# Patient Record
Sex: MALE | Race: BLACK OR AFRICAN AMERICAN | NOT HISPANIC OR LATINO | URBAN - METROPOLITAN AREA
[De-identification: names, ages, dates, MRNs, and addresses within clinical notes are randomized per-mention and may not be internally consistent; named-entity substitution may affect disease eponyms.]

---

## 2023-04-23 ENCOUNTER — INPATIENT (INPATIENT)
Facility: HOSPITAL | Age: 73
LOS: 3 days | Discharge: HOME HEALTH | DRG: 546 | End: 2023-04-27
Attending: INTERNAL MEDICINE | Admitting: INTERNAL MEDICINE
Payer: MEDICARE

## 2023-04-23 ENCOUNTER — EMERGENCY (EMERGENCY)
Facility: HOSPITAL | Age: 73
LOS: 0 days | Discharge: ANOTHER HOSPITAL | End: 2023-04-23
Attending: EMERGENCY MEDICINE
Payer: MEDICARE

## 2023-04-23 VITALS
RESPIRATION RATE: 17 BRPM | SYSTOLIC BLOOD PRESSURE: 133 MMHG | OXYGEN SATURATION: 98 % | HEIGHT: 64 IN | WEIGHT: 74.74 LBS | TEMPERATURE: 98 F | DIASTOLIC BLOOD PRESSURE: 78 MMHG | HEART RATE: 77 BPM

## 2023-04-23 VITALS
OXYGEN SATURATION: 94 % | TEMPERATURE: 98 F | DIASTOLIC BLOOD PRESSURE: 82 MMHG | RESPIRATION RATE: 16 BRPM | HEART RATE: 102 BPM | HEIGHT: 64 IN | WEIGHT: 164.91 LBS | SYSTOLIC BLOOD PRESSURE: 126 MMHG

## 2023-04-23 VITALS
SYSTOLIC BLOOD PRESSURE: 120 MMHG | HEART RATE: 78 BPM | DIASTOLIC BLOOD PRESSURE: 73 MMHG | RESPIRATION RATE: 16 BRPM | TEMPERATURE: 98 F | OXYGEN SATURATION: 100 %

## 2023-04-23 DIAGNOSIS — I21.4 NON-ST ELEVATION (NSTEMI) MYOCARDIAL INFARCTION: ICD-10-CM

## 2023-04-23 DIAGNOSIS — R06.02 SHORTNESS OF BREATH: ICD-10-CM

## 2023-04-23 DIAGNOSIS — E11.9 TYPE 2 DIABETES MELLITUS WITHOUT COMPLICATIONS: ICD-10-CM

## 2023-04-23 DIAGNOSIS — M10.9 GOUT, UNSPECIFIED: ICD-10-CM

## 2023-04-23 DIAGNOSIS — I10 ESSENTIAL (PRIMARY) HYPERTENSION: ICD-10-CM

## 2023-04-23 DIAGNOSIS — Z87.438 PERSONAL HISTORY OF OTHER DISEASES OF MALE GENITAL ORGANS: ICD-10-CM

## 2023-04-23 DIAGNOSIS — Z88.8 ALLERGY STATUS TO OTHER DRUGS, MEDICAMENTS AND BIOLOGICAL SUBSTANCES: ICD-10-CM

## 2023-04-23 DIAGNOSIS — R09.81 NASAL CONGESTION: ICD-10-CM

## 2023-04-23 DIAGNOSIS — Z79.4 LONG TERM (CURRENT) USE OF INSULIN: ICD-10-CM

## 2023-04-23 DIAGNOSIS — I45.10 UNSPECIFIED RIGHT BUNDLE-BRANCH BLOCK: ICD-10-CM

## 2023-04-23 DIAGNOSIS — M54.50 LOW BACK PAIN, UNSPECIFIED: ICD-10-CM

## 2023-04-23 LAB
ALBUMIN SERPL ELPH-MCNC: 3.2 G/DL — LOW (ref 3.3–5)
ALP SERPL-CCNC: 68 U/L — SIGNIFICANT CHANGE UP (ref 40–120)
ALT FLD-CCNC: 19 U/L — SIGNIFICANT CHANGE UP (ref 12–78)
ANION GAP SERPL CALC-SCNC: 14 MMOL/L — SIGNIFICANT CHANGE UP (ref 5–17)
ANION GAP SERPL CALC-SCNC: 5 MMOL/L — SIGNIFICANT CHANGE UP (ref 5–17)
APTT BLD: 35.7 SEC — HIGH (ref 27.5–35.5)
APTT BLD: 58.1 SEC — HIGH (ref 27.5–35.5)
AST SERPL-CCNC: 30 U/L — SIGNIFICANT CHANGE UP (ref 15–37)
BASOPHILS # BLD AUTO: 0.04 K/UL — SIGNIFICANT CHANGE UP (ref 0–0.2)
BASOPHILS NFR BLD AUTO: 0.8 % — SIGNIFICANT CHANGE UP (ref 0–2)
BILIRUB SERPL-MCNC: 0.5 MG/DL — SIGNIFICANT CHANGE UP (ref 0.2–1.2)
BUN SERPL-MCNC: 14 MG/DL — SIGNIFICANT CHANGE UP (ref 7–23)
BUN SERPL-MCNC: 15 MG/DL — SIGNIFICANT CHANGE UP (ref 7–23)
CALCIUM SERPL-MCNC: 9 MG/DL — SIGNIFICANT CHANGE UP (ref 8.4–10.5)
CALCIUM SERPL-MCNC: 9.2 MG/DL — SIGNIFICANT CHANGE UP (ref 8.5–10.1)
CHLORIDE SERPL-SCNC: 104 MMOL/L — SIGNIFICANT CHANGE UP (ref 96–108)
CHLORIDE SERPL-SCNC: 99 MMOL/L — SIGNIFICANT CHANGE UP (ref 96–108)
CO2 SERPL-SCNC: 23 MMOL/L — SIGNIFICANT CHANGE UP (ref 22–31)
CO2 SERPL-SCNC: 26 MMOL/L — SIGNIFICANT CHANGE UP (ref 22–31)
CREAT SERPL-MCNC: 1.96 MG/DL — HIGH (ref 0.5–1.3)
CREAT SERPL-MCNC: 1.97 MG/DL — HIGH (ref 0.5–1.3)
D DIMER BLD IA.RAPID-MCNC: 571 NG/ML DDU — HIGH
EGFR: 35 ML/MIN/1.73M2 — LOW
EGFR: 35 ML/MIN/1.73M2 — LOW
EOSINOPHIL # BLD AUTO: 0.26 K/UL — SIGNIFICANT CHANGE UP (ref 0–0.5)
EOSINOPHIL NFR BLD AUTO: 5.2 % — SIGNIFICANT CHANGE UP (ref 0–6)
GLUCOSE BLDC GLUCOMTR-MCNC: 83 MG/DL — SIGNIFICANT CHANGE UP (ref 70–99)
GLUCOSE SERPL-MCNC: 70 MG/DL — SIGNIFICANT CHANGE UP (ref 70–99)
GLUCOSE SERPL-MCNC: 80 MG/DL — SIGNIFICANT CHANGE UP (ref 70–99)
HCT VFR BLD CALC: 29.5 % — LOW (ref 39–50)
HGB BLD-MCNC: 9.5 G/DL — LOW (ref 13–17)
IMM GRANULOCYTES NFR BLD AUTO: 0.2 % — SIGNIFICANT CHANGE UP (ref 0–0.9)
INR BLD: 1.22 RATIO — HIGH (ref 0.88–1.16)
INR BLD: 1.26 RATIO — HIGH (ref 0.88–1.16)
LYMPHOCYTES # BLD AUTO: 2.27 K/UL — SIGNIFICANT CHANGE UP (ref 1–3.3)
LYMPHOCYTES # BLD AUTO: 45.1 % — HIGH (ref 13–44)
MCHC RBC-ENTMCNC: 27 PG — SIGNIFICANT CHANGE UP (ref 27–34)
MCHC RBC-ENTMCNC: 32.2 G/DL — SIGNIFICANT CHANGE UP (ref 32–36)
MCV RBC AUTO: 83.8 FL — SIGNIFICANT CHANGE UP (ref 80–100)
MONOCYTES # BLD AUTO: 0.6 K/UL — SIGNIFICANT CHANGE UP (ref 0–0.9)
MONOCYTES NFR BLD AUTO: 11.9 % — SIGNIFICANT CHANGE UP (ref 2–14)
NEUTROPHILS # BLD AUTO: 1.85 K/UL — SIGNIFICANT CHANGE UP (ref 1.8–7.4)
NEUTROPHILS NFR BLD AUTO: 36.8 % — LOW (ref 43–77)
NRBC # BLD: 0 /100 WBCS — SIGNIFICANT CHANGE UP (ref 0–0)
NT-PROBNP SERPL-SCNC: 751 PG/ML — HIGH (ref 0–300)
NT-PROBNP SERPL-SCNC: 785 PG/ML — HIGH (ref 0–125)
PLATELET # BLD AUTO: 232 K/UL — SIGNIFICANT CHANGE UP (ref 150–400)
POTASSIUM SERPL-MCNC: 4 MMOL/L — SIGNIFICANT CHANGE UP (ref 3.5–5.3)
POTASSIUM SERPL-MCNC: 4.3 MMOL/L — SIGNIFICANT CHANGE UP (ref 3.5–5.3)
POTASSIUM SERPL-SCNC: 4 MMOL/L — SIGNIFICANT CHANGE UP (ref 3.5–5.3)
POTASSIUM SERPL-SCNC: 4.3 MMOL/L — SIGNIFICANT CHANGE UP (ref 3.5–5.3)
PROT SERPL-MCNC: 7.3 GM/DL — SIGNIFICANT CHANGE UP (ref 6–8.3)
PROTHROM AB SERPL-ACNC: 14.7 SEC — HIGH (ref 10.5–13.4)
PROTHROM AB SERPL-ACNC: 14.7 SEC — HIGH (ref 10.5–13.4)
RAPID RVP RESULT: SIGNIFICANT CHANGE UP
RBC # BLD: 3.52 M/UL — LOW (ref 4.2–5.8)
RBC # FLD: 15.4 % — HIGH (ref 10.3–14.5)
SARS-COV-2 RNA SPEC QL NAA+PROBE: SIGNIFICANT CHANGE UP
SODIUM SERPL-SCNC: 135 MMOL/L — SIGNIFICANT CHANGE UP (ref 135–145)
SODIUM SERPL-SCNC: 136 MMOL/L — SIGNIFICANT CHANGE UP (ref 135–145)
TROPONIN I, HIGH SENSITIVITY RESULT: 150.7 NG/L — HIGH
TROPONIN T, HIGH SENSITIVITY RESULT: 149 NG/L — HIGH (ref 0–51)
WBC # BLD: 5.03 K/UL — SIGNIFICANT CHANGE UP (ref 3.8–10.5)
WBC # FLD AUTO: 5.03 K/UL — SIGNIFICANT CHANGE UP (ref 3.8–10.5)

## 2023-04-23 PROCEDURE — 72131 CT LUMBAR SPINE W/O DYE: CPT | Mod: 26,MA

## 2023-04-23 PROCEDURE — 71045 X-RAY EXAM CHEST 1 VIEW: CPT | Mod: 26

## 2023-04-23 PROCEDURE — 71275 CT ANGIOGRAPHY CHEST: CPT | Mod: 26,MA

## 2023-04-23 PROCEDURE — 70450 CT HEAD/BRAIN W/O DYE: CPT | Mod: 26,MA

## 2023-04-23 PROCEDURE — 99285 EMERGENCY DEPT VISIT HI MDM: CPT

## 2023-04-23 PROCEDURE — 93010 ELECTROCARDIOGRAM REPORT: CPT

## 2023-04-23 RX ORDER — KETOROLAC TROMETHAMINE 30 MG/ML
15 SYRINGE (ML) INJECTION ONCE
Refills: 0 | Status: DISCONTINUED | OUTPATIENT
Start: 2023-04-23 | End: 2023-04-23

## 2023-04-23 RX ORDER — GLUCAGON INJECTION, SOLUTION 0.5 MG/.1ML
1 INJECTION, SOLUTION SUBCUTANEOUS ONCE
Refills: 0 | Status: DISCONTINUED | OUTPATIENT
Start: 2023-04-23 | End: 2023-04-27

## 2023-04-23 RX ORDER — AMPICILLIN SODIUM AND SULBACTAM SODIUM 250; 125 MG/ML; MG/ML
INJECTION, POWDER, FOR SUSPENSION INTRAMUSCULAR; INTRAVENOUS
Refills: 0 | Status: DISCONTINUED | OUTPATIENT
Start: 2023-04-23 | End: 2023-04-27

## 2023-04-23 RX ORDER — PANTOPRAZOLE SODIUM 20 MG/1
40 TABLET, DELAYED RELEASE ORAL ONCE
Refills: 0 | Status: COMPLETED | OUTPATIENT
Start: 2023-04-23 | End: 2023-04-23

## 2023-04-23 RX ORDER — ASPIRIN/CALCIUM CARB/MAGNESIUM 324 MG
324 TABLET ORAL ONCE
Refills: 0 | Status: COMPLETED | OUTPATIENT
Start: 2023-04-23 | End: 2023-04-23

## 2023-04-23 RX ORDER — INSULIN LISPRO 100/ML
VIAL (ML) SUBCUTANEOUS
Refills: 0 | Status: DISCONTINUED | OUTPATIENT
Start: 2023-04-23 | End: 2023-04-27

## 2023-04-23 RX ORDER — AMPICILLIN SODIUM AND SULBACTAM SODIUM 250; 125 MG/ML; MG/ML
3 INJECTION, POWDER, FOR SUSPENSION INTRAMUSCULAR; INTRAVENOUS EVERY 6 HOURS
Refills: 0 | Status: DISCONTINUED | OUTPATIENT
Start: 2023-04-24 | End: 2023-04-27

## 2023-04-23 RX ORDER — AMPICILLIN SODIUM AND SULBACTAM SODIUM 250; 125 MG/ML; MG/ML
3 INJECTION, POWDER, FOR SUSPENSION INTRAMUSCULAR; INTRAVENOUS ONCE
Refills: 0 | Status: COMPLETED | OUTPATIENT
Start: 2023-04-23 | End: 2023-04-23

## 2023-04-23 RX ORDER — DEXTROSE 50 % IN WATER 50 %
12.5 SYRINGE (ML) INTRAVENOUS ONCE
Refills: 0 | Status: DISCONTINUED | OUTPATIENT
Start: 2023-04-23 | End: 2023-04-27

## 2023-04-23 RX ORDER — FLUTICASONE PROPIONATE 50 MCG
1 SPRAY, SUSPENSION NASAL
Refills: 0 | Status: DISCONTINUED | OUTPATIENT
Start: 2023-04-23 | End: 2023-04-27

## 2023-04-23 RX ORDER — HEPARIN SODIUM 5000 [USP'U]/ML
4400 INJECTION INTRAVENOUS; SUBCUTANEOUS EVERY 6 HOURS
Refills: 0 | Status: DISCONTINUED | OUTPATIENT
Start: 2023-04-23 | End: 2023-04-24

## 2023-04-23 RX ORDER — HEPARIN SODIUM 5000 [USP'U]/ML
INJECTION INTRAVENOUS; SUBCUTANEOUS
Qty: 25000 | Refills: 0 | Status: DISCONTINUED | OUTPATIENT
Start: 2023-04-23 | End: 2023-04-24

## 2023-04-23 RX ORDER — HEPARIN SODIUM 5000 [USP'U]/ML
900 INJECTION INTRAVENOUS; SUBCUTANEOUS
Qty: 25000 | Refills: 0 | Status: DISCONTINUED | OUTPATIENT
Start: 2023-04-23 | End: 2023-04-23

## 2023-04-23 RX ORDER — CLOPIDOGREL BISULFATE 75 MG/1
600 TABLET, FILM COATED ORAL ONCE
Refills: 0 | Status: COMPLETED | OUTPATIENT
Start: 2023-04-23 | End: 2023-04-23

## 2023-04-23 RX ORDER — DEXTROSE 50 % IN WATER 50 %
15 SYRINGE (ML) INTRAVENOUS ONCE
Refills: 0 | Status: DISCONTINUED | OUTPATIENT
Start: 2023-04-23 | End: 2023-04-27

## 2023-04-23 RX ORDER — SODIUM CHLORIDE 9 MG/ML
1000 INJECTION, SOLUTION INTRAVENOUS
Refills: 0 | Status: DISCONTINUED | OUTPATIENT
Start: 2023-04-23 | End: 2023-04-27

## 2023-04-23 RX ORDER — DEXTROSE 50 % IN WATER 50 %
25 SYRINGE (ML) INTRAVENOUS ONCE
Refills: 0 | Status: DISCONTINUED | OUTPATIENT
Start: 2023-04-23 | End: 2023-04-27

## 2023-04-23 RX ORDER — SODIUM CHLORIDE 9 MG/ML
1000 INJECTION INTRAMUSCULAR; INTRAVENOUS; SUBCUTANEOUS ONCE
Refills: 0 | Status: COMPLETED | OUTPATIENT
Start: 2023-04-23 | End: 2023-04-23

## 2023-04-23 RX ORDER — INSULIN LISPRO 100/ML
VIAL (ML) SUBCUTANEOUS AT BEDTIME
Refills: 0 | Status: DISCONTINUED | OUTPATIENT
Start: 2023-04-23 | End: 2023-04-27

## 2023-04-23 RX ADMIN — AMPICILLIN SODIUM AND SULBACTAM SODIUM 200 GRAM(S): 250; 125 INJECTION, POWDER, FOR SUSPENSION INTRAMUSCULAR; INTRAVENOUS at 15:51

## 2023-04-23 RX ADMIN — SODIUM CHLORIDE 1000 MILLILITER(S): 9 INJECTION INTRAMUSCULAR; INTRAVENOUS; SUBCUTANEOUS at 13:03

## 2023-04-23 RX ADMIN — Medication 324 MILLIGRAM(S): at 13:03

## 2023-04-23 RX ADMIN — PANTOPRAZOLE SODIUM 40 MILLIGRAM(S): 20 TABLET, DELAYED RELEASE ORAL at 10:29

## 2023-04-23 RX ADMIN — CLOPIDOGREL BISULFATE 600 MILLIGRAM(S): 75 TABLET, FILM COATED ORAL at 13:03

## 2023-04-23 RX ADMIN — Medication 15 MILLIGRAM(S): at 10:29

## 2023-04-23 RX ADMIN — HEPARIN SODIUM 900 UNIT(S)/HR: 5000 INJECTION INTRAVENOUS; SUBCUTANEOUS at 13:03

## 2023-04-23 RX ADMIN — HEPARIN SODIUM 900 UNIT(S)/HR: 5000 INJECTION INTRAVENOUS; SUBCUTANEOUS at 20:26

## 2023-04-23 NOTE — ED ADULT NURSE NOTE - OBJECTIVE STATEMENT
72y m pt arrived via ems; emt reports tx from [facility] vs; pt dx with nstemi; had gone to hospital regarding chronic sinus condition; nstemi found on ekg; elevated trop and bnp; pt given 324mg aspirin, 600mg plavix, toradol 15mg, protonix 40mg and is on heparin drip 900units/hr 9ml/hr started at 1300; no bolus given secondary to hematuria a few years ago; pt has 20g in L ac; pt aox3; no resp distress; no sob; no diff breath; no chest pain; pt c/o chronic back pain; no abd pain; no n/v/d; denies fever/chills; + cough/congestion; second iv placed-18g r ac; ekg done; pt placed on cardiac monitor in nsr; heparin infusing at 9ml/hr; repeat ptt due 1900; pt weighed; pt swabbed; labs drawn/sent

## 2023-04-23 NOTE — ED PROVIDER NOTE - PHYSICAL EXAMINATION
Physical Exam:  General: NAD, Conversive  Eyes: EOMI, Conjunctiva and sclera clear  Nose: + pus from R. nare  Neck: No JVD  Lungs: Clear to auscultation bilaterally, no wheeze, no rhonchi  Heart: Normal S1, S2, no murmurs  Abdomen: Soft, nontender, nondistended, no CVA tenderness  Extremities: 2+ peripheral pulses, no edema  Psych: AAO X3  Neurologic: Non-focal

## 2023-04-23 NOTE — ED PROVIDER NOTE - ATTENDING CONTRIBUTION TO CARE
Attending MD Gutiérrez:  I personally have seen and examined this patient. I have performed a substantive portion of the visit including all aspects of the medical decision making.  Resident note reviewed and agree on plan of care and except where noted.        72-year-old gentleman presenting as a transfer from outside hospital for possible NSTEMI.  The patient initially presented to outside emergency department for acute on chronic nasal congestion.  He stated that he had difficulty breathing out of his nose because of congestion.  This has been going on for years he states.  He denies any headaches facial pain or fevers.  Reportedly has a history of nasal polyp.  At outside hospital patient underwent ECG that showed ST elevations in V1 and V2.  The patient denies ever having any chest pain.  He again states that his shortness of breath is because of nasal congestion.  Patient did have troponin I of 150 which was elevated and patient was started on dual antiplatelet therapy and started on heparin infusion.    Patient on exam in no apparent distress sitting in the stretcher.  No increased work of breathing.  There is visible purulence in the anterior portion of the right nares.  There is no facial tenderness.  Extraocular movements intact bilaterally.  Regular heart sounds clear lungs anteriorly.  Trace edema bilateral ankles.  Patient moves all extremities spontaneously.    ECG at 1417 independently interpreted by me and shows normal sinus rhythm left axis deviation Q waves in lead V1 and V2, 1-1/2 mm of ST elevation in lead V2, 0.5 mm of ST elevation in V1, T wave inversion in lead aVL    Patient presenting for evaluation initially of nasal congestion but found to have abnormal cardiogram with elevated troponin at outside hospital.  Patient at this time with no active chest pain no shortness of breath.  Repeat ECG does show Q waves in V1 and V2 with maximal elevation of 1.5 mm in V2 isolated about 0.5 mm elevation in V1.  Highly doubt type I MI in this patient however we will continue medical therapies for now, cardiology fellow is aware and will assess patient.  Disposition and treatment plan will be in accordance with cardiology recommendations.  Patient certainly does have pretty severe sinus disease in the setting of known nasal polyps.      *The above represents an initial assessment/impression. Please refer to progress notes for potential changes in patient clinical course*

## 2023-04-23 NOTE — ED PROVIDER NOTE - MUSCULOSKELETAL NECK EXAM
+ old surgical scar in the mid posterior neck/no deformity, pain or tenderness. no restriction of movement/supple/trachea midline

## 2023-04-23 NOTE — H&P ADULT - NEGATIVE ENMT SYMPTOMS
no hearing difficulty/no ear pain/no tinnitus/no vertigo/no nasal discharge/no post-nasal discharge/no nose bleeds/no recurrent cold sores/no abnormal taste sensation/no gum bleeding/no throat pain

## 2023-04-23 NOTE — ED PROVIDER NOTE - PROGRESS NOTE DETAILS
Prabhu Bolton MD:  Cardiology fellow consulted, awaiting cardiology recommendations Prabhu Bolton MD:  Discussed with cardiology, low concern NSTEMI, Trop maintained elevation to ~150, will admit on heparin, discussed with unattached hospitalist non-emergent ENT consultation

## 2023-04-23 NOTE — ED PROVIDER NOTE - OBJECTIVE STATEMENT
72 Y M h/O chronic sinusitis, last evaluation by cardiologist in 2015, presenting due to incidental finding of NSTEMI with trop I of 151, started on DAPT, heparin infusion without bolus, patient denies any chest pain, difficulty breathing (from mouth, chronic difficulty breathing through nose), upon arrival patient remains asymptomtic with repeat ECG demonstrating 1.5 mm elevation v2,

## 2023-04-23 NOTE — ED ADULT NURSE NOTE - NSIMPLEMENTINTERV_GEN_ALL_ED
Implemented All Fall Risk Interventions:  [place] to call system. Call bell, personal items and telephone within reach. Instruct patient to call for assistance. Room bathroom lighting operational. Non-slip footwear when patient is off stretcher. Physically safe environment: no spills, clutter or unnecessary equipment. Stretcher in lowest position, wheels locked, appropriate side rails in place. Provide visual cue, wrist band, yellow gown, etc. Monitor gait and stability. Monitor for mental status changes and reorient to person, place, and time. Review medications for side effects contributing to fall risk. Reinforce activity limits and safety measures with patient and family.

## 2023-04-23 NOTE — ED PROVIDER NOTE - OBJECTIVE STATEMENT
72 years old male with wife c/o nasal congestions sob unable to breath for more than one year does not varies with lying down or sitting up. Pt also c/o lower back pain for a while. Pt denies recent hx of long traveling, recent hx of trauma, headache, dizziness, blurred visions, light sensitivities, focal/distal weakness or numbness, neck/hips/calfs pain, cough, chest pain, nausea, vomiting, fever, chills, abd pain, dysuria or irregular bowel movements. Pt sts he has hx of BPH, gout, diabetes, nasal polyp, hypertension, and cervical spines sx. pt's pox is 99 % to 100% in room air, rr 11 to 14 bp 114/56 at bed side.

## 2023-04-23 NOTE — ED PROVIDER NOTE - CLINICAL SUMMARY MEDICAL DECISION MAKING FREE TEXT BOX
72 Y M p/w chronic difficulty breathing through nares, incidental NSTEMI with no cardiology evaluation since 2015, ECG demonstrates marked 1-1.5 mm elevation in V2, V3, with AVL inversion, will discuss with cardiology, patient remains asymptomatic, on DAPT and heparin drip, awaiting final disposition for catheterization based on cardiology recommendations.

## 2023-04-23 NOTE — ED PROVIDER NOTE - CONSTITUTIONAL, MLM
normal... Well appearing, awake, alert, oriented to person, place, time/situation and in no apparent distress. Speaking in clear full sentences no nasal flaring no shoulders retractions no diaphoresis appears very comfortable lying in the stretcher in a bright light room

## 2023-04-23 NOTE — ED ADULT NURSE NOTE - OBJECTIVE STATEMENT
patient alert and oriented x4, came in for shortness of breath. pt states for the past few years he's been having shortness of breath especially at night, and can't take it anymore since it causes difficulty breathing. pt has history of nasal polyps and had surgery "many" years ago, but pt states frequent nose bleeds. pt also has been having lower back pain for quite some time, denies taking any medication for relief. pt denies any other pmh. pt placed on cardiac monitor and pulse oximetry, oxygen saturation 99% on room air, pt in no acute distress at this time.

## 2023-04-23 NOTE — ED ADULT TRIAGE NOTE - NS ED NURSE AMBULANCES
[facility] [Alert] : alert [No Acute Distress] : no acute distress [PERRL] : PERRL [Normocephalic] : normocephalic [Conjunctivae with no discharge] : conjunctivae with no discharge [Clear Tympanic membranes with present light reflex and bony landmarks] : clear tympanic membranes with present light reflex and bony landmarks [Auricles Well Formed] : auricles well formed [EOMI Bilateral] : EOMI bilateral [No Discharge] : no discharge [Nares Patent] : nares patent [Pink Nasal Mucosa] : pink nasal mucosa [Palate Intact] : palate intact [Nonerythematous Oropharynx] : nonerythematous oropharynx [Supple, full passive range of motion] : supple, full passive range of motion [Symmetric Chest Rise] : symmetric chest rise [No Palpable Masses] : no palpable masses [Normal S1, S2 present] : normal S1, S2 present [Regular Rate and Rhythm] : regular rate and rhythm [Clear to Auscultation Bilaterally] : clear to auscultation bilaterally [No Murmurs] : no murmurs [+2 Femoral Pulses] : +2 femoral pulses [Soft] : soft [NonTender] : non tender [Non Distended] : non distended [No Hepatomegaly] : no hepatomegaly [Normoactive Bowel Sounds] : normoactive bowel sounds [Testicles Descended Bilaterally] : testicles descended bilaterally [No Splenomegaly] : no splenomegaly [No fissures] : no fissures [Patent] : patent [No Abnormal Lymph Nodes Palpated] : no abnormal lymph nodes palpated [No Gait Asymmetry] : no gait asymmetry [Normal Muscle Tone] : normal muscle tone [No pain or deformities with palpation of bone, muscles, joints] : no pain or deformities with palpation of bone, muscles, joints [+2 Patella DTR] : +2 patella DTR [Straight] : straight [Cranial Nerves Grossly Intact] : cranial nerves grossly intact [No Rash or Lesions] : no rash or lesions

## 2023-04-23 NOTE — CONSULT NOTE ADULT - ASSESSMENT
72 y.o M with hx of HTN, CKD, DM, nasal polyps, sinusitis who presented to [facility] with difficulty breathing through his nose and was found to have elevated troponin and transferred to [facility] for further management.     #Elevated troponin  Patient without chest pain or angina equivalent eg SOB.   ECG: Sinus rhythm with LVH and repolarization abnormalties  Troponin trend: 149    Recommendations  - Troponin elevation does not appear to be related acute coronary syndrome and is in the presence of underlying CKD  - S/p ASA, Plavix load at OSH  - Continue aspirin 81mg daily  - TTE  - Check lipid panel, TSH  - Telemetry monitoring    Recommendations are preliminary until attending attestation.    Alvarez Perdomo MD  Cardiology Fellow- PGY 4 72 y.o M with hx of HTN, CKD, DM, nasal polyps, sinusitis who presented to [facility] with difficulty breathing through his nose and was found to have elevated troponin and transferred to [facility] for further management.     #Elevated troponin  Patient without chest pain or angina equivalent eg SOB.   ECG: Sinus rhythm with LVH and repolarization abnormalties  Troponin trend: 149    Recommendations  - Troponin elevation does not appear to be related acute coronary syndrome and is in the presence of underlying CKD  - S/p ASA, Plavix load at OSH  - Continue aspirin 81mg daily  - Trend troponin with CK, CKMB to peak  - TTE  - Check lipid panel, TSH  - Telemetry monitoring    Recommendations are preliminary until attending attestation.    Alvarez Perdomo MD  Cardiology Fellow- PGY 4 72 y.o M with hx of HTN, CKD, DM, nasal polyps, sinusitis who presented to [facility] with difficulty breathing through his nose and was found to have elevated troponin and transferred to [facility] for further management.     #Elevated troponin  Patient without chest pain or angina equivalent eg SOB.   ECG: Sinus rhythm with LVH and repolarization abnormalties  Troponin trend: 149    Recommendations  - Troponin elevation does not appear to be related acute coronary syndrome and is in the presence of underlying CKD  - S/p ASA, Plavix load at OSH  - Continue aspirin 81mg daily  - Trend troponin with CK, CKMB to peak  - TTE  - Check lipid panel, TSH  - Telemetry monitoring    Recommendations are preliminary until attending attestation.    Alvarez Perdomo MD  Cardiology Fellow- PGY 4    This patient was seen and examined personally by me and the plan was discussed with the fellow and/or resident above. Amendments were made as necessary to the above. Agree with the excellent note and plan above. 72M  w HTN, DM, CKD, nasal polyps here w SOB and nstemi. ECG w LVH. Trop flat with Cr. 1.97. TTE pending. If trop flat and TTE wnl can likely pursue non-invasive ischemic workup.    Joshua Allen MD, MPhil, [facility]  Cardiologist, [facility]  ; [facility] [employer] [facility]  Email: [email or web address] on-service contact/call information, go to [email or web address] and use [email or web address]" to login.  Outpatient Cardiology appointments, call [phone] to arrange with a colleague; I do not have outpatient Cardiology clinic.

## 2023-04-23 NOTE — ED PROVIDER NOTE - PROGRESS NOTE DETAILS
Dr. Valenzuela cardiologist is paged thru his service and team text. Transfer center is called. Transfer center is called. Dr. Perdomo cardiologist is notified sts he will call back after review the faxed ekg with Dr. Kaminski. Dr. Perdomo cardiologist called back sts pt will be transferred to [facility] ED. and [facility] start heparin ivpb and plavix 600 mg orally. according to [facility] medical record pt was allergic to heparin Pt however sts he was placed on an unknown blood thinner which caused hematuria no swelling no sob no rash after ortho sx in 2015. Transfer center and Dr. Perdomo cardiologist is called back sts just do it without heparin bolus continue drip and plavix.

## 2023-04-23 NOTE — H&P ADULT - NSHPLABSRESULTS_GEN_ALL_CORE
LABS:                        9.5    5.03  )-----------( 232      ( 23 Apr 2023 10:30 )             29.5     04-23    136  |  99  |  14  ----------------------------<  70  4.0   |  23  |  1.97<H>    Ca    9.0      23 Apr 2023 15:02    TPro  7.3  /  Alb  3.2<L>  /  TBili  0.5  /  DBili  x   /  AST  30  /  ALT  19  /  AlkPhos  68  04-23    PT/INR - ( 23 Apr 2023 19:37 )   PT: 14.7 sec;   INR: 1.26 ratio         PTT - ( 23 Apr 2023 19:37 )  PTT:58.1 sec          RADIOLOGY & ADDITIONAL TESTS:

## 2023-04-23 NOTE — ED PROVIDER NOTE - CLINICAL SUMMARY MEDICAL DECISION MAKING FREE TEXT BOX
hx, exam labs, ct head. cta chest no pulmonary embolism, consulted with cardiologist Dr. Valenzuela and transfer center Dr. Perdomo who reviewed ekg and labs and accept pt to [facility] for nonurgent cardiocath. Pt is very comfortable stable for transferring.

## 2023-04-23 NOTE — ED PROVIDER NOTE - NS ED ROS FT
GENERAL: No fever or chills  EYES: No change in vision  HEENT: No trouble swallowing or speaking, + difficulty breathing through nose  CARDIAC: No chest pain  PULMONARY: No cough or SOB  GI: No abdominal pain, no nausea or no vomiting, no diarrhea or constipation  NEURO: No new numbness  MSK: No joint pain  Otherwise as HPI or negative.

## 2023-04-23 NOTE — CONSULT NOTE ADULT - SUBJECTIVE AND OBJECTIVE BOX
CARDIOLOGY FELLOW CONSULT NOTE    HPI:  72 y.o M with hx of HTN, DM, nasal polyps, sinusitis who presented to [facility] with difficulty breathing and was found to have elevated troponin and transferred to [facility] for NSTEMI management. Patient reports progressive difficulty breathing through his nose, he has no difficulty or SOB when breathing through his mouth. He denies any chest pain, dyspnea on exertion, orthopnea or PND. At OSH was found to have troponin of elevation and transferred to [facility] for further management.     PMHx:   HTN (hypertension)  DM (diabetes mellitus)  Nasal polyp    PSHx:   S/P cataract extraction  S/P nasal polypectomy    Allergies:  No Known Allergies      Home Meds:    Current Medications:   ampicillin/sulbactam  IVPB      heparin   Injectable 4400 Unit(s) IV Push every 6 hours PRN  heparin  Infusion.  Unit(s)/Hr IV Continuous <Continuous>    FAMILY HISTORY:  No pertinent family history    ROS:  CV: chest pain (-), palpitation (-), orthopnea (-), PND (-), edema (-)  PULM: SOB (-), cough (-), wheezing (-), hemoptysis (-).   CONST: fever (-), chills (-) or fatigue (-)  GI: abdominal distension (-), abdominal pain (-) , nausea/vomiting (-), hematemesis, (-), melena (-), hematochezia (-)  : dysuria (-), frequency (-), hematuria (-).   NEURO: numbness (-), weakness (-), dizziness (-)  SKIN: itching (-), rash (-)  HEENT:  visual changes (-); vertigo or throat pain (-);  neck stiffness (-)     Physical Exam:  T(F): 98.7 (04-23), Max: 98.7 (04-23)  HR: 70 (04-23) (70 - 102)  BP: 98/59 (04-23) (98/59 - 133/78)  RR: 16 (04-23)  SpO2: 95% (04-23)    GENERAL: NAD  HEAD:  Atraumatic, Normocephalic.  EYES: EOMI, PERRLA, conjunctiva and sclera clear.  NECK: Supple, No JVD.  CHEST/LUNG: Clear to auscultation bilaterally; No rales, rhonchi, wheezing, or rubs. Speaking in full sentences  HEART: Regular rate and rhythm; No murmurs, rubs, or gallops.  ABDOMEN: Bowel sounds present; Soft, Nontender, Nondistended.   EXTREMITIES:  2+ Peripheral Pulses, brisk capillary refill. No clubbing, cyanosis, or edema.  PSYCH: Normal affect.  SKIN: No rashes or lesions.    ECG: Personally reviewed  Sinus rhythm with LVH and repolarization abnormalities     Echo: Personally reviewed    2015  Conclusions:  Technically difficult study with suboptimal image quality.  1. Mild mitral annular calcification. Minimal mitral  regurgitation.  2. Normal left ventricular internal dimensions.  Mildly  thickened basal anteroseptum.  3. Endocardium not well visualized in all myocardial  segments.  Normal overall left ventricular systolic  function with an estimated ejection fraction of 60%.  4. Normal right ventricular size and function.    CXR: Personally reviewed    Labs: Personally reviewed                        9.5    5.03  )-----------( 232      ( 23 Apr 2023 10:30 )             29.5     04-23    136  |  99  |  14  ----------------------------<  70  4.0   |  23  |  1.97<H>    Ca    9.0      23 Apr 2023 15:02    TPro  7.3  /  Alb  3.2<L>  /  TBili  0.5  /  DBili  x   /  AST  30  /  ALT  19  /  AlkPhos  68  04-23    PT/INR - ( 23 Apr 2023 19:37 )   PT: 14.7 sec;   INR: 1.26 ratio      PTT - ( 23 Apr 2023 19:37 )  PTT:58.1 sec    CARDIAC MARKERS ( 23 Apr 2023 15:02 )  149 ng/L / x     / x     / x     / x     / x

## 2023-04-23 NOTE — ED ADULT NURSE NOTE - NSIMPLEMENTINTERV_GEN_ALL_ED
Implemented All Fall with Harm Risk Interventions:  [place] to call system. Call bell, personal items and telephone within reach. Instruct patient to call for assistance. Room bathroom lighting operational. Non-slip footwear when patient is off stretcher. Physically safe environment: no spills, clutter or unnecessary equipment. Stretcher in lowest position, wheels locked, appropriate side rails in place. Provide visual cue, wrist band, yellow gown, etc. Monitor gait and stability. Monitor for mental status changes and reorient to person, place, and time. Review medications for side effects contributing to fall risk. Reinforce activity limits and safety measures with patient and family. Provide visual clues: red socks.

## 2023-04-24 DIAGNOSIS — J33.9 NASAL POLYP, UNSPECIFIED: ICD-10-CM

## 2023-04-24 DIAGNOSIS — R06.02 SHORTNESS OF BREATH: ICD-10-CM

## 2023-04-24 DIAGNOSIS — I21.4 NON-ST ELEVATION (NSTEMI) MYOCARDIAL INFARCTION: ICD-10-CM

## 2023-04-24 LAB
A1C WITH ESTIMATED AVERAGE GLUCOSE RESULT: 5.9 % — HIGH (ref 4–5.6)
ANION GAP SERPL CALC-SCNC: 16 MMOL/L — SIGNIFICANT CHANGE UP (ref 5–17)
APTT BLD: 112.9 SEC — HIGH (ref 27.5–35.5)
APTT BLD: 70.4 SEC — HIGH (ref 27.5–35.5)
BLD GP AB SCN SERPL QL: NEGATIVE — SIGNIFICANT CHANGE UP
BUN SERPL-MCNC: 16 MG/DL — SIGNIFICANT CHANGE UP (ref 7–23)
CALCIUM SERPL-MCNC: 8.8 MG/DL — SIGNIFICANT CHANGE UP (ref 8.4–10.5)
CHLORIDE SERPL-SCNC: 101 MMOL/L — SIGNIFICANT CHANGE UP (ref 96–108)
CK MB BLD-MCNC: 1.6 % — SIGNIFICANT CHANGE UP (ref 0–3.5)
CK MB CFR SERPL CALC: 3.1 NG/ML — SIGNIFICANT CHANGE UP (ref 0–6.7)
CK SERPL-CCNC: 189 U/L — SIGNIFICANT CHANGE UP (ref 30–200)
CO2 SERPL-SCNC: 20 MMOL/L — LOW (ref 22–31)
CREAT SERPL-MCNC: 2.11 MG/DL — HIGH (ref 0.5–1.3)
EGFR: 32 ML/MIN/1.73M2 — LOW
ESTIMATED AVERAGE GLUCOSE: 123 MG/DL — HIGH (ref 68–114)
FERRITIN SERPL-MCNC: 178 NG/ML — SIGNIFICANT CHANGE UP (ref 30–400)
FOLATE SERPL-MCNC: 15.6 NG/ML — SIGNIFICANT CHANGE UP
GLUCOSE BLDC GLUCOMTR-MCNC: 104 MG/DL — HIGH (ref 70–99)
GLUCOSE BLDC GLUCOMTR-MCNC: 108 MG/DL — HIGH (ref 70–99)
GLUCOSE BLDC GLUCOMTR-MCNC: 108 MG/DL — HIGH (ref 70–99)
GLUCOSE BLDC GLUCOMTR-MCNC: 55 MG/DL — LOW (ref 70–99)
GLUCOSE BLDC GLUCOMTR-MCNC: 61 MG/DL — LOW (ref 70–99)
GLUCOSE BLDC GLUCOMTR-MCNC: 71 MG/DL — SIGNIFICANT CHANGE UP (ref 70–99)
GLUCOSE BLDC GLUCOMTR-MCNC: 79 MG/DL — SIGNIFICANT CHANGE UP (ref 70–99)
GLUCOSE BLDC GLUCOMTR-MCNC: 86 MG/DL — SIGNIFICANT CHANGE UP (ref 70–99)
GLUCOSE BLDC GLUCOMTR-MCNC: 95 MG/DL — SIGNIFICANT CHANGE UP (ref 70–99)
GLUCOSE BLDC GLUCOMTR-MCNC: 96 MG/DL — SIGNIFICANT CHANGE UP (ref 70–99)
GLUCOSE BLDC GLUCOMTR-MCNC: 99 MG/DL — SIGNIFICANT CHANGE UP (ref 70–99)
GLUCOSE SERPL-MCNC: 56 MG/DL — LOW (ref 70–99)
HCT VFR BLD CALC: 27.7 % — LOW (ref 39–50)
HCT VFR BLD CALC: 28.5 % — LOW (ref 39–50)
HCT VFR BLD CALC: 28.7 % — LOW (ref 39–50)
HCT VFR BLD CALC: 29.4 % — LOW (ref 39–50)
HCV AB S/CO SERPL IA: 0.09 S/CO — SIGNIFICANT CHANGE UP (ref 0–0.99)
HCV AB SERPL-IMP: SIGNIFICANT CHANGE UP
HGB BLD-MCNC: 9.1 G/DL — LOW (ref 13–17)
HGB BLD-MCNC: 9.3 G/DL — LOW (ref 13–17)
HGB BLD-MCNC: 9.5 G/DL — LOW (ref 13–17)
HGB BLD-MCNC: 9.7 G/DL — LOW (ref 13–17)
INR BLD: 1.25 RATIO — HIGH (ref 0.88–1.16)
IRON SATN MFR SERPL: 18 % — SIGNIFICANT CHANGE UP (ref 16–55)
IRON SATN MFR SERPL: 36 UG/DL — LOW (ref 45–165)
MAGNESIUM SERPL-MCNC: 2 MG/DL — SIGNIFICANT CHANGE UP (ref 1.6–2.6)
MCHC RBC-ENTMCNC: 27.4 PG — SIGNIFICANT CHANGE UP (ref 27–34)
MCHC RBC-ENTMCNC: 27.5 PG — SIGNIFICANT CHANGE UP (ref 27–34)
MCHC RBC-ENTMCNC: 27.6 PG — SIGNIFICANT CHANGE UP (ref 27–34)
MCHC RBC-ENTMCNC: 28.1 PG — SIGNIFICANT CHANGE UP (ref 27–34)
MCHC RBC-ENTMCNC: 32.4 GM/DL — SIGNIFICANT CHANGE UP (ref 32–36)
MCHC RBC-ENTMCNC: 32.9 GM/DL — SIGNIFICANT CHANGE UP (ref 32–36)
MCHC RBC-ENTMCNC: 33 GM/DL — SIGNIFICANT CHANGE UP (ref 32–36)
MCHC RBC-ENTMCNC: 33.3 GM/DL — SIGNIFICANT CHANGE UP (ref 32–36)
MCV RBC AUTO: 82.8 FL — SIGNIFICANT CHANGE UP (ref 80–100)
MCV RBC AUTO: 83.7 FL — SIGNIFICANT CHANGE UP (ref 80–100)
MCV RBC AUTO: 84.7 FL — SIGNIFICANT CHANGE UP (ref 80–100)
MCV RBC AUTO: 85.2 FL — SIGNIFICANT CHANGE UP (ref 80–100)
NRBC # BLD: 0 /100 WBCS — SIGNIFICANT CHANGE UP (ref 0–0)
PHOSPHATE SERPL-MCNC: 3.7 MG/DL — SIGNIFICANT CHANGE UP (ref 2.5–4.5)
PLATELET # BLD AUTO: 212 K/UL — SIGNIFICANT CHANGE UP (ref 150–400)
PLATELET # BLD AUTO: 215 K/UL — SIGNIFICANT CHANGE UP (ref 150–400)
PLATELET # BLD AUTO: 224 K/UL — SIGNIFICANT CHANGE UP (ref 150–400)
PLATELET # BLD AUTO: 226 K/UL — SIGNIFICANT CHANGE UP (ref 150–400)
POTASSIUM SERPL-MCNC: 4.2 MMOL/L — SIGNIFICANT CHANGE UP (ref 3.5–5.3)
POTASSIUM SERPL-SCNC: 4.2 MMOL/L — SIGNIFICANT CHANGE UP (ref 3.5–5.3)
PROTHROM AB SERPL-ACNC: 14.5 SEC — HIGH (ref 10.5–13.4)
PSA FLD-MCNC: 16 NG/ML — HIGH (ref 0–4)
RBC # BLD: 3.31 M/UL — LOW (ref 4.2–5.8)
RBC # BLD: 3.39 M/UL — LOW (ref 4.2–5.8)
RBC # BLD: 3.44 M/UL — LOW (ref 4.2–5.8)
RBC # BLD: 3.45 M/UL — LOW (ref 4.2–5.8)
RBC # FLD: 15 % — HIGH (ref 10.3–14.5)
RBC # FLD: 15 % — HIGH (ref 10.3–14.5)
RBC # FLD: 15.1 % — HIGH (ref 10.3–14.5)
RBC # FLD: 15.1 % — HIGH (ref 10.3–14.5)
RH IG SCN BLD-IMP: POSITIVE — SIGNIFICANT CHANGE UP
SODIUM SERPL-SCNC: 137 MMOL/L — SIGNIFICANT CHANGE UP (ref 135–145)
TIBC SERPL-MCNC: 203 UG/DL — LOW (ref 220–430)
TROPONIN T, HIGH SENSITIVITY RESULT: 148 NG/L — HIGH (ref 0–51)
TSH SERPL-MCNC: 2.38 UIU/ML — SIGNIFICANT CHANGE UP (ref 0.27–4.2)
UIBC SERPL-MCNC: 167 UG/DL — SIGNIFICANT CHANGE UP (ref 110–370)
VIT B12 SERPL-MCNC: 677 PG/ML — SIGNIFICANT CHANGE UP (ref 232–1245)
WBC # BLD: 4.77 K/UL — SIGNIFICANT CHANGE UP (ref 3.8–10.5)
WBC # BLD: 5.15 K/UL — SIGNIFICANT CHANGE UP (ref 3.8–10.5)
WBC # BLD: 5.47 K/UL — SIGNIFICANT CHANGE UP (ref 3.8–10.5)
WBC # BLD: 6.71 K/UL — SIGNIFICANT CHANGE UP (ref 3.8–10.5)
WBC # FLD AUTO: 4.77 K/UL — SIGNIFICANT CHANGE UP (ref 3.8–10.5)
WBC # FLD AUTO: 5.15 K/UL — SIGNIFICANT CHANGE UP (ref 3.8–10.5)
WBC # FLD AUTO: 5.47 K/UL — SIGNIFICANT CHANGE UP (ref 3.8–10.5)
WBC # FLD AUTO: 6.71 K/UL — SIGNIFICANT CHANGE UP (ref 3.8–10.5)

## 2023-04-24 PROCEDURE — 99222 1ST HOSP IP/OBS MODERATE 55: CPT | Mod: FS,25

## 2023-04-24 PROCEDURE — 51703 INSERT BLADDER CATH COMPLEX: CPT

## 2023-04-24 PROCEDURE — 99221 1ST HOSP IP/OBS SF/LOW 40: CPT

## 2023-04-24 PROCEDURE — 31231 NASAL ENDOSCOPY DX: CPT

## 2023-04-24 PROCEDURE — 76770 US EXAM ABDO BACK WALL COMP: CPT | Mod: 26

## 2023-04-24 RX ORDER — SODIUM CHLORIDE 9 MG/ML
1000 INJECTION, SOLUTION INTRAVENOUS
Refills: 0 | Status: DISCONTINUED | OUTPATIENT
Start: 2023-04-24 | End: 2023-04-26

## 2023-04-24 RX ORDER — SODIUM CHLORIDE 0.65 %
2 AEROSOL, SPRAY (ML) NASAL THREE TIMES A DAY
Refills: 0 | Status: DISCONTINUED | OUTPATIENT
Start: 2023-04-24 | End: 2023-04-27

## 2023-04-24 RX ORDER — SODIUM CHLORIDE 9 MG/ML
1000 INJECTION INTRAMUSCULAR; INTRAVENOUS; SUBCUTANEOUS
Refills: 0 | Status: DISCONTINUED | OUTPATIENT
Start: 2023-04-24 | End: 2023-04-26

## 2023-04-24 RX ORDER — DEXTROSE 50 % IN WATER 50 %
12.5 SYRINGE (ML) INTRAVENOUS ONCE
Refills: 0 | Status: COMPLETED | OUTPATIENT
Start: 2023-04-24 | End: 2023-04-24

## 2023-04-24 RX ADMIN — AMPICILLIN SODIUM AND SULBACTAM SODIUM 200 GRAM(S): 250; 125 INJECTION, POWDER, FOR SUSPENSION INTRAMUSCULAR; INTRAVENOUS at 00:51

## 2023-04-24 RX ADMIN — HEPARIN SODIUM 900 UNIT(S)/HR: 5000 INJECTION INTRAVENOUS; SUBCUTANEOUS at 00:50

## 2023-04-24 RX ADMIN — Medication 1 SPRAY(S): at 06:04

## 2023-04-24 RX ADMIN — HEPARIN SODIUM 0 UNIT(S)/HR: 5000 INJECTION INTRAVENOUS; SUBCUTANEOUS at 03:03

## 2023-04-24 RX ADMIN — Medication 2 SPRAY(S): at 21:35

## 2023-04-24 RX ADMIN — Medication 12.5 GRAM(S): at 08:27

## 2023-04-24 RX ADMIN — AMPICILLIN SODIUM AND SULBACTAM SODIUM 200 GRAM(S): 250; 125 INJECTION, POWDER, FOR SUSPENSION INTRAMUSCULAR; INTRAVENOUS at 06:04

## 2023-04-24 RX ADMIN — Medication 1 SPRAY(S): at 17:43

## 2023-04-24 RX ADMIN — AMPICILLIN SODIUM AND SULBACTAM SODIUM 200 GRAM(S): 250; 125 INJECTION, POWDER, FOR SUSPENSION INTRAMUSCULAR; INTRAVENOUS at 13:39

## 2023-04-24 RX ADMIN — SODIUM CHLORIDE 50 MILLILITER(S): 9 INJECTION, SOLUTION INTRAVENOUS at 18:01

## 2023-04-24 RX ADMIN — HEPARIN SODIUM 700 UNIT(S)/HR: 5000 INJECTION INTRAVENOUS; SUBCUTANEOUS at 12:21

## 2023-04-24 RX ADMIN — HEPARIN SODIUM 700 UNIT(S)/HR: 5000 INJECTION INTRAVENOUS; SUBCUTANEOUS at 04:10

## 2023-04-24 RX ADMIN — AMPICILLIN SODIUM AND SULBACTAM SODIUM 200 GRAM(S): 250; 125 INJECTION, POWDER, FOR SUSPENSION INTRAMUSCULAR; INTRAVENOUS at 19:30

## 2023-04-24 NOTE — PROVIDER CONTACT NOTE (CRITICAL VALUE NOTIFICATION) - ASSESSMENT
alert and orineted x4; denies any chest pain, SOB or HA. patient was on heparin drip earlier but started developeing hematuria and clots; stopped Heparin drip see provider contact note.

## 2023-04-24 NOTE — CONSULT NOTE ADULT - SUBJECTIVE AND OBJECTIVE BOX
HPI:  72 Y M h/O chronic sinusitis, last evaluation by cardiologist in 2015, presenting due to incidental finding of NSTEMI with trop I of 151, started on DAPT, heparin infusion without bolus, patient denies any chest pain, difficulty breathing (from mouth, chronic difficulty breathing through nose), upon arrival patient remains asymptomtic with repeat ECG demonstrating 1.5 mm elevation v2.  consulted for gross hematuria s/p morales placement i/s/o heparin gtt and elevated PSA to 16.. Pt seen and examined. Reports he has been told of elevated PSA in the past and told he is has an enlarged prostate. no urologist. has not seen an PCP in few years. Had hematuria once in the past during a procedure, believes it was related to blood thinning medication. No family or personal history of  malignancy. No difficulty voiding at home however endorses nocturia (5-6x per night). ?had stones but unsure if they were renal. denies f/c/n/v/abd/flank pain or other acute complaints.    PAST MEDICAL & SURGICAL HISTORY:  HTN (hypertension)      DM (diabetes mellitus)      Nasal polyp      S/P cataract extraction  B/L      S/P nasal polypectomy          MEDICATIONS  (STANDING):  ampicillin/sulbactam  IVPB      ampicillin/sulbactam  IVPB 3 Gram(s) IV Intermittent every 6 hours  dextrose 5%. 1000 milliLiter(s) (50 mL/Hr) IV Continuous <Continuous>  dextrose 5%. 1000 milliLiter(s) (100 mL/Hr) IV Continuous <Continuous>  dextrose 50% Injectable 25 Gram(s) IV Push once  dextrose 50% Injectable 12.5 Gram(s) IV Push once  dextrose 50% Injectable 25 Gram(s) IV Push once  fluticasone propionate 50 MICROgram(s)/spray Nasal Spray 1 Spray(s) Both Nostrils two times a day  glucagon  Injectable 1 milliGRAM(s) IntraMuscular once  insulin lispro (ADMELOG) corrective regimen sliding scale   SubCutaneous at bedtime  insulin lispro (ADMELOG) corrective regimen sliding scale   SubCutaneous three times a day before meals    MEDICATIONS  (PRN):  dextrose Oral Gel 15 Gram(s) Oral once PRN Blood Glucose LESS THAN 70 milliGRAM(s)/deciliter      FAMILY HISTORY:  No pertinent family history        Allergies    No Known Allergies    Intolerances        SOCIAL HISTORY:    REVIEW OF SYSTEMS: Otherwise negative as stated in HPI    Physical Exam  Vital signs  T(C): 36.7 (04-24-23 @ 13:34), Max: 37.1 (04-23-23 @ 22:08)  HR: 81 (04-24-23 @ 13:34)  BP: 104/65 (04-24-23 @ 13:34)  SpO2: 97% (04-24-23 @ 13:34)  Wt(kg): --    Output      Gen: NAD  Pulm: No respiratory distress  Abd: S/ND/NT  Back: no CVAT BL  : nonpalp bladder. circ penis. small amount of blood with little clot at meatus. morales in place, light red urine draining. catheter hubbed. testes descended bl and nontender. no scrotal skin changes.  JAZMIN: + BPH, questionable firm nodularity on right posterior aspect of prostate. good sphincter tone\    exam chaperoned by SHAD Mi and SHAD Amaya.          LABS:                        9.1    5.15  )-----------( 224      ( 24 Apr 2023 11:17 )             27.7       04-24    137  |  101  |  16  ----------------------------<  56<L>  4.2   |  20<L>  |  2.11<H>    Ca    8.8      24 Apr 2023 07:02  Phos  3.7     04-24  Mg     2.0     04-24    TPro  7.3  /  Alb  3.2<L>  /  TBili  0.5  /  DBili  x   /  AST  30  /  ALT  19  /  AlkPhos  68  04-23    PT/INR - ( 24 Apr 2023 02:03 )   PT: 14.5 sec;   INR: 1.25 ratio         PTT - ( 24 Apr 2023 11:17 )  PTT:70.4 sec         HPI:  72 Y M h/O chronic sinusitis, last evaluation by cardiologist in 2015, presenting due to incidental finding of NSTEMI with trop I of 151, started on DAPT, heparin infusion without bolus, patient denies any chest pain, difficulty breathing (from mouth, chronic difficulty breathing through nose), upon arrival patient remains asymptomtic with repeat ECG demonstrating 1.5 mm elevation v2.  consulted for gross hematuria s/p morales placement i/s/o heparin gtt and elevated PSA to 16.. Pt seen and examined. Reports he has been told of elevated PSA in the past and told he is has an enlarged prostate. no urologist. has not seen an PCP in few years. Had hematuria once in the past during a procedure, believes it was related to blood thinning medication. No family or personal history of  malignancy. No difficulty voiding at home however endorses nocturia (5-6x per night). ?had stones but unsure if they were renal. denies f/c/n/v/abd/flank pain or other acute complaints.    PAST MEDICAL & SURGICAL HISTORY:  HTN (hypertension)      DM (diabetes mellitus)      Nasal polyp      S/P cataract extraction  B/L      S/P nasal polypectomy          MEDICATIONS  (STANDING):  ampicillin/sulbactam  IVPB      ampicillin/sulbactam  IVPB 3 Gram(s) IV Intermittent every 6 hours  dextrose 5%. 1000 milliLiter(s) (50 mL/Hr) IV Continuous <Continuous>  dextrose 5%. 1000 milliLiter(s) (100 mL/Hr) IV Continuous <Continuous>  dextrose 50% Injectable 25 Gram(s) IV Push once  dextrose 50% Injectable 12.5 Gram(s) IV Push once  dextrose 50% Injectable 25 Gram(s) IV Push once  fluticasone propionate 50 MICROgram(s)/spray Nasal Spray 1 Spray(s) Both Nostrils two times a day  glucagon  Injectable 1 milliGRAM(s) IntraMuscular once  insulin lispro (ADMELOG) corrective regimen sliding scale   SubCutaneous at bedtime  insulin lispro (ADMELOG) corrective regimen sliding scale   SubCutaneous three times a day before meals    MEDICATIONS  (PRN):  dextrose Oral Gel 15 Gram(s) Oral once PRN Blood Glucose LESS THAN 70 milliGRAM(s)/deciliter      FAMILY HISTORY:  No pertinent family history        Allergies    No Known Allergies    Intolerances        SOCIAL HISTORY:    REVIEW OF SYSTEMS: Otherwise negative as stated in HPI    Physical Exam  Vital signs  T(C): 36.7 (04-24-23 @ 13:34), Max: 37.1 (04-23-23 @ 22:08)  HR: 81 (04-24-23 @ 13:34)  BP: 104/65 (04-24-23 @ 13:34)  SpO2: 97% (04-24-23 @ 13:34)  Wt(kg): --    Output      Gen: NAD  Pulm: No respiratory distress  Abd: S/ND/NT  Back: no CVAT BL  : nonpalp bladder. circ penis. small amount of blood with little clot at meatus. morales in place, light red urine draining. catheter hubbed. testes descended bl and nontender. no scrotal skin changes.  JAZMIN: + BPH, questionable firm nodularity on right posterior aspect of prostate. good sphincter tone.    using septic technique, catheter irrigated with NS with immediate transition of urine from light red to pink. no clots. pt with some spasms during irrigation.     exam chaperoned by SHAD Mi and PA anil Amaya.          LABS:                        9.1    5.15  )-----------( 224      ( 24 Apr 2023 11:17 )             27.7       04-24    137  |  101  |  16  ----------------------------<  56<L>  4.2   |  20<L>  |  2.11<H>    Ca    8.8      24 Apr 2023 07:02  Phos  3.7     04-24  Mg     2.0     04-24    TPro  7.3  /  Alb  3.2<L>  /  TBili  0.5  /  DBili  x   /  AST  30  /  ALT  19  /  AlkPhos  68  04-23    PT/INR - ( 24 Apr 2023 02:03 )   PT: 14.5 sec;   INR: 1.25 ratio         PTT - ( 24 Apr 2023 11:17 )  PTT:70.4 sec         HPI:  72 Y M h/O chronic sinusitis, last evaluation by cardiologist in 2015, presenting due to incidental finding of NSTEMI with trop I of 151, started on DAPT, heparin infusion without bolus, patient denies any chest pain, difficulty breathing (from mouth, chronic difficulty breathing through nose), upon arrival patient remains asymptomatic with repeat ECG demonstrating 1.5 mm elevation v2.  consulted for gross hematuria s/p morales placement i/s/o heparin gtt and elevated PSA to 16.. Pt seen and examined. Reports he has been told of elevated PSA in the past and told he is has an enlarged prostate. no urologist. has not seen an PCP in few years. Had hematuria once in the past during a procedure, believes it was related to blood thinning medication. No family or personal history of  malignancy. No difficulty voiding at home however endorses nocturia (5-6x per night). ?had stones but unsure if they were renal. denies f/c/n/v/abd/flank pain or other acute complaints.    PAST MEDICAL & SURGICAL HISTORY:  HTN (hypertension)      DM (diabetes mellitus)      Nasal polyp      S/P cataract extraction  B/L      S/P nasal polypectomy          MEDICATIONS  (STANDING):  ampicillin/sulbactam  IVPB      ampicillin/sulbactam  IVPB 3 Gram(s) IV Intermittent every 6 hours  dextrose 5%. 1000 milliLiter(s) (50 mL/Hr) IV Continuous <Continuous>  dextrose 5%. 1000 milliLiter(s) (100 mL/Hr) IV Continuous <Continuous>  dextrose 50% Injectable 25 Gram(s) IV Push once  dextrose 50% Injectable 12.5 Gram(s) IV Push once  dextrose 50% Injectable 25 Gram(s) IV Push once  fluticasone propionate 50 MICROgram(s)/spray Nasal Spray 1 Spray(s) Both Nostrils two times a day  glucagon  Injectable 1 milliGRAM(s) IntraMuscular once  insulin lispro (ADMELOG) corrective regimen sliding scale   SubCutaneous at bedtime  insulin lispro (ADMELOG) corrective regimen sliding scale   SubCutaneous three times a day before meals    MEDICATIONS  (PRN):  dextrose Oral Gel 15 Gram(s) Oral once PRN Blood Glucose LESS THAN 70 milliGRAM(s)/deciliter      FAMILY HISTORY:  No pertinent family history        Allergies    No Known Allergies    Intolerances        SOCIAL HISTORY:    REVIEW OF SYSTEMS: Otherwise negative as stated in HPI    Physical Exam  Vital signs  T(C): 36.7 (04-24-23 @ 13:34), Max: 37.1 (04-23-23 @ 22:08)  HR: 81 (04-24-23 @ 13:34)  BP: 104/65 (04-24-23 @ 13:34)  SpO2: 97% (04-24-23 @ 13:34)  Wt(kg): --    Output      Gen: NAD  Pulm: No respiratory distress  Abd: S/ND/NT  Back: no CVAT BL  : nonpalp bladder. circ penis. small amount of blood with little clot at meatus. morales in place, light red urine draining. catheter hubbed. testes descended bl and nontender. no scrotal skin changes.  JAZMIN: + BPH, questionable firm nodularity on right posterior aspect of prostate. good sphincter tone.    using septic technique, catheter irrigated with NS with immediate transition of urine from light red to pink. no clots. pt with some spasms during irrigation.     exam chaperoned by SHAD Mi and PA anil Amaya.          LABS:                        9.1    5.15  )-----------( 224      ( 24 Apr 2023 11:17 )             27.7       04-24    137  |  101  |  16  ----------------------------<  56<L>  4.2   |  20<L>  |  2.11<H>    Ca    8.8      24 Apr 2023 07:02  Phos  3.7     04-24  Mg     2.0     04-24    TPro  7.3  /  Alb  3.2<L>  /  TBili  0.5  /  DBili  x   /  AST  30  /  ALT  19  /  AlkPhos  68  04-23    PT/INR - ( 24 Apr 2023 02:03 )   PT: 14.5 sec;   INR: 1.25 ratio         PTT - ( 24 Apr 2023 11:17 )  PTT:70.4 sec

## 2023-04-24 NOTE — PROCEDURE NOTE - ADDITIONAL PROCEDURE DETAILS
Called by ED for difficult morales in the setting of retention; ~480cc on bladder scan. Multiple failed attempts prior to my arrival.    Using aseptic technique, area prepped in traditional sterile fashion, lidocaine urojet instilled into urethra, and 16F coude catheter placed without resistance. Yellow urine drained with 1 clot, likely from previous traumatic insertions. 10cc sterile water placed into balloon and morales catheter secured with stat lock. pt tolerated procedure well. plan for morales per primary team. please page with any acute  concerns or questions.  p: [phone]

## 2023-04-24 NOTE — CONSULT NOTE ADULT - ASSESSMENT
73 yo M PMHx of chronic sinusitis, last evaluation by cardiologist in 2015, admitted for NSTEMI on heparin gtt.  consulted for gross hematuria s/p morales placement i/s/o heparin gtt and elevated PSA to 16.    #gross hematuria, likely in the setting of traumatic morales insertion and anticoagulation  - maintain morales  - consider flomax and finasteride  - follow up urine color  - flush morales prn  - no indication for cbi at this time  - CTAP (would prefer CT Urogram however in the setting of elevated Cr, may need non con)  - send urine culture and urine cytology  - needs outpatient cystoscopy  - anticoagulation per primary team    #elevated PSA (16.00) c/f prostate CA vs BPH vs recent catheterization  - needs outpatient workup  - MR Prostate 73 yo M PMHx of chronic sinusitis, last evaluation by cardiologist in 2015, admitted for NSTEMI on heparin gtt.  consulted for gross hematuria s/p morales placement i/s/o heparin gtt and elevated PSA to 16.    #gross hematuria, likely in the setting of traumatic morales insertion and anticoagulation  - maintain morales  - follow up urine color  - flush morales prn  - no indication for cbi at this time  - CTAP (would prefer CT Urogram however in the setting of elevated Cr, may need non con)  - send urine culture and urine cytology  - needs outpatient cystoscopy  - anticoagulation per primary team    #elevated PSA (16.00) c/f prostate CA vs BPH vs recent catheterization  - needs outpatient workup  - MR Prostate    #Retention  - morales for now  - consider flomax and finasteride  - bowel regimen  - hydration 73 yo M PMHx of chronic sinusitis, last evaluation by cardiologist in 2015, admitted for NSTEMI on heparin gtt.  consulted for gross hematuria s/p morales placement i/s/o heparin gtt and elevated PSA to 16.    #gross hematuria, likely in the setting of traumatic morales insertion and anticoagulation  - maintain morales  - follow up urine color  - flush morales prn  - no indication for cbi at this time  - CT AP (would prefer CT Urogram however in the setting of elevated Cr, needs optimization)  - send urine culture and urine cytology  - needs outpatient cystoscopy  - anticoagulation per primary team    #elevated PSA (16.00) c/f prostate CA vs BPH vs recent catheterization  - needs outpatient workup  - MR Prostate    #Retention  - morales for now  - consider flomax and finasteride  - bowel regimen  - hydration 71 yo M PMHx of chronic sinusitis, last evaluation by cardiologist in 2015, admitted for NSTEMI on heparin gtt.  consulted for gross hematuria s/p morales placement i/s/o heparin gtt and elevated PSA to 16.    #gross hematuria, likely in the setting of traumatic morales insertion and anticoagulation  - maintain morales  - follow up urine color  - flush morales prn  - no indication for cbi at this time  - CT AP (would prefer CT Urogram however in the setting of elevated Cr, needs optimization)  - send urine culture and urine cytology  - needs outpatient cystoscopy  - anticoagulation per primary team    #elevated PSA (16.00) c/f prostate CA vs BPH vs recent catheterization  - needs outpatient workup  - MR Prostate should be done outpatient    #Retention  - morales for now  - consider flomax and finasteride  - bowel regimen  - hydration 73 yo M PMHx of chronic sinusitis, last evaluation by cardiologist in 2015, admitted for NSTEMI on heparin gtt.  consulted for gross hematuria s/p morales placement i/s/o heparin gtt and elevated PSA to 16.    #gross hematuria, likely in the setting of traumatic morales insertion and anticoagulation  - maintain morales  - follow up urine color  - flush morales prn  - no indication for cbi at this time  - CT AP (would prefer CT Urogram however in the setting of elevated Cr, needs optimization)  - send urine culture  - needs outpatient cystoscopy  - anticoagulation per primary team    #elevated PSA (16.00) c/f prostate CA vs BPH vs recent catheterization  - needs outpatient workup  - MR Prostate should be done outpatient    #Retention  - morales for now  - consider flomax and finasteride  - bowel regimen  - hydration

## 2023-04-24 NOTE — CONSULT NOTE ADULT - ASSESSMENT
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 Pt had surgery in the past for the same problem. Bedside nasal endoscopy revealed polyps in the nasal cavity, exam was limited due to obstruction of the right nasal cavity. Recommend a medrol dose pack and flonase.

## 2023-04-24 NOTE — CONSULT NOTE ADULT - SUBJECTIVE AND OBJECTIVE BOX
CC: Nasal and sinus polyposis    HPI: 72 Y M h/O chronic sinusitis, last evaluation by cardiologist in 2015, presenting due to incidental finding of NSTEMI with trop I of 151, started on DAPT, heparin infusion without bolus, patient denies any chest pain, difficulty breathing (from mouth, chronic difficulty breathing through nose), upon arrival patient remains asymptomtic with repeat ECG demonstrating 1.5 mm elevation v2. ENT was consulted for Nasal and sinus polyposis found on head CT. Pt with H/O nasal polyps S/P resection many years ago, pt unsure of exact date. Pt reports nasal discharge and congestion mostly on the right side. Pt denies fevers, cough, facial pain or pressure.          PAST MEDICAL & SURGICAL HISTORY:  HTN (hypertension)      DM (diabetes mellitus)      Nasal polyp      S/P cataract extraction  B/L      S/P nasal polypectomy        Allergies    No Known Allergies    Intolerances      MEDICATIONS  (STANDING):  ampicillin/sulbactam  IVPB 3 Gram(s) IV Intermittent every 6 hours  ampicillin/sulbactam  IVPB      dextrose 5%. 1000 milliLiter(s) (100 mL/Hr) IV Continuous <Continuous>  dextrose 5%. 1000 milliLiter(s) (50 mL/Hr) IV Continuous <Continuous>  dextrose 50% Injectable 25 Gram(s) IV Push once  dextrose 50% Injectable 25 Gram(s) IV Push once  dextrose 50% Injectable 12.5 Gram(s) IV Push once  fluticasone propionate 50 MICROgram(s)/spray Nasal Spray 1 Spray(s) Both Nostrils two times a day  glucagon  Injectable 1 milliGRAM(s) IntraMuscular once  insulin lispro (ADMELOG) corrective regimen sliding scale   SubCutaneous three times a day before meals  insulin lispro (ADMELOG) corrective regimen sliding scale   SubCutaneous at bedtime    MEDICATIONS  (PRN):  dextrose Oral Gel 15 Gram(s) Oral once PRN Blood Glucose LESS THAN 70 milliGRAM(s)/deciliter      Social History: no tobacco use    Family history: no pertinent Fhx    ROS:   ENT: all negative except as noted in HPI   CV: denies palpitations  Pulm: denies SOB, cough, hemoptysis  GI: denies change in apetite, indigestion, n/v  : denies pertinent urinary symptoms, urgency  Neuro: denies numbness/tingling, loss of sensation  Psych: denies anxiety  MS: denies muscle weakness, instability  Heme: denies easy bruising or bleeding  Endo: denies heat/cold intolerance, excessive sweating  Vascular: denies LE edema    Vital Signs Last 24 Hrs  T(C): 36.7 (24 Apr 2023 13:34), Max: 37.1 (23 Apr 2023 22:08)  T(F): 98 (24 Apr 2023 13:34), Max: 98.7 (23 Apr 2023 22:08)  HR: 81 (24 Apr 2023 13:34) (70 - 81)  BP: 104/65 (24 Apr 2023 13:34) (98/59 - 119/77)  BP(mean): 72 (23 Apr 2023 22:08) (72 - 72)  RR: 18 (24 Apr 2023 13:34) (15 - 18)  SpO2: 97% (24 Apr 2023 13:34) (95% - 100%)    Parameters below as of 24 Apr 2023 13:34  Patient On (Oxygen Delivery Method): room air                              9.1    5.15  )-----------( 224      ( 24 Apr 2023 11:17 )             27.7    04-24    137  |  101  |  16  ----------------------------<  56<L>  4.2   |  20<L>  |  2.11<H>    Ca    8.8      24 Apr 2023 07:02  Phos  3.7     04-24  Mg     2.0     04-24    TPro  7.3  /  Alb  3.2<L>  /  TBili  0.5  /  DBili  x   /  AST  30  /  ALT  19  /  AlkPhos  68  04-23   PT/INR - ( 24 Apr 2023 02:03 )   PT: 14.5 sec;   INR: 1.25 ratio         PTT - ( 24 Apr 2023 11:17 )  PTT:70.4 sec    PHYSICAL EXAM:  Gen: NAD  Skin: No rashes, bruises, or lesions  Head: Normocephalic, Atraumatic  Face: no edema, erythema, or fluctuance. Parotid glands soft without mass  Eyes: no scleral injection  Nose: Polyps and clear secretions noted in right nare, no pus or active bleeding, left nare patent patent, no discharge  Mouth: No Stridor / Drooling / Trismus.  Mucosa moist, tongue/uvula midline, oropharynx clear  Neck: Flat, supple, no lymphadenopathy, trachea midline, no masses  Lymphatic: No lymphadenopathy  Resp: breathing easily, no stridor  CV: no peripheral edema/cyanosis  GI: nondistended   Peripheral vascular: no JVD or edema  Neuro: facial nerve intact, no facial droop        Diagnostic Nasal Endoscopy: (Scope #2 used)    Diagnostic Nasal Endoscopy  Indication for procedure: nasal congestion    "Anterior rhinoscopy insufficient to account for symptoms"    Verbal and/or written consent obtained from patient    Scope #3: flexible fiber optic telescope used with surgilube     Polyps and clear secretions noted in the nasal cavity, no purulence, unable to visualize middle meatus, maxillary ostia clear, sphenoethmoidal recess.  No sigmoidal septal deviation noted. Mucosa moist with scant mucus.      IMAGING/ADDITIONAL STUDIES: IMPRESSION:  No acute intracranial hemorrhage or mass effect. Chronic small vessel   ischemic changes.    Complete opacification of right sided paranasal sinuses and sinus change   pathways including suggestion of sinonasal polyposis with opacification   extending into the nasal cavity and nasopharynx. Recommend ENT referral.    --- End of Report ---     CC: Nasal and sinus polyposis    HPI: 72 Y M h/O chronic sinusitis, last evaluation by cardiologist in 2015, presenting due to incidental finding of NSTEMI with trop I of 151, started on DAPT, heparin infusion without bolus, patient denies any chest pain, difficulty breathing (from mouth, chronic difficulty breathing through nose), upon arrival patient remains asymptomtic with repeat ECG demonstrating 1.5 mm elevation v2. ENT was consulted for Nasal and sinus polyposis found on head CT. Pt with H/O nasal polyps S/P resection many years ago, pt unsure of exact date. Pt reports nasal discharge and congestion mostly on the right side. Pt denies fevers, cough, facial pain or pressure.          PAST MEDICAL & SURGICAL HISTORY:  HTN (hypertension)      DM (diabetes mellitus)      Nasal polyp      S/P cataract extraction  B/L      S/P nasal polypectomy        Allergies    No Known Allergies    Intolerances      MEDICATIONS  (STANDING):  ampicillin/sulbactam  IVPB 3 Gram(s) IV Intermittent every 6 hours  ampicillin/sulbactam  IVPB      dextrose 5%. 1000 milliLiter(s) (100 mL/Hr) IV Continuous <Continuous>  dextrose 5%. 1000 milliLiter(s) (50 mL/Hr) IV Continuous <Continuous>  dextrose 50% Injectable 25 Gram(s) IV Push once  dextrose 50% Injectable 25 Gram(s) IV Push once  dextrose 50% Injectable 12.5 Gram(s) IV Push once  fluticasone propionate 50 MICROgram(s)/spray Nasal Spray 1 Spray(s) Both Nostrils two times a day  glucagon  Injectable 1 milliGRAM(s) IntraMuscular once  insulin lispro (ADMELOG) corrective regimen sliding scale   SubCutaneous three times a day before meals  insulin lispro (ADMELOG) corrective regimen sliding scale   SubCutaneous at bedtime    MEDICATIONS  (PRN):  dextrose Oral Gel 15 Gram(s) Oral once PRN Blood Glucose LESS THAN 70 milliGRAM(s)/deciliter      Social History: no tobacco use    Family history: no pertinent Fhx    ROS:   ENT: all negative except as noted in HPI   CV: denies palpitations  Pulm: denies SOB, cough, hemoptysis  GI: denies change in apetite, indigestion, n/v  : denies pertinent urinary symptoms, urgency  Neuro: denies numbness/tingling, loss of sensation  Psych: denies anxiety  MS: denies muscle weakness, instability  Heme: denies easy bruising or bleeding  Endo: denies heat/cold intolerance, excessive sweating  Vascular: denies LE edema    Vital Signs Last 24 Hrs  T(C): 36.7 (24 Apr 2023 13:34), Max: 37.1 (23 Apr 2023 22:08)  T(F): 98 (24 Apr 2023 13:34), Max: 98.7 (23 Apr 2023 22:08)  HR: 81 (24 Apr 2023 13:34) (70 - 81)  BP: 104/65 (24 Apr 2023 13:34) (98/59 - 119/77)  BP(mean): 72 (23 Apr 2023 22:08) (72 - 72)  RR: 18 (24 Apr 2023 13:34) (15 - 18)  SpO2: 97% (24 Apr 2023 13:34) (95% - 100%)    Parameters below as of 24 Apr 2023 13:34  Patient On (Oxygen Delivery Method): room air                              9.1    5.15  )-----------( 224      ( 24 Apr 2023 11:17 )             27.7    04-24    137  |  101  |  16  ----------------------------<  56<L>  4.2   |  20<L>  |  2.11<H>    Ca    8.8      24 Apr 2023 07:02  Phos  3.7     04-24  Mg     2.0     04-24    TPro  7.3  /  Alb  3.2<L>  /  TBili  0.5  /  DBili  x   /  AST  30  /  ALT  19  /  AlkPhos  68  04-23   PT/INR - ( 24 Apr 2023 02:03 )   PT: 14.5 sec;   INR: 1.25 ratio         PTT - ( 24 Apr 2023 11:17 )  PTT:70.4 sec    PHYSICAL EXAM:  Gen: NAD  Skin: No rashes, bruises, or lesions  Head: Normocephalic, Atraumatic  Face: no edema, erythema, or fluctuance. Parotid glands soft without mass  Eyes: no scleral injection  Nose: Polyps and clear secretions noted in right nare, no pus or active bleeding, left nare patent patent, no discharge  Mouth: No Stridor / Drooling / Trismus.  Mucosa moist, tongue/uvula midline, oropharynx clear  Neck: Flat, supple, no lymphadenopathy, trachea midline, no masses  Lymphatic: No lymphadenopathy  Resp: breathing easily, no stridor  CV: no peripheral edema/cyanosis  GI: nondistended   Peripheral vascular: no JVD or edema  Neuro: facial nerve intact, no facial droop        Diagnostic Nasal Endoscopy: (Scope #2 used)    Diagnostic Nasal Endoscopy  Indication for procedure: nasal congestion    "Anterior rhinoscopy insufficient to account for symptoms"    Verbal and/or written consent obtained from patient    Scope #3: flexible fiber optic telescope used with surgilube      no purulence, Large polypoid translucent mass in the right nasal cavity obstructing view of the right nasal cavity landmark.  Continues on into the nasopharynx and is visible from the left side via the nasopharynx, Left side: maxillary ostia clear, sphenoethmoidal recess.  No sigmoidal septal deviation noted. Mucosa moist with scant mucus.      IMAGING/ADDITIONAL STUDIES: IMPRESSION:  No acute intracranial hemorrhage or mass effect. Chronic small vessel   ischemic changes.    Complete opacification of right sided paranasal sinuses and sinus change   pathways including suggestion of sinonasal polyposis with opacification   extending into the nasal cavity and nasopharynx. Recommend ENT referral.    --- End of Report ---

## 2023-04-24 NOTE — PROGRESS NOTE ADULT - SUBJECTIVE AND OBJECTIVE BOX
DATE OF SERVICE: 04-24-23 @ 19:40    Patient is a 72y old  Male who presents with a chief complaint of     SUBJECTIVE / OVERNIGHT EVENTS:  morales placed for urinary retention    MEDICATIONS  (STANDING):  ampicillin/sulbactam  IVPB 3 Gram(s) IV Intermittent every 6 hours  ampicillin/sulbactam  IVPB      dextrose 5%. 1000 milliLiter(s) (50 mL/Hr) IV Continuous <Continuous>  dextrose 5%. 1000 milliLiter(s) (50 mL/Hr) IV Continuous <Continuous>  dextrose 5%. 1000 milliLiter(s) (100 mL/Hr) IV Continuous <Continuous>  dextrose 50% Injectable 25 Gram(s) IV Push once  dextrose 50% Injectable 25 Gram(s) IV Push once  dextrose 50% Injectable 12.5 Gram(s) IV Push once  fluticasone propionate 50 MICROgram(s)/spray Nasal Spray 1 Spray(s) Both Nostrils two times a day  glucagon  Injectable 1 milliGRAM(s) IntraMuscular once  insulin lispro (ADMELOG) corrective regimen sliding scale   SubCutaneous three times a day before meals  insulin lispro (ADMELOG) corrective regimen sliding scale   SubCutaneous at bedtime  methylPREDNISolone   Oral   methylPREDNISolone 24 milliGRAM(s) Oral once  sodium chloride 0.65% Nasal 2 Spray(s) Both Nostrils three times a day    MEDICATIONS  (PRN):  dextrose Oral Gel 15 Gram(s) Oral once PRN Blood Glucose LESS THAN 70 milliGRAM(s)/deciliter      Vital Signs Last 24 Hrs  T(C): 36.8 (24 Apr 2023 18:38), Max: 37.1 (23 Apr 2023 22:08)  T(F): 98.2 (24 Apr 2023 18:38), Max: 98.7 (23 Apr 2023 22:08)  HR: 88 (24 Apr 2023 18:38) (70 - 88)  BP: 112/63 (24 Apr 2023 18:38) (98/59 - 112/63)  BP(mean): 72 (23 Apr 2023 22:08) (72 - 72)  RR: 17 (24 Apr 2023 18:38) (16 - 18)  SpO2: 98% (24 Apr 2023 18:38) (95% - 98%)    Parameters below as of 24 Apr 2023 18:38  Patient On (Oxygen Delivery Method): room air      CAPILLARY BLOOD GLUCOSE      POCT Blood Glucose.: 108 mg/dL (24 Apr 2023 18:22)  POCT Blood Glucose.: 86 mg/dL (24 Apr 2023 17:49)  POCT Blood Glucose.: 79 mg/dL (24 Apr 2023 17:05)  POCT Blood Glucose.: 99 mg/dL (24 Apr 2023 14:10)  POCT Blood Glucose.: 96 mg/dL (24 Apr 2023 13:13)  POCT Blood Glucose.: 95 mg/dL (24 Apr 2023 12:50)  POCT Blood Glucose.: 71 mg/dL (24 Apr 2023 11:31)  POCT Blood Glucose.: 108 mg/dL (24 Apr 2023 08:53)  POCT Blood Glucose.: 55 mg/dL (24 Apr 2023 07:59)  POCT Blood Glucose.: 61 mg/dL (24 Apr 2023 07:57)  POCT Blood Glucose.: 83 mg/dL (23 Apr 2023 23:56)    I&O's Summary      PHYSICAL EXAM:  GENERAL: NAD, well-developed  HEAD:  Atraumatic, Normocephalic  EYES: EOMI, PERRLA, conjunctiva and sclera clear  NECK: Supple, No JVD  CHEST/LUNG: Clear to auscultation bilaterally; No wheeze  HEART: Regular rate and rhythm; No murmurs, rubs, or gallops  ABDOMEN: Soft, Nontender, Nondistended; Bowel sounds present  EXTREMITIES:  2+ Peripheral Pulses, No clubbing, cyanosis, or edema  PSYCH: AAOx3  NEUROLOGY: non-focal  SKIN: No rashes or lesions    LABS:                        9.7    6.71  )-----------( 226      ( 24 Apr 2023 17:27 )             29.4     04-24    137  |  101  |  16  ----------------------------<  56<L>  4.2   |  20<L>  |  2.11<H>    Ca    8.8      24 Apr 2023 07:02  Phos  3.7     04-24  Mg     2.0     04-24    TPro  7.3  /  Alb  3.2<L>  /  TBili  0.5  /  DBili  x   /  AST  30  /  ALT  19  /  AlkPhos  68  04-23    PT/INR - ( 24 Apr 2023 02:03 )   PT: 14.5 sec;   INR: 1.25 ratio         PTT - ( 24 Apr 2023 11:17 )  PTT:70.4 sec  CARDIAC MARKERS ( 24 Apr 2023 17:27 )  x     / x     / 189 U/L / x     / 3.1 ng/mL          RADIOLOGY & ADDITIONAL TESTS:    Imaging Personally Reviewed:    Consultant(s) Notes Reviewed:      Care Discussed with Consultants/Other Providers:

## 2023-04-24 NOTE — CONSULT NOTE ADULT - PROBLEM SELECTOR RECOMMENDATION 9
Spontaneous, unlabored and symmetrical Flonase 1 spray to B/L nares BID  Medrol dose pack  Nasal saline 2 sprays to B/L nares TID  F/U with ENT as outpt Rec Sinus CT with contrast  Flonase 1 spray to B/L nares BID  Medrol dose pack  Nasal saline 2 sprays to B/L nares TID  F/U with ENT as outpt DR Dinh or Dr Turner [phone] Flonase 1 spray to B/L nares BID  Medrol dose pack  Nasal saline 2 sprays to B/L nares TID  F/U with ENT as outpt DR Dinh or Dr Turner [phone]

## 2023-04-24 NOTE — PROGRESS NOTE ADULT - ASSESSMENT
72 Y M h/O chronic sinusitis, last evaluation by cardiologist in 2015, presenting due to incidental finding of NSTEMI with trop I of 151, started on DAPT, heparin infusion without bolus, patient denies any chest pain, difficulty breathing (from mouth, chronic difficulty breathing through nose), upon arrival patient remains asymptomtic with repeat ECG demonstrating 1.5 mm elevation v2,    abnormal EKG  - follow up cardiology  - d/c  iv heparin  - start b blocker  - echo  - cardiology consult      SARAH  - unknown baseline  - renal US  - avoid nephrotoxins  - nephrology consult    diabetes  - fs qid  - hgb a1c 5.9  - insulin ss    sinusitis with nasal obstruction and nasal polyps  - Nasal polyps.   - Flonase 1 spray to B/L nares BID  Medrol dose pack  Nasal saline 2 sprays to B/L nares TID  F/U with ENT as outpt DR Dinh or Dr Turner [phone].-   ENT consult appreciated    anemia  - iron studies    urinary retention  - cont flomax  - cont morales     hematuria  - trauma 2/2 morales  - monitor H&H    elevated PSA  - follow up with urology    back pain  - likely DJD  - analgesics prn  - PT eval    anemia  - iron studies    dvt px  - on iv heparin

## 2023-04-24 NOTE — CONSULT NOTE ADULT - NS ATTEND AMEND GEN_ALL_CORE FT
He was seen and examined in ER. Urine color in [facility]arkins bag was light red, he was just irrigated. Will monitor to see if he needs CBI. Will evaluate elevated PSA as outpatient.
Patient with history of nasal polyps and large polypoid mass seen on the right side on both nasal endoscopy as well as CT scan.  He is status post previous surgery.  Although nasal mass looks very polypoid on physical exam given its unilateral nature would recommend CT sinus with contrast to rule out inverted papilloma.  However he is currently with acute kidney injury and is unable to get scan with contrast.  CT head images personally reviewed, large polypoid mass of right maxillary sinus.  Will treat acute kidney injury and obtain CT sinus with contrast as outpatient once stable.  We will have him follow-up with Abdiel Turner or Fabrizio for further management.  In the meanwhile can have Medrol Dosepak for symptomatic relief, should start nasal sinus rinses as outpatient as well.

## 2023-04-25 DIAGNOSIS — N17.9 ACUTE KIDNEY FAILURE, UNSPECIFIED: ICD-10-CM

## 2023-04-25 LAB
GLUCOSE BLDC GLUCOMTR-MCNC: 107 MG/DL — HIGH (ref 70–99)
GLUCOSE BLDC GLUCOMTR-MCNC: 124 MG/DL — HIGH (ref 70–99)
GLUCOSE BLDC GLUCOMTR-MCNC: 181 MG/DL — HIGH (ref 70–99)
GLUCOSE BLDC GLUCOMTR-MCNC: 89 MG/DL — SIGNIFICANT CHANGE UP (ref 70–99)
GLUCOSE BLDC GLUCOMTR-MCNC: 90 MG/DL — SIGNIFICANT CHANGE UP (ref 70–99)
GLUCOSE BLDC GLUCOMTR-MCNC: 92 MG/DL — SIGNIFICANT CHANGE UP (ref 70–99)
HCT VFR BLD CALC: 28.8 % — LOW (ref 39–50)
HCT VFR BLD CALC: 29.3 % — LOW (ref 39–50)
HCT VFR BLD CALC: 31.7 % — LOW (ref 39–50)
HGB BLD-MCNC: 10.5 G/DL — LOW (ref 13–17)
HGB BLD-MCNC: 9.5 G/DL — LOW (ref 13–17)
HGB BLD-MCNC: 9.7 G/DL — LOW (ref 13–17)
MCHC RBC-ENTMCNC: 27.2 PG — SIGNIFICANT CHANGE UP (ref 27–34)
MCHC RBC-ENTMCNC: 27.4 PG — SIGNIFICANT CHANGE UP (ref 27–34)
MCHC RBC-ENTMCNC: 27.7 PG — SIGNIFICANT CHANGE UP (ref 27–34)
MCHC RBC-ENTMCNC: 33 GM/DL — SIGNIFICANT CHANGE UP (ref 32–36)
MCHC RBC-ENTMCNC: 33.1 GM/DL — SIGNIFICANT CHANGE UP (ref 32–36)
MCHC RBC-ENTMCNC: 33.1 GM/DL — SIGNIFICANT CHANGE UP (ref 32–36)
MCV RBC AUTO: 82.5 FL — SIGNIFICANT CHANGE UP (ref 80–100)
MCV RBC AUTO: 82.8 FL — SIGNIFICANT CHANGE UP (ref 80–100)
MCV RBC AUTO: 83.7 FL — SIGNIFICANT CHANGE UP (ref 80–100)
NRBC # BLD: 0 /100 WBCS — SIGNIFICANT CHANGE UP (ref 0–0)
PLATELET # BLD AUTO: 229 K/UL — SIGNIFICANT CHANGE UP (ref 150–400)
PLATELET # BLD AUTO: 236 K/UL — SIGNIFICANT CHANGE UP (ref 150–400)
PLATELET # BLD AUTO: 241 K/UL — SIGNIFICANT CHANGE UP (ref 150–400)
RBC # BLD: 3.49 M/UL — LOW (ref 4.2–5.8)
RBC # BLD: 3.5 M/UL — LOW (ref 4.2–5.8)
RBC # BLD: 3.83 M/UL — LOW (ref 4.2–5.8)
RBC # FLD: 14.9 % — HIGH (ref 10.3–14.5)
RBC # FLD: 15 % — HIGH (ref 10.3–14.5)
RBC # FLD: 15.2 % — HIGH (ref 10.3–14.5)
WBC # BLD: 5.15 K/UL — SIGNIFICANT CHANGE UP (ref 3.8–10.5)
WBC # BLD: 5.4 K/UL — SIGNIFICANT CHANGE UP (ref 3.8–10.5)
WBC # BLD: 5.59 K/UL — SIGNIFICANT CHANGE UP (ref 3.8–10.5)
WBC # FLD AUTO: 5.15 K/UL — SIGNIFICANT CHANGE UP (ref 3.8–10.5)
WBC # FLD AUTO: 5.4 K/UL — SIGNIFICANT CHANGE UP (ref 3.8–10.5)
WBC # FLD AUTO: 5.59 K/UL — SIGNIFICANT CHANGE UP (ref 3.8–10.5)

## 2023-04-25 PROCEDURE — 93306 TTE W/DOPPLER COMPLETE: CPT | Mod: 26

## 2023-04-25 PROCEDURE — 78830 RP LOCLZJ TUM SPECT W/CT 1: CPT | Mod: 26

## 2023-04-25 PROCEDURE — 99231 SBSQ HOSP IP/OBS SF/LOW 25: CPT

## 2023-04-25 PROCEDURE — 99222 1ST HOSP IP/OBS MODERATE 55: CPT | Mod: GC

## 2023-04-25 PROCEDURE — 99233 SBSQ HOSP IP/OBS HIGH 50: CPT

## 2023-04-25 RX ORDER — TAMSULOSIN HYDROCHLORIDE 0.4 MG/1
0.4 CAPSULE ORAL AT BEDTIME
Refills: 0 | Status: DISCONTINUED | OUTPATIENT
Start: 2023-04-25 | End: 2023-04-27

## 2023-04-25 RX ORDER — ACETAMINOPHEN 500 MG
650 TABLET ORAL ONCE
Refills: 0 | Status: COMPLETED | OUTPATIENT
Start: 2023-04-25 | End: 2023-04-25

## 2023-04-25 RX ORDER — CHLORHEXIDINE GLUCONATE 213 G/1000ML
1 SOLUTION TOPICAL
Refills: 0 | Status: DISCONTINUED | OUTPATIENT
Start: 2023-04-25 | End: 2023-04-27

## 2023-04-25 RX ORDER — FINASTERIDE 5 MG/1
5 TABLET, FILM COATED ORAL DAILY
Refills: 0 | Status: DISCONTINUED | OUTPATIENT
Start: 2023-04-25 | End: 2023-04-27

## 2023-04-25 RX ADMIN — AMPICILLIN SODIUM AND SULBACTAM SODIUM 200 GRAM(S): 250; 125 INJECTION, POWDER, FOR SUSPENSION INTRAMUSCULAR; INTRAVENOUS at 12:38

## 2023-04-25 RX ADMIN — Medication 1 SPRAY(S): at 18:08

## 2023-04-25 RX ADMIN — Medication 650 MILLIGRAM(S): at 11:41

## 2023-04-25 RX ADMIN — Medication 2 SPRAY(S): at 05:37

## 2023-04-25 RX ADMIN — TAMSULOSIN HYDROCHLORIDE 0.4 MILLIGRAM(S): 0.4 CAPSULE ORAL at 22:09

## 2023-04-25 RX ADMIN — Medication 4 MILLIGRAM(S): at 08:32

## 2023-04-25 RX ADMIN — Medication 8 MILLIGRAM(S): at 22:09

## 2023-04-25 RX ADMIN — SODIUM CHLORIDE 50 MILLILITER(S): 9 INJECTION, SOLUTION INTRAVENOUS at 01:03

## 2023-04-25 RX ADMIN — Medication 4 MILLIGRAM(S): at 18:08

## 2023-04-25 RX ADMIN — CHLORHEXIDINE GLUCONATE 1 APPLICATION(S): 213 SOLUTION TOPICAL at 12:43

## 2023-04-25 RX ADMIN — FINASTERIDE 5 MILLIGRAM(S): 5 TABLET, FILM COATED ORAL at 12:42

## 2023-04-25 RX ADMIN — Medication 4 MILLIGRAM(S): at 12:43

## 2023-04-25 RX ADMIN — Medication 2 SPRAY(S): at 22:09

## 2023-04-25 RX ADMIN — AMPICILLIN SODIUM AND SULBACTAM SODIUM 200 GRAM(S): 250; 125 INJECTION, POWDER, FOR SUSPENSION INTRAMUSCULAR; INTRAVENOUS at 18:08

## 2023-04-25 RX ADMIN — AMPICILLIN SODIUM AND SULBACTAM SODIUM 200 GRAM(S): 250; 125 INJECTION, POWDER, FOR SUSPENSION INTRAMUSCULAR; INTRAVENOUS at 05:37

## 2023-04-25 RX ADMIN — Medication 1 SPRAY(S): at 05:38

## 2023-04-25 RX ADMIN — Medication 650 MILLIGRAM(S): at 10:41

## 2023-04-25 RX ADMIN — Medication 2 SPRAY(S): at 13:00

## 2023-04-25 RX ADMIN — AMPICILLIN SODIUM AND SULBACTAM SODIUM 200 GRAM(S): 250; 125 INJECTION, POWDER, FOR SUSPENSION INTRAMUSCULAR; INTRAVENOUS at 01:03

## 2023-04-25 NOTE — CONSULT NOTE ADULT - PROBLEM SELECTOR RECOMMENDATION 9
Pt with SARAH of unclear etiology- likely in the setting of IV contrast. Duration of SARAH however unknown for now. No prior labs available to review. Scr was elevated at 1.96 on admission on 4/23. Scr subsequently is elevated at 2.11 on 4/24. US Kidney showed B/L echogenicity suggestive of chronic medical disease. Pt also received IV contrast on 4/23.  Send UA, urine electrolytes. Agree with IVF (NS) for now. Monitor labs and urine output. Avoid any potential nephrotoxins. Dose medications as per eGFR.     If you have any questions, please feel free to contact me  Danny Crow  Nephrology Fellow  [phone]/ [employer] Teams(Preferred)  (After 5pm or on weekends please page the on-call fellow).

## 2023-04-25 NOTE — PROGRESS NOTE ADULT - ASSESSMENT
72M    HTN  CKD  DM  Nasal polyps    Admitted with difficulty breathing associated with sinusitis/nasal polyps  Found to have elevated troponin  CAC noted on CT PA    TTE raises suspicion for cardiac amyloidosis 2/2 increased wall thickness and abnormal GLS pattern  Cardiac amyloidosis could also account for elevated troponin and septal Q waves    Recommend  Serum free light chains  [id number] for TTR CA  ASA held 2/2 hematuria - consider restarting ASA if hematuria resolves  Repeat BMP  Check lipids with next blood draw and start statin if no contraindication  Consider stress MPI versus LHC    A total of 50 minutes was spent on this patient encounter. 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 Dago Mireles ([phone])  Ischemic evaluation as outpatient given absence of attributable symptoms and more likely alternative explanation for elevated troponin  Decreased LVSF also likely associated with TTR CA  Avoid negative inotropes  Check lipids with next blood draw and start statin if no contraindication  Association between nasal polyps, sinus disease, and aspirin-exacerbated respiratory disease - risk of aspirin may outweigh benefit    A total of 50 minutes was spent on this patient encounter.

## 2023-04-25 NOTE — PROGRESS NOTE ADULT - SUBJECTIVE AND OBJECTIVE BOX
DAILY PROGRESS NOTE:         24 hr events:  naeon  urine color clearing in tubing     Objective:    Vital Signs Last 24 Hrs  T(C): 36.9 (25 Apr 2023 04:30), Max: 36.9 (25 Apr 2023 04:30)  T(F): 98.4 (25 Apr 2023 04:30), Max: 98.4 (25 Apr 2023 04:30)  HR: 86 (25 Apr 2023 04:30) (81 - 88)  BP: 120/75 (25 Apr 2023 04:30) (104/65 - 120/75)  BP(mean): --  RR: 17 (25 Apr 2023 04:30) (17 - 18)  SpO2: 96% (25 Apr 2023 04:30) (96% - 98%)    Parameters below as of 25 Apr 2023 04:30  Patient On (Oxygen Delivery Method): room air        I&O's Detail    24 Apr 2023 07:01  -  25 Apr 2023 07:00  --------------------------------------------------------  IN:  Total IN: 0 mL    OUT:    Indwelling Catheter - Urethral (mL): 1500 mL  Total OUT: 1500 mL    Total NET: -1500 mL          Physical Exam:    General: NAD, well-nourished  Resp: Breathing comfortably on RA  CV: regular rate   : morales w/ clear yellow urine in morales tubing.       Laboratory Results:                          9.5    5.59  )-----------( 236      ( 25 Apr 2023 06:28 )             28.8     04-24    137  |  101  |  16  ----------------------------<  56<L>  4.2   |  20<L>  |  2.11<H>    Ca    8.8      24 Apr 2023 07:02  Phos  3.7     04-24  Mg     2.0     04-24    TPro  7.3  /  Alb  3.2<L>  /  TBili  0.5  /  DBili  x   /  AST  30  /  ALT  19  /  AlkPhos  68  04-23    PT/INR - ( 24 Apr 2023 02:03 )   PT: 14.5 sec;   INR: 1.25 ratio         PTT - ( 24 Apr 2023 11:17 )  PTT:70.4 sec

## 2023-04-25 NOTE — PROGRESS NOTE ADULT - SUBJECTIVE AND OBJECTIVE BOX
No acute overnight events    TTE from 4/24 showed LV EF 41%, increased LV wall thickness, and abnormal GLS    Mr. Sanchez reports difficulty breathing secondary to nasal polyps, chronic lower back pain, and pain secondary to catheter. He denies chest pain.    Physical Exam:  Vital Signs Last 24 Hrs  T(C): 36.7 (25 Apr 2023 11:05), Max: 36.9 (25 Apr 2023 04:30)  T(F): 98.1 (25 Apr 2023 11:05), Max: 98.4 (25 Apr 2023 04:30)  HR: 87 (25 Apr 2023 11:23) (81 - 88)  BP: 109/73 (25 Apr 2023 11:23) (104/65 - 120/75)  RR: 18 (25 Apr 2023 11:23) (17 - 18)  SpO2: 96% (25 Apr 2023 11:23) (95% - 98%)    GENERAL: NAD  HEAD:  Atraumatic, Normocephalic.  EYES: EOMI, PERRLA, conjunctiva and sclera clear.  NECK: Supple, No JVD.  CHEST/LUNG: Clear to auscultation bilaterally; No rales, rhonchi, wheezing, or rubs. Speaking in full sentences  HEART: Regular rate and rhythm; No murmurs, rubs, or gallops.  ABDOMEN: Bowel sounds present; Soft, Nontender, Nondistended.   EXTREMITIES:  2+ Peripheral Pulses, brisk capillary refill. No clubbing, cyanosis, or edema.  PSYCH: Normal affect.  SKIN: No rashes or lesions.    ECG:   Sinus rhythm   Septal Q waves    Echo: Personally reviewed    Labs:                        9.7    5.15  )-----------( 229      ( 25 Apr 2023 12:41 )             29.3     04-24    137  |  101  |  16  ----------------------------<  56<L>  4.2   |  20<L>  |  2.11<H>    Ca    8.8      24 Apr 2023 07:02  Phos  3.7     04-24  Mg     2.0     04-24      MEDICATIONS  (STANDING):  ampicillin/sulbactam  IVPB 3 Gram(s) IV Intermittent every 6 hours  ampicillin/sulbactam  IVPB      chlorhexidine 2% Cloths 1 Application(s) Topical <User Schedule>  dextrose 5%. 1000 milliLiter(s) (100 mL/Hr) IV Continuous <Continuous>  dextrose 5%. 1000 milliLiter(s) (50 mL/Hr) IV Continuous <Continuous>  dextrose 5%. 1000 milliLiter(s) (50 mL/Hr) IV Continuous <Continuous>  dextrose 50% Injectable 25 Gram(s) IV Push once  dextrose 50% Injectable 25 Gram(s) IV Push once  dextrose 50% Injectable 12.5 Gram(s) IV Push once  finasteride 5 milliGRAM(s) Oral daily  fluticasone propionate 50 MICROgram(s)/spray Nasal Spray 1 Spray(s) Both Nostrils two times a day  glucagon  Injectable 1 milliGRAM(s) IntraMuscular once  insulin lispro (ADMELOG) corrective regimen sliding scale   SubCutaneous at bedtime  insulin lispro (ADMELOG) corrective regimen sliding scale   SubCutaneous three times a day before meals  methylPREDNISolone 4 milliGRAM(s) Oral before breakfast  methylPREDNISolone 4 milliGRAM(s) Oral after lunch  methylPREDNISolone 8 milliGRAM(s) Oral at bedtime  methylPREDNISolone   Oral   methylPREDNISolone 24 milliGRAM(s) Oral once  methylPREDNISolone 4 milliGRAM(s) Oral after dinner  sodium chloride 0.65% Nasal 2 Spray(s) Both Nostrils three times a day  sodium chloride 0.9%. 1000 milliLiter(s) (75 mL/Hr) IV Continuous <Continuous>  tamsulosin 0.4 milliGRAM(s) Oral at bedtime                                  Events:  Tc-PYP scan compatible with TTR CA    Mr. Sanchez denies chest pain.    Vital Signs Last 24 Hrs  T(C): 36.5 (26 Apr 2023 11:00), Max: 36.8 (25 Apr 2023 20:15)  T(F): 97.7 (26 Apr 2023 11:00), Max: 98.2 (25 Apr 2023 20:15)  HR: 94 (26 Apr 2023 11:00) (75 - 94)  BP: 104/68 (26 Apr 2023 11:00) (104/68 - 121/74)  RR: 18 (26 Apr 2023 11:00) (18 - 18)  SpO2: 100% (26 Apr 2023 11:00) (96% - 100%)    GENERAL: NAD  LUNGS: mild expiratory wheezes  HEART: Regular rate and rhythm; No murmurs, rubs, or gallops.  ABDOMEN: Bowel sounds present; Soft, Nontender, Nondistended.   EXTREMITIES:  2+ Peripheral Pulses, brisk capillary refill. No clubbing, cyanosis, or edema.  PSYCH: Normal affect.  SKIN: No rashes or lesions.    ECG:   Sinus rhythm   Septal Q waves    Echo: Personally reviewed    Labs:             04-26    136  |  104  |  15  ----------------------------<  141<H>  4.4   |  21<L>  |  2.10<H>    Ca    8.4      26 Apr 2023 06:31                          9.3    5.23  )-----------( 234      ( 26 Apr 2023 06:32 )             28.2     MEDICATIONS  (STANDING):  ampicillin/sulbactam  IVPB      ampicillin/sulbactam  IVPB 3 Gram(s) IV Intermittent every 6 hours  chlorhexidine 2% Cloths 1 Application(s) Topical <User Schedule>  dextrose 5%. 1000 milliLiter(s) (100 mL/Hr) IV Continuous <Continuous>  dextrose 5%. 1000 milliLiter(s) (50 mL/Hr) IV Continuous <Continuous>  dextrose 50% Injectable 25 Gram(s) IV Push once  dextrose 50% Injectable 25 Gram(s) IV Push once  dextrose 50% Injectable 12.5 Gram(s) IV Push once  finasteride 5 milliGRAM(s) Oral daily  fluticasone propionate 50 MICROgram(s)/spray Nasal Spray 1 Spray(s) Both Nostrils two times a day  glucagon  Injectable 1 milliGRAM(s) IntraMuscular once  insulin lispro (ADMELOG) corrective regimen sliding scale   SubCutaneous three times a day before meals  insulin lispro (ADMELOG) corrective regimen sliding scale   SubCutaneous at bedtime  iron sucrose IVPB 200 milliGRAM(s) IV Intermittent every 24 hours  methylPREDNISolone   Oral   methylPREDNISolone 24 milliGRAM(s) Oral once  methylPREDNISolone 4 milliGRAM(s) Oral after dinner  methylPREDNISolone 4 milliGRAM(s) Oral before breakfast  methylPREDNISolone 4 milliGRAM(s) Oral after lunch  methylPREDNISolone 4 milliGRAM(s) Oral at bedtime  sodium chloride 0.65% Nasal 2 Spray(s) Both Nostrils three times a day  tamsulosin 0.4 milliGRAM(s) Oral at bedtime

## 2023-04-25 NOTE — CONSULT NOTE ADULT - ATTENDING COMMENTS
Pt is a 72-year-old Male with PMHx of chronic sinusitis, last evaluation by cardiologist in 2015, admitted at [facility] on 4/23 for NSTEMI, currently on heparin gtt. Over the course, labs concerning for rising Scr. Nephrology team was consulted today for SARAH.  Alert, conversant, wife in attendance.  Denies FH of renal disease DM  1.  Renal failure--needs full w/u including P/Cr urine and serologies.  Sono c/w CKD.  Echo raises prospect of systemic amyloid.  Radiocontrast may be contributing.  Volume optimization.  NON oliguric, no HD required.  Emphasized need for assiduous outpatient followup after current admission    discussed with med team  Juan Ortiz MD  contact me on TEAMS

## 2023-04-25 NOTE — PROGRESS NOTE ADULT - SUBJECTIVE AND OBJECTIVE BOX
DATE OF SERVICE: 04-25-23 @ 12:38    Patient is a 72y old  Male who presents with a chief complaint of     SUBJECTIVE / OVERNIGHT EVENTS:  No chest pain. No shortness of breath. No complaints. No events overnight.     MEDICATIONS  (STANDING):  ampicillin/sulbactam  IVPB 3 Gram(s) IV Intermittent every 6 hours  ampicillin/sulbactam  IVPB      chlorhexidine 2% Cloths 1 Application(s) Topical <User Schedule>  dextrose 5%. 1000 milliLiter(s) (100 mL/Hr) IV Continuous <Continuous>  dextrose 5%. 1000 milliLiter(s) (50 mL/Hr) IV Continuous <Continuous>  dextrose 5%. 1000 milliLiter(s) (50 mL/Hr) IV Continuous <Continuous>  dextrose 50% Injectable 25 Gram(s) IV Push once  dextrose 50% Injectable 12.5 Gram(s) IV Push once  dextrose 50% Injectable 25 Gram(s) IV Push once  finasteride 5 milliGRAM(s) Oral daily  fluticasone propionate 50 MICROgram(s)/spray Nasal Spray 1 Spray(s) Both Nostrils two times a day  glucagon  Injectable 1 milliGRAM(s) IntraMuscular once  insulin lispro (ADMELOG) corrective regimen sliding scale   SubCutaneous three times a day before meals  insulin lispro (ADMELOG) corrective regimen sliding scale   SubCutaneous at bedtime  methylPREDNISolone   Oral   methylPREDNISolone 24 milliGRAM(s) Oral once  methylPREDNISolone 4 milliGRAM(s) Oral after dinner  methylPREDNISolone 4 milliGRAM(s) Oral before breakfast  methylPREDNISolone 4 milliGRAM(s) Oral after lunch  methylPREDNISolone 8 milliGRAM(s) Oral at bedtime  sodium chloride 0.65% Nasal 2 Spray(s) Both Nostrils three times a day  sodium chloride 0.9%. 1000 milliLiter(s) (75 mL/Hr) IV Continuous <Continuous>  tamsulosin 0.4 milliGRAM(s) Oral at bedtime    MEDICATIONS  (PRN):  dextrose Oral Gel 15 Gram(s) Oral once PRN Blood Glucose LESS THAN 70 milliGRAM(s)/deciliter      Vital Signs Last 24 Hrs  T(C): 36.7 (25 Apr 2023 11:05), Max: 36.9 (25 Apr 2023 04:30)  T(F): 98.1 (25 Apr 2023 11:05), Max: 98.4 (25 Apr 2023 04:30)  HR: 87 (25 Apr 2023 11:23) (81 - 88)  BP: 109/73 (25 Apr 2023 11:23) (104/65 - 120/75)  BP(mean): --  RR: 18 (25 Apr 2023 11:23) (17 - 18)  SpO2: 96% (25 Apr 2023 11:23) (95% - 98%)    Parameters below as of 25 Apr 2023 11:23  Patient On (Oxygen Delivery Method): room air      CAPILLARY BLOOD GLUCOSE      POCT Blood Glucose.: 92 mg/dL (25 Apr 2023 11:31)  POCT Blood Glucose.: 90 mg/dL (25 Apr 2023 07:36)  POCT Blood Glucose.: 107 mg/dL (25 Apr 2023 05:39)  POCT Blood Glucose.: 89 mg/dL (25 Apr 2023 00:56)  POCT Blood Glucose.: 104 mg/dL (24 Apr 2023 21:25)  POCT Blood Glucose.: 108 mg/dL (24 Apr 2023 18:22)  POCT Blood Glucose.: 86 mg/dL (24 Apr 2023 17:49)  POCT Blood Glucose.: 79 mg/dL (24 Apr 2023 17:05)  POCT Blood Glucose.: 99 mg/dL (24 Apr 2023 14:10)  POCT Blood Glucose.: 96 mg/dL (24 Apr 2023 13:13)  POCT Blood Glucose.: 95 mg/dL (24 Apr 2023 12:50)    I&O's Summary    24 Apr 2023 07:01  -  25 Apr 2023 07:00  --------------------------------------------------------  IN: 0 mL / OUT: 1500 mL / NET: -1500 mL    25 Apr 2023 07:01  -  25 Apr 2023 12:38  --------------------------------------------------------  IN: 480 mL / OUT: 0 mL / NET: 480 mL        PHYSICAL EXAM:  GENERAL: NAD, well-developed  HEAD:  Atraumatic, Normocephalic  EYES: EOMI, PERRLA, conjunctiva and sclera clear  NECK: Supple, No JVD  CHEST/LUNG: Clear to auscultation bilaterally; No wheeze  HEART: Regular rate and rhythm; No murmurs, rubs, or gallops  ABDOMEN: Soft, Nontender, Nondistended; Bowel sounds present  EXTREMITIES:  2+ Peripheral Pulses, No clubbing, cyanosis, or edema  PSYCH: AAOx3  NEUROLOGY: non-focal  SKIN: No rashes or lesions    LABS:                        9.5    5.59  )-----------( 236      ( 25 Apr 2023 06:28 )             28.8     04-24    137  |  101  |  16  ----------------------------<  56<L>  4.2   |  20<L>  |  2.11<H>    Ca    8.8      24 Apr 2023 07:02  Phos  3.7     04-24  Mg     2.0     04-24      PT/INR - ( 24 Apr 2023 02:03 )   PT: 14.5 sec;   INR: 1.25 ratio         PTT - ( 24 Apr 2023 11:17 )  PTT:70.4 sec  CARDIAC MARKERS ( 24 Apr 2023 17:27 )  x     / x     / 189 U/L / x     / 3.1 ng/mL      < from: Transthoracic Echocardiogram (07.01.15 @ 08:02) >  ------------------------------------------------------------------------  Dimensions:    Normal Values:  LA:     3.0    2.0 - 4.0 cm  Ao:     3.1    2.0 - 3.8 cm  SEPTUM: 1.4    0.6 - 1.2 cm  PWT:    0.9    0.6 -1.1 cm  LVIDd:  3.6    3.0 - 5.6 cm  LVIDs:         1.8 - 4.0 cm  EF (Visual Estimate): 60 %  ------------------------------------------------------------------------  Observations:  Mitral Valve: Mild mitral annular calcification. Minimal  mitral regurgitation.  Aortic Valve/Aorta: Tricuspid aortic valve; cusp excursion  appears normal. No aortic valve regurgitation seen.  Normal aortic root dimension.  Left Atrium: Normal left atrium.  Left Ventricle: Endocardium not well visualized in all  myocardial segments.  Normal overall left ventricular  systolic function with an estimated ejection fraction of  60%. Normal left ventricular internal dimensions.  Mildly  thickened basal anteroseptum.  Right Heart: Normal right ventricular size and function.  Minimal tricuspid regurgitation.  Pericardium/Pleura: No pericardial effusion seen.  ------------------------------------------------------------------------  Conclusions:  Technically difficult study with suboptimal image quality.  1. Mild mitral annular calcification. Minimal mitral  regurgitation.  2. Normal left ventricular internal dimensions.  Mildly  thickened basal anteroseptum.  3. Endocardium not well visualized in all myocardial  segments.  Normal overall left ventricular systolic  function with an estimated ejection fraction of 60%.  4. Normal right ventricular size and function.  ------------------------------------------------------------------------    < end of copied text >      RADIOLOGY & ADDITIONAL TESTS:    Imaging Personally Reviewed:    Consultant(s) Notes Reviewed:      Care Discussed with Consultants/Other Providers:

## 2023-04-25 NOTE — PROGRESS NOTE ADULT - ASSESSMENT
72 Y M h/O chronic sinusitis, last evaluation by cardiologist in 2015, presenting due to incidental finding of NSTEMI with trop I of 151, started on DAPT, heparin infusion without bolus, patient denies any chest pain, difficulty breathing (from mouth, chronic difficulty breathing through nose), upon arrival patient remains asymptomtic with repeat ECG demonstrating 1.5 mm elevation v2,    abnormal EKG  - follow up cardiology  - d/c  iv heparin  - echo  - cardiology consult      SARAH  - unknown baseline  - renal US  - avoid nephrotoxins  - nephrology consult    diabetes  - fs qid  - hgb a1c 5.9  - insulin ss    sinusitis with nasal obstruction and nasal polyps  - Nasal polyps.   - Flonase 1 spray to B/L nares BID  Medrol dose pack  Nasal saline 2 sprays to B/L nares TID  F/U with ENT as outpt DR Dinh or Dr Turner [phone].-   ENT consult appreciated    anemia  - iron studies    urinary retention  - cont flomax  - cont morales     hematuria  - trauma 2/2 morales  - monitor H&H    elevated PSA  - follow up with urology    back pain  - likely DJD  - analgesics prn  - PT eval    anemia  - iron studies    dvt px  - on iv heparin

## 2023-04-25 NOTE — PHYSICAL THERAPY INITIAL EVALUATION ADULT - PLANNED THERAPY INTERVENTIONS, PT EVAL
balance training/bed mobility training/gait training/strengthening/transfer training GOAL: Pt will negotiate 5 steps  up and down using HR support, independent  within 2-3 weeks./balance training/bed mobility training/gait training/strengthening/transfer training

## 2023-04-25 NOTE — PROGRESS NOTE ADULT - ASSESSMENT
73 yo M PMHx of chronic sinusitis, last evaluation by cardiologist in 2015, admitted for NSTEMI on heparin gtt.  consulted for gross hematuria s/p morales placement i/s/o heparin gtt and elevated PSA to 16.    #gross hematuria, likely in the setting of traumatic morales insertion and anticoagulation  - maintain morales  - follow up urine color --> currently clear yellow.   - please start Flomax 0.4mg qhs and finasteride 5mg daily.   - send urine culture  - needs outpatient cystoscopy  - anticoagulation per primary team. If AC is pursued by primary team, would recommend continuing with morales at this time. Given his recent episode of retention would not remove morales at this time.     #elevated PSA (16.00) c/f prostate CA vs BPH vs recent catheterization  - needs outpatient workup  - MR Prostate should be done outpatient

## 2023-04-25 NOTE — PHYSICAL THERAPY INITIAL EVALUATION ADULT - PERTINENT HX OF CURRENT PROBLEM, REHAB EVAL
72 Y M h/O chronic sinusitis, last evaluation by cardiologist in 2015, presenting due to incidental finding of NSTEMI with trop I of 151, started on DAPT, heparin infusion without bolus, patient denies any chest pain, difficulty breathing (from mouth, chronic difficulty breathing through nose), upon arrival patient remains asymptomtic with repeat ECG demonstrating 1.5 mm elevation v2. Per cardiology note- Troponin elevation does not appear to be related acute coronary syndrome and is in the presence of underlying CKD. EKG- ECG: Sinus rhythm with LVH and repolarization abnormalties.

## 2023-04-25 NOTE — CONSULT NOTE ADULT - SUBJECTIVE AND OBJECTIVE BOX
[facility] DIVISION OF KIDNEY DISEASES AND HYPERTENSION -- [phone]  -- INITIAL CONSULT NOTE  --------------------------------------------------------------------------------  HPI: Pt is a 72-year-old Male with PMHx of chronic sinusitis, last evaluation by cardiologist in 2015, admitted at [facility] on 4/23 for NSTEMI, currently on heparin gtt. Over the course, labs concerning for rising Scr. Nephrology team was consulted today for SARAH.      On review of [facility], it was noted that Scr was elevated at 1.96 on admission on 4/23. Scr subsequently is elevated at 2.11 on 4/24. US Kidney showed B/L echogenicity suggestive of chronic medical disease. Pt also received IV contrast on 4/23. Pt denies history of kidney disease or kidney stones in the past . Also denies recent intake of NSAIDs/ motrin or OTCs meds.    Pt seen and examined at bedside. Pt reports of discomfort at the moraels catheter insertion site. Denies SOB, CP, HA, N/V, abdominal pain, or dizziness.      PAST HISTORY  --------------------------------------------------------------------------------  PAST MEDICAL & SURGICAL HISTORY:  HTN (hypertension)  DM (diabetes mellitus)  Nasal polyp  S/P cataract extraction  B/L  S/P nasal polypectomy    FAMILY HISTORY:  No pertinent family history    PAST SOCIAL HISTORY:    ALLERGIES & MEDICATIONS  --------------------------------------------------------------------------------  Allergies    No Known Allergies    Intolerances    Standing Inpatient Medications  acetaminophen     Tablet .. 650 milliGRAM(s) Oral once  ampicillin/sulbactam  IVPB      ampicillin/sulbactam  IVPB 3 Gram(s) IV Intermittent every 6 hours  dextrose 5%. 1000 milliLiter(s) IV Continuous <Continuous>  dextrose 5%. 1000 milliLiter(s) IV Continuous <Continuous>  dextrose 5%. 1000 milliLiter(s) IV Continuous <Continuous>  dextrose 50% Injectable 25 Gram(s) IV Push once  dextrose 50% Injectable 25 Gram(s) IV Push once  dextrose 50% Injectable 12.5 Gram(s) IV Push once  finasteride 5 milliGRAM(s) Oral daily  fluticasone propionate 50 MICROgram(s)/spray Nasal Spray 1 Spray(s) Both Nostrils two times a day  glucagon  Injectable 1 milliGRAM(s) IntraMuscular once  insulin lispro (ADMELOG) corrective regimen sliding scale   SubCutaneous three times a day before meals  insulin lispro (ADMELOG) corrective regimen sliding scale   SubCutaneous at bedtime  methylPREDNISolone   Oral   methylPREDNISolone 24 milliGRAM(s) Oral once  methylPREDNISolone 4 milliGRAM(s) Oral after dinner  methylPREDNISolone 4 milliGRAM(s) Oral before breakfast  methylPREDNISolone 4 milliGRAM(s) Oral after lunch  methylPREDNISolone 8 milliGRAM(s) Oral at bedtime  sodium chloride 0.65% Nasal 2 Spray(s) Both Nostrils three times a day  sodium chloride 0.9%. 1000 milliLiter(s) IV Continuous <Continuous>  tamsulosin 0.4 milliGRAM(s) Oral at bedtime    PRN Inpatient Medications  dextrose Oral Gel 15 Gram(s) Oral once PRN    REVIEW OF SYSTEMS  --------------------------------------------------------------------------------  Gen: No fevers/chills  Skin: No rashes  Head/Eyes/Ears: Normal hearing,   Respiratory: No dyspnea, cough  CV: No chest pain  GI: No abdominal pain, diarrhea  : No dysuria, hematuria  MSK: No  edema  Heme: No easy bruising or bleeding  Psych: No significant depression  All other systems were reviewed and are negative, except as noted.    VITALS/PHYSICAL EXAM  --------------------------------------------------------------------------------  T(C): 36.9 (04-25-23 @ 04:30), Max: 36.9 (04-25-23 @ 04:30)  HR: 86 (04-25-23 @ 04:30) (81 - 88)  BP: 120/75 (04-25-23 @ 04:30) (104/65 - 120/75)  RR: 17 (04-25-23 @ 04:30) (17 - 18)  SpO2: 96% (04-25-23 @ 04:30) (96% - 98%)  Wt(kg): --  Height (cm): 162.6 (04-23-23 @ 13:48)  Weight (kg): 73.6 (04-23-23 @ 14:45)  BMI (kg/m2): 27.8 (04-23-23 @ 14:45)  BSA (m2): 1.79 (04-23-23 @ 14:45)    04-24-23 @ 07:01  -  04-25-23 @ 07:00  --------------------------------------------------------  IN: 0 mL / OUT: 1500 mL / NET: -1500 mL    Physical Exam:  	Gen: NAD  	HEENT: MMM  	Pulm: CTA B/L  	CV: S1S2  	Abd: Soft, +BS               : morales cath+, pinkish urine evident  	Ext: No LE edema B/L  	Neuro: Awake  	Skin: Warm and dry  	Vascular access: IV peripheral canula    LABS/STUDIES  --------------------------------------------------------------------------------              9.5    5.59  >-----------<  236      [04-25-23 @ 06:28]              28.8     137  |  101  |  16  ----------------------------<  56      [04-24-23 @ 07:02]  4.2   |  20  |  2.11        Ca     8.8     [04-24-23 @ 07:02]      Mg     2.0     [04-24-23 @ 07:02]      Phos  3.7     [04-24-23 @ 07:02]    TPro  7.3  /  Alb  3.2  /  TBili  0.5  /  DBili  x   /  AST  30  /  ALT  19  /  AlkPhos  68  [04-23-23 @ 10:30]    PT/INR: PT 14.5 , INR 1.25       [04-24-23 @ 02:03]  PTT: 70.4       [04-24-23 @ 11:17]          [04-24-23 @ 17:27]    Creatinine Trend:  SCr 2.11 [04-24 @ 07:02]  SCr 1.97 [04-23 @ 15:02]  SCr 1.96 [04-23 @ 10:30]    Iron 36, TIBC 203, %sat 18      [04-24-23 @ 07:02]  Ferritin 178      [04-24-23 @ 07:02]  TSH 2.38      [04-24-23 @ 07:02]    HCV 0.09, Nonreact      [04-24-23 @ 07:02]

## 2023-04-25 NOTE — PHYSICAL THERAPY INITIAL EVALUATION ADULT - ADDITIONAL COMMENTS
Patient lives in pvt house with spouse, 3   steps to enter.  no steps inside.  Patient ambulated with quad cane independent. pt owns no other dmeS.

## 2023-04-26 LAB
ANION GAP SERPL CALC-SCNC: 11 MMOL/L — SIGNIFICANT CHANGE UP (ref 5–17)
BUN SERPL-MCNC: 15 MG/DL — SIGNIFICANT CHANGE UP (ref 7–23)
CALCIUM SERPL-MCNC: 8.4 MG/DL — SIGNIFICANT CHANGE UP (ref 8.4–10.5)
CHLORIDE SERPL-SCNC: 104 MMOL/L — SIGNIFICANT CHANGE UP (ref 96–108)
CHOLEST SERPL-MCNC: 137 MG/DL — SIGNIFICANT CHANGE UP
CO2 SERPL-SCNC: 21 MMOL/L — LOW (ref 22–31)
CREAT SERPL-MCNC: 2.1 MG/DL — HIGH (ref 0.5–1.3)
EGFR: 32 ML/MIN/1.73M2 — LOW
GLUCOSE BLDC GLUCOMTR-MCNC: 109 MG/DL — HIGH (ref 70–99)
GLUCOSE BLDC GLUCOMTR-MCNC: 143 MG/DL — HIGH (ref 70–99)
GLUCOSE BLDC GLUCOMTR-MCNC: 161 MG/DL — HIGH (ref 70–99)
GLUCOSE BLDC GLUCOMTR-MCNC: 202 MG/DL — HIGH (ref 70–99)
GLUCOSE SERPL-MCNC: 141 MG/DL — HIGH (ref 70–99)
HCT VFR BLD CALC: 28.2 % — LOW (ref 39–50)
HCT VFR BLD CALC: 28.4 % — LOW (ref 39–50)
HDLC SERPL-MCNC: 35 MG/DL — LOW
HGB BLD-MCNC: 9.3 G/DL — LOW (ref 13–17)
HGB BLD-MCNC: 9.6 G/DL — LOW (ref 13–17)
KAPPA LC SER QL IFE: 5.07 MG/DL — HIGH (ref 0.33–1.94)
KAPPA/LAMBDA FREE LIGHT CHAIN RATIO, SERUM: 1.9 RATIO — HIGH (ref 0.26–1.65)
LAMBDA LC SER QL IFE: 2.67 MG/DL — HIGH (ref 0.57–2.63)
LIPID PNL WITH DIRECT LDL SERPL: 89 MG/DL — SIGNIFICANT CHANGE UP
MCHC RBC-ENTMCNC: 27.7 PG — SIGNIFICANT CHANGE UP (ref 27–34)
MCHC RBC-ENTMCNC: 27.9 PG — SIGNIFICANT CHANGE UP (ref 27–34)
MCHC RBC-ENTMCNC: 33 GM/DL — SIGNIFICANT CHANGE UP (ref 32–36)
MCHC RBC-ENTMCNC: 33.8 GM/DL — SIGNIFICANT CHANGE UP (ref 32–36)
MCV RBC AUTO: 82.6 FL — SIGNIFICANT CHANGE UP (ref 80–100)
MCV RBC AUTO: 83.9 FL — SIGNIFICANT CHANGE UP (ref 80–100)
NON HDL CHOLESTEROL: 103 MG/DL — SIGNIFICANT CHANGE UP
NRBC # BLD: 0 /100 WBCS — SIGNIFICANT CHANGE UP (ref 0–0)
NRBC # BLD: 0 /100 WBCS — SIGNIFICANT CHANGE UP (ref 0–0)
NT-PROBNP SERPL-SCNC: 1888 PG/ML — HIGH (ref 0–300)
PLATELET # BLD AUTO: 234 K/UL — SIGNIFICANT CHANGE UP (ref 150–400)
PLATELET # BLD AUTO: 246 K/UL — SIGNIFICANT CHANGE UP (ref 150–400)
POTASSIUM SERPL-MCNC: 4.4 MMOL/L — SIGNIFICANT CHANGE UP (ref 3.5–5.3)
POTASSIUM SERPL-SCNC: 4.4 MMOL/L — SIGNIFICANT CHANGE UP (ref 3.5–5.3)
RBC # BLD: 3.36 M/UL — LOW (ref 4.2–5.8)
RBC # BLD: 3.44 M/UL — LOW (ref 4.2–5.8)
RBC # FLD: 14.9 % — HIGH (ref 10.3–14.5)
RBC # FLD: 14.9 % — HIGH (ref 10.3–14.5)
SODIUM SERPL-SCNC: 136 MMOL/L — SIGNIFICANT CHANGE UP (ref 135–145)
TRIGL SERPL-MCNC: 67 MG/DL — SIGNIFICANT CHANGE UP
WBC # BLD: 4.32 K/UL — SIGNIFICANT CHANGE UP (ref 3.8–10.5)
WBC # BLD: 5.23 K/UL — SIGNIFICANT CHANGE UP (ref 3.8–10.5)
WBC # FLD AUTO: 4.32 K/UL — SIGNIFICANT CHANGE UP (ref 3.8–10.5)
WBC # FLD AUTO: 5.23 K/UL — SIGNIFICANT CHANGE UP (ref 3.8–10.5)

## 2023-04-26 PROCEDURE — 99233 SBSQ HOSP IP/OBS HIGH 50: CPT

## 2023-04-26 RX ORDER — IRON SUCROSE 20 MG/ML
200 INJECTION, SOLUTION INTRAVENOUS EVERY 24 HOURS
Refills: 0 | Status: DISCONTINUED | OUTPATIENT
Start: 2023-04-26 | End: 2023-04-27

## 2023-04-26 RX ADMIN — Medication 4 MILLIGRAM(S): at 21:21

## 2023-04-26 RX ADMIN — AMPICILLIN SODIUM AND SULBACTAM SODIUM 200 GRAM(S): 250; 125 INJECTION, POWDER, FOR SUSPENSION INTRAMUSCULAR; INTRAVENOUS at 17:08

## 2023-04-26 RX ADMIN — Medication 1 SPRAY(S): at 05:02

## 2023-04-26 RX ADMIN — Medication 4 MILLIGRAM(S): at 17:08

## 2023-04-26 RX ADMIN — Medication 2 SPRAY(S): at 05:03

## 2023-04-26 RX ADMIN — Medication 2 SPRAY(S): at 21:21

## 2023-04-26 RX ADMIN — IRON SUCROSE 110 MILLIGRAM(S): 20 INJECTION, SOLUTION INTRAVENOUS at 17:31

## 2023-04-26 RX ADMIN — AMPICILLIN SODIUM AND SULBACTAM SODIUM 200 GRAM(S): 250; 125 INJECTION, POWDER, FOR SUSPENSION INTRAMUSCULAR; INTRAVENOUS at 00:19

## 2023-04-26 RX ADMIN — FINASTERIDE 5 MILLIGRAM(S): 5 TABLET, FILM COATED ORAL at 11:39

## 2023-04-26 RX ADMIN — Medication 2: at 12:15

## 2023-04-26 RX ADMIN — Medication 1 SPRAY(S): at 17:09

## 2023-04-26 RX ADMIN — AMPICILLIN SODIUM AND SULBACTAM SODIUM 200 GRAM(S): 250; 125 INJECTION, POWDER, FOR SUSPENSION INTRAMUSCULAR; INTRAVENOUS at 05:03

## 2023-04-26 RX ADMIN — Medication 1: at 08:41

## 2023-04-26 RX ADMIN — Medication 4 MILLIGRAM(S): at 06:58

## 2023-04-26 RX ADMIN — AMPICILLIN SODIUM AND SULBACTAM SODIUM 200 GRAM(S): 250; 125 INJECTION, POWDER, FOR SUSPENSION INTRAMUSCULAR; INTRAVENOUS at 11:39

## 2023-04-26 RX ADMIN — AMPICILLIN SODIUM AND SULBACTAM SODIUM 200 GRAM(S): 250; 125 INJECTION, POWDER, FOR SUSPENSION INTRAMUSCULAR; INTRAVENOUS at 23:53

## 2023-04-26 RX ADMIN — TAMSULOSIN HYDROCHLORIDE 0.4 MILLIGRAM(S): 0.4 CAPSULE ORAL at 21:21

## 2023-04-26 RX ADMIN — CHLORHEXIDINE GLUCONATE 1 APPLICATION(S): 213 SOLUTION TOPICAL at 05:02

## 2023-04-26 RX ADMIN — Medication 4 MILLIGRAM(S): at 11:39

## 2023-04-26 RX ADMIN — Medication 2 SPRAY(S): at 11:40

## 2023-04-26 NOTE — PROGRESS NOTE ADULT - ASSESSMENT
73 yo M PMHx of chronic sinusitis, last evaluation by cardiologist in 2015, admitted for NSTEMI on heparin gtt.  consulted for gross hematuria s/p morales placement i/s/o heparin gtt and elevated PSA to 16.    #gross hematuria, likely in the setting of traumatic morales insertion and anticoagulation  - maintain morales  - follow up urine color --> currently clear yellow.   - please start Flomax 0.4mg qhs and finasteride 5mg daily.   - can attempt TOV today as urine is clear.

## 2023-04-26 NOTE — PROGRESS NOTE ADULT - ASSESSMENT
72 Y M h/O chronic sinusitis, last evaluation by cardiologist in 2015, presenting due to incidental finding of NSTEMI with trop I of 151, started on DAPT, heparin infusion without bolus, patient denies any chest pain, difficulty breathing (from mouth, chronic difficulty breathing through nose), upon arrival patient remains asymptomtic with repeat ECG demonstrating 1.5 mm elevation v2,    abnormal EKG  - follow up cardiology  - echo done  - amyloid study positive  - plan as per  cardiology consult      SARAH  - unknown baseline  - renal US - no obst  - avoid nephrotoxins  - nephrology consult following    diabetes  - fs qid  - hgb a1c 5.9  - insulin ss    sinusitis with nasal obstruction and nasal polyps  - Nasal polyps.   - Flonase 1 spray to B/L nares BID  Medrol dose pack  Nasal saline 2 sprays to B/L nares TID  F/U with ENT as outpt DR Dinh or Dr Turner [phone].-   ENT consult appreciated    anemia  - iron studies c/w iron def  - out pt gi workup  - iron supplements    urinary retention  - cont flomax  - cont morales     hematuria  - trauma 2/2 morales  - monitor H&H    elevated PSA  - follow up with urology as out pt    back pain  - likely DJD  - analgesics prn  - PT eval    anemia  - iron studies    dvt px  - on iv heparin

## 2023-04-26 NOTE — PROGRESS NOTE ADULT - SUBJECTIVE AND OBJECTIVE BOX
DATE OF SERVICE: 04-26-23 @ 12:31    Patient is a 72y old  Male who presents with a chief complaint of Elevated troponin (25 Apr 2023 12:33)      SUBJECTIVE / OVERNIGHT EVENTS:  No chest pain. No shortness of breath. No complaints. No events overnight.     MEDICATIONS  (STANDING):  ampicillin/sulbactam  IVPB      ampicillin/sulbactam  IVPB 3 Gram(s) IV Intermittent every 6 hours  chlorhexidine 2% Cloths 1 Application(s) Topical <User Schedule>  dextrose 5%. 1000 milliLiter(s) (100 mL/Hr) IV Continuous <Continuous>  dextrose 5%. 1000 milliLiter(s) (50 mL/Hr) IV Continuous <Continuous>  dextrose 50% Injectable 25 Gram(s) IV Push once  dextrose 50% Injectable 25 Gram(s) IV Push once  dextrose 50% Injectable 12.5 Gram(s) IV Push once  finasteride 5 milliGRAM(s) Oral daily  fluticasone propionate 50 MICROgram(s)/spray Nasal Spray 1 Spray(s) Both Nostrils two times a day  glucagon  Injectable 1 milliGRAM(s) IntraMuscular once  insulin lispro (ADMELOG) corrective regimen sliding scale   SubCutaneous three times a day before meals  insulin lispro (ADMELOG) corrective regimen sliding scale   SubCutaneous at bedtime  methylPREDNISolone   Oral   methylPREDNISolone 24 milliGRAM(s) Oral once  methylPREDNISolone 4 milliGRAM(s) Oral after dinner  methylPREDNISolone 4 milliGRAM(s) Oral before breakfast  methylPREDNISolone 4 milliGRAM(s) Oral after lunch  methylPREDNISolone 4 milliGRAM(s) Oral at bedtime  sodium chloride 0.65% Nasal 2 Spray(s) Both Nostrils three times a day  tamsulosin 0.4 milliGRAM(s) Oral at bedtime    MEDICATIONS  (PRN):  dextrose Oral Gel 15 Gram(s) Oral once PRN Blood Glucose LESS THAN 70 milliGRAM(s)/deciliter      Vital Signs Last 24 Hrs  T(C): 36.5 (26 Apr 2023 11:00), Max: 36.8 (25 Apr 2023 20:15)  T(F): 97.7 (26 Apr 2023 11:00), Max: 98.2 (25 Apr 2023 20:15)  HR: 94 (26 Apr 2023 11:00) (75 - 94)  BP: 104/68 (26 Apr 2023 11:00) (104/68 - 121/74)  BP(mean): --  RR: 18 (26 Apr 2023 11:00) (18 - 18)  SpO2: 100% (26 Apr 2023 11:00) (96% - 100%)    Parameters below as of 26 Apr 2023 11:00  Patient On (Oxygen Delivery Method): room air      CAPILLARY BLOOD GLUCOSE      POCT Blood Glucose.: 202 mg/dL (26 Apr 2023 11:47)  POCT Blood Glucose.: 161 mg/dL (26 Apr 2023 07:42)  POCT Blood Glucose.: 181 mg/dL (25 Apr 2023 21:22)  POCT Blood Glucose.: 124 mg/dL (25 Apr 2023 17:23)    I&O's Summary    25 Apr 2023 07:01  -  26 Apr 2023 07:00  --------------------------------------------------------  IN: 480 mL / OUT: 1975 mL / NET: -1495 mL    26 Apr 2023 07:01  -  26 Apr 2023 12:31  --------------------------------------------------------  IN: 240 mL / OUT: 0 mL / NET: 240 mL        PHYSICAL EXAM:  GENERAL: NAD, well-developed  HEAD:  Atraumatic, Normocephalic  EYES: EOMI, PERRLA, conjunctiva and sclera clear  NECK: Supple, No JVD  CHEST/LUNG: Clear to auscultation bilaterally; No wheeze  HEART: Regular rate and rhythm; No murmurs, rubs, or gallops  ABDOMEN: Soft, Nontender, Nondistended; Bowel sounds present  EXTREMITIES:  2+ Peripheral Pulses, No clubbing, cyanosis, or edema  PSYCH: AAOx3  NEUROLOGY: non-focal  SKIN: No rashes or lesions    LABS:                        9.3    5.23  )-----------( 234      ( 26 Apr 2023 06:32 )             28.2     04-26    136  |  104  |  15  ----------------------------<  141<H>  4.4   |  21<L>  |  2.10<H>    Ca    8.4      26 Apr 2023 06:31        CARDIAC MARKERS ( 24 Apr 2023 17:27 )  x     / x     / 189 U/L / x     / 3.1 ng/mL    < from: NM Amyloidosis SPECT/CT, Single Area Single Day (04.25.23 @ 14:40) >  INTERPRETATION:  RADIOPHARMACEUTICAL: 14.5 mCi Tc-99m-pyrophosphate; i.v.    CLINICAL INFORMATION: 72 year old gentleman withnon-STEMI and increased   LV thickness; referred to evaluate for cardiac transthyretin amyloidosis.    TECHNIQUE: Approximately 3 hours after radiopharmaceutical administration   SPECT/CT of the chest was obtained. A computer workstation was used to   obtain composite images for anatomic correlation by precisely overlying   the SPECT and CT images to generate a fused SPECT/CT image. The CT   protocol was optimized for SPECT attenuation correction and to provide   anatomic detail for localization of SPECT abnormalities. The CT protocol   was not designed to produce and cannot replace state-of- the-art   diagnostic CT images with specific imaging protocols for different body   parts and indications.    COMPARISON: None    FINDINGS:    The technical quality of the images was adequate.    There is trace pleural effusions bilaterally. Degenerative changes noted   along the thoracolumbar spine.    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    < end of copied text >    < from: US Kidney and Bladder (04.24.23 @ 16:50) >  FINDINGS:  Right kidney: 9.6 cm. Upper pole cyst measuring 0.8 x 0.9 x 0.8 cm and an   additional upper pole cyst measuring 1.2 x 1.4 x 1.6 cm. Lower pole cyst   measuring 2.0 x 1.7 x 1.7 cm. No hydronephrosis or calculi. Mildly   increased echogenicity, indicating renal parenchymal disease.    Left kidney: 10.8 cm. Mid renal cyst measuring 2.3 x 2.7 x 2.6 cm. Upper   pole cyst measuring 0.8 x 0.7 x 0.6 cm. No hydronephrosis or calculi.   Mildly increased echogenicity, indicating renal parenchymal disease.    Urinary bladder: Not distended, Harkins with balloon intact.    Prostate: Enlarged with a volume of 160 cc.    IMPRESSION:  Bilateral renal cysts. No hydronephrosis or renal calculi. Mildly   increased echogenicity bilaterally, indicating renal parenchymal disease.  Prostatomegaly.      < end of copied text >      RADIOLOGY & ADDITIONAL TESTS:    Imaging Personally Reviewed:    Consultant(s) Notes Reviewed:      Care Discussed with Consultants/Other Providers:

## 2023-04-26 NOTE — PROGRESS NOTE ADULT - SUBJECTIVE AND OBJECTIVE BOX
DAILY PROGRESS NOTE:         24 hr events:  urine in morales clear     Objective:    Vital Signs Last 24 Hrs  T(C): 36.5 (26 Apr 2023 11:00), Max: 36.8 (25 Apr 2023 20:15)  T(F): 97.7 (26 Apr 2023 11:00), Max: 98.2 (25 Apr 2023 20:15)  HR: 94 (26 Apr 2023 11:00) (75 - 94)  BP: 104/68 (26 Apr 2023 11:00) (104/68 - 121/74)  BP(mean): --  RR: 18 (26 Apr 2023 11:00) (18 - 18)  SpO2: 100% (26 Apr 2023 11:00) (96% - 100%)    Parameters below as of 26 Apr 2023 11:00  Patient On (Oxygen Delivery Method): room air        I&O's Detail    25 Apr 2023 07:01  -  26 Apr 2023 07:00  --------------------------------------------------------  IN:    Oral Fluid: 480 mL  Total IN: 480 mL    OUT:    Indwelling Catheter - Urethral (mL): 1975 mL  Total OUT: 1975 mL    Total NET: -1495 mL      26 Apr 2023 07:01  -  26 Apr 2023 13:16  --------------------------------------------------------  IN:    Oral Fluid: 240 mL  Total IN: 240 mL    OUT:  Total OUT: 0 mL    Total NET: 240 mL          Physical Exam:    General: NAD, well-nourished  Resp: Breathing comfortably on RA  CV: regular rate   : clear yellow urine   Laboratory Results:                          9.3    5.23  )-----------( 234      ( 26 Apr 2023 06:32 )             28.2     04-26    136  |  104  |  15  ----------------------------<  141<H>  4.4   |  21<L>  |  2.10<H>    Ca    8.4      26 Apr 2023 06:31

## 2023-04-26 NOTE — PATIENT PROFILE ADULT - VISION (WITH CORRECTIVE LENSES IF THE PATIENT USUALLY WEARS THEM):
Addended by: Radha Bolden on: 1/26/2023 11:48 AM     Modules accepted: Orders
Normal vision: sees adequately in most situations; can see medication labels, newsprint

## 2023-04-26 NOTE — PATIENT PROFILE ADULT - FALL HARM RISK - RISK INTERVENTIONS

## 2023-04-27 ENCOUNTER — TRANSCRIPTION ENCOUNTER (OUTPATIENT)
Age: 73
End: 2023-04-27

## 2023-04-27 VITALS
TEMPERATURE: 98 F | HEART RATE: 75 BPM | SYSTOLIC BLOOD PRESSURE: 117 MMHG | DIASTOLIC BLOOD PRESSURE: 75 MMHG | OXYGEN SATURATION: 97 % | RESPIRATION RATE: 18 BRPM

## 2023-04-27 LAB
ALBUMIN SERPL ELPH-MCNC: 3.5 G/DL — SIGNIFICANT CHANGE UP (ref 3.3–5)
ALP SERPL-CCNC: 62 U/L — SIGNIFICANT CHANGE UP (ref 40–120)
ALT FLD-CCNC: 11 U/L — SIGNIFICANT CHANGE UP (ref 10–45)
ANION GAP SERPL CALC-SCNC: 12 MMOL/L — SIGNIFICANT CHANGE UP (ref 5–17)
APPEARANCE UR: CLEAR — SIGNIFICANT CHANGE UP
AST SERPL-CCNC: 25 U/L — SIGNIFICANT CHANGE UP (ref 10–40)
BACTERIA # UR AUTO: NEGATIVE — SIGNIFICANT CHANGE UP
BILIRUB SERPL-MCNC: 0.2 MG/DL — SIGNIFICANT CHANGE UP (ref 0.2–1.2)
BILIRUB UR-MCNC: NEGATIVE — SIGNIFICANT CHANGE UP
BUN SERPL-MCNC: 18 MG/DL — SIGNIFICANT CHANGE UP (ref 7–23)
CALCIUM SERPL-MCNC: 8.6 MG/DL — SIGNIFICANT CHANGE UP (ref 8.4–10.5)
CHLORIDE SERPL-SCNC: 106 MMOL/L — SIGNIFICANT CHANGE UP (ref 96–108)
CO2 SERPL-SCNC: 21 MMOL/L — LOW (ref 22–31)
COLOR SPEC: SIGNIFICANT CHANGE UP
CREAT ?TM UR-MCNC: 54 MG/DL — SIGNIFICANT CHANGE UP
CREAT SERPL-MCNC: 2.23 MG/DL — HIGH (ref 0.5–1.3)
CULTURE RESULTS: NO GROWTH — SIGNIFICANT CHANGE UP
DIFF PNL FLD: ABNORMAL
EGFR: 30 ML/MIN/1.73M2 — LOW
EPI CELLS # UR: 2 /HPF — SIGNIFICANT CHANGE UP
GLUCOSE BLDC GLUCOMTR-MCNC: 120 MG/DL — HIGH (ref 70–99)
GLUCOSE BLDC GLUCOMTR-MCNC: 144 MG/DL — HIGH (ref 70–99)
GLUCOSE SERPL-MCNC: 108 MG/DL — HIGH (ref 70–99)
GLUCOSE UR QL: NEGATIVE — SIGNIFICANT CHANGE UP
HCT VFR BLD CALC: 26.2 % — LOW (ref 39–50)
HGB BLD-MCNC: 8.9 G/DL — LOW (ref 13–17)
HYALINE CASTS # UR AUTO: 0 /LPF — SIGNIFICANT CHANGE UP (ref 0–2)
KAPPA LC SER QL IFE: 3.99 MG/DL — HIGH (ref 0.33–1.94)
KAPPA/LAMBDA FREE LIGHT CHAIN RATIO, SERUM: 1.67 RATIO — HIGH (ref 0.26–1.65)
KETONES UR-MCNC: NEGATIVE — SIGNIFICANT CHANGE UP
LAMBDA LC SER QL IFE: 2.39 MG/DL — SIGNIFICANT CHANGE UP (ref 0.57–2.63)
LEUKOCYTE ESTERASE UR-ACNC: NEGATIVE — SIGNIFICANT CHANGE UP
MCHC RBC-ENTMCNC: 27.9 PG — SIGNIFICANT CHANGE UP (ref 27–34)
MCHC RBC-ENTMCNC: 34 GM/DL — SIGNIFICANT CHANGE UP (ref 32–36)
MCV RBC AUTO: 82.1 FL — SIGNIFICANT CHANGE UP (ref 80–100)
MRSA PCR RESULT.: SIGNIFICANT CHANGE UP
NITRITE UR-MCNC: NEGATIVE — SIGNIFICANT CHANGE UP
NRBC # BLD: 0 /100 WBCS — SIGNIFICANT CHANGE UP (ref 0–0)
PH UR: 6.5 — SIGNIFICANT CHANGE UP (ref 5–8)
PLATELET # BLD AUTO: 236 K/UL — SIGNIFICANT CHANGE UP (ref 150–400)
POTASSIUM SERPL-MCNC: 3.9 MMOL/L — SIGNIFICANT CHANGE UP (ref 3.5–5.3)
POTASSIUM SERPL-SCNC: 3.9 MMOL/L — SIGNIFICANT CHANGE UP (ref 3.5–5.3)
POTASSIUM UR-SCNC: 15 MMOL/L — SIGNIFICANT CHANGE UP
PROT ?TM UR-MCNC: 14 MG/DL — HIGH (ref 0–12)
PROT SERPL-MCNC: 6.5 G/DL — SIGNIFICANT CHANGE UP (ref 6–8.3)
PROT UR-MCNC: ABNORMAL
PROT/CREAT UR-RTO: 0.3 RATIO — HIGH (ref 0–0.2)
RBC # BLD: 3.19 M/UL — LOW (ref 4.2–5.8)
RBC # FLD: 15.2 % — HIGH (ref 10.3–14.5)
RBC CASTS # UR COMP ASSIST: 102 /HPF — HIGH (ref 0–4)
S AUREUS DNA NOSE QL NAA+PROBE: DETECTED
SODIUM SERPL-SCNC: 139 MMOL/L — SIGNIFICANT CHANGE UP (ref 135–145)
SODIUM UR-SCNC: 40 MMOL/L — SIGNIFICANT CHANGE UP
SP GR SPEC: 1.01 — SIGNIFICANT CHANGE UP (ref 1.01–1.02)
SPECIMEN SOURCE: SIGNIFICANT CHANGE UP
URATE UR-MCNC: 18.4 MG/DL — SIGNIFICANT CHANGE UP
UROBILINOGEN FLD QL: NEGATIVE — SIGNIFICANT CHANGE UP
WBC # BLD: 10.15 K/UL — SIGNIFICANT CHANGE UP (ref 3.8–10.5)
WBC # FLD AUTO: 10.15 K/UL — SIGNIFICANT CHANGE UP (ref 3.8–10.5)
WBC UR QL: 7 /HPF — HIGH (ref 0–5)

## 2023-04-27 PROCEDURE — 99285 EMERGENCY DEPT VISIT HI MDM: CPT

## 2023-04-27 PROCEDURE — 84560 ASSAY OF URINE/URIC ACID: CPT

## 2023-04-27 PROCEDURE — 83521 IG LIGHT CHAINS FREE EACH: CPT

## 2023-04-27 PROCEDURE — 80053 COMPREHEN METABOLIC PANEL: CPT

## 2023-04-27 PROCEDURE — 82550 ASSAY OF CK (CPK): CPT

## 2023-04-27 PROCEDURE — 84443 ASSAY THYROID STIM HORMONE: CPT

## 2023-04-27 PROCEDURE — 94640 AIRWAY INHALATION TREATMENT: CPT

## 2023-04-27 PROCEDURE — 84300 ASSAY OF URINE SODIUM: CPT

## 2023-04-27 PROCEDURE — 82962 GLUCOSE BLOOD TEST: CPT

## 2023-04-27 PROCEDURE — 87086 URINE CULTURE/COLONY COUNT: CPT

## 2023-04-27 PROCEDURE — 99231 SBSQ HOSP IP/OBS SF/LOW 25: CPT

## 2023-04-27 PROCEDURE — 83550 IRON BINDING TEST: CPT

## 2023-04-27 PROCEDURE — 82607 VITAMIN B-12: CPT

## 2023-04-27 PROCEDURE — 83735 ASSAY OF MAGNESIUM: CPT

## 2023-04-27 PROCEDURE — 80061 LIPID PANEL: CPT

## 2023-04-27 PROCEDURE — 86850 RBC ANTIBODY SCREEN: CPT

## 2023-04-27 PROCEDURE — 85610 PROTHROMBIN TIME: CPT

## 2023-04-27 PROCEDURE — 86803 HEPATITIS C AB TEST: CPT

## 2023-04-27 PROCEDURE — 0225U NFCT DS DNA&RNA 21 SARSCOV2: CPT

## 2023-04-27 PROCEDURE — 80048 BASIC METABOLIC PNL TOTAL CA: CPT

## 2023-04-27 PROCEDURE — 84484 ASSAY OF TROPONIN QUANT: CPT

## 2023-04-27 PROCEDURE — 87641 MR-STAPH DNA AMP PROBE: CPT

## 2023-04-27 PROCEDURE — 84156 ASSAY OF PROTEIN URINE: CPT

## 2023-04-27 PROCEDURE — 82728 ASSAY OF FERRITIN: CPT

## 2023-04-27 PROCEDURE — 97161 PT EVAL LOW COMPLEX 20 MIN: CPT

## 2023-04-27 PROCEDURE — 82570 ASSAY OF URINE CREATININE: CPT

## 2023-04-27 PROCEDURE — A9538: CPT

## 2023-04-27 PROCEDURE — 85027 COMPLETE CBC AUTOMATED: CPT

## 2023-04-27 PROCEDURE — 83880 ASSAY OF NATRIURETIC PEPTIDE: CPT

## 2023-04-27 PROCEDURE — 87640 STAPH A DNA AMP PROBE: CPT

## 2023-04-27 PROCEDURE — 85730 THROMBOPLASTIN TIME PARTIAL: CPT

## 2023-04-27 PROCEDURE — G0103: CPT

## 2023-04-27 PROCEDURE — 83036 HEMOGLOBIN GLYCOSYLATED A1C: CPT

## 2023-04-27 PROCEDURE — 82553 CREATINE MB FRACTION: CPT

## 2023-04-27 PROCEDURE — 83540 ASSAY OF IRON: CPT

## 2023-04-27 PROCEDURE — 86901 BLOOD TYPING SEROLOGIC RH(D): CPT

## 2023-04-27 PROCEDURE — 36415 COLL VENOUS BLD VENIPUNCTURE: CPT

## 2023-04-27 PROCEDURE — 84133 ASSAY OF URINE POTASSIUM: CPT

## 2023-04-27 PROCEDURE — 99232 SBSQ HOSP IP/OBS MODERATE 35: CPT

## 2023-04-27 PROCEDURE — 84100 ASSAY OF PHOSPHORUS: CPT

## 2023-04-27 PROCEDURE — 93306 TTE W/DOPPLER COMPLETE: CPT

## 2023-04-27 PROCEDURE — 82746 ASSAY OF FOLIC ACID SERUM: CPT

## 2023-04-27 PROCEDURE — 81001 URINALYSIS AUTO W/SCOPE: CPT

## 2023-04-27 PROCEDURE — 76770 US EXAM ABDO BACK WALL COMP: CPT

## 2023-04-27 PROCEDURE — 86900 BLOOD TYPING SEROLOGIC ABO: CPT

## 2023-04-27 PROCEDURE — 78830 RP LOCLZJ TUM SPECT W/CT 1: CPT

## 2023-04-27 PROCEDURE — 96374 THER/PROPH/DIAG INJ IV PUSH: CPT

## 2023-04-27 RX ORDER — FINASTERIDE 5 MG/1
1 TABLET, FILM COATED ORAL
Qty: 30 | Refills: 0
Start: 2023-04-27 | End: 2023-05-26

## 2023-04-27 RX ORDER — FLUTICASONE PROPIONATE 50 MCG
1 SPRAY, SUSPENSION NASAL
Qty: 1 | Refills: 0
Start: 2023-04-27 | End: 2023-05-26

## 2023-04-27 RX ORDER — TAMSULOSIN HYDROCHLORIDE 0.4 MG/1
1 CAPSULE ORAL
Qty: 30 | Refills: 0
Start: 2023-04-27 | End: 2023-05-26

## 2023-04-27 RX ORDER — SODIUM CHLORIDE 0.65 %
2 AEROSOL, SPRAY (ML) NASAL
Qty: 0 | Refills: 0 | DISCHARGE
Start: 2023-04-27

## 2023-04-27 RX ADMIN — AMPICILLIN SODIUM AND SULBACTAM SODIUM 200 GRAM(S): 250; 125 INJECTION, POWDER, FOR SUSPENSION INTRAMUSCULAR; INTRAVENOUS at 05:20

## 2023-04-27 RX ADMIN — Medication 2 SPRAY(S): at 05:20

## 2023-04-27 RX ADMIN — Medication 4 MILLIGRAM(S): at 11:26

## 2023-04-27 RX ADMIN — Medication 4 MILLIGRAM(S): at 06:52

## 2023-04-27 RX ADMIN — CHLORHEXIDINE GLUCONATE 1 APPLICATION(S): 213 SOLUTION TOPICAL at 05:19

## 2023-04-27 RX ADMIN — Medication 1 SPRAY(S): at 05:20

## 2023-04-27 RX ADMIN — FINASTERIDE 5 MILLIGRAM(S): 5 TABLET, FILM COATED ORAL at 11:26

## 2023-04-27 RX ADMIN — Medication 2 SPRAY(S): at 11:27

## 2023-04-27 NOTE — DISCHARGE NOTE PROVIDER - CARE PROVIDERS DIRECT ADDRESSES
[email or web address]

## 2023-04-27 NOTE — DISCHARGE NOTE PROVIDER - CARE PROVIDER_API CALL
Dago Mireles)  Cardiology; Internal Medicine  [address], [address]  [place] [address]  Phone: [phone]  Fax: [phone]  Follow Up Time: 2 weeks    Cleveland Dinh)  Otolaryngology  [address], [address]  [place] [address]  Phone: [phone]  Fax: [phone]  Follow Up Time: 2 weeks    Reji Brenner)  Urology  [address], [address]  [place] [address]  Phone: [phone]  Fax: [phone]  Follow Up Time: 2 weeks

## 2023-04-27 NOTE — DISCHARGE NOTE NURSING/CASE MANAGEMENT/SOCIAL WORK - PATIENT PORTAL LINK FT
You can access the FollowMyHealth Patient Portal offered by [facility] by registering at the following website: [email or web address]. By joining [facility]’s FollowMyHealth portal, you will also be able to view your health information using other applications (apps) compatible with our system. Alert-The patient is alert, awake and responds to voice. The patient is oriented to time, place, and person. The triage nurse is able to obtain subjective information.

## 2023-04-27 NOTE — PROGRESS NOTE ADULT - SUBJECTIVE AND OBJECTIVE BOX
Subjective  feeling well; has had small voids. Does not feel sensation of retention currently   Objective    Vital signs  T(F): , Max: 98.5 (04-26-23 @ 20:14)  HR: 77 (04-27-23 @ 04:49)  BP: 100/63 (04-27-23 @ 04:49)  SpO2: 97% (04-27-23 @ 04:49)  Wt(kg): --    Output     04-26 @ 07:01  -  04-27 @ 07:00  --------------------------------------------------------  IN: 480 mL / OUT: 350 mL / NET: 130 mL        Gen awake alert nad axox3  Abd soft ntnd   Back no cvat bl    nonpalp bladder no suprapubic tenderness     Labs  Complete Blood Count in AM (04.27.23 @ 06:45)    Nucleated RBC: 0 /100 WBCs   WBC Count: 10.15 K/uL   RBC Count: 3.19 M/uL   Hemoglobin: 8.9 g/dL   Hematocrit: 26.2 %   Mean Cell Volume: 82.1 fl   Mean Cell Hemoglobin: 27.9 pg   Mean Cell Hemoglobin Conc: 34.0 gm/dL   Red Cell Distrib Width: 15.2 %   Platelet Count - Automated: 236 K/uL    Comprehensive Metabolic Panel in AM (04.27.23 @ 06:44)    Sodium, Serum: 139 mmol/L   Potassium, Serum: 3.9 mmol/L   Chloride, Serum: 106 mmol/L   Carbon Dioxide, Serum: 21 mmol/L   Anion Gap, Serum: 12 mmol/L   Blood Urea Nitrogen, Serum: 18 mg/dL   Creatinine, Serum: 2.23 mg/dL   Glucose, Serum: 108 mg/dL   Calcium, Total Serum: 8.6 mg/dL   Protein Total, Serum: 6.5 g/dL   Albumin, Serum: 3.5 g/dL   Bilirubin Total, Serum: 0.2 mg/dL   Alkaline Phosphatase, Serum: 62 U/L   Aspartate Aminotransferase (AST/SGOT): 25 U/L   Alanine Aminotransferase (ALT/SGPT): 11 U/L   eGFR: 30: The estimated glomerular filtration rate (eGFR) is calculated using the  2021 CKD-EPI creatinine equation, which does not have a coefficient for  race and is validated in individuals 18 years of age and older (N Engl J  Med 2021; 385:9054-7713). Creatinine-based eGFR may be inaccurate in  various situations including but not limited to extremes of muscle mass,  altered dietary protein intake, or medications that affect renal tubular  creatinine secretion. mL/min/1.73m2        Urine Cx: Culture - Urine (04.26.23 @ 00:40)    Specimen Source: Catheterized Catheterized   Culture Results:   No growth    Imaging  < from: US Kidney and Bladder (04.24.23 @ 16:50) >  FINDINGS:  Right kidney: 9.6 cm. Upper pole cyst measuring 0.8 x 0.9 x 0.8 cm and an   additional upper pole cyst measuring 1.2 x 1.4 x 1.6 cm. Lower pole cyst   measuring 2.0 x 1.7 x 1.7 cm. No hydronephrosis or calculi. Mildly   increased echogenicity, indicating renal parenchymal disease.    Left kidney: 10.8 cm. Mid renal cyst measuring 2.3 x 2.7 x 2.6 cm. Upper   pole cyst measuring 0.8 x 0.7 x 0.6 cm. No hydronephrosis or calculi.   Mildly increased echogenicity, indicating renal parenchymal disease.    Urinary bladder: Not distended, Harkins with balloon intact.    Prostate: Enlarged with a volume of 160 cc.    IMPRESSION:  Bilateral renal cysts. No hydronephrosis or renal calculi. Mildly   increased echogenicity bilaterally, indicating renal parenchymal disease.  Prostatomegaly.    < end of copied text >

## 2023-04-27 NOTE — PROGRESS NOTE ADULT - SUBJECTIVE AND OBJECTIVE BOX
Mr. Sanchez denies chest pain and dyspnea    Vital Signs Last 24 Hrs  T(C): 36.9 (27 Apr 2023 04:49), Max: 36.9 (26 Apr 2023 20:14)  T(F): 98.4 (27 Apr 2023 04:49), Max: 98.5 (26 Apr 2023 20:14)  HR: 77 (27 Apr 2023 04:49) (77 - 92)  BP: 100/63 (27 Apr 2023 04:49) (100/63 - 106/68)  RR: 18 (27 Apr 2023 04:49) (18 - 18)  SpO2: 97% (27 Apr 2023 04:49) (97% - 98%)    GENERAL: NAD  LUNGS: mild expiratory wheezes  HEART: Regular rate and rhythm; No murmurs, rubs, or gallops.  ABDOMEN: Bowel sounds present; Soft, Nontender, Nondistended.   EXTREMITIES:  2+ Peripheral Pulses, brisk capillary refill. No clubbing, cyanosis, or edema.  PSYCH: Normal affect.  SKIN: No rashes or lesions.    ECG:   Sinus rhythm   Septal Q waves    Echo: Personally reviewed    Labs:                                 8.9    10.15 )-----------( 236      ( 27 Apr 2023 06:45 )             26.2     04-27    139  |  106  |  18  ----------------------------<  108<H>  3.9   |  21<L>  |  2.23<H>    LDL 89    MEDICATIONS  (STANDING):  chlorhexidine 2% Cloths 1 Application(s) Topical <User Schedule>  dextrose 5%. 1000 milliLiter(s) (100 mL/Hr) IV Continuous <Continuous>  dextrose 5%. 1000 milliLiter(s) (50 mL/Hr) IV Continuous <Continuous>  dextrose 50% Injectable 25 Gram(s) IV Push once  dextrose 50% Injectable 25 Gram(s) IV Push once  dextrose 50% Injectable 12.5 Gram(s) IV Push once  finasteride 5 milliGRAM(s) Oral daily  fluticasone propionate 50 MICROgram(s)/spray Nasal Spray 1 Spray(s) Both Nostrils two times a day  glucagon  Injectable 1 milliGRAM(s) IntraMuscular once  insulin lispro (ADMELOG) corrective regimen sliding scale   SubCutaneous three times a day before meals  insulin lispro (ADMELOG) corrective regimen sliding scale   SubCutaneous at bedtime  iron sucrose IVPB 200 milliGRAM(s) IV Intermittent every 24 hours  methylPREDNISolone 4 milliGRAM(s) Oral before breakfast  methylPREDNISolone 4 milliGRAM(s) Oral at bedtime  methylPREDNISolone   Oral   methylPREDNISolone 24 milliGRAM(s) Oral once  sodium chloride 0.65% Nasal 2 Spray(s) Both Nostrils three times a day  tamsulosin 0.4 milliGRAM(s) Oral at bedtime    Tele:   SR 70-80

## 2023-04-27 NOTE — PROGRESS NOTE ADULT - SUBJECTIVE AND OBJECTIVE BOX
DATE OF SERVICE: 23 @ 12:52    Patient is a 72y old  Male who presents with a chief complaint of Elevated troponin (2023 11:49)      SUBJECTIVE / OVERNIGHT EVENTS:  No chest pain. No shortness of breath. No complaints. No events overnight.     MEDICATIONS  (STANDING):  chlorhexidine 2% Cloths 1 Application(s) Topical <User Schedule>  dextrose 5%. 1000 milliLiter(s) (50 mL/Hr) IV Continuous <Continuous>  dextrose 5%. 1000 milliLiter(s) (100 mL/Hr) IV Continuous <Continuous>  dextrose 50% Injectable 25 Gram(s) IV Push once  dextrose 50% Injectable 25 Gram(s) IV Push once  dextrose 50% Injectable 12.5 Gram(s) IV Push once  finasteride 5 milliGRAM(s) Oral daily  fluticasone propionate 50 MICROgram(s)/spray Nasal Spray 1 Spray(s) Both Nostrils two times a day  glucagon  Injectable 1 milliGRAM(s) IntraMuscular once  insulin lispro (ADMELOG) corrective regimen sliding scale   SubCutaneous at bedtime  insulin lispro (ADMELOG) corrective regimen sliding scale   SubCutaneous three times a day before meals  iron sucrose IVPB 200 milliGRAM(s) IV Intermittent every 24 hours  methylPREDNISolone   Oral   methylPREDNISolone 24 milliGRAM(s) Oral once  methylPREDNISolone 4 milliGRAM(s) Oral before breakfast  methylPREDNISolone 4 milliGRAM(s) Oral at bedtime  sodium chloride 0.65% Nasal 2 Spray(s) Both Nostrils three times a day  tamsulosin 0.4 milliGRAM(s) Oral at bedtime    MEDICATIONS  (PRN):  dextrose Oral Gel 15 Gram(s) Oral once PRN Blood Glucose LESS THAN 70 milliGRAM(s)/deciliter      Vital Signs Last 24 Hrs  T(C): 36.9 (2023 04:49), Max: 36.9 (2023 20:14)  T(F): 98.4 (2023 04:49), Max: 98.5 (2023 20:14)  HR: 77 (2023 04:49) (77 - 92)  BP: 100/63 (2023 04:49) (100/63 - 106/68)  BP(mean): --  RR: 18 (2023 04:49) (18 - 18)  SpO2: 97% (2023 04:49) (97% - 98%)    Parameters below as of 2023 04:49  Patient On (Oxygen Delivery Method): room air      CAPILLARY BLOOD GLUCOSE      POCT Blood Glucose.: 144 mg/dL (2023 11:59)  POCT Blood Glucose.: 120 mg/dL (2023 08:05)  POCT Blood Glucose.: 143 mg/dL (2023 21:09)  POCT Blood Glucose.: 109 mg/dL (2023 17:09)    I&O's Summary    2023 07:01  -  2023 07:00  --------------------------------------------------------  IN: 480 mL / OUT: 350 mL / NET: 130 mL        PHYSICAL EXAM:  GENERAL: NAD, well-developed  HEAD:  Atraumatic, Normocephalic  EYES: EOMI, PERRLA, conjunctiva and sclera clear  NECK: Supple, No JVD  CHEST/LUNG: Clear to auscultation bilaterally; No wheeze  HEART: Regular rate and rhythm; No murmurs, rubs, or gallops  ABDOMEN: Soft, Nontender, Nondistended; Bowel sounds present  EXTREMITIES:  2+ Peripheral Pulses, No clubbing, cyanosis, or edema  PSYCH: AAOx3  NEUROLOGY: non-focal  SKIN: No rashes or lesions    LABS:                        8.9    10.15 )-----------( 236      ( 2023 06:45 )             26.2         139  |  106  |  18  ----------------------------<  108<H>  3.9   |  21<L>  |  2.23<H>    Ca    8.6      2023 06:44    TPro  6.5  /  Alb  3.5  /  TBili  0.2  /  DBili  x   /  AST  25  /  ALT  11  /  AlkPhos  62            Urinalysis Basic - ( 2023 07:12 )    Color: Light Yellow / Appearance: Clear / S.011 / pH: x  Gluc: x / Ketone: Negative  / Bili: Negative / Urobili: Negative   Blood: x / Protein: Trace / Nitrite: Negative   Leuk Esterase: Negative / RBC: 102 /hpf / WBC 7 /HPF   Sq Epi: x / Non Sq Epi: x / Bacteria: Negative        RADIOLOGY & ADDITIONAL TESTS:    Imaging Personally Reviewed:    Consultant(s) Notes Reviewed:      Care Discussed with Consultants/Other Providers:

## 2023-04-27 NOTE — PROVIDER CONTACT NOTE (OTHER) - BACKGROUND
see patient chart
Pt admitted for NSTEMI.
see patient chart
see patient chart
Pt admitted for NSTEMI
see patient chart

## 2023-04-27 NOTE — DISCHARGE NOTE PROVIDER - NSFOLLOWUPCLINICS_GEN_ALL_ED_FT
[facility] - Primary Care  Primary Care  [address] [place] [address]  Phone: [phone]  Fax:

## 2023-04-27 NOTE — PROGRESS NOTE ADULT - ATTENDING COMMENTS
- patient is still refusing morales catheter  - double the dose of Flomax  - elevated PSA work up as outside bases
Agree with plan as indicated above for work up of hematuria and also elevated PSA level.

## 2023-04-27 NOTE — PROVIDER CONTACT NOTE (OTHER) - NAME OF MD/NP/PA/DO NOTIFIED:
Karen KULKARNI
Ionie Chambers
Kelsey Vargas
Kelsey Vargas RN
Karen KULKARNI
Kelsey Miramontes NP ACP
Kelsey Sotomayor

## 2023-04-27 NOTE — PROVIDER CONTACT NOTE (OTHER) - ASSESSMENT
alert and oriented x4; denies any chest pain, sob or ha. tender to touch around penis and scrotum area during hygiene clean. hematuria with clots noted in morales bad. clot noted on head of penis. 7ml/hr on heparin drip as ordered
Pt TOV done at 22:00 with bladder scan of 204 ml
alert and oriented x4; denies any chest pain, sob, ha, lightheadededness, numbness, tingling. NSR on tele moitor. ate 50% of his lunch. apple juice at bedside. patient states he eats and drinks slowly.
c/o discomfort and inability to void; 480 mL retaining
patient is alert and oriented x4; denies any chest pain, sob , ha, lightheadedness, numbness or tingling.
Pt had PAT for 1 sec HR up to 170 on tele
patient is alert and oriented x4; heparin drip at 7mlhr. denies any chest pain, sob or HA.

## 2023-04-27 NOTE — PROVIDER CONTACT NOTE (OTHER) - DATE AND TIME:
24-Apr-2023 04:35
24-Apr-2023 13:26
24-Apr-2023 08:08
27-Apr-2023 22:15
24-Apr-2023 10:13
24-Apr-2023 14:13
27-Apr-2023 06:58

## 2023-04-27 NOTE — DISCHARGE NOTE PROVIDER - DID THE PATIENT PRESENT WITH OR WAS TREATED FOR MALNUTRITION DURING THIS ADMISSION
Patient was seen in the ED on 8/29/21 for complaints of chest pain.  CT of the chest was negative for PE and showed just trace pericardial effusion.  Troponin level negative x1.  She had resumed her NSAID and colchicine. She was treated for presumed pericarditis. She took ibuprofen for a few days with resolution of the symptom.      This has not recurred.       No

## 2023-04-27 NOTE — DISCHARGE NOTE NURSING/CASE MANAGEMENT/SOCIAL WORK - NSDCPEFALRISK_GEN_ALL_CORE
For information on Fall & Injury Prevention, visit: [email or web address] OR  [email or web address] OR  https://www.cdc.gov/steadi/patient.html

## 2023-04-27 NOTE — PROVIDER CONTACT NOTE (OTHER) - ACTION/TREATMENT ORDERED:
ACTIVATE HYPOGLYCEMIC PROTOCOL; administer dextrose 50% 12.5grams
draw CBC, aptt. continue heparin drip until noted otherwise.
provider entering orders
As per Karen KULKARNI, will continue to monitor
stop heparin drip. will order urology consult
As per Karen KULKARNI, repeat bladder scan and continue to monitor
recheck blood glucose after completion of apple juice

## 2023-04-27 NOTE — DISCHARGE NOTE PROVIDER - NSDCFUSCHEDAPPT_GEN_ALL_CORE_FT
[facility]  INTMED  [facility]   Scheduled Appointment: 05/03/2023    Cleveland Dinh  [facility]  OTOLARYNG [address]  Scheduled Appointment: 06/05/2023    Reji Brenner  [facility]  UROLOGY [address]  Scheduled Appointment: 06/12/2023    Dago Mireles  [facility]  CARDIOLOGY [address] [facility]   Scheduled Appointment: 07/03/2023

## 2023-04-27 NOTE — PROGRESS NOTE ADULT - ASSESSMENT
73 yo M PMHx of chronic sinusitis, last evaluation by cardiologist in 2015, admitted for NSTEMI on heparin gtt.  consulted for gross hematuria s/p morales placement i/s/o heparin gtt and elevated PSA to 16.    gross hematuria from traumatic morales   currently clear yellow.     Pts bladder scan was 600 and 590 this am, Recommend morales catheter to pt who is refusing.   Pt educated on the risks of retention: worsening kidney function, upper tract obstruction, infections     Cont Proscar but can increase Flomax to 0.8mg qhs as pt retaining urine and refusing morales.  Ambulate as able, bowel regimen    elevated PSA - outpt work up  Follow  up as outpt with [facility] Dr. Brenner

## 2023-04-27 NOTE — PROGRESS NOTE ADULT - PROVIDER SPECIALTY LIST ADULT
Cardiology
Internal Medicine
Internal Medicine
Urology
Cardiology
Internal Medicine
Internal Medicine
Urology
Urology

## 2023-04-27 NOTE — PROVIDER CONTACT NOTE (OTHER) - SITUATION
blood glucose after half cup apple juice: 94,95,99
pt on heparin drip; hematuria in morales bag and pt bleeding from penis (bed sheet 5% soaked with blood)
blood clots noted around head of penis and in morales bag
patient is hypoglycemic at 61 repeated at 55.
pt unable to urinate
Pt TOV done at 22:00 with bladder scan of 204 ml
Pt had PAT for 1 sec HR up to 170 on tele

## 2023-04-27 NOTE — DISCHARGE NOTE PROVIDER - NSDCCPCAREPLAN_GEN_ALL_CORE_FT
PRINCIPAL DISCHARGE DIAGNOSIS  Diagnosis: Cardiac amyloidosis  Assessment and Plan of Treatment: Follow up with Dr Dago Mireles.  Call to make an appointment.      SECONDARY DISCHARGE DIAGNOSES  Diagnosis: SARAH (acute kidney injury)  Assessment and Plan of Treatment: Avoid taking (NSAIDs) - (ex: Ibuprofen, Advil, Celebrex, Naprosyn)  Avoid taking any nephrotoxic agents (can harm kidneys) - Intravenous contrast for diagnostic testing, combination cold medications.  Have all medications adjusted for your renal function by your Health Care Provider.  Blood pressure control is important.  Take all medication as prescribed.      Diagnosis: DM2 (diabetes mellitus, type 2)  Assessment and Plan of Treatment: HgA1C this admission was   Make sure you get your HgA1c checked every three months.  If you take oral diabetes medications, check your blood glucose two times a day.  If you take insulin, check your blood glucose before meals and at bedtime.  It's important not to skip any meals.  Keep a log of your blood glucose results and always take it with you to your doctor appointments.  Keep a list of your current medications including injectables and over the counter medications and bring this medication list with you to all your doctor appointments.  If you have not seen your ophthalmologist this year call for appointment.  Check your feet daily for redness, sores, or openings. Do not self treat. If no improvement in two days call your primary care physician for an appointment.  Low blood sugar (hypoglycemia) is a blood sugar below 70mg/dl. Check your blood sugar if you feel signs/symptoms of hypoglycemia. If your blood sugar is below 70 take 15 grams of carbohydrates (ex 4 oz of apple juice, 3-4 glucose tablets, or 4-6 oz of regular soda) wait 15 minutes and repeat blood sugar to make sure it comes up above 70.  If your blood sugar is above 70 and you are due for a meal, have a meal.  If you are not due for a meal have a snack.  This snack helps keeps your blood sugar at a safe range.      Diagnosis: Nasal polyps  Assessment and Plan of Treatment:     Diagnosis: Anemia  Assessment and Plan of Treatment:     Diagnosis: Hypertension  Assessment and Plan of Treatment: Low salt diet  Activity as tolerated.  Take all medication as prescribed.  Follow up with your medical doctor for routine blood pressure monitoring at your next visit.  Notify your doctor if you have any of the following symptoms:   Dizziness, Lightheadedness, Blurry vision, Headache, Chest pain, Shortness of breath      Diagnosis: Urinary retention  Assessment and Plan of Treatment: You also have an elevated PSA level.  Follow up outpatient with urology.     PRINCIPAL DISCHARGE DIAGNOSIS  Diagnosis: Cardiac amyloidosis  Assessment and Plan of Treatment: Follow up with Dr Dago Mireles.  Call to make an appointment.  If you develop chest pain or shortness of breaht, please go to your nearest emergency room and contact your phyisician.      SECONDARY DISCHARGE DIAGNOSES  Diagnosis: SARAH (acute kidney injury)  Assessment and Plan of Treatment: Avoid taking (NSAIDs) - (ex: Ibuprofen, Advil, Celebrex, Naprosyn)  Avoid taking any nephrotoxic agents (can harm kidneys) - Intravenous contrast for diagnostic testing, combination cold medications.  Have all medications adjusted for your renal function by your Health Care Provider.  Blood pressure control is important.  Take all medication as prescribed.      Diagnosis: DM2 (diabetes mellitus, type 2)  Assessment and Plan of Treatment: HgA1C this admission was 5.9  Make sure you get your HgA1c checked every three months.  If you take oral diabetes medications, check your blood glucose two times a day.  If you take insulin, check your blood glucose before meals and at bedtime.  It's important not to skip any meals.  Keep a log of your blood glucose results and always take it with you to your doctor appointments.  Keep a list of your current medications including injectables and over the counter medications and bring this medication list with you to all your doctor appointments.  If you have not seen your ophthalmologist this year call for appointment.  Check your feet daily for redness, sores, or openings. Do not self treat. If no improvement in two days call your primary care physician for an appointment.  Low blood sugar (hypoglycemia) is a blood sugar below 70mg/dl. Check your blood sugar if you feel signs/symptoms of hypoglycemia. If your blood sugar is below 70 take 15 grams of carbohydrates (ex 4 oz of apple juice, 3-4 glucose tablets, or 4-6 oz of regular soda) wait 15 minutes and repeat blood sugar to make sure it comes up above 70.  If your blood sugar is above 70 and you are due for a meal, have a meal.  If you are not due for a meal have a snack.  This snack helps keeps your blood sugar at a safe range.      Diagnosis: Nasal polyps  Assessment and Plan of Treatment: Follow up with Dr Cleveland Dinh, call to make an appointment.  Ensure that you complete the medrol dose javier and use the nasal sprays as prescribed.    Diagnosis: Anemia  Assessment and Plan of Treatment: Eat iron and protein rich foods including: meat, dark leafy green vegetables, and beans.  Limit caffeine.  Notify your doctor if you experience heartburn, constipation, diarrhea, nausea/vomiting, dizziness or are feeling extremely tired.  Seek immediate care or call 911 if you experience:  shortness of breath even at rest, you have blood in your bowel movement or vomit, if you are too dizzy to stand up      Diagnosis: Hypertension  Assessment and Plan of Treatment: Low salt diet  Activity as tolerated.  Take all medication as prescribed.  Follow up with your medical doctor for routine blood pressure monitoring at your next visit.  Notify your doctor if you have any of the following symptoms:   Dizziness, Lightheadedness, Blurry vision, Headache, Chest pain, Shortness of breath      Diagnosis: Urinary retention  Assessment and Plan of Treatment: You also have an elevated PSA level.  Follow up outpatient with urology.

## 2023-04-27 NOTE — DISCHARGE NOTE PROVIDER - NSDCMRMEDTOKEN_GEN_ALL_CORE_FT
sodium chloride 0.65% nasal spray: 2 spray(s) nasal 3 times a day   finasteride 5 mg oral tablet: 1 tab(s) orally once a day  fluticasone 50 mcg/inh nasal spray: 1 spray(s) nasal 2 times a day  Medrol Dosepak 4 mg oral tablet: 1 tablet orally once a day take as directed, start with Day 4 of the pack and only complete Days 4-6.  sodium chloride 0.65% nasal spray: 2 spray(s) nasal 3 times a day  tamsulosin 0.4 mg oral capsule: 1 cap(s) orally once a day (at bedtime)

## 2023-04-27 NOTE — DISCHARGE NOTE PROVIDER - PROVIDER TOKENS
PROVIDER:[TOKEN:[[id number]],FOLLOWUP:[2 weeks]],PROVIDER:[TOKEN:[[id number]],FOLLOWUP:[2 weeks]],PROVIDER:[TOKEN:[[id number]],FOLLOWUP:[2 weeks]]

## 2023-04-27 NOTE — DISCHARGE NOTE PROVIDER - HOSPITAL COURSE
72 Y M h/O chronic sinusitis, last evaluation by cardiologist in 2015, presenting due to incidental finding of NSTEMI with trop I of 151, started on DAPT, heparin infusion without bolus, patient denies any chest pain, difficulty breathing (from mouth, chronic difficulty breathing through nose), upon arrival patient remains asymptomatic with repeat ECG demonstrating 1.5 mm elevation v2,    abnormal EKG  - follow up cardiology  - echo done  - amyloid study positive  - follow up with Dr Mireles outpatient      SARAH  - unknown baseline  - renal US - no obstruction   - avoid nephrotoxins  - nephrology consult following    diabetes  - fs qid  - hgb a1c 5.9  - insulin ss    sinusitis with nasal obstruction and nasal polyps  - Nasal polyps.   - Flonase 1 spray to B/L nares BID  Medrol dose pack  Nasal saline 2 sprays to B/L nares TID  F/U with ENT as outpt DR Dinh or Dr Turner [phone].-   ENT consult appreciated    anemia  - iron studies c/w iron def  - out pt gi workup  - iron supplements    urinary retention  - cont flomax  - cont morales     hematuria  - trauma 2/2 morales  - monitor H&H    elevated PSA  - follow up with urology as out pt    back pain  - likely DJD  - analgesics prn  - PT eval    Patient stable for discharge home. 72 Y M h/O chronic sinusitis, last evaluation by cardiologist in 2015, presenting due to incidental finding of NSTEMI with trop I of 151, started on DAPT, heparin infusion without bolus, patient denies any chest pain, difficulty breathing (from mouth, chronic difficulty breathing through nose), upon arrival patient remains asymptomatic with repeat ECG demonstrating 1.5 mm elevation v2,    abnormal EKG  - follow up cardiology  - echo done  - amyloid study positive  - follow up with Dr Mireles outpatient      SARAH  - unknown baseline  - renal US - no obstruction   - avoid nephrotoxins  - nephrology consult following    diabetes  - fs qid  - hgb a1c 5.9  - insulin ss    sinusitis with nasal obstruction and nasal polyps  - Nasal polyps.   - Flonase 1 spray to B/L nares BID  Medrol dose pack  Nasal saline 2 sprays to B/L nares TID  F/U with ENT as outpt DR Dinh or Dr Turner [phone].-   ENT consult appreciated    anemia  - iron studies c/w iron def  - out pt gi workup  - iron supplements    urinary retention  - cont flomax  - follow up with urology outpatient    hematuria  - trauma 2/2 morales  - monitor H&H    elevated PSA  - follow up with urology as out pt    back pain  - likely DJD  - analgesics prn  - PT eval    Patient stable for discharge home.

## 2023-04-27 NOTE — PROGRESS NOTE ADULT - ASSESSMENT
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 Dago Mireles ([phone])  Ischemic evaluation as outpatient given absence of attributable symptoms and more likely alternative explanation for elevated troponin  Start atorvastatin 20  Decreased LVSF also likely associated with TTR CA  Avoid negative inotropes  Association between nasal polyps, sinus disease, and aspirin-exacerbated respiratory disease - risk of aspirin may outweigh benefit    A total of 35 minutes was spent on this patient encounter.

## 2023-04-27 NOTE — PROGRESS NOTE ADULT - ASSESSMENT
72 Y M h/O chronic sinusitis, last evaluation by cardiologist in 2015, presenting due to incidental finding of NSTEMI with trop I of 151, started on DAPT, heparin infusion without bolus, patient denies any chest pain, difficulty breathing (from mouth, chronic difficulty breathing through nose), upon arrival patient remains asymptomtic with repeat ECG demonstrating 1.5 mm elevation v2,    abnormal EKG  - follow up cardiology  - echo done  - amyloid study positive  - follow up with Niels Mireles as out pt    SARAH  - unknown baseline  - renal US - no obst  - avoid nephrotoxins  - nephrology consult following    diabetes  - fs qid  - hgb a1c 5.9  - insulin ss    sinusitis with nasal obstruction and nasal polyps  - Nasal polyps.   - Flonase 1 spray to B/L nares BID  Medrol dose pack  Nasal saline 2 sprays to B/L nares TID  F/U with ENT as outpt DR Dinh or Dr Turner [phone].-   ENT consult appreciated    anemia  - iron studies c/w iron def  - out pt gi workup  - iron supplements    urinary retention  - morales removed  - pt refusing replacement  - cont flomax  - c/w proscar      hematuria  - trauma 2/2 morales  - monitor H&H    elevated PSA  - follow up with urology as out pt    back pain  - likely DJD  - analgesics prn  - PT eval    d/c home  - follow up with ent, urology and cardio

## 2023-04-27 NOTE — PROVIDER CONTACT NOTE (OTHER) - REASON
Pt TOV done at 22:00 with bladder scan of 204 ml
Pt had PAT for 1 sec HR up to 170 on tele
480 mL retaining
blood clots noted around head of penis and in morales bag
bloo glucose after half cup apple juice: 94,95,99
pt on heparin drip; hematuria in morales bag and pt bleeding from penis
hypoglycemia; 61,55

## 2023-04-27 NOTE — DISCHARGE NOTE PROVIDER - NSDCFUADDAPPT_GEN_ALL_CORE_FT
APPTS ARE READY TO BE MADE: [x] YES    Best Family or Patient Contact (if needed):    Additional Information about above appointments (if needed):    1:   2:   3:     Other comments or requests:    APPTS ARE READY TO BE MADE: [x] YES    Best Family or Patient Contact (if needed):    Additional Information about above appointments (if needed):    1:   2:   3:     Other comments or requests:     Patient is scheduled to see Dr. Marin at 10:30AM on 5/3/23 at [address] [place] [address]    Patient is scheduled to see Dr. Dinh at 9:30AM on 6/5/23 at [address] [place] [address]    Patient is scheduled to see Dr. Contreras at 10:20AM on 6/12/23 at [address] [place] [id number]    Patient is scheduled to see Dr. Mireles at 12:00pm on 7/3/23 at [address] [place] [address]

## 2023-04-28 NOTE — CHART NOTE - NSCHARTNOTEFT_GEN_A_CORE
Alerted by primary team that pt failed TOV.   Catheter removed yesterday evening. Pt voiding small quantities but PVR showed 615cc.  Unable to be straight cath on floor when attempted by RN secondary to resistance.  Went to bedside to assist in catheter placement, however pt refusing additional attempts at this time. Feels comfortable and would like more time. States he is able to void at his baseline.   discussed risks of retention with pt. Verbalized understanding, however at this time wishes to wait 1-2 more hours to see if able to void more.   advised pt to call RN for assistance going to restroom when urge to void rather than lying supine in bed during next attempt.  Should repeat bladder scan following void.   Will endorse pt to day team to follow up void and assist with catheter placement if necessary.     pager: [phone]
Called by the nurse to report that patient is hypoglycemic and hematuria. Patient seen at bedside, states he does not want an appetite.  Vital Signs Last 24 Hrs  T(C): 36.7 (24 Apr 2023 13:34), Max: 37.1 (23 Apr 2023 22:08)  T(F): 98 (24 Apr 2023 13:34), Max: 98.7 (23 Apr 2023 22:08)  HR: 81 (24 Apr 2023 13:34) (70 - 81)  BP: 104/65 (24 Apr 2023 13:34) (98/59 - 110/67)  BP(mean): 72 (23 Apr 2023 22:08) (72 - 72)  RR: 18 (24 Apr 2023 13:34) (16 - 18)  SpO2: 97% (24 Apr 2023 13:34) (95% - 97%)    Parameters below as of 24 Apr 2023 13:34  Patient On (Oxygen Delivery Method): room air          Labs:                          9.7    6.71  )-----------( 226      ( 24 Apr 2023 17:27 )             29.4     04-24    137  |  101  |  16  ----------------------------<  56<L>  4.2   |  20<L>  |  2.11<H>    Ca    8.8      24 Apr 2023 07:02  Phos  3.7     04-24  Mg     2.0     04-24    TPro  7.3  /  Alb  3.2<L>  /  TBili  0.5  /  DBili  x   /  AST  30  /  ALT  19  /  AlkPhos  68  04-23      CARDIAC MARKERS ( 24 Apr 2023 17:27 )  x     / x     / 189 U/L / x     / 3.1 ng/mL        Radiology:    MEDICATIONS  (STANDING):  ampicillin/sulbactam  IVPB 3 Gram(s) IV Intermittent every 6 hours  ampicillin/sulbactam  IVPB      dextrose 5%. 1000 milliLiter(s) (50 mL/Hr) IV Continuous <Continuous>  dextrose 5%. 1000 milliLiter(s) (100 mL/Hr) IV Continuous <Continuous>  dextrose 5%. 1000 milliLiter(s) (50 mL/Hr) IV Continuous <Continuous>  dextrose 50% Injectable 25 Gram(s) IV Push once  dextrose 50% Injectable 12.5 Gram(s) IV Push once  dextrose 50% Injectable 25 Gram(s) IV Push once  fluticasone propionate 50 MICROgram(s)/spray Nasal Spray 1 Spray(s) Both Nostrils two times a day  glucagon  Injectable 1 milliGRAM(s) IntraMuscular once  insulin lispro (ADMELOG) corrective regimen sliding scale   SubCutaneous three times a day before meals  insulin lispro (ADMELOG) corrective regimen sliding scale   SubCutaneous at bedtime  methylPREDNISolone   Oral   methylPREDNISolone 24 milliGRAM(s) Oral once  sodium chloride 0.65% Nasal 2 Spray(s) Both Nostrils three times a day    MEDICATIONS  (PRN):  dextrose Oral Gel 15 Gram(s) Oral once PRN Blood Glucose LESS THAN 70 milliGRAM(s)/deciliter      Physical Exam:  General: WN/WD NAD  Neurology: A&Ox3, nonfocal, GARCIA x 4  Head:  Normocephalic, atraumatic  Respiratory: CTA B/L  CV: RRR, S1S2, no murmur  Abdominal: Soft, NT, ND no palpable mass   - morales bag noted with punch color urine with small clots  MSK: No edema, + peripheral pulses, FROM all 4 extremity    Assessment & Plan:  HPI:  72 Y M h/O chronic sinusitis, last evaluation by cardiologist in 2015, presenting due to incidental finding of NSTEMI with trop I of 151, started on DAPT, heparin infusion without bolus, patient denies any chest pain, difficulty breathing (from mouth, chronic difficulty breathing through nose), upon arrival patient remains asymptomtic with repeat ECG demonstrating 1.5 mm elevation v2, (23 Apr 2023 22:34)  Hematuria  > Morales flushed  > Urology consulted , may need CBI  >  stat cbc done HG 9.7  > Trend cbc q6hrs  > Troponin 148( 149)  > Heparin stopped per Dr Allen    Hypoglycemia  > Refusing to eat, Encourage increase po intake   > Follow glycemic protocol  > Dr Kiko ibanez
OK for trial of void today if urine clear.  Dw Dr Daniels.
Left 1 message for patient in regards to follow up care with callback information.

## 2023-04-29 ENCOUNTER — FORM ENCOUNTER (OUTPATIENT)
Age: 73
End: 2023-04-29

## 2023-04-29 ENCOUNTER — NON-APPOINTMENT (OUTPATIENT)
Age: 73
End: 2023-04-29

## 2023-05-03 ENCOUNTER — OUTPATIENT (OUTPATIENT)
Dept: OUTPATIENT SERVICES | Facility: HOSPITAL | Age: 73
LOS: 1 days | End: 2023-05-03
Payer: MEDICARE

## 2023-05-03 ENCOUNTER — APPOINTMENT (OUTPATIENT)
Dept: INTERNAL MEDICINE | Facility: CLINIC | Age: 73
End: 2023-05-03
Payer: MEDICARE

## 2023-05-03 VITALS
BODY MASS INDEX: 27.31 KG/M2 | WEIGHT: 160 LBS | OXYGEN SATURATION: 98 % | DIASTOLIC BLOOD PRESSURE: 70 MMHG | HEIGHT: 64 IN | HEART RATE: 90 BPM | SYSTOLIC BLOOD PRESSURE: 122 MMHG

## 2023-05-03 DIAGNOSIS — R93.1 ABNORMAL FINDINGS ON DIAGNOSTIC IMAGING OF HEART AND CORONARY CIRCULATION: ICD-10-CM

## 2023-05-03 DIAGNOSIS — I10 ESSENTIAL (PRIMARY) HYPERTENSION: ICD-10-CM

## 2023-05-03 DIAGNOSIS — Z00.00 ENCOUNTER FOR GENERAL ADULT MEDICAL EXAMINATION W/OUT ABNORMAL FINDINGS: ICD-10-CM

## 2023-05-03 PROCEDURE — G0439: CPT | Mod: GC

## 2023-05-03 PROCEDURE — G0439: CPT

## 2023-05-03 PROCEDURE — 84443 ASSAY THYROID STIM HORMONE: CPT

## 2023-05-03 PROCEDURE — 36415 COLL VENOUS BLD VENIPUNCTURE: CPT

## 2023-05-03 PROCEDURE — 80061 LIPID PANEL: CPT

## 2023-05-03 PROCEDURE — 85027 COMPLETE CBC AUTOMATED: CPT

## 2023-05-03 PROCEDURE — 80053 COMPREHEN METABOLIC PANEL: CPT

## 2023-05-04 PROBLEM — R93.1 ECHOCARDIOGRAM ABNORMAL: Status: ACTIVE | Noted: 2023-05-04

## 2023-05-04 NOTE — REVIEW OF SYSTEMS
[Fever] : no fever [Chills] : no chills [Night Sweats] : no night sweats [Discharge] : no discharge [Pain] : no pain [Vision Problems] : no vision problems [Earache] : no earache [Hearing Loss] : no hearing loss [Nasal Discharge] : no nasal discharge [Chest Pain] : no chest pain [Palpitations] : no palpitations [Orthopena] : no orthopnea [Shortness Of Breath] : no shortness of breath [Wheezing] : no wheezing [Cough] : no cough [Abdominal Pain] : no abdominal pain [Nausea] : no nausea [Vomiting] : no vomiting [Dysuria] : no dysuria [Incontinence] : no incontinence [Hematuria] : no hematuria [Joint Pain] : no joint pain [Joint Stiffness] : no joint stiffness [Back Pain] : no back pain [Itching] : no itching [Mole Changes] : no mole changes [Skin Rash] : no skin rash [Headache] : no headache [Dizziness] : no dizziness [Memory Loss] : no memory loss [Easy Bleeding] : no easy bleeding

## 2023-05-04 NOTE — END OF VISIT
[] : Resident [Time Spent: ___ minutes] : I have spent [unfilled] minutes of time on the encounter. [FreeTextEntry3] : echo with pattern suggestive of amyloid Workup in progress, with cardio, Renal referral for CKD noted.\par ENT for nasla polyps

## 2023-05-04 NOTE — ASSESSMENT
[FreeTextEntry1] : #HCM\par #Post-Hosp\par -Dx of TTR cardiac amyloid w/ noted SARAH during hospitalization\par -D/c on 6 day medrol (4mg), flonase, finasteride, & tamsulosin\par > ordered: cbc, cmp, lipid, TSH, uric acid\par    > should uric acid be elevated + pt c/b w/ complaints on joint pain, start allopurinol\par > f/u w/ cards for cardiac amyloid\par > f/u w/ ENT for b/l polyp\par > ordered f/u w/ renal for CKD\par > rtc as needed or in 6 months

## 2023-05-04 NOTE — HISTORY OF PRESENT ILLNESS
[Other: _____] : [unfilled] [FreeTextEntry1] : CPE [de-identified] : 72M w/ PMH of chronic sinusitis (b/l polyps), chronic back pain, BPH, & pre-DM (5.9%) presenting for hospital f/u & CPE. Pt was admitted week prior w/ noted NSTEMI & SARAH. During hospitalization, noted TTR amyloid on echo. Patient d/c w/ instruction to f/u w/ PCP, cards, renal, uro, & ENT. Patient d/c on 6 day medrol (4mg), flonase, finasteride, & tamsulosin. Since d/c, pt denies fatigue, CP, SOB, abdominal pain. Endorses (+) LE cold extremities w/o associated pain on palpation or w/ exertion. \par \par Lives w/ daughter. Obtained covid vaccine; no influenza vaccine.No history of abuse of alcohol, tobacco, or drug use. Patient utilizes cane to walk. Pt is not up to date on colonoscopy. States last one was many years prior in which a polyp was removed; however pt not interested in f/u colonoscopy.

## 2023-05-04 NOTE — PHYSICAL EXAM
[No Acute Distress] : no acute distress [Well Nourished] : well nourished [Normal Sclera/Conjunctiva] : normal sclera/conjunctiva [No JVD] : no jugular venous distention [No Lymphadenopathy] : no lymphadenopathy [No Respiratory Distress] : no respiratory distress  [Normal Rate] : normal rate  [Regular Rhythm] : with a regular rhythm [No Carotid Bruits] : no carotid bruits [No Edema] : there was no peripheral edema [Soft] : abdomen soft [No HSM] : no HSM [Normal] : no CVA or spinal tenderness [No Joint Swelling] : no joint swelling [No Rash] : no rash [Coordination Grossly Intact] : coordination grossly intact [No Focal Deficits] : no focal deficits [Speech Grossly Normal] : speech grossly normal [de-identified] : Noted b/l polyps

## 2023-05-05 ENCOUNTER — NON-APPOINTMENT (OUTPATIENT)
Age: 73
End: 2023-05-05

## 2023-05-05 DIAGNOSIS — R93.1 ABNORMAL FINDINGS ON DIAGNOSTIC IMAGING OF HEART AND CORONARY CIRCULATION: ICD-10-CM

## 2023-05-05 DIAGNOSIS — N18.32 CHRONIC KIDNEY DISEASE, STAGE 3B: ICD-10-CM

## 2023-05-05 DIAGNOSIS — Z00.00 ENCOUNTER FOR GENERAL ADULT MEDICAL EXAMINATION WITHOUT ABNORMAL FINDINGS: ICD-10-CM

## 2023-05-15 DIAGNOSIS — M1A.9XX0 CHRONIC GOUT, UNSPECIFIED, W/OUT TOPHUS (TOPHI): ICD-10-CM

## 2023-05-17 ENCOUNTER — NON-APPOINTMENT (OUTPATIENT)
Age: 73
End: 2023-05-17

## 2023-05-24 ENCOUNTER — NON-APPOINTMENT (OUTPATIENT)
Age: 73
End: 2023-05-24

## 2023-06-05 ENCOUNTER — NON-APPOINTMENT (OUTPATIENT)
Age: 73
End: 2023-06-05

## 2023-06-05 ENCOUNTER — APPOINTMENT (OUTPATIENT)
Dept: OTOLARYNGOLOGY | Facility: CLINIC | Age: 73
End: 2023-06-05
Payer: MEDICARE

## 2023-06-05 VITALS
BODY MASS INDEX: 25.95 KG/M2 | SYSTOLIC BLOOD PRESSURE: 112 MMHG | WEIGHT: 152 LBS | HEIGHT: 64 IN | DIASTOLIC BLOOD PRESSURE: 72 MMHG | TEMPERATURE: 98.2 F | HEART RATE: 87 BPM

## 2023-06-05 PROCEDURE — 31231 NASAL ENDOSCOPY DX: CPT

## 2023-06-05 PROCEDURE — 99214 OFFICE O/P EST MOD 30 MIN: CPT | Mod: 25

## 2023-06-05 NOTE — CONSULT LETTER
[Please see my note below.] : Please see my note below. [FreeTextEntry1] : Dear Dr. MELANIE GARCIA \par I had the pleasure of evaluating your patient DEEPTHI GÓMEZ, thank you for allowing us to participate in their care. please see full note detailing our visit below.\par If you have any questions, please do not hesitate to call me and I would be happy to discuss further. \par \par Cleveland Dinh M.D.\par Attending Physician,  \par Department of Otolaryngology - Head and Neck Surgery\par [facility] \par Office: [phone]\par Fax: [phone]\par

## 2023-06-05 NOTE — PROCEDURE
[FreeTextEntry6] : Procedure performed: Nasal Endoscopy- Diagnostic\par Pre-op indication(s): nasal congestion\par Post-op indication(s): nasal congestion\par Verbal and/or written consent obtained from patient\par Anterior rhinoscopy insufficient to account for symptoms\par Scope #: 3, flexible fiber optic telescope\par The scope was introduced in the nasal passage between the middle and inferior turbinates to exam the inferior portion of the middle meatus and the fontanelle, as well as the maxillary ostia. Next, the scope was passed medically and posteriorly to the middle turbinates to examine the sphenoethmoid recess and the superior turbinate region.\par \par Upon visualization the finders are as follows:\par Nasal Septum: sigmoidal septal deviation\par Bilateral - Mucosa: boggy turbinates, Mucous: scant, Polyp: +seen R>L, right completely obstructed by polyps, Inferior Turbinate: boggy, Middle Turbinate: normal, Superior Turbinate: normal, Inferior Meatus: narrow, Middle Meatus: narrow, Super Meatus: normal, Sphenoethmoidal Recess: clear\par

## 2023-06-05 NOTE — END OF VISIT
[FreeTextEntry3] : I personally saw and examined the patient in detail. I spoke to SHAD Thornton regarding the assessment and plan of care.  I preformed the procedures and I reviewed the above assessment and plan of care, and agree. I have made changes in changes in the body of the note where appropriate.

## 2023-06-05 NOTE — ASSESSMENT
[FreeTextEntry1] : 72 year old male presents with hx of nasal polyps and nasal obstruction. On exam, polyps seen R>L, right completely obstructed by polyps, purulence draining as well. \par - patient already had antibiotics and steroids while at hospital recently. \par - will get CT scan for further evaluation of nasal polyps and to help plan appropriate intervention. Will decide if procedure should be in office or hospital based on CT scan. \par - Risks benefits and alternatives of endoscopic sinus surgery with possible image guidance possible septoplasty bilateral inferior turbinate reduction discussed with patient at length. Risks discussed include but were not limited to bleeding, infection, persistent symptoms, scarring, injury to the skull base and brain and CSF leak, injury to orbit, crusting, septal hematoma, septal perforation results in whistling, crusting and bleeding as well as continued nasal obstruction etc. were discussed. also discussed option of office balloon - similar risk profile, will not address septum and turbs and is less invasive with quicker recover however also higher possibility for need for future intervention. \par - start patient on steroids and antbiotics 5 days before procedure\par - will refer to Dr Staci Mota for further evaluation of polyps\par - follow up after CT results - pt would like to try office first if possible, will see evaluate based on CT \par

## 2023-06-05 NOTE — HISTORY OF PRESENT ILLNESS
[de-identified] : 72 year old male presents with complaint of nasal polyps for many years. States he had them removed many years ago, but blockage on right>left has come back. States cannot even breath from right side of nose. Has runny nose all the time. Denies sinus pressure. Uses nasal sprays but unsure which ones he uses, uses twice a day. Was on antibiotics and prednisone at [facility].

## 2023-06-10 ENCOUNTER — EMERGENCY (EMERGENCY)
Facility: HOSPITAL | Age: 73
LOS: 1 days | Discharge: HOME | End: 2023-06-10
Payer: MEDICARE

## 2023-06-10 VITALS
DIASTOLIC BLOOD PRESSURE: 69 MMHG | HEIGHT: 64 IN | SYSTOLIC BLOOD PRESSURE: 104 MMHG | RESPIRATION RATE: 17 BRPM | WEIGHT: 154.98 LBS | OXYGEN SATURATION: 97 % | HEART RATE: 91 BPM | TEMPERATURE: 99 F

## 2023-06-10 VITALS
HEART RATE: 80 BPM | SYSTOLIC BLOOD PRESSURE: 105 MMHG | DIASTOLIC BLOOD PRESSURE: 65 MMHG | TEMPERATURE: 98 F | RESPIRATION RATE: 18 BRPM | OXYGEN SATURATION: 97 %

## 2023-06-10 LAB
ALBUMIN SERPL ELPH-MCNC: 3.3 G/DL — SIGNIFICANT CHANGE UP (ref 3.3–5)
ALP SERPL-CCNC: 93 U/L — SIGNIFICANT CHANGE UP (ref 40–120)
ALT FLD-CCNC: 6 U/L — LOW (ref 10–45)
ANION GAP SERPL CALC-SCNC: 15 MMOL/L — SIGNIFICANT CHANGE UP (ref 5–17)
APTT BLD: 34.3 SEC — SIGNIFICANT CHANGE UP (ref 27.5–35.5)
AST SERPL-CCNC: 15 U/L — SIGNIFICANT CHANGE UP (ref 10–40)
BASOPHILS # BLD AUTO: 0.01 K/UL — SIGNIFICANT CHANGE UP (ref 0–0.2)
BASOPHILS NFR BLD AUTO: 0.2 % — SIGNIFICANT CHANGE UP (ref 0–2)
BILIRUB SERPL-MCNC: 0.6 MG/DL — SIGNIFICANT CHANGE UP (ref 0.2–1.2)
BUN SERPL-MCNC: 15 MG/DL — SIGNIFICANT CHANGE UP (ref 7–23)
CALCIUM SERPL-MCNC: 9.4 MG/DL — SIGNIFICANT CHANGE UP (ref 8.4–10.5)
CHLORIDE SERPL-SCNC: 95 MMOL/L — LOW (ref 96–108)
CO2 SERPL-SCNC: 20 MMOL/L — LOW (ref 22–31)
CREAT SERPL-MCNC: 1.66 MG/DL — HIGH (ref 0.5–1.3)
EGFR: 43 ML/MIN/1.73M2 — LOW
EOSINOPHIL # BLD AUTO: 0.15 K/UL — SIGNIFICANT CHANGE UP (ref 0–0.5)
EOSINOPHIL NFR BLD AUTO: 3.6 % — SIGNIFICANT CHANGE UP (ref 0–6)
GLUCOSE SERPL-MCNC: 80 MG/DL — SIGNIFICANT CHANGE UP (ref 70–99)
HCT VFR BLD CALC: 31.6 % — LOW (ref 39–50)
HGB BLD-MCNC: 10.6 G/DL — LOW (ref 13–17)
IMM GRANULOCYTES NFR BLD AUTO: 0.5 % — SIGNIFICANT CHANGE UP (ref 0–0.9)
INR BLD: 1.27 RATIO — HIGH (ref 0.88–1.16)
LYMPHOCYTES # BLD AUTO: 2.09 K/UL — SIGNIFICANT CHANGE UP (ref 1–3.3)
LYMPHOCYTES # BLD AUTO: 50 % — HIGH (ref 13–44)
MCHC RBC-ENTMCNC: 28.2 PG — SIGNIFICANT CHANGE UP (ref 27–34)
MCHC RBC-ENTMCNC: 33.5 GM/DL — SIGNIFICANT CHANGE UP (ref 32–36)
MCV RBC AUTO: 84 FL — SIGNIFICANT CHANGE UP (ref 80–100)
MONOCYTES # BLD AUTO: 0.57 K/UL — SIGNIFICANT CHANGE UP (ref 0–0.9)
MONOCYTES NFR BLD AUTO: 13.6 % — SIGNIFICANT CHANGE UP (ref 2–14)
NEUTROPHILS # BLD AUTO: 1.34 K/UL — LOW (ref 1.8–7.4)
NEUTROPHILS NFR BLD AUTO: 32.1 % — LOW (ref 43–77)
NRBC # BLD: 0 /100 WBCS — SIGNIFICANT CHANGE UP (ref 0–0)
PLATELET # BLD AUTO: 220 K/UL — SIGNIFICANT CHANGE UP (ref 150–400)
POTASSIUM SERPL-MCNC: 4.6 MMOL/L — SIGNIFICANT CHANGE UP (ref 3.5–5.3)
POTASSIUM SERPL-SCNC: 4.6 MMOL/L — SIGNIFICANT CHANGE UP (ref 3.5–5.3)
PROT SERPL-MCNC: 6.8 G/DL — SIGNIFICANT CHANGE UP (ref 6–8.3)
PROTHROM AB SERPL-ACNC: 14.6 SEC — HIGH (ref 10.5–13.4)
RBC # BLD: 3.76 M/UL — LOW (ref 4.2–5.8)
RBC # FLD: 13.9 % — SIGNIFICANT CHANGE UP (ref 10.3–14.5)
SODIUM SERPL-SCNC: 130 MMOL/L — LOW (ref 135–145)
URATE SERPL-MCNC: 8.6 MG/DL — SIGNIFICANT CHANGE UP (ref 3.4–8.8)
WBC # BLD: 4.18 K/UL — SIGNIFICANT CHANGE UP (ref 3.8–10.5)
WBC # FLD AUTO: 4.18 K/UL — SIGNIFICANT CHANGE UP (ref 3.8–10.5)

## 2023-06-10 PROCEDURE — 85730 THROMBOPLASTIN TIME PARTIAL: CPT

## 2023-06-10 PROCEDURE — 83036 HEMOGLOBIN GLYCOSYLATED A1C: CPT

## 2023-06-10 PROCEDURE — 73610 X-RAY EXAM OF ANKLE: CPT

## 2023-06-10 PROCEDURE — 85025 COMPLETE CBC W/AUTO DIFF WBC: CPT

## 2023-06-10 PROCEDURE — 85652 RBC SED RATE AUTOMATED: CPT

## 2023-06-10 PROCEDURE — 73630 X-RAY EXAM OF FOOT: CPT

## 2023-06-10 PROCEDURE — 86140 C-REACTIVE PROTEIN: CPT

## 2023-06-10 PROCEDURE — 73630 X-RAY EXAM OF FOOT: CPT | Mod: 26,LT

## 2023-06-10 PROCEDURE — 73610 X-RAY EXAM OF ANKLE: CPT | Mod: 26,LT

## 2023-06-10 PROCEDURE — 99284 EMERGENCY DEPT VISIT MOD MDM: CPT | Mod: 25

## 2023-06-10 PROCEDURE — 85610 PROTHROMBIN TIME: CPT

## 2023-06-10 PROCEDURE — 80053 COMPREHEN METABOLIC PANEL: CPT

## 2023-06-10 PROCEDURE — 84550 ASSAY OF BLOOD/URIC ACID: CPT

## 2023-06-10 PROCEDURE — 99285 EMERGENCY DEPT VISIT HI MDM: CPT

## 2023-06-10 RX ORDER — COLCHICINE 0.6 MG
1 TABLET ORAL
Qty: 2 | Refills: 0
Start: 2023-06-10 | End: 2023-06-12

## 2023-06-10 RX ORDER — COLCHICINE 0.6 MG
1 TABLET ORAL
Qty: 2 | Refills: 0
Start: 2023-06-10 | End: 2023-06-11

## 2023-06-10 RX ORDER — DEXAMETHASONE 0.5 MG/5ML
4 ELIXIR ORAL ONCE
Refills: 0 | Status: COMPLETED | OUTPATIENT
Start: 2023-06-10 | End: 2023-06-10

## 2023-06-10 RX ORDER — ACETAMINOPHEN 500 MG
975 TABLET ORAL ONCE
Refills: 0 | Status: COMPLETED | OUTPATIENT
Start: 2023-06-10 | End: 2023-06-10

## 2023-06-10 RX ORDER — COLCHICINE 0.6 MG
0.6 TABLET ORAL ONCE
Refills: 0 | Status: COMPLETED | OUTPATIENT
Start: 2023-06-10 | End: 2023-06-10

## 2023-06-10 RX ADMIN — Medication 4 MILLIGRAM(S): at 16:59

## 2023-06-10 RX ADMIN — Medication 0.6 MILLIGRAM(S): at 20:47

## 2023-06-10 RX ADMIN — Medication 975 MILLIGRAM(S): at 16:59

## 2023-06-10 NOTE — ED PROVIDER NOTE - NSFOLLOWUPINSTRUCTIONS_ED_ALL_ED_FT
GOUT    WHAT YOU NEED TO KNOW:  What is gout? Gout is a form of arthritis that causes severe joint pain and stiffness. Acute gout pain starts suddenly, gets worse quickly, and stops on its own. Acute gout can become chronic and cause permanent damage to your joints.    What causes gout? Gout develops when uric acid builds up in your joints. Uric acid is made when your body breaks down purines. Purines are found in some medicines and foods. Your body gets rid of most uric acid through your urine. When your body cannot get rid of enough uric acid, it can build up and form crystals in your joints. The crystals cause your joints to become swollen and painful. This is called a gout attack.    What increases my risk for gout? You may have been born with a decreased ability to break down and get rid of purines. Your body's ability to break down purines may be very slow. Gout is more common in men than in women. Any of the following can also increase your risk:  •A family history of gout  •Kidney disease or problems with how your kidneys work  •Eating foods that are high in purines, such as red meat  •Alcohol or tobacco use  •Diuretic medicine (water pills), or aspirin  •A medical condition such as diabetes, high blood pressure, or high cholesterol  •A condition such as an irregular heartbeat or a blood clot in your lungs    What are the stages of gout?   •Hyperuricemia is a high level of uric acid. Hyperuricemia is not gout, but it increases your risk for gout. You may have no symptoms at this stage, and it usually does not need treatment.  •Acute gouty arthritis starts with a sudden attack of pain and swelling, usually in 1 joint. This may be in your big toe. The attack may last from a few days to 2 weeks.      How is gout diagnosed? Your healthcare provider will ask about your medicines, health problems, and allergies. Tell him or her when your joint pain and swelling started. He or she will need to know if the swelling and pain were worst within 1 day or if got worse over time. He or she will check the skin over your joints for redness. You may also need any of the following:  •Blood tests are used to check the level of uric acid. You may need to have blood tested more than 1 time.  •A synovial fluid test is used to collect a sample of fluid from around your painful joint. The fluid is sent to a lab to check for uric acid crystals. Synovial fluid surrounds and protects your joints.  •An x-ray, ultrasound, CT, or MRI may show the gout or damage to bones caused by gout. You may be given contrast liquid to help your joints show up better in the pictures. Tell the healthcare provider if you have ever had an allergic reaction to contrast liquid. Do not enter the MRI room with anything metal. Metal can cause serious injury. Tell the healthcare provider if you have any metal in or on your body.      How is gout treated? The following can make your symptoms stop sooner, prevent attacks, and decrease your risk for joint damage:  •Medicines: ?NSAIDs, such as ibuprofen, help decrease swelling, pain, and fever. This medicine is available with or without a doctor's order. NSAIDs can cause stomach bleeding or kidney problems in certain people. If you take blood thinner medicine, always ask your healthcare provider if NSAIDs are safe for you. Always read the medicine label and follow directions.      Gout medicine decreases joint pain and swelling. It may also be given to prevent new gout attacks.      =======================================    TAKE COLCHICINE 0.6MG, ONCE/DAY FOR THE NEXT 2 DAYS.    =======================================    Advance activity as tolerated.  Continue all previously prescribed medications as directed unless otherwise instructed.  Follow up with your primary care physician in 48-72 hours- bring copies of your results.  Return to the ER for worsening or persistent symptoms, and/or ANY NEW OR CONCERNING SYMPTOMS.     If you have issues obtaining follow up, please call: [phone] to obtain a doctor or specialist who takes your insurance in your area.  You may call [phone] to make an appointment with the internal medicine clinic.

## 2023-06-10 NOTE — ED PROVIDER NOTE - CLINICAL SUMMARY MEDICAL DECISION MAKING FREE TEXT BOX
Impression:  DDx includes gout, cellulitis, >>> septic joint.    Patient is afebrile with normal vital signs.  No leukocytosis.  Although CRP is elevated his physical exam is more consistent with gout at this time.  Will evaluate with labs, xray, pain meds, reassess.  LIkely podiatry consult.

## 2023-06-10 NOTE — ED PROVIDER NOTE - PROGRESS NOTE DETAILS
MD Pickard: patient seen and cleared by podiatry, high suspicion for gout at this time.  Has been on colchicine in the past baseline creatinine ~2; 1.66 today.  Patient had previously been on a Medrol Dosepak in the days prior to coming to the ED but he did not tolerate it well.  While it is not typical practice to prescribe colchicine to patients with mild renal insufficiency, given his degree of pain and its efficacy it is reasonable to give the patient a single dose of 0.6 mg today in the ED and 2 to 3 days as an outpatient 0.6 mg daily.  Strict return precautions

## 2023-06-10 NOTE — CONSULT NOTE ADULT - SUBJECTIVE AND OBJECTIVE BOX
Patient is a 72y old  Male who presents with a chief complaint of left ankle pain     HPI: Patient is a 72-year-old male who presents emergency department complaining of foot pain.  Patient states that left lower extremity started hurting 2 weeks ago) and progressively got worse.  Patient states that he is unable to ambulate due to pain.  Patient denies any fevers, chills, shortness of breath, chest pain, alcohol use, loss of sensation.      PAST MEDICAL & SURGICAL HISTORY:  HTN (hypertension)      DM (diabetes mellitus)      Nasal polyp      S/P cataract extraction  B/L      S/P nasal polypectomy          MEDICATIONS  (STANDING):    MEDICATIONS  (PRN):      Allergies    No Known Allergies    Intolerances        VITALS:    Vital Signs Last 24 Hrs  T(C): 36.7 (10 Elijah 2023 21:30), Max: 37 (10 Elijah 2023 14:47)  T(F): 98 (10 Elijah 2023 21:30), Max: 98.6 (10 Elijah 2023 14:47)  HR: 80 (10 Elijah 2023 21:30) (73 - 91)  BP: 105/65 (10 Elijah 2023 21:30) (98/63 - 107/74)  BP(mean): --  RR: 18 (10 Elijah 2023 21:30) (16 - 18)  SpO2: 97% (10 Elijah 2023 21:30) (97% - 98%)    Parameters below as of 10 Elijah 2023 21:30  Patient On (Oxygen Delivery Method): room air        LABS:                          10.6   4.18  )-----------( 220      ( 10 Elijah 2023 16:29 )             31.6       06-10    130<L>  |  95<L>  |  15  ----------------------------<  80  4.6   |  20<L>  |  1.66<H>    Ca    9.4      10 Elijah 2023 16:29    TPro  6.8  /  Alb  3.3  /  TBili  0.6  /  DBili  x   /  AST  15  /  ALT  6<L>  /  AlkPhos  93  06-10      CAPILLARY BLOOD GLUCOSE          PT/INR - ( 10 Elijah 2023 16:29 )   PT: 14.6 sec;   INR: 1.27 ratio         PTT - ( 10 Elijah 2023 16:29 )  PTT:34.3 sec    LOWER EXTREMITY PHYSICAL EXAM:    Vascular: DP/PT 2/4, B/L, CFT <3 seconds B/L, Temperature gradient warm to cool, B/L.   Neuro: Epicritic sensation intact to the level of digits, B/L.  Musculoskeletal/Ortho: unremarkable   Skin: Left ankle global edema, tender to touch near lateral ankle, guarded range of motion, no open wounds. Right foot with no open wounds or signs of infection       RADIOLOGY & ADDITIONAL STUDIES:    < from: Xray Foot AP + Lateral + Oblique, Left (06.10.23 @ 16:59) >    ******PRELIMINARY REPORT******      ******PRELIMINARY REPORT******         ACC: [id number] EXAM:  XR FOOT COMP MIN 3 VIEWS LT   ORDERED BY: MARILIN CURTIS     ACC: [id number] EXAM:  XR ANKLE COMP MIN 3 VIEWS LT   ORDERED BY: MARILIN CURTIS     PROCEDURE DATE:  06/10/2023    ******PRELIMINARY REPORT******      ******PRELIMINARY REPORT******           INTERPRETATION:  CLINICAL INDICATION: Atraumatic left lateral malleolus   pain    TECHNIQUE: 3 views of left ankle and 3 views of the left foot    COMPARISON: No priors available.    FINDINGS:  No acute fracture or dislocation.    Joint spaces are maintained. No joint effusion.    Mild osteoarthritic degenerative changes. Calcaneal spurs.    Vascular calcifications.    IMPRESSION:  No acute fracture or dislocation.        ******PRELIMINARY REPORT******      ******PRELIMINARY REPORT******        PRERNA KAT DO; Resident Radiologist  This document is a PRELIMINARY interpretation and is pending final   attending approval. Elijah 10 2023  5:40PM    < end of copied text >

## 2023-06-10 NOTE — ED PROVIDER NOTE - OBJECTIVE STATEMENT
Patient is a 72-year-old male who presents emergency department complaining of foot pain.  Patient states that left lower extremity started hurting 2 weeks ago) and progressively got worse.  Patient states that he is unable to ambulate due to pain.  Patient denies any fevers, chills, shortness of breath, chest pain, alcohol use, loss of sensation..

## 2023-06-10 NOTE — ED ADULT NURSE NOTE - CAS DISCH BELONGINGS RETURNED
Not applicable Consent 3/Introductory Paragraph: I gave the patient a chance to ask questions they had about the procedure.  Following this I explained the Mohs procedure and consent was obtained. The risks, benefits and alternatives to therapy were discussed in detail. Specifically, the risks of infection, scarring, bleeding, prolonged wound healing, incomplete removal, allergy to anesthesia, nerve injury and recurrence were addressed. Prior to the procedure, the treatment site was clearly identified and confirmed by the patient. All components of Universal Protocol/PAUSE Rule completed.

## 2023-06-10 NOTE — ED PROVIDER NOTE - PATIENT PORTAL LINK FT
You can access the FollowMyHealth Patient Portal offered by [facility] by registering at the following website: [email or web address]. By joining [facility]’s FollowMyHealth portal, you will also be able to view your health information using other applications (apps) compatible with our system.

## 2023-06-10 NOTE — ED ADULT NURSE REASSESSMENT NOTE - NS ED NURSE REASSESS COMMENT FT1
report received from RN KELSEY, pt resting comfortably and no acute distress noted, safety and comfort measures maintained. pending podiatry consult

## 2023-06-10 NOTE — ED ADULT NURSE NOTE - OBJECTIVE STATEMENT
71 yo M pt PMHx of HTN, DM p/w 2 weeks of Left sided pedal edema worsened today with pain. no signs of trauma/infection. PMS intact. pt using osman to assist ambulating d/t pain.  pt is A&Ox4, MAEW, Skin warm dry intact/normal for race, no rash, LLE foot minimally edematous tender to touch, PMS intact.  Pt denies headache, dizziness, chest pain, palpitations, cough, SOB, abdominal pain, n/v/d, urinary symptoms, fevers, chills, weakness at this time.

## 2023-06-10 NOTE — ED PROVIDER NOTE - ATTENDING CONTRIBUTION TO CARE
MD Pickard:  patient seen and evaluated with the resident.  I was present for key portions of the History & Physical, and I agree with the Impression & Plan.    Patient is a 72-year-old male complaining of 2 weeks atraumatic left lateral malleolar pain.  Quality is a stabbing pain worse with any movement or palpation; quality similar to prior gout flares    Medical history of gout.    Context: Patient admitted to [facility] in late April for incidental NSTEMI (did not complain of chest pain, shortness of breath)    Associated symptoms: No fevers, no chills, no history of trauma.      VS: wnl.  Physical Exam: adult M, NAD, NCAT, PERRL, EOMI, neck supple, RRR, CTA B, Abd: s/nd/nt,   Ext: grossly swollen L ankle; +lateral maleollar TTP, overlying skin is warm, but not hot or red.  Neuro: AAOx3, ambulates w/o diff, strength 5/5 & symmetric throughout.    Impression:  DDx includes gout, cellulitis, >>> septic joint.     Will evaluate with labs, xray, pain meds, reassess. MD Pickard:  patient seen and evaluated with the resident.  I was present for key portions of the History & Physical, and I agree with the Impression & Plan.    Patient is a 72-year-old male complaining of 2 weeks atraumatic left lateral malleolar pain.  Quality is a stabbing pain worse with any movement or palpation; quality similar to prior gout flares    Medical history of gout.    Context: Patient admitted to [facility] in late April for incidental NSTEMI (did not complain of chest pain, shortness of breath)    Associated symptoms: No fevers, no chills, no history of trauma.      VS: wnl.  Physical Exam: adult M, NAD, NCAT, PERRL, EOMI, neck supple, RRR, CTA B, Abd: s/nd/nt,   Ext: grossly swollen L ankle; +lateral maleollar TTP, overlying skin is warm, but not hot or red.  Neuro: AAOx3, ambulates w/o diff, strength 5/5 & symmetric throughout.    Impression:  DDx includes gout, cellulitis, >>> septic joint.     Will evaluate with labs, xray, pain meds, reassess.  LIkely podiatry consult.

## 2023-06-10 NOTE — CONSULT NOTE ADULT - ASSESSMENT
72 y.o M with L ankle pain  - Pt seen and evaluated   - NVS intact  - Afebrile, no leukocytosis   - Left foot xray: no gas, no OM  - Left ankle global edema, tender to touch near lateral ankle, guarded range of motion, no open wounds. Right foot with no open wounds or signs of infection   - Recommend IV dose of prednisone and d/c on Colchiceine - low concern for septic ankle. Patient likely having a recurrent gout attack given his strong history of previous gout episodes x5 years.   - Pt to follow-up with PCP or rheumatologist for appropriate preventive management for his chronic gout   - Patient is podiatry stable for d/c - please followup with Dr. Ward w/in 1 week so discharge. Please call [phone] to make any appointment   - Discussed with attending

## 2023-06-10 NOTE — ED ADULT NURSE NOTE - PRIMARY CARE PROVIDER
Risk Factors for Stroke/Prescribed Medications/Need for Followup After Discharge/Stroke Education Booklet/Stroke Warning Signs and Symptoms/Call 911 for Stroke unk

## 2023-06-10 NOTE — ED PROVIDER NOTE - PHYSICAL EXAMINATION
Physical Exam:  Gen: NAD, AOx3, non-toxic appearing, able to ambulate without assistance  Head: NCAT  HEENT: EOMI, PEERLA, normal conjunctiva, tongue midline, oral mucosa moist  Lung: CTAB, no respiratory distress, no wheezes/rhonchi/rales B/L, speaking in full sentences  CV: RRR, no murmurs, rubs or gallops  Abd: soft, NT, ND, no guarding, no rigidity, no rebound tenderness, no CVA tenderness   MSK: no visible deformities, ROM normal in UE/LE, no back pain  Neuro: No focal sensory or motor deficits  Skin: Warm, well perfused, LLE swelling/ warmth @ the foot/ankle.   Psych: normal affect, calm

## 2023-06-11 LAB
A1C WITH ESTIMATED AVERAGE GLUCOSE RESULT: 5.5 % — SIGNIFICANT CHANGE UP (ref 4–5.6)
ESTIMATED AVERAGE GLUCOSE: 111 MG/DL — SIGNIFICANT CHANGE UP (ref 68–114)

## 2023-06-12 ENCOUNTER — APPOINTMENT (OUTPATIENT)
Dept: UROLOGY | Facility: CLINIC | Age: 73
End: 2023-06-12
Payer: MEDICARE

## 2023-06-12 DIAGNOSIS — Z87.09 PERSONAL HISTORY OF OTHER DISEASES OF THE RESPIRATORY SYSTEM: ICD-10-CM

## 2023-06-12 PROCEDURE — 99214 OFFICE O/P EST MOD 30 MIN: CPT

## 2023-06-12 NOTE — REVIEW OF SYSTEMS
[Feeling Tired] : feeling tired [Recent Weight Gain (___ Lbs)] : recent [unfilled] ~Ulb weight gain [Recent Weight Loss (___ Lbs)] : recent [unfilled] ~Ulb weight loss [Nasal Discharge] : nasal discharge [Urine retention] : urine retention [Strain or push to urinate] : strain or push to urinate [Slow urine stream] : slow urine stream [Limb Weakness] : limb weakness [Difficulty Walking] : difficulty walking [Muscle Weakness] : muscle weakness [Negative] : Heme/Lymph [FreeTextEntry2] : frequent urination , blood in urine when given heparin

## 2023-06-12 NOTE — PHYSICAL EXAM
[General Appearance - Well Developed] : well developed [General Appearance - Well Nourished] : well nourished [Normal Appearance] : normal appearance [Well Groomed] : well groomed [General Appearance - In No Acute Distress] : no acute distress [Edema] : no peripheral edema [Respiration, Rhythm And Depth] : normal respiratory rhythm and effort [Exaggerated Use Of Accessory Muscles For Inspiration] : no accessory muscle use [Abdomen Soft] : soft [Abdomen Tenderness] : non-tender [Costovertebral Angle Tenderness] : no ~M costovertebral angle tenderness [Urethral Meatus] : meatus normal [Urinary Bladder Findings] : the bladder was normal on palpation [Testes Tenderness] : no tenderness of the testes [Scrotum] : the scrotum was normal [Testes Mass (___cm)] : there were no testicular masses [] : no rash [FreeTextEntry1] : Using a cane to walk [No Focal Deficits] : no focal deficits [Oriented To Time, Place, And Person] : oriented to person, place, and time [Affect] : the affect was normal [Mood] : the mood was normal [Not Anxious] : not anxious [Inguinal Lymph Nodes Enlarged Bilaterally] : inguinal

## 2023-06-12 NOTE — ASSESSMENT
[FreeTextEntry1] : We reviewed his hospitalization records on the computer screen.  We had a long discussion regarding patient's urinary symptoms. Initial workup with cystoscopy, determination of urinary flow rate, post void residual volume and urodynamic study was discussed. We discussed conservative management without using medications such as controlling constipation, limiting fluids before bed-time, and limiting irritating substances such as coffee, tea, alcohol, spicy foods, etc. We also discussed medication therapy for treatment of urinary symptoms with alpha-blocker therapy (such as Flomax, Rapaflo), 5 alpha reductase inhibitors (such as Proscar and Avodart), Cialis 5 mg daily especially if ED is present, anticholinergic medications (such as VESIcare, Toviaz), Myrbetriq or a combination of the above medications. Treatment of overactive bladder symptoms with Botox intravesical injections was reviewed. Also surgical treatment options such as minimally invasive treatments (UroLift, Rezum, etc), photo-vaporization of the prostate (Greenlight laser), TURP or suprapubic prostatectomy based on the size of the prostate, were discussed and side effects reviewed. Questions were answered.  For now he will continue with finasteride and tamsulosin.  Since he recently had a Harkins catheter, we will hold off on repeating PSA level.  PSA level can be repeated at the next visit and then we will discuss the next step.\par \par Reji Brenner MD, FACS\par [facility]\par  of Urology\par \par [address], [address]\[place] [address]\par \par [address] [facility] [address]\[place] [address]\par \par Tel: [phone]\par Fax: [phone]

## 2023-06-15 ENCOUNTER — OUTPATIENT (OUTPATIENT)
Dept: OUTPATIENT SERVICES | Facility: HOSPITAL | Age: 73
LOS: 1 days | End: 2023-06-15
Payer: MEDICARE

## 2023-06-15 ENCOUNTER — APPOINTMENT (OUTPATIENT)
Dept: CT IMAGING | Facility: IMAGING CENTER | Age: 73
End: 2023-06-15
Payer: MEDICARE

## 2023-06-15 DIAGNOSIS — J33.9 NASAL POLYP, UNSPECIFIED: ICD-10-CM

## 2023-06-15 PROCEDURE — 70486 CT MAXILLOFACIAL W/O DYE: CPT | Mod: 26

## 2023-06-15 PROCEDURE — 70486 CT MAXILLOFACIAL W/O DYE: CPT

## 2023-06-19 ENCOUNTER — NON-APPOINTMENT (OUTPATIENT)
Age: 73
End: 2023-06-19

## 2023-06-26 ENCOUNTER — APPOINTMENT (OUTPATIENT)
Dept: NEPHROLOGY | Facility: CLINIC | Age: 73
End: 2023-06-26
Payer: MEDICARE

## 2023-06-26 VITALS
WEIGHT: 156 LBS | BODY MASS INDEX: 26.63 KG/M2 | TEMPERATURE: 97.3 F | HEART RATE: 85 BPM | OXYGEN SATURATION: 100 % | SYSTOLIC BLOOD PRESSURE: 111 MMHG | HEIGHT: 64 IN | DIASTOLIC BLOOD PRESSURE: 74 MMHG

## 2023-06-26 DIAGNOSIS — N25.0 RENAL OSTEODYSTROPHY: ICD-10-CM

## 2023-06-26 LAB
ALBUMIN SERPL ELPH-MCNC: 4 G/DL
ANION GAP SERPL CALC-SCNC: 16 MMOL/L
APPEARANCE: CLEAR
BACTERIA: NEGATIVE /HPF
BILIRUBIN URINE: NEGATIVE
BLOOD URINE: NEGATIVE
BUN SERPL-MCNC: 16 MG/DL
CALCIUM SERPL-MCNC: 9.6 MG/DL
CAST: 1 /LPF
CHLORIDE SERPL-SCNC: 103 MMOL/L
CO2 SERPL-SCNC: 21 MMOL/L
COLOR: YELLOW
CREAT SERPL-MCNC: 1.88 MG/DL
CREAT SPEC-SCNC: 132 MG/DL
CREAT SPEC-SCNC: 132 MG/DL
CREAT/PROT UR: 0.1 RATIO
EGFR: 37 ML/MIN/1.73M2
EPITHELIAL CELLS: 0 /HPF
GLUCOSE QUALITATIVE U: NEGATIVE MG/DL
GLUCOSE SERPL-MCNC: 79 MG/DL
KETONES URINE: NEGATIVE MG/DL
LEUKOCYTE ESTERASE URINE: NEGATIVE
MICROALBUMIN 24H UR DL<=1MG/L-MCNC: 2.5 MG/DL
MICROALBUMIN/CREAT 24H UR-RTO: 19 MG/G
MICROSCOPIC-UA: NORMAL
NITRITE URINE: NEGATIVE
PH URINE: 5.5
PHOSPHATE SERPL-MCNC: 4.5 MG/DL
POTASSIUM SERPL-SCNC: 4.4 MMOL/L
PROT UR-MCNC: 12 MG/DL
PROTEIN URINE: NEGATIVE MG/DL
RED BLOOD CELLS URINE: 1 /HPF
SODIUM SERPL-SCNC: 140 MMOL/L
SPECIFIC GRAVITY URINE: 1.01
UROBILINOGEN URINE: 0.2 MG/DL
WHITE BLOOD CELLS URINE: 0 /HPF

## 2023-06-26 PROCEDURE — 99215 OFFICE O/P EST HI 40 MIN: CPT

## 2023-06-26 NOTE — ASSESSMENT
[FreeTextEntry1] : \par 1. Chronic kidney disease stage 3b . \par Patient's baseline serum creatinine is ~2.0mg/dL, with a corresponding eGFR of around 32-37 ml/min/1.73m2.\par The etiology of CKD is unclear. The history of DM is also unclear. SPEP negative, serological workup negative.\par US kidney showed medical renal disease with no hydronephrosis. He has several small simple renal cysts. \par In order to slow progression, patient is advised have good blood pressure to avoid NSAIDs. \par >50% of the encounter was spent discussing about kidney function, stages of CKD, and steps to slow progression of kidney disease. \par \par \par 2. Metabolic acidosis - secondary to CKD. \par Current bicarb level is 21 - no indication to start bicarb therapy for now.  \par \par 3. Renal bone disease - Since patient has CKD, we will screen for secondary hyperparathyroidism and hyperphosphatemia. The latest phos level is 4.5, phos binders are not indicated. \par \par 4. Anemia of CKD - goal Hb is 10-11.5. his current Hb is 9.9. I will check iron studies during the next blood work. \par \par Patient is recommended to follow up in 3-4 months. \par \par

## 2023-06-26 NOTE — PHYSICAL EXAM
[General Appearance - Alert] : alert [General Appearance - In No Acute Distress] : in no acute distress [General Appearance - Well Nourished] : well nourished [Sclera] : the sclera and conjunctiva were normal [Outer Ear] : the ears and nose were normal in appearance [Hearing Threshold Finger Rub Not [place]] : hearing was normal [Nasal Cavity] : the nasal mucosa and septum were normal [Oropharynx] : the oropharynx was normal [Neck Appearance] : the appearance of the neck was normal [Neck Cervical Mass (___cm)] : no neck mass was observed [] : no respiratory distress [Respiration, Rhythm And Depth] : normal respiratory rhythm and effort [Exaggerated Use Of Accessory Muscles For Inspiration] : no accessory muscle use [Auscultation Breath Sounds / Voice Sounds] : lungs were clear to auscultation bilaterally [Heart Rate And Rhythm] : heart rate was normal and rhythm regular [Heart Sounds] : normal S1 and S2 [Heart Sounds Gallop] : no gallops [Murmurs] : no murmurs [Edema] : there was no peripheral edema [Veins - Varicosity Changes] : there were no varicosital changes [Bowel Sounds] : normal bowel sounds [No CVA Tenderness] : no ~M costovertebral angle tenderness [No Spinal Tenderness] : no spinal tenderness [Involuntary Movements] : no involuntary movements were seen [Musculoskeletal - Swelling] : no joint swelling seen [Impaired Insight] : insight and judgment were intact [Affect] : the affect was normal [Mood] : the mood was normal [FreeTextEntry1] : Walks using a cane.

## 2023-06-26 NOTE — HISTORY OF PRESENT ILLNESS
[FreeTextEntry1] : Mr. DEEPTHI GÓMEZ is a 72 year-old man with a PMH of BPH, DM who [facility] for the management of CKD. \par \par \par Kidney history:\par Mr. GÓMEZ has a baseline serum creatinine of 2.0 mg/dL, with a corresponding eGFR of 33 ml/min/1.73m2. He had a serum creatinine of 1.0 in 2015, but we do not have record in between. \par Urinalyses showed no rbc/wbc; and UPCR showed 0.1 g/g.\par No prior kidney imaging done. \par Serological workup was negative, paraproteinemia workup was negative. \par \par He denies having nausea/vomiting/diarrhea. He has a good appetite. He denies hematuria, frothy urine or dysuria. He denies shortness of breath, chest pain, headache, edema. No hand tremors. He  denies skin rash, joint pains or hair loss. He denies NSAID use or herbal supplements.\par No family history of kidney disease.\par \par DM:\par Diagnosed in 2010 \par He said that he changed his diet. Not on any medication. Not consistent with outpatient followup. \par \par Currently, he is getting workup for cardiac amyloid. \par \par

## 2023-07-12 ENCOUNTER — APPOINTMENT (OUTPATIENT)
Dept: CARDIOLOGY | Facility: CLINIC | Age: 73
End: 2023-07-12
Payer: MEDICARE

## 2023-07-12 ENCOUNTER — NON-APPOINTMENT (OUTPATIENT)
Age: 73
End: 2023-07-12

## 2023-07-12 VITALS
DIASTOLIC BLOOD PRESSURE: 78 MMHG | SYSTOLIC BLOOD PRESSURE: 104 MMHG | WEIGHT: 150 LBS | HEART RATE: 78 BPM | OXYGEN SATURATION: 100 % | BODY MASS INDEX: 25.75 KG/M2

## 2023-07-12 PROCEDURE — 99215 OFFICE O/P EST HI 40 MIN: CPT

## 2023-07-12 PROCEDURE — 36415 COLL VENOUS BLD VENIPUNCTURE: CPT

## 2023-07-12 PROCEDURE — 93000 ELECTROCARDIOGRAM COMPLETE: CPT

## 2023-07-13 ENCOUNTER — APPOINTMENT (OUTPATIENT)
Dept: OTOLARYNGOLOGY | Facility: CLINIC | Age: 73
End: 2023-07-13
Payer: MEDICARE

## 2023-07-13 VITALS
DIASTOLIC BLOOD PRESSURE: 72 MMHG | HEART RATE: 77 BPM | SYSTOLIC BLOOD PRESSURE: 115 MMHG | HEIGHT: 64 IN | WEIGHT: 150 LBS | OXYGEN SATURATION: 98 % | BODY MASS INDEX: 25.61 KG/M2

## 2023-07-13 LAB
ALBUMIN SERPL ELPH-MCNC: 4.1 G/DL
ALP BLD-CCNC: 87 U/L
ALT SERPL-CCNC: 9 U/L
ANION GAP SERPL CALC-SCNC: 17 MMOL/L
AST SERPL-CCNC: 19 U/L
BILIRUB SERPL-MCNC: 0.4 MG/DL
BUN SERPL-MCNC: 15 MG/DL
CALCIUM SERPL-MCNC: 10 MG/DL
CHLORIDE SERPL-SCNC: 103 MMOL/L
CHOLEST SERPL-MCNC: 161 MG/DL
CO2 SERPL-SCNC: 20 MMOL/L
CREAT SERPL-MCNC: 1.87 MG/DL
EGFR: 37 ML/MIN/1.73M2
ESTIMATED AVERAGE GLUCOSE: 108 MG/DL
GLUCOSE SERPL-MCNC: 78 MG/DL
HBA1C MFR BLD HPLC: 5.4 %
HDLC SERPL-MCNC: 37 MG/DL
LDLC SERPL CALC-MCNC: 106 MG/DL
NONHDLC SERPL-MCNC: 124 MG/DL
NT-PROBNP SERPL-MCNC: 1332 PG/ML
POTASSIUM SERPL-SCNC: 5 MMOL/L
PREALB SERPL NEPH-MCNC: 5 MG/DL
PROT SERPL-MCNC: 6.9 G/DL
SODIUM SERPL-SCNC: 140 MMOL/L
TRIGL SERPL-MCNC: 94 MG/DL
TSH SERPL-ACNC: 2.52 UIU/ML

## 2023-07-13 PROCEDURE — 99214 OFFICE O/P EST MOD 30 MIN: CPT | Mod: 25

## 2023-07-13 PROCEDURE — 31231 NASAL ENDOSCOPY DX: CPT

## 2023-07-13 NOTE — ASSESSMENT
[FreeTextEntry1] : 72 year old male presents with hx of nasal polyps and nasal obstruction. On exam, polyps seen R>L, right completely obstructed by polyps, purulence draining as well. \par - patient already had antibiotics and steroids while at hospital recently. \par - CT scan showed diffuse pansinus opacification and polyps throughout. \par - Risks benefits and alternatives of endoscopic sinus surgery with possible image guidance possible septoplasty bilateral inferior turbinate reduction discussed with patient at length. Risks discussed include but were not limited to bleeding, infection, persistent symptoms, scarring, injury to the skull base and brain and CSF leak, injury to orbit, crusting, septal hematoma, septal perforation results in whistling, crusting and bleeding as well as continued nasal obstruction etc. were discussed. also discussed option of office balloon - similar risk profile, will not address septum and turbs and is less invasive with quicker recover however also higher possibility for need for future intervention. \par - start patient on steroids and antbiotics 5 days before procedure\par - patient has cardiac disease and advised he will need clearance before procedure. pansinus, septum, turbs and polyps \par - will send medrol dose pack to help relieve breathing prior to surgery. \par - will refer to Dr Staci Mota for further evaluation of polyps\par \par

## 2023-07-13 NOTE — HISTORY OF PRESENT ILLNESS
[de-identified] : 72 year old male presents with complaint of nasal polyps for many years. States he had them removed many years ago, but blockage on right>left has come back. States cannot even breath from right side of nose. Has runny nose all the time. Denies sinus pressure. Uses nasal sprays but unsure which ones he uses, uses twice a day. Was on antibiotics and prednisone at [facility].  [FreeTextEntry1] : Patient following up to review CT scan and go over surgical plan for sinuses. States breathing is obstructed and nose is always runny. never allergy tested. using nasal sprays \par States has constant mucus in throat and frequent throat clearing for many years. Denies hx of acid reflux.

## 2023-07-13 NOTE — CONSULT LETTER
[Please see my note below.] : Please see my note below. [FreeTextEntry1] : Dear Dr. MELANIE GARCIA \par I had the pleasure of evaluating your patient DEEPTHI GÓMEZ, thank you for allowing us to participate in their care. please see full note detailing our visit below.\par If you have any questions, please do not hesitate to call me and I would be happy to discuss further. \par \par Cleveland Dinh M.D.\par Attending Physician,  \par Department of Otolaryngology - Head and Neck Surgery\par [facility] \par Office: [phone]\par Fax: [phone]\par

## 2023-07-16 NOTE — DISCUSSION/SUMMARY
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 \par He is NYHA Class II. \par Amyloid is stage 2 based on pro-BNP < 3000 and GFR < 45.\par \par Genetic testing sent today.\par Will plan to start stabilizer therapy with Vyndamax. \par \par If patient is seen to have hereditary disease and develops symptoms of polyneuropathy, will consider addition of TTR silencer therapy. \par \par Patient has moderately reduced LVEF, but patients with amyloid cardiomyopathy often do not tolerate guideline directed medical therapy for HFrEF.

## 2023-07-16 NOTE — CARDIOLOGY SUMMARY
[de-identified] : 7/12/2023, sinus at 77 bpm, LAFB, early repolarization, normal voltage ECG [de-identified] : 4/24/2023\par 1. Normal left ventricular cavity size. The left ventricular wall thickness is moderately increased. The left\par ventricular systolic function is mildly decreased with an ejection fraction of 41 % by Freeman's method\par of disks. There is global left ventricular hypokinesis.\par 2. There is normal left ventricular diastolic function.\par 3. Global longitudinal strain is -8.0 % (abnormal > -16%). GLS was assessed on TomTec echo machine\par with a heart rate of 80 bpm and a blood pressure of 104/65 mmHg.\par 4. Normal right ventricular cavity size, normal wall thickness and normal systolic function. The tricuspid\par annular plane systolic excursion (TAPSE) is 2.4 cm (normal >=1.7 cm).\par 5. With increased left ventricular thickening, decreased left ventricular function and abnormal strain,\par would suggest further evaluation for infiltrative cardiomyopathy vs hypertensive heart disease with\par Cardiac MRI.\par 6. No significant valvular disease.\par 7. No pericardial effusion seen.\par 8. Compared to the transthoracic echocardiogram performed on 7/21/2015 There is a decrease in LVEF. [de-identified] : 4/25/2023, technetium pyrophosphate scan, grade 3

## 2023-07-16 NOTE — HISTORY OF PRESENT ILLNESS
[FreeTextEntry1] : Patient is an 72 year-old Black gentleman, born in [facility], [place] the [place] 36, with known past medical history of hypertension, noninsulin dependent type II diabetes\par Pain in the feet that he thought was gout. He presented to the ER with no cardiovascular complaints, but because of swelling in the feet, they checked troponin, found it mildly elevated, treated for ACS, got an Echo, and ultimately made the diagnosis of cardiac amyloidosis.\par \par On 4/27/2023, patient had kappa : lambda ratio of 1.67 (normal in the setting o f creatinine of 2 mg/dL); this makes AL-CA unlikely. \par Technetium pyrophosphate scan positive.

## 2023-07-16 NOTE — PHYSICAL EXAM
[Well Developed] : well developed [Well Nourished] : well nourished [No Acute Distress] : no acute distress [Normal Conjunctiva] : normal conjunctiva [Normal Venous Pressure] : normal venous pressure [No Carotid Bruit] : no carotid bruit [Normal S1, S2] : normal S1, S2 [No Murmur] : no murmur [No Rub] : no rub [No Gallop] : no gallop [Clear Lung Fields] : clear lung fields [Good Air Entry] : good air entry [Soft] : abdomen soft [No Respiratory Distress] : no respiratory distress  [Non Tender] : non-tender [No Masses/organomegaly] : no masses/organomegaly [Normal Bowel Sounds] : normal bowel sounds [Normal Gait] : normal gait [No Edema] : no edema [No Clubbing] : no clubbing [No Cyanosis] : no cyanosis [No Varicosities] : no varicosities [No Rash] : no rash [No Skin Lesions] : no skin lesions [Moves all extremities] : moves all extremities [No Focal Deficits] : no focal deficits [Normal Speech] : normal speech [Alert and Oriented] : alert and oriented [Normal memory] : normal memory

## 2023-08-03 LAB
ALBUMIN SERPL ELPH-MCNC: 3.9 G/DL
ALP BLD-CCNC: 68 U/L
ALT SERPL-CCNC: 14 U/L
ANION GAP SERPL CALC-SCNC: 14 MMOL/L
AST SERPL-CCNC: 21 U/L
BILIRUB SERPL-MCNC: 0.4 MG/DL
BUN SERPL-MCNC: 11 MG/DL
CALCIUM SERPL-MCNC: 9.2 MG/DL
CHLORIDE SERPL-SCNC: 107 MMOL/L
CHOLEST SERPL-MCNC: 163 MG/DL
CO2 SERPL-SCNC: 23 MMOL/L
CREAT SERPL-MCNC: 2.05 MG/DL
EGFR: 33 ML/MIN/1.73M2
GLUCOSE SERPL-MCNC: 89 MG/DL
HCT VFR BLD CALC: 31 %
HDLC SERPL-MCNC: 43 MG/DL
HGB BLD-MCNC: 9.9 G/DL
LDLC SERPL CALC-MCNC: 95 MG/DL
MCHC RBC-ENTMCNC: 28.4 PG
MCHC RBC-ENTMCNC: 31.9 GM/DL
MCV RBC AUTO: 88.8 FL
NONHDLC SERPL-MCNC: 121 MG/DL
PLATELET # BLD AUTO: 278 K/UL
POTASSIUM SERPL-SCNC: 4.8 MMOL/L
PROT SERPL-MCNC: 6.9 G/DL
RBC # BLD: 3.49 M/UL
RBC # FLD: 16.4 %
SODIUM SERPL-SCNC: 144 MMOL/L
TRIGL SERPL-MCNC: 127 MG/DL
TSH SERPL-ACNC: 3.39 UIU/ML
WBC # FLD AUTO: 5.39 K/UL

## 2023-08-14 ENCOUNTER — NON-APPOINTMENT (OUTPATIENT)
Age: 73
End: 2023-08-14

## 2023-08-14 ENCOUNTER — APPOINTMENT (OUTPATIENT)
Dept: CARDIOLOGY | Facility: CLINIC | Age: 73
End: 2023-08-14
Payer: MEDICARE

## 2023-08-14 VITALS
DIASTOLIC BLOOD PRESSURE: 62 MMHG | WEIGHT: 150 LBS | OXYGEN SATURATION: 100 % | HEART RATE: 87 BPM | BODY MASS INDEX: 25.61 KG/M2 | SYSTOLIC BLOOD PRESSURE: 93 MMHG | HEIGHT: 64 IN

## 2023-08-14 PROCEDURE — 93000 ELECTROCARDIOGRAM COMPLETE: CPT

## 2023-08-14 PROCEDURE — 99215 OFFICE O/P EST HI 40 MIN: CPT | Mod: 25

## 2023-08-17 NOTE — CARDIOLOGY SUMMARY
[de-identified] : 7/12/2023, sinus at 77 bpm, LAFB, early repolarization, normal voltage ECG [de-identified] : 4/24/2023\par  1. Normal left ventricular cavity size. The left ventricular wall thickness is moderately increased. The left\par  ventricular systolic function is mildly decreased with an ejection fraction of 41 % by Freeman's method\par  of disks. There is global left ventricular hypokinesis.\par  2. There is normal left ventricular diastolic function.\par  3. Global longitudinal strain is -8.0 % (abnormal > -16%). GLS was assessed on TomTec echo machine\par  with a heart rate of 80 bpm and a blood pressure of 104/65 mmHg.\par  4. Normal right ventricular cavity size, normal wall thickness and normal systolic function. The tricuspid\par  annular plane systolic excursion (TAPSE) is 2.4 cm (normal >=1.7 cm).\par  5. With increased left ventricular thickening, decreased left ventricular function and abnormal strain,\par  would suggest further evaluation for infiltrative cardiomyopathy vs hypertensive heart disease with\par  Cardiac MRI.\par  6. No significant valvular disease.\par  7. No pericardial effusion seen.\par  8. Compared to the transthoracic echocardiogram performed on 7/21/2015 There is a decrease in LVEF. [de-identified] : 4/25/2023, technetium pyrophosphate scan, grade 3

## 2023-08-17 NOTE — HISTORY OF PRESENT ILLNESS
[FreeTextEntry1] : Patient is an 72 year-old Black gentleman, born in [facility], [place] 36, with known past medical history of hypertension, noninsulin dependent type II diabetes\par  Pain in the feet that he thought was gout. He presented to the ER with no cardiovascular complaints, but because of swelling in the feet, they checked troponin, found it mildly elevated, treated for ACS, got an Echo, and ultimately made the diagnosis of cardiac amyloidosis.\par  \par  On 4/27/2023, patient had kappa : lambda ratio of 1.67 (normal in the setting o f creatinine of 2 mg/dL); this makes AL-CA unlikely. \par  Technetium pyrophosphate scan positive.

## 2023-08-17 NOTE — DISCUSSION/SUMMARY
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  He is NYHA Class II.  Amyloid is stage 2 based on pro-BNP < 3000 and GFR < 45.  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 Will plan to start stabilizer therapy with Vyndamax.  Will plan to start silencer therapy with Amvuttra for patient with hereditary disease and symptoms of polyneuropathy.   Patient has moderately reduced LVEF, but patients with amyloid cardiomyopathy often do not tolerate guideline directed medical therapy for HFrEF.  Follow up in one month.

## 2023-08-17 NOTE — END OF VISIT
[Time Spent: ___ minutes] : I have spent [unfilled] minutes of time on the encounter. [FreeTextEntry3] : I saw the patient with Rose Mary Covarrubias NP and I agree with the findings and plan as above.

## 2023-08-17 NOTE — REASON FOR VISIT
[Other: ____] : [unfilled] [FreeTextEntry1] : 8/19/2023 Cy Sanchez returns today for routine scheduled follow up. He is accompanied by his daughter, Franci. Since last visit he has begun therapy with Vyndamax 61 mg daily. He has also been contacted by the representatives of [facility] although there are still ongoing insurance issues for approval. Today he is feeling well and has no specific complaints. He continues to report neuropathy which he feels mostly in his hands and occasionally in his feet.  In October he is planning for surgical intervention of his nasal polyps which have caused obstruction and he is hopeful that his breathing will become easier. He denies any overt exertional dyspnea, orthopnea or PND.   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 He has 3 sisters, 2 in [place] and 1 in [facility].  He has 2 sons and 1 daughter.

## 2023-08-28 ENCOUNTER — NON-APPOINTMENT (OUTPATIENT)
Age: 73
End: 2023-08-28

## 2023-08-29 ENCOUNTER — LABORATORY RESULT (OUTPATIENT)
Age: 73
End: 2023-08-29

## 2023-08-29 ENCOUNTER — APPOINTMENT (OUTPATIENT)
Dept: PEDIATRIC ALLERGY IMMUNOLOGY | Facility: CLINIC | Age: 73
End: 2023-08-29
Payer: MEDICARE

## 2023-08-29 ENCOUNTER — NON-APPOINTMENT (OUTPATIENT)
Age: 73
End: 2023-08-29

## 2023-08-29 VITALS
SYSTOLIC BLOOD PRESSURE: 109 MMHG | HEART RATE: 74 BPM | DIASTOLIC BLOOD PRESSURE: 70 MMHG | WEIGHT: 150 LBS | HEIGHT: 64 IN | BODY MASS INDEX: 25.61 KG/M2 | OXYGEN SATURATION: 97 %

## 2023-08-29 DIAGNOSIS — J31.0 CHRONIC RHINITIS: ICD-10-CM

## 2023-08-29 DIAGNOSIS — R74.8 ABNORMAL LEVELS OF OTHER SERUM ENZYMES: ICD-10-CM

## 2023-08-29 PROCEDURE — 95012 NITRIC OXIDE EXP GAS DETER: CPT

## 2023-08-29 PROCEDURE — 36415 COLL VENOUS BLD VENIPUNCTURE: CPT

## 2023-08-29 PROCEDURE — 99203 OFFICE O/P NEW LOW 30 MIN: CPT | Mod: 25

## 2023-08-29 PROCEDURE — 94060 EVALUATION OF WHEEZING: CPT

## 2023-08-29 PROCEDURE — 99204 OFFICE O/P NEW MOD 45 MIN: CPT | Mod: 25

## 2023-08-29 PROCEDURE — 95004 PERQ TESTS W/ALRGNC XTRCS: CPT

## 2023-08-29 RX ORDER — PREDNISONE 10 MG/1
10 TABLET ORAL
Qty: 1 | Refills: 0 | Status: COMPLETED | COMMUNITY
Start: 2023-05-15 | End: 2023-08-29

## 2023-08-29 RX ORDER — METHYLPREDNISOLONE 4 MG/1
4 TABLET ORAL
Qty: 1 | Refills: 0 | Status: COMPLETED | COMMUNITY
Start: 2023-04-27 | End: 2023-08-29

## 2023-08-29 NOTE — IMPRESSION
[Spirometry] : Spirometry [Mild] : (mild) [Allergy Testing Dust Mite] : dust mites [Allergy Testing Mixed Feathers] : feathers [Allergy Testing Cockroach] : cockroach [Allergy Testing Dog] : dog [Allergy Testing Cat] : cat [] : molds [Allergy Testing Trees] : trees [Allergy Testing Weeds] : weeds [Allergy Testing Grasses] : grasses

## 2023-08-30 LAB
C LUNATA IGE QN: <0.1 KUA/L
DEPRECATED A PULLULANS IGE RAST QL: 0
DEPRECATED C LUNATA IGE RAST QL: 0
DEPRECATED F MONILIFORME IGE RAST QL: 0
DEPRECATED M RACEMOSUS IGE RAST QL: 0
DEPRECATED R NIGRICANS IGE RAST QL: 0
F MONILIFORME IGE QN: <0.1 KUA/L
M RACEMOSUS IGE QN: <0.1 KUA/L
MOLD (AUREOBASIDIUM M12) CONC: <0.1 KUA/L
R NIGRICANS IGE QN: <0.1 KUA/L

## 2023-08-31 PROBLEM — R74.8 ELEVATED SERUM TRYPTASE: Status: ACTIVE | Noted: 2023-08-31

## 2023-08-31 NOTE — PHYSICAL EXAM
[Alert] : alert [Well Nourished] : well nourished [No Acute Distress] : no acute distress [Well Developed] : well developed [Sclera Not Icteric] : sclera not icteric [Normal TMs] : both tympanic membranes were normal [Normal Tonsils] : normal tonsils [Boggy Nasal Turbinates] : boggy and/or pale nasal turbinates [No Neck Mass] : no neck mass was observed [Normal Rate and Effort] : normal respiratory rhythm and effort [No Crackles] : no crackles [Bilateral Audible Breath Sounds] : bilateral audible breath sounds [Normal Rate] : heart rate was normal  [Normal S1, S2] : normal S1 and S2 [No murmur] : no murmur [Regular Rhythm] : with a regular rhythm [Soft] : abdomen soft [Normal Cervical Lymph Nodes] : cervical [Skin Intact] : skin intact  [No Rash] : no rash [No Edema] : no edema [No Cyanosis] : no cyanosis [Normal Mood] : mood was normal [Normal Affect] : affect was normal [Judgment and Insight Age Appropriate] : judgement and insight is age appropriate [Alert, Awake, Oriented as Age-Appropriate] : alert, awake, oriented as age appropriate [Conjunctival Erythema] : no conjunctival erythema [Pharyngeal erythema] : no pharyngeal erythema [Posterior Pharyngeal Cobblestoning] : no posterior pharyngeal cobblestoning [Clear Rhinorrhea] : no clear rhinorrhea was seen [Exudate] : no exudate [Wheezing] : no wheezing was heard [Patches] : no patches [Urticaria] : no urticaria

## 2023-08-31 NOTE — END OF VISIT
[FreeTextEntry3] : I, Dr. Staci Mota, personally performed the evaluation and management (E/M) services for this new patient.  That E/M includes conducting the initial examination, assessing all conditions, and establishing the plan of care.  Today, my JANICE, [employer], was here to observe my evaluation and management services for this patient to be followed going forward.

## 2023-08-31 NOTE — CONSULT LETTER
[Dear  ___] : Dear  [unfilled], [Consult Letter:] : I had the pleasure of evaluating your patient, [unfilled]. [Please see my note below.] : Please see my note below. [Consult Closing:] : Thank you very much for allowing me to participate in the care of this patient.  If you have any questions, please do not hesitate to contact me. [Sincerely,] : Sincerely, [FreeTextEntry3] : MD FRANCESCO Witt, LELIA Adult [facility] Pediatric Allergy, Asthma and Clinical Immunology Associate Professor of Medicine and Pediatrics at   [facility], Adult Allergy and Immunology   [facility]   Division of Allergy, Asthma and Immunology   [address] [place] [address], [place] [address]   Phone [phone]  Fax [phone]

## 2023-08-31 NOTE — REVIEW OF SYSTEMS
[Joint Pains] : arthralgias [Nl] : no history of recurrent infections of the sinuses,ear, throat, lungs (pneumonia/ bronchitis) or skin [Fatigue] : no fatigue [Fever] : no fever [Eye Redness] : no redness [Puffy Eyelids] : no puffy ~T eyelids [Edema] : no edema [Chest Pain] : no chest pain or discomfort [Palpitations] : no palpitations [Nocturnal Awakening] : no nocturnal awakening with shortness of breath [Cough] : no cough [Wheezing Worsens With Exercise] : wheezing does not worsen with exercise [Wheezing] : no wheezing [Vomiting] : no vomiting [Diarrhea] : no diarrhea [Seizure] : no seizures [Headache] : no headache [Urticaria] : no urticaria [Atopic Dermatitis] : no atopic dermatitis [Swelling] : no swelling [Easy Bleeding] : no ~M tendency for easy bleeding [Depression] : no depression [Anxiety] : no anxiety [Short Stature] : short stature was not noted [Jitteriness] : no jitteriness [FreeTextEntry4] : see HPI

## 2023-08-31 NOTE — HISTORY OF PRESENT ILLNESS
[Asthma] : asthma [Eczematous rashes] : eczematous rashes [Venom Reactions] : venom reactions [Food Allergies] : food allergies [Drug Allergies] : drug allergies [de-identified] : 72M  with  chronic sinusitis with nasal polyps, chronic back pain and TTR amyloid cardiomyopathy currently presenting for an initial evaluation. Referred by Dr. Dinh.   CHRONIC RHINOSINUSITIS Approximately 20 years ago, developed nasal congestion. Subsequent, ENT evaluation revealed nasal polyps, which led to first polypectomy. Nasal symptoms, initially improved post sinus surgery. Two years, later polyp re grew. Nasal symptoms significantly, worsened last year. Currently, admits to decreased sense of smell and taste, nasal congestion. He is scheduled for sinus surgery with polypectomy in October.  -July 2023: Rhinoscopy:  Mucosa: boggy turbinates, Mucous: scant, Polyp: +seen R>L, right completely obstructed by polyps, Inferior Turbinate: boggy, Middle Turbinate: normal, Superior Turbinate: normal, Inferior Meatus: narrow, Middle Meatus: narrow, Super Meatus: normal, Sphenoethmoidal Recess: clear . Nasal steroid: on  Flonase.     Patient had had ONE sinus surgeries. Last surgery was 20 years ago.  Over the last year he received one course of antibiotics and steroids, with improvement of nasal congestion.    No history of ASTHMA.   No NSAIDs allergy. Took Advil two months ago without any issues.    History of respiratory symptoms after alcohol ingestion: does not drink.  History of hives: no History of GI bleeding or ulcers:no

## 2023-09-01 LAB
A ALTERNATA IGE QN: <0.1 KUA/L
A FUMIGATUS IGE QN: <0.1 KUA/L
ALBUMIN SERPL ELPH-MCNC: 3.9 G/DL
ALP BLD-CCNC: 93 U/L
ALT SERPL-CCNC: 11 U/L
ANION GAP SERPL CALC-SCNC: 15 MMOL/L
AST SERPL-CCNC: 22 U/L
BILIRUB SERPL-MCNC: 0.4 MG/DL
BUN SERPL-MCNC: 16 MG/DL
C HERBARUM IGE QN: <0.1 KUA/L
CALCIUM SERPL-MCNC: 9.5 MG/DL
CH50 SERPL-MCNC: 57 U/ML
CHLORIDE SERPL-SCNC: 103 MMOL/L
CO2 SERPL-SCNC: 20 MMOL/L
CREAT SERPL-MCNC: 1.83 MG/DL
DEPRECATED A ALTERNATA IGE RAST QL: 0
DEPRECATED A FUMIGATUS IGE RAST QL: 0
DEPRECATED C HERBARUM IGE RAST QL: 0
DEPRECATED KAPPA LC FREE/LAMBDA SER: 1.7 RATIO
DEPRECATED P NOTATUM IGE RAST QL: 0
DEPRECATED S ROSTRATA IGE RAST QL: 0
EGFR: 38 ML/MIN/1.73M2
GLUCOSE SERPL-MCNC: 77 MG/DL
IGA SER QL IEP: 209 MG/DL
IGG SER QL IEP: 1253 MG/DL
IGM SER QL IEP: 110 MG/DL
KAPPA LC CSF-MCNC: 2.7 MG/DL
KAPPA LC SERPL-MCNC: 4.6 MG/DL
P NOTATUM IGE QN: <0.1 KUA/L
POTASSIUM SERPL-SCNC: 4.8 MMOL/L
PROT SERPL-MCNC: 6.8 G/DL
S ROSTRATA IGE QN: <0.1 KUA/L
SODIUM SERPL-SCNC: 138 MMOL/L
TOTAL IGE SMQN RAST: 20 KU/L
TRYPTASE: 14.4 UG/L

## 2023-09-08 LAB
2,3-DINOR-11B-PROSTAGLANDIN F2A, RANDOM URINE: NORMAL
COMPLEMENT, ALTERNATE PATHWAY (AH50): 67
CREATININE RANDOM URINE: 136 MG/DL
CREATININE RANDOM URINE: NORMAL
LEUKOTRIENE E4, URINE: 162 PG/MG CR
MANNAN BINDING LECTIN (MBL): 2118 NG/ML

## 2023-09-19 ENCOUNTER — NON-APPOINTMENT (OUTPATIENT)
Age: 73
End: 2023-09-19

## 2023-09-19 ENCOUNTER — APPOINTMENT (OUTPATIENT)
Dept: CARDIOLOGY | Facility: CLINIC | Age: 73
End: 2023-09-19
Payer: MEDICARE

## 2023-09-19 VITALS
OXYGEN SATURATION: 100 % | SYSTOLIC BLOOD PRESSURE: 103 MMHG | HEART RATE: 84 BPM | BODY MASS INDEX: 24.37 KG/M2 | DIASTOLIC BLOOD PRESSURE: 70 MMHG | WEIGHT: 142 LBS

## 2023-09-19 DIAGNOSIS — G89.29 DORSALGIA, UNSPECIFIED: ICD-10-CM

## 2023-09-19 DIAGNOSIS — M54.9 DORSALGIA, UNSPECIFIED: ICD-10-CM

## 2023-09-19 DIAGNOSIS — R60.0 LOCALIZED EDEMA: ICD-10-CM

## 2023-09-19 PROCEDURE — 93000 ELECTROCARDIOGRAM COMPLETE: CPT

## 2023-09-19 PROCEDURE — 93970 EXTREMITY STUDY: CPT

## 2023-09-19 PROCEDURE — 99215 OFFICE O/P EST HI 40 MIN: CPT | Mod: 25

## 2023-09-20 ENCOUNTER — OUTPATIENT (OUTPATIENT)
Dept: OUTPATIENT SERVICES | Facility: HOSPITAL | Age: 73
LOS: 1 days | End: 2023-09-20
Payer: MEDICARE

## 2023-09-20 VITALS
DIASTOLIC BLOOD PRESSURE: 52 MMHG | WEIGHT: 145.95 LBS | OXYGEN SATURATION: 100 % | TEMPERATURE: 98 F | HEIGHT: 64 IN | RESPIRATION RATE: 16 BRPM | HEART RATE: 82 BPM | SYSTOLIC BLOOD PRESSURE: 90 MMHG

## 2023-09-20 DIAGNOSIS — J33.9 NASAL POLYP, UNSPECIFIED: ICD-10-CM

## 2023-09-20 DIAGNOSIS — Z98.890 OTHER SPECIFIED POSTPROCEDURAL STATES: Chronic | ICD-10-CM

## 2023-09-20 DIAGNOSIS — Z01.818 ENCOUNTER FOR OTHER PREPROCEDURAL EXAMINATION: ICD-10-CM

## 2023-09-20 DIAGNOSIS — J34.89 OTHER SPECIFIED DISORDERS OF NOSE AND NASAL SINUSES: ICD-10-CM

## 2023-09-20 DIAGNOSIS — Z98.1 ARTHRODESIS STATUS: Chronic | ICD-10-CM

## 2023-09-20 DIAGNOSIS — E85.4 ORGAN-LIMITED AMYLOIDOSIS: ICD-10-CM

## 2023-09-20 PROCEDURE — G0463: CPT

## 2023-09-20 NOTE — H&P PST ADULT - NSICDXPASTMEDICALHX_GEN_ALL_CORE_FT
PAST MEDICAL HISTORY:  BPH (benign prostatic hyperplasia)     Cardiac amyloidosis     DM (diabetes mellitus)     HTN (hypertension)     Nasal polyp     NSTEMI (non-ST elevation myocardial infarction)     Stenosis, cervical spine

## 2023-09-20 NOTE — H&P PST ADULT - PROBLEM SELECTOR PLAN 1
scheduled functional endoscopic sinus surgery with medfusion, septoplasty, bilateral inferior turbinoplasty, polypectomy on 10/11/2023  -preop instructions given  -Labs: obtain from [employer] dated 9/19/2023  -obtain M/E from Dr. Mireles appt 9/19

## 2023-09-20 NOTE — H&P PST ADULT - ASSESSMENT
DASI score: 3.89  DASI activity: Pt able to perform ADL's, has EASTON on exertion  Loose teeth or denture: denies

## 2023-09-20 NOTE — H&P PST ADULT - NSANTHOSAYNRD_GEN_A_CORE
No. JEFE screening performed.  STOP BANG Legend: 0-2 = LOW Risk; 3-4 = INTERMEDIATE Risk; 5-8 = HIGH Risk

## 2023-09-20 NOTE — H&P PST ADULT - NSICDXPASTSURGICALHX_GEN_ALL_CORE_FT
PAST SURGICAL HISTORY:  History of fusion of cervical spine     S/P cataract extraction B/L    S/P nasal polypectomy

## 2023-09-20 NOTE — H&P PST ADULT - HISTORY OF PRESENT ILLNESS
72 yr old male with PMH of BPH, NSTEMI (4/2023), cardiac amyloidosis, DM (A1c ; 5.5 on 7/13/2023), SARAH (Creat: 1.79), nasal polyps. Pt reports hx of nasal obstruction, nasal polyps, and sinus symptoms, worsening as of late. Pt evaluated by Dr. Dinh for a scheduled functional endoscopic sinus surgery with medfusion, septoplasty, bilateral inferior turbinoplasty, polypectomy on 10/11/2023. Pt denies fever, chills, rhinorrhea, h/a, c/p or SOB.

## 2023-09-21 LAB
ALBUMIN SERPL ELPH-MCNC: 4 G/DL
ALP BLD-CCNC: 103 U/L
ALT SERPL-CCNC: 9 U/L
ANION GAP SERPL CALC-SCNC: 14 MMOL/L
AST SERPL-CCNC: 18 U/L
BILIRUB SERPL-MCNC: 0.5 MG/DL
BUN SERPL-MCNC: 17 MG/DL
CALCIUM SERPL-MCNC: 9.7 MG/DL
CHLORIDE SERPL-SCNC: 102 MMOL/L
CO2 SERPL-SCNC: 22 MMOL/L
CREAT SERPL-MCNC: 1.79 MG/DL
DEPRECATED D DIMER PPP IA-ACNC: 396 NG/ML DDU
EGFR: 39 ML/MIN/1.73M2
GLUCOSE SERPL-MCNC: 80 MG/DL
HCT VFR BLD CALC: 33.1 %
HGB BLD-MCNC: 10.6 G/DL
MCHC RBC-ENTMCNC: 26.1 PG
MCHC RBC-ENTMCNC: 32 GM/DL
MCV RBC AUTO: 81.5 FL
NT-PROBNP SERPL-MCNC: 1840 PG/ML
PLATELET # BLD AUTO: 232 K/UL
POTASSIUM SERPL-SCNC: 4.8 MMOL/L
PREALB SERPL NEPH-MCNC: 10 MG/DL
PROT SERPL-MCNC: 6.6 G/DL
RBC # BLD: 4.06 M/UL
RBC # FLD: 16.6 %
SODIUM SERPL-SCNC: 138 MMOL/L
WBC # FLD AUTO: 5.06 K/UL

## 2023-10-04 ENCOUNTER — NON-APPOINTMENT (OUTPATIENT)
Age: 73
End: 2023-10-04

## 2023-10-10 ENCOUNTER — TRANSCRIPTION ENCOUNTER (OUTPATIENT)
Age: 73
End: 2023-10-10

## 2023-10-11 ENCOUNTER — RESULT REVIEW (OUTPATIENT)
Age: 73
End: 2023-10-11

## 2023-10-11 ENCOUNTER — APPOINTMENT (OUTPATIENT)
Dept: OTOLARYNGOLOGY | Facility: HOSPITAL | Age: 73
End: 2023-10-11

## 2023-10-11 ENCOUNTER — OUTPATIENT (OUTPATIENT)
Dept: INPATIENT UNIT | Facility: HOSPITAL | Age: 73
LOS: 1 days | End: 2023-10-11
Payer: MEDICARE

## 2023-10-11 ENCOUNTER — TRANSCRIPTION ENCOUNTER (OUTPATIENT)
Age: 73
End: 2023-10-11

## 2023-10-11 VITALS
DIASTOLIC BLOOD PRESSURE: 69 MMHG | WEIGHT: 145.95 LBS | RESPIRATION RATE: 18 BRPM | TEMPERATURE: 98 F | HEART RATE: 75 BPM | SYSTOLIC BLOOD PRESSURE: 114 MMHG | OXYGEN SATURATION: 100 % | HEIGHT: 64 IN

## 2023-10-11 VITALS
RESPIRATION RATE: 16 BRPM | DIASTOLIC BLOOD PRESSURE: 59 MMHG | OXYGEN SATURATION: 100 % | HEART RATE: 74 BPM | SYSTOLIC BLOOD PRESSURE: 111 MMHG

## 2023-10-11 DIAGNOSIS — Z98.1 ARTHRODESIS STATUS: Chronic | ICD-10-CM

## 2023-10-11 DIAGNOSIS — J34.89 OTHER SPECIFIED DISORDERS OF NOSE AND NASAL SINUSES: ICD-10-CM

## 2023-10-11 DIAGNOSIS — J33.9 NASAL POLYP, UNSPECIFIED: ICD-10-CM

## 2023-10-11 LAB
GAS PNL BLDA: SIGNIFICANT CHANGE UP
GAS PNL BLDA: SIGNIFICANT CHANGE UP
GLUCOSE BLDC GLUCOMTR-MCNC: 104 MG/DL — HIGH (ref 70–99)
GLUCOSE BLDC GLUCOMTR-MCNC: 121 MG/DL — HIGH (ref 70–99)
GLUCOSE BLDC GLUCOMTR-MCNC: 51 MG/DL — CRITICAL LOW (ref 70–99)
GLUCOSE BLDC GLUCOMTR-MCNC: 53 MG/DL — CRITICAL LOW (ref 70–99)
GLUCOSE BLDC GLUCOMTR-MCNC: 59 MG/DL — LOW (ref 70–99)
GLUCOSE BLDC GLUCOMTR-MCNC: 66 MG/DL — LOW (ref 70–99)
GRAM STN FLD: SIGNIFICANT CHANGE UP
SPECIMEN SOURCE: SIGNIFICANT CHANGE UP

## 2023-10-11 PROCEDURE — 82565 ASSAY OF CREATININE: CPT

## 2023-10-11 PROCEDURE — 88305 TISSUE EXAM BY PATHOLOGIST: CPT | Mod: 26

## 2023-10-11 PROCEDURE — 31225 REMOVAL OF UPPER JAW: CPT | Mod: RT

## 2023-10-11 PROCEDURE — 31259 NSL/SINS NDSC SPHN TISS RMVL: CPT | Mod: 50

## 2023-10-11 PROCEDURE — 61782 SCAN PROC CRANIAL EXTRA: CPT

## 2023-10-11 PROCEDURE — 82803 BLOOD GASES ANY COMBINATION: CPT

## 2023-10-11 PROCEDURE — 87077 CULTURE AEROBIC IDENTIFY: CPT

## 2023-10-11 PROCEDURE — 30520 REPAIR OF NASAL SEPTUM: CPT

## 2023-10-11 PROCEDURE — 84295 ASSAY OF SERUM SODIUM: CPT

## 2023-10-11 PROCEDURE — 82962 GLUCOSE BLOOD TEST: CPT

## 2023-10-11 PROCEDURE — 87075 CULTR BACTERIA EXCEPT BLOOD: CPT

## 2023-10-11 PROCEDURE — 31276 NSL/SINS NDSC FRNT TISS RMVL: CPT | Mod: 50

## 2023-10-11 PROCEDURE — 87186 SC STD MICRODIL/AGAR DIL: CPT

## 2023-10-11 PROCEDURE — 88331 PATH CONSLTJ SURG 1 BLK 1SPC: CPT

## 2023-10-11 PROCEDURE — 31267 ENDOSCOPY MAXILLARY SINUS: CPT | Mod: LT

## 2023-10-11 PROCEDURE — 88300 SURGICAL PATH GROSS: CPT | Mod: 26,59

## 2023-10-11 PROCEDURE — 82330 ASSAY OF CALCIUM: CPT

## 2023-10-11 PROCEDURE — 82947 ASSAY GLUCOSE BLOOD QUANT: CPT

## 2023-10-11 PROCEDURE — 30140 RESECT INFERIOR TURBINATE: CPT | Mod: LT

## 2023-10-11 PROCEDURE — 31288 NASAL/SINUS ENDOSCOPY SURG: CPT | Mod: 50

## 2023-10-11 PROCEDURE — 31255 NSL/SINS NDSC W/TOT ETHMDCT: CPT | Mod: 50

## 2023-10-11 PROCEDURE — 88331 PATH CONSLTJ SURG 1 BLK 1SPC: CPT | Mod: 26

## 2023-10-11 PROCEDURE — 85014 HEMATOCRIT: CPT

## 2023-10-11 PROCEDURE — 84132 ASSAY OF SERUM POTASSIUM: CPT

## 2023-10-11 PROCEDURE — 30140 RESECT INFERIOR TURBINATE: CPT | Mod: 50

## 2023-10-11 PROCEDURE — C1889: CPT

## 2023-10-11 PROCEDURE — 31253 NSL/SINS NDSC TOTAL: CPT | Mod: 50

## 2023-10-11 PROCEDURE — C9399: CPT

## 2023-10-11 PROCEDURE — 31267 ENDOSCOPY MAXILLARY SINUS: CPT | Mod: 50

## 2023-10-11 PROCEDURE — 87070 CULTURE OTHR SPECIMN AEROBIC: CPT

## 2023-10-11 PROCEDURE — 88311 DECALCIFY TISSUE: CPT

## 2023-10-11 PROCEDURE — 88305 TISSUE EXAM BY PATHOLOGIST: CPT

## 2023-10-11 PROCEDURE — 88311 DECALCIFY TISSUE: CPT | Mod: 26

## 2023-10-11 PROCEDURE — 82435 ASSAY OF BLOOD CHLORIDE: CPT

## 2023-10-11 PROCEDURE — 87205 SMEAR GRAM STAIN: CPT

## 2023-10-11 PROCEDURE — 85018 HEMOGLOBIN: CPT

## 2023-10-11 PROCEDURE — 83605 ASSAY OF LACTIC ACID: CPT

## 2023-10-11 PROCEDURE — 88300 SURGICAL PATH GROSS: CPT

## 2023-10-11 DEVICE — SURGIFLO MATRIX WITH THROMBIN KIT: Type: IMPLANTABLE DEVICE | Site: BILATERAL | Status: FUNCTIONAL

## 2023-10-11 RX ORDER — HYDROMORPHONE HYDROCHLORIDE 2 MG/ML
0.2 INJECTION INTRAMUSCULAR; INTRAVENOUS; SUBCUTANEOUS
Refills: 0 | Status: DISCONTINUED | OUTPATIENT
Start: 2023-10-11 | End: 2023-10-11

## 2023-10-11 RX ORDER — LIDOCAINE HCL 20 MG/ML
0.2 VIAL (ML) INJECTION ONCE
Refills: 0 | Status: DISCONTINUED | OUTPATIENT
Start: 2023-10-11 | End: 2023-10-11

## 2023-10-11 RX ORDER — CEFAZOLIN SODIUM 1 G
2000 VIAL (EA) INJECTION ONCE
Refills: 0 | Status: COMPLETED | OUTPATIENT
Start: 2023-10-11 | End: 2023-10-11

## 2023-10-11 RX ORDER — ONDANSETRON 8 MG/1
4 TABLET, FILM COATED ORAL ONCE
Refills: 0 | Status: DISCONTINUED | OUTPATIENT
Start: 2023-10-11 | End: 2023-10-11

## 2023-10-11 RX ORDER — SODIUM CHLORIDE 9 MG/ML
1000 INJECTION, SOLUTION INTRAVENOUS
Refills: 0 | Status: DISCONTINUED | OUTPATIENT
Start: 2023-10-11 | End: 2023-10-11

## 2023-10-11 RX ORDER — SODIUM CHLORIDE 9 MG/ML
3 INJECTION INTRAMUSCULAR; INTRAVENOUS; SUBCUTANEOUS EVERY 8 HOURS
Refills: 0 | Status: DISCONTINUED | OUTPATIENT
Start: 2023-10-11 | End: 2023-10-11

## 2023-10-11 RX ORDER — SODIUM CHLORIDE 9 MG/ML
1000 INJECTION INTRAMUSCULAR; INTRAVENOUS; SUBCUTANEOUS
Refills: 0 | Status: DISCONTINUED | OUTPATIENT
Start: 2023-10-11 | End: 2023-10-25

## 2023-10-11 RX ADMIN — HYDROMORPHONE HYDROCHLORIDE 0.2 MILLIGRAM(S): 2 INJECTION INTRAMUSCULAR; INTRAVENOUS; SUBCUTANEOUS at 15:30

## 2023-10-11 RX ADMIN — HYDROMORPHONE HYDROCHLORIDE 0.2 MILLIGRAM(S): 2 INJECTION INTRAMUSCULAR; INTRAVENOUS; SUBCUTANEOUS at 15:06

## 2023-10-11 RX ADMIN — SODIUM CHLORIDE 100 MILLILITER(S): 9 INJECTION, SOLUTION INTRAVENOUS at 09:49

## 2023-10-11 NOTE — PRE-OP CHECKLIST - WEIGHT IN LBS
When You Have a Sore Throat    A sore throat can be painful. There are many reasons why you may have a sore throat. Your healthcare provider will work with you to find the cause of your sore throat. He or she will also find the best treatment for you.  What causes a sore throat?  Sore throats can be caused or worsened by:  · Cold or flu viruses  · Bacteria  · Irritants such as tobacco smoke or air pollution  · Acid reflux  A healthy throat  The tonsils are on the sides of the throat near the base of the tongue. They collect viruses and bacteria and help fight infection. The throat (pharynx) is the passage for air. Mucus from the nasal cavity also moves down the passage.  An inflamed throat  The tonsils and pharynx can become inflamed due to a cold or flu virus. Postnasal drip (excess mucus draining from the nasal cavity) can irritate the throat. It can also make the throat or tonsils more likely to be infected by bacteria. Severe, untreated tonsillitis in children or adults can cause a pocket of pus (abscess) to form near the tonsil.  Your evaluation  A medical evaluation can help find the cause of your sore throat. It can also help your healthcare provider choose the best treatment for you. The evaluation may include a health history, physical exam, and diagnostic tests.  Health history  Your healthcare provider may ask you the following:  · How long has the sore throat lasted and how have you been treating it?  · Do you have any other symptoms, such as body aches, fever, or cough?  · Does your sore throat recur? If so, how often? How many days of school or work have you missed because of a sore throat?  · Do you have trouble eating or swallowing?  · Have you been told that you snore or have other sleep problems?  · Do you have bad breath?  · Do you cough up bad-tasting mucus?  Physical exam  During the exam, your healthcare provider checks your ears, nose, and throat for problems. He or she also checks for  "swelling in the neck, and may listen to your chest.  Possible tests  Other tests your healthcare provider may perform include:  · A throat swab to check for bacteria such as streptococcus (the bacteria that causes strep throat)  · A blood test to check for mononucleosis (a viral infection)  · A chest X-ray to rule out pneumonia, especially if you have a cough  Treating a sore throat  Treatment depends on many factors. What is the likely cause? Is the problem recent? Does it keep coming back? In many cases, the best thing to do is to treat the symptoms, rest, and let the problem heal itself. Antibiotics may help clear up some bacterial infections. For cases of severe or recurring tonsillitis, the tonsils may need to be removed.  Relieving your symptoms  · Dont smoke, and avoid secondhand smoke.  · For children, try throat sprays or Popsicles. Adults and older children may try lozenges.  · Drink warm liquids to soothe the throat and help thin mucus. Avoid alcohol, spicy foods, and acidic drinks such as orange juice. These can irritate the throat.  · Gargle with warm saltwater (1 teaspoon of salt to 8 ounces of warm water).  · Use a humidifier to keep air moist and relieve throat dryness.  · Try over-the-counter pain relievers such as acetaminophen or ibuprofen. Use as directed, and dont exceed the recommended dose. Dont give aspirin to children.   Are antibiotics needed?  If your sore throat is due to a bacterial infection, antibiotics may speed healing and prevent complications. Although group A streptococcus ("strep throat" or GAS) is the major treatable infection for a sore throat, GAS causes only 5% to 15% of sore throats in adults who seek medical care. Most sore throats are caused by cold or flu viruses. And antibiotics dont treat viral illness. In fact, using antibiotics when theyre not needed may produce bacteria that are harder to kill. Your healthcare provider will prescribe antibiotics only if he or " she thinks they are likely to help.  If antibiotics are prescribed  Take the medicine exactly as directed. Be sure to finish your prescription even if youre feeling better. And be sure to ask your healthcare provider or pharmacist what side effects are common and what to do about them.  Is surgery needed?  In some cases, tonsils need to be removed. This is often done as outpatient (same-day) surgery. Your healthcare provider may advise removing the tonsils in cases of:  · Several severe bouts of tonsillitis in a year. Severe episodes include those that lead to missed days of school or work, or that need to be treated with antibiotics.  · Tonsillitis that causes breathing problems during sleep  · Tonsillitis caused by food particles collecting in pouches in the tonsils (cryptic tonsillitis)  Call your healthcare provider if any of the following occur:  · Symptoms worsen, or new symptoms develop.  · Swollen tonsils make breathing difficult.  · The pain is severe enough to keep you from drinking liquids.  · A skin rash, hives, or wheezing develops. Any of these could signal an allergic reaction to antibiotics.  · Symptoms dont improve within a week.  · Symptoms dont improve within 2 to 3 days of starting antibiotics.   Date Last Reviewed: 2016  © 0307-5697 [employer]. [address] [place] [address], [place] [address]. All rights reserved. This information is not intended as a substitute for professional medical care. Always follow your healthcare professional's instructions.         145.9

## 2023-10-11 NOTE — ASU DISCHARGE PLAN (ADULT/PEDIATRIC) - ASU DC SPECIAL INSTRUCTIONSFT
- No nose blowing for 2 weeks, cough/sneeze through an open mouth   - No straining for two weeks  - Complete antibiotics as prescribed  - Pain medication as needed - do not drive, make decisions or operate machinery on pain meds. if constipates take stool softener, do not strain.   - start using nasal wash tomorrow, 2-3 times a day if possible  - follow up with Dr. Dinh in 1-2 weeks  - expect some bloody oozing from the nose for the first week, put a gauze under to catch it. May use afrin if needed

## 2023-10-11 NOTE — ASU DISCHARGE PLAN (ADULT/PEDIATRIC) - CARE PROVIDER_API CALL
Cleveland Dinh.  Otolaryngology  [address] [place] [address], [address]  [place] [address]  Phone: [phone]  Fax: [phone]  Follow Up Time:

## 2023-10-11 NOTE — ASU PATIENT PROFILE, ADULT - FALL HARM RISK - HARM RISK INTERVENTIONS

## 2023-10-11 NOTE — ASU DISCHARGE PLAN (ADULT/PEDIATRIC) - NS MD DC FALL RISK RISK
For information on Fall & Injury Prevention, visit: [email or web address] OR  [email or web address] OR  https://www.cdc.gov/steadi/patient.html

## 2023-10-14 LAB
-  AMPICILLIN/SULBACTAM: SIGNIFICANT CHANGE UP
-  CEFAZOLIN: SIGNIFICANT CHANGE UP
-  CLINDAMYCIN: SIGNIFICANT CHANGE UP
-  ERYTHROMYCIN: SIGNIFICANT CHANGE UP
-  GENTAMICIN: SIGNIFICANT CHANGE UP
-  OXACILLIN: SIGNIFICANT CHANGE UP
-  PENICILLIN: SIGNIFICANT CHANGE UP
-  RIFAMPIN: SIGNIFICANT CHANGE UP
-  TETRACYCLINE: SIGNIFICANT CHANGE UP
-  TRIMETHOPRIM/SULFAMETHOXAZOLE: SIGNIFICANT CHANGE UP
-  VANCOMYCIN: SIGNIFICANT CHANGE UP
METHOD TYPE: SIGNIFICANT CHANGE UP

## 2023-10-15 ENCOUNTER — EMERGENCY (EMERGENCY)
Facility: HOSPITAL | Age: 73
LOS: 1 days | Discharge: HOME | End: 2023-10-15
Attending: STUDENT IN AN ORGANIZED HEALTH CARE EDUCATION/TRAINING PROGRAM
Payer: MEDICARE

## 2023-10-15 VITALS
RESPIRATION RATE: 18 BRPM | HEIGHT: 64 IN | HEART RATE: 99 BPM | DIASTOLIC BLOOD PRESSURE: 72 MMHG | OXYGEN SATURATION: 100 % | SYSTOLIC BLOOD PRESSURE: 115 MMHG | TEMPERATURE: 98 F | WEIGHT: 149.91 LBS

## 2023-10-15 DIAGNOSIS — Z98.1 ARTHRODESIS STATUS: Chronic | ICD-10-CM

## 2023-10-15 PROBLEM — E85.4 ORGAN-LIMITED AMYLOIDOSIS: Chronic | Status: ACTIVE | Noted: 2023-09-20

## 2023-10-15 PROBLEM — N40.0 BENIGN PROSTATIC HYPERPLASIA WITHOUT LOWER URINARY TRACT SYMPTOMS: Chronic | Status: ACTIVE | Noted: 2023-09-20

## 2023-10-15 PROBLEM — M48.02 SPINAL STENOSIS, CERVICAL REGION: Chronic | Status: ACTIVE | Noted: 2023-09-20

## 2023-10-15 LAB
-  AMIKACIN: SIGNIFICANT CHANGE UP
-  AMOXICILLIN/CLAVULANIC ACID: SIGNIFICANT CHANGE UP
-  AMPICILLIN/SULBACTAM: SIGNIFICANT CHANGE UP
-  AMPICILLIN: SIGNIFICANT CHANGE UP
-  AZTREONAM: SIGNIFICANT CHANGE UP
-  CEFAZOLIN: SIGNIFICANT CHANGE UP
-  CEFEPIME: SIGNIFICANT CHANGE UP
-  CEFOXITIN: SIGNIFICANT CHANGE UP
-  CEFTRIAXONE: SIGNIFICANT CHANGE UP
-  CIPROFLOXACIN: SIGNIFICANT CHANGE UP
-  ERTAPENEM: SIGNIFICANT CHANGE UP
-  GENTAMICIN: SIGNIFICANT CHANGE UP
-  IMIPENEM: SIGNIFICANT CHANGE UP
-  LEVOFLOXACIN: SIGNIFICANT CHANGE UP
-  MEROPENEM: SIGNIFICANT CHANGE UP
-  PIPERACILLIN/TAZOBACTAM: SIGNIFICANT CHANGE UP
-  TOBRAMYCIN: SIGNIFICANT CHANGE UP
-  TRIMETHOPRIM/SULFAMETHOXAZOLE: SIGNIFICANT CHANGE UP
APPEARANCE UR: CLEAR — SIGNIFICANT CHANGE UP
BACTERIA # UR AUTO: NEGATIVE — SIGNIFICANT CHANGE UP
BILIRUB UR-MCNC: NEGATIVE — SIGNIFICANT CHANGE UP
COLOR SPEC: YELLOW — SIGNIFICANT CHANGE UP
DIFF PNL FLD: NEGATIVE — SIGNIFICANT CHANGE UP
EPI CELLS # UR: 0 /HPF — SIGNIFICANT CHANGE UP
GLUCOSE UR QL: NEGATIVE — SIGNIFICANT CHANGE UP
HYALINE CASTS # UR AUTO: 1 /LPF — SIGNIFICANT CHANGE UP (ref 0–2)
KETONES UR-MCNC: NEGATIVE — SIGNIFICANT CHANGE UP
LEUKOCYTE ESTERASE UR-ACNC: NEGATIVE — SIGNIFICANT CHANGE UP
METHOD TYPE: SIGNIFICANT CHANGE UP
NITRITE UR-MCNC: NEGATIVE — SIGNIFICANT CHANGE UP
PH UR: 5.5 — SIGNIFICANT CHANGE UP (ref 5–8)
PROT UR-MCNC: NEGATIVE — SIGNIFICANT CHANGE UP
RBC CASTS # UR COMP ASSIST: 1 /HPF — SIGNIFICANT CHANGE UP (ref 0–4)
SP GR SPEC: 1.01 — SIGNIFICANT CHANGE UP (ref 1.01–1.02)
UROBILINOGEN FLD QL: NEGATIVE — SIGNIFICANT CHANGE UP
WBC UR QL: 1 /HPF — SIGNIFICANT CHANGE UP (ref 0–5)

## 2023-10-15 PROCEDURE — 99283 EMERGENCY DEPT VISIT LOW MDM: CPT

## 2023-10-15 PROCEDURE — 81001 URINALYSIS AUTO W/SCOPE: CPT

## 2023-10-15 PROCEDURE — 87086 URINE CULTURE/COLONY COUNT: CPT

## 2023-10-15 NOTE — ED PROVIDER NOTE - PHYSICAL EXAMINATION
Physical Exam:  Gen: NAD, AOx3, non-toxic appearing, able to ambulate without assistance  Head: NCAT  HEENT: EOMI, PEERLA, normal conjunctiva, tongue midline, oral mucosa moist  Lung: CTAB, no respiratory distress, no wheezes/rhonchi/rales B/L, speaking in full sentences  CV: RRR, no murmurs, rubs or gallops  Abd: lower abd tenderness/ suprapubic tenderness -   MSK: no visible deformities, ROM normal in UE/LE, no back pain  Neuro: No focal sensory or motor deficits  Skin: Warm, well perfused, no rash, no leg swelling  Psych: normal affect, calm

## 2023-10-15 NOTE — ED PROVIDER NOTE - CLINICAL SUMMARY MEDICAL DECISION MAKING FREE TEXT BOX
Patient presents to the ED  complaining of urinary retention.  Patient is hemodynamically stable afebrile on presentation.  Patient physical exam significant lower abdominal swelling and tenderness on exam.  Patient's bladder scan shows over 1 L of urine.  Given patient's presentation and symptoms we will place Harkins catheter and patient.  Urinalysis and urine culture will be obtained.  Pending labs for disposition. Patient presents to the ED  complaining of urinary retention.  Patient is hemodynamically stable afebrile on presentation.  Patient physical exam significant lower abdominal swelling and tenderness on exam.  Patient's bladder scan shows over 1 L of urine.  Given patient's presentation and symptoms we will place Harkins catheter and patient.  Urinalysis and urine culture will be obtained.  Pending labs for disposition.    pettet attending- see attending attestation for my mdm

## 2023-10-15 NOTE — ED ADULT NURSE REASSESSMENT NOTE - NS ED NURSE REASSESS COMMENT FT1
16French indwelling urinary catheter placed under sterile technique, 2 RNs at bedside, draining to gravity, pt output 1300mLs clear, yellow urine

## 2023-10-15 NOTE — ED ADULT NURSE NOTE - OBJECTIVE STATEMENT
pt is a 73yo male presenting to the ED complaining of urinary retention. pt states he began experiencing some difficulty with urination on Wednesday, reports putting out very small volumes of urine, pt reports worsening urinary retention since last night, has not been able to urinate since last night 10/14/23. pt bladder scanned, found to have >1200mL urine retained. pt A&Ox3 gross neuro intact, no difficulty speaking in complete sentences, s1s2 heart sounds heard, pulses x 4, kelly x4, abdomen soft nontender nondistended, skin intact. pt denies chest pain, sob, ha, n/v/d, abdominal pain, f/c, hematuria

## 2023-10-15 NOTE — ED PROVIDER NOTE - PATIENT PORTAL LINK FT
You can access the FollowMyHealth Patient Portal offered by [facility] by registering at the following website: [email or web address]. By joining [facility]’s FollowMyHealth portal, you will also be able to view your health information using other applications (apps) compatible with our system.

## 2023-10-15 NOTE — ED PROVIDER NOTE - ATTENDING CONTRIBUTION TO CARE
I, Silvio Reed, performed a history and physical exam of the patient and discussed their management with the resident and/or advanced care provider. I reviewed the resident and/or advanced care provider's note and agree with the documented findings and plan of care. I was present and available for all procedures.      Well appearing and in NAD, head normal appearing atraumatic, trachea midline, no respiratory distress, lungs cta bilaterally, rrr no murmurs, soft NT sp distention. abdomen, no visible extremity deformities, Alert and oriented, non focal neuro exam, skin warm and dry, normal affect and mood, no leg swelling, calf ttp or jvd. : circumcised, no lesions.  No discharge or blood at urethral meatus.  Testes descended bilaterally.  Testes and epididymis nontender bilaterally.  Cremasteric reflex present bilaterally.     Acute urinary retention otherwise well-appearing bladder scan over 1200 mL in bladder Harkins placed 16 [place] without resistance no hematuria draining spontaneously well-appearing patient feeling relief will drain maximally monitor send urine discharge urology follow-up discussed patient agreeable with plan

## 2023-10-15 NOTE — ED ADULT NURSE NOTE - NSFALLUNIVINTERV_ED_ALL_ED
Bed/Stretcher in lowest position, wheels locked, appropriate side rails in place/Call bell, personal items and telephone in reach/Instruct patient to call for assistance before getting out of bed/chair/stretcher/Non-slip footwear applied when patient is off stretcher/[place] to call system/Physically safe environment - no spills, clutter or unnecessary equipment/Purposeful proactive rounding/Room/bathroom lighting operational, light cord in reach

## 2023-10-15 NOTE — ED PROVIDER NOTE - OBJECTIVE STATEMENT
Patient is a 72-year-old male with a past medical history of BPH, cardiac amyloidosis, diabetes urinary retention who presents emergency department complaining of urinary retention.  Patient states last time he urinated was yesterday afternoon.  Patient states that he is in significant pain due to inability to urinate.  Patient denies any fevers, chills, chest pain, shortness of breath, headache, syncope.

## 2023-10-15 NOTE — ED PROVIDER NOTE - NSFOLLOWUPINSTRUCTIONS_ED_ALL_ED_FT
1. TAKE ALL MEDICATIONS AS DIRECTED.    2. FOR PAIN OR FEVER YOU CAN TAKE IBUPROFEN (MOTRIN, ADVIL) OR ACETAMINOPHEN (TYLENOL) AS NEEDED, AS DIRECTED ON PACKAGING.  3. FOLLOW UP WITH A UROLOGY DOCTOR WITHIN 5 DAYS AS DIRECTED FOR INMAN CATHETER ASSESSMENT  4. IF YOU HAD LABS OR IMAGING DONE, YOU WERE GIVEN COPIES OF ALL LABS AND/OR IMAGING RESULTS FROM YOUR ER VISIT--PLEASE TAKE THEM WITH YOU TO YOUR FOLLOW UP APPOINTMENTS.  5. RETURN TO THE ER FOR ANY WORSENING SYMPTOMS OR CONCERNS.    How to take care of your Inman catheter  To take care of your Inman catheter, you will need to:    Clean your catheter every day.  Change your drainage bags. You will change your drainage bag 2 times a day:  In the morning, change the night bag to the leg bag.  At night before you go to bed, change the leg bag to the night bag.  Replace your drainage bags with new bags once a week. You should also change your drainage bag if it gets clogged or blocked.  Wash your drainage bags every day.  Drink 1 to 2 glasses of liquids every 2 hours while you’re awake to keep you hydrated.  You may see some blood or urine around where the catheter enters your body. This may happen when you’re walking or having a bowel movement (pooping). This is normal if there’s urine draining into the drainage bag. If you do not have urine draining into the drainage bag, call your healthcare provider.    Back to top  How to comfortably wear your Inman catheter and leg bag  The tubing from your leg bag should fit down to your calf with your leg slightly bent. If you have extra tubing, you may need to cut it. Your healthcare provider will show you how to do this.  Always wear the leg bag below your knee. This will help it drain.  Place the leg bag on your calf using the Velcro® straps your healthcare provider gave you. Use a leg strap to secure the tubing to your thigh.  If the straps leave a yamil on your leg, they are too tight. Loosen them. Leaving the straps too tight can lower your blood flow and cause blood clots.  Use a water-based lubricant (such as Astroglide® or K-Y®) to keep your penis or vagina opening from getting sore.  Keep your penis or vagina opening clean by taking a shower every day. This will help prevent infections when your Inman catheter is in place.  Back to top  How to shower with your Inman catheter  You can shower while you have your catheter in place.  Do not take a bath until your catheter is removed. Taking a bath while you have your catheter in place puts you at risk for infections.  Make sure you always shower with your night bag. Your night bag is waterproof. Do not shower with your leg bag. Your leg bag has cloth on the side and will not dry as fast.  You may find it easier to shower in the morning before you change your night bag to your leg bag.  Back to top  How to clean your Inman catheter  You can clean your catheter while you’re in the shower. Follow these instructions.    Gather your supplies. You will need:  Mild soap, such as Dove®.  1 Cath-Secure®.  Clean your hands with soap and water or an alcohol-based hand .  If you’re washing your hands with soap and water, wet your hands and apply soap. Rub your hands together well for at least 20 seconds, then rinse. Dry your hands with a paper towel. Use that same towel to turn off the faucet.  If you’re using an alcohol-based hand , cover your hands with it. Rub them together until they’re dry.  Using mild soap and water, clean your penis or vagina.  If you have a penis, pull back your foreskin (the skin around the tip of your penis), if needed. Clean the area, including your penis.  If you have a vagina, separate your labia (the smaller folds of skin around your vaginal opening). Clean the area from front to back.  Clean the area where the catheter enters your body. This is called your urethra.  Clean the catheter from where it enters your body and then down, away from your body. Hold the catheter at the point it enters your body so that you do not put tension on it.  Rinse the area well and dry it gently.  If you removed your old Cath-Secure, attach the catheter to your leg with a new Cath-Secure. This will keep the catheter from moving.  Back to top  When to change your drainage bags  You will change your drainage bag 2 times a day. Change it:    In the morning after you shower, change the night bag to the leg bag.  At night before you go to bed, change the leg bag to the night bag.  Replace your drainage bags with new bags once a week. You should also change your drainage bag if it gets clogged or blocked.    Back to top  How to change your drainage bag    This video demonstrates how to change your urinary (Inman) catheter drainage bag.  Video Details  Gather your supplies. You will need:  A clean cloth (not one you’re using for bathing) or a 4 x 4 piece of gauze.  Your night or leg bag (whichever one you are switching to).  2 alcohol pads.  Clean your hands with soap and water or an alcohol-based hand .  If you’re washing your hands with soap and water, wet your hands and apply soap. Rub your hands together well for at least 20 seconds, then rinse. Dry your hands with a paper towel. Use that same towel to turn off the faucet.  If you’re using an alcohol-based hand , cover your hands with it. Rub them together until they’re dry.  Empty the urine from the drainage bag into the toilet. Make sure the spout of the drainage bag never touches the side of the toilet or any emptying container. If it does, wipe it with an alcohol pad for 15 seconds.  Place the clean cloth or gauze under the connector to catch any leakage.  Pinch the catheter with your fingers and disconnect the used bag.  Wipe the end of the catheter with an alcohol pad.  Wipe the connector on the new bag with the second alcohol pad.  Connect the clean bag to the catheter and release your finger pinch. Make sure the catheter is tightly connected to the bag to keep it from opening or leaking.  Check all connections. Straighten any kinks or twists in the tubing.  Back to top  How to take care of your drainage bags  Caring for your leg drainage bag  Empty the leg bag into the toilet every 2 to 4 hours, as needed. You can do this through the spout at the bottom of the bag. Do not let the bag get completely full.  Do not lie down for longer than 2 hours while you’re wearing the leg bag. This can keep your urine from draining the way it should.  Caring for your night drainage bag  Always keep the night bag below the level of your bladder.  When you go to sleep, hang your night bag off the bed. You can do this by using a small trash can. Place a clean plastic bag inside the trash can. Hang your night bag inside of the trash can.  Cleaning your drainage bags  Clean your leg bag and night bag every day. Follow these instructions.    Gather your supplies. You will need:  White vinegar.  Cool water.  Clean your hands with soap and water or an alcohol-based hand .  If you’re washing your hands with soap and water, wet your hands and apply soap. Rub your hands together well for at least 20 seconds, then rinse. Dry your hands with a paper towel. Use that same towel to turn off the faucet.  If you’re using an alcohol-based hand , cover your hands with it. Rub them together until they’re dry.  Rinse the bag with cool water. Do not use hot water because it can damage the plastic.  To help get rid of the smell, fill the bag jail with a mixture of 1part white vinegar and 3 parts water. Shake the bag and let it sit for 15 minutes.  If you cannot get the mixture into the bag, try putting the vinegar and water into a measuring cup with a pour spout. Then use the spout to help pour the mixture into the bag.  Rinse the bag with cool water. Hang it up to dry.  Back to top  How to prevent Inman catheter infections  Follow these guidelines to prevent getting infections while you have your catheter in place:    Keep the drainage bag below the level of your bladder.  Always keep your drainage bag off the floor.  Keep the catheter secured to your thigh to keep it from moving.  Do not lie on your catheter or block the flow of urine in the tubing.  Take a shower every day to keep the catheter clean.  Wash your hands before and after touching the catheter or bag.  Back to top  Common questions about caring for your Inman catheter  Can I place a Inman catheter myself?  No. Your catheter is placed by your healthcare provider.    How long can I wear my Inman catheter before it needs to be changed?  Your catheter should be replaced about once a month, if it stops working, or if you have an infection.    Can I poop when I have a Inman catheter?  Yes. Your catheter will not affect your pooping.    Can I get an erection with a Inman catheter?  Yes. You can get an erection with a catheter in place.    Can I drive with a Inman catheter?  You can drive with a catheter unless your healthcare provider gives you other instructions.    Can I exercise with a Inman catheter?  Ask your healthcare provider if you can exercise while you have a Inman catheter in place.    Can I swim with a Inman catheter?  No. You cannot swim with a catheter in place.    Can I fly on an airplane with a Inman catheter?  Yes. You can fly on an airplane with a catheter.    Back to top  When to call your healthcare provider  Call your healthcare provider right away if:    Your catheter comes out. Do not try to put it back in yourself.  You have a fever of 101°F (38.3 °C) or higher.  You’re making less urine than usual.  You do not have urine draining into your drainage bag.  Your urine smells bad.  You have bright red blood or large blood clots in your urine.  You have abdominal (belly) pain and no urine in your catheter bag.

## 2023-10-15 NOTE — ED PROVIDER NOTE - CARE PROVIDER_API CALL
Reji Brenner  Urology  [address] [place] [address], [address]  [place] [address]  Phone: [phone]  Fax: [phone]  Follow Up Time: Urgent

## 2023-10-16 ENCOUNTER — NON-APPOINTMENT (OUTPATIENT)
Age: 73
End: 2023-10-16

## 2023-10-16 LAB
CULTURE RESULTS: NO GROWTH — SIGNIFICANT CHANGE UP
CULTURE RESULTS: SIGNIFICANT CHANGE UP
ORGANISM # SPEC MICROSCOPIC CNT: SIGNIFICANT CHANGE UP
SPECIMEN SOURCE: SIGNIFICANT CHANGE UP
SPECIMEN SOURCE: SIGNIFICANT CHANGE UP

## 2023-10-18 ENCOUNTER — APPOINTMENT (OUTPATIENT)
Dept: UROLOGY | Facility: CLINIC | Age: 73
End: 2023-10-18

## 2023-10-18 ENCOUNTER — APPOINTMENT (OUTPATIENT)
Dept: UROLOGY | Facility: CLINIC | Age: 73
End: 2023-10-18
Payer: MEDICARE

## 2023-10-18 LAB
SURGICAL PATHOLOGY STUDY: SIGNIFICANT CHANGE UP
SURGICAL PATHOLOGY STUDY: SIGNIFICANT CHANGE UP

## 2023-10-18 PROCEDURE — 99214 OFFICE O/P EST MOD 30 MIN: CPT

## 2023-10-19 ENCOUNTER — APPOINTMENT (OUTPATIENT)
Dept: OTOLARYNGOLOGY | Facility: CLINIC | Age: 73
End: 2023-10-19
Payer: MEDICARE

## 2023-10-19 ENCOUNTER — APPOINTMENT (OUTPATIENT)
Dept: UROLOGY | Facility: CLINIC | Age: 73
End: 2023-10-19
Payer: MEDICARE

## 2023-10-19 ENCOUNTER — NON-APPOINTMENT (OUTPATIENT)
Age: 73
End: 2023-10-19

## 2023-10-19 ENCOUNTER — EMERGENCY (EMERGENCY)
Facility: HOSPITAL | Age: 73
LOS: 1 days | Discharge: HOME | End: 2023-10-19
Attending: STUDENT IN AN ORGANIZED HEALTH CARE EDUCATION/TRAINING PROGRAM
Payer: MEDICARE

## 2023-10-19 VITALS
SYSTOLIC BLOOD PRESSURE: 101 MMHG | DIASTOLIC BLOOD PRESSURE: 64 MMHG | RESPIRATION RATE: 18 BRPM | HEART RATE: 72 BPM | TEMPERATURE: 98 F | OXYGEN SATURATION: 100 %

## 2023-10-19 VITALS
SYSTOLIC BLOOD PRESSURE: 103 MMHG | DIASTOLIC BLOOD PRESSURE: 63 MMHG | HEIGHT: 64 IN | HEART RATE: 96 BPM | TEMPERATURE: 98 F

## 2023-10-19 VITALS
TEMPERATURE: 98 F | DIASTOLIC BLOOD PRESSURE: 65 MMHG | WEIGHT: 149.91 LBS | HEART RATE: 86 BPM | SYSTOLIC BLOOD PRESSURE: 98 MMHG | OXYGEN SATURATION: 100 % | HEIGHT: 64 IN | RESPIRATION RATE: 16 BRPM

## 2023-10-19 VITALS
SYSTOLIC BLOOD PRESSURE: 101 MMHG | DIASTOLIC BLOOD PRESSURE: 67 MMHG | RESPIRATION RATE: 17 BRPM | HEART RATE: 91 BPM | OXYGEN SATURATION: 100 % | TEMPERATURE: 98.5 F

## 2023-10-19 DIAGNOSIS — R35.0 FREQUENCY OF MICTURITION: ICD-10-CM

## 2023-10-19 DIAGNOSIS — R97.20 ELEVATED PROSTATE, SPECIFIC ANTIGEN [PSA]: ICD-10-CM

## 2023-10-19 DIAGNOSIS — Z98.1 ARTHRODESIS STATUS: Chronic | ICD-10-CM

## 2023-10-19 LAB
ALBUMIN SERPL ELPH-MCNC: 3.4 G/DL — SIGNIFICANT CHANGE UP (ref 3.3–5)
ALBUMIN SERPL ELPH-MCNC: 3.4 G/DL — SIGNIFICANT CHANGE UP (ref 3.3–5)
ALP SERPL-CCNC: 128 U/L — HIGH (ref 40–120)
ALP SERPL-CCNC: 128 U/L — HIGH (ref 40–120)
ALT FLD-CCNC: 35 U/L — SIGNIFICANT CHANGE UP (ref 10–45)
ALT FLD-CCNC: 35 U/L — SIGNIFICANT CHANGE UP (ref 10–45)
ANION GAP SERPL CALC-SCNC: 13 MMOL/L — SIGNIFICANT CHANGE UP (ref 5–17)
ANION GAP SERPL CALC-SCNC: 13 MMOL/L — SIGNIFICANT CHANGE UP (ref 5–17)
APPEARANCE UR: ABNORMAL
APPEARANCE UR: ABNORMAL
AST SERPL-CCNC: 52 U/L — HIGH (ref 10–40)
AST SERPL-CCNC: 52 U/L — HIGH (ref 10–40)
BACTERIA # UR AUTO: ABNORMAL
BACTERIA # UR AUTO: ABNORMAL
BASOPHILS # BLD AUTO: 0.03 K/UL — SIGNIFICANT CHANGE UP (ref 0–0.2)
BASOPHILS # BLD AUTO: 0.03 K/UL — SIGNIFICANT CHANGE UP (ref 0–0.2)
BASOPHILS NFR BLD AUTO: 0.5 % — SIGNIFICANT CHANGE UP (ref 0–2)
BASOPHILS NFR BLD AUTO: 0.5 % — SIGNIFICANT CHANGE UP (ref 0–2)
BILIRUB SERPL-MCNC: 0.3 MG/DL — SIGNIFICANT CHANGE UP (ref 0.2–1.2)
BILIRUB SERPL-MCNC: 0.3 MG/DL — SIGNIFICANT CHANGE UP (ref 0.2–1.2)
BILIRUB UR-MCNC: NEGATIVE — SIGNIFICANT CHANGE UP
BILIRUB UR-MCNC: NEGATIVE — SIGNIFICANT CHANGE UP
BUN SERPL-MCNC: 18 MG/DL — SIGNIFICANT CHANGE UP (ref 7–23)
BUN SERPL-MCNC: 18 MG/DL — SIGNIFICANT CHANGE UP (ref 7–23)
CALCIUM SERPL-MCNC: 8.9 MG/DL — SIGNIFICANT CHANGE UP (ref 8.4–10.5)
CALCIUM SERPL-MCNC: 8.9 MG/DL — SIGNIFICANT CHANGE UP (ref 8.4–10.5)
CHLORIDE SERPL-SCNC: 105 MMOL/L — SIGNIFICANT CHANGE UP (ref 96–108)
CHLORIDE SERPL-SCNC: 105 MMOL/L — SIGNIFICANT CHANGE UP (ref 96–108)
CO2 SERPL-SCNC: 20 MMOL/L — LOW (ref 22–31)
CO2 SERPL-SCNC: 20 MMOL/L — LOW (ref 22–31)
COLOR SPEC: SIGNIFICANT CHANGE UP
COLOR SPEC: SIGNIFICANT CHANGE UP
CREAT SERPL-MCNC: 1.89 MG/DL — HIGH (ref 0.5–1.3)
CREAT SERPL-MCNC: 1.89 MG/DL — HIGH (ref 0.5–1.3)
DIFF PNL FLD: ABNORMAL
DIFF PNL FLD: ABNORMAL
EGFR: 37 ML/MIN/1.73M2 — LOW
EGFR: 37 ML/MIN/1.73M2 — LOW
EOSINOPHIL # BLD AUTO: 0.25 K/UL — SIGNIFICANT CHANGE UP (ref 0–0.5)
EOSINOPHIL # BLD AUTO: 0.25 K/UL — SIGNIFICANT CHANGE UP (ref 0–0.5)
EOSINOPHIL NFR BLD AUTO: 4.6 % — SIGNIFICANT CHANGE UP (ref 0–6)
EOSINOPHIL NFR BLD AUTO: 4.6 % — SIGNIFICANT CHANGE UP (ref 0–6)
EPI CELLS # UR: 0 /HPF — SIGNIFICANT CHANGE UP
EPI CELLS # UR: 0 /HPF — SIGNIFICANT CHANGE UP
GLUCOSE SERPL-MCNC: 92 MG/DL — SIGNIFICANT CHANGE UP (ref 70–99)
GLUCOSE SERPL-MCNC: 92 MG/DL — SIGNIFICANT CHANGE UP (ref 70–99)
GLUCOSE UR QL: NEGATIVE — SIGNIFICANT CHANGE UP
GLUCOSE UR QL: NEGATIVE — SIGNIFICANT CHANGE UP
HCT VFR BLD CALC: 27.1 % — LOW (ref 39–50)
HCT VFR BLD CALC: 27.1 % — LOW (ref 39–50)
HGB BLD-MCNC: 8.7 G/DL — LOW (ref 13–17)
HGB BLD-MCNC: 8.7 G/DL — LOW (ref 13–17)
HYALINE CASTS # UR AUTO: 1 /LPF — SIGNIFICANT CHANGE UP (ref 0–2)
HYALINE CASTS # UR AUTO: 1 /LPF — SIGNIFICANT CHANGE UP (ref 0–2)
IMM GRANULOCYTES NFR BLD AUTO: 0.2 % — SIGNIFICANT CHANGE UP (ref 0–0.9)
IMM GRANULOCYTES NFR BLD AUTO: 0.2 % — SIGNIFICANT CHANGE UP (ref 0–0.9)
KETONES UR-MCNC: NEGATIVE — SIGNIFICANT CHANGE UP
KETONES UR-MCNC: NEGATIVE — SIGNIFICANT CHANGE UP
LEUKOCYTE ESTERASE UR-ACNC: ABNORMAL
LEUKOCYTE ESTERASE UR-ACNC: ABNORMAL
LYMPHOCYTES # BLD AUTO: 2.85 K/UL — SIGNIFICANT CHANGE UP (ref 1–3.3)
LYMPHOCYTES # BLD AUTO: 2.85 K/UL — SIGNIFICANT CHANGE UP (ref 1–3.3)
LYMPHOCYTES # BLD AUTO: 52 % — HIGH (ref 13–44)
LYMPHOCYTES # BLD AUTO: 52 % — HIGH (ref 13–44)
MCHC RBC-ENTMCNC: 26.3 PG — LOW (ref 27–34)
MCHC RBC-ENTMCNC: 26.3 PG — LOW (ref 27–34)
MCHC RBC-ENTMCNC: 32.1 GM/DL — SIGNIFICANT CHANGE UP (ref 32–36)
MCHC RBC-ENTMCNC: 32.1 GM/DL — SIGNIFICANT CHANGE UP (ref 32–36)
MCV RBC AUTO: 81.9 FL — SIGNIFICANT CHANGE UP (ref 80–100)
MCV RBC AUTO: 81.9 FL — SIGNIFICANT CHANGE UP (ref 80–100)
MONOCYTES # BLD AUTO: 0.63 K/UL — SIGNIFICANT CHANGE UP (ref 0–0.9)
MONOCYTES # BLD AUTO: 0.63 K/UL — SIGNIFICANT CHANGE UP (ref 0–0.9)
MONOCYTES NFR BLD AUTO: 11.5 % — SIGNIFICANT CHANGE UP (ref 2–14)
MONOCYTES NFR BLD AUTO: 11.5 % — SIGNIFICANT CHANGE UP (ref 2–14)
NEUTROPHILS # BLD AUTO: 1.71 K/UL — LOW (ref 1.8–7.4)
NEUTROPHILS # BLD AUTO: 1.71 K/UL — LOW (ref 1.8–7.4)
NEUTROPHILS NFR BLD AUTO: 31.2 % — LOW (ref 43–77)
NEUTROPHILS NFR BLD AUTO: 31.2 % — LOW (ref 43–77)
NITRITE UR-MCNC: NEGATIVE — SIGNIFICANT CHANGE UP
NITRITE UR-MCNC: NEGATIVE — SIGNIFICANT CHANGE UP
NRBC # BLD: 0 /100 WBCS — SIGNIFICANT CHANGE UP (ref 0–0)
NRBC # BLD: 0 /100 WBCS — SIGNIFICANT CHANGE UP (ref 0–0)
PH UR: 6.5 — SIGNIFICANT CHANGE UP (ref 5–8)
PH UR: 6.5 — SIGNIFICANT CHANGE UP (ref 5–8)
PLATELET # BLD AUTO: 277 K/UL — SIGNIFICANT CHANGE UP (ref 150–400)
PLATELET # BLD AUTO: 277 K/UL — SIGNIFICANT CHANGE UP (ref 150–400)
POTASSIUM SERPL-MCNC: 4.7 MMOL/L — SIGNIFICANT CHANGE UP (ref 3.5–5.3)
POTASSIUM SERPL-MCNC: 4.7 MMOL/L — SIGNIFICANT CHANGE UP (ref 3.5–5.3)
POTASSIUM SERPL-SCNC: 4.7 MMOL/L — SIGNIFICANT CHANGE UP (ref 3.5–5.3)
POTASSIUM SERPL-SCNC: 4.7 MMOL/L — SIGNIFICANT CHANGE UP (ref 3.5–5.3)
PROT SERPL-MCNC: 6.7 G/DL — SIGNIFICANT CHANGE UP (ref 6–8.3)
PROT SERPL-MCNC: 6.7 G/DL — SIGNIFICANT CHANGE UP (ref 6–8.3)
PROT UR-MCNC: SIGNIFICANT CHANGE UP
PROT UR-MCNC: SIGNIFICANT CHANGE UP
RBC # BLD: 3.31 M/UL — LOW (ref 4.2–5.8)
RBC # BLD: 3.31 M/UL — LOW (ref 4.2–5.8)
RBC # FLD: 16.2 % — HIGH (ref 10.3–14.5)
RBC # FLD: 16.2 % — HIGH (ref 10.3–14.5)
RBC CASTS # UR COMP ASSIST: 16 /HPF — HIGH (ref 0–4)
RBC CASTS # UR COMP ASSIST: 16 /HPF — HIGH (ref 0–4)
SODIUM SERPL-SCNC: 138 MMOL/L — SIGNIFICANT CHANGE UP (ref 135–145)
SODIUM SERPL-SCNC: 138 MMOL/L — SIGNIFICANT CHANGE UP (ref 135–145)
SP GR SPEC: 1.01 — LOW (ref 1.01–1.02)
SP GR SPEC: 1.01 — LOW (ref 1.01–1.02)
UROBILINOGEN FLD QL: NEGATIVE — SIGNIFICANT CHANGE UP
UROBILINOGEN FLD QL: NEGATIVE — SIGNIFICANT CHANGE UP
WBC # BLD: 5.48 K/UL — SIGNIFICANT CHANGE UP (ref 3.8–10.5)
WBC # BLD: 5.48 K/UL — SIGNIFICANT CHANGE UP (ref 3.8–10.5)
WBC # FLD AUTO: 5.48 K/UL — SIGNIFICANT CHANGE UP (ref 3.8–10.5)
WBC # FLD AUTO: 5.48 K/UL — SIGNIFICANT CHANGE UP (ref 3.8–10.5)
WBC UR QL: 52 /HPF — HIGH (ref 0–5)
WBC UR QL: 52 /HPF — HIGH (ref 0–5)

## 2023-10-19 PROCEDURE — 87086 URINE CULTURE/COLONY COUNT: CPT

## 2023-10-19 PROCEDURE — 87186 SC STD MICRODIL/AGAR DIL: CPT

## 2023-10-19 PROCEDURE — 96374 THER/PROPH/DIAG INJ IV PUSH: CPT | Mod: XU

## 2023-10-19 PROCEDURE — 80053 COMPREHEN METABOLIC PANEL: CPT

## 2023-10-19 PROCEDURE — 51702 INSERT TEMP BLADDER CATH: CPT

## 2023-10-19 PROCEDURE — 52000 CYSTOURETHROSCOPY: CPT

## 2023-10-19 PROCEDURE — 99284 EMERGENCY DEPT VISIT MOD MDM: CPT | Mod: 25

## 2023-10-19 PROCEDURE — 31237 NSL/SINS NDSC SURG BX POLYPC: CPT | Mod: 50,58

## 2023-10-19 PROCEDURE — 85025 COMPLETE CBC W/AUTO DIFF WBC: CPT

## 2023-10-19 PROCEDURE — 99284 EMERGENCY DEPT VISIT MOD MDM: CPT

## 2023-10-19 PROCEDURE — 99024 POSTOP FOLLOW-UP VISIT: CPT

## 2023-10-19 PROCEDURE — 81001 URINALYSIS AUTO W/SCOPE: CPT

## 2023-10-19 PROCEDURE — 87077 CULTURE AEROBIC IDENTIFY: CPT

## 2023-10-19 RX ORDER — CEFTRIAXONE 500 MG/1
1000 INJECTION, POWDER, FOR SOLUTION INTRAMUSCULAR; INTRAVENOUS ONCE
Refills: 0 | Status: COMPLETED | OUTPATIENT
Start: 2023-10-19 | End: 2023-10-19

## 2023-10-19 RX ORDER — CEFDINIR 250 MG/5ML
1 POWDER, FOR SUSPENSION ORAL
Qty: 14 | Refills: 0
Start: 2023-10-19 | End: 2023-10-25

## 2023-10-19 RX ADMIN — CEFTRIAXONE 100 MILLIGRAM(S): 500 INJECTION, POWDER, FOR SOLUTION INTRAMUSCULAR; INTRAVENOUS at 05:01

## 2023-10-19 NOTE — ED PROVIDER NOTE - NSFOLLOWUPINSTRUCTIONS_ED_ALL_ED_FT
You were seen in the emergency department for inability to urinate    1) Follow-up with your primary care provider in 1-5 days. Follow up with a urologist within 1 week.    2) Continue to take all medications as prescribed. We sent an antibiotic to your pharmacy. Please take as directed.    3) Rest and stay hydrated. Pain can be managed with Acetaminophen (aka Tylenol) over the counter as directed.    4) Return to the ER for any new or worsening symptoms. Signs to look out for include fever, chills, vomiting, severe pain, pain that travels from from groin to back, or for any other worsening or concerning symptoms      Please read all attached patient information.

## 2023-10-19 NOTE — ED PROVIDER NOTE - OBJECTIVE STATEMENT
72y M with hx BPH and gout presenting with urinary retention for 1d.   pt had surgery 1w ago and had difficulty voiding afterward and was dc'ed on morales. Pt went to urologist yesterday and had morales removed and was unable to urinate since then. Pt denies  any abd pain, fever, chills, flank pain, cp, sob, swelling of legs.

## 2023-10-19 NOTE — ED ADULT NURSE NOTE - NSFALLUNIVINTERV_ED_ALL_ED
Bed/Stretcher in lowest position, wheels locked, appropriate side rails in place/Call bell, personal items and telephone in reach/Instruct patient to call for assistance before getting out of bed/chair/stretcher/Non-slip footwear applied when patient is off stretcher/[place] to call system/Physically safe environment - no spills, clutter or unnecessary equipment/Purposeful proactive rounding/Room/bathroom lighting operational, light cord in reach

## 2023-10-19 NOTE — ED PROVIDER NOTE - ATTENDING CONTRIBUTION TO CARE
I have personally performed a face to face medical and diagnostic evaluation of the patient. I have discussed with and reviewed the Resident's note and agree with the History, ROS, Physical Exam and MDM unless otherwise indicated. A brief summary of my personal evaluation and impression can be found below.    72y M with hx BPH and gout presenting with urinary retention for 1d. pt had surgery 1w ago and had difficulty voiding afterward and was dc'ed on morales. Pt went to urologist yesterday and had morales removed and was unable to urinate since then. On exam, vital signs stable, retaining urine on bladder scan.  Morales placed without any difficulty, patient endorses much relief.  On lab sent off, positive for UTI.  Plan for treatment with antibiotics.  Labs within normal limits.  Will give return precautions and follow-up instructions with teach back.  Will DC patient with Morales. Brad Mo, ED Attending

## 2023-10-19 NOTE — ED ADULT NURSE REASSESSMENT NOTE - NS ED NURSE REASSESS COMMENT FT1
Harkins catheter inserted using sterile technique. Second RN present to confirm sterility. Bedside drainage to gravity. 900 CC clear yellow urine drained. UA and UC sent.

## 2023-10-19 NOTE — ED PROVIDER NOTE - PROGRESS NOTE DETAILS
Heike Glover, PGY-2: RN did bladder scan which shower retention 746cc in bladder.   Harkins placed, which drained approx 900cc urine UA shows WBC, leukocyte esterase and blood. CTX ordered for UTI. No previous cultures available

## 2023-10-19 NOTE — ED PROVIDER NOTE - PATIENT PORTAL LINK FT
You can access the FollowMyHealth Patient Portal offered by [facility] by registering at the following website: [email or web address]. By joining [employer]’s FollowMyHealth portal, you will also be able to view your health information using other applications (apps) compatible with our system.

## 2023-10-19 NOTE — ED ADULT NURSE NOTE - OBJECTIVE STATEMENT
72 year old male, A&Ox4, PMH of PMH of BPH, stenosis, cardiac amyloidosis, NSTEMI, DM, cervical spine fusion, presenting to ED c/o urinary retention. Patient recently here at [facility] 2 days ago and sent home with a morales catheter. Catheter was removed at urology follow up on 10/18 around noon, patient states he has been unable to urinate since. Denies pain but states he has the urge to urinate. 746 CC of urine noted on bladder scan. Denies CP, SOB, HA, n/v/d, abdominal pain, fever and chills. Abdomen soft, nondistended and nontender. Peripheral pulses strong and equal bilaterally. Safety and comfort measures maintained.

## 2023-10-19 NOTE — ED PROVIDER NOTE - CLINICAL SUMMARY MEDICAL DECISION MAKING FREE TEXT BOX
c/f urinary retention 2/2 BPH vs bladder spasm vs UTI. Consider SARAH 2/2 urinary retention Plan: cbc, cmp, bladder scan, ua/uc

## 2023-10-19 NOTE — ED PROVIDER NOTE - PHYSICAL EXAMINATION
GENERAL: NAD, lying in bed comfortably  HEAD:  Atraumatic, Normocephalic  EYES: EOMI, conjunctiva and sclera clear  ENT: Moist mucous membranes  NECK: Supple  CHEST/LUNG: Unlabored respirations. Clear to auscultation bilaterally. No rales, rhonchi, wheezing, or rubs  HEART: Regular rate and rhythm. No murmurs, rubs, or gallops  ABDOMEN: Soft, nondistended, nontender. palpable bladder  EXTREMITIES:  No cyanosis or edema. Brisk capillary refill  NERVOUS SYSTEM:  No focal deficits. A&Ox3  SKIN: No rashes or lesions

## 2023-10-21 NOTE — ED POST DISCHARGE NOTE - DETAILS
final sens 3rd gen cephalosporins sensitive - Bon Mckenzie PA-C 10/24/23: Additional culture results show pseudomonas sensitive to FQ, prior organism enterobacter also sensitive to FQ. I called and spoke to pt who reports persistent pain around the catheter site, instructed pt to DC cefdinir and start cipro, Rx sent to pt preferred pharmacy, CrCl 33 ([facility]) as per UTD 500mg BID is in acceptable dose range given this CrCl. pt instructed to f/u with urology. -Cholo Ruth PA-C

## 2023-10-21 NOTE — ED POST DISCHARGE NOTE - RESULT SUMMARY
Prelim ucx >100K GNR. On cefdinir. Will await final sensitivities to determine need for contact vs appropriate care given. - Aleks Baker PA-C

## 2023-10-22 LAB
-  AMIKACIN: SIGNIFICANT CHANGE UP
-  AMIKACIN: SIGNIFICANT CHANGE UP
-  AMOXICILLIN/CLAVULANIC ACID: SIGNIFICANT CHANGE UP
-  AMOXICILLIN/CLAVULANIC ACID: SIGNIFICANT CHANGE UP
-  AMPICILLIN/SULBACTAM: SIGNIFICANT CHANGE UP
-  AMPICILLIN/SULBACTAM: SIGNIFICANT CHANGE UP
-  AMPICILLIN: SIGNIFICANT CHANGE UP
-  AMPICILLIN: SIGNIFICANT CHANGE UP
-  AZTREONAM: SIGNIFICANT CHANGE UP
-  AZTREONAM: SIGNIFICANT CHANGE UP
-  CEFAZOLIN: SIGNIFICANT CHANGE UP
-  CEFAZOLIN: SIGNIFICANT CHANGE UP
-  CEFEPIME: SIGNIFICANT CHANGE UP
-  CEFEPIME: SIGNIFICANT CHANGE UP
-  CEFOTAXIME: SIGNIFICANT CHANGE UP
-  CEFOTAXIME: SIGNIFICANT CHANGE UP
-  CEFOXITIN: SIGNIFICANT CHANGE UP
-  CEFOXITIN: SIGNIFICANT CHANGE UP
-  CEFTAZIDIME: SIGNIFICANT CHANGE UP
-  CEFTAZIDIME: SIGNIFICANT CHANGE UP
-  CEFTRIAXONE: SIGNIFICANT CHANGE UP
-  CEFTRIAXONE: SIGNIFICANT CHANGE UP
-  CIPROFLOXACIN: SIGNIFICANT CHANGE UP
-  CIPROFLOXACIN: SIGNIFICANT CHANGE UP
-  ERTAPENEM: SIGNIFICANT CHANGE UP
-  ERTAPENEM: SIGNIFICANT CHANGE UP
-  GENTAMICIN: SIGNIFICANT CHANGE UP
-  GENTAMICIN: SIGNIFICANT CHANGE UP
-  IMIPENEM: SIGNIFICANT CHANGE UP
-  IMIPENEM: SIGNIFICANT CHANGE UP
-  LEVOFLOXACIN: SIGNIFICANT CHANGE UP
-  LEVOFLOXACIN: SIGNIFICANT CHANGE UP
-  MEROPENEM: SIGNIFICANT CHANGE UP
-  MEROPENEM: SIGNIFICANT CHANGE UP
-  NITROFURANTOIN: SIGNIFICANT CHANGE UP
-  NITROFURANTOIN: SIGNIFICANT CHANGE UP
-  PIPERACILLIN/TAZOBACTAM: SIGNIFICANT CHANGE UP
-  PIPERACILLIN/TAZOBACTAM: SIGNIFICANT CHANGE UP
-  TOBRAMYCIN: SIGNIFICANT CHANGE UP
-  TOBRAMYCIN: SIGNIFICANT CHANGE UP
-  TRIMETHOPRIM/SULFAMETHOXAZOLE: SIGNIFICANT CHANGE UP
-  TRIMETHOPRIM/SULFAMETHOXAZOLE: SIGNIFICANT CHANGE UP
METHOD TYPE: SIGNIFICANT CHANGE UP
METHOD TYPE: SIGNIFICANT CHANGE UP

## 2023-10-24 LAB
-  AMIKACIN: SIGNIFICANT CHANGE UP
-  AMIKACIN: SIGNIFICANT CHANGE UP
-  AZTREONAM: SIGNIFICANT CHANGE UP
-  AZTREONAM: SIGNIFICANT CHANGE UP
-  CEFEPIME: SIGNIFICANT CHANGE UP
-  CEFEPIME: SIGNIFICANT CHANGE UP
-  CEFTAZIDIME: SIGNIFICANT CHANGE UP
-  CEFTAZIDIME: SIGNIFICANT CHANGE UP
-  CIPROFLOXACIN: SIGNIFICANT CHANGE UP
-  CIPROFLOXACIN: SIGNIFICANT CHANGE UP
-  GENTAMICIN: SIGNIFICANT CHANGE UP
-  GENTAMICIN: SIGNIFICANT CHANGE UP
-  IMIPENEM: SIGNIFICANT CHANGE UP
-  IMIPENEM: SIGNIFICANT CHANGE UP
-  LEVOFLOXACIN: SIGNIFICANT CHANGE UP
-  LEVOFLOXACIN: SIGNIFICANT CHANGE UP
-  MEROPENEM: SIGNIFICANT CHANGE UP
-  MEROPENEM: SIGNIFICANT CHANGE UP
-  PIPERACILLIN/TAZOBACTAM: SIGNIFICANT CHANGE UP
-  PIPERACILLIN/TAZOBACTAM: SIGNIFICANT CHANGE UP
-  TOBRAMYCIN: SIGNIFICANT CHANGE UP
-  TOBRAMYCIN: SIGNIFICANT CHANGE UP
CULTURE RESULTS: SIGNIFICANT CHANGE UP
CULTURE RESULTS: SIGNIFICANT CHANGE UP
METHOD TYPE: SIGNIFICANT CHANGE UP
METHOD TYPE: SIGNIFICANT CHANGE UP
ORGANISM # SPEC MICROSCOPIC CNT: SIGNIFICANT CHANGE UP
SPECIMEN SOURCE: SIGNIFICANT CHANGE UP
SPECIMEN SOURCE: SIGNIFICANT CHANGE UP

## 2023-10-24 RX ORDER — CIPROFLOXACIN LACTATE 400MG/40ML
1 VIAL (ML) INTRAVENOUS
Qty: 10 | Refills: 0
Start: 2023-10-24 | End: 2023-10-28

## 2023-10-25 ENCOUNTER — APPOINTMENT (OUTPATIENT)
Dept: UROLOGY | Facility: CLINIC | Age: 73
End: 2023-10-25
Payer: MEDICARE

## 2023-10-25 DIAGNOSIS — R21 RASH AND OTHER NONSPECIFIC SKIN ERUPTION: ICD-10-CM

## 2023-10-25 PROCEDURE — 99214 OFFICE O/P EST MOD 30 MIN: CPT

## 2023-10-26 ENCOUNTER — APPOINTMENT (OUTPATIENT)
Dept: UROLOGY | Facility: CLINIC | Age: 73
End: 2023-10-26
Payer: MEDICARE

## 2023-10-26 PROCEDURE — 51703 INSERT BLADDER CATH COMPLEX: CPT

## 2023-10-30 ENCOUNTER — APPOINTMENT (OUTPATIENT)
Dept: NEPHROLOGY | Facility: CLINIC | Age: 73
End: 2023-10-30
Payer: MEDICARE

## 2023-10-30 VITALS
DIASTOLIC BLOOD PRESSURE: 53 MMHG | HEIGHT: 64 IN | OXYGEN SATURATION: 97 % | WEIGHT: 148.81 LBS | BODY MASS INDEX: 25.41 KG/M2 | HEART RATE: 80 BPM | SYSTOLIC BLOOD PRESSURE: 84 MMHG | TEMPERATURE: 98 F

## 2023-10-30 PROCEDURE — 99214 OFFICE O/P EST MOD 30 MIN: CPT

## 2023-11-03 ENCOUNTER — INPATIENT (INPATIENT)
Facility: HOSPITAL | Age: 73
LOS: 3 days | Discharge: HOME | End: 2023-11-07
Attending: INTERNAL MEDICINE | Admitting: INTERNAL MEDICINE
Payer: MEDICARE

## 2023-11-03 VITALS
SYSTOLIC BLOOD PRESSURE: 117 MMHG | HEIGHT: 64 IN | RESPIRATION RATE: 18 BRPM | TEMPERATURE: 98 F | DIASTOLIC BLOOD PRESSURE: 79 MMHG | HEART RATE: 76 BPM

## 2023-11-03 DIAGNOSIS — Z98.1 ARTHRODESIS STATUS: Chronic | ICD-10-CM

## 2023-11-03 DIAGNOSIS — R07.9 CHEST PAIN, UNSPECIFIED: ICD-10-CM

## 2023-11-03 LAB
ALBUMIN SERPL ELPH-MCNC: 3.5 G/DL — SIGNIFICANT CHANGE UP (ref 3.3–5)
ALBUMIN SERPL ELPH-MCNC: 3.5 G/DL — SIGNIFICANT CHANGE UP (ref 3.3–5)
ALP SERPL-CCNC: 159 U/L — HIGH (ref 40–120)
ALP SERPL-CCNC: 159 U/L — HIGH (ref 40–120)
ALT FLD-CCNC: 18 U/L — SIGNIFICANT CHANGE UP (ref 4–41)
ALT FLD-CCNC: 18 U/L — SIGNIFICANT CHANGE UP (ref 4–41)
ANION GAP SERPL CALC-SCNC: 13 MMOL/L — SIGNIFICANT CHANGE UP (ref 7–14)
ANION GAP SERPL CALC-SCNC: 13 MMOL/L — SIGNIFICANT CHANGE UP (ref 7–14)
AST SERPL-CCNC: 36 U/L — SIGNIFICANT CHANGE UP (ref 4–40)
AST SERPL-CCNC: 36 U/L — SIGNIFICANT CHANGE UP (ref 4–40)
BASOPHILS # BLD AUTO: 0.04 K/UL — SIGNIFICANT CHANGE UP (ref 0–0.2)
BASOPHILS # BLD AUTO: 0.04 K/UL — SIGNIFICANT CHANGE UP (ref 0–0.2)
BASOPHILS NFR BLD AUTO: 0.8 % — SIGNIFICANT CHANGE UP (ref 0–2)
BASOPHILS NFR BLD AUTO: 0.8 % — SIGNIFICANT CHANGE UP (ref 0–2)
BILIRUB SERPL-MCNC: 0.8 MG/DL — SIGNIFICANT CHANGE UP (ref 0.2–1.2)
BILIRUB SERPL-MCNC: 0.8 MG/DL — SIGNIFICANT CHANGE UP (ref 0.2–1.2)
BUN SERPL-MCNC: 19 MG/DL — SIGNIFICANT CHANGE UP (ref 7–23)
BUN SERPL-MCNC: 19 MG/DL — SIGNIFICANT CHANGE UP (ref 7–23)
CALCIUM SERPL-MCNC: 8.8 MG/DL — SIGNIFICANT CHANGE UP (ref 8.4–10.5)
CALCIUM SERPL-MCNC: 8.8 MG/DL — SIGNIFICANT CHANGE UP (ref 8.4–10.5)
CHLORIDE SERPL-SCNC: 105 MMOL/L — SIGNIFICANT CHANGE UP (ref 98–107)
CHLORIDE SERPL-SCNC: 105 MMOL/L — SIGNIFICANT CHANGE UP (ref 98–107)
CO2 SERPL-SCNC: 21 MMOL/L — LOW (ref 22–31)
CO2 SERPL-SCNC: 21 MMOL/L — LOW (ref 22–31)
CREAT SERPL-MCNC: 1.81 MG/DL — HIGH (ref 0.5–1.3)
CREAT SERPL-MCNC: 1.81 MG/DL — HIGH (ref 0.5–1.3)
EGFR: 39 ML/MIN/1.73M2 — LOW
EGFR: 39 ML/MIN/1.73M2 — LOW
EOSINOPHIL # BLD AUTO: 0.18 K/UL — SIGNIFICANT CHANGE UP (ref 0–0.5)
EOSINOPHIL # BLD AUTO: 0.18 K/UL — SIGNIFICANT CHANGE UP (ref 0–0.5)
EOSINOPHIL NFR BLD AUTO: 3.7 % — SIGNIFICANT CHANGE UP (ref 0–6)
EOSINOPHIL NFR BLD AUTO: 3.7 % — SIGNIFICANT CHANGE UP (ref 0–6)
GLUCOSE BLDC GLUCOMTR-MCNC: 103 MG/DL — HIGH (ref 70–99)
GLUCOSE BLDC GLUCOMTR-MCNC: 103 MG/DL — HIGH (ref 70–99)
GLUCOSE SERPL-MCNC: 48 MG/DL — CRITICAL LOW (ref 70–99)
GLUCOSE SERPL-MCNC: 48 MG/DL — CRITICAL LOW (ref 70–99)
HCT VFR BLD CALC: 25.7 % — LOW (ref 39–50)
HCT VFR BLD CALC: 25.7 % — LOW (ref 39–50)
HGB BLD-MCNC: 8.6 G/DL — LOW (ref 13–17)
HGB BLD-MCNC: 8.6 G/DL — LOW (ref 13–17)
IANC: 1.84 K/UL — SIGNIFICANT CHANGE UP (ref 1.8–7.4)
IANC: 1.84 K/UL — SIGNIFICANT CHANGE UP (ref 1.8–7.4)
IMM GRANULOCYTES NFR BLD AUTO: 0.4 % — SIGNIFICANT CHANGE UP (ref 0–0.9)
IMM GRANULOCYTES NFR BLD AUTO: 0.4 % — SIGNIFICANT CHANGE UP (ref 0–0.9)
LIDOCAIN IGE QN: 38 U/L — SIGNIFICANT CHANGE UP (ref 7–60)
LIDOCAIN IGE QN: 38 U/L — SIGNIFICANT CHANGE UP (ref 7–60)
LYMPHOCYTES # BLD AUTO: 2.16 K/UL — SIGNIFICANT CHANGE UP (ref 1–3.3)
LYMPHOCYTES # BLD AUTO: 2.16 K/UL — SIGNIFICANT CHANGE UP (ref 1–3.3)
LYMPHOCYTES # BLD AUTO: 44.7 % — HIGH (ref 13–44)
LYMPHOCYTES # BLD AUTO: 44.7 % — HIGH (ref 13–44)
MAGNESIUM SERPL-MCNC: 1.9 MG/DL — SIGNIFICANT CHANGE UP (ref 1.6–2.6)
MAGNESIUM SERPL-MCNC: 1.9 MG/DL — SIGNIFICANT CHANGE UP (ref 1.6–2.6)
MCHC RBC-ENTMCNC: 26.5 PG — LOW (ref 27–34)
MCHC RBC-ENTMCNC: 26.5 PG — LOW (ref 27–34)
MCHC RBC-ENTMCNC: 33.5 GM/DL — SIGNIFICANT CHANGE UP (ref 32–36)
MCHC RBC-ENTMCNC: 33.5 GM/DL — SIGNIFICANT CHANGE UP (ref 32–36)
MCV RBC AUTO: 79.3 FL — LOW (ref 80–100)
MCV RBC AUTO: 79.3 FL — LOW (ref 80–100)
MONOCYTES # BLD AUTO: 0.59 K/UL — SIGNIFICANT CHANGE UP (ref 0–0.9)
MONOCYTES # BLD AUTO: 0.59 K/UL — SIGNIFICANT CHANGE UP (ref 0–0.9)
MONOCYTES NFR BLD AUTO: 12.2 % — SIGNIFICANT CHANGE UP (ref 2–14)
MONOCYTES NFR BLD AUTO: 12.2 % — SIGNIFICANT CHANGE UP (ref 2–14)
NEUTROPHILS # BLD AUTO: 1.84 K/UL — SIGNIFICANT CHANGE UP (ref 1.8–7.4)
NEUTROPHILS # BLD AUTO: 1.84 K/UL — SIGNIFICANT CHANGE UP (ref 1.8–7.4)
NEUTROPHILS NFR BLD AUTO: 38.2 % — LOW (ref 43–77)
NEUTROPHILS NFR BLD AUTO: 38.2 % — LOW (ref 43–77)
NRBC # BLD: 0 /100 WBCS — SIGNIFICANT CHANGE UP (ref 0–0)
NRBC # BLD: 0 /100 WBCS — SIGNIFICANT CHANGE UP (ref 0–0)
NRBC # FLD: 0 K/UL — SIGNIFICANT CHANGE UP (ref 0–0)
NRBC # FLD: 0 K/UL — SIGNIFICANT CHANGE UP (ref 0–0)
NT-PROBNP SERPL-SCNC: 3316 PG/ML — HIGH
NT-PROBNP SERPL-SCNC: 3316 PG/ML — HIGH
PLATELET # BLD AUTO: 253 K/UL — SIGNIFICANT CHANGE UP (ref 150–400)
PLATELET # BLD AUTO: 253 K/UL — SIGNIFICANT CHANGE UP (ref 150–400)
POTASSIUM SERPL-MCNC: 4 MMOL/L — SIGNIFICANT CHANGE UP (ref 3.5–5.3)
POTASSIUM SERPL-MCNC: 4 MMOL/L — SIGNIFICANT CHANGE UP (ref 3.5–5.3)
POTASSIUM SERPL-SCNC: 4 MMOL/L — SIGNIFICANT CHANGE UP (ref 3.5–5.3)
POTASSIUM SERPL-SCNC: 4 MMOL/L — SIGNIFICANT CHANGE UP (ref 3.5–5.3)
PROT SERPL-MCNC: 6.7 G/DL — SIGNIFICANT CHANGE UP (ref 6–8.3)
PROT SERPL-MCNC: 6.7 G/DL — SIGNIFICANT CHANGE UP (ref 6–8.3)
RBC # BLD: 3.24 M/UL — LOW (ref 4.2–5.8)
RBC # BLD: 3.24 M/UL — LOW (ref 4.2–5.8)
RBC # FLD: 16.1 % — HIGH (ref 10.3–14.5)
RBC # FLD: 16.1 % — HIGH (ref 10.3–14.5)
SODIUM SERPL-SCNC: 139 MMOL/L — SIGNIFICANT CHANGE UP (ref 135–145)
SODIUM SERPL-SCNC: 139 MMOL/L — SIGNIFICANT CHANGE UP (ref 135–145)
TROPONIN T, HIGH SENSITIVITY RESULT: 148 NG/L — CRITICAL HIGH
TROPONIN T, HIGH SENSITIVITY RESULT: 148 NG/L — CRITICAL HIGH
TROPONIN T, HIGH SENSITIVITY RESULT: 157 NG/L — CRITICAL HIGH
TROPONIN T, HIGH SENSITIVITY RESULT: 157 NG/L — CRITICAL HIGH
WBC # BLD: 4.83 K/UL — SIGNIFICANT CHANGE UP (ref 3.8–10.5)
WBC # BLD: 4.83 K/UL — SIGNIFICANT CHANGE UP (ref 3.8–10.5)
WBC # FLD AUTO: 4.83 K/UL — SIGNIFICANT CHANGE UP (ref 3.8–10.5)
WBC # FLD AUTO: 4.83 K/UL — SIGNIFICANT CHANGE UP (ref 3.8–10.5)

## 2023-11-03 PROCEDURE — 71045 X-RAY EXAM CHEST 1 VIEW: CPT | Mod: 26

## 2023-11-03 PROCEDURE — 99285 EMERGENCY DEPT VISIT HI MDM: CPT

## 2023-11-03 RX ORDER — SODIUM CHLORIDE 9 MG/ML
1000 INJECTION, SOLUTION INTRAVENOUS
Refills: 0 | Status: DISCONTINUED | OUTPATIENT
Start: 2023-11-03 | End: 2023-11-06

## 2023-11-03 RX ORDER — ASPIRIN/CALCIUM CARB/MAGNESIUM 324 MG
162 TABLET ORAL ONCE
Refills: 0 | Status: COMPLETED | OUTPATIENT
Start: 2023-11-03 | End: 2023-11-03

## 2023-11-03 RX ORDER — FAMOTIDINE 10 MG/ML
20 INJECTION INTRAVENOUS ONCE
Refills: 0 | Status: COMPLETED | OUTPATIENT
Start: 2023-11-03 | End: 2023-11-04

## 2023-11-03 RX ORDER — DEXTROSE 50 % IN WATER 50 %
50 SYRINGE (ML) INTRAVENOUS ONCE
Refills: 0 | Status: COMPLETED | OUTPATIENT
Start: 2023-11-03 | End: 2023-11-03

## 2023-11-03 RX ADMIN — Medication 162 MILLIGRAM(S): at 19:45

## 2023-11-03 RX ADMIN — Medication 50 MILLILITER(S): at 21:45

## 2023-11-03 NOTE — ED PROVIDER NOTE - CLINICAL SUMMARY MEDICAL DECISION MAKING FREE TEXT BOX
73 y/o M with PMHx amyloidosis, BPH presents to the ED for chest pain that lasted a few seconds. Lower left sided sharp in nature. Pt in NAD at this time. Pt resting comfortably. Differentials include but not limited to ACS, muscle spasm. Low concern for PE as pt only had pain for a few seconds without SOB, tachycardia. Plan for labs, EKG, CXR. Dispo pending labs and imaging but likely home.

## 2023-11-03 NOTE — ED ADULT NURSE NOTE - OBJECTIVE STATEMENT
pt presents to ED c/o CP today lasting x2secs. A/Ox4, ambulatory, denies other symptoms, does not appear in distress

## 2023-11-03 NOTE — ED ADULT TRIAGE NOTE - SOURCE OF INFORMATION
Left pt a vmm informing him that he will be receiving a new CCM for the program.  He can expect that this person will outreach to him in the near future. Time Spent This Encounter Total: 2 min medical record review, telephone,  communication.        Mon
Patient

## 2023-11-03 NOTE — ED PROVIDER NOTE - ATTENDING CONTRIBUTION TO CARE
73 yo M hx cardiac amyloidosis, BPH, on tamsulosin and finasteride, walks with rolleraid at baseline, sent in for evaluation s/p episode of chest pain today while awaiting for MRI of prostate. Pt states he did not eat since last night. He was able to walk from the car to the waiting room with no chest pain. He was sitting for a "while" when pain started, described as lasting seconds, sharp/shock like pain. No associated n/v or diaphoresis. No fevers/chills. No sob, monteiro/orthopnea. On exam, well appearing, NAD, heart rrr, lungs ctab, abd soft nt/nd, mild b/l LE edema to ankles. Plan for cardiac monitor, labs, ekg, cxr. troponin elevated, tba

## 2023-11-03 NOTE — ED ADULT TRIAGE NOTE - CHIEF COMPLAINT QUOTE
As per EMT. "Pt arrives from MD office  awaiting MRI for enlarged prostate. while there pt c/o chest pain lasted few seconds and is gone. EKG done is abnormal pt has recent history of cardiac work up with elevated troponin" asa 325 given. Pt st" Pain is gone now."

## 2023-11-03 NOTE — ED PROVIDER NOTE - OBJECTIVE STATEMENT
71 y/o M with PMHx amyloidosis, BPH presents to the ED for chest pain. Pt states he was supposed to get MRI to evaluate prostate today but had episode of sharp left sided chest pain and was sent to ED for evaluation and MRI was not completed. Pt states pain only lasted a few seconds and he has no other complaints at this time. Pt states he has had similar episodes in past but never thought anything of it. Denies fevers, chills, SOB, palpitations, abd pain, n/v/d/c, dysuria, hematuria.

## 2023-11-03 NOTE — ED PROVIDER NOTE - PHYSICAL EXAMINATION
Constitutional: VS reviewed. Alert and orientedx3, well appearing, no apparent distress  HEENT: Atraumatic, EOMI, PERRL  CV: RRR  Lungs: Clear and equal bilaterally, no wheezes, rales or crackles  Abdomen: Soft, nondistended, nontender  MSK: No deformities  Skin: Warm and dry. As visualized no rashes, lesions, bruising or erythema  Neuro: Strength and sensation intact.   Lymph: No pitting edema in extremities.

## 2023-11-03 NOTE — ED ADULT TRIAGE NOTE - BP NONINVASIVE DIASTOLIC (MM HG)
Pt stated that he will be out of medication prior to the weekend, he was given on 2/6/18 #20. NORCO.      Medication(s) Requested: NORCO  Last office visit: 2/6/18  Last refill: 2/6/18 #20      Please advise.    79

## 2023-11-03 NOTE — ED ADULT NURSE NOTE - NSFALLUNIVINTERV_ED_ALL_ED
Bed/Stretcher in lowest position, wheels locked, appropriate side rails in place/Call bell, personal items and telephone in reach/Instruct patient to call for assistance before getting out of bed/chair/stretcher/Non-slip footwear applied when patient is off stretcher/[place] to call system/Physically safe environment - no spills, clutter or unnecessary equipment/Purposeful proactive rounding/Room/bathroom lighting operational, light cord in reach/Unable to comprehend

## 2023-11-03 NOTE — ED PROVIDER NOTE - PROGRESS NOTE DETAILS
Alicia Lowe PGY2: Pt found to be hypoglycemia in 50s. Pt states he did not eat all day due to the MRI. Pt given multiple juices with minimal improvement. Pt given 1 amp of D50 with increase into 110s. Pt also found to have elevated troponin. Pt still denying CP at this time. TBA to tele with rpt trop.

## 2023-11-04 DIAGNOSIS — D64.9 ANEMIA, UNSPECIFIED: ICD-10-CM

## 2023-11-04 DIAGNOSIS — N18.30 CHRONIC KIDNEY DISEASE, STAGE 3 UNSPECIFIED: ICD-10-CM

## 2023-11-04 LAB
A1C WITH ESTIMATED AVERAGE GLUCOSE RESULT: 5.3 % — SIGNIFICANT CHANGE UP (ref 4–5.6)
A1C WITH ESTIMATED AVERAGE GLUCOSE RESULT: 5.3 % — SIGNIFICANT CHANGE UP (ref 4–5.6)
ALBUMIN SERPL ELPH-MCNC: 3.1 G/DL — LOW (ref 3.3–5)
ALBUMIN SERPL ELPH-MCNC: 3.1 G/DL — LOW (ref 3.3–5)
ALP SERPL-CCNC: 156 U/L — HIGH (ref 40–120)
ALP SERPL-CCNC: 156 U/L — HIGH (ref 40–120)
ALT FLD-CCNC: 22 U/L — SIGNIFICANT CHANGE UP (ref 4–41)
ALT FLD-CCNC: 22 U/L — SIGNIFICANT CHANGE UP (ref 4–41)
ANION GAP SERPL CALC-SCNC: 10 MMOL/L — SIGNIFICANT CHANGE UP (ref 7–14)
ANION GAP SERPL CALC-SCNC: 10 MMOL/L — SIGNIFICANT CHANGE UP (ref 7–14)
APPEARANCE UR: ABNORMAL
APPEARANCE UR: ABNORMAL
APTT BLD: 35.2 SEC — SIGNIFICANT CHANGE UP (ref 24.5–35.6)
APTT BLD: 35.2 SEC — SIGNIFICANT CHANGE UP (ref 24.5–35.6)
APTT BLD: 38.6 SEC — HIGH (ref 24.5–35.6)
APTT BLD: 38.6 SEC — HIGH (ref 24.5–35.6)
AST SERPL-CCNC: 46 U/L — HIGH (ref 4–40)
AST SERPL-CCNC: 46 U/L — HIGH (ref 4–40)
B PERT DNA SPEC QL NAA+PROBE: SIGNIFICANT CHANGE UP
B PERT DNA SPEC QL NAA+PROBE: SIGNIFICANT CHANGE UP
B PERT+PARAPERT DNA PNL SPEC NAA+PROBE: SIGNIFICANT CHANGE UP
B PERT+PARAPERT DNA PNL SPEC NAA+PROBE: SIGNIFICANT CHANGE UP
BACTERIA # UR AUTO: ABNORMAL /HPF
BACTERIA # UR AUTO: ABNORMAL /HPF
BASOPHILS # BLD AUTO: 0.03 K/UL — SIGNIFICANT CHANGE UP (ref 0–0.2)
BASOPHILS # BLD AUTO: 0.03 K/UL — SIGNIFICANT CHANGE UP (ref 0–0.2)
BASOPHILS # BLD AUTO: 0.04 K/UL — SIGNIFICANT CHANGE UP (ref 0–0.2)
BASOPHILS # BLD AUTO: 0.04 K/UL — SIGNIFICANT CHANGE UP (ref 0–0.2)
BASOPHILS NFR BLD AUTO: 0.7 % — SIGNIFICANT CHANGE UP (ref 0–2)
BASOPHILS NFR BLD AUTO: 0.7 % — SIGNIFICANT CHANGE UP (ref 0–2)
BASOPHILS NFR BLD AUTO: 0.9 % — SIGNIFICANT CHANGE UP (ref 0–2)
BASOPHILS NFR BLD AUTO: 0.9 % — SIGNIFICANT CHANGE UP (ref 0–2)
BILIRUB SERPL-MCNC: 0.6 MG/DL — SIGNIFICANT CHANGE UP (ref 0.2–1.2)
BILIRUB SERPL-MCNC: 0.6 MG/DL — SIGNIFICANT CHANGE UP (ref 0.2–1.2)
BILIRUB UR-MCNC: NEGATIVE — SIGNIFICANT CHANGE UP
BILIRUB UR-MCNC: NEGATIVE — SIGNIFICANT CHANGE UP
BORDETELLA PARAPERTUSSIS (RAPRVP): SIGNIFICANT CHANGE UP
BORDETELLA PARAPERTUSSIS (RAPRVP): SIGNIFICANT CHANGE UP
BUN SERPL-MCNC: 20 MG/DL — SIGNIFICANT CHANGE UP (ref 7–23)
BUN SERPL-MCNC: 20 MG/DL — SIGNIFICANT CHANGE UP (ref 7–23)
C PNEUM DNA SPEC QL NAA+PROBE: SIGNIFICANT CHANGE UP
C PNEUM DNA SPEC QL NAA+PROBE: SIGNIFICANT CHANGE UP
CALCIUM SERPL-MCNC: 8.4 MG/DL — SIGNIFICANT CHANGE UP (ref 8.4–10.5)
CALCIUM SERPL-MCNC: 8.4 MG/DL — SIGNIFICANT CHANGE UP (ref 8.4–10.5)
CAST: 5 /LPF — HIGH (ref 0–4)
CAST: 5 /LPF — HIGH (ref 0–4)
CHLORIDE SERPL-SCNC: 105 MMOL/L — SIGNIFICANT CHANGE UP (ref 98–107)
CHLORIDE SERPL-SCNC: 105 MMOL/L — SIGNIFICANT CHANGE UP (ref 98–107)
CHOLEST SERPL-MCNC: 86 MG/DL — SIGNIFICANT CHANGE UP
CHOLEST SERPL-MCNC: 86 MG/DL — SIGNIFICANT CHANGE UP
CK MB BLD-MCNC: 2.2 % — SIGNIFICANT CHANGE UP (ref 0–2.5)
CK MB BLD-MCNC: 2.2 % — SIGNIFICANT CHANGE UP (ref 0–2.5)
CK MB CFR SERPL CALC: 5.1 NG/ML — SIGNIFICANT CHANGE UP
CK MB CFR SERPL CALC: 5.1 NG/ML — SIGNIFICANT CHANGE UP
CK SERPL-CCNC: 227 U/L — HIGH (ref 30–200)
CK SERPL-CCNC: 227 U/L — HIGH (ref 30–200)
CO2 SERPL-SCNC: 20 MMOL/L — LOW (ref 22–31)
CO2 SERPL-SCNC: 20 MMOL/L — LOW (ref 22–31)
COLOR SPEC: YELLOW — SIGNIFICANT CHANGE UP
COLOR SPEC: YELLOW — SIGNIFICANT CHANGE UP
CREAT SERPL-MCNC: 1.81 MG/DL — HIGH (ref 0.5–1.3)
CREAT SERPL-MCNC: 1.81 MG/DL — HIGH (ref 0.5–1.3)
DIFF PNL FLD: ABNORMAL
DIFF PNL FLD: ABNORMAL
EGFR: 39 ML/MIN/1.73M2 — LOW
EGFR: 39 ML/MIN/1.73M2 — LOW
EOSINOPHIL # BLD AUTO: 0.21 K/UL — SIGNIFICANT CHANGE UP (ref 0–0.5)
EOSINOPHIL # BLD AUTO: 0.21 K/UL — SIGNIFICANT CHANGE UP (ref 0–0.5)
EOSINOPHIL # BLD AUTO: 0.24 K/UL — SIGNIFICANT CHANGE UP (ref 0–0.5)
EOSINOPHIL # BLD AUTO: 0.24 K/UL — SIGNIFICANT CHANGE UP (ref 0–0.5)
EOSINOPHIL NFR BLD AUTO: 4.6 % — SIGNIFICANT CHANGE UP (ref 0–6)
EOSINOPHIL NFR BLD AUTO: 4.6 % — SIGNIFICANT CHANGE UP (ref 0–6)
EOSINOPHIL NFR BLD AUTO: 5.2 % — SIGNIFICANT CHANGE UP (ref 0–6)
EOSINOPHIL NFR BLD AUTO: 5.2 % — SIGNIFICANT CHANGE UP (ref 0–6)
ESTIMATED AVERAGE GLUCOSE: 105 — SIGNIFICANT CHANGE UP
ESTIMATED AVERAGE GLUCOSE: 105 — SIGNIFICANT CHANGE UP
FLUAV SUBTYP SPEC NAA+PROBE: SIGNIFICANT CHANGE UP
FLUAV SUBTYP SPEC NAA+PROBE: SIGNIFICANT CHANGE UP
FLUBV RNA SPEC QL NAA+PROBE: SIGNIFICANT CHANGE UP
FLUBV RNA SPEC QL NAA+PROBE: SIGNIFICANT CHANGE UP
GLUCOSE BLDC GLUCOMTR-MCNC: 102 MG/DL — HIGH (ref 70–99)
GLUCOSE BLDC GLUCOMTR-MCNC: 102 MG/DL — HIGH (ref 70–99)
GLUCOSE BLDC GLUCOMTR-MCNC: 124 MG/DL — HIGH (ref 70–99)
GLUCOSE BLDC GLUCOMTR-MCNC: 124 MG/DL — HIGH (ref 70–99)
GLUCOSE BLDC GLUCOMTR-MCNC: 127 MG/DL — HIGH (ref 70–99)
GLUCOSE BLDC GLUCOMTR-MCNC: 127 MG/DL — HIGH (ref 70–99)
GLUCOSE BLDC GLUCOMTR-MCNC: 128 MG/DL — HIGH (ref 70–99)
GLUCOSE BLDC GLUCOMTR-MCNC: 128 MG/DL — HIGH (ref 70–99)
GLUCOSE BLDC GLUCOMTR-MCNC: 76 MG/DL — SIGNIFICANT CHANGE UP (ref 70–99)
GLUCOSE BLDC GLUCOMTR-MCNC: 76 MG/DL — SIGNIFICANT CHANGE UP (ref 70–99)
GLUCOSE BLDC GLUCOMTR-MCNC: 80 MG/DL — SIGNIFICANT CHANGE UP (ref 70–99)
GLUCOSE BLDC GLUCOMTR-MCNC: 80 MG/DL — SIGNIFICANT CHANGE UP (ref 70–99)
GLUCOSE BLDC GLUCOMTR-MCNC: 86 MG/DL — SIGNIFICANT CHANGE UP (ref 70–99)
GLUCOSE BLDC GLUCOMTR-MCNC: 86 MG/DL — SIGNIFICANT CHANGE UP (ref 70–99)
GLUCOSE BLDC GLUCOMTR-MCNC: 89 MG/DL — SIGNIFICANT CHANGE UP (ref 70–99)
GLUCOSE BLDC GLUCOMTR-MCNC: 89 MG/DL — SIGNIFICANT CHANGE UP (ref 70–99)
GLUCOSE BLDC GLUCOMTR-MCNC: 96 MG/DL — SIGNIFICANT CHANGE UP (ref 70–99)
GLUCOSE BLDC GLUCOMTR-MCNC: 96 MG/DL — SIGNIFICANT CHANGE UP (ref 70–99)
GLUCOSE BLDC GLUCOMTR-MCNC: 97 MG/DL — SIGNIFICANT CHANGE UP (ref 70–99)
GLUCOSE BLDC GLUCOMTR-MCNC: 97 MG/DL — SIGNIFICANT CHANGE UP (ref 70–99)
GLUCOSE BLDC GLUCOMTR-MCNC: 99 MG/DL — SIGNIFICANT CHANGE UP (ref 70–99)
GLUCOSE SERPL-MCNC: 116 MG/DL — HIGH (ref 70–99)
GLUCOSE SERPL-MCNC: 116 MG/DL — HIGH (ref 70–99)
GLUCOSE UR QL: NEGATIVE MG/DL — SIGNIFICANT CHANGE UP
GLUCOSE UR QL: NEGATIVE MG/DL — SIGNIFICANT CHANGE UP
HADV DNA SPEC QL NAA+PROBE: SIGNIFICANT CHANGE UP
HADV DNA SPEC QL NAA+PROBE: SIGNIFICANT CHANGE UP
HCOV 229E RNA SPEC QL NAA+PROBE: SIGNIFICANT CHANGE UP
HCOV 229E RNA SPEC QL NAA+PROBE: SIGNIFICANT CHANGE UP
HCOV HKU1 RNA SPEC QL NAA+PROBE: SIGNIFICANT CHANGE UP
HCOV HKU1 RNA SPEC QL NAA+PROBE: SIGNIFICANT CHANGE UP
HCOV NL63 RNA SPEC QL NAA+PROBE: SIGNIFICANT CHANGE UP
HCOV NL63 RNA SPEC QL NAA+PROBE: SIGNIFICANT CHANGE UP
HCOV OC43 RNA SPEC QL NAA+PROBE: SIGNIFICANT CHANGE UP
HCOV OC43 RNA SPEC QL NAA+PROBE: SIGNIFICANT CHANGE UP
HCT VFR BLD CALC: 23.6 % — LOW (ref 39–50)
HCT VFR BLD CALC: 23.6 % — LOW (ref 39–50)
HCT VFR BLD CALC: 24.3 % — LOW (ref 39–50)
HCT VFR BLD CALC: 24.3 % — LOW (ref 39–50)
HDLC SERPL-MCNC: 34 MG/DL — LOW
HDLC SERPL-MCNC: 34 MG/DL — LOW
HGB BLD-MCNC: 8 G/DL — LOW (ref 13–17)
HGB BLD-MCNC: 8 G/DL — LOW (ref 13–17)
HGB BLD-MCNC: 8.1 G/DL — LOW (ref 13–17)
HGB BLD-MCNC: 8.1 G/DL — LOW (ref 13–17)
HMPV RNA SPEC QL NAA+PROBE: SIGNIFICANT CHANGE UP
HMPV RNA SPEC QL NAA+PROBE: SIGNIFICANT CHANGE UP
HPIV1 RNA SPEC QL NAA+PROBE: SIGNIFICANT CHANGE UP
HPIV1 RNA SPEC QL NAA+PROBE: SIGNIFICANT CHANGE UP
HPIV2 RNA SPEC QL NAA+PROBE: SIGNIFICANT CHANGE UP
HPIV2 RNA SPEC QL NAA+PROBE: SIGNIFICANT CHANGE UP
HPIV3 RNA SPEC QL NAA+PROBE: SIGNIFICANT CHANGE UP
HPIV3 RNA SPEC QL NAA+PROBE: SIGNIFICANT CHANGE UP
HPIV4 RNA SPEC QL NAA+PROBE: SIGNIFICANT CHANGE UP
HPIV4 RNA SPEC QL NAA+PROBE: SIGNIFICANT CHANGE UP
IANC: 1.69 K/UL — LOW (ref 1.8–7.4)
IANC: 1.69 K/UL — LOW (ref 1.8–7.4)
IANC: 1.77 K/UL — LOW (ref 1.8–7.4)
IANC: 1.77 K/UL — LOW (ref 1.8–7.4)
IMM GRANULOCYTES NFR BLD AUTO: 0.4 % — SIGNIFICANT CHANGE UP (ref 0–0.9)
INR BLD: 1.46 RATIO — HIGH (ref 0.85–1.18)
INR BLD: 1.46 RATIO — HIGH (ref 0.85–1.18)
KETONES UR-MCNC: NEGATIVE MG/DL — SIGNIFICANT CHANGE UP
KETONES UR-MCNC: NEGATIVE MG/DL — SIGNIFICANT CHANGE UP
LACTATE SERPL-SCNC: 1 MMOL/L — SIGNIFICANT CHANGE UP (ref 0.5–2)
LACTATE SERPL-SCNC: 1 MMOL/L — SIGNIFICANT CHANGE UP (ref 0.5–2)
LEUKOCYTE ESTERASE UR-ACNC: ABNORMAL
LEUKOCYTE ESTERASE UR-ACNC: ABNORMAL
LIPID PNL WITH DIRECT LDL SERPL: 45 MG/DL — SIGNIFICANT CHANGE UP
LIPID PNL WITH DIRECT LDL SERPL: 45 MG/DL — SIGNIFICANT CHANGE UP
LYMPHOCYTES # BLD AUTO: 1.82 K/UL — SIGNIFICANT CHANGE UP (ref 1–3.3)
LYMPHOCYTES # BLD AUTO: 1.82 K/UL — SIGNIFICANT CHANGE UP (ref 1–3.3)
LYMPHOCYTES # BLD AUTO: 1.9 K/UL — SIGNIFICANT CHANGE UP (ref 1–3.3)
LYMPHOCYTES # BLD AUTO: 1.9 K/UL — SIGNIFICANT CHANGE UP (ref 1–3.3)
LYMPHOCYTES # BLD AUTO: 39.7 % — SIGNIFICANT CHANGE UP (ref 13–44)
LYMPHOCYTES # BLD AUTO: 39.7 % — SIGNIFICANT CHANGE UP (ref 13–44)
LYMPHOCYTES # BLD AUTO: 42 % — SIGNIFICANT CHANGE UP (ref 13–44)
LYMPHOCYTES # BLD AUTO: 42 % — SIGNIFICANT CHANGE UP (ref 13–44)
M PNEUMO DNA SPEC QL NAA+PROBE: SIGNIFICANT CHANGE UP
M PNEUMO DNA SPEC QL NAA+PROBE: SIGNIFICANT CHANGE UP
MAGNESIUM SERPL-MCNC: 1.9 MG/DL — SIGNIFICANT CHANGE UP (ref 1.6–2.6)
MAGNESIUM SERPL-MCNC: 1.9 MG/DL — SIGNIFICANT CHANGE UP (ref 1.6–2.6)
MCHC RBC-ENTMCNC: 26.8 PG — LOW (ref 27–34)
MCHC RBC-ENTMCNC: 26.8 PG — LOW (ref 27–34)
MCHC RBC-ENTMCNC: 26.9 PG — LOW (ref 27–34)
MCHC RBC-ENTMCNC: 26.9 PG — LOW (ref 27–34)
MCHC RBC-ENTMCNC: 32.9 GM/DL — SIGNIFICANT CHANGE UP (ref 32–36)
MCHC RBC-ENTMCNC: 32.9 GM/DL — SIGNIFICANT CHANGE UP (ref 32–36)
MCHC RBC-ENTMCNC: 34.3 GM/DL — SIGNIFICANT CHANGE UP (ref 32–36)
MCHC RBC-ENTMCNC: 34.3 GM/DL — SIGNIFICANT CHANGE UP (ref 32–36)
MCV RBC AUTO: 78.4 FL — LOW (ref 80–100)
MCV RBC AUTO: 78.4 FL — LOW (ref 80–100)
MCV RBC AUTO: 81.3 FL — SIGNIFICANT CHANGE UP (ref 80–100)
MCV RBC AUTO: 81.3 FL — SIGNIFICANT CHANGE UP (ref 80–100)
MONOCYTES # BLD AUTO: 0.66 K/UL — SIGNIFICANT CHANGE UP (ref 0–0.9)
MONOCYTES # BLD AUTO: 0.66 K/UL — SIGNIFICANT CHANGE UP (ref 0–0.9)
MONOCYTES # BLD AUTO: 0.71 K/UL — SIGNIFICANT CHANGE UP (ref 0–0.9)
MONOCYTES # BLD AUTO: 0.71 K/UL — SIGNIFICANT CHANGE UP (ref 0–0.9)
MONOCYTES NFR BLD AUTO: 14.6 % — HIGH (ref 2–14)
MONOCYTES NFR BLD AUTO: 14.6 % — HIGH (ref 2–14)
MONOCYTES NFR BLD AUTO: 15.5 % — HIGH (ref 2–14)
MONOCYTES NFR BLD AUTO: 15.5 % — HIGH (ref 2–14)
NEUTROPHILS # BLD AUTO: 1.69 K/UL — LOW (ref 1.8–7.4)
NEUTROPHILS # BLD AUTO: 1.69 K/UL — LOW (ref 1.8–7.4)
NEUTROPHILS # BLD AUTO: 1.77 K/UL — LOW (ref 1.8–7.4)
NEUTROPHILS # BLD AUTO: 1.77 K/UL — LOW (ref 1.8–7.4)
NEUTROPHILS NFR BLD AUTO: 37.5 % — LOW (ref 43–77)
NEUTROPHILS NFR BLD AUTO: 37.5 % — LOW (ref 43–77)
NEUTROPHILS NFR BLD AUTO: 38.5 % — LOW (ref 43–77)
NEUTROPHILS NFR BLD AUTO: 38.5 % — LOW (ref 43–77)
NITRITE UR-MCNC: NEGATIVE — SIGNIFICANT CHANGE UP
NITRITE UR-MCNC: NEGATIVE — SIGNIFICANT CHANGE UP
NON HDL CHOLESTEROL: 52 MG/DL — SIGNIFICANT CHANGE UP
NON HDL CHOLESTEROL: 52 MG/DL — SIGNIFICANT CHANGE UP
NRBC # BLD: 0 /100 WBCS — SIGNIFICANT CHANGE UP (ref 0–0)
NRBC # FLD: 0 K/UL — SIGNIFICANT CHANGE UP (ref 0–0)
PH UR: 6 — SIGNIFICANT CHANGE UP (ref 5–8)
PH UR: 6 — SIGNIFICANT CHANGE UP (ref 5–8)
PHOSPHATE SERPL-MCNC: 2.9 MG/DL — SIGNIFICANT CHANGE UP (ref 2.5–4.5)
PHOSPHATE SERPL-MCNC: 2.9 MG/DL — SIGNIFICANT CHANGE UP (ref 2.5–4.5)
PLATELET # BLD AUTO: 219 K/UL — SIGNIFICANT CHANGE UP (ref 150–400)
PLATELET # BLD AUTO: 219 K/UL — SIGNIFICANT CHANGE UP (ref 150–400)
PLATELET # BLD AUTO: 256 K/UL — SIGNIFICANT CHANGE UP (ref 150–400)
PLATELET # BLD AUTO: 256 K/UL — SIGNIFICANT CHANGE UP (ref 150–400)
POTASSIUM SERPL-MCNC: 4.1 MMOL/L — SIGNIFICANT CHANGE UP (ref 3.5–5.3)
POTASSIUM SERPL-MCNC: 4.1 MMOL/L — SIGNIFICANT CHANGE UP (ref 3.5–5.3)
POTASSIUM SERPL-SCNC: 4.1 MMOL/L — SIGNIFICANT CHANGE UP (ref 3.5–5.3)
POTASSIUM SERPL-SCNC: 4.1 MMOL/L — SIGNIFICANT CHANGE UP (ref 3.5–5.3)
PROT SERPL-MCNC: 6 G/DL — SIGNIFICANT CHANGE UP (ref 6–8.3)
PROT SERPL-MCNC: 6 G/DL — SIGNIFICANT CHANGE UP (ref 6–8.3)
PROT UR-MCNC: 100 MG/DL
PROT UR-MCNC: 100 MG/DL
PROTHROM AB SERPL-ACNC: 16.3 SEC — HIGH (ref 9.5–13)
PROTHROM AB SERPL-ACNC: 16.3 SEC — HIGH (ref 9.5–13)
RAPID RVP RESULT: SIGNIFICANT CHANGE UP
RAPID RVP RESULT: SIGNIFICANT CHANGE UP
RBC # BLD: 2.99 M/UL — LOW (ref 4.2–5.8)
RBC # BLD: 2.99 M/UL — LOW (ref 4.2–5.8)
RBC # BLD: 3.01 M/UL — LOW (ref 4.2–5.8)
RBC # BLD: 3.01 M/UL — LOW (ref 4.2–5.8)
RBC # FLD: 15.9 % — HIGH (ref 10.3–14.5)
RBC # FLD: 15.9 % — HIGH (ref 10.3–14.5)
RBC # FLD: 16.2 % — HIGH (ref 10.3–14.5)
RBC # FLD: 16.2 % — HIGH (ref 10.3–14.5)
RBC CASTS # UR COMP ASSIST: 28 /HPF — HIGH (ref 0–4)
RBC CASTS # UR COMP ASSIST: 28 /HPF — HIGH (ref 0–4)
RSV RNA SPEC QL NAA+PROBE: SIGNIFICANT CHANGE UP
RSV RNA SPEC QL NAA+PROBE: SIGNIFICANT CHANGE UP
RV+EV RNA SPEC QL NAA+PROBE: SIGNIFICANT CHANGE UP
RV+EV RNA SPEC QL NAA+PROBE: SIGNIFICANT CHANGE UP
SARS-COV-2 RNA SPEC QL NAA+PROBE: SIGNIFICANT CHANGE UP
SARS-COV-2 RNA SPEC QL NAA+PROBE: SIGNIFICANT CHANGE UP
SODIUM SERPL-SCNC: 135 MMOL/L — SIGNIFICANT CHANGE UP (ref 135–145)
SODIUM SERPL-SCNC: 135 MMOL/L — SIGNIFICANT CHANGE UP (ref 135–145)
SP GR SPEC: 1.01 — SIGNIFICANT CHANGE UP (ref 1–1.03)
SP GR SPEC: 1.01 — SIGNIFICANT CHANGE UP (ref 1–1.03)
SQUAMOUS # UR AUTO: 1 /HPF — SIGNIFICANT CHANGE UP (ref 0–5)
SQUAMOUS # UR AUTO: 1 /HPF — SIGNIFICANT CHANGE UP (ref 0–5)
TRIGL SERPL-MCNC: 34 MG/DL — SIGNIFICANT CHANGE UP
TRIGL SERPL-MCNC: 34 MG/DL — SIGNIFICANT CHANGE UP
TROPONIN T, HIGH SENSITIVITY RESULT: 188 NG/L — CRITICAL HIGH
TROPONIN T, HIGH SENSITIVITY RESULT: 188 NG/L — CRITICAL HIGH
TSH SERPL-MCNC: 2.98 UIU/ML — SIGNIFICANT CHANGE UP (ref 0.27–4.2)
TSH SERPL-MCNC: 2.98 UIU/ML — SIGNIFICANT CHANGE UP (ref 0.27–4.2)
UROBILINOGEN FLD QL: 0.2 MG/DL — SIGNIFICANT CHANGE UP (ref 0.2–1)
UROBILINOGEN FLD QL: 0.2 MG/DL — SIGNIFICANT CHANGE UP (ref 0.2–1)
WBC # BLD: 4.52 K/UL — SIGNIFICANT CHANGE UP (ref 3.8–10.5)
WBC # BLD: 4.52 K/UL — SIGNIFICANT CHANGE UP (ref 3.8–10.5)
WBC # BLD: 4.59 K/UL — SIGNIFICANT CHANGE UP (ref 3.8–10.5)
WBC # BLD: 4.59 K/UL — SIGNIFICANT CHANGE UP (ref 3.8–10.5)
WBC # FLD AUTO: 4.52 K/UL — SIGNIFICANT CHANGE UP (ref 3.8–10.5)
WBC # FLD AUTO: 4.52 K/UL — SIGNIFICANT CHANGE UP (ref 3.8–10.5)
WBC # FLD AUTO: 4.59 K/UL — SIGNIFICANT CHANGE UP (ref 3.8–10.5)
WBC # FLD AUTO: 4.59 K/UL — SIGNIFICANT CHANGE UP (ref 3.8–10.5)
WBC UR QL: 298 /HPF — HIGH (ref 0–5)
WBC UR QL: 298 /HPF — HIGH (ref 0–5)

## 2023-11-04 RX ORDER — FINASTERIDE 5 MG/1
5 TABLET, FILM COATED ORAL DAILY
Refills: 0 | Status: DISCONTINUED | OUTPATIENT
Start: 2023-11-04 | End: 2023-11-07

## 2023-11-04 RX ORDER — DEXTROSE 50 % IN WATER 50 %
12.5 SYRINGE (ML) INTRAVENOUS ONCE
Refills: 0 | Status: DISCONTINUED | OUTPATIENT
Start: 2023-11-04 | End: 2023-11-07

## 2023-11-04 RX ORDER — LIDOCAINE 4 G/100G
1 CREAM TOPICAL ONCE
Refills: 0 | Status: COMPLETED | OUTPATIENT
Start: 2023-11-04 | End: 2023-11-04

## 2023-11-04 RX ORDER — LIDOCAINE HCL 20 MG/ML
20 VIAL (ML) INJECTION ONCE
Refills: 0 | Status: COMPLETED | OUTPATIENT
Start: 2023-11-04 | End: 2023-11-04

## 2023-11-04 RX ORDER — HEPARIN SODIUM 5000 [USP'U]/ML
5000 INJECTION INTRAVENOUS; SUBCUTANEOUS EVERY 12 HOURS
Refills: 0 | Status: DISCONTINUED | OUTPATIENT
Start: 2023-11-04 | End: 2023-11-07

## 2023-11-04 RX ORDER — OXYCODONE HYDROCHLORIDE 5 MG/1
5 TABLET ORAL ONCE
Refills: 0 | Status: DISCONTINUED | OUTPATIENT
Start: 2023-11-04 | End: 2023-11-04

## 2023-11-04 RX ORDER — TAMSULOSIN HYDROCHLORIDE 0.4 MG/1
0.4 CAPSULE ORAL AT BEDTIME
Refills: 0 | Status: DISCONTINUED | OUTPATIENT
Start: 2023-11-04 | End: 2023-11-07

## 2023-11-04 RX ORDER — COLCHICINE 0.6 MG
0.6 TABLET ORAL DAILY
Refills: 0 | Status: DISCONTINUED | OUTPATIENT
Start: 2023-11-04 | End: 2023-11-07

## 2023-11-04 RX ORDER — GLUCAGON INJECTION, SOLUTION 0.5 MG/.1ML
1 INJECTION, SOLUTION SUBCUTANEOUS ONCE
Refills: 0 | Status: DISCONTINUED | OUTPATIENT
Start: 2023-11-04 | End: 2023-11-07

## 2023-11-04 RX ORDER — SODIUM CHLORIDE 0.65 %
1 AEROSOL, SPRAY (ML) NASAL
Refills: 0 | Status: DISCONTINUED | OUTPATIENT
Start: 2023-11-04 | End: 2023-11-07

## 2023-11-04 RX ORDER — DEXTROSE 50 % IN WATER 50 %
15 SYRINGE (ML) INTRAVENOUS ONCE
Refills: 0 | Status: DISCONTINUED | OUTPATIENT
Start: 2023-11-04 | End: 2023-11-07

## 2023-11-04 RX ORDER — ACETAMINOPHEN 500 MG
650 TABLET ORAL EVERY 6 HOURS
Refills: 0 | Status: DISCONTINUED | OUTPATIENT
Start: 2023-11-04 | End: 2023-11-07

## 2023-11-04 RX ORDER — DEXTROSE 50 % IN WATER 50 %
25 SYRINGE (ML) INTRAVENOUS ONCE
Refills: 0 | Status: DISCONTINUED | OUTPATIENT
Start: 2023-11-04 | End: 2023-11-07

## 2023-11-04 RX ORDER — CEFTRIAXONE 500 MG/1
1000 INJECTION, POWDER, FOR SOLUTION INTRAMUSCULAR; INTRAVENOUS EVERY 24 HOURS
Refills: 0 | Status: COMPLETED | OUTPATIENT
Start: 2023-11-04 | End: 2023-11-06

## 2023-11-04 RX ORDER — OXYCODONE HYDROCHLORIDE 5 MG/1
2.5 TABLET ORAL EVERY 6 HOURS
Refills: 0 | Status: DISCONTINUED | OUTPATIENT
Start: 2023-11-04 | End: 2023-11-07

## 2023-11-04 RX ORDER — FLUTICASONE PROPIONATE 50 MCG
1 SPRAY, SUSPENSION NASAL
Refills: 0 | Status: DISCONTINUED | OUTPATIENT
Start: 2023-11-04 | End: 2023-11-07

## 2023-11-04 RX ADMIN — Medication 650 MILLIGRAM(S): at 06:30

## 2023-11-04 RX ADMIN — Medication 0.6 MILLIGRAM(S): at 13:32

## 2023-11-04 RX ADMIN — Medication 650 MILLIGRAM(S): at 03:35

## 2023-11-04 RX ADMIN — OXYCODONE HYDROCHLORIDE 5 MILLIGRAM(S): 5 TABLET ORAL at 10:45

## 2023-11-04 RX ADMIN — Medication 1 SPRAY(S): at 17:39

## 2023-11-04 RX ADMIN — FINASTERIDE 5 MILLIGRAM(S): 5 TABLET, FILM COATED ORAL at 13:31

## 2023-11-04 RX ADMIN — SODIUM CHLORIDE 75 MILLILITER(S): 9 INJECTION, SOLUTION INTRAVENOUS at 00:28

## 2023-11-04 RX ADMIN — CEFTRIAXONE 100 MILLIGRAM(S): 500 INJECTION, POWDER, FOR SOLUTION INTRAMUSCULAR; INTRAVENOUS at 10:45

## 2023-11-04 RX ADMIN — OXYCODONE HYDROCHLORIDE 2.5 MILLIGRAM(S): 5 TABLET ORAL at 19:40

## 2023-11-04 RX ADMIN — FAMOTIDINE 20 MILLIGRAM(S): 10 INJECTION INTRAVENOUS at 00:28

## 2023-11-04 RX ADMIN — OXYCODONE HYDROCHLORIDE 2.5 MILLIGRAM(S): 5 TABLET ORAL at 19:15

## 2023-11-04 RX ADMIN — HEPARIN SODIUM 5000 UNIT(S): 5000 INJECTION INTRAVENOUS; SUBCUTANEOUS at 17:39

## 2023-11-04 RX ADMIN — OXYCODONE HYDROCHLORIDE 5 MILLIGRAM(S): 5 TABLET ORAL at 11:38

## 2023-11-04 RX ADMIN — TAMSULOSIN HYDROCHLORIDE 0.4 MILLIGRAM(S): 0.4 CAPSULE ORAL at 23:09

## 2023-11-04 NOTE — H&P ADULT - ASSESSMENT
73 yo M hx cardiac amyloidosis, BPH, on tamsulosin and finasteride, walks with rolleraid at baseline, sent in for evaluation s/p episode of chest pain today while awaiting for MRI of prostate.   Pt states he did not eat since last night. He was able to walk from the car to the waiting room with no chest pain. He was sitting for a "while" when pain started, described as lasting seconds, sharp/shock like pain. No associated n/v or diaphoresis. No fevers/chills. No sob, monteiro/orthopnea. On exam, well appearing, NAD, heart rrr, lungs ctab, abd soft nt/nd, mild b/l LE edema to ankles.   Ptn has urinary retention, morales placed, c/o having urethral pain while morales is inserted  states baseline SBP at home is   Cardiology called. may need ischemic work up though CP seems atypical  CKD 4, renal called  Urinary retention,  called, keep morales in place, analgesics  cont oputptn meds  DVT ppx w sc Lovenox

## 2023-11-04 NOTE — ED ADULT NURSE REASSESSMENT NOTE - NS ED NURSE REASSESS COMMENT FT1
Pt laying in bed, NAD, NSR on the monitor, respirations even and unlabored. Labs drawn per MD orders. VS in flow sheet. Pt denies chest pain, shortness of breath, N/V/D, headache, dizziness, weakness, fever, chills,  symptoms.

## 2023-11-04 NOTE — CONSULT NOTE ADULT - ASSESSMENT
A/P    73 yo M hx cardiac amyloidosis, BPH, on tamsulosin and finasteride, walks with rolleraid at baseline, sent in for evaluation s/p episode of chest pain today while awaiting for MRI of prostate.     #chest pain  -atypical chest pain  -resolved sx  -no acute ecg abnl   -trop elevated in setting of ckd, likely demand ischemia   -ckmb negative  -can check echo to eval lv fxn  -no plans for ischemic eval unless sig lv dysfunction given atypical nature of sx    #urinary retention  -s/p morales  -med f/u  -?urology eval     #cardiac amyloid   -on vyndamax as outpt  -baseline sbp 100 as outpt    #ckd/adryan  -renal eval called    dvt ppx      76 minutes spent on total encounter; more than 50% of the visit was spent counseling and/or coordinating care by the attending physician.  
"71 y/o M with PMHx amyloidosis, BPH presents to the ED for chest pain. Pt states he was supposed to get MRI to evaluate prostate today but had episode of sharp left sided chest pain and was sent to ED for evaluation and MRI was not completed. Pt states pain only lasted a few seconds and he has no other complaints at this time. Pt states he has had similar episodes in past but never thought anything of it. Denies fevers, chills, SOB, palpitations, abd pain, n/v/d/c, dysuria, hematuria."    Urology consulted for urinary retention. Patient currently seeing Dr. Brenner as outpatient for urinary retention work up. Has had a morales catheter in place for past few weeks. States no issues with morales catheter and that it has been draining. Endorses some pain at the tip of the penis from the catheter.     Plan:   - No urological intervention indicated   - Continue morales catheter  - Patient to continue following up as outpatient for work up with Dr. Brenner.     Case discussed with Dr. Brenner. Recommendations communicated directly to primary team.     [facility]  [address] [place] [id number]  [phone]  
Pt with CKD.

## 2023-11-04 NOTE — H&P ADULT - NSHPPHYSICALEXAM_GEN_ALL_CORE
T(C): 36.6 (11-04-23 @ 09:44), Max: 38.2 (11-04-23 @ 03:31)  HR: 84 (11-04-23 @ 09:44) (76 - 100)  BP: 98/60 (11-04-23 @ 09:44) (98/60 - 117/79)  RR: 16 (11-04-23 @ 09:44) (16 - 22)  SpO2: 100% (11-04-23 @ 09:44) (100% - 100%)    PHYSICAL EXAM:  GENERAL: NAD, well-developed  HEAD:  Atraumatic, Normocephalic  EYES: EOMI, PERRLA, conjunctiva and sclera clear  NECK: Supple, No JVD  CHEST/LUNG: Clear to auscultation bilaterally; No wheeze  HEART: Regular rate and rhythm; No murmurs, rubs, or gallops  ABDOMEN: Soft, Nontender, Nondistended; Bowel sounds present  EXTREMITIES:  2+ Peripheral Pulses, No clubbing, cyanosis, or edema  PSYCH: AAOx3  NEUROLOGY: non-focal  SKIN: No rashes or lesions

## 2023-11-04 NOTE — CONSULT NOTE ADULT - PROBLEM SELECTOR RECOMMENDATION 2
Pt with anemia in the setting of CKD. Hgb low at 8. Recommend checking iron studies and ferritin levels. Tranfusion as per primary team. Monitor Hgb levels.     Discussed with attending on call.   If you have any questions, please feel free to contact me.  Eladio Stuart MD  Nephrology Fellow  [id number] / [employer] Teams (Preferred)  (After 4pm or on weekends, please call the on-call Fellow)

## 2023-11-04 NOTE — PATIENT PROFILE ADULT - FALL HARM RISK - HARM RISK INTERVENTIONS
Assistance with ambulation/Assistance OOB with selected safe patient handling equipment/Communicate Risk of Fall with Harm to all staff/Discuss with provider need for PT consult/Monitor gait and stability/Reinforce activity limits and safety measures with patient and family/Tailored Fall Risk Interventions/Visual Cue: Yellow wristband and red socks/Bed in lowest position, wheels locked, appropriate side rails in place/Call bell, personal items and telephone in reach/Instruct patient to call for assistance before getting out of bed or chair/Non-slip footwear when patient is out of bed/[place] to call system/Physically safe environment - no spills, clutter or unnecessary equipment/Purposeful Proactive Rounding/Room/bathroom lighting operational, light cord in reach

## 2023-11-04 NOTE — CONSULT NOTE ADULT - ATTENDING COMMENTS
He was seen and examined, above was reviewed, Harkins catheter was re-adjusted and clear urine drained, agree with plan as above. Keep Harkins to drainage upon discharge. If possible, he should do prostate MRI here prior to discharge.
CKD cr ta baseline   anemia beckwith as outlined above

## 2023-11-04 NOTE — ED ADULT NURSE REASSESSMENT NOTE - NS ED NURSE REASSESS COMMENT FT1
Pt laying in bed, NAD, respirations even and unlabored. Pt c/o penile pain rated 5/10, Green ACP made aware, awaiting interventions. ACP made aware of BG and BP.

## 2023-11-04 NOTE — ED ADULT NURSE REASSESSMENT NOTE - NS ED NURSE REASSESS COMMENT FT1
Pt received from break RN. Pt laying in bed, NAD, respirations even and unlabored. VS in flow sheet, ACP made aware of HR and temperature, awaiting interventions.

## 2023-11-04 NOTE — CONSULT NOTE ADULT - PROBLEM SELECTOR RECOMMENDATION 9
Pt with known CKD Stage 3 and follows with Dr. Elmore in the outpatient setting. On review of labs on [place], last Scr on 10/19/23 was 1.89. Scr on admission was 1.81 on 11/3 and remains elevated/stable at 1.81 today. Recommend removal of morales catheter unless warranted due to urinary retention. UA with 100mg of protein. Last Kidney US on 4/24/23 showed B/L renal cysts, no evidence of hydronephrosis, and mildly increased echogenicity bilaterally, indicating renal parenchymal disease. Monitor labs and urine output. Avoid nephrotoxins. Dose medications as per eGFR.

## 2023-11-04 NOTE — ED ADULT NURSE REASSESSMENT NOTE - NS ED NURSE REASSESS COMMENT FT1
Break RN: Pt is A&Ox4, resting in stretcher with complaints of pain at the tip of penile area at this time. 16F Harkins in place with positive urine output. No swelling, redness or drainage noted from penis. ACP made aware. Respirations even and unlabored, chest rise equal b/l. Sinus rhythm noted on cardiac monitor. Pt denies chest pain, SOB, fever, cough, chills, abdominal pain, N/V/D, h/a, dizziness, numbness/tingling or any urinary symptoms at this time. Awaiting further orders from ACP. Safety maintained throughout. Will continue to monitor.

## 2023-11-04 NOTE — ED ADULT NURSE REASSESSMENT NOTE - NS ED NURSE REASSESS COMMENT FT1
ACP made aware of BG. Pt received with Harkins catheter from home,  Harkins catheter removed. 16 [place] catheter placed per MD orders, urine output yellow, cloudy. Urine collected per MD orders. Sterility maintained. Pt tolerated well. ACP made aware of BG.

## 2023-11-04 NOTE — ED ADULT NURSE REASSESSMENT NOTE - NS ED NURSE REASSESS COMMENT FT1
pt fs 80 given apple juice  pt c/o of penal pain by morales and rectal pain / pt cleaned and turned  iv in fusing as ordered/repeat fs in 15 min/

## 2023-11-04 NOTE — ED ADULT NURSE REASSESSMENT NOTE - PAIN RATING/NUMBER SCALE (0-10): ACTIVITY
Reports that the amytryptyline 25 mg is not helping his sleep.  Negative apnea test.  Will try some trazodone  
Started Thursday with stomach flu, diarrhea and puking.  Headache.  Unable to eat and drink.  Yesterday still has some stomach issues, sore throat started. Reports mild nasal congestion.    No fever today.  No blood in vomit or stool.    Has not taken any over the counter medication.  Sick contacts at work.    
9 (severe pain)

## 2023-11-04 NOTE — CHART NOTE - NSCHARTNOTEFT_GEN_A_CORE
Medicine PA Note    Pt's Outpatient Urologist:    Dr. Reji Brenner MD  [address] [place] [address]  [address]  [place] [address] [phone]      Charles Burden PA-C  x [phone] Medicine PA Note    Pt's Outpatient Urologist:    Dr. Reji Brenner MD  [address] [place] [address]  [address] [place] [address] [phone]    - morales catheter placed in the [facility] ED for urinary retention. [facility] c/s called.    Charles Burden PA-C  x [phone]

## 2023-11-04 NOTE — CONSULT NOTE ADULT - SUBJECTIVE AND OBJECTIVE BOX
CARDIOLOGY CONSULT NOTE - DR. BEST        Date of Service: 11-04-23 @ 12:17      HPI:  71 y/o M with PMHx amyloidosis, BPH presents to the ED for chest pain. Pt states he was supposed to get MRI to evaluate prostate today but had episode of sharp left sided chest pain and was sent to ED for evaluation and MRI was not completed. Pt states pain only lasted a few seconds and he has no other complaints at this time. Pt states he has had similar episodes in past but never thought anything of it. Denies fevers, chills, SOB, palpitations, abd pain, n/v/d/c, dysuria, hematuria. (04 Nov 2023 11:50)      no active chest pain, dyspnea, dizziness    PAST MEDICAL & SURGICAL HISTORY:  HTN (hypertension)      DM (diabetes mellitus)      Nasal polyp      NSTEMI (non-ST elevation myocardial infarction)      Cardiac amyloidosis      Stenosis, cervical spine      BPH (benign prostatic hyperplasia)      S/P cataract extraction  B/L      S/P nasal polypectomy      History of fusion of cervical spine            PREVIOUS DIAGNOSTIC TESTING:    [ ] Echocardiogram:  [ ]  Catheterization:  [ ] Stress Test:  	    MEDICATIONS:    Home Medications:  sodium chloride 0.65% nasal spray: 2 spray(s) nasal 3 times a day (04 Nov 2023 11:52)  Vyndamax 61 mg oral capsule: 1 cap(s) orally once a day (04 Nov 2023 11:52)      MEDICATIONS  (STANDING):  cefTRIAXone   IVPB 1000 milliGRAM(s) IV Intermittent every 24 hours  colchicine 0.6 milliGRAM(s) Oral daily  dextrose 5% + sodium chloride 0.9%. 1000 milliLiter(s) (75 mL/Hr) IV Continuous <Continuous>  dextrose 50% Injectable 25 Gram(s) IV Push once  dextrose 50% Injectable 12.5 Gram(s) IV Push once  dextrose 50% Injectable 25 Gram(s) IV Push once  dextrose Oral Gel 15 Gram(s) Oral once  finasteride 5 milliGRAM(s) Oral daily  fluticasone propionate 50 MICROgram(s)/spray Nasal Spray 1 Spray(s) Both Nostrils two times a day  glucagon  Injectable 1 milliGRAM(s) IntraMuscular once  heparin   Injectable 5000 Unit(s) SubCutaneous every 12 hours  sodium chloride 0.65% Nasal 1 Spray(s) Both Nostrils two times a day  tamsulosin 0.4 milliGRAM(s) Oral at bedtime      FAMILY HISTORY:    nc  SOCIAL HISTORY:    [x ] Non-smoker  [ ] Smoker  [ ] Alcohol    Allergies    No Known Allergies    Intolerances    	    REVIEW OF SYSTEMS:  CONSTITUTIONAL: No fever, weight loss, or fatigue  EYES: No eye pain, visual disturbances, or discharge  ENMT:  No difficulty hearing, tinnitus, vertigo; No sinus or throat pain  NECK: No pain or stiffness  RESPIRATORY: No cough, wheezing, chills or hemoptysis; No Shortness of Breath  CARDIOVASCULAR: as HPI  GASTROINTESTINAL: No abdominal or epigastric pain. No nausea, vomiting, or hematemesis; No diarrhea or constipation. No melena or hematochezia.  GENITOURINARY: No dysuria, frequency, hematuria, or incontinence  NEUROLOGICAL: No headaches, memory loss, loss of strength, numbness, or tremors  SKIN: No itching, burning, rashes, or lesions   	  [ ] All others negative	  [ ] Unable to obtain    PHYSICAL EXAM:    T(C): 36.8 (11-04-23 @ 12:05), Max: 38.2 (11-04-23 @ 03:31)  HR: 78 (11-04-23 @ 12:05) (76 - 100)  BP: 96/55 (11-04-23 @ 12:05) (96/55 - 117/79)  RR: 16 (11-04-23 @ 12:05) (16 - 22)  SpO2: 96% (11-04-23 @ 12:05) (96% - 100%)  Wt(kg): --  I&O's Summary    04 Nov 2023 08:01  -  04 Nov 2023 12:17  --------------------------------------------------------  IN: 0 mL / OUT: 400 mL / NET: -400 mL      Daily Height in cm: 162.56 (03 Nov 2023 17:03)    Daily     Appearance: Normal	  Psychiatry: A & O x 3, Mood & affect appropriate  HEENT:   Normal oral mucosa, PERRL, EOMI	  Lymphatic: No lymphadenopathy  Cardiovascular: Normal S1 S2,RRR, No JVD, No murmurs  Respiratory: Lungs clear to auscultation	  Gastrointestinal:  Soft, Non-tender, + BS	  Skin: No rashes, No ecchymoses, No cyanosis	  Neurologic: Non-focal  Extremities: Normal range of motion, No clubbing, cyanosis or edema  Vascular: Peripheral pulses palpable 2+ bilaterally    TELEMETRY: 	    ECG:  	sr no acute ischemic abnl   RADIOLOGY:  OTHER: 	  	  LABS:	 	    CARDIAC MARKERS:        proBNP:     Lipid Profile:   HgA1c:   TSH: Thyroid Stimulating Hormone, Serum: 2.98 uIU/mL (11-04-23 @ 10:45)                            8.0    4.52  )-----------( 219      ( 04 Nov 2023 10:45 )             24.3     11-04    135  |  105  |  20  ----------------------------<  116<H>  4.1   |  20<L>  |  1.81<H>    Ca    8.4      04 Nov 2023 10:45  Phos  2.9     11-04  Mg     1.90     11-04    TPro  6.0  /  Alb  3.1<L>  /  TBili  0.6  /  DBili  x   /  AST  46<H>  /  ALT  22  /  AlkPhos  156<H>  11-04    PT/INR - ( 04 Nov 2023 10:45 )   PT: 16.3 sec;   INR: 1.46 ratio         PTT - ( 04 Nov 2023 10:45 )  PTT:38.6 sec    Creatinine: 1.81 mg/dL (11-04-23 @ 10:45)  Creatinine: 1.81 mg/dL (11-03-23 @ 19:41)        ASSESSMENT/PLAN: 	              
HPI    "73 y/o M with PMHx amyloidosis, BPH presents to the ED for chest pain. Pt states he was supposed to get MRI to evaluate prostate today but had episode of sharp left sided chest pain and was sent to ED for evaluation and MRI was not completed. Pt states pain only lasted a few seconds and he has no other complaints at this time. Pt states he has had similar episodes in past but never thought anything of it. Denies fevers, chills, SOB, palpitations, abd pain, n/v/d/c, dysuria, hematuria."    Urology consulted for urinary retention. Patient currently seeing Dr. Brenner as outpatient for urinary retention work up. Has had a morales catheter in place for past few weeks. States no issues with morales catheter and that it has been draining. Endorses some pain at the tip of the penis from the catheter. States he had some abdominal pain previously at time of MRI but has since resolved.     PAST MEDICAL & SURGICAL HISTORY:  HTN (hypertension)      DM (diabetes mellitus)      Nasal polyp      NSTEMI (non-ST elevation myocardial infarction)      Cardiac amyloidosis      Stenosis, cervical spine      BPH (benign prostatic hyperplasia)      S/P cataract extraction  B/L      S/P nasal polypectomy      History of fusion of cervical spine          MEDICATIONS  (STANDING):  cefTRIAXone   IVPB 1000 milliGRAM(s) IV Intermittent every 24 hours  colchicine 0.6 milliGRAM(s) Oral daily  dextrose 5% + sodium chloride 0.9%. 1000 milliLiter(s) (75 mL/Hr) IV Continuous <Continuous>  dextrose 50% Injectable 25 Gram(s) IV Push once  dextrose 50% Injectable 12.5 Gram(s) IV Push once  dextrose 50% Injectable 25 Gram(s) IV Push once  dextrose Oral Gel 15 Gram(s) Oral once  finasteride 5 milliGRAM(s) Oral daily  fluticasone propionate 50 MICROgram(s)/spray Nasal Spray 1 Spray(s) Both Nostrils two times a day  glucagon  Injectable 1 milliGRAM(s) IntraMuscular once  heparin   Injectable 5000 Unit(s) SubCutaneous every 12 hours  sodium chloride 0.65% Nasal 1 Spray(s) Both Nostrils two times a day  tamsulosin 0.4 milliGRAM(s) Oral at bedtime    MEDICATIONS  (PRN):  acetaminophen     Tablet .. 650 milliGRAM(s) Oral every 6 hours PRN Temp greater or equal to 38C (100.4F), Mild Pain (1 - 3)  aluminum hydroxide/magnesium hydroxide/simethicone Suspension 30 milliLiter(s) Oral every 4 hours PRN Dyspepsia  oxyCODONE    IR 2.5 milliGRAM(s) Oral every 6 hours PRN Moderate Pain (4 - 6)      FAMILY HISTORY:      Allergies    No Known Allergies    Intolerances        SOCIAL HISTORY:    REVIEW OF SYSTEMS: Otherwise negative as stated in HPI    Physical Exam  Vital signs  T(C): 36.4 (11-04-23 @ 18:10), Max: 38.2 (11-04-23 @ 03:31)  HR: 86 (11-04-23 @ 18:10)  BP: 101/65 (11-04-23 @ 18:10)  SpO2: 98% (11-04-23 @ 18:10)  Wt(kg): --    Output    OUT:    Indwelling Catheter - Urethral (mL): 400 mL  Total OUT: 400 mL    Total NET: -400 mL          Gen: NAD  Pulm: No respiratory distress	  CV: RRR  GI: S/ND/NT  : Morales draining cyu.   MSK: moves all 4 limbs spontaneously    LABS:      11-04 @ 10:45    WBC 4.52  / Hct 24.3  / SCr 1.81     11-04 @ 04:55    WBC 4.59  / Hct 23.6  / SCr --       11-04    135  |  105  |  20  ----------------------------<  116<H>  4.1   |  20<L>  |  1.81<H>    Ca    8.4      04 Nov 2023 10:45  Phos  2.9     11-04  Mg     1.90     11-04    TPro  6.0  /  Alb  3.1<L>  /  TBili  0.6  /  DBili  x   /  AST  46<H>  /  ALT  22  /  AlkPhos  156<H>  11-04    PT/INR - ( 04 Nov 2023 10:45 )   PT: 16.3 sec;   INR: 1.46 ratio         PTT - ( 04 Nov 2023 10:45 )  PTT:38.6 sec  Urinalysis Basic - ( 04 Nov 2023 10:45 )    Color: x / Appearance: x / SG: x / pH: x  Gluc: 116 mg/dL / Ketone: x  / Bili: x / Urobili: x   Blood: x / Protein: x / Nitrite: x   Leuk Esterase: x / RBC: x / WBC x   Sq Epi: x / Non Sq Epi: x / Bacteria: x        
[facility] DIVISION OF KIDNEY DISEASES AND HYPERTENSION -- [phone]  -- INITIAL CONSULT NOTE  --------------------------------------------------------------------------------  HPI: 72M with PMH of HTN, DM2, CKD Stage 3 (baseline Scr ~1.7-2), NSTEMI, cardiac amyloidosis, and BPH who presents to the ED after experience sharp L sided chest pain that radiated down his L arm prior to MRI for prostate surgery. Nephrology consulted for CKD management.     On review of labs on [place], last Scr on 10/19/23 was 1.89. Scr on admission was 1.81 on 11/3 and remains elevated/stable at 1.81 today.     Pt seen and evaluated in the ED. Pt only complains of penile pain due to morales catheter and states that chest pain he experience earlier has resolved. Otherwise offers no additional complaints at this time.    PAST HISTORY  --------------------------------------------------------------------------------  PAST MEDICAL & SURGICAL HISTORY:  HTN (hypertension)  DM (diabetes mellitus)  Nasal polyp  NSTEMI (non-ST elevation myocardial infarction)  Cardiac amyloidosis  Stenosis, cervical spine  BPH (benign prostatic hyperplasia)  S/P cataract extraction  B/L  S/P nasal polypectomy  History of fusion of cervical spine    FAMILY HISTORY:  No family history of kidney disease    PAST SOCIAL HISTORY:  Former smoker    ALLERGIES & MEDICATIONS  --------------------------------------------------------------------------------  Allergies  No Known Allergies    Intolerances    Standing Inpatient MedicationscefTRIAXone   IVPB 1000 milliGRAM(s) IV Intermittent every 24 hours  colchicine 0.6 milliGRAM(s) Oral daily  dextrose 5% + sodium chloride 0.9%. 1000 milliLiter(s) IV Continuous <Continuous>  dextrose 50% Injectable 25 Gram(s) IV Push once  dextrose 50% Injectable 12.5 Gram(s) IV Push once  dextrose 50% Injectable 25 Gram(s) IV Push once  dextrose Oral Gel 15 Gram(s) Oral once  finasteride 5 milliGRAM(s) Oral daily  fluticasone propionate 50 MICROgram(s)/spray Nasal Spray 1 Spray(s) Both Nostrils two times a day  glucagon  Injectable 1 milliGRAM(s) IntraMuscular once  heparin   Injectable 5000 Unit(s) SubCutaneous every 12 hours  sodium chloride 0.65% Nasal 1 Spray(s) Both Nostrils two times a day  tamsulosin 0.4 milliGRAM(s) Oral at bedtime    PRN Inpatient Medicationsacetaminophen     Tablet .. 650 milliGRAM(s) Oral every 6 hours PRN  aluminum hydroxide/magnesium hydroxide/simethicone Suspension 30 milliLiter(s) Oral every 4 hours PRN  oxyCODONE    IR 2.5 milliGRAM(s) Oral every 6 hours PRN    REVIEW OF SYSTEMS  --------------------------------------------------------------------------------  Gen: No fevers/chills  Skin: No rashes  Head/Eyes/Ears: No HA  Respiratory: No dyspnea, cough  CV: +chest pain radiating down L arm now improved  GI: No abdominal pain, diarrhea  : No dysuria, hematuria, penile pain because of morales catheter  MSK: No edema  Heme: No easy bruising or bleeding  Psych: No significant depression    All other systems were reviewed and are negative, except as noted.    VITALS/PHYSICAL EXAM  --------------------------------------------------------------------------------  T(C): 36.8 (11-04-23 @ 12:05), Max: 38.2 (11-04-23 @ 03:31)  HR: 77 (11-04-23 @ 15:22) (76 - 100)  BP: 90/57 (11-04-23 @ 15:22) (90/57 - 117/79)  RR: 16 (11-04-23 @ 12:05) (16 - 22)  SpO2: 96% (11-04-23 @ 12:05) (96% - 100%)  Wt(kg): --  Height (cm): 162.6 (11-03-23 @ 17:03)    11-04-23 @ 08:01  -  11-04-23 @ 16:55  --------------------------------------------------------  IN: 0 mL / OUT: 400 mL / NET: -400 mL    Physical Exam:  Gen: NAD  HEENT: MMM  Pulm: Minimal rales B/L in bases  CV: S1S2  Abd: Soft, +BS, morales catheter   Ext: trace B/L LE edema  Neuro: Awake  Skin: Warm and dry    LABS/STUDIES  --------------------------------------------------------------------------------              8.0    4.52  >-----------<  219      [11-04-23 @ 10:45]              24.3     135  |  105  |  20  ----------------------------<  116      [11-04-23 @ 10:45]  4.1   |  20  |  1.81        Ca     8.4     [11-04-23 @ 10:45]      Mg     1.90     [11-04-23 @ 10:45]      Phos  2.9     [11-04-23 @ 10:45]    TPro  6.0  /  Alb  3.1  /  TBili  0.6  /  DBili  x   /  AST  46  /  ALT  22  /  AlkPhos  156  [11-04-23 @ 10:45]          [11-04-23 @ 10:45]    Creatinine Trend:  SCr 1.81 [11-04 @ 10:45]  SCr 1.81 [11-03 @ 19:41]  SCr 1.89 [10-19 @ 04:17]    Iron 36, TIBC 203, %sat 18      [04-24-23 @ 07:02]  Ferritin 178      [04-24-23 @ 07:02]  TSH 2.98      [11-04-23 @ 10:45]  Lipid: chol 86, TG 34, HDL 34, LDL --      [11-04-23 @ 10:45]

## 2023-11-05 LAB
ANION GAP SERPL CALC-SCNC: 12 MMOL/L — SIGNIFICANT CHANGE UP (ref 7–14)
ANION GAP SERPL CALC-SCNC: 12 MMOL/L — SIGNIFICANT CHANGE UP (ref 7–14)
BASOPHILS # BLD AUTO: 0.04 K/UL — SIGNIFICANT CHANGE UP (ref 0–0.2)
BASOPHILS # BLD AUTO: 0.04 K/UL — SIGNIFICANT CHANGE UP (ref 0–0.2)
BASOPHILS NFR BLD AUTO: 0.7 % — SIGNIFICANT CHANGE UP (ref 0–2)
BASOPHILS NFR BLD AUTO: 0.7 % — SIGNIFICANT CHANGE UP (ref 0–2)
BUN SERPL-MCNC: 16 MG/DL — SIGNIFICANT CHANGE UP (ref 7–23)
BUN SERPL-MCNC: 16 MG/DL — SIGNIFICANT CHANGE UP (ref 7–23)
CALCIUM SERPL-MCNC: 8.4 MG/DL — SIGNIFICANT CHANGE UP (ref 8.4–10.5)
CALCIUM SERPL-MCNC: 8.4 MG/DL — SIGNIFICANT CHANGE UP (ref 8.4–10.5)
CHLORIDE SERPL-SCNC: 105 MMOL/L — SIGNIFICANT CHANGE UP (ref 98–107)
CHLORIDE SERPL-SCNC: 105 MMOL/L — SIGNIFICANT CHANGE UP (ref 98–107)
CO2 SERPL-SCNC: 20 MMOL/L — LOW (ref 22–31)
CO2 SERPL-SCNC: 20 MMOL/L — LOW (ref 22–31)
CREAT SERPL-MCNC: 1.82 MG/DL — HIGH (ref 0.5–1.3)
CREAT SERPL-MCNC: 1.82 MG/DL — HIGH (ref 0.5–1.3)
CULTURE RESULTS: SIGNIFICANT CHANGE UP
CULTURE RESULTS: SIGNIFICANT CHANGE UP
EGFR: 38 ML/MIN/1.73M2 — LOW
EGFR: 38 ML/MIN/1.73M2 — LOW
EOSINOPHIL # BLD AUTO: 0.26 K/UL — SIGNIFICANT CHANGE UP (ref 0–0.5)
EOSINOPHIL # BLD AUTO: 0.26 K/UL — SIGNIFICANT CHANGE UP (ref 0–0.5)
EOSINOPHIL NFR BLD AUTO: 4.8 % — SIGNIFICANT CHANGE UP (ref 0–6)
EOSINOPHIL NFR BLD AUTO: 4.8 % — SIGNIFICANT CHANGE UP (ref 0–6)
GLUCOSE BLDC GLUCOMTR-MCNC: 116 MG/DL — HIGH (ref 70–99)
GLUCOSE BLDC GLUCOMTR-MCNC: 116 MG/DL — HIGH (ref 70–99)
GLUCOSE BLDC GLUCOMTR-MCNC: 141 MG/DL — HIGH (ref 70–99)
GLUCOSE BLDC GLUCOMTR-MCNC: 141 MG/DL — HIGH (ref 70–99)
GLUCOSE BLDC GLUCOMTR-MCNC: 89 MG/DL — SIGNIFICANT CHANGE UP (ref 70–99)
GLUCOSE BLDC GLUCOMTR-MCNC: 89 MG/DL — SIGNIFICANT CHANGE UP (ref 70–99)
GLUCOSE SERPL-MCNC: 88 MG/DL — SIGNIFICANT CHANGE UP (ref 70–99)
GLUCOSE SERPL-MCNC: 88 MG/DL — SIGNIFICANT CHANGE UP (ref 70–99)
HCT VFR BLD CALC: 23.6 % — LOW (ref 39–50)
HCT VFR BLD CALC: 23.6 % — LOW (ref 39–50)
HGB BLD-MCNC: 8 G/DL — LOW (ref 13–17)
HGB BLD-MCNC: 8 G/DL — LOW (ref 13–17)
IANC: 2.49 K/UL — SIGNIFICANT CHANGE UP (ref 1.8–7.4)
IANC: 2.49 K/UL — SIGNIFICANT CHANGE UP (ref 1.8–7.4)
IMM GRANULOCYTES NFR BLD AUTO: 0.6 % — SIGNIFICANT CHANGE UP (ref 0–0.9)
IMM GRANULOCYTES NFR BLD AUTO: 0.6 % — SIGNIFICANT CHANGE UP (ref 0–0.9)
LYMPHOCYTES # BLD AUTO: 1.91 K/UL — SIGNIFICANT CHANGE UP (ref 1–3.3)
LYMPHOCYTES # BLD AUTO: 1.91 K/UL — SIGNIFICANT CHANGE UP (ref 1–3.3)
LYMPHOCYTES # BLD AUTO: 35 % — SIGNIFICANT CHANGE UP (ref 13–44)
LYMPHOCYTES # BLD AUTO: 35 % — SIGNIFICANT CHANGE UP (ref 13–44)
MAGNESIUM SERPL-MCNC: 1.7 MG/DL — SIGNIFICANT CHANGE UP (ref 1.6–2.6)
MAGNESIUM SERPL-MCNC: 1.7 MG/DL — SIGNIFICANT CHANGE UP (ref 1.6–2.6)
MCHC RBC-ENTMCNC: 27 PG — SIGNIFICANT CHANGE UP (ref 27–34)
MCHC RBC-ENTMCNC: 27 PG — SIGNIFICANT CHANGE UP (ref 27–34)
MCHC RBC-ENTMCNC: 33.9 GM/DL — SIGNIFICANT CHANGE UP (ref 32–36)
MCHC RBC-ENTMCNC: 33.9 GM/DL — SIGNIFICANT CHANGE UP (ref 32–36)
MCV RBC AUTO: 79.7 FL — LOW (ref 80–100)
MCV RBC AUTO: 79.7 FL — LOW (ref 80–100)
MONOCYTES # BLD AUTO: 0.72 K/UL — SIGNIFICANT CHANGE UP (ref 0–0.9)
MONOCYTES # BLD AUTO: 0.72 K/UL — SIGNIFICANT CHANGE UP (ref 0–0.9)
MONOCYTES NFR BLD AUTO: 13.2 % — SIGNIFICANT CHANGE UP (ref 2–14)
MONOCYTES NFR BLD AUTO: 13.2 % — SIGNIFICANT CHANGE UP (ref 2–14)
NEUTROPHILS # BLD AUTO: 2.49 K/UL — SIGNIFICANT CHANGE UP (ref 1.8–7.4)
NEUTROPHILS # BLD AUTO: 2.49 K/UL — SIGNIFICANT CHANGE UP (ref 1.8–7.4)
NEUTROPHILS NFR BLD AUTO: 45.7 % — SIGNIFICANT CHANGE UP (ref 43–77)
NEUTROPHILS NFR BLD AUTO: 45.7 % — SIGNIFICANT CHANGE UP (ref 43–77)
NRBC # BLD: 0 /100 WBCS — SIGNIFICANT CHANGE UP (ref 0–0)
NRBC # BLD: 0 /100 WBCS — SIGNIFICANT CHANGE UP (ref 0–0)
NRBC # FLD: 0 K/UL — SIGNIFICANT CHANGE UP (ref 0–0)
NRBC # FLD: 0 K/UL — SIGNIFICANT CHANGE UP (ref 0–0)
PHOSPHATE SERPL-MCNC: 2.5 MG/DL — SIGNIFICANT CHANGE UP (ref 2.5–4.5)
PHOSPHATE SERPL-MCNC: 2.5 MG/DL — SIGNIFICANT CHANGE UP (ref 2.5–4.5)
PLATELET # BLD AUTO: 243 K/UL — SIGNIFICANT CHANGE UP (ref 150–400)
PLATELET # BLD AUTO: 243 K/UL — SIGNIFICANT CHANGE UP (ref 150–400)
POTASSIUM SERPL-MCNC: 4 MMOL/L — SIGNIFICANT CHANGE UP (ref 3.5–5.3)
POTASSIUM SERPL-MCNC: 4 MMOL/L — SIGNIFICANT CHANGE UP (ref 3.5–5.3)
POTASSIUM SERPL-SCNC: 4 MMOL/L — SIGNIFICANT CHANGE UP (ref 3.5–5.3)
POTASSIUM SERPL-SCNC: 4 MMOL/L — SIGNIFICANT CHANGE UP (ref 3.5–5.3)
RBC # BLD: 2.96 M/UL — LOW (ref 4.2–5.8)
RBC # BLD: 2.96 M/UL — LOW (ref 4.2–5.8)
RBC # FLD: 16.2 % — HIGH (ref 10.3–14.5)
RBC # FLD: 16.2 % — HIGH (ref 10.3–14.5)
SODIUM SERPL-SCNC: 137 MMOL/L — SIGNIFICANT CHANGE UP (ref 135–145)
SODIUM SERPL-SCNC: 137 MMOL/L — SIGNIFICANT CHANGE UP (ref 135–145)
SPECIMEN SOURCE: SIGNIFICANT CHANGE UP
SPECIMEN SOURCE: SIGNIFICANT CHANGE UP
WBC # BLD: 5.45 K/UL — SIGNIFICANT CHANGE UP (ref 3.8–10.5)
WBC # BLD: 5.45 K/UL — SIGNIFICANT CHANGE UP (ref 3.8–10.5)
WBC # FLD AUTO: 5.45 K/UL — SIGNIFICANT CHANGE UP (ref 3.8–10.5)
WBC # FLD AUTO: 5.45 K/UL — SIGNIFICANT CHANGE UP (ref 3.8–10.5)

## 2023-11-05 PROCEDURE — 99221 1ST HOSP IP/OBS SF/LOW 40: CPT

## 2023-11-05 PROCEDURE — 99222 1ST HOSP IP/OBS MODERATE 55: CPT

## 2023-11-05 PROCEDURE — 74176 CT ABD & PELVIS W/O CONTRAST: CPT | Mod: 26

## 2023-11-05 RX ORDER — ACETAMINOPHEN 500 MG
1000 TABLET ORAL ONCE
Refills: 0 | Status: COMPLETED | OUTPATIENT
Start: 2023-11-05 | End: 2023-11-05

## 2023-11-05 RX ORDER — INFLUENZA VIRUS VACCINE 15; 15; 15; 15 UG/.5ML; UG/.5ML; UG/.5ML; UG/.5ML
0.7 SUSPENSION INTRAMUSCULAR ONCE
Refills: 0 | Status: DISCONTINUED | OUTPATIENT
Start: 2023-11-05 | End: 2023-11-07

## 2023-11-05 RX ADMIN — Medication 1 SPRAY(S): at 05:57

## 2023-11-05 RX ADMIN — OXYCODONE HYDROCHLORIDE 2.5 MILLIGRAM(S): 5 TABLET ORAL at 17:56

## 2023-11-05 RX ADMIN — SODIUM CHLORIDE 75 MILLILITER(S): 9 INJECTION, SOLUTION INTRAVENOUS at 09:38

## 2023-11-05 RX ADMIN — Medication 1000 MILLIGRAM(S): at 05:27

## 2023-11-05 RX ADMIN — FINASTERIDE 5 MILLIGRAM(S): 5 TABLET, FILM COATED ORAL at 09:36

## 2023-11-05 RX ADMIN — CEFTRIAXONE 100 MILLIGRAM(S): 500 INJECTION, POWDER, FOR SOLUTION INTRAMUSCULAR; INTRAVENOUS at 10:39

## 2023-11-05 RX ADMIN — Medication 1 SPRAY(S): at 17:31

## 2023-11-05 RX ADMIN — Medication 0.6 MILLIGRAM(S): at 09:36

## 2023-11-05 RX ADMIN — SODIUM CHLORIDE 75 MILLILITER(S): 9 INJECTION, SOLUTION INTRAVENOUS at 23:00

## 2023-11-05 RX ADMIN — Medication 400 MILLIGRAM(S): at 10:06

## 2023-11-05 RX ADMIN — OXYCODONE HYDROCHLORIDE 2.5 MILLIGRAM(S): 5 TABLET ORAL at 01:55

## 2023-11-05 RX ADMIN — TAMSULOSIN HYDROCHLORIDE 0.4 MILLIGRAM(S): 0.4 CAPSULE ORAL at 21:46

## 2023-11-05 RX ADMIN — SODIUM CHLORIDE 75 MILLILITER(S): 9 INJECTION, SOLUTION INTRAVENOUS at 04:57

## 2023-11-05 RX ADMIN — OXYCODONE HYDROCHLORIDE 2.5 MILLIGRAM(S): 5 TABLET ORAL at 01:25

## 2023-11-05 RX ADMIN — Medication 400 MILLIGRAM(S): at 04:57

## 2023-11-05 NOTE — CHART NOTE - NSCHARTNOTEFT_GEN_A_CORE
Urology contacted due to CT showing catheter balloon within prostate with distended bladder. Catheter previously placed by Dr. Brenner with visualization of balloon in bladder on ultrasound.    Catheter balloon deflated and catheter advanced into bladder with output of urine. 20 mL sterile water placed into balloon.    - Continue Harkins catheter  - Recommend prostate MRI for prostate cancer if patient will be staying in hospital, as previously scheduled outpatient MRI was cancelled Urology contacted due to CT showing catheter balloon within prostate with distended bladder. Catheter previously placed by Dr. Brenner with visualization of balloon in bladder on ultrasound.    Catheter balloon deflated and catheter advanced into bladder with output of urine. 20 mL sterile water placed into balloon.    - Continue Harkins catheter  - Recommend prostate MRI for prostate cancer if patient will be staying in hospital, as previously scheduled outpatient MRI was cancelled.    Addendum: he was seen and examined, above discussed. Plan for prostate MRI as inpatient hopefully.

## 2023-11-05 NOTE — PROVIDER CONTACT NOTE (OTHER) - ASSESSMENT
a&o x4, no mental status changes noted, D5 NS 0.9% continuously running at 75ml/hr. safety maintained

## 2023-11-06 ENCOUNTER — APPOINTMENT (OUTPATIENT)
Dept: OTOLARYNGOLOGY | Facility: CLINIC | Age: 73
End: 2023-11-06

## 2023-11-06 ENCOUNTER — APPOINTMENT (OUTPATIENT)
Dept: GERIATRICS | Facility: CLINIC | Age: 73
End: 2023-11-06

## 2023-11-06 LAB
ANION GAP SERPL CALC-SCNC: 9 MMOL/L — SIGNIFICANT CHANGE UP (ref 7–14)
ANION GAP SERPL CALC-SCNC: 9 MMOL/L — SIGNIFICANT CHANGE UP (ref 7–14)
BASOPHILS # BLD AUTO: 0.03 K/UL — SIGNIFICANT CHANGE UP (ref 0–0.2)
BASOPHILS # BLD AUTO: 0.03 K/UL — SIGNIFICANT CHANGE UP (ref 0–0.2)
BASOPHILS NFR BLD AUTO: 0.8 % — SIGNIFICANT CHANGE UP (ref 0–2)
BASOPHILS NFR BLD AUTO: 0.8 % — SIGNIFICANT CHANGE UP (ref 0–2)
BUN SERPL-MCNC: 14 MG/DL — SIGNIFICANT CHANGE UP (ref 7–23)
BUN SERPL-MCNC: 14 MG/DL — SIGNIFICANT CHANGE UP (ref 7–23)
CALCIUM SERPL-MCNC: 8.1 MG/DL — LOW (ref 8.4–10.5)
CALCIUM SERPL-MCNC: 8.1 MG/DL — LOW (ref 8.4–10.5)
CHLORIDE SERPL-SCNC: 109 MMOL/L — HIGH (ref 98–107)
CHLORIDE SERPL-SCNC: 109 MMOL/L — HIGH (ref 98–107)
CO2 SERPL-SCNC: 21 MMOL/L — LOW (ref 22–31)
CO2 SERPL-SCNC: 21 MMOL/L — LOW (ref 22–31)
CREAT SERPL-MCNC: 1.65 MG/DL — HIGH (ref 0.5–1.3)
CREAT SERPL-MCNC: 1.65 MG/DL — HIGH (ref 0.5–1.3)
EGFR: 43 ML/MIN/1.73M2 — LOW
EGFR: 43 ML/MIN/1.73M2 — LOW
EOSINOPHIL # BLD AUTO: 0.47 K/UL — SIGNIFICANT CHANGE UP (ref 0–0.5)
EOSINOPHIL # BLD AUTO: 0.47 K/UL — SIGNIFICANT CHANGE UP (ref 0–0.5)
EOSINOPHIL NFR BLD AUTO: 11.8 % — HIGH (ref 0–6)
EOSINOPHIL NFR BLD AUTO: 11.8 % — HIGH (ref 0–6)
FERRITIN SERPL-MCNC: 211 NG/ML — SIGNIFICANT CHANGE UP (ref 30–400)
FERRITIN SERPL-MCNC: 211 NG/ML — SIGNIFICANT CHANGE UP (ref 30–400)
GLUCOSE BLDC GLUCOMTR-MCNC: 103 MG/DL — HIGH (ref 70–99)
GLUCOSE BLDC GLUCOMTR-MCNC: 103 MG/DL — HIGH (ref 70–99)
GLUCOSE BLDC GLUCOMTR-MCNC: 83 MG/DL — SIGNIFICANT CHANGE UP (ref 70–99)
GLUCOSE BLDC GLUCOMTR-MCNC: 83 MG/DL — SIGNIFICANT CHANGE UP (ref 70–99)
GLUCOSE BLDC GLUCOMTR-MCNC: 94 MG/DL — SIGNIFICANT CHANGE UP (ref 70–99)
GLUCOSE BLDC GLUCOMTR-MCNC: 94 MG/DL — SIGNIFICANT CHANGE UP (ref 70–99)
GLUCOSE BLDC GLUCOMTR-MCNC: 97 MG/DL — SIGNIFICANT CHANGE UP (ref 70–99)
GLUCOSE BLDC GLUCOMTR-MCNC: 97 MG/DL — SIGNIFICANT CHANGE UP (ref 70–99)
GLUCOSE SERPL-MCNC: 90 MG/DL — SIGNIFICANT CHANGE UP (ref 70–99)
GLUCOSE SERPL-MCNC: 90 MG/DL — SIGNIFICANT CHANGE UP (ref 70–99)
HCT VFR BLD CALC: 23.6 % — LOW (ref 39–50)
HCT VFR BLD CALC: 23.6 % — LOW (ref 39–50)
HGB BLD-MCNC: 8 G/DL — LOW (ref 13–17)
HGB BLD-MCNC: 8 G/DL — LOW (ref 13–17)
IANC: 1.42 K/UL — LOW (ref 1.8–7.4)
IANC: 1.42 K/UL — LOW (ref 1.8–7.4)
IMM GRANULOCYTES NFR BLD AUTO: 0.3 % — SIGNIFICANT CHANGE UP (ref 0–0.9)
IMM GRANULOCYTES NFR BLD AUTO: 0.3 % — SIGNIFICANT CHANGE UP (ref 0–0.9)
IRON SATN MFR SERPL: 12 % — LOW (ref 14–50)
IRON SATN MFR SERPL: 12 % — LOW (ref 14–50)
IRON SATN MFR SERPL: 18 UG/DL — LOW (ref 45–165)
IRON SATN MFR SERPL: 18 UG/DL — LOW (ref 45–165)
LYMPHOCYTES # BLD AUTO: 1.69 K/UL — SIGNIFICANT CHANGE UP (ref 1–3.3)
LYMPHOCYTES # BLD AUTO: 1.69 K/UL — SIGNIFICANT CHANGE UP (ref 1–3.3)
LYMPHOCYTES # BLD AUTO: 42.3 % — SIGNIFICANT CHANGE UP (ref 13–44)
LYMPHOCYTES # BLD AUTO: 42.3 % — SIGNIFICANT CHANGE UP (ref 13–44)
MAGNESIUM SERPL-MCNC: 1.8 MG/DL — SIGNIFICANT CHANGE UP (ref 1.6–2.6)
MAGNESIUM SERPL-MCNC: 1.8 MG/DL — SIGNIFICANT CHANGE UP (ref 1.6–2.6)
MCHC RBC-ENTMCNC: 26.8 PG — LOW (ref 27–34)
MCHC RBC-ENTMCNC: 26.8 PG — LOW (ref 27–34)
MCHC RBC-ENTMCNC: 33.9 GM/DL — SIGNIFICANT CHANGE UP (ref 32–36)
MCHC RBC-ENTMCNC: 33.9 GM/DL — SIGNIFICANT CHANGE UP (ref 32–36)
MCV RBC AUTO: 79.2 FL — LOW (ref 80–100)
MCV RBC AUTO: 79.2 FL — LOW (ref 80–100)
MONOCYTES # BLD AUTO: 0.38 K/UL — SIGNIFICANT CHANGE UP (ref 0–0.9)
MONOCYTES # BLD AUTO: 0.38 K/UL — SIGNIFICANT CHANGE UP (ref 0–0.9)
MONOCYTES NFR BLD AUTO: 9.5 % — SIGNIFICANT CHANGE UP (ref 2–14)
MONOCYTES NFR BLD AUTO: 9.5 % — SIGNIFICANT CHANGE UP (ref 2–14)
NEUTROPHILS # BLD AUTO: 1.42 K/UL — LOW (ref 1.8–7.4)
NEUTROPHILS # BLD AUTO: 1.42 K/UL — LOW (ref 1.8–7.4)
NEUTROPHILS NFR BLD AUTO: 35.3 % — LOW (ref 43–77)
NEUTROPHILS NFR BLD AUTO: 35.3 % — LOW (ref 43–77)
NRBC # BLD: 0 /100 WBCS — SIGNIFICANT CHANGE UP (ref 0–0)
NRBC # BLD: 0 /100 WBCS — SIGNIFICANT CHANGE UP (ref 0–0)
NRBC # FLD: 0 K/UL — SIGNIFICANT CHANGE UP (ref 0–0)
NRBC # FLD: 0 K/UL — SIGNIFICANT CHANGE UP (ref 0–0)
PHOSPHATE SERPL-MCNC: 3.3 MG/DL — SIGNIFICANT CHANGE UP (ref 2.5–4.5)
PHOSPHATE SERPL-MCNC: 3.3 MG/DL — SIGNIFICANT CHANGE UP (ref 2.5–4.5)
PLATELET # BLD AUTO: 243 K/UL — SIGNIFICANT CHANGE UP (ref 150–400)
PLATELET # BLD AUTO: 243 K/UL — SIGNIFICANT CHANGE UP (ref 150–400)
POTASSIUM SERPL-MCNC: 4.1 MMOL/L — SIGNIFICANT CHANGE UP (ref 3.5–5.3)
POTASSIUM SERPL-MCNC: 4.1 MMOL/L — SIGNIFICANT CHANGE UP (ref 3.5–5.3)
POTASSIUM SERPL-SCNC: 4.1 MMOL/L — SIGNIFICANT CHANGE UP (ref 3.5–5.3)
POTASSIUM SERPL-SCNC: 4.1 MMOL/L — SIGNIFICANT CHANGE UP (ref 3.5–5.3)
RBC # BLD: 2.98 M/UL — LOW (ref 4.2–5.8)
RBC # BLD: 2.98 M/UL — LOW (ref 4.2–5.8)
RBC # FLD: 16 % — HIGH (ref 10.3–14.5)
RBC # FLD: 16 % — HIGH (ref 10.3–14.5)
SODIUM SERPL-SCNC: 139 MMOL/L — SIGNIFICANT CHANGE UP (ref 135–145)
SODIUM SERPL-SCNC: 139 MMOL/L — SIGNIFICANT CHANGE UP (ref 135–145)
TIBC SERPL-MCNC: 146 UG/DL — LOW (ref 220–430)
TIBC SERPL-MCNC: 146 UG/DL — LOW (ref 220–430)
UIBC SERPL-MCNC: 128 UG/DL — SIGNIFICANT CHANGE UP (ref 110–370)
UIBC SERPL-MCNC: 128 UG/DL — SIGNIFICANT CHANGE UP (ref 110–370)
WBC # BLD: 4 K/UL — SIGNIFICANT CHANGE UP (ref 3.8–10.5)
WBC # BLD: 4 K/UL — SIGNIFICANT CHANGE UP (ref 3.8–10.5)
WBC # FLD AUTO: 4 K/UL — SIGNIFICANT CHANGE UP (ref 3.8–10.5)
WBC # FLD AUTO: 4 K/UL — SIGNIFICANT CHANGE UP (ref 3.8–10.5)

## 2023-11-06 PROCEDURE — 99232 SBSQ HOSP IP/OBS MODERATE 35: CPT | Mod: GC

## 2023-11-06 PROCEDURE — 93356 MYOCRD STRAIN IMG SPCKL TRCK: CPT

## 2023-11-06 PROCEDURE — 99231 SBSQ HOSP IP/OBS SF/LOW 25: CPT

## 2023-11-06 PROCEDURE — 76376 3D RENDER W/INTRP POSTPROCES: CPT | Mod: 26

## 2023-11-06 PROCEDURE — 93306 TTE W/DOPPLER COMPLETE: CPT | Mod: 26

## 2023-11-06 RX ADMIN — Medication 1 SPRAY(S): at 17:39

## 2023-11-06 RX ADMIN — Medication 0.6 MILLIGRAM(S): at 12:15

## 2023-11-06 RX ADMIN — Medication 1 SPRAY(S): at 06:34

## 2023-11-06 RX ADMIN — FINASTERIDE 5 MILLIGRAM(S): 5 TABLET, FILM COATED ORAL at 12:14

## 2023-11-06 RX ADMIN — TAMSULOSIN HYDROCHLORIDE 0.4 MILLIGRAM(S): 0.4 CAPSULE ORAL at 22:37

## 2023-11-06 RX ADMIN — CEFTRIAXONE 100 MILLIGRAM(S): 500 INJECTION, POWDER, FOR SOLUTION INTRAMUSCULAR; INTRAVENOUS at 09:54

## 2023-11-06 NOTE — PROGRESS NOTE ADULT - TIME BILLING
agree with above  cp reoslved  trop elevated in setting of ckd, likely demand ischemia   ckmb negative  CT abd and plevis with cardiomegaly /pulm edema-- consider adding lasix 20 mg Po every other day given creat/borderling low bp   check echo to eval lv fxn  no plans for ischemic eval unless sig lv dysfunction given atypical nature of sx

## 2023-11-06 NOTE — PROGRESS NOTE ADULT - ATTENDING COMMENTS
he was seen and examined and above plan was reviewed with him.
CKD improving cr trends   Renal service will sign off at this time. please re-consult if needed

## 2023-11-06 NOTE — PROGRESS NOTE ADULT - PROBLEM SELECTOR PLAN 1
Pt with known CKD Stage 3 and follows with Dr. Elmore in the outpatient setting. On review of labs on [place], last Scr on 10/19/23 was 1.89. Scr on admission was 1.81 on 11/3 and remains elevated/stable at 1.6 today. Consider removal of morales catheter unless warranted due to urinary retention. UA with 100mg of protein. Last Kidney US on 4/24/23 showed B/L renal cysts, no evidence of hydronephrosis, and mildly increased echogenicity bilaterally, indicating renal parenchymal disease. Monitor labs and urine output. Avoid nephrotoxins. Dose medications as per eGFR. Pt with known CKD Stage 3 and follows with Dr. Elmore in the outpatient setting. On review of labs on [place], last Scr on 10/19/23 was 1.89. Scr on admission was 1.81 on 11/3 and remains elevated/stable at 1.6 today. Harkins catheter in place. UA with 100mg of protein. Last Kidney US on 4/24/23 showed B/L renal cysts, no evidence of hydronephrosis, and mildly increased echogenicity bilaterally, indicating renal parenchymal disease. Monitor labs and urine output. Avoid nephrotoxins. Dose medications as per eGFR.

## 2023-11-06 NOTE — PROGRESS NOTE ADULT - PROBLEM SELECTOR PLAN 2
Pt with anemia in the setting of CKD. Hgb low at 8. 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 If no contraindication, can consider IV venofer. Tranfusion as per primary team. Monitor Hgb levels. Pt with anemia in the setting of CKD. Hgb low at 8. 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 If no contraindication, can consider IV venofer. Transfusion as per primary team. Monitor Hgb levels.

## 2023-11-07 ENCOUNTER — TRANSCRIPTION ENCOUNTER (OUTPATIENT)
Age: 73
End: 2023-11-07

## 2023-11-07 VITALS
TEMPERATURE: 98 F | HEART RATE: 79 BPM | SYSTOLIC BLOOD PRESSURE: 119 MMHG | OXYGEN SATURATION: 98 % | RESPIRATION RATE: 20 BRPM | DIASTOLIC BLOOD PRESSURE: 75 MMHG

## 2023-11-07 LAB
ANION GAP SERPL CALC-SCNC: 11 MMOL/L — SIGNIFICANT CHANGE UP (ref 7–14)
ANION GAP SERPL CALC-SCNC: 11 MMOL/L — SIGNIFICANT CHANGE UP (ref 7–14)
B PERT DNA SPEC QL NAA+PROBE: SIGNIFICANT CHANGE UP
B PERT DNA SPEC QL NAA+PROBE: SIGNIFICANT CHANGE UP
B PERT+PARAPERT DNA PNL SPEC NAA+PROBE: SIGNIFICANT CHANGE UP
B PERT+PARAPERT DNA PNL SPEC NAA+PROBE: SIGNIFICANT CHANGE UP
BASOPHILS # BLD AUTO: 0.03 K/UL — SIGNIFICANT CHANGE UP (ref 0–0.2)
BASOPHILS # BLD AUTO: 0.03 K/UL — SIGNIFICANT CHANGE UP (ref 0–0.2)
BASOPHILS NFR BLD AUTO: 0.7 % — SIGNIFICANT CHANGE UP (ref 0–2)
BASOPHILS NFR BLD AUTO: 0.7 % — SIGNIFICANT CHANGE UP (ref 0–2)
BORDETELLA PARAPERTUSSIS (RAPRVP): SIGNIFICANT CHANGE UP
BORDETELLA PARAPERTUSSIS (RAPRVP): SIGNIFICANT CHANGE UP
BUN SERPL-MCNC: 12 MG/DL — SIGNIFICANT CHANGE UP (ref 7–23)
BUN SERPL-MCNC: 12 MG/DL — SIGNIFICANT CHANGE UP (ref 7–23)
C PNEUM DNA SPEC QL NAA+PROBE: SIGNIFICANT CHANGE UP
C PNEUM DNA SPEC QL NAA+PROBE: SIGNIFICANT CHANGE UP
CALCIUM SERPL-MCNC: 8.2 MG/DL — LOW (ref 8.4–10.5)
CALCIUM SERPL-MCNC: 8.2 MG/DL — LOW (ref 8.4–10.5)
CHLORIDE SERPL-SCNC: 107 MMOL/L — SIGNIFICANT CHANGE UP (ref 98–107)
CHLORIDE SERPL-SCNC: 107 MMOL/L — SIGNIFICANT CHANGE UP (ref 98–107)
CO2 SERPL-SCNC: 20 MMOL/L — LOW (ref 22–31)
CO2 SERPL-SCNC: 20 MMOL/L — LOW (ref 22–31)
CREAT SERPL-MCNC: 1.63 MG/DL — HIGH (ref 0.5–1.3)
CREAT SERPL-MCNC: 1.63 MG/DL — HIGH (ref 0.5–1.3)
EGFR: 44 ML/MIN/1.73M2 — LOW
EGFR: 44 ML/MIN/1.73M2 — LOW
EOSINOPHIL # BLD AUTO: 0.46 K/UL — SIGNIFICANT CHANGE UP (ref 0–0.5)
EOSINOPHIL # BLD AUTO: 0.46 K/UL — SIGNIFICANT CHANGE UP (ref 0–0.5)
EOSINOPHIL NFR BLD AUTO: 10.2 % — HIGH (ref 0–6)
EOSINOPHIL NFR BLD AUTO: 10.2 % — HIGH (ref 0–6)
FLUAV SUBTYP SPEC NAA+PROBE: SIGNIFICANT CHANGE UP
FLUAV SUBTYP SPEC NAA+PROBE: SIGNIFICANT CHANGE UP
FLUBV RNA SPEC QL NAA+PROBE: SIGNIFICANT CHANGE UP
FLUBV RNA SPEC QL NAA+PROBE: SIGNIFICANT CHANGE UP
GLUCOSE BLDC GLUCOMTR-MCNC: 112 MG/DL — HIGH (ref 70–99)
GLUCOSE BLDC GLUCOMTR-MCNC: 112 MG/DL — HIGH (ref 70–99)
GLUCOSE BLDC GLUCOMTR-MCNC: 86 MG/DL — SIGNIFICANT CHANGE UP (ref 70–99)
GLUCOSE BLDC GLUCOMTR-MCNC: 86 MG/DL — SIGNIFICANT CHANGE UP (ref 70–99)
GLUCOSE SERPL-MCNC: 80 MG/DL — SIGNIFICANT CHANGE UP (ref 70–99)
GLUCOSE SERPL-MCNC: 80 MG/DL — SIGNIFICANT CHANGE UP (ref 70–99)
HADV DNA SPEC QL NAA+PROBE: SIGNIFICANT CHANGE UP
HADV DNA SPEC QL NAA+PROBE: SIGNIFICANT CHANGE UP
HCOV 229E RNA SPEC QL NAA+PROBE: SIGNIFICANT CHANGE UP
HCOV 229E RNA SPEC QL NAA+PROBE: SIGNIFICANT CHANGE UP
HCOV HKU1 RNA SPEC QL NAA+PROBE: SIGNIFICANT CHANGE UP
HCOV HKU1 RNA SPEC QL NAA+PROBE: SIGNIFICANT CHANGE UP
HCOV NL63 RNA SPEC QL NAA+PROBE: SIGNIFICANT CHANGE UP
HCOV NL63 RNA SPEC QL NAA+PROBE: SIGNIFICANT CHANGE UP
HCOV OC43 RNA SPEC QL NAA+PROBE: SIGNIFICANT CHANGE UP
HCOV OC43 RNA SPEC QL NAA+PROBE: SIGNIFICANT CHANGE UP
HCT VFR BLD CALC: 22.6 % — LOW (ref 39–50)
HCT VFR BLD CALC: 22.6 % — LOW (ref 39–50)
HGB BLD-MCNC: 7.5 G/DL — LOW (ref 13–17)
HGB BLD-MCNC: 7.5 G/DL — LOW (ref 13–17)
HMPV RNA SPEC QL NAA+PROBE: SIGNIFICANT CHANGE UP
HMPV RNA SPEC QL NAA+PROBE: SIGNIFICANT CHANGE UP
HPIV1 RNA SPEC QL NAA+PROBE: SIGNIFICANT CHANGE UP
HPIV1 RNA SPEC QL NAA+PROBE: SIGNIFICANT CHANGE UP
HPIV2 RNA SPEC QL NAA+PROBE: SIGNIFICANT CHANGE UP
HPIV2 RNA SPEC QL NAA+PROBE: SIGNIFICANT CHANGE UP
HPIV3 RNA SPEC QL NAA+PROBE: SIGNIFICANT CHANGE UP
HPIV3 RNA SPEC QL NAA+PROBE: SIGNIFICANT CHANGE UP
HPIV4 RNA SPEC QL NAA+PROBE: SIGNIFICANT CHANGE UP
HPIV4 RNA SPEC QL NAA+PROBE: SIGNIFICANT CHANGE UP
IANC: 1.4 K/UL — LOW (ref 1.8–7.4)
IANC: 1.4 K/UL — LOW (ref 1.8–7.4)
IMM GRANULOCYTES NFR BLD AUTO: 0.2 % — SIGNIFICANT CHANGE UP (ref 0–0.9)
IMM GRANULOCYTES NFR BLD AUTO: 0.2 % — SIGNIFICANT CHANGE UP (ref 0–0.9)
LYMPHOCYTES # BLD AUTO: 2.09 K/UL — SIGNIFICANT CHANGE UP (ref 1–3.3)
LYMPHOCYTES # BLD AUTO: 2.09 K/UL — SIGNIFICANT CHANGE UP (ref 1–3.3)
LYMPHOCYTES # BLD AUTO: 46.3 % — HIGH (ref 13–44)
LYMPHOCYTES # BLD AUTO: 46.3 % — HIGH (ref 13–44)
M PNEUMO DNA SPEC QL NAA+PROBE: SIGNIFICANT CHANGE UP
M PNEUMO DNA SPEC QL NAA+PROBE: SIGNIFICANT CHANGE UP
MAGNESIUM SERPL-MCNC: 1.9 MG/DL — SIGNIFICANT CHANGE UP (ref 1.6–2.6)
MAGNESIUM SERPL-MCNC: 1.9 MG/DL — SIGNIFICANT CHANGE UP (ref 1.6–2.6)
MCHC RBC-ENTMCNC: 26.5 PG — LOW (ref 27–34)
MCHC RBC-ENTMCNC: 26.5 PG — LOW (ref 27–34)
MCHC RBC-ENTMCNC: 33.2 GM/DL — SIGNIFICANT CHANGE UP (ref 32–36)
MCHC RBC-ENTMCNC: 33.2 GM/DL — SIGNIFICANT CHANGE UP (ref 32–36)
MCV RBC AUTO: 79.9 FL — LOW (ref 80–100)
MCV RBC AUTO: 79.9 FL — LOW (ref 80–100)
MONOCYTES # BLD AUTO: 0.52 K/UL — SIGNIFICANT CHANGE UP (ref 0–0.9)
MONOCYTES # BLD AUTO: 0.52 K/UL — SIGNIFICANT CHANGE UP (ref 0–0.9)
MONOCYTES NFR BLD AUTO: 11.5 % — SIGNIFICANT CHANGE UP (ref 2–14)
MONOCYTES NFR BLD AUTO: 11.5 % — SIGNIFICANT CHANGE UP (ref 2–14)
NEUTROPHILS # BLD AUTO: 1.4 K/UL — LOW (ref 1.8–7.4)
NEUTROPHILS # BLD AUTO: 1.4 K/UL — LOW (ref 1.8–7.4)
NEUTROPHILS NFR BLD AUTO: 31.1 % — LOW (ref 43–77)
NEUTROPHILS NFR BLD AUTO: 31.1 % — LOW (ref 43–77)
NRBC # BLD: 0 /100 WBCS — SIGNIFICANT CHANGE UP (ref 0–0)
NRBC # BLD: 0 /100 WBCS — SIGNIFICANT CHANGE UP (ref 0–0)
NRBC # FLD: 0 K/UL — SIGNIFICANT CHANGE UP (ref 0–0)
NRBC # FLD: 0 K/UL — SIGNIFICANT CHANGE UP (ref 0–0)
PHOSPHATE SERPL-MCNC: 3.4 MG/DL — SIGNIFICANT CHANGE UP (ref 2.5–4.5)
PHOSPHATE SERPL-MCNC: 3.4 MG/DL — SIGNIFICANT CHANGE UP (ref 2.5–4.5)
PLATELET # BLD AUTO: 235 K/UL — SIGNIFICANT CHANGE UP (ref 150–400)
PLATELET # BLD AUTO: 235 K/UL — SIGNIFICANT CHANGE UP (ref 150–400)
POTASSIUM SERPL-MCNC: 4.1 MMOL/L — SIGNIFICANT CHANGE UP (ref 3.5–5.3)
POTASSIUM SERPL-MCNC: 4.1 MMOL/L — SIGNIFICANT CHANGE UP (ref 3.5–5.3)
POTASSIUM SERPL-SCNC: 4.1 MMOL/L — SIGNIFICANT CHANGE UP (ref 3.5–5.3)
POTASSIUM SERPL-SCNC: 4.1 MMOL/L — SIGNIFICANT CHANGE UP (ref 3.5–5.3)
RAPID RVP RESULT: SIGNIFICANT CHANGE UP
RAPID RVP RESULT: SIGNIFICANT CHANGE UP
RBC # BLD: 2.83 M/UL — LOW (ref 4.2–5.8)
RBC # BLD: 2.83 M/UL — LOW (ref 4.2–5.8)
RBC # FLD: 16.2 % — HIGH (ref 10.3–14.5)
RBC # FLD: 16.2 % — HIGH (ref 10.3–14.5)
RSV RNA SPEC QL NAA+PROBE: SIGNIFICANT CHANGE UP
RSV RNA SPEC QL NAA+PROBE: SIGNIFICANT CHANGE UP
RV+EV RNA SPEC QL NAA+PROBE: SIGNIFICANT CHANGE UP
RV+EV RNA SPEC QL NAA+PROBE: SIGNIFICANT CHANGE UP
SARS-COV-2 RNA SPEC QL NAA+PROBE: SIGNIFICANT CHANGE UP
SARS-COV-2 RNA SPEC QL NAA+PROBE: SIGNIFICANT CHANGE UP
SODIUM SERPL-SCNC: 138 MMOL/L — SIGNIFICANT CHANGE UP (ref 135–145)
SODIUM SERPL-SCNC: 138 MMOL/L — SIGNIFICANT CHANGE UP (ref 135–145)
WBC # BLD: 4.51 K/UL — SIGNIFICANT CHANGE UP (ref 3.8–10.5)
WBC # BLD: 4.51 K/UL — SIGNIFICANT CHANGE UP (ref 3.8–10.5)
WBC # FLD AUTO: 4.51 K/UL — SIGNIFICANT CHANGE UP (ref 3.8–10.5)
WBC # FLD AUTO: 4.51 K/UL — SIGNIFICANT CHANGE UP (ref 3.8–10.5)

## 2023-11-07 RX ORDER — TAFAMIDIS 61 MG/1
1 CAPSULE, LIQUID FILLED ORAL
Refills: 0 | DISCHARGE

## 2023-11-07 RX ORDER — ACETAMINOPHEN 500 MG
2 TABLET ORAL
Qty: 0 | Refills: 0 | DISCHARGE
Start: 2023-11-07

## 2023-11-07 RX ORDER — TAFAMIDIS 61 MG/1
1 CAPSULE, LIQUID FILLED ORAL
Qty: 0 | Refills: 0 | DISCHARGE
Start: 2023-11-07

## 2023-11-07 RX ADMIN — HEPARIN SODIUM 5000 UNIT(S): 5000 INJECTION INTRAVENOUS; SUBCUTANEOUS at 05:21

## 2023-11-07 RX ADMIN — Medication 100 MILLIGRAM(S): at 08:00

## 2023-11-07 RX ADMIN — Medication 1 SPRAY(S): at 17:37

## 2023-11-07 RX ADMIN — Medication 0.6 MILLIGRAM(S): at 11:35

## 2023-11-07 RX ADMIN — FINASTERIDE 5 MILLIGRAM(S): 5 TABLET, FILM COATED ORAL at 11:34

## 2023-11-07 RX ADMIN — Medication 1 SPRAY(S): at 05:22

## 2023-11-07 NOTE — PROGRESS NOTE ADULT - PROVIDER SPECIALTY LIST ADULT
Cardiology
Internal Medicine
Internal Medicine
Urology
Cardiology
Cardiology
Internal Medicine
Nephrology

## 2023-11-07 NOTE — PHYSICAL THERAPY INITIAL EVALUATION ADULT - ADDITIONAL COMMENTS
Pt lives with his wife and daughter in a house with 3 stairs to enter; Pt resides on the first floor. prior to admission Pt reported requiring a little assistance with bed mobility/transfers and required supervision with ambulating household distances with a rollator. Pt denies any falls over the last 6 months.     Pt. left comfortable in bed with all tubes/lines intact, head of the bed elevated, +bed alarm, call bell in reach and in NAD.

## 2023-11-07 NOTE — DISCHARGE NOTE PROVIDER - NSDCCPCAREPLAN_GEN_ALL_CORE_FT
PRINCIPAL DISCHARGE DIAGNOSIS  Diagnosis: Chest pain  Assessment and Plan of Treatment: Resolved. Continue all medications as prescribed. Follow up with your cardiologist in 1-2 weeks for re-eval and further management. If you do not have a cardiologist you can follow up with Dr Saravia (contact info provided). Return to ED for persistent chest pain or shortness of breath.      SECONDARY DISCHARGE DIAGNOSES  Diagnosis: Urinary retention  Assessment and Plan of Treatment: Continue with Harkins catheter care. Follow up with your urologist Dr Brenner, as an outpatient for MRI prostate and further management     PRINCIPAL DISCHARGE DIAGNOSIS  Diagnosis: Chest pain  Assessment and Plan of Treatment: Resolved. Continue all medications as prescribed. Follow up with your cardiologist Dr Mireles in 1-2 weeks for re-eval and further management. Return to ED for persistent chest pain or shortness of breath.      SECONDARY DISCHARGE DIAGNOSES  Diagnosis: Urinary retention  Assessment and Plan of Treatment: Continue with Harkins catheter care. Follow up with your urologist Dr Brenner, as an outpatient for MRI prostate and further management

## 2023-11-07 NOTE — DISCHARGE NOTE PROVIDER - CARE PROVIDER_API CALL
Dago Saravia  Cardiovascular Disease  [address], [address]  [place] [address]  Phone: [phone]  Fax: [phone]  Follow Up Time:     Dr Larkin, PCP,   Phone: (   )    -  Fax: (   )    -  Follow Up Time:     Reji Brenner  Urology  [address] [place] [address], [address]  [place] [address]  Phone: [phone]  Fax: [phone]  Follow Up Time:    Reji Bernner  Urology  [address] [place] [address], [address]  [place] [address]  Phone: [phone]  Fax: [phone]  Follow Up Time:     Dr Larkin, PCP,   Phone: (   )    -  Fax: (   )    -  Follow Up Time:     Dago Mireles  Cardiology  [address]  [place] [address]  Phone: [phone]  Fax: [phone]  Follow Up Time:

## 2023-11-07 NOTE — DISCHARGE NOTE NURSING/CASE MANAGEMENT/SOCIAL WORK - NSDCPEFALRISK_GEN_ALL_CORE
For information on Fall & Injury Prevention, visit: [email or web address] OR  [email or web address] OR  https://www.cdc.gov/steadi/patient.html

## 2023-11-07 NOTE — DISCHARGE NOTE NURSING/CASE MANAGEMENT/SOCIAL WORK - PATIENT PORTAL LINK FT
You can access the FollowMyHealth Patient Portal offered by [facility] by registering at the following website: [email or web address]. By joining [facility]’s FollowMyHealth portal, you will also be able to view your health information using other applications (apps) compatible with our system.

## 2023-11-07 NOTE — PROGRESS NOTE ADULT - SUBJECTIVE AND OBJECTIVE BOX
Patient is a 72y old  Male who presents with a chief complaint of     SUBJECTIVE / OVERNIGHT EVENTS: awaiting MR prostate, TTE is stable.     MEDICATIONS  (STANDING):  colchicine 0.6 milliGRAM(s) Oral daily  dextrose 5% + sodium chloride 0.9%. 1000 milliLiter(s) (75 mL/Hr) IV Continuous <Continuous>  dextrose 50% Injectable 25 Gram(s) IV Push once  dextrose 50% Injectable 12.5 Gram(s) IV Push once  dextrose 50% Injectable 25 Gram(s) IV Push once  dextrose Oral Gel 15 Gram(s) Oral once  finasteride 5 milliGRAM(s) Oral daily  fluticasone propionate 50 MICROgram(s)/spray Nasal Spray 1 Spray(s) Both Nostrils two times a day  glucagon  Injectable 1 milliGRAM(s) IntraMuscular once  heparin   Injectable 5000 Unit(s) SubCutaneous every 12 hours  influenza  Vaccine (HIGH DOSE) 0.7 milliLiter(s) IntraMuscular once  sodium chloride 0.65% Nasal 1 Spray(s) Both Nostrils two times a day  tamsulosin 0.4 milliGRAM(s) Oral at bedtime    MEDICATIONS  (PRN):  acetaminophen     Tablet .. 650 milliGRAM(s) Oral every 6 hours PRN Temp greater or equal to 38C (100.4F), Mild Pain (1 - 3)  aluminum hydroxide/magnesium hydroxide/simethicone Suspension 30 milliLiter(s) Oral every 4 hours PRN Dyspepsia  oxyCODONE    IR 2.5 milliGRAM(s) Oral every 6 hours PRN Moderate Pain (4 - 6)      Vital Signs Last 24 Hrs  T(F): 98.6 (11-06-23 @ 14:25), Max: 98.6 (11-06-23 @ 14:25)  HR: 85 (11-06-23 @ 14:25) (75 - 85)  BP: 103/67 (11-06-23 @ 14:25) (100/66 - 103/67)  RR: 18 (11-06-23 @ 14:25) (18 - 18)  SpO2: 100% (11-06-23 @ 14:25) (98% - 100%)  Telemetry:   CAPILLARY BLOOD GLUCOSE      POCT Blood Glucose.: 94 mg/dL (06 Nov 2023 17:08)  POCT Blood Glucose.: 103 mg/dL (06 Nov 2023 12:01)  POCT Blood Glucose.: 97 mg/dL (06 Nov 2023 08:12)  POCT Blood Glucose.: 116 mg/dL (05 Nov 2023 22:35)    I&O's Summary    05 Nov 2023 07:01  -  06 Nov 2023 07:00  --------------------------------------------------------  IN: 1790 mL / OUT: 2000 mL / NET: -210 mL    06 Nov 2023 07:01  -  06 Nov 2023 19:57  --------------------------------------------------------  IN: 725 mL / OUT: 300 mL / NET: 425 mL        PHYSICAL EXAM:  GENERAL: NAD, well-developed  HEAD:  Atraumatic, Normocephalic  EYES: EOMI, PERRLA, conjunctiva and sclera clear  NECK: Supple, No JVD  CHEST/LUNG: Clear to auscultation bilaterally; No wheeze  HEART: Regular rate and rhythm; No murmurs, rubs, or gallops  ABDOMEN: Soft, Nontender, Nondistended; Bowel sounds present  EXTREMITIES:  2+ Peripheral Pulses, No clubbing, cyanosis, or edema  PSYCH: AAOx3  NEUROLOGY: non-focal  SKIN: No rashes or lesions    LABS:                        8.0    4.00  )-----------( 243      ( 06 Nov 2023 06:22 )             23.6     11-06    139  |  109<H>  |  14  ----------------------------<  90  4.1   |  21<L>  |  1.65<H>    Ca    8.1<L>      06 Nov 2023 06:22  Phos  3.3     11-06  Mg     1.80     11-06            Urinalysis Basic - ( 06 Nov 2023 06:22 )    Color: x / Appearance: x / SG: x / pH: x  Gluc: 90 mg/dL / Ketone: x  / Bili: x / Urobili: x   Blood: x / Protein: x / Nitrite: x   Leuk Esterase: x / RBC: x / WBC x   Sq Epi: x / Non Sq Epi: x / Bacteria: x        RADIOLOGY & ADDITIONAL TESTS:    Imaging Personally Reviewed:    Consultant(s) Notes Reviewed:      Care Discussed with Consultants/Other Providers:  
CARDIOLOGY FOLLOW UP - Dr. Saravia  DATE OF SERVICE: 11/6/23     CC no cp or sob       REVIEW OF SYSTEMS:  CONSTITUTIONAL: No fever, weight loss, or fatigue  RESPIRATORY: No cough, wheezing, chills or hemoptysis; No Shortness of Breath  CARDIOVASCULAR: No chest pain, palpitations, passing out, dizziness, or leg swelling  GASTROINTESTINAL: No abdominal or epigastric pain. No nausea, vomiting, or hematemesis; No diarrhea or constipation. No melena or hematochezia.  VASCULAR: No edema     PHYSICAL EXAM:  T(C): 36.8 (11-06-23 @ 06:34), Max: 36.8 (11-06-23 @ 06:34)  HR: 81 (11-06-23 @ 06:34) (73 - 93)  BP: 102/64 (11-06-23 @ 06:34) (100/63 - 102/64)  RR: 18 (11-06-23 @ 06:34) (18 - 18)  SpO2: 98% (11-06-23 @ 06:34) (97% - 100%)  Wt(kg): --  I&O's Summary    05 Nov 2023 07:01  -  06 Nov 2023 07:00  --------------------------------------------------------  IN: 1790 mL / OUT: 2000 mL / NET: -210 mL    06 Nov 2023 07:01  -  06 Nov 2023 10:23  --------------------------------------------------------  IN: 275 mL / OUT: 0 mL / NET: 275 mL        Appearance: Normal	  Cardiovascular: Normal S1 S2,RRR, No JVD, No murmurs  Respiratory:crackles at base   Gastrointestinal:  Soft, Non-tender, + BS	  Extremities: le edema +1bl       Home Medications:  sodium chloride 0.65% nasal spray: 2 spray(s) nasal 3 times a day (04 Nov 2023 11:52)  Vyndamax 61 mg oral capsule: 1 cap(s) orally once a day (04 Nov 2023 11:52)      MEDICATIONS  (STANDING):  colchicine 0.6 milliGRAM(s) Oral daily  dextrose 5% + sodium chloride 0.9%. 1000 milliLiter(s) (75 mL/Hr) IV Continuous <Continuous>  dextrose 50% Injectable 25 Gram(s) IV Push once  dextrose 50% Injectable 12.5 Gram(s) IV Push once  dextrose 50% Injectable 25 Gram(s) IV Push once  dextrose Oral Gel 15 Gram(s) Oral once  finasteride 5 milliGRAM(s) Oral daily  fluticasone propionate 50 MICROgram(s)/spray Nasal Spray 1 Spray(s) Both Nostrils two times a day  glucagon  Injectable 1 milliGRAM(s) IntraMuscular once  heparin   Injectable 5000 Unit(s) SubCutaneous every 12 hours  influenza  Vaccine (HIGH DOSE) 0.7 milliLiter(s) IntraMuscular once  sodium chloride 0.65% Nasal 1 Spray(s) Both Nostrils two times a day  tamsulosin 0.4 milliGRAM(s) Oral at bedtime      TELEMETRY: nsr 	    ECG:  	  RADIOLOGY:      < from: CT Abdomen and Pelvis No Cont (11.05.23 @ 09:04) >  IMPRESSION:  Harkins catheter resides within a massive prostate with resultant urinary   obstruction and mild bilateral hydronephrosis.    Cardiomegaly with pulmonary edema pattern at the lung bases.    Preliminary findings regarding malpositioned Harkins catheter were   discussed with BEV Crowder 11/5/2023 9:22 AM by Dr. Wang with read   back confirmation.    --- End of Report ---    < end of copied text >    DIAGNOSTIC TESTING:  [ ] Echocardiogram:  [ ]  Catheterization:  [ ] Stress Test:    OTHER: 	    LABS:	 	    Troponin T, High Sensitivity Result: 188 ng/L (11-04 @ 10:45)  Creatine Kinase, Serum: 227 U/L [30 - 200] (11-04 @ 10:45)  CKMB Units: 5.1 ng/mL (11-04 @ 10:45)  Troponin T, High Sensitivity Result: 157 ng/L (11-03 @ 22:05)  Troponin T, High Sensitivity Result: 148 ng/L (11-03 @ 19:41)                          8.0    4.00  )-----------( 243      ( 06 Nov 2023 06:22 )             23.6     11-06    139  |  109<H>  |  14  ----------------------------<  90  4.1   |  21<L>  |  1.65<H>    Ca    8.1<L>      06 Nov 2023 06:22  Phos  3.3     11-06  Mg     1.80     11-06    TPro  6.0  /  Alb  3.1<L>  /  TBili  0.6  /  DBili  x   /  AST  46<H>  /  ALT  22  /  AlkPhos  156<H>  11-04    PT/INR - ( 04 Nov 2023 10:45 )   PT: 16.3 sec;   INR: 1.46 ratio         PTT - ( 04 Nov 2023 10:45 )  PTT:38.6 sec        
CARDIOLOGY FOLLOW UP - Dr. Saravia  DATE OF SERVICE: 11/7/23     CC no cp or sob        REVIEW OF SYSTEMS:  CONSTITUTIONAL: No fever, weight loss, or fatigue  RESPIRATORY: No cough, wheezing, chills or hemoptysis; No Shortness of Breath  CARDIOVASCULAR: No chest pain, palpitations, passing out, dizziness, or leg swelling  GASTROINTESTINAL: No abdominal or epigastric pain. No nausea, vomiting, or hematemesis; No diarrhea or constipation. No melena or hematochezia.  VASCULAR: No edema     PHYSICAL EXAM:  T(C): 36.6 (11-07-23 @ 05:00), Max: 37 (11-06-23 @ 14:25)  HR: 82 (11-07-23 @ 05:00) (82 - 88)  BP: 102/72 (11-07-23 @ 05:00) (102/72 - 109/68)  RR: 18 (11-07-23 @ 05:00) (18 - 20)  SpO2: 97% (11-07-23 @ 05:00) (97% - 100%)  Wt(kg): --  I&O's Summary    06 Nov 2023 07:01  -  07 Nov 2023 07:00  --------------------------------------------------------  IN: 725 mL / OUT: 300 mL / NET: 425 mL        Appearance: Normal	  Cardiovascular: Normal S1 S2,RRR, No JVD, No murmurs  Respiratory: Lungs clear to auscultation	  Gastrointestinal:  Soft, Non-tender, + BS	  Extremities: Normal range of motion, No clubbing, cyanosis or edema      Home Medications:  sodium chloride 0.65% nasal spray: 2 spray(s) nasal 3 times a day (04 Nov 2023 11:52)  Vyndamax 61 mg oral capsule: 1 cap(s) orally once a day (04 Nov 2023 11:52)      MEDICATIONS  (STANDING):  colchicine 0.6 milliGRAM(s) Oral daily  dextrose 50% Injectable 25 Gram(s) IV Push once  dextrose 50% Injectable 12.5 Gram(s) IV Push once  dextrose 50% Injectable 25 Gram(s) IV Push once  dextrose Oral Gel 15 Gram(s) Oral once  finasteride 5 milliGRAM(s) Oral daily  fluticasone propionate 50 MICROgram(s)/spray Nasal Spray 1 Spray(s) Both Nostrils two times a day  glucagon  Injectable 1 milliGRAM(s) IntraMuscular once  heparin   Injectable 5000 Unit(s) SubCutaneous every 12 hours  influenza  Vaccine (HIGH DOSE) 0.7 milliLiter(s) IntraMuscular once  sodium chloride 0.65% Nasal 1 Spray(s) Both Nostrils two times a day  tamsulosin 0.4 milliGRAM(s) Oral at bedtime      TELEMETRY: nsr  	    ECG:  	  RADIOLOGY:   DIAGNOSTIC TESTING:  [ ] Echocardiogram:  < from: TTE W or WO Ultrasound Enhancing Agent (11.06.23 @ 10:22) >     CONCLUSIONS:      1. The left ventricular cavity is small. Left ventricular systolic function is normal with a calculated ejection fraction of 69 % by the Freeman's biplane method of disks. There is moderate (grade 2) left ventricular diastolic dysfunction. Severe left ventricular hypertrophy. Left ventricular global longitudinal strain is -10.7 % is abnormal (> -16%). Normal strain measurements noted at the left ventricular apex. Images were acquired on a [employer] ultrasound system and processed on the ultrasound machine with a heart rate of 82 bpm and a blood pressure of 102/64 mmHg. Findings suggestive of infiltrative cardiomyopathy.   2. Normal right ventricular cavity size, increasedwall thickness, and probably normal systolic function.   3. The left atrium is normal in size.   4. The inferior vena cava is normal in size measuring 1.61 cm in diameter, (normal <2.1cm) with normal inspiratory collapse (normal >50%) consistent with normal right atrial pressure (~3, range 0-5mmHg).   5. No pericardial effusion seen.      < end of copied text >    [ ]  Catheterization:  [ ] Stress Test:    OTHER: 	    LABS:	 	    Troponin T, High Sensitivity Result: 188 ng/L (11-04 @ 10:45)  Creatine Kinase, Serum: 227 U/L [30 - 200] (11-04 @ 10:45)  CKMB Units: 5.1 ng/mL (11-04 @ 10:45)  Troponin T, High Sensitivity Result: 157 ng/L (11-03 @ 22:05)  Troponin T, High Sensitivity Result: 148 ng/L (11-03 @ 19:41)                          7.5    4.51  )-----------( 235      ( 07 Nov 2023 06:46 )             22.6     11-07    138  |  107  |  12  ----------------------------<  80  4.1   |  20<L>  |  1.63<H>    Ca    8.2<L>      07 Nov 2023 06:46  Phos  3.4     11-07  Mg     1.90     11-07              
CARDIOLOGY FOLLOW UP NOTE - DR. BEST    Patient Name: DEEPTHI GÓMEZ    Date of Service: 11-05-23 @ 09:09    Patient seen and examined  no new complaints      Subjective:    cv: denies chest pain, dyspnea, palpitations, dizziness  pulmonary: denies cough  GI: denies abdominal pain, nausea, vomiting  vascular/legs: no edema   skin: no rash  ROS: otherwise negative   overnight events:      PHYSICAL EXAM:  T(C): 37 (11-05-23 @ 05:57), Max: 37.3 (11-04-23 @ 20:50)  HR: 95 (11-05-23 @ 05:57) (77 - 97)  BP: 116/68 (11-05-23 @ 05:57) (90/57 - 116/68)  RR: 18 (11-05-23 @ 05:57) (16 - 18)  SpO2: 98% (11-05-23 @ 05:57) (96% - 100%)  Wt(kg): --  I&O's Summary    04 Nov 2023 08:01  -  05 Nov 2023 07:00  --------------------------------------------------------  IN: 1140 mL / OUT: 800 mL / NET: 340 mL      Daily Height in cm: 162.56 (04 Nov 2023 20:50)    Daily     Appearance: Normal	  Cardiovascular: Normal S1 S2,RRR, No JVD, No murmurs  Respiratory: Lungs clear to auscultation	  Gastrointestinal:  Soft, Non-tender, + BS	  Extremities: Normal range of motion, No clubbing, cyanosis or edema      Home Medications:  sodium chloride 0.65% nasal spray: 2 spray(s) nasal 3 times a day (04 Nov 2023 11:52)  Vyndamax 61 mg oral capsule: 1 cap(s) orally once a day (04 Nov 2023 11:52)      MEDICATIONS  (STANDING):  cefTRIAXone   IVPB 1000 milliGRAM(s) IV Intermittent every 24 hours  colchicine 0.6 milliGRAM(s) Oral daily  dextrose 5% + sodium chloride 0.9%. 1000 milliLiter(s) (75 mL/Hr) IV Continuous <Continuous>  dextrose 50% Injectable 25 Gram(s) IV Push once  dextrose 50% Injectable 12.5 Gram(s) IV Push once  dextrose 50% Injectable 25 Gram(s) IV Push once  dextrose Oral Gel 15 Gram(s) Oral once  finasteride 5 milliGRAM(s) Oral daily  fluticasone propionate 50 MICROgram(s)/spray Nasal Spray 1 Spray(s) Both Nostrils two times a day  glucagon  Injectable 1 milliGRAM(s) IntraMuscular once  heparin   Injectable 5000 Unit(s) SubCutaneous every 12 hours  sodium chloride 0.65% Nasal 1 Spray(s) Both Nostrils two times a day  tamsulosin 0.4 milliGRAM(s) Oral at bedtime      TELEMETRY: 	    ECG:  	  RADIOLOGY:   DIAGNOSTIC TESTING:  [ ] Echocardiogram:  [ ] Catheterization:  [ ] Stress Test:    OTHER: 	    LABS:	 	    CARDIAC MARKERS:        Troponin T, High Sensitivity Result: 188 ng/L (11-04 @ 10:45)  Troponin T, High Sensitivity Result: 157 ng/L (11-03 @ 22:05)  Troponin T, High Sensitivity Result: 148 ng/L (11-03 @ 19:41)                                8.0    5.45  )-----------( 243      ( 05 Nov 2023 05:26 )             23.6     11-05    137  |  105  |  16  ----------------------------<  88  4.0   |  20<L>  |  1.82<H>    Ca    8.4      05 Nov 2023 05:26  Phos  2.5     11-05  Mg     1.70     11-05    TPro  6.0  /  Alb  3.1<L>  /  TBili  0.6  /  DBili  x   /  AST  46<H>  /  ALT  22  /  AlkPhos  156<H>  11-04    proBNP:   PT/INR - ( 04 Nov 2023 10:45 )   PT: 16.3 sec;   INR: 1.46 ratio         PTT - ( 04 Nov 2023 10:45 )  PTT:38.6 sec  Lipid Profile:   HgA1c:     Creatinine: 1.82 mg/dL (11-05-23 @ 05:26)  Creatinine: 1.81 mg/dL (11-04-23 @ 10:45)  Creatinine: 1.81 mg/dL (11-03-23 @ 19:41)            
Patient is a 72y old  Male who presents with a chief complaint of     SUBJECTIVE / OVERNIGHT EVENTS: morales in place, had a  F/U, urine Cx is neg, blood cx ntd, no contraindication to dc planning, will dc ABx. ptn has a massive prostate , will need outptn  F/U    MEDICATIONS  (STANDING):  cefTRIAXone   IVPB 1000 milliGRAM(s) IV Intermittent every 24 hours  colchicine 0.6 milliGRAM(s) Oral daily  dextrose 5% + sodium chloride 0.9%. 1000 milliLiter(s) (75 mL/Hr) IV Continuous <Continuous>  dextrose 50% Injectable 25 Gram(s) IV Push once  dextrose 50% Injectable 12.5 Gram(s) IV Push once  dextrose 50% Injectable 25 Gram(s) IV Push once  dextrose Oral Gel 15 Gram(s) Oral once  finasteride 5 milliGRAM(s) Oral daily  fluticasone propionate 50 MICROgram(s)/spray Nasal Spray 1 Spray(s) Both Nostrils two times a day  glucagon  Injectable 1 milliGRAM(s) IntraMuscular once  heparin   Injectable 5000 Unit(s) SubCutaneous every 12 hours  sodium chloride 0.65% Nasal 1 Spray(s) Both Nostrils two times a day  tamsulosin 0.4 milliGRAM(s) Oral at bedtime    MEDICATIONS  (PRN):  acetaminophen     Tablet .. 650 milliGRAM(s) Oral every 6 hours PRN Temp greater or equal to 38C (100.4F), Mild Pain (1 - 3)  aluminum hydroxide/magnesium hydroxide/simethicone Suspension 30 milliLiter(s) Oral every 4 hours PRN Dyspepsia  oxyCODONE    IR 2.5 milliGRAM(s) Oral every 6 hours PRN Moderate Pain (4 - 6)      Vital Signs Last 24 Hrs  T(F): 97.9 (11-05-23 @ 10:42), Max: 99.2 (11-04-23 @ 20:50)  HR: 93 (11-05-23 @ 10:42) (86 - 97)  BP: 100/63 (11-05-23 @ 10:42) (100/63 - 116/68)  RR: 18 (11-05-23 @ 10:42) (18 - 18)  SpO2: 99% (11-05-23 @ 10:42) (97% - 99%)  Telemetry:   CAPILLARY BLOOD GLUCOSE      POCT Blood Glucose.: 141 mg/dL (05 Nov 2023 12:35)  POCT Blood Glucose.: 89 mg/dL (05 Nov 2023 08:32)  POCT Blood Glucose.: 99 mg/dL (04 Nov 2023 23:06)  POCT Blood Glucose.: 89 mg/dL (04 Nov 2023 21:22)  POCT Blood Glucose.: 124 mg/dL (04 Nov 2023 20:07)  POCT Blood Glucose.: 76 mg/dL (04 Nov 2023 18:04)    I&O's Summary    04 Nov 2023 08:01  -  05 Nov 2023 07:00  --------------------------------------------------------  IN: 1140 mL / OUT: 800 mL / NET: 340 mL    05 Nov 2023 07:01  -  05 Nov 2023 16:38  --------------------------------------------------------  IN: 300 mL / OUT: 1000 mL / NET: -700 mL        PHYSICAL EXAM:  GENERAL: NAD, well-developed  HEAD:  Atraumatic, Normocephalic  EYES: EOMI, PERRLA, conjunctiva and sclera clear  NECK: Supple, No JVD  CHEST/LUNG: Clear to auscultation bilaterally; No wheeze  HEART: Regular rate and rhythm; No murmurs, rubs, or gallops  ABDOMEN: Soft, Nontender, Nondistended; Bowel sounds present  EXTREMITIES:  2+ Peripheral Pulses, No clubbing, cyanosis, or edema  PSYCH: AAOx3  NEUROLOGY: non-focal  SKIN: No rashes or lesions    LABS:                        8.0    5.45  )-----------( 243      ( 05 Nov 2023 05:26 )             23.6     11-05    137  |  105  |  16  ----------------------------<  88  4.0   |  20<L>  |  1.82<H>    Ca    8.4      05 Nov 2023 05:26  Phos  2.5     11-05  Mg     1.70     11-05    TPro  6.0  /  Alb  3.1<L>  /  TBili  0.6  /  DBili  x   /  AST  46<H>  /  ALT  22  /  AlkPhos  156<H>  11-04    PT/INR - ( 04 Nov 2023 10:45 )   PT: 16.3 sec;   INR: 1.46 ratio         PTT - ( 04 Nov 2023 10:45 )  PTT:38.6 sec  CARDIAC MARKERS ( 04 Nov 2023 10:45 )  x     / x     / 227 U/L / x     / 5.1 ng/mL      Urinalysis Basic - ( 05 Nov 2023 05:26 )    Color: x / Appearance: x / SG: x / pH: x  Gluc: 88 mg/dL / Ketone: x  / Bili: x / Urobili: x   Blood: x / Protein: x / Nitrite: x   Leuk Esterase: x / RBC: x / WBC x   Sq Epi: x / Non Sq Epi: x / Bacteria: x        RADIOLOGY & ADDITIONAL TESTS:    Imaging Personally Reviewed:    Consultant(s) Notes Reviewed:      Care Discussed with Consultants/Other Providers:  
Patient is a 72y old  Male who presents with a chief complaint of Chest pain (07 Nov 2023 15:11)      SUBJECTIVE / OVERNIGHT EVENTS: no new events. dc home today    MEDICATIONS  (STANDING):  colchicine 0.6 milliGRAM(s) Oral daily  dextrose 50% Injectable 25 Gram(s) IV Push once  dextrose 50% Injectable 12.5 Gram(s) IV Push once  dextrose 50% Injectable 25 Gram(s) IV Push once  dextrose Oral Gel 15 Gram(s) Oral once  finasteride 5 milliGRAM(s) Oral daily  fluticasone propionate 50 MICROgram(s)/spray Nasal Spray 1 Spray(s) Both Nostrils two times a day  glucagon  Injectable 1 milliGRAM(s) IntraMuscular once  heparin   Injectable 5000 Unit(s) SubCutaneous every 12 hours  influenza  Vaccine (HIGH DOSE) 0.7 milliLiter(s) IntraMuscular once  sodium chloride 0.65% Nasal 1 Spray(s) Both Nostrils two times a day  tamsulosin 0.4 milliGRAM(s) Oral at bedtime    MEDICATIONS  (PRN):  acetaminophen     Tablet .. 650 milliGRAM(s) Oral every 6 hours PRN Temp greater or equal to 38C (100.4F), Mild Pain (1 - 3)  aluminum hydroxide/magnesium hydroxide/simethicone Suspension 30 milliLiter(s) Oral every 4 hours PRN Dyspepsia  benzonatate 100 milliGRAM(s) Oral every 8 hours PRN Cough  oxyCODONE    IR 2.5 milliGRAM(s) Oral every 6 hours PRN Moderate Pain (4 - 6)      Vital Signs Last 24 Hrs  T(F): 98.5 (11-07-23 @ 15:05), Max: 98.5 (11-07-23 @ 15:05)  HR: 79 (11-07-23 @ 15:05) (79 - 88)  BP: 119/75 (11-07-23 @ 15:05) (100/61 - 119/75)  RR: 20 (11-07-23 @ 15:05) (18 - 20)  SpO2: 98% (11-07-23 @ 15:05) (97% - 98%)  Telemetry:   CAPILLARY BLOOD GLUCOSE      POCT Blood Glucose.: 112 mg/dL (07 Nov 2023 12:22)  POCT Blood Glucose.: 86 mg/dL (07 Nov 2023 08:27)  POCT Blood Glucose.: 83 mg/dL (06 Nov 2023 22:17)    I&O's Summary    06 Nov 2023 07:01  -  07 Nov 2023 07:00  --------------------------------------------------------  IN: 725 mL / OUT: 300 mL / NET: 425 mL    07 Nov 2023 07:01  -  07 Nov 2023 19:32  --------------------------------------------------------  IN: 0 mL / OUT: 500 mL / NET: -500 mL        PHYSICAL EXAM:  GENERAL: NAD, well-developed  HEAD:  Atraumatic, Normocephalic  EYES: EOMI, PERRLA, conjunctiva and sclera clear  NECK: Supple, No JVD  CHEST/LUNG: Clear to auscultation bilaterally; No wheeze  HEART: Regular rate and rhythm; No murmurs, rubs, or gallops  ABDOMEN: Soft, Nontender, Nondistended; Bowel sounds present  EXTREMITIES:  2+ Peripheral Pulses, No clubbing, cyanosis, or edema  PSYCH: AAOx3  NEUROLOGY: non-focal  SKIN: No rashes or lesions    LABS:                        7.5    4.51  )-----------( 235      ( 07 Nov 2023 06:46 )             22.6     11-07    138  |  107  |  12  ----------------------------<  80  4.1   |  20<L>  |  1.63<H>    Ca    8.2<L>      07 Nov 2023 06:46  Phos  3.4     11-07  Mg     1.90     11-07            Urinalysis Basic - ( 07 Nov 2023 06:46 )    Color: x / Appearance: x / SG: x / pH: x  Gluc: 80 mg/dL / Ketone: x  / Bili: x / Urobili: x   Blood: x / Protein: x / Nitrite: x   Leuk Esterase: x / RBC: x / WBC x   Sq Epi: x / Non Sq Epi: x / Bacteria: x        RADIOLOGY & ADDITIONAL TESTS:    Imaging Personally Reviewed:    Consultant(s) Notes Reviewed:      Care Discussed with Consultants/Other Providers:  
Subjective  Patient seen at bedside. Feeling well today.     Objective    Vital signs  T(F): , Max: 98.6 (11-06-23 @ 14:25)  HR: 85 (11-06-23 @ 14:25)  BP: 103/67 (11-06-23 @ 14:25)  SpO2: 100% (11-06-23 @ 14:25)  Wt(kg): --    Output     OUT:    Indwelling Catheter - Urethral (mL): 2000 mL  Total OUT: 2000 mL    Total NET: -2000 mL      OUT:    Indwelling Catheter - Urethral (mL): 300 mL  Total OUT: 300 mL    Total NET: -300 mL          Gen: NAD  Abd: soft, nontender, nondistended  : morales secured in place, draining CYU    Labs      11-06 @ 06:22    WBC 4.00  / Hct 23.6  / SCr 1.65     11-05 @ 05:26    WBC 5.45  / Hct 23.6  / SCr 1.82         Culture - Blood (collected 11-04-23 @ 04:55)  Source: .Blood Blood-Peripheral  Preliminary Report (11-06-23 @ 09:01):    No growth at 48 Hours    Culture - Blood (collected 11-04-23 @ 04:55)  Source: .Blood Blood-Peripheral  Preliminary Report (11-06-23 @ 09:01):    No growth at 48 Hours    Culture - Urine (collected 11-04-23 @ 00:48)  Source: Clean Catch Clean Catch (Midstream)  Final Report (11-05-23 @ 12:26):    <10,000 CFU/mL Normal Urogenital Janett        Urine Cx: ?  Blood Cx: ?    Imaging
[facility] DIVISION OF KIDNEY DISEASES AND HYPERTENSION   FOLLOW UP NOTE  --------------------------------------------------------------------------------  Chief Complaint: CKD management     24 hour events/subjective: Pt. was seen and examined today. Continues to have morales discomfort otherwise denied shortness of breath, nausea, vomiting, chest discomfort.     PAST HISTORY  --------------------------------------------------------------------------------  No significant changes to PMH, PSH, FHx, SHx, unless otherwise noted    ALLERGIES & MEDICATIONS  --------------------------------------------------------------------------------  Allergies    No Known Allergies    Intolerances    Standing Inpatient Medications  colchicine 0.6 milliGRAM(s) Oral daily  dextrose 5% + sodium chloride 0.9%. 1000 milliLiter(s) IV Continuous <Continuous>  dextrose 50% Injectable 25 Gram(s) IV Push once  dextrose 50% Injectable 12.5 Gram(s) IV Push once  dextrose 50% Injectable 25 Gram(s) IV Push once  dextrose Oral Gel 15 Gram(s) Oral once  finasteride 5 milliGRAM(s) Oral daily  fluticasone propionate 50 MICROgram(s)/spray Nasal Spray 1 Spray(s) Both Nostrils two times a day  glucagon  Injectable 1 milliGRAM(s) IntraMuscular once  heparin   Injectable 5000 Unit(s) SubCutaneous every 12 hours  influenza  Vaccine (HIGH DOSE) 0.7 milliLiter(s) IntraMuscular once  sodium chloride 0.65% Nasal 1 Spray(s) Both Nostrils two times a day  tamsulosin 0.4 milliGRAM(s) Oral at bedtime    PRN Inpatient Medications  acetaminophen     Tablet .. 650 milliGRAM(s) Oral every 6 hours PRN  aluminum hydroxide/magnesium hydroxide/simethicone Suspension 30 milliLiter(s) Oral every 4 hours PRN  oxyCODONE    IR 2.5 milliGRAM(s) Oral every 6 hours PRN    REVIEW OF SYSTEMS  --------------------------------------------------------------------------------  All other systems were reviewed and are negative, except as noted.    VITALS/PHYSICAL EXAM  --------------------------------------------------------------------------------  T(C): 36.8 (11-06-23 @ 06:34), Max: 36.8 (11-06-23 @ 06:34)  HR: 81 (11-06-23 @ 06:34) (73 - 93)  BP: 102/64 (11-06-23 @ 06:34) (100/63 - 102/64)  RR: 18 (11-06-23 @ 06:34) (18 - 18)  SpO2: 98% (11-06-23 @ 06:34) (97% - 100%)  Wt(kg): --  Height (cm): 162.6 (11-04-23 @ 20:50)  Weight (kg): 68.5 (11-04-23 @ 20:50)  BMI (kg/m2): 25.9 (11-04-23 @ 20:50)  BSA (m2): 1.74 (11-04-23 @ 20:50)    11-05-23 @ 07:01  -  11-06-23 @ 07:00  --------------------------------------------------------  IN: 1790 mL / OUT: 2000 mL / NET: -210 mL    11-06-23 @ 07:01  -  11-06-23 @ 09:58  --------------------------------------------------------  IN: 275 mL / OUT: 0 mL / NET: 275 mL    Physical Exam:  Gen: NAD  HEENT: MMM  Pulm: Diminished B/L in bases  CV: S1S2  Abd: Soft, +BS, morales catheter   Ext: trace B/L LE edema  Neuro: Awake  Skin: Warm and dry    LABS/STUDIES  --------------------------------------------------------------------------------              8.0    4.00  >-----------<  243      [11-06-23 @ 06:22]              23.6     139  |  109  |  14  ----------------------------<  90      [11-06-23 @ 06:22]  4.1   |  21  |  1.65        Ca     8.1     [11-06-23 @ 06:22]      Mg     1.80     [11-06-23 @ 06:22]      Phos  3.3     [11-06-23 @ 06:22]    TPro  6.0  /  Alb  3.1  /  TBili  0.6  /  DBili  x   /  AST  46  /  ALT  22  /  AlkPhos  156  [11-04-23 @ 10:45]    PT/INR: PT 16.3 , INR 1.46       [11-04-23 @ 10:45]  PTT: 38.6       [11-04-23 @ 10:45]          [11-04-23 @ 10:45]    Creatinine Trend:  SCr 1.65 [11-06 @ 06:22]  SCr 1.82 [11-05 @ 05:26]  SCr 1.81 [11-04 @ 10:45]  SCr 1.81 [11-03 @ 19:41]  SCr 1.89 [10-19 @ 04:17]

## 2023-11-07 NOTE — DISCHARGE NOTE PROVIDER - PROVIDER TOKENS
PROVIDER:[TOKEN:[[id number]]],FREE:[LAST:[Dr Larkin, PCP],PHONE:[(   )    -],FAX:[(   )    -]],PROVIDER:[TOKEN:[[id number]]] PROVIDER:[TOKEN:[[id number]]],FREE:[LAST:[Dr Larkin, PCP],PHONE:[(   )    -],FAX:[(   )    -]],PROVIDER:[TOKEN:[[id number]]]

## 2023-11-07 NOTE — DISCHARGE NOTE PROVIDER - CARE PROVIDERS DIRECT ADDRESSES
,DirectAddress_Unknown,DirectAddress_Unknown,[email or web address],DirectAddress_[email or web address]

## 2023-11-07 NOTE — DISCHARGE NOTE PROVIDER - HOSPITAL COURSE
Chest/Abd Pain  hsTrop: 148-->157               BNP: 3316  11/3 CXR - clear lungs    UTI  UA - large leuks, mod blood, many bacteria        RVP neg  - BCX/UCX negative  - on CTX iv  - has a morales catheter placed for BPH  - lidocaine jelly ordered for around catheter site    Hypoglycemia  - Glucose: 48  - was npo for an outpatient MRI of abd but never took the test  - monitor fs  - on D5W    CKD  Bun/Cr: 19/1.81  - monitor cr  - avoid nephrotoxic agents    Dispo- Chest/Abd Pain  hsTrop: 148-->157               BNP: 3316  11/3 CXR - clear lungs    UTI  UA - large leuks, mod blood, many bacteria        RVP neg  - BCX/UCX negative  - on CTX iv  - has a morales catheter placed for BPH  - lidocaine jelly ordered for around catheter site    Hypoglycemia  - Glucose: 48  - was npo for an outpatient MRI of abd but never took the test  - monitor fs  - on D5W    CKD  Bun/Cr: 19/1.81  - monitor cr  - avoid nephrotoxic agents    Dispo- home

## 2023-11-07 NOTE — PHYSICAL THERAPY INITIAL EVALUATION ADULT - MANUAL MUSCLE TESTING RESULTS, REHAB EVAL
bilateral upper extremities: at least 3/5, bilateral lower extremities hips: 2+/5, knees: 3-/5, ankles: at least 3/5/grossly assessed due to

## 2023-11-07 NOTE — DISCHARGE NOTE PROVIDER - NSDCFUSCHEDAPPT_GEN_ALL_CORE_FT
Staci Mota  [facility]  PEDALLERGY [address]  Scheduled Appointment: 11/08/2023    Dago Mireles  [facility]  CARDIOLOGY [address]   Scheduled Appointment: 11/16/2023    Reji Brenner  [facility]  UROLOGY [address]  Scheduled Appointment: 11/17/2023    [facility]  MRI  [address]  Scheduled Appointment: 11/19/2023    Reji Brenner  [facility]  UROLOGY [address]  Scheduled Appointment: 12/13/2023    Nery Elmore  [facility]  NEPHRO [address]  Scheduled Appointment: 01/29/2024

## 2023-11-08 ENCOUNTER — LABORATORY RESULT (OUTPATIENT)
Age: 73
End: 2023-11-08

## 2023-11-08 ENCOUNTER — APPOINTMENT (OUTPATIENT)
Dept: PEDIATRIC ALLERGY IMMUNOLOGY | Facility: CLINIC | Age: 73
End: 2023-11-08
Payer: MEDICARE

## 2023-11-08 VITALS
SYSTOLIC BLOOD PRESSURE: 115 MMHG | HEART RATE: 86 BPM | OXYGEN SATURATION: 95 % | DIASTOLIC BLOOD PRESSURE: 74 MMHG | WEIGHT: 148.81 LBS | BODY MASS INDEX: 25.41 KG/M2 | HEIGHT: 64 IN

## 2023-11-08 DIAGNOSIS — R09.82 POSTNASAL DRIP: ICD-10-CM

## 2023-11-08 PROCEDURE — 99214 OFFICE O/P EST MOD 30 MIN: CPT | Mod: 25

## 2023-11-08 PROCEDURE — 36415 COLL VENOUS BLD VENIPUNCTURE: CPT

## 2023-11-08 RX ORDER — NYSTATIN 100000 [USP'U]/G
100000 CREAM TOPICAL
Qty: 60 | Refills: 0 | Status: COMPLETED | COMMUNITY
Start: 2023-10-25 | End: 2023-11-08

## 2023-11-08 RX ORDER — AMOXICILLIN AND CLAVULANATE POTASSIUM 875; 125 MG/1; MG/1
875-125 TABLET, COATED ORAL
Qty: 20 | Refills: 0 | Status: COMPLETED | COMMUNITY
Start: 2023-10-04 | End: 2023-11-08

## 2023-11-08 RX ORDER — OXYCODONE AND ACETAMINOPHEN 5; 325 MG/1; MG/1
5-325 TABLET ORAL
Qty: 20 | Refills: 0 | Status: COMPLETED | COMMUNITY
Start: 2023-10-04 | End: 2023-11-08

## 2023-11-08 RX ORDER — PREDNISONE 10 MG/1
10 TABLET ORAL
Qty: 27 | Refills: 0 | Status: COMPLETED | COMMUNITY
Start: 2023-10-04 | End: 2023-11-08

## 2023-11-09 PROBLEM — R09.82 POSTNASAL DRIP: Status: ACTIVE | Noted: 2023-08-31

## 2023-11-09 LAB
CULTURE RESULTS: SIGNIFICANT CHANGE UP
SPECIMEN SOURCE: SIGNIFICANT CHANGE UP

## 2023-11-10 ENCOUNTER — APPOINTMENT (OUTPATIENT)
Dept: OTOLARYNGOLOGY | Facility: CLINIC | Age: 73
End: 2023-11-10
Payer: MEDICARE

## 2023-11-10 VITALS — HEIGHT: 64 IN | BODY MASS INDEX: 25.27 KG/M2 | WEIGHT: 148 LBS | TEMPERATURE: 97.6 F

## 2023-11-10 DIAGNOSIS — J32.9 CHRONIC SINUSITIS, UNSPECIFIED: ICD-10-CM

## 2023-11-10 DIAGNOSIS — J34.89 OTHER SPECIFIED DISORDERS OF NOSE AND NASAL SINUSES: ICD-10-CM

## 2023-11-10 DIAGNOSIS — D36.9 BENIGN NEOPLASM, UNSPECIFIED SITE: ICD-10-CM

## 2023-11-10 DIAGNOSIS — J31.0 CHRONIC RHINITIS: ICD-10-CM

## 2023-11-10 PROCEDURE — 31237 NSL/SINS NDSC SURG BX POLYPC: CPT | Mod: 50,58

## 2023-11-10 PROCEDURE — 99024 POSTOP FOLLOW-UP VISIT: CPT

## 2023-11-15 ENCOUNTER — APPOINTMENT (OUTPATIENT)
Dept: MRI IMAGING | Facility: CLINIC | Age: 73
End: 2023-11-15
Payer: MEDICARE

## 2023-11-15 ENCOUNTER — RESULT REVIEW (OUTPATIENT)
Age: 73
End: 2023-11-15

## 2023-11-15 PROCEDURE — 76498P: CUSTOM

## 2023-11-15 PROCEDURE — 72197 MRI PELVIS W/O & W/DYE: CPT

## 2023-11-15 PROCEDURE — A9585: CPT

## 2023-11-16 ENCOUNTER — APPOINTMENT (OUTPATIENT)
Dept: CARDIOLOGY | Facility: CLINIC | Age: 73
End: 2023-11-16
Payer: MEDICARE

## 2023-11-16 ENCOUNTER — NON-APPOINTMENT (OUTPATIENT)
Age: 73
End: 2023-11-16

## 2023-11-16 VITALS
BODY MASS INDEX: 25.4 KG/M2 | WEIGHT: 148 LBS | DIASTOLIC BLOOD PRESSURE: 66 MMHG | SYSTOLIC BLOOD PRESSURE: 84 MMHG | HEART RATE: 80 BPM | OXYGEN SATURATION: 100 %

## 2023-11-16 PROCEDURE — 93000 ELECTROCARDIOGRAM COMPLETE: CPT

## 2023-11-16 PROCEDURE — 99215 OFFICE O/P EST HI 40 MIN: CPT | Mod: 25

## 2023-11-17 ENCOUNTER — APPOINTMENT (OUTPATIENT)
Dept: UROLOGY | Facility: CLINIC | Age: 73
End: 2023-11-17
Payer: MEDICARE

## 2023-11-17 PROCEDURE — 51703 INSERT BLADDER CATH COMPLEX: CPT

## 2023-11-20 ENCOUNTER — LABORATORY RESULT (OUTPATIENT)
Age: 73
End: 2023-11-20

## 2023-11-20 ENCOUNTER — APPOINTMENT (OUTPATIENT)
Dept: GERIATRICS | Facility: CLINIC | Age: 73
End: 2023-11-20
Payer: MEDICARE

## 2023-11-20 VITALS — DIASTOLIC BLOOD PRESSURE: 65 MMHG | SYSTOLIC BLOOD PRESSURE: 87 MMHG

## 2023-11-20 VITALS — DIASTOLIC BLOOD PRESSURE: 66 MMHG | SYSTOLIC BLOOD PRESSURE: 92 MMHG

## 2023-11-20 DIAGNOSIS — N18.32 TYPE 2 DIABETES MELLITUS WITH DIABETIC CHRONIC KIDNEY DISEASE: ICD-10-CM

## 2023-11-20 DIAGNOSIS — E11.22 TYPE 2 DIABETES MELLITUS WITH DIABETIC CHRONIC KIDNEY DISEASE: ICD-10-CM

## 2023-11-20 DIAGNOSIS — N40.1 BENIGN PROSTATIC HYPERPLASIA WITH LOWER URINARY TRACT SYMPMS: ICD-10-CM

## 2023-11-20 DIAGNOSIS — F32.A DEPRESSION, UNSPECIFIED: ICD-10-CM

## 2023-11-20 DIAGNOSIS — I13.0 HYPERTENSIVE HEART AND CHRONIC KIDNEY DISEASE WITH HEART FAILURE AND STAGE 1 THROUGH STAGE 4 CHRONIC KIDNEY DISEASE, OR UNSPECIFIED CHRONIC KIDNEY DISEASE: ICD-10-CM

## 2023-11-20 DIAGNOSIS — Z71.89 OTHER SPECIFIED COUNSELING: ICD-10-CM

## 2023-11-20 DIAGNOSIS — D14.0 BENIGN NEOPLASM OF MIDDLE EAR, NASAL CAVITY AND ACCESSORY SINUSES: ICD-10-CM

## 2023-11-20 DIAGNOSIS — E11.59 TYPE 2 DIABETES MELLITUS WITH OTHER CIRCULATORY COMPLICATIONS: ICD-10-CM

## 2023-11-20 DIAGNOSIS — I15.2 TYPE 2 DIABETES MELLITUS WITH OTHER CIRCULATORY COMPLICATIONS: ICD-10-CM

## 2023-11-20 DIAGNOSIS — N13.8 BENIGN PROSTATIC HYPERPLASIA WITH LOWER URINARY TRACT SYMPMS: ICD-10-CM

## 2023-11-20 DIAGNOSIS — Z87.09 PERSONAL HISTORY OF OTHER DISEASES OF THE RESPIRATORY SYSTEM: ICD-10-CM

## 2023-11-20 LAB
ALBUMIN SERPL ELPH-MCNC: 3.4 G/DL
ALP BLD-CCNC: 171 U/L
ALT SERPL-CCNC: 23 U/L
ANION GAP SERPL CALC-SCNC: 11 MMOL/L
AST SERPL-CCNC: 27 U/L
BASOPHILS # BLD AUTO: 0.05 K/UL
BASOPHILS NFR BLD AUTO: 1 %
BILIRUB SERPL-MCNC: 0.5 MG/DL
BUN SERPL-MCNC: 12 MG/DL
CALCIUM SERPL-MCNC: 8.9 MG/DL
CHLORIDE SERPL-SCNC: 101 MMOL/L
CO2 SERPL-SCNC: 24 MMOL/L
CREAT SERPL-MCNC: 1.76 MG/DL
EGFR: 40 ML/MIN/1.73M2
EOSINOPHIL # BLD AUTO: 0.44 K/UL
EOSINOPHIL NFR BLD AUTO: 8.7 %
GLUCOSE SERPL-MCNC: 95 MG/DL
HCT VFR BLD CALC: 27 %
HGB BLD-MCNC: 8.6 G/DL
IMM GRANULOCYTES NFR BLD AUTO: 0.2 %
LYMPHOCYTES # BLD AUTO: 2.15 K/UL
LYMPHOCYTES NFR BLD AUTO: 42.3 %
MAN DIFF?: NORMAL
MCHC RBC-ENTMCNC: 26.7 PG
MCHC RBC-ENTMCNC: 31.9 GM/DL
MCV RBC AUTO: 83.9 FL
MONOCYTES # BLD AUTO: 0.57 K/UL
MONOCYTES NFR BLD AUTO: 11.2 %
NEUTROPHILS # BLD AUTO: 1.86 K/UL
NEUTROPHILS NFR BLD AUTO: 36.6 %
NT-PROBNP SERPL-MCNC: 3323 PG/ML
PLATELET # BLD AUTO: 243 K/UL
POTASSIUM SERPL-SCNC: 4.5 MMOL/L
PREALB SERPL NEPH-MCNC: 8 MG/DL
PROT SERPL-MCNC: 6.2 G/DL
RBC # BLD: 3.22 M/UL
RBC # FLD: 17.3 %
SODIUM SERPL-SCNC: 135 MMOL/L
WBC # FLD AUTO: 5.08 K/UL

## 2023-11-20 PROCEDURE — 99204 OFFICE O/P NEW MOD 45 MIN: CPT | Mod: 25

## 2023-11-20 PROCEDURE — 99497 ADVNCD CARE PLAN 30 MIN: CPT

## 2023-11-20 PROCEDURE — 99214 OFFICE O/P EST MOD 30 MIN: CPT | Mod: 25

## 2023-11-21 LAB
24R-OH-CALCIDIOL SERPL-MCNC: 19.3 PG/ML
CHOLEST SERPL-MCNC: 129 MG/DL
ESTIMATED AVERAGE GLUCOSE: 100 MG/DL
FOLATE SERPL-MCNC: 9.7 NG/ML
HBA1C MFR BLD HPLC: 5.1 %
HDLC SERPL-MCNC: 35 MG/DL
LDLC SERPL CALC-MCNC: 75 MG/DL
NONHDLC SERPL-MCNC: 94 MG/DL
TRIGL SERPL-MCNC: 98 MG/DL
TSH SERPL-ACNC: 4.53 UIU/ML
VIT B12 SERPL-MCNC: 668 PG/ML

## 2023-11-22 NOTE — ED PROVIDER NOTE - NSTIMEPROVIDERCAREINITIATE_GEN_ER
Caller: Jose Luis Ahuja    Relationship to patient: Self    Best call back number: [phone]     Chief complaint: DVT     Requested date: ASAP      Additional notes: PT IS INQUIRING IF HE NEEDS TO BE SEEN. PT STATES HIS LEG IS SWOLLEN, DUE TO A DVT. PT WENT TO THE ER THIS MORNING AND WAS ADVISED TO SPEAK WITH DR. ALVAREZ. PT STATES HE HAD 2 STENTS PUT IN LAST THURSDAY BY DR. ALVAREZ. HE IS ALSO BEEN PLACED ON ELIQUIS. PLEASE REACH OUT TO FURTHER ADVISE.       
PT TO FOLLOW UP WITH CURRENT CARDIOLOGIST AILYN PHOENIX  
19-Oct-2023 02:32

## 2023-11-23 PROBLEM — I13.0: Status: RESOLVED | Noted: 2023-11-23 | Resolved: 2023-11-23

## 2023-11-23 PROBLEM — D14.0 INVERTED PAPILLOMA OF NASAL CAVITY: Status: ACTIVE | Noted: 2023-11-10

## 2023-11-23 PROBLEM — Z71.89 ADVANCE CARE PLANNING: Status: ACTIVE | Noted: 2023-11-23

## 2023-11-23 PROBLEM — E11.22 TYPE 2 DIABETES MELLITUS WITH STAGE 3B CHRONIC KIDNEY DISEASE, WITHOUT LONG-TERM CURRENT USE OF INSULIN: Status: ACTIVE | Noted: 2023-11-23

## 2023-11-23 PROBLEM — E11.59 HYPERTENSION ASSOCIATED WITH DIABETES: Status: RESOLVED | Noted: 2023-11-23 | Resolved: 2023-11-23

## 2023-11-23 PROBLEM — N40.1 BENIGN PROSTATIC HYPERPLASIA WITH URINARY OBSTRUCTION: Status: RESOLVED | Noted: 2023-11-23 | Resolved: 2023-11-23

## 2023-11-23 PROBLEM — F32.A DEPRESSION, UNSPECIFIED DEPRESSION TYPE: Status: ACTIVE | Noted: 2023-11-20

## 2023-11-23 PROBLEM — Z87.09 HISTORY OF NASAL POLYP: Status: RESOLVED | Noted: 2023-06-05 | Resolved: 2023-11-23

## 2023-11-26 LAB
2,3-DINOR-11B-PROSTAGLANDIN F2A, RANDOM URINE: NORMAL
ALBUMIN SERPL ELPH-MCNC: 3.4 G/DL
ALP BLD-CCNC: 159 U/L
ALT SERPL-CCNC: 52 U/L
ANION GAP SERPL CALC-SCNC: 10 MMOL/L
AST SERPL-CCNC: 125 U/L
BASOPHILS # BLD AUTO: 0.06 K/UL
BASOPHILS NFR BLD AUTO: 1.5 %
BILIRUB SERPL-MCNC: 0.3 MG/DL
BUN SERPL-MCNC: 12 MG/DL
CALCIUM SERPL-MCNC: 8.3 MG/DL
CHLORIDE SERPL-SCNC: 104 MMOL/L
CO2 SERPL-SCNC: 22 MMOL/L
CREAT SERPL-MCNC: 1.74 MG/DL
CREATININE RANDOM URINE: 99 MG/DL
CREATININE RANDOM URINE: NORMAL
EGFR: 40 ML/MIN/1.73M2
EOSINOPHIL # BLD AUTO: 0.4 K/UL
EOSINOPHIL NFR BLD AUTO: 10.1 %
GLUCOSE SERPL-MCNC: 85 MG/DL
HCT VFR BLD CALC: 24.8 %
HGB BLD-MCNC: 8 G/DL
IMM GRANULOCYTES NFR BLD AUTO: 0.3 %
LEUKOTRIENE E4, URINE: 412 PG/MG CR
LYMPHOCYTES # BLD AUTO: 2.2 K/UL
LYMPHOCYTES NFR BLD AUTO: 55.4 %
MAN DIFF?: NORMAL
MCHC RBC-ENTMCNC: 26.3 PG
MCHC RBC-ENTMCNC: 32.3 GM/DL
MCV RBC AUTO: 81.6 FL
MONOCYTES # BLD AUTO: 0.43 K/UL
MONOCYTES NFR BLD AUTO: 10.8 %
NEUTROPHILS # BLD AUTO: 0.87 K/UL
NEUTROPHILS NFR BLD AUTO: 21.9 %
PLATELET # BLD AUTO: 276 K/UL
POTASSIUM SERPL-SCNC: 4.4 MMOL/L
PROT SERPL-MCNC: 6.2 G/DL
RBC # BLD: 3.04 M/UL
RBC # FLD: 16.3 %
SODIUM SERPL-SCNC: 137 MMOL/L
TOTAL IGE SMQN RAST: 41 KU/L
TRYPTASE: 18.3 UG/L
WBC # FLD AUTO: 3.97 K/UL

## 2023-11-28 ENCOUNTER — NON-APPOINTMENT (OUTPATIENT)
Age: 73
End: 2023-11-28

## 2023-12-04 ENCOUNTER — APPOINTMENT (OUTPATIENT)
Dept: UROLOGY | Facility: CLINIC | Age: 73
End: 2023-12-04

## 2023-12-07 ENCOUNTER — APPOINTMENT (OUTPATIENT)
Dept: OTOLARYNGOLOGY | Facility: CLINIC | Age: 73
End: 2023-12-07

## 2023-12-12 ENCOUNTER — APPOINTMENT (OUTPATIENT)
Dept: UROLOGY | Facility: CLINIC | Age: 73
End: 2023-12-12
Payer: MEDICARE

## 2023-12-12 DIAGNOSIS — R35.1 NOCTURIA: ICD-10-CM

## 2023-12-12 PROCEDURE — 99214 OFFICE O/P EST MOD 30 MIN: CPT

## 2023-12-16 ENCOUNTER — INPATIENT (INPATIENT)
Facility: HOSPITAL | Age: 73
LOS: 5 days | Discharge: HOME | End: 2023-12-22
Attending: INTERNAL MEDICINE | Admitting: INTERNAL MEDICINE
Payer: MEDICARE

## 2023-12-16 VITALS
OXYGEN SATURATION: 97 % | HEIGHT: 64 IN | DIASTOLIC BLOOD PRESSURE: 54 MMHG | TEMPERATURE: 103 F | RESPIRATION RATE: 18 BRPM | HEART RATE: 109 BPM | WEIGHT: 154.98 LBS | SYSTOLIC BLOOD PRESSURE: 112 MMHG

## 2023-12-16 DIAGNOSIS — Z98.1 ARTHRODESIS STATUS: Chronic | ICD-10-CM

## 2023-12-16 DIAGNOSIS — Z97.8 PRESENCE OF OTHER SPECIFIED DEVICES: ICD-10-CM

## 2023-12-16 DIAGNOSIS — N40.0 BENIGN PROSTATIC HYPERPLASIA WITHOUT LOWER URINARY TRACT SYMPTOMS: ICD-10-CM

## 2023-12-16 DIAGNOSIS — R10.11 RIGHT UPPER QUADRANT PAIN: ICD-10-CM

## 2023-12-16 DIAGNOSIS — N18.30 CHRONIC KIDNEY DISEASE, STAGE 3 UNSPECIFIED: ICD-10-CM

## 2023-12-16 DIAGNOSIS — E85.4 ORGAN-LIMITED AMYLOIDOSIS: ICD-10-CM

## 2023-12-16 DIAGNOSIS — A41.9 SEPSIS, UNSPECIFIED ORGANISM: ICD-10-CM

## 2023-12-16 LAB
ALBUMIN SERPL ELPH-MCNC: 3.1 G/DL — LOW (ref 3.3–5)
ALBUMIN SERPL ELPH-MCNC: 3.1 G/DL — LOW (ref 3.3–5)
ALP SERPL-CCNC: 127 U/L — HIGH (ref 40–120)
ALP SERPL-CCNC: 127 U/L — HIGH (ref 40–120)
ALT FLD-CCNC: 52 U/L — SIGNIFICANT CHANGE UP (ref 12–78)
ALT FLD-CCNC: 52 U/L — SIGNIFICANT CHANGE UP (ref 12–78)
ANION GAP SERPL CALC-SCNC: 5 MMOL/L — SIGNIFICANT CHANGE UP (ref 5–17)
ANION GAP SERPL CALC-SCNC: 5 MMOL/L — SIGNIFICANT CHANGE UP (ref 5–17)
APPEARANCE UR: ABNORMAL
APPEARANCE UR: ABNORMAL
APTT BLD: 38.1 SEC — HIGH (ref 24.5–35.6)
APTT BLD: 38.1 SEC — HIGH (ref 24.5–35.6)
AST SERPL-CCNC: 68 U/L — HIGH (ref 15–37)
AST SERPL-CCNC: 68 U/L — HIGH (ref 15–37)
BACTERIA # UR AUTO: ABNORMAL /HPF
BACTERIA # UR AUTO: ABNORMAL /HPF
BASE EXCESS BLDV CALC-SCNC: 2.1 MMOL/L — SIGNIFICANT CHANGE UP (ref -2–3)
BASE EXCESS BLDV CALC-SCNC: 2.1 MMOL/L — SIGNIFICANT CHANGE UP (ref -2–3)
BASOPHILS # BLD AUTO: 0.03 K/UL — SIGNIFICANT CHANGE UP (ref 0–0.2)
BASOPHILS # BLD AUTO: 0.03 K/UL — SIGNIFICANT CHANGE UP (ref 0–0.2)
BASOPHILS NFR BLD AUTO: 0.3 % — SIGNIFICANT CHANGE UP (ref 0–2)
BASOPHILS NFR BLD AUTO: 0.3 % — SIGNIFICANT CHANGE UP (ref 0–2)
BILIRUB SERPL-MCNC: 0.5 MG/DL — SIGNIFICANT CHANGE UP (ref 0.2–1.2)
BILIRUB SERPL-MCNC: 0.5 MG/DL — SIGNIFICANT CHANGE UP (ref 0.2–1.2)
BILIRUB UR-MCNC: NEGATIVE — SIGNIFICANT CHANGE UP
BILIRUB UR-MCNC: NEGATIVE — SIGNIFICANT CHANGE UP
BLOOD GAS COMMENTS, VENOUS: SIGNIFICANT CHANGE UP
BLOOD GAS COMMENTS, VENOUS: SIGNIFICANT CHANGE UP
BUN SERPL-MCNC: 22 MG/DL — SIGNIFICANT CHANGE UP (ref 7–23)
BUN SERPL-MCNC: 22 MG/DL — SIGNIFICANT CHANGE UP (ref 7–23)
CALCIUM SERPL-MCNC: 8.7 MG/DL — SIGNIFICANT CHANGE UP (ref 8.5–10.1)
CALCIUM SERPL-MCNC: 8.7 MG/DL — SIGNIFICANT CHANGE UP (ref 8.5–10.1)
CHLORIDE BLDV-SCNC: 104 MMOL/L — SIGNIFICANT CHANGE UP (ref 98–107)
CHLORIDE BLDV-SCNC: 104 MMOL/L — SIGNIFICANT CHANGE UP (ref 98–107)
CHLORIDE SERPL-SCNC: 106 MMOL/L — SIGNIFICANT CHANGE UP (ref 96–108)
CHLORIDE SERPL-SCNC: 106 MMOL/L — SIGNIFICANT CHANGE UP (ref 96–108)
CO2 BLDV-SCNC: 29 MMOL/L — HIGH (ref 22–26)
CO2 BLDV-SCNC: 29 MMOL/L — HIGH (ref 22–26)
CO2 SERPL-SCNC: 28 MMOL/L — SIGNIFICANT CHANGE UP (ref 22–31)
CO2 SERPL-SCNC: 28 MMOL/L — SIGNIFICANT CHANGE UP (ref 22–31)
COLOR SPEC: ABNORMAL
COLOR SPEC: ABNORMAL
CREAT SERPL-MCNC: 1.77 MG/DL — HIGH (ref 0.5–1.3)
CREAT SERPL-MCNC: 1.77 MG/DL — HIGH (ref 0.5–1.3)
DIFF PNL FLD: ABNORMAL
DIFF PNL FLD: ABNORMAL
EGFR: 40 ML/MIN/1.73M2 — LOW
EGFR: 40 ML/MIN/1.73M2 — LOW
EOSINOPHIL # BLD AUTO: 0.15 K/UL — SIGNIFICANT CHANGE UP (ref 0–0.5)
EOSINOPHIL # BLD AUTO: 0.15 K/UL — SIGNIFICANT CHANGE UP (ref 0–0.5)
EOSINOPHIL NFR BLD AUTO: 1.6 % — SIGNIFICANT CHANGE UP (ref 0–6)
EOSINOPHIL NFR BLD AUTO: 1.6 % — SIGNIFICANT CHANGE UP (ref 0–6)
EPI CELLS # UR: PRESENT
EPI CELLS # UR: PRESENT
FLUAV AG NPH QL: SIGNIFICANT CHANGE UP
FLUAV AG NPH QL: SIGNIFICANT CHANGE UP
FLUBV AG NPH QL: SIGNIFICANT CHANGE UP
FLUBV AG NPH QL: SIGNIFICANT CHANGE UP
GAS PNL BLDV: 136 MMOL/L — SIGNIFICANT CHANGE UP (ref 136–145)
GAS PNL BLDV: 136 MMOL/L — SIGNIFICANT CHANGE UP (ref 136–145)
GAS PNL BLDV: SIGNIFICANT CHANGE UP
GLUCOSE BLDV-MCNC: 77 MG/DL — SIGNIFICANT CHANGE UP (ref 65–95)
GLUCOSE BLDV-MCNC: 77 MG/DL — SIGNIFICANT CHANGE UP (ref 65–95)
GLUCOSE SERPL-MCNC: 80 MG/DL — SIGNIFICANT CHANGE UP (ref 70–99)
GLUCOSE SERPL-MCNC: 80 MG/DL — SIGNIFICANT CHANGE UP (ref 70–99)
GLUCOSE UR QL: NEGATIVE MG/DL — SIGNIFICANT CHANGE UP
GLUCOSE UR QL: NEGATIVE MG/DL — SIGNIFICANT CHANGE UP
HCO3 BLDV-SCNC: 27 MMOL/L — SIGNIFICANT CHANGE UP (ref 22–28)
HCO3 BLDV-SCNC: 27 MMOL/L — SIGNIFICANT CHANGE UP (ref 22–28)
HCT VFR BLD CALC: 30.9 % — LOW (ref 39–50)
HCT VFR BLD CALC: 30.9 % — LOW (ref 39–50)
HCT VFR BLDA CALC: 32 % — LOW (ref 37–47)
HCT VFR BLDA CALC: 32 % — LOW (ref 37–47)
HGB BLD CALC-MCNC: 10.6 G/DL — LOW (ref 12.6–17.4)
HGB BLD CALC-MCNC: 10.6 G/DL — LOW (ref 12.6–17.4)
HGB BLD-MCNC: 10 G/DL — LOW (ref 13–17)
HGB BLD-MCNC: 10 G/DL — LOW (ref 13–17)
HOROWITZ INDEX BLDV+IHG-RTO: 21 — SIGNIFICANT CHANGE UP
HOROWITZ INDEX BLDV+IHG-RTO: 21 — SIGNIFICANT CHANGE UP
IMM GRANULOCYTES NFR BLD AUTO: 0.4 % — SIGNIFICANT CHANGE UP (ref 0–0.9)
IMM GRANULOCYTES NFR BLD AUTO: 0.4 % — SIGNIFICANT CHANGE UP (ref 0–0.9)
INR BLD: 1.09 RATIO — SIGNIFICANT CHANGE UP (ref 0.85–1.18)
INR BLD: 1.09 RATIO — SIGNIFICANT CHANGE UP (ref 0.85–1.18)
KETONES UR-MCNC: NEGATIVE MG/DL — SIGNIFICANT CHANGE UP
KETONES UR-MCNC: NEGATIVE MG/DL — SIGNIFICANT CHANGE UP
LACTATE BLDV-MCNC: 1.1 MMOL/L — SIGNIFICANT CHANGE UP (ref 0.56–1.39)
LACTATE BLDV-MCNC: 1.1 MMOL/L — SIGNIFICANT CHANGE UP (ref 0.56–1.39)
LACTATE SERPL-SCNC: 1 MMOL/L — SIGNIFICANT CHANGE UP (ref 0.7–2)
LACTATE SERPL-SCNC: 1 MMOL/L — SIGNIFICANT CHANGE UP (ref 0.7–2)
LEUKOCYTE ESTERASE UR-ACNC: ABNORMAL
LEUKOCYTE ESTERASE UR-ACNC: ABNORMAL
LYMPHOCYTES # BLD AUTO: 1.69 K/UL — SIGNIFICANT CHANGE UP (ref 1–3.3)
LYMPHOCYTES # BLD AUTO: 1.69 K/UL — SIGNIFICANT CHANGE UP (ref 1–3.3)
LYMPHOCYTES # BLD AUTO: 18.2 % — SIGNIFICANT CHANGE UP (ref 13–44)
LYMPHOCYTES # BLD AUTO: 18.2 % — SIGNIFICANT CHANGE UP (ref 13–44)
MCHC RBC-ENTMCNC: 26.2 PG — LOW (ref 27–34)
MCHC RBC-ENTMCNC: 26.2 PG — LOW (ref 27–34)
MCHC RBC-ENTMCNC: 32.4 G/DL — SIGNIFICANT CHANGE UP (ref 32–36)
MCHC RBC-ENTMCNC: 32.4 G/DL — SIGNIFICANT CHANGE UP (ref 32–36)
MCV RBC AUTO: 81.1 FL — SIGNIFICANT CHANGE UP (ref 80–100)
MCV RBC AUTO: 81.1 FL — SIGNIFICANT CHANGE UP (ref 80–100)
MONOCYTES # BLD AUTO: 0.49 K/UL — SIGNIFICANT CHANGE UP (ref 0–0.9)
MONOCYTES # BLD AUTO: 0.49 K/UL — SIGNIFICANT CHANGE UP (ref 0–0.9)
MONOCYTES NFR BLD AUTO: 5.3 % — SIGNIFICANT CHANGE UP (ref 2–14)
MONOCYTES NFR BLD AUTO: 5.3 % — SIGNIFICANT CHANGE UP (ref 2–14)
NEUTROPHILS # BLD AUTO: 6.9 K/UL — SIGNIFICANT CHANGE UP (ref 1.8–7.4)
NEUTROPHILS # BLD AUTO: 6.9 K/UL — SIGNIFICANT CHANGE UP (ref 1.8–7.4)
NEUTROPHILS NFR BLD AUTO: 74.2 % — SIGNIFICANT CHANGE UP (ref 43–77)
NEUTROPHILS NFR BLD AUTO: 74.2 % — SIGNIFICANT CHANGE UP (ref 43–77)
NITRITE UR-MCNC: NEGATIVE — SIGNIFICANT CHANGE UP
NITRITE UR-MCNC: NEGATIVE — SIGNIFICANT CHANGE UP
NRBC # BLD: 0 /100 WBCS — SIGNIFICANT CHANGE UP (ref 0–0)
NRBC # BLD: 0 /100 WBCS — SIGNIFICANT CHANGE UP (ref 0–0)
PCO2 BLDV: 44 MMHG — SIGNIFICANT CHANGE UP (ref 42–55)
PCO2 BLDV: 44 MMHG — SIGNIFICANT CHANGE UP (ref 42–55)
PH BLDV: 7.4 — SIGNIFICANT CHANGE UP (ref 7.32–7.43)
PH BLDV: 7.4 — SIGNIFICANT CHANGE UP (ref 7.32–7.43)
PH UR: 7.5 — SIGNIFICANT CHANGE UP (ref 5–8)
PH UR: 7.5 — SIGNIFICANT CHANGE UP (ref 5–8)
PLATELET # BLD AUTO: 337 K/UL — SIGNIFICANT CHANGE UP (ref 150–400)
PLATELET # BLD AUTO: 337 K/UL — SIGNIFICANT CHANGE UP (ref 150–400)
PO2 BLDV: 25 MMHG — SIGNIFICANT CHANGE UP (ref 25–45)
PO2 BLDV: 25 MMHG — SIGNIFICANT CHANGE UP (ref 25–45)
POTASSIUM BLDV-SCNC: 4.3 MMOL/L — SIGNIFICANT CHANGE UP (ref 3.5–5.1)
POTASSIUM BLDV-SCNC: 4.3 MMOL/L — SIGNIFICANT CHANGE UP (ref 3.5–5.1)
POTASSIUM SERPL-MCNC: 4.2 MMOL/L — SIGNIFICANT CHANGE UP (ref 3.5–5.3)
POTASSIUM SERPL-MCNC: 4.2 MMOL/L — SIGNIFICANT CHANGE UP (ref 3.5–5.3)
POTASSIUM SERPL-SCNC: 4.2 MMOL/L — SIGNIFICANT CHANGE UP (ref 3.5–5.3)
POTASSIUM SERPL-SCNC: 4.2 MMOL/L — SIGNIFICANT CHANGE UP (ref 3.5–5.3)
PROT SERPL-MCNC: 7.8 GM/DL — SIGNIFICANT CHANGE UP (ref 6–8.3)
PROT SERPL-MCNC: 7.8 GM/DL — SIGNIFICANT CHANGE UP (ref 6–8.3)
PROT UR-MCNC: 30 MG/DL
PROT UR-MCNC: 30 MG/DL
PROTHROM AB SERPL-ACNC: 12.9 SEC — SIGNIFICANT CHANGE UP (ref 9.5–13)
PROTHROM AB SERPL-ACNC: 12.9 SEC — SIGNIFICANT CHANGE UP (ref 9.5–13)
RBC # BLD: 3.81 M/UL — LOW (ref 4.2–5.8)
RBC # BLD: 3.81 M/UL — LOW (ref 4.2–5.8)
RBC # FLD: 16.4 % — HIGH (ref 10.3–14.5)
RBC # FLD: 16.4 % — HIGH (ref 10.3–14.5)
RBC CASTS # UR COMP ASSIST: SIGNIFICANT CHANGE UP /HPF (ref 0–4)
RBC CASTS # UR COMP ASSIST: SIGNIFICANT CHANGE UP /HPF (ref 0–4)
SAO2 % BLDV: 30.4 % — LOW (ref 94–98)
SAO2 % BLDV: 30.4 % — LOW (ref 94–98)
SARS-COV-2 RNA SPEC QL NAA+PROBE: SIGNIFICANT CHANGE UP
SARS-COV-2 RNA SPEC QL NAA+PROBE: SIGNIFICANT CHANGE UP
SODIUM SERPL-SCNC: 139 MMOL/L — SIGNIFICANT CHANGE UP (ref 135–145)
SODIUM SERPL-SCNC: 139 MMOL/L — SIGNIFICANT CHANGE UP (ref 135–145)
SP GR SPEC: 1.01 — SIGNIFICANT CHANGE UP (ref 1–1.03)
SP GR SPEC: 1.01 — SIGNIFICANT CHANGE UP (ref 1–1.03)
UROBILINOGEN FLD QL: 1 MG/DL — SIGNIFICANT CHANGE UP (ref 0.2–1)
UROBILINOGEN FLD QL: 1 MG/DL — SIGNIFICANT CHANGE UP (ref 0.2–1)
WBC # BLD: 9.3 K/UL — SIGNIFICANT CHANGE UP (ref 3.8–10.5)
WBC # BLD: 9.3 K/UL — SIGNIFICANT CHANGE UP (ref 3.8–10.5)
WBC # FLD AUTO: 9.3 K/UL — SIGNIFICANT CHANGE UP (ref 3.8–10.5)
WBC # FLD AUTO: 9.3 K/UL — SIGNIFICANT CHANGE UP (ref 3.8–10.5)
WBC UR QL: SIGNIFICANT CHANGE UP /HPF (ref 0–5)
WBC UR QL: SIGNIFICANT CHANGE UP /HPF (ref 0–5)

## 2023-12-16 PROCEDURE — 99223 1ST HOSP IP/OBS HIGH 75: CPT

## 2023-12-16 PROCEDURE — 99285 EMERGENCY DEPT VISIT HI MDM: CPT

## 2023-12-16 PROCEDURE — 76700 US EXAM ABDOM COMPLETE: CPT | Mod: 26

## 2023-12-16 PROCEDURE — 93010 ELECTROCARDIOGRAM REPORT: CPT

## 2023-12-16 PROCEDURE — 71045 X-RAY EXAM CHEST 1 VIEW: CPT | Mod: 26

## 2023-12-16 RX ORDER — TRAMADOL HYDROCHLORIDE 50 MG/1
50 TABLET ORAL EVERY 6 HOURS
Refills: 0 | Status: DISCONTINUED | OUTPATIENT
Start: 2023-12-16 | End: 2023-12-22

## 2023-12-16 RX ORDER — ONDANSETRON 8 MG/1
4 TABLET, FILM COATED ORAL EVERY 8 HOURS
Refills: 0 | Status: DISCONTINUED | OUTPATIENT
Start: 2023-12-16 | End: 2023-12-22

## 2023-12-16 RX ORDER — CEFTRIAXONE 500 MG/1
1000 INJECTION, POWDER, FOR SOLUTION INTRAMUSCULAR; INTRAVENOUS ONCE
Refills: 0 | Status: COMPLETED | OUTPATIENT
Start: 2023-12-17 | End: 2023-12-17

## 2023-12-16 RX ORDER — SODIUM CHLORIDE 9 MG/ML
1000 INJECTION, SOLUTION INTRAVENOUS ONCE
Refills: 0 | Status: COMPLETED | OUTPATIENT
Start: 2023-12-16 | End: 2023-12-16

## 2023-12-16 RX ORDER — LIDOCAINE HCL 20 MG/ML
5 VIAL (ML) INJECTION ONCE
Refills: 0 | Status: COMPLETED | OUTPATIENT
Start: 2023-12-16 | End: 2023-12-16

## 2023-12-16 RX ORDER — COLCHICINE 0.6 MG
0.6 TABLET ORAL
Refills: 0 | Status: DISCONTINUED | OUTPATIENT
Start: 2023-12-16 | End: 2023-12-22

## 2023-12-16 RX ORDER — ONDANSETRON 8 MG/1
4 TABLET, FILM COATED ORAL ONCE
Refills: 0 | Status: COMPLETED | OUTPATIENT
Start: 2023-12-16 | End: 2023-12-16

## 2023-12-16 RX ORDER — ACETAMINOPHEN 500 MG
975 TABLET ORAL ONCE
Refills: 0 | Status: COMPLETED | OUTPATIENT
Start: 2023-12-16 | End: 2023-12-16

## 2023-12-16 RX ORDER — FLUTICASONE PROPIONATE 50 MCG
1 SPRAY, SUSPENSION NASAL
Refills: 0 | Status: DISCONTINUED | OUTPATIENT
Start: 2023-12-16 | End: 2023-12-22

## 2023-12-16 RX ORDER — SODIUM CHLORIDE 0.65 %
2 AEROSOL, SPRAY (ML) NASAL THREE TIMES A DAY
Refills: 0 | Status: DISCONTINUED | OUTPATIENT
Start: 2023-12-16 | End: 2023-12-22

## 2023-12-16 RX ORDER — LANOLIN ALCOHOL/MO/W.PET/CERES
3 CREAM (GRAM) TOPICAL AT BEDTIME
Refills: 0 | Status: DISCONTINUED | OUTPATIENT
Start: 2023-12-16 | End: 2023-12-22

## 2023-12-16 RX ORDER — ACETAMINOPHEN 500 MG
650 TABLET ORAL EVERY 6 HOURS
Refills: 0 | Status: DISCONTINUED | OUTPATIENT
Start: 2023-12-16 | End: 2023-12-22

## 2023-12-16 RX ORDER — LIDOCAINE 4 G/100G
1 CREAM TOPICAL ONCE
Refills: 0 | Status: COMPLETED | OUTPATIENT
Start: 2023-12-16 | End: 2023-12-16

## 2023-12-16 RX ORDER — TAMSULOSIN HYDROCHLORIDE 0.4 MG/1
0.4 CAPSULE ORAL AT BEDTIME
Refills: 0 | Status: DISCONTINUED | OUTPATIENT
Start: 2023-12-16 | End: 2023-12-22

## 2023-12-16 RX ORDER — CEFTRIAXONE 500 MG/1
1000 INJECTION, POWDER, FOR SOLUTION INTRAMUSCULAR; INTRAVENOUS ONCE
Refills: 0 | Status: COMPLETED | OUTPATIENT
Start: 2023-12-16 | End: 2023-12-16

## 2023-12-16 RX ORDER — SODIUM CHLORIDE 9 MG/ML
500 INJECTION, SOLUTION INTRAVENOUS ONCE
Refills: 0 | Status: COMPLETED | OUTPATIENT
Start: 2023-12-16 | End: 2023-12-16

## 2023-12-16 RX ORDER — COLCHICINE 0.6 MG
0.6 TABLET ORAL
Refills: 0 | Status: DISCONTINUED | OUTPATIENT
Start: 2023-12-16 | End: 2023-12-16

## 2023-12-16 RX ORDER — SODIUM CHLORIDE 9 MG/ML
1000 INJECTION, SOLUTION INTRAVENOUS ONCE
Refills: 0 | Status: DISCONTINUED | OUTPATIENT
Start: 2023-12-16 | End: 2023-12-18

## 2023-12-16 RX ORDER — FINASTERIDE 5 MG/1
5 TABLET, FILM COATED ORAL DAILY
Refills: 0 | Status: DISCONTINUED | OUTPATIENT
Start: 2023-12-16 | End: 2023-12-22

## 2023-12-16 RX ADMIN — CEFTRIAXONE 100 MILLIGRAM(S): 500 INJECTION, POWDER, FOR SOLUTION INTRAMUSCULAR; INTRAVENOUS at 15:59

## 2023-12-16 RX ADMIN — ONDANSETRON 4 MILLIGRAM(S): 8 TABLET, FILM COATED ORAL at 15:59

## 2023-12-16 RX ADMIN — FINASTERIDE 5 MILLIGRAM(S): 5 TABLET, FILM COATED ORAL at 22:08

## 2023-12-16 RX ADMIN — TAMSULOSIN HYDROCHLORIDE 0.4 MILLIGRAM(S): 0.4 CAPSULE ORAL at 22:08

## 2023-12-16 RX ADMIN — Medication 5 MILLILITER(S): at 15:59

## 2023-12-16 RX ADMIN — LIDOCAINE 1 PATCH: 4 CREAM TOPICAL at 15:59

## 2023-12-16 RX ADMIN — LIDOCAINE 1 PATCH: 4 CREAM TOPICAL at 22:00

## 2023-12-16 RX ADMIN — SODIUM CHLORIDE 500 MILLILITER(S): 9 INJECTION, SOLUTION INTRAVENOUS at 22:21

## 2023-12-16 RX ADMIN — Medication 0.6 MILLIGRAM(S): at 22:08

## 2023-12-16 RX ADMIN — Medication 975 MILLIGRAM(S): at 15:59

## 2023-12-16 RX ADMIN — Medication 1 SPRAY(S): at 22:08

## 2023-12-16 RX ADMIN — Medication 2 SPRAY(S): at 22:09

## 2023-12-16 RX ADMIN — SODIUM CHLORIDE 1000 MILLILITER(S): 9 INJECTION, SOLUTION INTRAVENOUS at 15:59

## 2023-12-16 NOTE — ED PROVIDER NOTE - PHYSICAL EXAMINATION
Gen: aox3, nad,  Head: NCAT  ENT: Airway patent, dry mucous membranes, nasal passageways clear   Cardiac: mild tachycardia without  murmurs   Respiratory: Lungs CTA B/L  Gastrointestinal: Abdomen soft, nontender, nondistended, no rebound, no guarding  MSK: No gross abnormalities, FROM of all four extremities, mild b/l nonpitting symmetrical edema, + low sacral pain, no midline c-t-l spine tenderness   HEME: Extremities warm and well perfused   Skin: No rashes, no lesions  Neuro: No gross neurologic deficits

## 2023-12-16 NOTE — ED PROVIDER NOTE - PROGRESS NOTE DETAILS
Sudheer DO: pt reassessed, clinically improved, however due to initial rigors, high fever, and morales with complicated UTI with possible early sepsis, will admit, d/w Dr Miller Willard DO: pt reassessed, clinically improved, however due to initial rigors, high fever, and morales with complicated UTI with possible early sepsis, will admit, d/w Dr Miller, pt agreeable to plan

## 2023-12-16 NOTE — ED ADULT TRIAGE NOTE - GLASGOW COMA SCALE: BEST MOTOR RESPONSE, MLM
Goal Outcome Evaluation:               Pt has been running fevers over night and this am. Motrin added this am to help with fevers. Hematology changed patient to eliquis and stopped heparin gtt. Patient was alert and oriented this am on 4L n/c.     Patient went to bathroom with oxygen on and when returned to bed O2 sats were 50%. Patient recovered to 90s with 4L n/c. Explained to patient she cannot take her oxygen off.     This afternoon patient become lethargic. She responded to voice but quickly back to sleep. Abgs done and Co2 was 82. Patient placed on avaps and abgs rechecked 2 hours later. Repeat co2 was 80 and patient would then only respond by touch/gentle shaking.     Called Naun in ICU and patient transferred to ICU. Notified daughter at bedside.       Problem: Fall Injury Risk  Goal: Absence of Fall and Fall-Related Injury  Outcome: Ongoing, Progressing  Intervention: Identify and Manage Contributors  Recent Flowsheet Documentation  Taken 11/16/2022 1200 by Robina Hopson RN  Medication Review/Management: medications reviewed  Taken 11/16/2022 0811 by Robina Hopson RN  Medication Review/Management: medications reviewed  Intervention: Promote Injury-Free Environment  Recent Flowsheet Documentation  Taken 11/16/2022 1200 by Robina Hopson RN  Safety Promotion/Fall Prevention:   activity supervised   clutter free environment maintained   nonskid shoes/slippers when out of bed   safety round/check completed  Taken 11/16/2022 0811 by Robina Hopson RN  Safety Promotion/Fall Prevention:   activity supervised   clutter free environment maintained   nonskid shoes/slippers when out of bed   safety round/check completed      (M6) obeys commands

## 2023-12-16 NOTE — ED ADULT NURSE NOTE - OBJECTIVE STATEMENT
Pt presents to the ED c/o lower back pain, fever, chills, weakness and hematuria. Hx BPH, CKD. Pt had indwelling morales cathter in place, was changed 1 week ago. Denies cp, sob, diff breathing

## 2023-12-16 NOTE — ED ADULT TRIAGE NOTE - CHIEF COMPLAINT QUOTE
brought by ems for back pain and bilateral leg pain . pt also vomited times two . h/o prostates problems with Harkins catheter in place draining pinkish urine .

## 2023-12-16 NOTE — H&P ADULT - ASSESSMENT
Cy Sanchez is a 73 year old male with PMHx of BPH (c/b urinary retention s/p Harkins catheter placement two months ago, exchanged monthly), CKD stage IIIb, and cardiac amyloidosis who presented to the ED on 12/16/23 for complaints of bilateral leg pain and admitted for sepsis secondary to CAUTI.    Sepsis secondary to CAUTI  In pt with indwelling Harkins catheter  Complaints of b/l leg pain that started yesterday and worsening of chronic lower back pain  Temp 102.9 and  on admission  Lactic acid WNL, COVID/influenza negative  U/A (sample obtained after Harkins exchange as per ER physician) with proteinuria, negative nitrites, large leuks, moderate blood, WBC 25-50, RBC 11-25, moderate bacteria, squamous epithelial cells  CXR with ?b/l lower infiltrates, clinical picture not consistent with PNA  S/p LR 1500 cc bolus, ceftriaxone 1 g IV, and acetaminophen 975 mg in the ED  Ceftriaxone 1 g IV continued  F/u blood cultures, urine culture, CT A/P to r/o other etiologies given acute on chronic lower back pain  Monitor fever curve and WBC trend  Telemetry    RUQ pain, unclear etiology  Complaints of RUQ pain that started while he was in ER, could not elaborate more on pain  F/u abdominal U/S      Chronic medical conditions:  BPH: PTA finasteride 5 mg, tamsulosin 0.4 mg  Cardiac amyloidosis: PTA colchicine 0.6 mg reordered as q48 given CrCl after discussion with pharmacy, PTA Vyndamax 61 mg held given not on formulary and unable to borrow from [place], discussed with pharmacist, please ask family to bring in own med in AM  CKD stage IIIb: Cr 1.77 on admission, appears to be around baseline, s/p LR 1500 cc bolus in the ED, avoid nephrotoxins, renally dose meds, encourage PO hydration, monitor renal function  Normocytic anemia, chronic: hgb 10.0 on admission, appears better than baseline, no signs of bleeding, monitor    Medication reconciliation completed using discharge med rec from [facility] on 11/7/23. Patient reports he has not had any medication changes since discharge.    Plan of care discussed with wife at bedside. Cy Sanchez is a 73 year old male with PMHx of BPH (c/b urinary retention s/p Harkins catheter placement two months ago, exchanged monthly), CKD stage IIIb, and cardiac amyloidosis who presented to the ED on 12/16/23 for complaints of bilateral leg pain and admitted for sepsis secondary to CAUTI.    Sepsis secondary to CAUTI  In pt with indwelling Harkins catheter  Complaints of b/l leg pain that started yesterday and worsening of chronic lower back pain  Temp 102.9 and  on admission  Lactic acid WNL, COVID/influenza negative  U/A (sample obtained after Harkins exchange as per ER physician) with proteinuria, negative nitrites, large leuks, moderate blood, WBC 25-50, RBC 11-25, moderate bacteria, squamous epithelial cells  CXR with ?b/l lower infiltrates, clinical picture not consistent with PNA  S/p LR 1500 cc bolus, ceftriaxone 1 g IV, and acetaminophen 975 mg in the ED  Ceftriaxone 1 g IV continued  F/u blood cultures, urine culture, CT A/P to r/o other etiologies given acute on chronic lower back pain  Monitor fever curve and WBC trend  Telemetry    RUQ pain, unclear etiology  Complaints of RUQ pain that started while he was in ER, could not elaborate more on pain  F/u abdominal U/S      Chronic medical conditions:  BPH: PTA finasteride 5 mg, tamsulosin 0.4 mg  Cardiac amyloidosis: PTA colchicine 0.6 mg reordered as q48 given CrCl after discussion with pharmacy, PTA Vyndamax 61 mg held given not on formulary and unable to borrow from [place], discussed with pharmacist, please ask family to bring in own med in AM  CKD stage IIIb: Cr 1.77 on admission, appears to be around baseline, s/p LR 1500 cc bolus in the ED, avoid nephrotoxins, renally dose meds, encourage PO hydration, monitor renal function  Normocytic anemia, chronic: hgb 10.0 on admission, appears better than baseline, no signs of bleeding, monitor    Medication reconciliation completed using discharge med rec from [facility] on 11/7/23. Patient reports he has not had any medication changes since discharge.    Plan of care discussed with wife at bedside.

## 2023-12-16 NOTE — ED ADULT NURSE NOTE - NSFALLUNIVINTERV_ED_ALL_ED
Bed/Stretcher in lowest position, wheels locked, appropriate side rails in place/Call bell, personal items and telephone in reach/Instruct patient to call for assistance before getting out of bed/chair/stretcher/Non-slip footwear applied when patient is off stretcher/[place] to call system/Physically safe environment - no spills, clutter or unnecessary equipment/Purposeful proactive rounding/Room/bathroom lighting operational, light cord in reach Bed/Stretcher in lowest position, wheels locked, appropriate side rails in place/Call bell, personal items and telephone in reach/Instruct patient to call for assistance before getting out of bed/chair/stretcher/Non-slip footwear applied when patient is off stretcher/[place] to call system/Physically safe environment - no spills, clutter or unnecessary equipment/Purposeful proactive rounding/Room/bathroom lighting operational, light cord in reach

## 2023-12-16 NOTE — H&P ADULT - NSHPPHYSICALEXAM_GEN_ALL_CORE
T(C): 37.1 (12-16-23 @ 19:51), Max: 39.4 (12-16-23 @ 14:20)  HR: 96 (12-16-23 @ 19:51) (96 - 109)  BP: 96/58 (12-16-23 @ 19:51) (96/58 - 112/54)  RR: 20 (12-16-23 @ 19:51) (18 - 20)  SpO2: 97% (12-16-23 @ 19:51) (97% - 98%)    CONSTITUTIONAL: Well groomed, in mild distress  EYES: PERRLA and symmetric, EOMI  ENMT: Oral mucosa with moist membranes  RESP: No respiratory distress, no use of accessory muscles, diminished breath sounds b/l  CV: tachycardic  GI: Soft, NT, ND  : Harkins catheter in place draining orange colored urine, no CVA tenderness appreciated

## 2023-12-16 NOTE — ED PROVIDER NOTE - CLINICAL SUMMARY MEDICAL DECISION MAKING FREE TEXT BOX
73-year-old male history of diabetes BPH CKD chronic indwelling Harkins, who presents for low back pain, chills, fatigue and generalized weakness, found to be febrile here, with reported new hematuria without clots in the Harkins.  Harkins was changed 1 week ago.  Patient also noted to have nausea and vomiting today and could not tolerate p.o.  Patient denies any falls.  No headache no chest pain no trouble breathing.  Family has noticed mild increase in lower extremity edema over the last several weeks.  Patient was seen in November for chest pain, diagnosed with cardiac amyloidosis  - patient with noted rigors, dehydrated appearing, due to cardiac amyloid and stage II diastolic dysfcn, will not bolus 30 cc/kg but start with 1L and assess response, suspect UTI rule out sepsis, empiric abx - last Ucx proteus sensitive to cephalosporins, cxr, re-eval

## 2023-12-16 NOTE — H&P ADULT - NSHPLABSRESULTS_GEN_ALL_CORE
10.0   9.30  )-----------( 337      ( 16 Dec 2023 15:30 )             30.9   12-    139  |  106  |  22  ----------------------------<  80  4.2   |  28  |  1.77<H>    Ca    8.7      16 Dec 2023 15:30    TPro  7.8  /  Alb  3.1<L>  /  TBili  0.5  /  DBili  x   /  AST  68<H>  /  ALT  52  /  AlkPhos  127<H>      Urinalysis Basic - ( 16 Dec 2023 15:30 )    Color: Orange / Appearance: Cloudy / S.012 / pH: x  Gluc: 80 mg/dL / Ketone: Negative mg/dL  / Bili: Negative / Urobili: 1.0 mg/dL   Blood: x / Protein: 30 mg/dL / Nitrite: Negative   Leuk Esterase: Large / RBC: 11-25 /HPF / WBC 25-50 /HPF   Sq Epi: x / Non Sq Epi: x / Bacteria: Moderate /HPF    Chest x-ray 23  IMPRESSION:  Persistent small bibasilar infiltrates.

## 2023-12-16 NOTE — ED ADULT NURSE NOTE - CCCP TRG CHIEF CMPLNT
Is an  Needed: no  If yes, Which Language:     Callers Name: Mallory Jay Phone Number: [phone]  Relationship to Patient: mom  Best time of day to call: any  Is it ok to leave a detailed voicemail on this number: yes  Reason for Call: mom states Dupixent was approved and they need to set up teaching nurse visits for it. Also needs refills on below medications   Medication Question(if no, do not complete additional questions):  Name of Medication: Benadryl and Mometasone CREAM   Name of Pharmacy(include location): [facility] [place]  Is this a Refill Request: yes        Gaby Keyes     vomiting , blood tinged urine/back pain general

## 2023-12-16 NOTE — PATIENT PROFILE ADULT - FALL HARM RISK - HARM RISK INTERVENTIONS
Assistance with ambulation/Assistance OOB with selected safe patient handling equipment/Communicate Risk of Fall with Harm to all staff/Discuss with provider need for PT consult/Monitor gait and stability/Provide patient with walking aids - walker, cane, crutches/Reinforce activity limits and safety measures with patient and family/Tailored Fall Risk Interventions/Visual Cue: Yellow wristband and red socks/Bed in lowest position, wheels locked, appropriate side rails in place/Call bell, personal items and telephone in reach/Instruct patient to call for assistance before getting out of bed or chair/Non-slip footwear when patient is out of bed/[place] to call system/Physically safe environment - no spills, clutter or unnecessary equipment/Purposeful Proactive Rounding/Room/bathroom lighting operational, light cord in reach Assistance with ambulation/Assistance OOB with selected safe patient handling equipment/Communicate Risk of Fall with Harm to all staff/Discuss with provider need for PT consult/Monitor gait and stability/Provide patient with walking aids - walker, cane, crutches/Reinforce activity limits and safety measures with patient and family/Tailored Fall Risk Interventions/Visual Cue: Yellow wristband and red socks/Bed in lowest position, wheels locked, appropriate side rails in place/Call bell, personal items and telephone in reach/Instruct patient to call for assistance before getting out of bed or chair/Non-slip footwear when patient is out of bed/[place] to call system/Physically safe environment - no spills, clutter or unnecessary equipment/Purposeful Proactive Rounding/Room/bathroom lighting operational, light cord in reach

## 2023-12-16 NOTE — ED PROVIDER NOTE - OBJECTIVE STATEMENT
73-year-old male history of diabetes BPH CKD chronic indwelling Harkins, who presents for low back pain, chills, fatigue and generalized weakness, found to be febrile here, with reported new hematuria without clots in the Harkins.  Harkins was changed 1 week ago.  Patient also noted to have nausea and vomiting today and could not tolerate p.o.  Patient denies any falls.  No headache no chest pain no trouble breathing.  Family has noticed mild increase in lower extremity edema over the last several weeks.  Patient was seen in November for chest pain, diagnosed with cardiac amyloidosis

## 2023-12-16 NOTE — ED ADULT TRIAGE NOTE - HISTORY OF COVID-19 VACCINATION
Vaccine status unknown I have personally performed a face to face diagnostic evaluation on this patient. I have reviewed the PA note and agree with the history, exam, and plan of care, except as noted.

## 2023-12-17 LAB
ANION GAP SERPL CALC-SCNC: 7 MMOL/L — SIGNIFICANT CHANGE UP (ref 5–17)
ANION GAP SERPL CALC-SCNC: 7 MMOL/L — SIGNIFICANT CHANGE UP (ref 5–17)
BUN SERPL-MCNC: 22 MG/DL — SIGNIFICANT CHANGE UP (ref 7–23)
BUN SERPL-MCNC: 22 MG/DL — SIGNIFICANT CHANGE UP (ref 7–23)
CALCIUM SERPL-MCNC: 8.6 MG/DL — SIGNIFICANT CHANGE UP (ref 8.5–10.1)
CALCIUM SERPL-MCNC: 8.6 MG/DL — SIGNIFICANT CHANGE UP (ref 8.5–10.1)
CHLORIDE SERPL-SCNC: 106 MMOL/L — SIGNIFICANT CHANGE UP (ref 96–108)
CHLORIDE SERPL-SCNC: 106 MMOL/L — SIGNIFICANT CHANGE UP (ref 96–108)
CO2 SERPL-SCNC: 24 MMOL/L — SIGNIFICANT CHANGE UP (ref 22–31)
CO2 SERPL-SCNC: 24 MMOL/L — SIGNIFICANT CHANGE UP (ref 22–31)
CREAT SERPL-MCNC: 1.7 MG/DL — HIGH (ref 0.5–1.3)
CREAT SERPL-MCNC: 1.7 MG/DL — HIGH (ref 0.5–1.3)
EGFR: 42 ML/MIN/1.73M2 — LOW
EGFR: 42 ML/MIN/1.73M2 — LOW
GLUCOSE BLDC GLUCOMTR-MCNC: 134 MG/DL — HIGH (ref 70–99)
GLUCOSE BLDC GLUCOMTR-MCNC: 134 MG/DL — HIGH (ref 70–99)
GLUCOSE BLDC GLUCOMTR-MCNC: 140 MG/DL — HIGH (ref 70–99)
GLUCOSE BLDC GLUCOMTR-MCNC: 140 MG/DL — HIGH (ref 70–99)
GLUCOSE BLDC GLUCOMTR-MCNC: 76 MG/DL — SIGNIFICANT CHANGE UP (ref 70–99)
GLUCOSE BLDC GLUCOMTR-MCNC: 76 MG/DL — SIGNIFICANT CHANGE UP (ref 70–99)
GLUCOSE BLDC GLUCOMTR-MCNC: 99 MG/DL — SIGNIFICANT CHANGE UP (ref 70–99)
GLUCOSE BLDC GLUCOMTR-MCNC: 99 MG/DL — SIGNIFICANT CHANGE UP (ref 70–99)
GLUCOSE SERPL-MCNC: 52 MG/DL — CRITICAL LOW (ref 70–99)
GLUCOSE SERPL-MCNC: 52 MG/DL — CRITICAL LOW (ref 70–99)
HCT VFR BLD CALC: 25.4 % — LOW (ref 39–50)
HCT VFR BLD CALC: 25.4 % — LOW (ref 39–50)
HGB BLD-MCNC: 8.2 G/DL — LOW (ref 13–17)
HGB BLD-MCNC: 8.2 G/DL — LOW (ref 13–17)
MCHC RBC-ENTMCNC: 26.2 PG — LOW (ref 27–34)
MCHC RBC-ENTMCNC: 26.2 PG — LOW (ref 27–34)
MCHC RBC-ENTMCNC: 32.3 G/DL — SIGNIFICANT CHANGE UP (ref 32–36)
MCHC RBC-ENTMCNC: 32.3 G/DL — SIGNIFICANT CHANGE UP (ref 32–36)
MCV RBC AUTO: 81.2 FL — SIGNIFICANT CHANGE UP (ref 80–100)
MCV RBC AUTO: 81.2 FL — SIGNIFICANT CHANGE UP (ref 80–100)
NRBC # BLD: 0 /100 WBCS — SIGNIFICANT CHANGE UP (ref 0–0)
NRBC # BLD: 0 /100 WBCS — SIGNIFICANT CHANGE UP (ref 0–0)
PLATELET # BLD AUTO: 258 K/UL — SIGNIFICANT CHANGE UP (ref 150–400)
PLATELET # BLD AUTO: 258 K/UL — SIGNIFICANT CHANGE UP (ref 150–400)
POTASSIUM SERPL-MCNC: 4 MMOL/L — SIGNIFICANT CHANGE UP (ref 3.5–5.3)
POTASSIUM SERPL-MCNC: 4 MMOL/L — SIGNIFICANT CHANGE UP (ref 3.5–5.3)
POTASSIUM SERPL-SCNC: 4 MMOL/L — SIGNIFICANT CHANGE UP (ref 3.5–5.3)
POTASSIUM SERPL-SCNC: 4 MMOL/L — SIGNIFICANT CHANGE UP (ref 3.5–5.3)
RBC # BLD: 3.13 M/UL — LOW (ref 4.2–5.8)
RBC # BLD: 3.13 M/UL — LOW (ref 4.2–5.8)
RBC # FLD: 16.2 % — HIGH (ref 10.3–14.5)
RBC # FLD: 16.2 % — HIGH (ref 10.3–14.5)
SODIUM SERPL-SCNC: 137 MMOL/L — SIGNIFICANT CHANGE UP (ref 135–145)
SODIUM SERPL-SCNC: 137 MMOL/L — SIGNIFICANT CHANGE UP (ref 135–145)
WBC # BLD: 13.94 K/UL — HIGH (ref 3.8–10.5)
WBC # BLD: 13.94 K/UL — HIGH (ref 3.8–10.5)
WBC # FLD AUTO: 13.94 K/UL — HIGH (ref 3.8–10.5)
WBC # FLD AUTO: 13.94 K/UL — HIGH (ref 3.8–10.5)

## 2023-12-17 PROCEDURE — 74176 CT ABD & PELVIS W/O CONTRAST: CPT | Mod: 26

## 2023-12-17 PROCEDURE — 99233 SBSQ HOSP IP/OBS HIGH 50: CPT

## 2023-12-17 RX ORDER — SODIUM CHLORIDE 9 MG/ML
1000 INJECTION INTRAMUSCULAR; INTRAVENOUS; SUBCUTANEOUS ONCE
Refills: 0 | Status: COMPLETED | OUTPATIENT
Start: 2023-12-17 | End: 2023-12-17

## 2023-12-17 RX ORDER — SODIUM CHLORIDE 9 MG/ML
1000 INJECTION INTRAMUSCULAR; INTRAVENOUS; SUBCUTANEOUS
Refills: 0 | Status: DISCONTINUED | OUTPATIENT
Start: 2023-12-17 | End: 2023-12-18

## 2023-12-17 RX ADMIN — FINASTERIDE 5 MILLIGRAM(S): 5 TABLET, FILM COATED ORAL at 13:11

## 2023-12-17 RX ADMIN — Medication 3 MILLIGRAM(S): at 00:18

## 2023-12-17 RX ADMIN — Medication 2 SPRAY(S): at 13:12

## 2023-12-17 RX ADMIN — LIDOCAINE 1 PATCH: 4 CREAM TOPICAL at 03:24

## 2023-12-17 RX ADMIN — CEFTRIAXONE 100 MILLIGRAM(S): 500 INJECTION, POWDER, FOR SOLUTION INTRAMUSCULAR; INTRAVENOUS at 22:59

## 2023-12-17 RX ADMIN — SODIUM CHLORIDE 1000 MILLILITER(S): 9 INJECTION INTRAMUSCULAR; INTRAVENOUS; SUBCUTANEOUS at 10:54

## 2023-12-17 RX ADMIN — Medication 2 SPRAY(S): at 22:11

## 2023-12-17 RX ADMIN — TRAMADOL HYDROCHLORIDE 50 MILLIGRAM(S): 50 TABLET ORAL at 08:22

## 2023-12-17 RX ADMIN — Medication 1 SPRAY(S): at 17:13

## 2023-12-17 RX ADMIN — Medication 2 SPRAY(S): at 05:20

## 2023-12-17 RX ADMIN — SODIUM CHLORIDE 75 MILLILITER(S): 9 INJECTION INTRAMUSCULAR; INTRAVENOUS; SUBCUTANEOUS at 13:17

## 2023-12-17 RX ADMIN — TRAMADOL HYDROCHLORIDE 50 MILLIGRAM(S): 50 TABLET ORAL at 09:00

## 2023-12-17 RX ADMIN — TAMSULOSIN HYDROCHLORIDE 0.4 MILLIGRAM(S): 0.4 CAPSULE ORAL at 22:11

## 2023-12-17 RX ADMIN — Medication 1 SPRAY(S): at 05:20

## 2023-12-17 NOTE — PROGRESS NOTE ADULT - SUBJECTIVE AND OBJECTIVE BOX
****************************************  Hayes Manley MD  Available on [employer] Teams  ****************************************  SUBJECTIVE  Patient seen and examined at bedside. Hypotensive and Hypoglycemic this AM  Denied HA, CP, SOB, n/v/d/c , fevers, chills    OBJECTIVE   Vital Signs Last 24 Hrs  T(C): 36.6 (17 Dec 2023 10:29), Max: 39.4 (16 Dec 2023 14:20)  T(F): 97.8 (17 Dec 2023 10:29), Max: 102.9 (16 Dec 2023 14:20)  HR: 84 (17 Dec 2023 10:29) (84 - 109)  BP: 83/48 (17 Dec 2023 10:29) (83/48 - 112/54)  BP(mean): --  RR: 18 (17 Dec 2023 10:29) (18 - 20)  SpO2: 96% (17 Dec 2023 10:29) (95% - 98%)    Parameters below as of 16 Dec 2023 19:51  Patient On (Oxygen Delivery Method): room air        12-16-23 @ 07:01  -  12-17-23 @ 07:00  --------------------------------------------------------  IN: 0 mL / OUT: 700 mL / NET: -700 mL    12-17-23 @ 07:01  -  12-17-23 @ 11:31  --------------------------------------------------------  IN: 360 mL / OUT: 0 mL / NET: 360 mL      PHYSICAL EXAM:  CONSTITUTIONAL: Well groomed, in mild distress  EYES: PERRLA and symmetric, EOMI  ENMT: Oral mucosa with moist membranes  RESP: No respiratory distress, no use of accessory muscles, diminished breath sounds b/l  CV: tachycardic  GI: Soft, NT, ND  : Harkins catheter in place draining orange colored urine, no CVA tenderness appreciated        HOSPITAL MEDICATIONS  cefTRIAXone   IVPB 1000 milliGRAM(s) IV Intermittent Once      colchicine 0.6 milliGRAM(s) Oral every 48 hours  finasteride 5 milliGRAM(s) Oral daily      acetaminophen     Tablet .. 650 milliGRAM(s) Oral every 6 hours PRN  melatonin 3 milliGRAM(s) Oral at bedtime PRN  ondansetron Injectable 4 milliGRAM(s) IV Push every 8 hours PRN  traMADol 50 milliGRAM(s) Oral every 6 hours PRN    aluminum hydroxide/magnesium hydroxide/simethicone Suspension 30 milliLiter(s) Oral every 4 hours PRN      tamsulosin 0.4 milliGRAM(s) Oral at bedtime    lactated ringers Bolus 1000 milliLiter(s) IV Bolus once  sodium chloride 0.9%. 1000 milliLiter(s) IV Continuous <Continuous>      fluticasone propionate 50 MICROgram(s)/spray Nasal Spray 1 Spray(s) Both Nostrils two times a day  sodium chloride 0.65% Nasal 2 Spray(s) Both Nostrils three times a day    Tafamadis (Vyndamax) 61 milliGRAM(s)   Oral daily      LABS                        8.2    13.94 )-----------( 258      ( 17 Dec 2023 06:05 )             25.4       12-17    137  |  106  |  22  ----------------------------<  52<LL>  4.0   |  24  |  1.70<H>    Ca    8.6      17 Dec 2023 06:05    TPro  7.8  /  Alb  3.1<L>  /  TBili  0.5  /  DBili  x   /  AST  68<H>  /  ALT  52  /  AlkPhos  127<H>  12-16      Urinalysis Basic - ( 17 Dec 2023 06:05 )    Color: x / Appearance: x / SG: x / pH: x  Gluc: 52 mg/dL / Ketone: x  / Bili: x / Urobili: x   Blood: x / Protein: x / Nitrite: x   Leuk Esterase: x / RBC: x / WBC x   Sq Epi: x / Non Sq Epi: x / Bacteria: x        Follow Up:      RADIOLOGY:

## 2023-12-17 NOTE — PROGRESS NOTE ADULT - ASSESSMENT
Cy Sanchez is a 73 year old male with PMHx of BPH (c/b urinary retention s/p Harkins catheter placement two months ago, exchanged monthly), CKD stage IIIb, and cardiac amyloidosis who presented to the ED on 12/16/23 for complaints of bilateral leg pain and admitted for sepsis secondary to CAUTI.    Sepsis secondary to CAUTI  In pt with indwelling Harkins catheter  Complaints of b/l leg pain that started yesterday and worsening of chronic lower back pain  Temp 102.9 and  on admission  Lactic acid WNL, COVID/influenza negative  U/A (sample obtained after Harkins exchange as per ER physician) with proteinuria, negative nitrites, large leuks, moderate blood, WBC 25-50, RBC 11-25, moderate bacteria, squamous epithelial cells  CXR with ?b/l lower infiltrates, clinical picture not consistent with PNA  S/p LR 1500 cc bolus, ceftriaxone 1 g IV, and acetaminophen 975 mg in the ED  Ceftriaxone 1 g IV continued  F/u blood cultures, urine culture, CT A/P to r/o other etiologies given acute on chronic lower back pain  Monitor fever curve and WBC trend  Telemetry  -S/p NS 1L Bolus for hypotn this AM. Start maint IVF x24 hrs    RUQ pain, unclear etiology  Complaints of RUQ pain that started while he was in ER, could not elaborate more on pain  F/u abdominal U/S      Chronic medical conditions:  BPH: PTA finasteride 5 mg, tamsulosin 0.4 mg  Cardiac amyloidosis: PTA colchicine 0.6 mg reordered as q48 given CrCl after discussion with pharmacy, PTA Vyndamax 61 mg held given not on formulary and unable to borrow from [place], discussed with pharmacist, please ask family to bring in own med in AM  CKD stage IIIb: Cr 1.77 on admission, appears to be around baseline, s/p LR 1500 cc bolus in the ED, avoid nephrotoxins, renally dose meds, encourage PO hydration, monitor renal function  Normocytic anemia, chronic: hgb 10.0 on admission, appears better than baseline, no signs of bleeding, monitor    Medication reconciliation completed using discharge med rec from [facility] on 11/7/23. Patient reports he has not had any medication changes since discharge.    Plan of care discussed with wife at bedside. Cy Sanchez is a 73 year old male with PMHx of BPH (c/b urinary retention s/p Harkins catheter placement two months ago, exchanged monthly), CKD stage IIIb, and cardiac amyloidosis who presented to the ED on 12/16/23 for complaints of bilateral leg pain and admitted for sepsis secondary to CAUTI.    Sepsis secondary to CAUTI  In pt with indwelling Harkins catheter  Complaints of b/l leg pain that started yesterday and worsening of chronic lower back pain  Temp 102.9 and  on admission  Lactic acid WNL, COVID/influenza negative  U/A (sample obtained after Harkins exchange as per ER physician) with proteinuria, negative nitrites, large leuks, moderate blood, WBC 25-50, RBC 11-25, moderate bacteria, squamous epithelial cells  CXR with ?b/l lower infiltrates, clinical picture not consistent with PNA  S/p LR 1500 cc bolus, ceftriaxone 1 g IV, and acetaminophen 975 mg in the ED  Ceftriaxone 1 g IV continued  F/u blood cultures, urine culture, CT A/P to r/o other etiologies given acute on chronic lower back pain  Monitor fever curve and WBC trend  Telemetry  -S/p NS 1L Bolus for hypotn this AM. Start maint IVF x24 hrs    RUQ pain, unclear etiology  Complaints of RUQ pain that started while he was in ER, could not elaborate more on pain  F/u abdominal U/S      Chronic medical conditions:  BPH: PTA finasteride 5 mg, tamsulosin 0.4 mg  Cardiac amyloidosis: PTA colchicine 0.6 mg reordered as q48 given CrCl after discussion with pharmacy, PTA Vyndamax 61 mg held given not on formulary and unable to borrow from [place], discussed with pharmacist, please ask family to bring in own med in AM  CKD stage IIIb: Cr 1.77 on admission, appears to be around baseline, s/p LR 1500 cc bolus in the ED, avoid nephrotoxins, renally dose meds, encourage PO hydration, monitor renal function  Normocytic anemia, chronic: hgb 10.0 on admission, appears better than baseline, no signs of bleeding, monitor    Medication reconciliation completed using discharge med rec from [facility] on 11/7/23. Patient reports he has not had any medication changes since discharge.    Plan of care discussed with wife at bedside.

## 2023-12-18 LAB
ALBUMIN SERPL ELPH-MCNC: 2.3 G/DL — LOW (ref 3.3–5)
ALBUMIN SERPL ELPH-MCNC: 2.3 G/DL — LOW (ref 3.3–5)
ALP SERPL-CCNC: 108 U/L — SIGNIFICANT CHANGE UP (ref 40–120)
ALP SERPL-CCNC: 108 U/L — SIGNIFICANT CHANGE UP (ref 40–120)
ALT FLD-CCNC: 28 U/L — SIGNIFICANT CHANGE UP (ref 12–78)
ALT FLD-CCNC: 28 U/L — SIGNIFICANT CHANGE UP (ref 12–78)
ANION GAP SERPL CALC-SCNC: 5 MMOL/L — SIGNIFICANT CHANGE UP (ref 5–17)
ANION GAP SERPL CALC-SCNC: 5 MMOL/L — SIGNIFICANT CHANGE UP (ref 5–17)
AST SERPL-CCNC: 36 U/L — SIGNIFICANT CHANGE UP (ref 15–37)
AST SERPL-CCNC: 36 U/L — SIGNIFICANT CHANGE UP (ref 15–37)
BASOPHILS # BLD AUTO: 0.03 K/UL — SIGNIFICANT CHANGE UP (ref 0–0.2)
BASOPHILS # BLD AUTO: 0.03 K/UL — SIGNIFICANT CHANGE UP (ref 0–0.2)
BASOPHILS NFR BLD AUTO: 0.5 % — SIGNIFICANT CHANGE UP (ref 0–2)
BASOPHILS NFR BLD AUTO: 0.5 % — SIGNIFICANT CHANGE UP (ref 0–2)
BILIRUB SERPL-MCNC: 0.2 MG/DL — SIGNIFICANT CHANGE UP (ref 0.2–1.2)
BILIRUB SERPL-MCNC: 0.2 MG/DL — SIGNIFICANT CHANGE UP (ref 0.2–1.2)
BUN SERPL-MCNC: 21 MG/DL — SIGNIFICANT CHANGE UP (ref 7–23)
BUN SERPL-MCNC: 21 MG/DL — SIGNIFICANT CHANGE UP (ref 7–23)
CALCIUM SERPL-MCNC: 8.1 MG/DL — LOW (ref 8.5–10.1)
CALCIUM SERPL-MCNC: 8.1 MG/DL — LOW (ref 8.5–10.1)
CHLORIDE SERPL-SCNC: 109 MMOL/L — HIGH (ref 96–108)
CHLORIDE SERPL-SCNC: 109 MMOL/L — HIGH (ref 96–108)
CO2 SERPL-SCNC: 25 MMOL/L — SIGNIFICANT CHANGE UP (ref 22–31)
CO2 SERPL-SCNC: 25 MMOL/L — SIGNIFICANT CHANGE UP (ref 22–31)
CREAT SERPL-MCNC: 1.67 MG/DL — HIGH (ref 0.5–1.3)
CREAT SERPL-MCNC: 1.67 MG/DL — HIGH (ref 0.5–1.3)
EGFR: 42 ML/MIN/1.73M2 — LOW
EGFR: 42 ML/MIN/1.73M2 — LOW
EOSINOPHIL # BLD AUTO: 0.25 K/UL — SIGNIFICANT CHANGE UP (ref 0–0.5)
EOSINOPHIL # BLD AUTO: 0.25 K/UL — SIGNIFICANT CHANGE UP (ref 0–0.5)
EOSINOPHIL NFR BLD AUTO: 4.5 % — SIGNIFICANT CHANGE UP (ref 0–6)
EOSINOPHIL NFR BLD AUTO: 4.5 % — SIGNIFICANT CHANGE UP (ref 0–6)
GLUCOSE BLDC GLUCOMTR-MCNC: 103 MG/DL — HIGH (ref 70–99)
GLUCOSE BLDC GLUCOMTR-MCNC: 103 MG/DL — HIGH (ref 70–99)
GLUCOSE BLDC GLUCOMTR-MCNC: 130 MG/DL — HIGH (ref 70–99)
GLUCOSE BLDC GLUCOMTR-MCNC: 130 MG/DL — HIGH (ref 70–99)
GLUCOSE SERPL-MCNC: 101 MG/DL — HIGH (ref 70–99)
GLUCOSE SERPL-MCNC: 101 MG/DL — HIGH (ref 70–99)
HCT VFR BLD CALC: 24.1 % — LOW (ref 39–50)
HCT VFR BLD CALC: 24.1 % — LOW (ref 39–50)
HGB BLD-MCNC: 7.9 G/DL — LOW (ref 13–17)
HGB BLD-MCNC: 7.9 G/DL — LOW (ref 13–17)
IMM GRANULOCYTES NFR BLD AUTO: 0.4 % — SIGNIFICANT CHANGE UP (ref 0–0.9)
IMM GRANULOCYTES NFR BLD AUTO: 0.4 % — SIGNIFICANT CHANGE UP (ref 0–0.9)
LYMPHOCYTES # BLD AUTO: 2.2 K/UL — SIGNIFICANT CHANGE UP (ref 1–3.3)
LYMPHOCYTES # BLD AUTO: 2.2 K/UL — SIGNIFICANT CHANGE UP (ref 1–3.3)
LYMPHOCYTES # BLD AUTO: 40 % — SIGNIFICANT CHANGE UP (ref 13–44)
LYMPHOCYTES # BLD AUTO: 40 % — SIGNIFICANT CHANGE UP (ref 13–44)
MAGNESIUM SERPL-MCNC: 2 MG/DL — SIGNIFICANT CHANGE UP (ref 1.6–2.6)
MAGNESIUM SERPL-MCNC: 2 MG/DL — SIGNIFICANT CHANGE UP (ref 1.6–2.6)
MCHC RBC-ENTMCNC: 26.4 PG — LOW (ref 27–34)
MCHC RBC-ENTMCNC: 26.4 PG — LOW (ref 27–34)
MCHC RBC-ENTMCNC: 32.8 G/DL — SIGNIFICANT CHANGE UP (ref 32–36)
MCHC RBC-ENTMCNC: 32.8 G/DL — SIGNIFICANT CHANGE UP (ref 32–36)
MCV RBC AUTO: 80.6 FL — SIGNIFICANT CHANGE UP (ref 80–100)
MCV RBC AUTO: 80.6 FL — SIGNIFICANT CHANGE UP (ref 80–100)
MONOCYTES # BLD AUTO: 0.6 K/UL — SIGNIFICANT CHANGE UP (ref 0–0.9)
MONOCYTES # BLD AUTO: 0.6 K/UL — SIGNIFICANT CHANGE UP (ref 0–0.9)
MONOCYTES NFR BLD AUTO: 10.9 % — SIGNIFICANT CHANGE UP (ref 2–14)
MONOCYTES NFR BLD AUTO: 10.9 % — SIGNIFICANT CHANGE UP (ref 2–14)
NEUTROPHILS # BLD AUTO: 2.4 K/UL — SIGNIFICANT CHANGE UP (ref 1.8–7.4)
NEUTROPHILS # BLD AUTO: 2.4 K/UL — SIGNIFICANT CHANGE UP (ref 1.8–7.4)
NEUTROPHILS NFR BLD AUTO: 43.7 % — SIGNIFICANT CHANGE UP (ref 43–77)
NEUTROPHILS NFR BLD AUTO: 43.7 % — SIGNIFICANT CHANGE UP (ref 43–77)
NRBC # BLD: 0 /100 WBCS — SIGNIFICANT CHANGE UP (ref 0–0)
NRBC # BLD: 0 /100 WBCS — SIGNIFICANT CHANGE UP (ref 0–0)
PHOSPHATE SERPL-MCNC: 3 MG/DL — SIGNIFICANT CHANGE UP (ref 2.5–4.5)
PHOSPHATE SERPL-MCNC: 3 MG/DL — SIGNIFICANT CHANGE UP (ref 2.5–4.5)
PLATELET # BLD AUTO: 231 K/UL — SIGNIFICANT CHANGE UP (ref 150–400)
PLATELET # BLD AUTO: 231 K/UL — SIGNIFICANT CHANGE UP (ref 150–400)
POTASSIUM SERPL-MCNC: 4.1 MMOL/L — SIGNIFICANT CHANGE UP (ref 3.5–5.3)
POTASSIUM SERPL-MCNC: 4.1 MMOL/L — SIGNIFICANT CHANGE UP (ref 3.5–5.3)
POTASSIUM SERPL-SCNC: 4.1 MMOL/L — SIGNIFICANT CHANGE UP (ref 3.5–5.3)
POTASSIUM SERPL-SCNC: 4.1 MMOL/L — SIGNIFICANT CHANGE UP (ref 3.5–5.3)
PROT SERPL-MCNC: 6.2 GM/DL — SIGNIFICANT CHANGE UP (ref 6–8.3)
PROT SERPL-MCNC: 6.2 GM/DL — SIGNIFICANT CHANGE UP (ref 6–8.3)
RBC # BLD: 2.99 M/UL — LOW (ref 4.2–5.8)
RBC # BLD: 2.99 M/UL — LOW (ref 4.2–5.8)
RBC # FLD: 16.3 % — HIGH (ref 10.3–14.5)
RBC # FLD: 16.3 % — HIGH (ref 10.3–14.5)
SODIUM SERPL-SCNC: 139 MMOL/L — SIGNIFICANT CHANGE UP (ref 135–145)
SODIUM SERPL-SCNC: 139 MMOL/L — SIGNIFICANT CHANGE UP (ref 135–145)
WBC # BLD: 5.5 K/UL — SIGNIFICANT CHANGE UP (ref 3.8–10.5)
WBC # BLD: 5.5 K/UL — SIGNIFICANT CHANGE UP (ref 3.8–10.5)
WBC # FLD AUTO: 5.5 K/UL — SIGNIFICANT CHANGE UP (ref 3.8–10.5)
WBC # FLD AUTO: 5.5 K/UL — SIGNIFICANT CHANGE UP (ref 3.8–10.5)

## 2023-12-18 PROCEDURE — 99232 SBSQ HOSP IP/OBS MODERATE 35: CPT

## 2023-12-18 RX ORDER — CEFTRIAXONE 500 MG/1
1000 INJECTION, POWDER, FOR SOLUTION INTRAMUSCULAR; INTRAVENOUS EVERY 24 HOURS
Refills: 0 | Status: DISCONTINUED | OUTPATIENT
Start: 2023-12-18 | End: 2023-12-20

## 2023-12-18 RX ADMIN — SODIUM CHLORIDE 75 MILLILITER(S): 9 INJECTION INTRAMUSCULAR; INTRAVENOUS; SUBCUTANEOUS at 05:44

## 2023-12-18 RX ADMIN — Medication 0.6 MILLIGRAM(S): at 21:23

## 2023-12-18 RX ADMIN — Medication 2 SPRAY(S): at 13:07

## 2023-12-18 RX ADMIN — FINASTERIDE 5 MILLIGRAM(S): 5 TABLET, FILM COATED ORAL at 11:34

## 2023-12-18 RX ADMIN — Medication 1 SPRAY(S): at 18:15

## 2023-12-18 RX ADMIN — CEFTRIAXONE 100 MILLIGRAM(S): 500 INJECTION, POWDER, FOR SOLUTION INTRAMUSCULAR; INTRAVENOUS at 21:22

## 2023-12-18 RX ADMIN — Medication 1 SPRAY(S): at 05:43

## 2023-12-18 RX ADMIN — Medication 2 SPRAY(S): at 05:43

## 2023-12-18 RX ADMIN — Medication 2 SPRAY(S): at 21:23

## 2023-12-18 RX ADMIN — TAMSULOSIN HYDROCHLORIDE 0.4 MILLIGRAM(S): 0.4 CAPSULE ORAL at 21:22

## 2023-12-18 NOTE — PROGRESS NOTE ADULT - ASSESSMENT
73 year old male with PMHx of BPH (c/b urinary retention s/p Harkins catheter placement two months ago, exchanged monthly), CKD stage IIIb, and cardiac amyloidosis who presented to the ED on 12/16/23 for complaints of bilateral leg pain and admitted for sepsis secondary to CAUTI.    Sepsis secondary to CAUTI  In pt with indwelling Harkins catheter on admission  Improving MSK and abdominal pain  Harkins exchanged in ED  Continue with Ceftriaxone  Follow up urine culture  Blood culture NGTD      RUQ pain, unclear etiology  Complaints of RUQ pain that started while he was in ER, could not elaborate more on pain  Abdominal US seems unremarkable  RESOLVED    BPH  Continue with Flomax and Finasteride  OP urology follow up    Cardiac Amyloidosis  Continue with Colchicine 0.6 q48 hours which is renally dosed  He brought in his home Vyndamax    CKD3  Monitor Cr  OP follow up

## 2023-12-18 NOTE — PROGRESS NOTE ADULT - SUBJECTIVE AND OBJECTIVE BOX
Patient is a 73y old  Male who presents with a chief complaint of Sepsis secondary to CAUTI (17 Dec 2023 11:31)      SUBJECTIVE / OVERNIGHT EVENTS: Patient seen and examined at bedside. No acute events overnight. Without complaints, feels well. NO abdominal pain. No leg pain.    MEDICATIONS  (STANDING):  cefTRIAXone   IVPB 1000 milliGRAM(s) IV Intermittent every 24 hours  colchicine 0.6 milliGRAM(s) Oral every 48 hours  finasteride 5 milliGRAM(s) Oral daily  fluticasone propionate 50 MICROgram(s)/spray Nasal Spray 1 Spray(s) Both Nostrils two times a day  sodium chloride 0.65% Nasal 2 Spray(s) Both Nostrils three times a day  Tafamadis (Vyndamax) 61 milliGRAM(s) 61 milliGRAM(s) Oral daily  tamsulosin 0.4 milliGRAM(s) Oral at bedtime    MEDICATIONS  (PRN):  acetaminophen     Tablet .. 650 milliGRAM(s) Oral every 6 hours PRN Temp greater or equal to 38C (100.4F), Mild Pain (1 - 3)  melatonin 3 milliGRAM(s) Oral at bedtime PRN Insomnia  ondansetron Injectable 4 milliGRAM(s) IV Push every 8 hours PRN Nausea and/or Vomiting  traMADol 50 milliGRAM(s) Oral every 6 hours PRN Moderate Pain (4 - 6)      CAPILLARY BLOOD GLUCOSE      POCT Blood Glucose.: 130 mg/dL (18 Dec 2023 11:11)  POCT Blood Glucose.: 103 mg/dL (18 Dec 2023 07:52)  POCT Blood Glucose.: 134 mg/dL (17 Dec 2023 21:25)  POCT Blood Glucose.: 99 mg/dL (17 Dec 2023 16:21)    I&O's Summary    17 Dec 2023 07:01  -  18 Dec 2023 07:00  --------------------------------------------------------  IN: 2650 mL / OUT: 1350 mL / NET: 1300 mL        PHYSICAL EXAM:  Vital Signs Last 24 Hrs  T(C): 37.6 (18 Dec 2023 10:50), Max: 37.6 (18 Dec 2023 10:50)  T(F): 99.7 (18 Dec 2023 10:50), Max: 99.7 (18 Dec 2023 10:50)  HR: 90 (18 Dec 2023 10:50) (70 - 90)  BP: 94/58 (18 Dec 2023 10:50) (83/53 - 102/67)  BP(mean): --  RR: 18 (18 Dec 2023 10:50) (16 - 18)  SpO2: 98% (18 Dec 2023 10:50) (94% - 98%)    Parameters below as of 18 Dec 2023 10:50  Patient On (Oxygen Delivery Method): room air        GEN: male in NAD, appears comfortable, no diaphoresis  EYES: No scleral injection, EOMI  ENTM: neck supple & symmetric without tracheal deviation, moist membranes, no gross hearing impairment, thyroid gland not enlarged  CV: +S1/S2, no m/r/g, no abdominal bruit, no LE edema  RESP: breathing comfortably, no respiratory accessory muscle use, CTAB, no w/r/r  GI: normoactive BS, soft, NTND, no rebounding/guarding, no palpable masses    LABS:                        7.9    5.50  )-----------( 231      ( 18 Dec 2023 06:57 )             24.1     12-18    139  |  109<H>  |  21  ----------------------------<  101<H>  4.1   |  25  |  1.67<H>    Ca    8.1<L>      18 Dec 2023 06:57  Phos  3.0     12-18  Mg     2.0     12-18    TPro  6.2  /  Alb  2.3<L>  /  TBili  0.2  /  DBili  x   /  AST  36  /  ALT  28  /  AlkPhos  108  12-18    PT/INR - ( 16 Dec 2023 15:30 )   PT: 12.9 sec;   INR: 1.09 ratio         PTT - ( 16 Dec 2023 15:30 )  PTT:38.1 sec      Urinalysis Basic - ( 18 Dec 2023 06:57 )    Color: x / Appearance: x / SG: x / pH: x  Gluc: 101 mg/dL / Ketone: x  / Bili: x / Urobili: x   Blood: x / Protein: x / Nitrite: x   Leuk Esterase: x / RBC: x / WBC x   Sq Epi: x / Non Sq Epi: x / Bacteria: x        Culture - Blood (collected 16 Dec 2023 15:40)  Source: .Blood Blood-Peripheral  Preliminary Report (17 Dec 2023 22:05):    No growth at 24 hours    Culture - Blood (collected 16 Dec 2023 15:30)  Source: .Blood Blood-Peripheral  Preliminary Report (17 Dec 2023 22:05):    No growth at 24 hours        RADIOLOGY & ADDITIONAL TESTS:  Results Reviewed:   Imaging Personally Reviewed:  Electrocardiogram Personally Reviewed:    COORDINATION OF CARE:  Care Discussed with Consultants/Other Providers [Y/N]:  Prior or Outpatient Records Reviewed [Y/N]:

## 2023-12-19 ENCOUNTER — TRANSCRIPTION ENCOUNTER (OUTPATIENT)
Age: 73
End: 2023-12-19

## 2023-12-19 LAB
ANION GAP SERPL CALC-SCNC: 5 MMOL/L — SIGNIFICANT CHANGE UP (ref 5–17)
ANION GAP SERPL CALC-SCNC: 5 MMOL/L — SIGNIFICANT CHANGE UP (ref 5–17)
BUN SERPL-MCNC: 20 MG/DL — SIGNIFICANT CHANGE UP (ref 7–23)
BUN SERPL-MCNC: 20 MG/DL — SIGNIFICANT CHANGE UP (ref 7–23)
CALCIUM SERPL-MCNC: 8.6 MG/DL — SIGNIFICANT CHANGE UP (ref 8.5–10.1)
CALCIUM SERPL-MCNC: 8.6 MG/DL — SIGNIFICANT CHANGE UP (ref 8.5–10.1)
CHLORIDE SERPL-SCNC: 109 MMOL/L — HIGH (ref 96–108)
CHLORIDE SERPL-SCNC: 109 MMOL/L — HIGH (ref 96–108)
CO2 SERPL-SCNC: 25 MMOL/L — SIGNIFICANT CHANGE UP (ref 22–31)
CO2 SERPL-SCNC: 25 MMOL/L — SIGNIFICANT CHANGE UP (ref 22–31)
CREAT SERPL-MCNC: 1.65 MG/DL — HIGH (ref 0.5–1.3)
CREAT SERPL-MCNC: 1.65 MG/DL — HIGH (ref 0.5–1.3)
EGFR: 43 ML/MIN/1.73M2 — LOW
EGFR: 43 ML/MIN/1.73M2 — LOW
GLUCOSE SERPL-MCNC: 92 MG/DL — SIGNIFICANT CHANGE UP (ref 70–99)
GLUCOSE SERPL-MCNC: 92 MG/DL — SIGNIFICANT CHANGE UP (ref 70–99)
HCT VFR BLD CALC: 26.9 % — LOW (ref 39–50)
HCT VFR BLD CALC: 26.9 % — LOW (ref 39–50)
HGB BLD-MCNC: 8.7 G/DL — LOW (ref 13–17)
HGB BLD-MCNC: 8.7 G/DL — LOW (ref 13–17)
MCHC RBC-ENTMCNC: 26.2 PG — LOW (ref 27–34)
MCHC RBC-ENTMCNC: 26.2 PG — LOW (ref 27–34)
MCHC RBC-ENTMCNC: 32.3 G/DL — SIGNIFICANT CHANGE UP (ref 32–36)
MCHC RBC-ENTMCNC: 32.3 G/DL — SIGNIFICANT CHANGE UP (ref 32–36)
MCV RBC AUTO: 81 FL — SIGNIFICANT CHANGE UP (ref 80–100)
MCV RBC AUTO: 81 FL — SIGNIFICANT CHANGE UP (ref 80–100)
NRBC # BLD: 0 /100 WBCS — SIGNIFICANT CHANGE UP (ref 0–0)
NRBC # BLD: 0 /100 WBCS — SIGNIFICANT CHANGE UP (ref 0–0)
PLATELET # BLD AUTO: 250 K/UL — SIGNIFICANT CHANGE UP (ref 150–400)
PLATELET # BLD AUTO: 250 K/UL — SIGNIFICANT CHANGE UP (ref 150–400)
POTASSIUM SERPL-MCNC: 4.2 MMOL/L — SIGNIFICANT CHANGE UP (ref 3.5–5.3)
POTASSIUM SERPL-MCNC: 4.2 MMOL/L — SIGNIFICANT CHANGE UP (ref 3.5–5.3)
POTASSIUM SERPL-SCNC: 4.2 MMOL/L — SIGNIFICANT CHANGE UP (ref 3.5–5.3)
POTASSIUM SERPL-SCNC: 4.2 MMOL/L — SIGNIFICANT CHANGE UP (ref 3.5–5.3)
RBC # BLD: 3.32 M/UL — LOW (ref 4.2–5.8)
RBC # BLD: 3.32 M/UL — LOW (ref 4.2–5.8)
RBC # FLD: 16.3 % — HIGH (ref 10.3–14.5)
RBC # FLD: 16.3 % — HIGH (ref 10.3–14.5)
SODIUM SERPL-SCNC: 139 MMOL/L — SIGNIFICANT CHANGE UP (ref 135–145)
SODIUM SERPL-SCNC: 139 MMOL/L — SIGNIFICANT CHANGE UP (ref 135–145)
WBC # BLD: 5.91 K/UL — SIGNIFICANT CHANGE UP (ref 3.8–10.5)
WBC # BLD: 5.91 K/UL — SIGNIFICANT CHANGE UP (ref 3.8–10.5)
WBC # FLD AUTO: 5.91 K/UL — SIGNIFICANT CHANGE UP (ref 3.8–10.5)
WBC # FLD AUTO: 5.91 K/UL — SIGNIFICANT CHANGE UP (ref 3.8–10.5)

## 2023-12-19 PROCEDURE — 99232 SBSQ HOSP IP/OBS MODERATE 35: CPT

## 2023-12-19 RX ADMIN — TAMSULOSIN HYDROCHLORIDE 0.4 MILLIGRAM(S): 0.4 CAPSULE ORAL at 22:11

## 2023-12-19 RX ADMIN — FINASTERIDE 5 MILLIGRAM(S): 5 TABLET, FILM COATED ORAL at 11:31

## 2023-12-19 RX ADMIN — CEFTRIAXONE 100 MILLIGRAM(S): 500 INJECTION, POWDER, FOR SOLUTION INTRAMUSCULAR; INTRAVENOUS at 22:11

## 2023-12-19 RX ADMIN — Medication 2 SPRAY(S): at 05:01

## 2023-12-19 RX ADMIN — Medication 1 SPRAY(S): at 17:11

## 2023-12-19 RX ADMIN — Medication 2 SPRAY(S): at 17:12

## 2023-12-19 RX ADMIN — Medication 1 SPRAY(S): at 05:01

## 2023-12-19 NOTE — DISCHARGE NOTE PROVIDER - HOSPITAL COURSE
73 year old male with PMHx of BPH (c/b urinary retention s/p Harkins catheter placement two months ago, exchanged monthly), CKD stage IIIb, and cardiac amyloidosis who presented to the ED on 12/16/23 for complaints of bilateral leg pain and admitted for sepsis secondary to CAUTI.    Sepsis secondary to CAUTI  In pt with indwelling Harkins catheter on admission  Improving MSK and abdominal pain  Harkins exchanged in ED  Continue with Ceftriaxone  Follow up urine culture  Blood culture NGTD      RUQ pain, unclear etiology  Complaints of RUQ pain that started while he was in ER, could not elaborate more on pain  Abdominal US seems unremarkable  RESOLVED    BPH  Continue with Flomax and Finasteride  OP urology follow up    Cardiac Amyloidosis  Continue with Colchicine 0.6 q48 hours which is renally dosed  He brought in his home Vyndamax     73 year old male with history of BPH (c/b urinary retention s/p Harkins catheter placement two months ago, exchanged monthly), CKD stage IIIb, and cardiac amyloidosis who presented to the ED on 12/16/23 for complaints of bilateral leg pain and admitted for sepsis POA secondary to CAUTI in pt with indwelling Harkins catheter on admission. Harkins was exchanged in ED. Pt was treated w/ Ceftriaxone - but urine culture grewing enterococcus faecalis & Stenotrophomonas maltophilia. Pt improved and, as per ID, there was no need for antibiotics at this time as patient clinically improved despite being on abx that is did not treat the urine culture. The patient refused PT eval.    Discharge time: 43 minutes      Vital Signs Last 24 Hrs  T(C): 36.3 (22 Dec 2023 10:38), Max: 37.2 (21 Dec 2023 23:16)  T(F): 97.4 (22 Dec 2023 10:38), Max: 99 (21 Dec 2023 23:16)  HR: 100 (22 Dec 2023 10:38) (85 - 100)  BP: 98/62 (22 Dec 2023 10:38) (96/61 - 105/72)  BP(mean): 74 (22 Dec 2023 10:38) (74 - 74)  RR: 17 (22 Dec 2023 10:38) (17 - 18)  SpO2: 99% (22 Dec 2023 10:38) (99% - 100%)    Parameters below as of 22 Dec 2023 04:22  Patient On (Oxygen Delivery Method): room air     PHYSICAL EXAM:  GENERAL: NAD, well-groomed, well-developed  HEAD:  Atraumatic, Normocephalic  EYES: EOMI, PERRLA   NECK: Supple   NERVOUS SYSTEM:  Alert & Oriented X3, Good concentration   CHEST/LUNG: Clear to auscultation bilaterally; No rales, rhonchi, wheezing, or rubs  HEART: Regular rate and rhythm; No murmurs, rubs, or gallops  ABDOMEN: Soft, Nontender, Nondistended; Bowel sounds present  EXTREMITIES:  No clubbing, cyanosis, or edema

## 2023-12-19 NOTE — DISCHARGE NOTE PROVIDER - NSDCCPCAREPLAN_GEN_ALL_CORE_FT
PRINCIPAL DISCHARGE DIAGNOSIS  Diagnosis: Fever  Assessment and Plan of Treatment: You were found to have a urinary tract infection      SECONDARY DISCHARGE DIAGNOSES  Diagnosis: Acute UTI  Assessment and Plan of Treatment:      PRINCIPAL DISCHARGE DIAGNOSIS  Diagnosis: Fever  Assessment and Plan of Treatment: You were found to have a urinary tract infection. You completed course of antibitiocs      SECONDARY DISCHARGE DIAGNOSES  Diagnosis: Acute UTI  Assessment and Plan of Treatment:

## 2023-12-19 NOTE — DISCHARGE NOTE PROVIDER - PROVIDER TOKENS
FREE:[LAST:[Your PCP],PHONE:[(   )    -],FAX:[(   )    -],FOLLOWUP:[1 week]],FREE:[LAST:[Your urologist],PHONE:[(   )    -],FAX:[(   )    -],FOLLOWUP:[1 week]]

## 2023-12-19 NOTE — DISCHARGE NOTE PROVIDER - CARE PROVIDER_API CALL
Your PCP,   Phone: (   )    -  Fax: (   )    -  Follow Up Time: 1 week    Your urologist,   Phone: (   )    -  Fax: (   )    -  Follow Up Time: 1 week

## 2023-12-19 NOTE — DISCHARGE NOTE PROVIDER - NSDCFUSCHEDAPPT_GEN_ALL_CORE_FT
[facility]  GERIATRICS [address]   Scheduled Appointment: 12/22/2023    Evonne Marion  [facility]  UROLOGY [address]  Scheduled Appointment: 01/04/2024    Cleveland Dinh  [facility]  OTOLARYNG [address] [facility]  Scheduled Appointment: 01/09/2024    [facility]  UROLOGY [address]  Scheduled Appointment: 01/16/2024    Vicente Milan  [facility]  UROLOGY [address]  Scheduled Appointment: 01/16/2024    Nery Elmore  [facility]  NEPHRO [address]  Scheduled Appointment: 01/29/2024    MACARIO CLINE  [facility]  CARDIOLOGY [address] [facility]   Scheduled Appointment: 02/14/2024     [facility]  GERIATRICS [address]   Scheduled Appointment: 12/22/2023    Evonne Marion  [facility]  UROLOGY [address]  Scheduled Appointment: 01/04/2024    Cleveland Dinh  [facility]  OTOLARYNG [address] [facility]  Scheduled Appointment: 01/09/2024    [facility]  UROLOGY [address]  Scheduled Appointment: 01/16/2024    Vicente Milan  [facility]  UROLOGY [address]  Scheduled Appointment: 01/16/2024    Nery Elmore  [facility]  NEPHRO [address]  Scheduled Appointment: 01/29/2024    MACARIO CLINE  [facility]  CARDIOLOGY [address] [facility]   Scheduled Appointment: 02/14/2024     [facility]  GERIATRICS [address]   Scheduled Appointment: 12/26/2023    Evonne Marion  [facility]  UROLOGY [address]  Scheduled Appointment: 01/04/2024    Cleveland Dinh  [facility]  OTOLARYNG [address] [facility]  Scheduled Appointment: 01/09/2024    [facility]  UROLOGY [address]  Scheduled Appointment: 01/16/2024    Vicente Milan  [facility]  UROLOGY [address]  Scheduled Appointment: 01/16/2024    MACARIO CLINE  [facility]  CARDIOLOGY [address] [facility]   Scheduled Appointment: 02/14/2024     [facility]  GERIATRICS [address]   Scheduled Appointment: 12/26/2023    Evonne Marion  [facility]  UROLOGY [address]  Scheduled Appointment: 01/04/2024    Cleveland Dinh  [facility]  OTOLARYNG [address] [facility]  Scheduled Appointment: 01/09/2024    [facility]  UROLOGY [address]  Scheduled Appointment: 01/16/2024    Vicente Milan  [facility]  UROLOGY [address]  Scheduled Appointment: 01/16/2024    MACARIO CLINE  [facility]  CARDIOLOGY [address] [facility]   Scheduled Appointment: 02/14/2024

## 2023-12-19 NOTE — DISCHARGE NOTE PROVIDER - NSDCMRMEDTOKEN_GEN_ALL_CORE_FT
acetaminophen 325 mg oral tablet: 2 tab(s) orally every 6 hours As needed Temp greater or equal to 38C (100.4F), Mild Pain (1 - 3)  colchicine 0.6 mg oral capsule: 1 cap(s) orally once a day  finasteride 5 mg oral tablet: 1 tab(s) orally once a day  fluticasone 50 mcg/inh nasal spray: 1 spray(s) nasal 2 times a day  sodium chloride 0.65% nasal spray: 2 spray(s) nasal 3 times a day  tamsulosin 0.4 mg oral capsule: 1 cap(s) orally once a day (at bedtime)  Vyndamax 61 mg oral capsule: 1 cap(s) orally once a day

## 2023-12-19 NOTE — PROGRESS NOTE ADULT - TIME BILLING
Review of laboratory data, radiology results, consultants' recommendations, documentation in [place], discussion with patient/house staff/ACP and interdisciplinary staff (such as , social workers, etc). Interventions were performed as documented above. Review of laboratory data, radiology results, consultants' recommendations, documentation in [place], discussion with patient/house staff/ACP and interdisciplinary staff (such as , social workers, etc). Interventions were performed as documented above.

## 2023-12-20 LAB
-  AMPICILLIN: SIGNIFICANT CHANGE UP
-  AMPICILLIN: SIGNIFICANT CHANGE UP
-  CIPROFLOXACIN: SIGNIFICANT CHANGE UP
-  CIPROFLOXACIN: SIGNIFICANT CHANGE UP
-  LEVOFLOXACIN: SIGNIFICANT CHANGE UP
-  MINOCYCLINE: SIGNIFICANT CHANGE UP
-  MINOCYCLINE: SIGNIFICANT CHANGE UP
-  NITROFURANTOIN: SIGNIFICANT CHANGE UP
-  NITROFURANTOIN: SIGNIFICANT CHANGE UP
-  TETRACYCLINE: SIGNIFICANT CHANGE UP
-  TETRACYCLINE: SIGNIFICANT CHANGE UP
-  TRIMETHOPRIM/SULFAMETHOXAZOLE: SIGNIFICANT CHANGE UP
-  TRIMETHOPRIM/SULFAMETHOXAZOLE: SIGNIFICANT CHANGE UP
-  VANCOMYCIN: SIGNIFICANT CHANGE UP
-  VANCOMYCIN: SIGNIFICANT CHANGE UP
CULTURE RESULTS: ABNORMAL
CULTURE RESULTS: ABNORMAL
METHOD TYPE: SIGNIFICANT CHANGE UP
ORGANISM # SPEC MICROSCOPIC CNT: ABNORMAL
ORGANISM # SPEC MICROSCOPIC CNT: SIGNIFICANT CHANGE UP
ORGANISM # SPEC MICROSCOPIC CNT: SIGNIFICANT CHANGE UP
SPECIMEN SOURCE: SIGNIFICANT CHANGE UP
SPECIMEN SOURCE: SIGNIFICANT CHANGE UP

## 2023-12-20 PROCEDURE — 99232 SBSQ HOSP IP/OBS MODERATE 35: CPT

## 2023-12-20 RX ORDER — CEFTRIAXONE 500 MG/1
INJECTION, POWDER, FOR SOLUTION INTRAMUSCULAR; INTRAVENOUS
Refills: 0 | Status: DISCONTINUED | OUTPATIENT
Start: 2023-12-20 | End: 2023-12-21

## 2023-12-20 RX ORDER — CEFTRIAXONE 500 MG/1
1000 INJECTION, POWDER, FOR SOLUTION INTRAMUSCULAR; INTRAVENOUS ONCE
Refills: 0 | Status: COMPLETED | OUTPATIENT
Start: 2023-12-20 | End: 2023-12-20

## 2023-12-20 RX ORDER — CEFTRIAXONE 500 MG/1
1000 INJECTION, POWDER, FOR SOLUTION INTRAMUSCULAR; INTRAVENOUS EVERY 24 HOURS
Refills: 0 | Status: DISCONTINUED | OUTPATIENT
Start: 2023-12-21 | End: 2023-12-21

## 2023-12-20 RX ADMIN — CEFTRIAXONE 1000 MILLIGRAM(S): 500 INJECTION, POWDER, FOR SOLUTION INTRAMUSCULAR; INTRAVENOUS at 13:49

## 2023-12-20 RX ADMIN — Medication 1 SPRAY(S): at 17:13

## 2023-12-20 RX ADMIN — Medication 2 SPRAY(S): at 13:50

## 2023-12-20 RX ADMIN — Medication 0.6 MILLIGRAM(S): at 21:08

## 2023-12-20 RX ADMIN — Medication 1 SPRAY(S): at 05:28

## 2023-12-20 RX ADMIN — Medication 3 MILLIGRAM(S): at 22:22

## 2023-12-20 RX ADMIN — Medication 2 SPRAY(S): at 00:25

## 2023-12-20 RX ADMIN — FINASTERIDE 5 MILLIGRAM(S): 5 TABLET, FILM COATED ORAL at 12:15

## 2023-12-20 RX ADMIN — TAMSULOSIN HYDROCHLORIDE 0.4 MILLIGRAM(S): 0.4 CAPSULE ORAL at 21:08

## 2023-12-20 RX ADMIN — Medication 2 SPRAY(S): at 21:08

## 2023-12-20 RX ADMIN — Medication 2 SPRAY(S): at 05:28

## 2023-12-20 NOTE — PROGRESS NOTE ADULT - NSPROGADDITIONALINFOA_GEN_ALL_CORE
DC pending urine culture
DC pending urine culture
DC today pending final dose of abx 35 minutes spent discharge planning.
No

## 2023-12-20 NOTE — PROGRESS NOTE ADULT - TIME BILLING
Review of laboratory data, radiology results, consultants' recommendations, documentation in [place], discussion with patient/house staff/ACP and interdisciplinary staff (such as , social workers, etc). Interventions were performed as documented above. Review of laboratory data, radiology results, consultants' recommendations, documentation in [place], discussion with patient/house staff/ACP and interdisciplinary staff (such as , social workers, etc). Interventions were performed as documented above.

## 2023-12-20 NOTE — PROGRESS NOTE ADULT - SUBJECTIVE AND OBJECTIVE BOX
[facility]  Division of Hospital Medicine  Angeline Ordaz MD  Pager: [phone]      Patient is a 73y old  Male who presents with a chief complaint of Sepsis secondary to CAUTI (19 Dec 2023 12:25)      SUBJECTIVE / OVERNIGHT EVENTS: Patient seen and examined at bedside. No fever chills or dysuria.   ADDITIONAL REVIEW OF SYSTEMS:    MEDICATIONS  (STANDING):  cefTRIAXone   IVPB 1000 milliGRAM(s) IV Intermittent every 24 hours  colchicine 0.6 milliGRAM(s) Oral every 48 hours  finasteride 5 milliGRAM(s) Oral daily  fluticasone propionate 50 MICROgram(s)/spray Nasal Spray 1 Spray(s) Both Nostrils two times a day  sodium chloride 0.65% Nasal 2 Spray(s) Both Nostrils three times a day  Tafamadis (Vyndamax) 61 milliGRAM(s) 61 milliGRAM(s) Oral daily  tamsulosin 0.4 milliGRAM(s) Oral at bedtime    MEDICATIONS  (PRN):  acetaminophen     Tablet .. 650 milliGRAM(s) Oral every 6 hours PRN Temp greater or equal to 38C (100.4F), Mild Pain (1 - 3)  melatonin 3 milliGRAM(s) Oral at bedtime PRN Insomnia  ondansetron Injectable 4 milliGRAM(s) IV Push every 8 hours PRN Nausea and/or Vomiting  traMADol 50 milliGRAM(s) Oral every 6 hours PRN Moderate Pain (4 - 6)      CAPILLARY BLOOD GLUCOSE        I&O's Summary    19 Dec 2023 07:01  -  20 Dec 2023 07:00  --------------------------------------------------------  IN: 0 mL / OUT: 2200 mL / NET: -2200 mL        PHYSICAL EXAM:  Vital Signs Last 24 Hrs  T(C): 36.8 (20 Dec 2023 11:03), Max: 37.1 (19 Dec 2023 16:10)  T(F): 98.2 (20 Dec 2023 11:03), Max: 98.8 (19 Dec 2023 23:12)  HR: 85 (20 Dec 2023 11:03) (84 - 90)  BP: 107/63 (20 Dec 2023 11:03) (106/71 - 126/78)  BP(mean): --  RR: 18 (20 Dec 2023 11:03) (18 - 18)  SpO2: 98% (20 Dec 2023 11:03) (95% - 98%)    Parameters below as of 20 Dec 2023 04:43  Patient On (Oxygen Delivery Method): room air      GEN: male in NAD, appears comfortable, no diaphoresis  EYES: No scleral injection, EOMI  ENTM: neck supple & symmetric without tracheal deviation, moist membranes, no gross hearing impairment, thyroid gland not enlarged  CV: +S1/S2, no m/r/g, no abdominal bruit, no LE edema  RESP: breathing comfortably, no respiratory accessory muscle use, CTAB, no w/r/r  GI: normoactive BS, soft, NTND, no rebounding/guarding, no palpable masses    LABS:                        8.7    5.91  )-----------( 250      ( 19 Dec 2023 06:42 )             26.9     12-19    139  |  109<H>  |  20  ----------------------------<  92  4.2   |  25  |  1.65<H>    Ca    8.6      19 Dec 2023 06:42            Urinalysis Basic - ( 19 Dec 2023 06:42 )    Color: x / Appearance: x / SG: x / pH: x  Gluc: 92 mg/dL / Ketone: x  / Bili: x / Urobili: x   Blood: x / Protein: x / Nitrite: x   Leuk Esterase: x / RBC: x / WBC x   Sq Epi: x / Non Sq Epi: x / Bacteria: x          RADIOLOGY & ADDITIONAL TESTS:  Results Reviewed:   Imaging Personally Reviewed:  Electrocardiogram Personally Reviewed:    COORDINATION OF CARE:  Care Discussed with Consultants/Other Providers [Y/N]:  Prior or Outpatient Records Reviewed [Y/N]:   [facility]  Division of Hospital Medicine  Angeline Ordaz MD  Pager: [phone]      Patient is a 73y old  Male who presents with a chief complaint of Sepsis secondary to CAUTI (19 Dec 2023 12:25)      SUBJECTIVE / OVERNIGHT EVENTS: Patient seen and examined at bedside. No fever chills or dysuria.   ADDITIONAL REVIEW OF SYSTEMS:    MEDICATIONS  (STANDING):  cefTRIAXone   IVPB 1000 milliGRAM(s) IV Intermittent every 24 hours  colchicine 0.6 milliGRAM(s) Oral every 48 hours  finasteride 5 milliGRAM(s) Oral daily  fluticasone propionate 50 MICROgram(s)/spray Nasal Spray 1 Spray(s) Both Nostrils two times a day  sodium chloride 0.65% Nasal 2 Spray(s) Both Nostrils three times a day  Tafamadis (Vyndamax) 61 milliGRAM(s) 61 milliGRAM(s) Oral daily  tamsulosin 0.4 milliGRAM(s) Oral at bedtime    MEDICATIONS  (PRN):  acetaminophen     Tablet .. 650 milliGRAM(s) Oral every 6 hours PRN Temp greater or equal to 38C (100.4F), Mild Pain (1 - 3)  melatonin 3 milliGRAM(s) Oral at bedtime PRN Insomnia  ondansetron Injectable 4 milliGRAM(s) IV Push every 8 hours PRN Nausea and/or Vomiting  traMADol 50 milliGRAM(s) Oral every 6 hours PRN Moderate Pain (4 - 6)      CAPILLARY BLOOD GLUCOSE        I&O's Summary    19 Dec 2023 07:01  -  20 Dec 2023 07:00  --------------------------------------------------------  IN: 0 mL / OUT: 2200 mL / NET: -2200 mL        PHYSICAL EXAM:  Vital Signs Last 24 Hrs  T(C): 36.8 (20 Dec 2023 11:03), Max: 37.1 (19 Dec 2023 16:10)  T(F): 98.2 (20 Dec 2023 11:03), Max: 98.8 (19 Dec 2023 23:12)  HR: 85 (20 Dec 2023 11:03) (84 - 90)  BP: 107/63 (20 Dec 2023 11:03) (106/71 - 126/78)  BP(mean): --  RR: 18 (20 Dec 2023 11:03) (18 - 18)  SpO2: 98% (20 Dec 2023 11:03) (95% - 98%)    Parameters below as of 20 Dec 2023 04:43  Patient On (Oxygen Delivery Method): room air      GEN: male in NAD, appears comfortable, no diaphoresis  EYES: No scleral injection, EOMI  ENTM: neck supple & symmetric without tracheal deviation, moist membranes, no gross hearing impairment, thyroid gland not enlarged  CV: +S1/S2, no m/r/g, no abdominal bruit, no LE edema  RESP: breathing comfortably, no respiratory accessory muscle use, CTAB, no w/r/r  GI: normoactive BS, soft, NTND, no rebounding/guarding, no palpable masses    LABS:                        8.7    5.91  )-----------( 250      ( 19 Dec 2023 06:42 )             26.9     12-19    139  |  109<H>  |  20  ----------------------------<  92  4.2   |  25  |  1.65<H>    Ca    8.6      19 Dec 2023 06:42            Urinalysis Basic - ( 19 Dec 2023 06:42 )    Color: x / Appearance: x / SG: x / pH: x  Gluc: 92 mg/dL / Ketone: x  / Bili: x / Urobili: x   Blood: x / Protein: x / Nitrite: x   Leuk Esterase: x / RBC: x / WBC x   Sq Epi: x / Non Sq Epi: x / Bacteria: x          RADIOLOGY & ADDITIONAL TESTS:  Results Reviewed:   Imaging Personally Reviewed:  Electrocardiogram Personally Reviewed:    COORDINATION OF CARE:  Care Discussed with Consultants/Other Providers [Y/N]:  Prior or Outpatient Records Reviewed [Y/N]:

## 2023-12-20 NOTE — PROGRESS NOTE ADULT - ASSESSMENT
73 year old male with PMHx of BPH (c/b urinary retention s/p Harkins catheter placement two months ago, exchanged monthly), CKD stage IIIb, and cardiac amyloidosis who presented to the ED on 12/16/23 for complaints of bilateral leg pain and admitted for sepsis secondary to CAUTI.    Sepsis secondary to CAUTI  In pt with indwelling Harkins catheter on admission  Improving MSK and abdominal pain  Harkins exchanged in ED  s/p Ceftriaxone - will give stat dose to complete 3 day course   Follow up urine culture growing enterococcus faecalis. Discussed with ID pharmacist, patient clinically improved despite being on abx that is not treating urine culture. Defer to watch off antibiotics. WIll give stat dose ceftriaxone to complete 3 days dose of ceftriaxone     Blood culture NGTD      RUQ pain, unclear etiology  Complaints of RUQ pain that started while he was in ER, could not elaborate more on pain  Abdominal US seems unremarkable  RESOLVED    BPH  Continue with Flomax and Finasteride  OP urology follow up    Cardiac Amyloidosis  Continue with Colchicine 0.6 q48 hours which is renally dosed  He brought in his home Vyndamax    CKD3  Monitor Cr  OP follow up

## 2023-12-21 LAB
BACTERIA UR CULT: ABNORMAL
CULTURE RESULTS: SIGNIFICANT CHANGE UP
SPECIMEN SOURCE: SIGNIFICANT CHANGE UP

## 2023-12-21 PROCEDURE — 99232 SBSQ HOSP IP/OBS MODERATE 35: CPT

## 2023-12-21 PROCEDURE — 99222 1ST HOSP IP/OBS MODERATE 55: CPT

## 2023-12-21 RX ORDER — ENOXAPARIN SODIUM 100 MG/ML
40 INJECTION SUBCUTANEOUS EVERY 24 HOURS
Refills: 0 | Status: DISCONTINUED | OUTPATIENT
Start: 2023-12-21 | End: 2023-12-22

## 2023-12-21 RX ADMIN — Medication 3 MILLIGRAM(S): at 21:34

## 2023-12-21 RX ADMIN — CEFTRIAXONE 100 MILLIGRAM(S): 500 INJECTION, POWDER, FOR SOLUTION INTRAMUSCULAR; INTRAVENOUS at 13:11

## 2023-12-21 RX ADMIN — FINASTERIDE 5 MILLIGRAM(S): 5 TABLET, FILM COATED ORAL at 11:19

## 2023-12-21 RX ADMIN — Medication 2 SPRAY(S): at 14:00

## 2023-12-21 RX ADMIN — Medication 1 SPRAY(S): at 05:12

## 2023-12-21 RX ADMIN — TAMSULOSIN HYDROCHLORIDE 0.4 MILLIGRAM(S): 0.4 CAPSULE ORAL at 21:34

## 2023-12-21 RX ADMIN — ENOXAPARIN SODIUM 40 MILLIGRAM(S): 100 INJECTION SUBCUTANEOUS at 21:34

## 2023-12-21 RX ADMIN — Medication 2 SPRAY(S): at 05:12

## 2023-12-21 RX ADMIN — Medication 1 SPRAY(S): at 17:10

## 2023-12-21 RX ADMIN — Medication 2 SPRAY(S): at 21:34

## 2023-12-21 NOTE — CONSULT NOTE ADULT - ASSESSMENT
A/P-   73 year old male with PMHx of BPH (c/b urinary retention s/p Morales catheter placement two months ago, exchanged monthly), CKD stage IIIb, and cardiac amyloidosis who presented to the ED on 12/16/23 for complaints of bilateral leg pain and he has been having intermittent lower back pain that "intensified today." Associated chills. Did not take anything for the pain at home.  patient had urinary retention two months ago and required Morales catheter placement. Attempted voiding trial but failed so Morales catheter replaced.  morales cath  Exchanged monthly, last exchanged one week ago.      this admisionn had fever and leukocytosis on admission  urine cx - 12/16- e.fecalis  and stenotrophomonas   he has completed 6 days of ceftriaxone and clinically has improved with normal wbc and afebrile.    plan-  advise to d/c IV abx.  patient needs to have frequent morales cath changes if he needs this long term to help avoid re-current UTI.  also since no fever and normal wbc at this time no indication for any further abx.  monitor for temps .  follow up with .    all above d/w PA covering patient Kemar Xiong.    All labs and imaging and chart notes reviewed.     All above discussed with patient and patient verbalizes full understanding of all above and agrees with above plan of care.    Thank you for this consultation.    Sasha Erickson MD  Infectious Disease Attending    for any questions please do not hesitate to contact me either via teams or by calling [phone]     A/P-   73 year old male with PMHx of BPH (c/b urinary retention s/p Morales catheter placement two months ago, exchanged monthly), CKD stage IIIb, and cardiac amyloidosis who presented to the ED on 12/16/23 for complaints of bilateral leg pain and he has been having intermittent lower back pain that "intensified today." Associated chills. Did not take anything for the pain at home.  patient had urinary retention two months ago and required Morales catheter placement. Attempted voiding trial but failed so Morales catheter replaced.  morales cath  Exchanged monthly, last exchanged one week ago.      this admisionn had fever and leukocytosis on admission  urine cx - 12/16- e.fecalis  and stenotrophomonas   he has completed 6 days of ceftriaxone and clinically has improved with normal wbc and afebrile.    plan-  advise to d/c IV abx.  patient needs to have frequent morales cath changes if he needs this long term to help avoid re-current UTI.  also since no fever and normal wbc at this time no indication for any further abx.  monitor for temps .  follow up with .    all above d/w PA covering patient Kemar Xiong.    All labs and imaging and chart notes reviewed.     All above discussed with patient and patient verbalizes full understanding of all above and agrees with above plan of care.    Thank you for this consultation.    Sasha Erickson MD  Infectious Disease Attending    for any questions please do not hesitate to contact me either via teams or by calling [phone]

## 2023-12-21 NOTE — CONSULT NOTE ADULT - SUBJECTIVE AND OBJECTIVE BOX
HPI:  Cy Sanchez is a 73 year old male with PMHx of BPH (c/b urinary retention s/p Morales catheter placement two months ago, exchanged monthly), CKD stage IIIb, and cardiac amyloidosis who presented to the ED on 12/16/23 for complaints of bilateral leg pain.    Patient reports he started having bilateral leg pain yesterday and it progressively worsened this morning. In addition, reports he has been having intermittent lower back pain that "intensified today." Associated chills. Did not take anything for the pain at home. Admits to onset of RUQ pain while in the ER. Of note, patient had urinary retention two months ago and required Morales catheter placement. Attempted voiding trial but failed so Morales catheter replaced. Exchanged monthly, last exchanged one week ago. States he is supposed to be scheduled for surgery to shrink his prostate early next year in hopes of being able to remove the Morales.    Recent admission from 11/3/23-11/7/23 for chest pain. Also found to have UTI. U/A with large leuks, moderate blood, and many bacteria. Urine and blood cultures negative. Received ceftriaxone while hospitalized.    In the ED, Tmax 102.9, HR as elevated as 109, BP as low as 97/59. Hgb 10.0, Cr 1.77, alkphos 127, AST 68. Lactic acid WNL. VBG 7.40 / 44 / 25 / 27. COVID/influenza negative. U/A with proteinuria, negative nitrites, large leuks, moderate blood, WBC 25-50, RBC 11-25, moderate bacteria, squamous epithelial cells. CXR with ?b/l lower infiltrates. As per ER physician, urine sample was obtained after exchanging Morales catheter. Received LR 1500 cc bolus, ceftriaxone 1 g IV, and acetaminophen 975 mg. (16 Dec 2023 21:48)      PAST MEDICAL & SURGICAL HISTORY:  Cardiac amyloidosis      Stenosis, cervical spine      BPH (benign prostatic hyperplasia)      Indwelling Morales catheter present      Stage 3 chronic kidney disease      S/P cataract extraction  B/L      S/P nasal polypectomy      History of fusion of cervical spine          Allergies    No Known Allergies    Intolerances        ANTIMICROBIALS:  cefTRIAXone   IVPB    cefTRIAXone   IVPB 1000 every 24 hours      OTHER MEDS:  acetaminophen     Tablet .. 650 milliGRAM(s) Oral every 6 hours PRN  colchicine 0.6 milliGRAM(s) Oral every 48 hours  finasteride 5 milliGRAM(s) Oral daily  fluticasone propionate 50 MICROgram(s)/spray Nasal Spray 1 Spray(s) Both Nostrils two times a day  melatonin 3 milliGRAM(s) Oral at bedtime PRN  ondansetron Injectable 4 milliGRAM(s) IV Push every 8 hours PRN  sodium chloride 0.65% Nasal 2 Spray(s) Both Nostrils three times a day  Tafamadis (Vyndamax) 61 milliGRAM(s) 61 milliGRAM(s) Oral daily  tamsulosin 0.4 milliGRAM(s) Oral at bedtime  traMADol 50 milliGRAM(s) Oral every 6 hours PRN      SOCIAL HISTORY:    Marital Status:    Occupation:   Lives with:     Substance Use (street drugs):   Tobacco Usage:    Alcohol Usage: Social EtOH    FAMILY HISTORY:      ROS:  Unobtainable because:   All other systems negative     Constitutional: no fever, no chills, no weight loss, no night sweats  Eye: no eye pain, no redness, no vision changes  ENT:  no sore throat, no rhinorrhea  Cardiovascular:  no chest pain, no palpitation  Respiratory:  no SOB, no cough  GI:  no abd pain, no vomiting, no diarrhea  urinary: no dysuria, no hematuria, no flank pain  : no  discharge or bleeding  musculoskeletal:  no joint pain, no joint swelling  skin:  no rash  neurology:  no headache, no seizure, no change in mental status  psych: no anxiety, no depression     Physical Exam:    General:    NAD, non toxic  Head: atraumatic, normocephalic  Eyes: normal sclera and conjunctiva  ENT:   no oropharyngeal lesions, no LAD, neck supple  Cardio:    regular S1,S2, no murmur  Respiratory:   clear to auscultation b/l, no wheezing  abd:   soft, BS +, not tender, no hepatosplenomegaly  :     no CVAT, no suprapubic tenderness, no morales  Musculoskeletal : no joint swelling, no edema  Skin:    no rash  vascular:  normal pulses  Neurologic:     no focal deficits  psych: normal affect, no suicidal ideation      Drug Dosing Weight  Height (cm): 162.6 (16 Dec 2023 19:51)  Weight (kg): 62.2 (16 Dec 2023 19:51)  BMI (kg/m2): 23.5 (16 Dec 2023 19:51)  BSA (m2): 1.67 (16 Dec 2023 19:51)    Vital Signs Last 24 Hrs  T(F): 97.4 (12-21-23 @ 10:32), Max: 102.9 (12-16-23 @ 14:20)    Vital Signs Last 24 Hrs  HR: 76 (12-21-23 @ 10:32) (76 - 88)  BP: 106/66 (12-21-23 @ 10:32) (100/60 - 106/66)  RR: 18 (12-21-23 @ 10:32)  SpO2: 97% (12-21-23 @ 10:32) (97% - 97%)  Wt(kg): --                    MICROBIOLOGY:  v  .Blood Blood-Peripheral  12-16-23   No growth at 4 days  --  --      .Blood Blood-Peripheral  12-16-23   No growth at 4 days  --  Enterococcus faecalis  Stenotrophomonas maltophilia                  RADIOLOGY:       HPI:  Cy Sanchez is a 73 year old male with PMHx of BPH (c/b urinary retention s/p Morales catheter placement two months ago, exchanged monthly), CKD stage IIIb, and cardiac amyloidosis who presented to the ED on 12/16/23 for complaints of bilateral leg pain and he has been having intermittent lower back pain that "intensified today." Associated chills. Did not take anything for the pain at home.  patient had urinary retention two months ago and required Morales catheter placement. Attempted voiding trial but failed so Moarles catheter replaced.  morales cath  Exchanged monthly, last exchanged one week ago.   States he is supposed to be scheduled for surgery to shrink his prostate early next year in hopes of being able to remove the Morales.  ID consultation is requested today 12/21 to determine if he needs antibiotics for the positive urine culture. patient had been on ceftriaxoine here since admission and as per medical team has improved on that regimen .    on admission  12/16/2023 Tmax 102.9, COVID/influenza negative. U/A with proteinuria, negative nitrites, large leuks, moderate blood, WBC 25-50, RBC 11-25, moderate bacteria, squamous epithelial cells. CXR with ?b/l lower infiltrates.     currently patient denies any fever or chills, denies any nausea, denies any abd or back pain, denies any diarrhea, denies nay chest pain        PAST MEDICAL & SURGICAL HISTORY:  Cardiac amyloidosis      Stenosis, cervical spine      BPH (benign prostatic hyperplasia)      Indwelling Morales catheter present      Stage 3 chronic kidney disease      S/P cataract extraction  B/L      S/P nasal polypectomy      History of fusion of cervical spine          Allergies    No Known  Drug Allergies        ANTIMICROBIALS:  cefTRIAXone   IVPB    cefTRIAXone   IVPB 1000 every 24 hours      OTHER MEDS:  acetaminophen     Tablet .. 650 milliGRAM(s) Oral every 6 hours PRN  colchicine 0.6 milliGRAM(s) Oral every 48 hours  finasteride 5 milliGRAM(s) Oral daily  fluticasone propionate 50 MICROgram(s)/spray Nasal Spray 1 Spray(s) Both Nostrils two times a day  melatonin 3 milliGRAM(s) Oral at bedtime PRN  ondansetron Injectable 4 milliGRAM(s) IV Push every 8 hours PRN  sodium chloride 0.65% Nasal 2 Spray(s) Both Nostrils three times a day  Tafamadis (Vyndamax) 61 milliGRAM(s) 61 milliGRAM(s) Oral daily  tamsulosin 0.4 milliGRAM(s) Oral at bedtime  traMADol 50 milliGRAM(s) Oral every 6 hours PRN      Substance Use (street drugs):   Tobacco Usage:    Alcohol Usage: Social EtOH    FAMILY HISTORY:      ROS-  Constitutional: no fever, no chills, no weight loss, no night sweats  Eye: no eye pain, no redness, no vision changes  ENT:  no sore throat, no rhinorrhea  Cardiovascular:  no chest pain, no palpitation  Respiratory:  no SOB, no cough  GI:  no abd pain, no vomiting, no diarrhea  urinary: no dysuria, no hematuria, no flank pain  : no  discharge or bleeding  musculoskeletal:  no joint pain, no joint swelling  neurology:  no headache, no seizure, no change in mental status       Physical Exam:    General:    NAD, non toxic  Head: atraumatic, normocephalic  Eyes: normal sclera and conjunctiva  ENT:   no oropharyngeal lesions, no LAD, neck supple  Cardio:    regular S1,S2, no murmur  Respiratory:   clear to auscultation b/l, no wheezing  abd:   soft, BS +, not tender, no distention  :     no CVAT, no suprapubic tenderness  Musculoskeletal : no joint swelling, no edema  Skin:    no rash  vascular:  normal pulses  Neurologic:     no focal deficits        Drug Dosing Weight  Height (cm): 162.6 (16 Dec 2023 19:51)  Weight (kg): 62.2 (16 Dec 2023 19:51)  BMI (kg/m2): 23.5 (16 Dec 2023 19:51)  BSA (m2): 1.67 (16 Dec 2023 19:51)    Vital Signs Last 24 Hrs  T(F): 97.4 (12-21-23 @ 10:32), Max: 102.9 (12-16-23 @ 14:20)    Vital Signs Last 24 Hrs  HR: 76 (12-21-23 @ 10:32) (76 - 88)  BP: 106/66 (12-21-23 @ 10:32) (100/60 - 106/66)  RR: 18 (12-21-23 @ 10:32)  SpO2: 97% (12-21-23 @ 10:32) (97% - 97%)  Wt(kg): --      MICROBIOLOGY:  v  .Blood Blood-Peripheral  12-16-23   No growth at 4 days  --  --      .Blood Blood-Peripheral  12-16-23   No growth at 4 days  --  Enterococcus faecalis  Stenotrophomonas maltophilia        RADIOLOGY:    < from: Xray Chest 1 View- PORTABLE-Urgent (12.16.23 @ 16:06) >  ACC: [id number] EXAM:  XR CHEST PORTABLE URGENT 1V   ORDERED BY: JOSELINE LOPEZ     PROCEDURE DATE:  12/16/2023          INTERPRETATION:  AP semierect chest on December 16, 2023 at 3:09 PM.   Patient has sepsis.    Heart is enlarged.    There is a persistent small infiltrate in the right lower lung field in   the retrocardiac area. Cervical spine hardware again noted.    Chest is similar to November 3.    IMPRESSION: Persistent small bibasilar infiltrates.    --- End of Report ---  < from: CT Abdomen and Pelvis No Cont (12.17.23 @ 21:46) >  IMPRESSION:  Punctate nonobstructive renal calculi.  No hydronephrosis or ureteral calculi.  Lung base opacities, whichrepresent atelectasis and/or pneumonia.  Small bilateral pleural effusions.      --- End of Report ---            PILLO BOYKIN MD; Attending Radiologist  This document has been electronically signed. Dec 18 2023 10:53AM    < end of copied text >            HALEY COPE MD; Attending Radiologist  This document has been electronically signed. Dec 16 2023  5:51PM    < end of copied text >       HPI:  Cy Sanchez is a 73 year old male with PMHx of BPH (c/b urinary retention s/p Morales catheter placement two months ago, exchanged monthly), CKD stage IIIb, and cardiac amyloidosis who presented to the ED on 12/16/23 for complaints of bilateral leg pain and he has been having intermittent lower back pain that "intensified today." Associated chills. Did not take anything for the pain at home.  patient had urinary retention two months ago and required Morales catheter placement. Attempted voiding trial but failed so Morales catheter replaced.  morales cath  Exchanged monthly, last exchanged one week ago.   States he is supposed to be scheduled for surgery to shrink his prostate early next year in hopes of being able to remove the Morales.  ID consultation is requested today 12/21 to determine if he needs antibiotics for the positive urine culture. patient had been on ceftriaxoine here since admission and as per medical team has improved on that regimen .    on admission  12/16/2023 Tmax 102.9, COVID/influenza negative. U/A with proteinuria, negative nitrites, large leuks, moderate blood, WBC 25-50, RBC 11-25, moderate bacteria, squamous epithelial cells. CXR with ?b/l lower infiltrates.     currently patient denies any fever or chills, denies any nausea, denies any abd or back pain, denies any diarrhea, denies nay chest pain        PAST MEDICAL & SURGICAL HISTORY:  Cardiac amyloidosis      Stenosis, cervical spine      BPH (benign prostatic hyperplasia)      Indwelling Morales catheter present      Stage 3 chronic kidney disease      S/P cataract extraction  B/L      S/P nasal polypectomy      History of fusion of cervical spine          Allergies    No Known  Drug Allergies        ANTIMICROBIALS:  cefTRIAXone   IVPB    cefTRIAXone   IVPB 1000 every 24 hours      OTHER MEDS:  acetaminophen     Tablet .. 650 milliGRAM(s) Oral every 6 hours PRN  colchicine 0.6 milliGRAM(s) Oral every 48 hours  finasteride 5 milliGRAM(s) Oral daily  fluticasone propionate 50 MICROgram(s)/spray Nasal Spray 1 Spray(s) Both Nostrils two times a day  melatonin 3 milliGRAM(s) Oral at bedtime PRN  ondansetron Injectable 4 milliGRAM(s) IV Push every 8 hours PRN  sodium chloride 0.65% Nasal 2 Spray(s) Both Nostrils three times a day  Tafamadis (Vyndamax) 61 milliGRAM(s) 61 milliGRAM(s) Oral daily  tamsulosin 0.4 milliGRAM(s) Oral at bedtime  traMADol 50 milliGRAM(s) Oral every 6 hours PRN      Substance Use (street drugs):   Tobacco Usage:    Alcohol Usage: Social EtOH    FAMILY HISTORY:      ROS-  Constitutional: no fever, no chills, no weight loss, no night sweats  Eye: no eye pain, no redness, no vision changes  ENT:  no sore throat, no rhinorrhea  Cardiovascular:  no chest pain, no palpitation  Respiratory:  no SOB, no cough  GI:  no abd pain, no vomiting, no diarrhea  urinary: no dysuria, no hematuria, no flank pain  : no  discharge or bleeding  musculoskeletal:  no joint pain, no joint swelling  neurology:  no headache, no seizure, no change in mental status       Physical Exam:    General:    NAD, non toxic  Head: atraumatic, normocephalic  Eyes: normal sclera and conjunctiva  ENT:   no oropharyngeal lesions, no LAD, neck supple  Cardio:    regular S1,S2, no murmur  Respiratory:   clear to auscultation b/l, no wheezing  abd:   soft, BS +, not tender, no distention  :     no CVAT, no suprapubic tenderness  Musculoskeletal : no joint swelling, no edema  Skin:    no rash  vascular:  normal pulses  Neurologic:     no focal deficits        Drug Dosing Weight  Height (cm): 162.6 (16 Dec 2023 19:51)  Weight (kg): 62.2 (16 Dec 2023 19:51)  BMI (kg/m2): 23.5 (16 Dec 2023 19:51)  BSA (m2): 1.67 (16 Dec 2023 19:51)    Vital Signs Last 24 Hrs  T(F): 97.4 (12-21-23 @ 10:32), Max: 102.9 (12-16-23 @ 14:20)    Vital Signs Last 24 Hrs  HR: 76 (12-21-23 @ 10:32) (76 - 88)  BP: 106/66 (12-21-23 @ 10:32) (100/60 - 106/66)  RR: 18 (12-21-23 @ 10:32)  SpO2: 97% (12-21-23 @ 10:32) (97% - 97%)  Wt(kg): --      MICROBIOLOGY:  v  .Blood Blood-Peripheral  12-16-23   No growth at 4 days  --  --      .Blood Blood-Peripheral  12-16-23   No growth at 4 days  --  Enterococcus faecalis  Stenotrophomonas maltophilia        RADIOLOGY:    < from: Xray Chest 1 View- PORTABLE-Urgent (12.16.23 @ 16:06) >  ACC: [id number] EXAM:  XR CHEST PORTABLE URGENT 1V   ORDERED BY: JOSELINE LOPEZ     PROCEDURE DATE:  12/16/2023          INTERPRETATION:  AP semierect chest on December 16, 2023 at 3:09 PM.   Patient has sepsis.    Heart is enlarged.    There is a persistent small infiltrate in the right lower lung field in   the retrocardiac area. Cervical spine hardware again noted.    Chest is similar to November 3.    IMPRESSION: Persistent small bibasilar infiltrates.    --- End of Report ---  < from: CT Abdomen and Pelvis No Cont (12.17.23 @ 21:46) >  IMPRESSION:  Punctate nonobstructive renal calculi.  No hydronephrosis or ureteral calculi.  Lung base opacities, whichrepresent atelectasis and/or pneumonia.  Small bilateral pleural effusions.      --- End of Report ---            PILLO BOYKIN MD; Attending Radiologist  This document has been electronically signed. Dec 18 2023 10:53AM    < end of copied text >            HALEY COPE MD; Attending Radiologist  This document has been electronically signed. Dec 16 2023  5:51PM    < end of copied text >

## 2023-12-21 NOTE — PROGRESS NOTE ADULT - ASSESSMENT
73 year old male with history of BPH (c/b urinary retention s/p Harkins catheter placement two months ago, exchanged monthly), CKD stage IIIb, and cardiac amyloidosis who presented to the ED on 12/16/23 for complaints of bilateral leg pain and admitted for sepsis POA secondary to CAUTI.    CAUTI  - In pt with indwelling Harkins catheter on admission  - Improving MSK and abdominal pain  - Harkins exchanged in ED  - s/p Ceftriaxone - but urine culture growing enterococcus faecalis & Stenotrophomonas maltophilia  - as per ID no need for antibiotics at this time as patient clinically improved despite being on abx that is not treating urine culture - will stop Rocephin  - Blood culture NGTD    RUQ pain  - unclear etiology  - abdominal US was unremarkable  - RESOLVED    BPH  - c/w Flomax and Finasteride  - OP urology follow up    Cardiac Amyloidosis  - c/w Colchicine & Vyndamax    Prophylaxis:  DVT: start Lovenox  GI: PO diet    Patient refused PT eval.

## 2023-12-21 NOTE — PROGRESS NOTE ADULT - REASON FOR ADMISSION
Sepsis secondary to CAUTI

## 2023-12-21 NOTE — PROGRESS NOTE ADULT - SUBJECTIVE AND OBJECTIVE BOX
73 year old male with history of BPH (c/b urinary retention s/p Harkins catheter placement two months ago, exchanged monthly), CKD stage IIIb, and cardiac amyloidosis who presented to the ED on 12/16/23 for complaints of bilateral leg pain and admitted for sepsis POA secondary to CAUTI. He is lying in bed in NAD.    MEDICATIONS  (STANDING):  cefTRIAXone   IVPB      cefTRIAXone   IVPB 1000 milliGRAM(s) IV Intermittent every 24 hours  colchicine 0.6 milliGRAM(s) Oral every 48 hours  finasteride 5 milliGRAM(s) Oral daily  fluticasone propionate 50 MICROgram(s)/spray Nasal Spray 1 Spray(s) Both Nostrils two times a day  sodium chloride 0.65% Nasal 2 Spray(s) Both Nostrils three times a day  Tafamadis (Vyndamax) 61 milliGRAM(s) 61 milliGRAM(s) Oral daily  tamsulosin 0.4 milliGRAM(s) Oral at bedtime    MEDICATIONS  (PRN):  acetaminophen     Tablet .. 650 milliGRAM(s) Oral every 6 hours PRN Temp greater or equal to 38C (100.4F), Mild Pain (1 - 3)  melatonin 3 milliGRAM(s) Oral at bedtime PRN Insomnia  ondansetron Injectable 4 milliGRAM(s) IV Push every 8 hours PRN Nausea and/or Vomiting  traMADol 50 milliGRAM(s) Oral every 6 hours PRN Moderate Pain (4 - 6)      Allergies    No Known Allergies    Intolerances        Vital Signs Last 24 Hrs  T(C): 36.7 (21 Dec 2023 16:12), Max: 36.8 (21 Dec 2023 00:34)  T(F): 98.1 (21 Dec 2023 16:12), Max: 98.3 (21 Dec 2023 00:34)  HR: 88 (21 Dec 2023 16:12) (76 - 88)  BP: 96/61 (21 Dec 2023 16:12) (96/61 - 106/66)  BP(mean): --  RR: 18 (21 Dec 2023 16:12) (18 - 18)  SpO2: 100% (21 Dec 2023 16:12) (97% - 100%)    Parameters below as of 21 Dec 2023 16:12  Patient On (Oxygen Delivery Method): room air        PHYSICAL EXAM:  GENERAL: NAD, well-groomed, well-developed  HEAD:  Atraumatic, Normocephalic  EYES: EOMI, PERRLA   NECK: Supple   NERVOUS SYSTEM:  Alert & Oriented X3, Good concentration   CHEST/LUNG: Clear to auscultation bilaterally; No rales, rhonchi, wheezing, or rubs  HEART: Regular rate and rhythm; No murmurs, rubs, or gallops  ABDOMEN: Soft, Nontender, Nondistended; Bowel sounds present  EXTREMITIES:  No clubbing, cyanosis, or edema     LABS:       RADIOLOGY & ADDITIONAL TESTS:    12-20-23 @ 07:01  -  12-21-23 @ 07:00  --------------------------------------------------------  IN:  Total IN: 0 mL    OUT:    Indwelling Catheter - Urethral (mL): 1300 mL  Total OUT: 1300 mL    Total NET: -1300 mL      12-21-23 @ 07:01  -  12-21-23 @ 17:07  --------------------------------------------------------  IN:    Oral Fluid: 720 mL  Total IN: 720 mL    OUT:    Indwelling Catheter - Urethral (mL): 1200 mL  Total OUT: 1200 mL    Total NET: -480 mL

## 2023-12-22 ENCOUNTER — TRANSCRIPTION ENCOUNTER (OUTPATIENT)
Age: 73
End: 2023-12-22

## 2023-12-22 PROCEDURE — 99239 HOSP IP/OBS DSCHRG MGMT >30: CPT

## 2023-12-22 RX ADMIN — FINASTERIDE 5 MILLIGRAM(S): 5 TABLET, FILM COATED ORAL at 11:09

## 2023-12-22 RX ADMIN — Medication 1 SPRAY(S): at 05:08

## 2023-12-22 RX ADMIN — Medication 2 SPRAY(S): at 13:10

## 2023-12-22 RX ADMIN — Medication 2 SPRAY(S): at 05:08

## 2023-12-22 NOTE — DISCHARGE NOTE NURSING/CASE MANAGEMENT/SOCIAL WORK - NSDCPEFALRISK_GEN_ALL_CORE
For information on Fall & Injury Prevention, visit: [email or web address] OR  [email or web address] OR  https://www.cdc.gov/steadi/patient.html For information on Fall & Injury Prevention, visit: [email or web address] OR  [email or web address]/[email or web address]-[email or web address]-[email or web address]-injury OR  https://www.cdc.gov/steadi/patient.html

## 2023-12-22 NOTE — DISCHARGE NOTE NURSING/CASE MANAGEMENT/SOCIAL WORK - PATIENT PORTAL LINK FT
You can access the FollowMyHealth Patient Portal offered by [facility] by registering at the following website: [email or web address]. By joining [facility]’s FollowMyHealth portal, you will also be able to view your health information using other applications (apps) compatible with our system. You can access the FollowMyHealth Patient Portal offered by [facility] by registering at the following website: [email or web address]. By joining [facility]’s FollowMyHealth portal, you will also be able to view your health information using other applications (apps) compatible with our system.

## 2023-12-23 VITALS
HEART RATE: 70 BPM | TEMPERATURE: 99 F | DIASTOLIC BLOOD PRESSURE: 67 MMHG | OXYGEN SATURATION: 98 % | SYSTOLIC BLOOD PRESSURE: 100 MMHG | RESPIRATION RATE: 18 BRPM

## 2023-12-26 ENCOUNTER — APPOINTMENT (OUTPATIENT)
Dept: GERIATRICS | Facility: CLINIC | Age: 73
End: 2023-12-26
Payer: MEDICARE

## 2023-12-26 ENCOUNTER — NON-APPOINTMENT (OUTPATIENT)
Age: 73
End: 2023-12-26

## 2023-12-26 ENCOUNTER — TRANSCRIPTION ENCOUNTER (OUTPATIENT)
Age: 73
End: 2023-12-26

## 2023-12-26 VITALS
RESPIRATION RATE: 15 BRPM | WEIGHT: 141 LBS | DIASTOLIC BLOOD PRESSURE: 68 MMHG | SYSTOLIC BLOOD PRESSURE: 102 MMHG | BODY MASS INDEX: 24.2 KG/M2 | HEART RATE: 82 BPM | TEMPERATURE: 98.6 F | OXYGEN SATURATION: 99 %

## 2023-12-26 DIAGNOSIS — T83.511A INFECTION AND INFLAMMATORY REACTION DUE TO INDWELLING URETHRAL CATHETER, INITIAL ENCOUNTER: ICD-10-CM

## 2023-12-26 DIAGNOSIS — N39.0 INFECTION AND INFLAMMATORY REACTION DUE TO INDWELLING URETHRAL CATHETER, INITIAL ENCOUNTER: ICD-10-CM

## 2023-12-26 DIAGNOSIS — A40.8 OTHER STREPTOCOCCAL SEPSIS: ICD-10-CM

## 2023-12-26 DIAGNOSIS — Z09 ENCOUNTER FOR FOLLOW-UP EXAMINATION AFTER COMPLETED TREATMENT FOR CONDITIONS OTHER THAN MALIGNANT NEOPLASM: ICD-10-CM

## 2023-12-26 PROBLEM — Z97.8 PRESENCE OF OTHER SPECIFIED DEVICES: Chronic | Status: ACTIVE | Noted: 2023-12-16

## 2023-12-26 PROBLEM — N18.30 CHRONIC KIDNEY DISEASE, STAGE 3 UNSPECIFIED: Chronic | Status: ACTIVE | Noted: 2023-12-16

## 2023-12-26 PROCEDURE — 99496 TRANSJ CARE MGMT HIGH F2F 7D: CPT | Mod: 25

## 2023-12-26 NOTE — HISTORY OF PRESENT ILLNESS
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   GENNY: Recently admitted to [facility] on 12/16/2023 and discharged on 12/22/2023. Admitted for CAUTI sepsis 2/2 obstructive uropathy. Complaints of bilateral leg pain and admitted for sepsis POA secondary to CAUTI in pt with indwelling Harkins catheter on admission. Harkins was exchanged in ED. Pt was treated w/ Ceftriaxone - but urine culture growing enterococcus faecalis & Stenotrophomonas maltophilia. Pt improved and, as per ID, there was no need for antibiotics at this time as patient clinically improved despite being on abx that is did not treat the urine culture. The patient refused PT eval.   Today, he is feeling much better. No fevers or chills noted. Catheter was sterilized and exchanged when he saw urology on 12/12/2023. [facility] Marion. Appointment for planning on January 4th, 2024.   Discharge meds: colchicine 0.6 mg oral capsule: 1 cap(s) orally once a day finasteride 5 mg oral tablet: 1 tab(s) orally once a day fluticasone 50 mcg/inh nasal spray: 1 spray(s) nasal 2 times a day sodium chloride 0.65% nasal spray: 2 spray(s) nasal 3 times a day tamsulosin 0.4 mg oral capsule: 1 cap(s) orally once a day (at bedtime) Vyndamax 61 mg oral capsule: 1 cap(s) orally once a day  Peripheral neuropathy: Chronic history, leg pain is also c/b swelling, does not wear compression stockings regularly. Feels that sometimes he has numbness and tingling in his feet, especially if he is laying supine which causes him to wake up and reposition himself. Other than Tylenol, he has not tried anything else for this. Ambulates with RW.  Insomnia: Likely due to pain. Patient was prescribed Remeron 7.5 mg with me at last visit but has not taken it consistently. Daughter states that pharamcy did not fill it back then.  Lumbago/deconditioning: Patient refused PT while inpatient at [place]. He feels weaker and feels his back pain and leg pain has gotten worse. No falls noted. PT script was originally given at last visit but patient never completed. [[id number]] : 0

## 2023-12-26 NOTE — PHYSICAL EXAM
[Alert] : alert [No Acute Distress] : in no acute distress [Normal Outer Ear/Nose] : the ears and nose were normal in appearance [Normal Appearance] : the appearance of the neck was normal [Supple] : the neck was supple [No Respiratory Distress] : no respiratory distress [No Acc Muscle Use] : no accessory muscle use [Respiration, Rhythm And Depth] : normal respiratory rhythm and effort [Auscultation Breath Sounds / Voice Sounds] : lungs were clear to auscultation bilaterally [Heart Rate And Rhythm] : heart rate was normal and rhythm regular [Bowel Sounds] : normal bowel sounds [Abdomen Tenderness] : non-tender [Abdomen Soft] : soft [No Spinal Tenderness] : no spinal tenderness [Normal Color / Pigmentation] : normal skin color and pigmentation [Normal Turgor] : normal skin turgor [No Focal Deficits] : no focal deficits [Normal Affect] : the affect was normal [Normal Mood] : the mood was normal [de-identified] : 2+ pitting edema in the bilateral lower extremities symmetrically [de-identified] : Xerosis cutis of the b/l lower extremities

## 2023-12-26 NOTE — ASSESSMENT
[FreeTextEntry1] : Presenting for transition of care visit  Sepsis 2/2 CAUTI in setting of prostatic obstructive uropathy, s/p Rocephin, chronic morales, following with urology outpatient, planning on surgical resection of prostate  Deconditioning, PT script given  Gabapentin for neuropathy  RTC in 1 month

## 2023-12-27 DIAGNOSIS — E85.4 ORGAN-LIMITED AMYLOIDOSIS: ICD-10-CM

## 2023-12-27 DIAGNOSIS — N18.32 CHRONIC KIDNEY DISEASE, STAGE 3B: ICD-10-CM

## 2023-12-27 DIAGNOSIS — I43 CARDIOMYOPATHY IN DISEASES CLASSIFIED ELSEWHERE: ICD-10-CM

## 2023-12-27 DIAGNOSIS — Z98.1 ARTHRODESIS STATUS: ICD-10-CM

## 2023-12-27 DIAGNOSIS — A41.9 SEPSIS, UNSPECIFIED ORGANISM: ICD-10-CM

## 2023-12-27 DIAGNOSIS — T83.511A INFECTION AND INFLAMMATORY REACTION DUE TO INDWELLING URETHRAL CATHETER, INITIAL ENCOUNTER: ICD-10-CM
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
## 2023-12-27 DIAGNOSIS — D64.9 ANEMIA, UNSPECIFIED: ICD-10-CM

## 2023-12-27 DIAGNOSIS — N39.0 URINARY TRACT INFECTION, SITE NOT SPECIFIED: ICD-10-CM

## 2023-12-27 DIAGNOSIS — M79.601 PAIN IN RIGHT ARM: ICD-10-CM

## 2023-12-27 DIAGNOSIS — I12.9 HYPERTENSIVE CHRONIC KIDNEY DISEASE WITH STAGE 1 THROUGH STAGE 4 CHRONIC KIDNEY DISEASE, OR UNSPECIFIED CHRONIC KIDNEY DISEASE: ICD-10-CM
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
## 2023-12-27 DIAGNOSIS — N40.1 BENIGN PROSTATIC HYPERPLASIA WITH LOWER URINARY TRACT SYMPTOMS: ICD-10-CM

## 2023-12-27 DIAGNOSIS — R33.8 OTHER RETENTION OF URINE: ICD-10-CM

## 2023-12-28 LAB
ALBUMIN SERPL ELPH-MCNC: 3.7 G/DL
ALP BLD-CCNC: 138 U/L
ALT SERPL-CCNC: 20 U/L
ANION GAP SERPL CALC-SCNC: 11 MMOL/L
APPEARANCE: CLEAR
AST SERPL-CCNC: 31 U/L
BACTERIA: ABNORMAL /HPF
BASOPHILS # BLD AUTO: 0.06 K/UL
BASOPHILS NFR BLD AUTO: 1 %
BILIRUB SERPL-MCNC: 0.3 MG/DL
BILIRUBIN URINE: NEGATIVE
BLOOD URINE: NEGATIVE
BUN SERPL-MCNC: 13 MG/DL
CALCIUM SERPL-MCNC: 8.9 MG/DL
CALCIUM SERPL-MCNC: 8.9 MG/DL
CAST: 0 /LPF
CHLORIDE SERPL-SCNC: 102 MMOL/L
CO2 SERPL-SCNC: 23 MMOL/L
COLOR: YELLOW
CREAT SERPL-MCNC: 1.53 MG/DL
CREAT SPEC-SCNC: 69 MG/DL
CREAT SPEC-SCNC: 69 MG/DL
CREAT/PROT UR: 0.4 RATIO
EGFR: 35 ML/MIN/1.73M2
EOSINOPHIL # BLD AUTO: 0.39 K/UL
EOSINOPHIL NFR BLD AUTO: 6.4 %
EPITHELIAL CELLS: 1 /HPF
GLUCOSE QUALITATIVE U: NEGATIVE MG/DL
GLUCOSE SERPL-MCNC: 80 MG/DL
HCT VFR BLD CALC: 28.4 %
HGB BLD-MCNC: 9.5 G/DL
IMM GRANULOCYTES NFR BLD AUTO: 0.2 %
KETONES URINE: NEGATIVE MG/DL
LEUKOCYTE ESTERASE URINE: ABNORMAL
LYMPHOCYTES # BLD AUTO: 3.11 K/UL
LYMPHOCYTES NFR BLD AUTO: 51.4 %
MAN DIFF?: NORMAL
MCHC RBC-ENTMCNC: 27 PG
MCHC RBC-ENTMCNC: 33.5 GM/DL
MCV RBC AUTO: 80.7 FL
MICROALBUMIN 24H UR DL<=1MG/L-MCNC: 6.9 MG/DL
MICROALBUMIN/CREAT 24H UR-RTO: 100 MG/G
MICROSCOPIC-UA: NORMAL
MONOCYTES # BLD AUTO: 0.56 K/UL
MONOCYTES NFR BLD AUTO: 9.3 %
NEUTROPHILS # BLD AUTO: 1.92 K/UL
NEUTROPHILS NFR BLD AUTO: 31.7 %
NITRITE URINE: NEGATIVE
PARATHYROID HORMONE INTACT: 72 PG/ML
PH URINE: 6.5
PHOSPHATE SERPL-MCNC: 4 MG/DL
PLATELET # BLD AUTO: 287 K/UL
POTASSIUM SERPL-SCNC: 5 MMOL/L
PROT SERPL-MCNC: 6.7 G/DL
PROT UR-MCNC: 26 MG/DL
PROTEIN URINE: NORMAL MG/DL
RBC # BLD: 3.52 M/UL
RBC # FLD: 17.2 %
RED BLOOD CELLS URINE: NORMAL /HPF
REVIEW: NORMAL
SODIUM SERPL-SCNC: 136 MMOL/L
SPECIFIC GRAVITY URINE: 1.01
UROBILINOGEN URINE: 0.2 MG/DL
WBC # FLD AUTO: 6.05 K/UL
WHITE BLOOD CELLS URINE: 31 /HPF

## 2023-12-29 ENCOUNTER — NON-APPOINTMENT (OUTPATIENT)
Age: 73
End: 2023-12-29

## 2024-01-02 ENCOUNTER — APPOINTMENT (OUTPATIENT)
Dept: NEPHROLOGY | Facility: CLINIC | Age: 74
End: 2024-01-02

## 2024-01-02 ENCOUNTER — INPATIENT (INPATIENT)
Facility: HOSPITAL | Age: 74
LOS: 12 days | Discharge: NURSING, REHAB OR LONG-TERM CARE | DRG: 698 | End: 2024-01-15
Attending: STUDENT IN AN ORGANIZED HEALTH CARE EDUCATION/TRAINING PROGRAM | Admitting: INTERNAL MEDICINE
Payer: MEDICARE

## 2024-01-02 ENCOUNTER — TRANSCRIPTION ENCOUNTER (OUTPATIENT)
Age: 74
End: 2024-01-02

## 2024-01-02 VITALS
HEIGHT: 64 IN | RESPIRATION RATE: 20 BRPM | SYSTOLIC BLOOD PRESSURE: 105 MMHG | TEMPERATURE: 99 F | DIASTOLIC BLOOD PRESSURE: 71 MMHG | HEART RATE: 104 BPM

## 2024-01-02 DIAGNOSIS — R19.7 DIARRHEA, UNSPECIFIED: ICD-10-CM

## 2024-01-02 DIAGNOSIS — Z98.1 ARTHRODESIS STATUS: Chronic | ICD-10-CM

## 2024-01-02 DIAGNOSIS — Z29.9 ENCOUNTER FOR PROPHYLACTIC MEASURES, UNSPECIFIED: ICD-10-CM

## 2024-01-02 DIAGNOSIS — E85.4 ORGAN-LIMITED AMYLOIDOSIS: ICD-10-CM
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
## 2024-01-02 DIAGNOSIS — A41.9 SEPSIS, UNSPECIFIED ORGANISM: ICD-10-CM

## 2024-01-02 DIAGNOSIS — N40.0 BENIGN PROSTATIC HYPERPLASIA WITHOUT LOWER URINARY TRACT SYMPTOMS: ICD-10-CM

## 2024-01-02 DIAGNOSIS — U07.1 COVID-19: ICD-10-CM

## 2024-01-02 DIAGNOSIS — R74.8 ABNORMAL LEVELS OF OTHER SERUM ENZYMES: ICD-10-CM

## 2024-01-02 DIAGNOSIS — N17.9 ACUTE KIDNEY FAILURE, UNSPECIFIED: ICD-10-CM

## 2024-01-02 LAB
ALBUMIN SERPL ELPH-MCNC: 2.9 G/DL — LOW (ref 3.3–5)
ALBUMIN SERPL ELPH-MCNC: 2.9 G/DL — LOW (ref 3.3–5)
ALBUMIN SERPL ELPH-MCNC: 3.3 G/DL — SIGNIFICANT CHANGE UP (ref 3.3–5)
ALBUMIN SERPL ELPH-MCNC: 3.3 G/DL — SIGNIFICANT CHANGE UP (ref 3.3–5)
ALP SERPL-CCNC: 117 U/L — SIGNIFICANT CHANGE UP (ref 40–120)
ALP SERPL-CCNC: 117 U/L — SIGNIFICANT CHANGE UP (ref 40–120)
ALP SERPL-CCNC: 155 U/L — HIGH (ref 40–120)
ALP SERPL-CCNC: 155 U/L — HIGH (ref 40–120)
ALT FLD-CCNC: 52 U/L — HIGH (ref 10–45)
ALT FLD-CCNC: 52 U/L — HIGH (ref 10–45)
ALT FLD-CCNC: 63 U/L — HIGH (ref 10–45)
ALT FLD-CCNC: 63 U/L — HIGH (ref 10–45)
ANION GAP SERPL CALC-SCNC: 12 MMOL/L — SIGNIFICANT CHANGE UP (ref 5–17)
ANION GAP SERPL CALC-SCNC: 12 MMOL/L — SIGNIFICANT CHANGE UP (ref 5–17)
ANION GAP SERPL CALC-SCNC: 14 MMOL/L — SIGNIFICANT CHANGE UP (ref 5–17)
ANION GAP SERPL CALC-SCNC: 14 MMOL/L — SIGNIFICANT CHANGE UP (ref 5–17)
ANISOCYTOSIS BLD QL: SLIGHT — SIGNIFICANT CHANGE UP
ANISOCYTOSIS BLD QL: SLIGHT — SIGNIFICANT CHANGE UP
APPEARANCE UR: ABNORMAL
APPEARANCE UR: ABNORMAL
AST SERPL-CCNC: 175 U/L — HIGH (ref 10–40)
AST SERPL-CCNC: 175 U/L — HIGH (ref 10–40)
AST SERPL-CCNC: 223 U/L — HIGH (ref 10–40)
AST SERPL-CCNC: 223 U/L — HIGH (ref 10–40)
BACTERIA # UR AUTO: NEGATIVE /HPF — SIGNIFICANT CHANGE UP
BACTERIA # UR AUTO: NEGATIVE /HPF — SIGNIFICANT CHANGE UP
BASE EXCESS BLDV CALC-SCNC: -2.5 MMOL/L — LOW (ref -2–3)
BASE EXCESS BLDV CALC-SCNC: -2.5 MMOL/L — LOW (ref -2–3)
BASE EXCESS BLDV CALC-SCNC: -4.5 MMOL/L — LOW (ref -2–3)
BASE EXCESS BLDV CALC-SCNC: -4.5 MMOL/L — LOW (ref -2–3)
BASOPHILS # BLD AUTO: 0.11 K/UL — SIGNIFICANT CHANGE UP (ref 0–0.2)
BASOPHILS # BLD AUTO: 0.11 K/UL — SIGNIFICANT CHANGE UP (ref 0–0.2)
BASOPHILS NFR BLD AUTO: 1.8 % — SIGNIFICANT CHANGE UP (ref 0–2)
BASOPHILS NFR BLD AUTO: 1.8 % — SIGNIFICANT CHANGE UP (ref 0–2)
BILIRUB SERPL-MCNC: 0.9 MG/DL — SIGNIFICANT CHANGE UP (ref 0.2–1.2)
BILIRUB SERPL-MCNC: 0.9 MG/DL — SIGNIFICANT CHANGE UP (ref 0.2–1.2)
BILIRUB SERPL-MCNC: 1.2 MG/DL — SIGNIFICANT CHANGE UP (ref 0.2–1.2)
BILIRUB SERPL-MCNC: 1.2 MG/DL — SIGNIFICANT CHANGE UP (ref 0.2–1.2)
BILIRUB UR-MCNC: NEGATIVE — SIGNIFICANT CHANGE UP
BILIRUB UR-MCNC: NEGATIVE — SIGNIFICANT CHANGE UP
BUN SERPL-MCNC: 26 MG/DL — HIGH (ref 7–23)
BUN SERPL-MCNC: 26 MG/DL — HIGH (ref 7–23)
BUN SERPL-MCNC: 27 MG/DL — HIGH (ref 7–23)
BUN SERPL-MCNC: 27 MG/DL — HIGH (ref 7–23)
C DIFF GDH STL QL: NEGATIVE — SIGNIFICANT CHANGE UP
C DIFF GDH STL QL: NEGATIVE — SIGNIFICANT CHANGE UP
C DIFF GDH STL QL: SIGNIFICANT CHANGE UP
C DIFF GDH STL QL: SIGNIFICANT CHANGE UP
CA-I SERPL-SCNC: 1.14 MMOL/L — LOW (ref 1.15–1.33)
CA-I SERPL-SCNC: 1.14 MMOL/L — LOW (ref 1.15–1.33)
CA-I SERPL-SCNC: 1.15 MMOL/L — SIGNIFICANT CHANGE UP (ref 1.15–1.33)
CA-I SERPL-SCNC: 1.15 MMOL/L — SIGNIFICANT CHANGE UP (ref 1.15–1.33)
CALCIUM SERPL-MCNC: 8.4 MG/DL — SIGNIFICANT CHANGE UP (ref 8.4–10.5)
CALCIUM SERPL-MCNC: 8.4 MG/DL — SIGNIFICANT CHANGE UP (ref 8.4–10.5)
CALCIUM SERPL-MCNC: 9.4 MG/DL — SIGNIFICANT CHANGE UP (ref 8.4–10.5)
CALCIUM SERPL-MCNC: 9.4 MG/DL — SIGNIFICANT CHANGE UP (ref 8.4–10.5)
CHLORIDE BLDV-SCNC: 101 MMOL/L — SIGNIFICANT CHANGE UP (ref 96–108)
CHLORIDE BLDV-SCNC: 101 MMOL/L — SIGNIFICANT CHANGE UP (ref 96–108)
CHLORIDE BLDV-SCNC: 104 MMOL/L — SIGNIFICANT CHANGE UP (ref 96–108)
CHLORIDE BLDV-SCNC: 104 MMOL/L — SIGNIFICANT CHANGE UP (ref 96–108)
CHLORIDE SERPL-SCNC: 102 MMOL/L — SIGNIFICANT CHANGE UP (ref 96–108)
CHLORIDE SERPL-SCNC: 102 MMOL/L — SIGNIFICANT CHANGE UP (ref 96–108)
CHLORIDE SERPL-SCNC: 104 MMOL/L — SIGNIFICANT CHANGE UP (ref 96–108)
CHLORIDE SERPL-SCNC: 104 MMOL/L — SIGNIFICANT CHANGE UP (ref 96–108)
CO2 BLDV-SCNC: 21 MMOL/L — LOW (ref 22–26)
CO2 BLDV-SCNC: 21 MMOL/L — LOW (ref 22–26)
CO2 BLDV-SCNC: 24 MMOL/L — SIGNIFICANT CHANGE UP (ref 22–26)
CO2 BLDV-SCNC: 24 MMOL/L — SIGNIFICANT CHANGE UP (ref 22–26)
CO2 SERPL-SCNC: 19 MMOL/L — LOW (ref 22–31)
COLOR SPEC: SIGNIFICANT CHANGE UP
COLOR SPEC: SIGNIFICANT CHANGE UP
CREAT SERPL-MCNC: 1.86 MG/DL — HIGH (ref 0.5–1.3)
CREAT SERPL-MCNC: 1.86 MG/DL — HIGH (ref 0.5–1.3)
CREAT SERPL-MCNC: 1.92 MG/DL — HIGH (ref 0.5–1.3)
CREAT SERPL-MCNC: 1.92 MG/DL — HIGH (ref 0.5–1.3)
DACRYOCYTES BLD QL SMEAR: SLIGHT — SIGNIFICANT CHANGE UP
DACRYOCYTES BLD QL SMEAR: SLIGHT — SIGNIFICANT CHANGE UP
DIFF PNL FLD: ABNORMAL
DIFF PNL FLD: ABNORMAL
EGFR: 36 ML/MIN/1.73M2 — LOW
EGFR: 36 ML/MIN/1.73M2 — LOW
EGFR: 37 ML/MIN/1.73M2 — LOW
EGFR: 37 ML/MIN/1.73M2 — LOW
ELLIPTOCYTES BLD QL SMEAR: SLIGHT — SIGNIFICANT CHANGE UP
ELLIPTOCYTES BLD QL SMEAR: SLIGHT — SIGNIFICANT CHANGE UP
EOSINOPHIL # BLD AUTO: 0 K/UL — SIGNIFICANT CHANGE UP (ref 0–0.5)
EOSINOPHIL # BLD AUTO: 0 K/UL — SIGNIFICANT CHANGE UP (ref 0–0.5)
EOSINOPHIL NFR BLD AUTO: 0 % — SIGNIFICANT CHANGE UP (ref 0–6)
EOSINOPHIL NFR BLD AUTO: 0 % — SIGNIFICANT CHANGE UP (ref 0–6)
EPI CELLS # UR: PRESENT
EPI CELLS # UR: PRESENT
FLUAV AG NPH QL: SIGNIFICANT CHANGE UP
FLUAV AG NPH QL: SIGNIFICANT CHANGE UP
FLUBV AG NPH QL: SIGNIFICANT CHANGE UP
FLUBV AG NPH QL: SIGNIFICANT CHANGE UP
GAS PNL BLDV: 130 MMOL/L — LOW (ref 136–145)
GAS PNL BLDV: 130 MMOL/L — LOW (ref 136–145)
GAS PNL BLDV: 132 MMOL/L — LOW (ref 136–145)
GAS PNL BLDV: 132 MMOL/L — LOW (ref 136–145)
GAS PNL BLDV: SIGNIFICANT CHANGE UP
GI PCR PANEL: SIGNIFICANT CHANGE UP
GI PCR PANEL: SIGNIFICANT CHANGE UP
GIANT PLATELETS BLD QL SMEAR: PRESENT — SIGNIFICANT CHANGE UP
GIANT PLATELETS BLD QL SMEAR: PRESENT — SIGNIFICANT CHANGE UP
GLUCOSE BLDV-MCNC: 67 MG/DL — LOW (ref 70–99)
GLUCOSE BLDV-MCNC: 67 MG/DL — LOW (ref 70–99)
GLUCOSE BLDV-MCNC: 78 MG/DL — SIGNIFICANT CHANGE UP (ref 70–99)
GLUCOSE BLDV-MCNC: 78 MG/DL — SIGNIFICANT CHANGE UP (ref 70–99)
GLUCOSE SERPL-MCNC: 112 MG/DL — HIGH (ref 70–99)
GLUCOSE SERPL-MCNC: 112 MG/DL — HIGH (ref 70–99)
GLUCOSE SERPL-MCNC: 68 MG/DL — LOW (ref 70–99)
GLUCOSE SERPL-MCNC: 68 MG/DL — LOW (ref 70–99)
GLUCOSE UR QL: NEGATIVE MG/DL — SIGNIFICANT CHANGE UP
GLUCOSE UR QL: NEGATIVE MG/DL — SIGNIFICANT CHANGE UP
HCO3 BLDV-SCNC: 20 MMOL/L — LOW (ref 22–29)
HCO3 BLDV-SCNC: 20 MMOL/L — LOW (ref 22–29)
HCO3 BLDV-SCNC: 22 MMOL/L — SIGNIFICANT CHANGE UP (ref 22–29)
HCO3 BLDV-SCNC: 22 MMOL/L — SIGNIFICANT CHANGE UP (ref 22–29)
HCT VFR BLD CALC: 27.9 % — LOW (ref 39–50)
HCT VFR BLD CALC: 27.9 % — LOW (ref 39–50)
HCT VFR BLD CALC: 31.5 % — LOW (ref 39–50)
HCT VFR BLD CALC: 31.5 % — LOW (ref 39–50)
HCT VFR BLDA CALC: 28 % — LOW (ref 39–51)
HCT VFR BLDA CALC: 28 % — LOW (ref 39–51)
HCT VFR BLDA CALC: 30 % — LOW (ref 39–51)
HCT VFR BLDA CALC: 30 % — LOW (ref 39–51)
HGB BLD CALC-MCNC: 10.1 G/DL — LOW (ref 12.6–17.4)
HGB BLD CALC-MCNC: 10.1 G/DL — LOW (ref 12.6–17.4)
HGB BLD CALC-MCNC: 9.3 G/DL — LOW (ref 12.6–17.4)
HGB BLD CALC-MCNC: 9.3 G/DL — LOW (ref 12.6–17.4)
HGB BLD-MCNC: 10.3 G/DL — LOW (ref 13–17)
HGB BLD-MCNC: 10.3 G/DL — LOW (ref 13–17)
HGB BLD-MCNC: 9.1 G/DL — LOW (ref 13–17)
HGB BLD-MCNC: 9.1 G/DL — LOW (ref 13–17)
HYALINE CASTS # UR AUTO: 5 — SIGNIFICANT CHANGE UP
HYALINE CASTS # UR AUTO: 5 — SIGNIFICANT CHANGE UP
KETONES UR-MCNC: ABNORMAL MG/DL
KETONES UR-MCNC: ABNORMAL MG/DL
LACTATE BLDV-MCNC: 2.1 MMOL/L — HIGH (ref 0.5–2)
LACTATE BLDV-MCNC: 2.1 MMOL/L — HIGH (ref 0.5–2)
LACTATE BLDV-MCNC: 2.5 MMOL/L — HIGH (ref 0.5–2)
LACTATE BLDV-MCNC: 2.5 MMOL/L — HIGH (ref 0.5–2)
LEUKOCYTE ESTERASE UR-ACNC: ABNORMAL
LEUKOCYTE ESTERASE UR-ACNC: ABNORMAL
LYMPHOCYTES # BLD AUTO: 2.09 K/UL — SIGNIFICANT CHANGE UP (ref 1–3.3)
LYMPHOCYTES # BLD AUTO: 2.09 K/UL — SIGNIFICANT CHANGE UP (ref 1–3.3)
LYMPHOCYTES # BLD AUTO: 34.2 % — SIGNIFICANT CHANGE UP (ref 13–44)
LYMPHOCYTES # BLD AUTO: 34.2 % — SIGNIFICANT CHANGE UP (ref 13–44)
MACROCYTES BLD QL: SLIGHT — SIGNIFICANT CHANGE UP
MACROCYTES BLD QL: SLIGHT — SIGNIFICANT CHANGE UP
MANUAL SMEAR VERIFICATION: SIGNIFICANT CHANGE UP
MANUAL SMEAR VERIFICATION: SIGNIFICANT CHANGE UP
MCHC RBC-ENTMCNC: 26.5 PG — LOW (ref 27–34)
MCHC RBC-ENTMCNC: 26.5 PG — LOW (ref 27–34)
MCHC RBC-ENTMCNC: 26.7 PG — LOW (ref 27–34)
MCHC RBC-ENTMCNC: 26.7 PG — LOW (ref 27–34)
MCHC RBC-ENTMCNC: 32.6 GM/DL — SIGNIFICANT CHANGE UP (ref 32–36)
MCHC RBC-ENTMCNC: 32.6 GM/DL — SIGNIFICANT CHANGE UP (ref 32–36)
MCHC RBC-ENTMCNC: 32.7 GM/DL — SIGNIFICANT CHANGE UP (ref 32–36)
MCHC RBC-ENTMCNC: 32.7 GM/DL — SIGNIFICANT CHANGE UP (ref 32–36)
MCV RBC AUTO: 81 FL — SIGNIFICANT CHANGE UP (ref 80–100)
MCV RBC AUTO: 81 FL — SIGNIFICANT CHANGE UP (ref 80–100)
MCV RBC AUTO: 81.8 FL — SIGNIFICANT CHANGE UP (ref 80–100)
MCV RBC AUTO: 81.8 FL — SIGNIFICANT CHANGE UP (ref 80–100)
MONOCYTES # BLD AUTO: 0.77 K/UL — SIGNIFICANT CHANGE UP (ref 0–0.9)
MONOCYTES # BLD AUTO: 0.77 K/UL — SIGNIFICANT CHANGE UP (ref 0–0.9)
MONOCYTES NFR BLD AUTO: 12.6 % — SIGNIFICANT CHANGE UP (ref 2–14)
MONOCYTES NFR BLD AUTO: 12.6 % — SIGNIFICANT CHANGE UP (ref 2–14)
NEUTROPHILS # BLD AUTO: 3.08 K/UL — SIGNIFICANT CHANGE UP (ref 1.8–7.4)
NEUTROPHILS # BLD AUTO: 3.08 K/UL — SIGNIFICANT CHANGE UP (ref 1.8–7.4)
NEUTROPHILS NFR BLD AUTO: 50.5 % — SIGNIFICANT CHANGE UP (ref 43–77)
NEUTROPHILS NFR BLD AUTO: 50.5 % — SIGNIFICANT CHANGE UP (ref 43–77)
NITRITE UR-MCNC: NEGATIVE — SIGNIFICANT CHANGE UP
NITRITE UR-MCNC: NEGATIVE — SIGNIFICANT CHANGE UP
NRBC # BLD: 0 /100 WBCS — SIGNIFICANT CHANGE UP (ref 0–0)
NRBC # BLD: 0 /100 WBCS — SIGNIFICANT CHANGE UP (ref 0–0)
PCO2 BLDV: 35 MMHG — LOW (ref 42–55)
PCO2 BLDV: 35 MMHG — LOW (ref 42–55)
PCO2 BLDV: 39 MMHG — LOW (ref 42–55)
PCO2 BLDV: 39 MMHG — LOW (ref 42–55)
PH BLDV: 7.37 — SIGNIFICANT CHANGE UP (ref 7.32–7.43)
PH UR: 5.5 — SIGNIFICANT CHANGE UP (ref 5–8)
PH UR: 5.5 — SIGNIFICANT CHANGE UP (ref 5–8)
PLAT MORPH BLD: NORMAL — SIGNIFICANT CHANGE UP
PLAT MORPH BLD: NORMAL — SIGNIFICANT CHANGE UP
PLATELET # BLD AUTO: 166 K/UL — SIGNIFICANT CHANGE UP (ref 150–400)
PLATELET # BLD AUTO: 166 K/UL — SIGNIFICANT CHANGE UP (ref 150–400)
PLATELET # BLD AUTO: 239 K/UL — SIGNIFICANT CHANGE UP (ref 150–400)
PLATELET # BLD AUTO: 239 K/UL — SIGNIFICANT CHANGE UP (ref 150–400)
PO2 BLDV: 31 MMHG — SIGNIFICANT CHANGE UP (ref 25–45)
PO2 BLDV: 31 MMHG — SIGNIFICANT CHANGE UP (ref 25–45)
PO2 BLDV: 48 MMHG — HIGH (ref 25–45)
PO2 BLDV: 48 MMHG — HIGH (ref 25–45)
POTASSIUM BLDV-SCNC: 4.6 MMOL/L — SIGNIFICANT CHANGE UP (ref 3.5–5.1)
POTASSIUM BLDV-SCNC: 4.6 MMOL/L — SIGNIFICANT CHANGE UP (ref 3.5–5.1)
POTASSIUM BLDV-SCNC: 4.9 MMOL/L — SIGNIFICANT CHANGE UP (ref 3.5–5.1)
POTASSIUM BLDV-SCNC: 4.9 MMOL/L — SIGNIFICANT CHANGE UP (ref 3.5–5.1)
POTASSIUM SERPL-MCNC: 4.6 MMOL/L — SIGNIFICANT CHANGE UP (ref 3.5–5.3)
POTASSIUM SERPL-MCNC: 4.6 MMOL/L — SIGNIFICANT CHANGE UP (ref 3.5–5.3)
POTASSIUM SERPL-MCNC: 4.9 MMOL/L — SIGNIFICANT CHANGE UP (ref 3.5–5.3)
POTASSIUM SERPL-MCNC: 4.9 MMOL/L — SIGNIFICANT CHANGE UP (ref 3.5–5.3)
POTASSIUM SERPL-SCNC: 4.6 MMOL/L — SIGNIFICANT CHANGE UP (ref 3.5–5.3)
POTASSIUM SERPL-SCNC: 4.6 MMOL/L — SIGNIFICANT CHANGE UP (ref 3.5–5.3)
POTASSIUM SERPL-SCNC: 4.9 MMOL/L — SIGNIFICANT CHANGE UP (ref 3.5–5.3)
POTASSIUM SERPL-SCNC: 4.9 MMOL/L — SIGNIFICANT CHANGE UP (ref 3.5–5.3)
PROT SERPL-MCNC: 6.1 G/DL — SIGNIFICANT CHANGE UP (ref 6–8.3)
PROT SERPL-MCNC: 6.1 G/DL — SIGNIFICANT CHANGE UP (ref 6–8.3)
PROT SERPL-MCNC: 7.3 G/DL — SIGNIFICANT CHANGE UP (ref 6–8.3)
PROT SERPL-MCNC: 7.3 G/DL — SIGNIFICANT CHANGE UP (ref 6–8.3)
PROT UR-MCNC: 100 MG/DL
PROT UR-MCNC: 100 MG/DL
RBC # BLD: 3.41 M/UL — LOW (ref 4.2–5.8)
RBC # BLD: 3.41 M/UL — LOW (ref 4.2–5.8)
RBC # BLD: 3.89 M/UL — LOW (ref 4.2–5.8)
RBC # BLD: 3.89 M/UL — LOW (ref 4.2–5.8)
RBC # FLD: 17.2 % — HIGH (ref 10.3–14.5)
RBC # FLD: 17.2 % — HIGH (ref 10.3–14.5)
RBC # FLD: 17.4 % — HIGH (ref 10.3–14.5)
RBC # FLD: 17.4 % — HIGH (ref 10.3–14.5)
RBC BLD AUTO: ABNORMAL
RBC BLD AUTO: ABNORMAL
RBC CASTS # UR COMP ASSIST: >50 /HPF — SIGNIFICANT CHANGE UP (ref 0–4)
RBC CASTS # UR COMP ASSIST: >50 /HPF — SIGNIFICANT CHANGE UP (ref 0–4)
RSV RNA NPH QL NAA+NON-PROBE: SIGNIFICANT CHANGE UP
RSV RNA NPH QL NAA+NON-PROBE: SIGNIFICANT CHANGE UP
SAO2 % BLDV: 50.9 % — LOW (ref 67–88)
SAO2 % BLDV: 50.9 % — LOW (ref 67–88)
SAO2 % BLDV: 83.9 % — SIGNIFICANT CHANGE UP (ref 67–88)
SAO2 % BLDV: 83.9 % — SIGNIFICANT CHANGE UP (ref 67–88)
SARS-COV-2 RNA SPEC QL NAA+PROBE: DETECTED
SARS-COV-2 RNA SPEC QL NAA+PROBE: DETECTED
SCHISTOCYTES BLD QL AUTO: SLIGHT — SIGNIFICANT CHANGE UP
SCHISTOCYTES BLD QL AUTO: SLIGHT — SIGNIFICANT CHANGE UP
SODIUM SERPL-SCNC: 135 MMOL/L — SIGNIFICANT CHANGE UP (ref 135–145)
SP GR SPEC: 1.01 — SIGNIFICANT CHANGE UP (ref 1–1.03)
SP GR SPEC: 1.01 — SIGNIFICANT CHANGE UP (ref 1–1.03)
TARGETS BLD QL SMEAR: SLIGHT — SIGNIFICANT CHANGE UP
TARGETS BLD QL SMEAR: SLIGHT — SIGNIFICANT CHANGE UP
UROBILINOGEN FLD QL: 0.2 MG/DL — SIGNIFICANT CHANGE UP (ref 0.2–1)
UROBILINOGEN FLD QL: 0.2 MG/DL — SIGNIFICANT CHANGE UP (ref 0.2–1)
VARIANT LYMPHS # BLD: 0.9 % — SIGNIFICANT CHANGE UP (ref 0–6)
VARIANT LYMPHS # BLD: 0.9 % — SIGNIFICANT CHANGE UP (ref 0–6)
WBC # BLD: 4.01 K/UL — SIGNIFICANT CHANGE UP (ref 3.8–10.5)
WBC # BLD: 4.01 K/UL — SIGNIFICANT CHANGE UP (ref 3.8–10.5)
WBC # BLD: 6.1 K/UL — SIGNIFICANT CHANGE UP (ref 3.8–10.5)
WBC # BLD: 6.1 K/UL — SIGNIFICANT CHANGE UP (ref 3.8–10.5)
WBC # FLD AUTO: 4.01 K/UL — SIGNIFICANT CHANGE UP (ref 3.8–10.5)
WBC # FLD AUTO: 4.01 K/UL — SIGNIFICANT CHANGE UP (ref 3.8–10.5)
WBC # FLD AUTO: 6.1 K/UL — SIGNIFICANT CHANGE UP (ref 3.8–10.5)
WBC # FLD AUTO: 6.1 K/UL — SIGNIFICANT CHANGE UP (ref 3.8–10.5)
WBC UR QL: >25 /HPF — HIGH (ref 0–5)
WBC UR QL: >25 /HPF — HIGH (ref 0–5)

## 2024-01-02 PROCEDURE — 99497 ADVNCD CARE PLAN 30 MIN: CPT | Mod: 25

## 2024-01-02 PROCEDURE — 93010 ELECTROCARDIOGRAM REPORT: CPT

## 2024-01-02 PROCEDURE — 99223 1ST HOSP IP/OBS HIGH 75: CPT | Mod: GC

## 2024-01-02 PROCEDURE — 99222 1ST HOSP IP/OBS MODERATE 55: CPT

## 2024-01-02 PROCEDURE — 99222 1ST HOSP IP/OBS MODERATE 55: CPT | Mod: GC

## 2024-01-02 PROCEDURE — 72125 CT NECK SPINE W/O DYE: CPT | Mod: 26,MA

## 2024-01-02 PROCEDURE — 99291 CRITICAL CARE FIRST HOUR: CPT

## 2024-01-02 PROCEDURE — 74177 CT ABD & PELVIS W/CONTRAST: CPT | Mod: 26,MA

## 2024-01-02 PROCEDURE — 72132 CT LUMBAR SPINE W/DYE: CPT | Mod: 26,MA

## 2024-01-02 PROCEDURE — 71045 X-RAY EXAM CHEST 1 VIEW: CPT | Mod: 26

## 2024-01-02 PROCEDURE — 70450 CT HEAD/BRAIN W/O DYE: CPT | Mod: 26,MA

## 2024-01-02 RX ORDER — REMDESIVIR 5 MG/ML
100 INJECTION INTRAVENOUS EVERY 24 HOURS
Refills: 0 | Status: DISCONTINUED | OUTPATIENT
Start: 2024-01-03 | End: 2024-01-03

## 2024-01-02 RX ORDER — PIPERACILLIN AND TAZOBACTAM 4; .5 G/20ML; G/20ML
3.38 INJECTION, POWDER, LYOPHILIZED, FOR SOLUTION INTRAVENOUS ONCE
Refills: 0 | Status: COMPLETED | OUTPATIENT
Start: 2024-01-02 | End: 2024-01-02

## 2024-01-02 RX ORDER — REMDESIVIR 5 MG/ML
INJECTION INTRAVENOUS
Refills: 0 | Status: DISCONTINUED | OUTPATIENT
Start: 2024-01-02 | End: 2024-01-03

## 2024-01-02 RX ORDER — MIDODRINE HYDROCHLORIDE 2.5 MG/1
10 TABLET ORAL ONCE
Refills: 0 | Status: COMPLETED | OUTPATIENT
Start: 2024-01-02 | End: 2024-01-02

## 2024-01-02 RX ORDER — SODIUM CHLORIDE 9 MG/ML
1000 INJECTION, SOLUTION INTRAVENOUS ONCE
Refills: 0 | Status: COMPLETED | OUTPATIENT
Start: 2024-01-02 | End: 2024-01-02

## 2024-01-02 RX ORDER — FINASTERIDE 5 MG/1
5 TABLET, FILM COATED ORAL DAILY
Refills: 0 | Status: DISCONTINUED | OUTPATIENT
Start: 2024-01-02 | End: 2024-01-15

## 2024-01-02 RX ORDER — ACETAMINOPHEN 500 MG
650 TABLET ORAL ONCE
Refills: 0 | Status: COMPLETED | OUTPATIENT
Start: 2024-01-02 | End: 2024-01-02

## 2024-01-02 RX ORDER — COLCHICINE 0.6 MG
0.6 TABLET ORAL DAILY
Refills: 0 | Status: DISCONTINUED | OUTPATIENT
Start: 2024-01-02 | End: 2024-01-04

## 2024-01-02 RX ORDER — MIDODRINE HYDROCHLORIDE 2.5 MG/1
20 TABLET ORAL EVERY 8 HOURS
Refills: 0 | Status: DISCONTINUED | OUTPATIENT
Start: 2024-01-02 | End: 2024-01-03

## 2024-01-02 RX ORDER — PIPERACILLIN AND TAZOBACTAM 4; .5 G/20ML; G/20ML
3.38 INJECTION, POWDER, LYOPHILIZED, FOR SOLUTION INTRAVENOUS EVERY 8 HOURS
Refills: 0 | Status: DISCONTINUED | OUTPATIENT
Start: 2024-01-03 | End: 2024-01-08

## 2024-01-02 RX ORDER — SODIUM CHLORIDE 9 MG/ML
1000 INJECTION INTRAMUSCULAR; INTRAVENOUS; SUBCUTANEOUS ONCE
Refills: 0 | Status: COMPLETED | OUTPATIENT
Start: 2024-01-02 | End: 2024-01-02

## 2024-01-02 RX ORDER — SODIUM CHLORIDE 9 MG/ML
500 INJECTION, SOLUTION INTRAVENOUS ONCE
Refills: 0 | Status: COMPLETED | OUTPATIENT
Start: 2024-01-02 | End: 2024-01-02

## 2024-01-02 RX ORDER — VANCOMYCIN HCL 1 G
1000 VIAL (EA) INTRAVENOUS ONCE
Refills: 0 | Status: DISCONTINUED | OUTPATIENT
Start: 2024-01-02 | End: 2024-01-02

## 2024-01-02 RX ORDER — SODIUM CHLORIDE 9 MG/ML
500 INJECTION INTRAMUSCULAR; INTRAVENOUS; SUBCUTANEOUS ONCE
Refills: 0 | Status: COMPLETED | OUTPATIENT
Start: 2024-01-02 | End: 2024-01-02

## 2024-01-02 RX ORDER — ACETAMINOPHEN 500 MG
650 TABLET ORAL EVERY 6 HOURS
Refills: 0 | Status: DISCONTINUED | OUTPATIENT
Start: 2024-01-02 | End: 2024-01-02

## 2024-01-02 RX ORDER — VANCOMYCIN HCL 1 G
1000 VIAL (EA) INTRAVENOUS ONCE
Refills: 0 | Status: COMPLETED | OUTPATIENT
Start: 2024-01-02 | End: 2024-01-02

## 2024-01-02 RX ORDER — LANOLIN ALCOHOL/MO/W.PET/CERES
3 CREAM (GRAM) TOPICAL AT BEDTIME
Refills: 0 | Status: DISCONTINUED | OUTPATIENT
Start: 2024-01-02 | End: 2024-01-02

## 2024-01-02 RX ORDER — REMDESIVIR 5 MG/ML
200 INJECTION INTRAVENOUS EVERY 24 HOURS
Refills: 0 | Status: COMPLETED | OUTPATIENT
Start: 2024-01-02 | End: 2024-01-02

## 2024-01-02 RX ORDER — NOREPINEPHRINE BITARTRATE/D5W 8 MG/250ML
0.05 PLASTIC BAG, INJECTION (ML) INTRAVENOUS
Qty: 8 | Refills: 0 | Status: DISCONTINUED | OUTPATIENT
Start: 2024-01-02 | End: 2024-01-02

## 2024-01-02 RX ORDER — TAMSULOSIN HYDROCHLORIDE 0.4 MG/1
0.4 CAPSULE ORAL AT BEDTIME
Refills: 0 | Status: DISCONTINUED | OUTPATIENT
Start: 2024-01-02 | End: 2024-01-04

## 2024-01-02 RX ORDER — SODIUM CHLORIDE 0.65 %
1 AEROSOL, SPRAY (ML) NASAL ONCE
Refills: 0 | Status: DISCONTINUED | OUTPATIENT
Start: 2024-01-02 | End: 2024-01-15

## 2024-01-02 RX ORDER — FLUTICASONE PROPIONATE 50 MCG
1 SPRAY, SUSPENSION NASAL
Refills: 0 | Status: DISCONTINUED | OUTPATIENT
Start: 2024-01-02 | End: 2024-01-04

## 2024-01-02 RX ORDER — CHLORHEXIDINE GLUCONATE 213 G/1000ML
1 SOLUTION TOPICAL
Refills: 0 | Status: DISCONTINUED | OUTPATIENT
Start: 2024-01-02 | End: 2024-01-15

## 2024-01-02 RX ADMIN — Medication 260 MILLIGRAM(S): at 03:45

## 2024-01-02 RX ADMIN — TAMSULOSIN HYDROCHLORIDE 0.4 MILLIGRAM(S): 0.4 CAPSULE ORAL at 22:24

## 2024-01-02 RX ADMIN — SODIUM CHLORIDE 500 MILLILITER(S): 9 INJECTION, SOLUTION INTRAVENOUS at 09:43

## 2024-01-02 RX ADMIN — SODIUM CHLORIDE 1000 MILLILITER(S): 9 INJECTION INTRAMUSCULAR; INTRAVENOUS; SUBCUTANEOUS at 03:46

## 2024-01-02 RX ADMIN — PIPERACILLIN AND TAZOBACTAM 25 GRAM(S): 4; .5 INJECTION, POWDER, LYOPHILIZED, FOR SOLUTION INTRAVENOUS at 20:41

## 2024-01-02 RX ADMIN — Medication 250 MILLIGRAM(S): at 05:29

## 2024-01-02 RX ADMIN — SODIUM CHLORIDE 1000 MILLILITER(S): 9 INJECTION, SOLUTION INTRAVENOUS at 07:28

## 2024-01-02 RX ADMIN — MIDODRINE HYDROCHLORIDE 10 MILLIGRAM(S): 2.5 TABLET ORAL at 10:41

## 2024-01-02 RX ADMIN — MIDODRINE HYDROCHLORIDE 10 MILLIGRAM(S): 2.5 TABLET ORAL at 13:34

## 2024-01-02 RX ADMIN — Medication 6.7 MICROGRAM(S)/KG/MIN: at 05:44

## 2024-01-02 RX ADMIN — SODIUM CHLORIDE 500 MILLILITER(S): 9 INJECTION INTRAMUSCULAR; INTRAVENOUS; SUBCUTANEOUS at 17:49

## 2024-01-02 RX ADMIN — Medication 1 SPRAY(S): at 19:31

## 2024-01-02 RX ADMIN — SODIUM CHLORIDE 500 MILLILITER(S): 9 INJECTION INTRAMUSCULAR; INTRAVENOUS; SUBCUTANEOUS at 04:53

## 2024-01-02 RX ADMIN — Medication 650 MILLIGRAM(S): at 06:12

## 2024-01-02 RX ADMIN — PIPERACILLIN AND TAZOBACTAM 200 GRAM(S): 4; .5 INJECTION, POWDER, LYOPHILIZED, FOR SOLUTION INTRAVENOUS at 04:54

## 2024-01-02 RX ADMIN — REMDESIVIR 200 MILLIGRAM(S): 5 INJECTION INTRAVENOUS at 19:31

## 2024-01-02 RX ADMIN — PIPERACILLIN AND TAZOBACTAM 200 GRAM(S): 4; .5 INJECTION, POWDER, LYOPHILIZED, FOR SOLUTION INTRAVENOUS at 14:42

## 2024-01-02 RX ADMIN — SODIUM CHLORIDE 500 MILLILITER(S): 9 INJECTION, SOLUTION INTRAVENOUS at 14:42

## 2024-01-02 RX ADMIN — MIDODRINE HYDROCHLORIDE 20 MILLIGRAM(S): 2.5 TABLET ORAL at 22:24

## 2024-01-02 RX ADMIN — SODIUM CHLORIDE 1000 MILLILITER(S): 9 INJECTION INTRAMUSCULAR; INTRAVENOUS; SUBCUTANEOUS at 05:40

## 2024-01-02 NOTE — H&P ADULT - NSHPREVIEWOFSYSTEMS_GEN_ALL_CORE
REVIEW OF SYSTEMS:    CONSTITUTIONAL:  No weakness, fevers or chills  EYES/ENT:  No visual changes;  No vertigo or throat pain   NECK:  No pain or stiffness  RESPIRATORY:  No cough, wheezing, hemoptysis; No shortness of breath  CARDIOVASCULAR:  No chest pain or palpitations  GASTROINTESTINAL:  No abdominal or epigastric pain. No nausea, vomiting, or hematemesis; No diarrhea or constipation. No melena or hematochezia.  GENITOURINARY:  No dysuria, frequency or hematuria  MUSCULOSKELETAL:  FROM all extremities, normal strength, No calf tenderness  NEUROLOGICAL:  No numbness or weakness  SKIN:  No itching, rashes REVIEW OF SYSTEMS:    CONSTITUTIONAL:  No weakness, fevers or chills  EYES/ENT:  No visual changes;  No vertigo or throat pain   NECK:  No pain or stiffness  RESPIRATORY:  No cough, wheezing, hemoptysis; No shortness of breath  CARDIOVASCULAR:  No chest pain or palpitations  GASTROINTESTINAL:  No abdominal or epigastric pain. No nausea, vomiting, or hematemesis; No constipation. No melena or hematochezia. +Diarrhea   GENITOURINARY:  No dysuria, frequency or hematuria. +Urinary urgency   MUSCULOSKELETAL:  FROM all extremities, normal strength  NEUROLOGICAL:  No numbness or weakness  SKIN:  No itching, rashes

## 2024-01-02 NOTE — DISCHARGE NOTE PROVIDER - NPI NUMBER (FOR SYS[employer] USE ONLY) :
[[id number]],[[id number]] [[id number]],[[id number]] [[id number]],[[id number]] [[id number]],[[id number]]

## 2024-01-02 NOTE — DISCHARGE NOTE PROVIDER - NSDCCAREPROVSEEN_GEN_ALL_CORE_FT
NS 1 [facility] 1 [facility] 1 [facility] 1 [facility] 1

## 2024-01-02 NOTE — DISCHARGE NOTE PROVIDER - NSFOLLOWUPCLINICSTOKEN_GEN_ALL_ED_FT
128031:1 week|| ||00\01||False;094952:1 week|| ||00\01||False; 101266:1 week|| ||00\01||False;648553:1 week|| ||00\01||False; 363898:1 week|| ||00\01||False;753069:1 week|| ||00\01||False; 454911:1 week|| ||00\01||False;349908:1 week|| ||00\01||False; 184442:1 week|| ||00\01||False;356302:1 week|| ||00\01||False;608754:2 weeks|| ||00\01||False; 343088:1 week|| ||00\01||False;341538:1 week|| ||00\01||False;236279:2 weeks|| ||00\01||False; 545987:1 week|| ||00\01||False;572637:1 week|| ||00\01||False;567284:2 weeks|| ||00\01||False; 460247:1 week|| ||00\01||False;584632:1 week|| ||00\01||False;090621:2 weeks|| ||00\01||False;

## 2024-01-02 NOTE — ED PROVIDER NOTE - PHYSICAL EXAMINATION
LOS:     VITALS:   T(C): 38.1 (01-02-24 @ 03:03), Max: 38.1 (01-02-24 @ 03:03)  HR: 94 (01-02-24 @ 03:03) (94 - 104)  BP: 128/102 (01-02-24 @ 03:03) (105/71 - 128/102)  RR: 20 (01-02-24 @ 03:03) (20 - 20)  SpO2: 100% (01-02-24 @ 03:03) (100% - 100%)    GENERAL: NAD, lying in bed comfortably  HEAD:  Atraumatic, Normocephalic  EYES: conjunctiva and sclera clear  ENT: Moist mucous membranes  NECK: Supple, No JVD  CHEST/LUNG: Clear to auscultation bilaterally; No rales, rhonchi, wheezing, or rubs. Unlabored respirations  HEART: Regular rate and rhythm; No murmurs, rubs, or gallops  ABDOMEN: BSx4; Soft, nontender, nondistended  EXTREMITIES:  2+ Peripheral Pulses, brisk capillary refill. No clubbing, cyanosis, or edema  NERVOUS SYSTEM:  A&Ox3, no focal deficits   SKIN: No rashes or lesions

## 2024-01-02 NOTE — ED ADULT NURSE REASSESSMENT NOTE - NS ED NURSE REASSESS COMMENT FT1
Per MD MICU to consult. Patient to receive midodrine, levo to continue at 01.mcg/kg/min. Patient drowsy but easily arousable. Patient updated on plan of care.

## 2024-01-02 NOTE — CONSULT NOTE ADULT - SUBJECTIVE AND OBJECTIVE BOX
CHIEF COMPLAINT: Diarrhea/diminished PO intake    HPI:  73M w/ BPH (c/b urinary retention s/p Harkins catheter placement; exchanged monthly), CKD stage IIIb, and cardiac amyloidosis brought in by daughter for diarrhea (loose-watery BM's) over the past few days along w/ nausea and poor PO intake. Additionally, pt was recent fall yesterday w/ possible head trauma. Pt is poor historian, most information collected by pt's daughter. Pt recently hospitalized in Dec for urosepsis and abdominal pain and d/c w/o antibiotics due to suspicion for contamination. MICU consulted by ED due to hypotension requiring norepi gtt.     In ED initially normotensive to 120s/90s, but febrile and tachycardic, met sepsis criteria. Received vanc/zosyn, neg CT head and C spine. Then became hypotensive to 70s-80s/60s despite 2 L IVF. On time of MICU assessment pt /76 on 0.08 ,cg/kg/min of norepi. Tired appearing but responding to questions appropriately. Denies abdominal pain, SOB, chest pain. reports diarrhea/cough/congestion/back pain.    PAST MEDICAL & SURGICAL HISTORY:  Cardiac amyloidosis      Stenosis, cervical spine      BPH (benign prostatic hyperplasia)      Indwelling Harkins catheter present      Stage 3 chronic kidney disease      S/P cataract extraction  B/L      S/P nasal polypectomy      History of fusion of cervical spine    Allergies    No Known Allergies    Intolerances        HOME MEDICATIONS:      OBJECTIVE:  ICU Vital Signs Last 24 Hrs  T(C): 36.7 (2024 10:45), Max: 38.1 (2024 03:03)  T(F): 98 (2024 10:45), Max: 100.6 (2024 03:03)  HR: 79 (2024 10:45) (78 - 105)  BP: 103/65 (2024 10:45) (76/45 - 128/102)  BP(mean): 78 (2024 10:45) (52 - 88)  ABP: --  ABP(mean): --  RR: 20 (2024 10:45) (12 - 23)  SpO2: 99% (2024 10:45) (96% - 100%)    O2 Parameters below as of 2024 10:45  Patient On (Oxygen Delivery Method): room air    CAPILLARY BLOOD GLUCOSE    PHYSICAL EXAM:  GENERAL: NAD, well-groomed, well-developed  HEAD:  Atraumatic, Normocephalic  EYES: EOMI, PERRLA   NECK: Supple   NERVOUS SYSTEM:  Alert & Oriented X3, tired appearing but responds to questions appropriately  CHEST/LUNG: fine crackles in left lower and middle fields, no wheeze, ronchi  HEART: Regular rate and rhythm; No murmurs, rubs, or gallops  ABDOMEN: Soft, Nontender, Nondistended; Bowel sounds present  EXTREMITIES:  No clubbing, cyanosis, or edema    HOSPITAL MEDICATIONS:  MEDICATIONS  (STANDING):  norepinephrine Infusion 0.05 MICROgram(s)/kG/Min (6.7 mL/Hr) IV Continuous <Continuous>    MEDICATIONS  (PRN):      LABS:                        10.3   6.10  )-----------( 239      ( 2024 03:55 )             31.5         135  |  102  |  27<H>  ----------------------------<  68<L>  4.9   |  19<L>  |  1.92<H>    Ca    9.4      2024 03:55    TPro  7.3  /  Alb  3.3  /  TBili  1.2  /  DBili  x   /  AST  223<H>  /  ALT  63<H>  /  AlkPhos  155<H>        Urinalysis Basic - ( 2024 06:01 )    Color: Dark Yellow / Appearance: Cloudy / S.015 / pH: x  Gluc: x / Ketone: Trace mg/dL  / Bili: Negative / Urobili: 0.2 mg/dL   Blood: x / Protein: 100 mg/dL / Nitrite: Negative   Leuk Esterase: Moderate / RBC: >50 /HPF / WBC >25 /HPF   Sq Epi: x / Non Sq Epi: x / Bacteria: Negative /HPF        Venous Blood Gas:   @ 06:02  7.37/35/48/20/83.9  VBG Lactate: 2.1  Venous Blood Gas:   @ 03:00  7.37/39/31/22/50.9  VBG Lactate: 2.5      MICROBIOLOGY:     RADIOLOGY:  CT Lumbar Spine    IMPRESSION:  Grade 1 anterior spondylolisthesis at L4-5 on a degenerative   basis due to facet osteophytic hypertrophy. Mild degenerative disc   disease and spondylosis at L1-2 through L5-S1 with loss of disc height   and associated degenerative endplate changes. DISH involves the lower   thoracic spine. Mild disc bulges are noted at L1-2 through L5-S1 which flatten the ventral thecal sac and narrow the BILATERAL neural foramina.   Moderate central stenosis at L2-3, L3-4 and severe central stenosis at   L4-5 mild central stenosis at L5-S1 on a degenerative basis due to disc   bulge, facet osteophytic hypertrophy and redundancy of the ligamentum   flavum. Superimposed LEFT foraminal disc herniations at L2-3 and L4-5   further narrow the LEFT neural foramen.  No vertebral fracture is   recognized.    CTAP:    IMPRESSION:  Limited evaluation due to motion artifact.    Mildly distended fluid-filled rectum, which may represent proctitis.   Evaluation of the bowel is otherwise very limited on this examination.   Short-term follow-up may be performed for reevaluation as clinically   warranted.    Bilateral perinephric soft tissue stranding. Suggest correlation with   urinalysis to exclude infection. Multiple hypodense right renal lesions   cannot be definitively characterized. Diffusely heterogeneous enhancement   of the right kidney  and  mildly heterogeneous enhancement of the left   kidney may be artifactual in nature due to the motion; however, renal   abnormality, including pyelonephritis or infarct  cannot be excluded in   this setting. Clinical and laboratory correlation is again recommended.   Renal ultrasound is suggested for further evaluation.    CT Head and C Spine:  IMPRESSION:    CT HEAD: Unremarkable head CT.    CT cervical spine:   No vertebral fracture is recognized.  Posterior   fusion is noted at C3-C7 utilizing pedicle screws and fusion rods with   laminectomies at C4-C6.   Moderate to severe degenerative disc disease   and spondylosis at C2-3 through C7-T1 with loss of disc height and   associated degenerative endplate changes. There is narrowing of the   BILATERAL C2-3 through C7-T1 neural foramina due to uncovertebral   spurring and facet osteophytic hypertrophy    EKG:     CHIEF COMPLAINT: Diarrhea/diminished PO intake    HPI:  73M w/ BPH (c/b urinary retention s/p Harkins catheter placement; exchanged monthly), CKD stage IIIb, and cardiac amyloidosis brought in by daughter for diarrhea (loose-watery BM's) over the past few days along w/ nausea and poor PO intake. Additionally, pt was recent fall yesterday w/ possible head trauma. Pt is poor historian, most information collected by pt's daughter. Pt recently hospitalized in Dec for urosepsis and abdominal pain and d/c w/o antibiotics due to suspicion for contamination. MICU consulted by ED due to hypotension requiring norepi gtt.     In ED initially normotensive to 120s/90s, but febrile and tachycardic, met sepsis criteria. Received vanc/zosyn, neg CT head and C spine. Then became hypotensive to 70s-80s/60s despite 2 L IVF. On time of MICU assessment pt /76 on 0.08 ,cg/kg/min of norepi. Tired appearing but responding to questions appropriately. Denies abdominal pain, SOB, chest pain. reports diarrhea/cough/congestion/back pain.    PAST MEDICAL & SURGICAL HISTORY:  Cardiac amyloidosis      Stenosis, cervical spine      BPH (benign prostatic hyperplasia)      Indwelling Harkins catheter present      Stage 3 chronic kidney disease      S/P cataract extraction  B/L      S/P nasal polypectomy      History of fusion of cervical spine    Allergies    No Known Allergies    Intolerances    Social History:  No tobacco use, denies EtOH, denies substance use      OBJECTIVE:  ICU Vital Signs Last 24 Hrs  T(C): 36.7 (2024 10:45), Max: 38.1 (2024 03:03)  T(F): 98 (2024 10:45), Max: 100.6 (2024 03:03)  HR: 79 (2024 10:45) (78 - 105)  BP: 103/65 (2024 10:45) (76/45 - 128/102)  BP(mean): 78 (2024 10:45) (52 - 88)  ABP: --  ABP(mean): --  RR: 20 (2024 10:45) (12 - 23)  SpO2: 99% (2024 10:45) (96% - 100%)    O2 Parameters below as of 2024 10:45  Patient On (Oxygen Delivery Method): room air    CAPILLARY BLOOD GLUCOSE    PHYSICAL EXAM:  GENERAL: NAD, well-groomed, well-developed  HEAD:  Atraumatic, Normocephalic  EYES: EOMI, PERRLA   NECK: Supple   NERVOUS SYSTEM:  Alert & Oriented X3, tired appearing but responds to questions appropriately  CHEST/LUNG: fine crackles in left lower and middle fields, no wheeze, ronchi  HEART: Regular rate and rhythm; No murmurs, rubs, or gallops  ABDOMEN: Soft, Nontender, Nondistended; Bowel sounds present  EXTREMITIES:  No clubbing, cyanosis, or edema    HOSPITAL MEDICATIONS:  MEDICATIONS  (STANDING):  norepinephrine Infusion 0.05 MICROgram(s)/kG/Min (6.7 mL/Hr) IV Continuous <Continuous>    MEDICATIONS  (PRN):      LABS:                        10.3   6.10  )-----------( 239      ( 2024 03:55 )             31.5         135  |  102  |  27<H>  ----------------------------<  68<L>  4.9   |  19<L>  |  1.92<H>    Ca    9.4      2024 03:55    TPro  7.3  /  Alb  3.3  /  TBili  1.2  /  DBili  x   /  AST  223<H>  /  ALT  63<H>  /  AlkPhos  155<H>        Urinalysis Basic - ( 2024 06:01 )    Color: Dark Yellow / Appearance: Cloudy / S.015 / pH: x  Gluc: x / Ketone: Trace mg/dL  / Bili: Negative / Urobili: 0.2 mg/dL   Blood: x / Protein: 100 mg/dL / Nitrite: Negative   Leuk Esterase: Moderate / RBC: >50 /HPF / WBC >25 /HPF   Sq Epi: x / Non Sq Epi: x / Bacteria: Negative /HPF        Venous Blood Gas:   @ 06:02  7.37/35/48/20/83.9  VBG Lactate: 2.1  Venous Blood Gas:   @ 03:00  7.37/39/31/22/50.9  VBG Lactate: 2.5      MICROBIOLOGY:     RADIOLOGY:  CT Lumbar Spine    IMPRESSION:  Grade 1 anterior spondylolisthesis at L4-5 on a degenerative   basis due to facet osteophytic hypertrophy. Mild degenerative disc   disease and spondylosis at L1-2 through L5-S1 with loss of disc height   and associated degenerative endplate changes. DISH involves the lower   thoracic spine. Mild disc bulges are noted at L1-2 through L5-S1 which flatten the ventral thecal sac and narrow the BILATERAL neural foramina.   Moderate central stenosis at L2-3, L3-4 and severe central stenosis at   L4-5 mild central stenosis at L5-S1 on a degenerative basis due to disc   bulge, facet osteophytic hypertrophy and redundancy of the ligamentum   flavum. Superimposed LEFT foraminal disc herniations at L2-3 and L4-5   further narrow the LEFT neural foramen.  No vertebral fracture is   recognized.    CTAP:    IMPRESSION:  Limited evaluation due to motion artifact.    Mildly distended fluid-filled rectum, which may represent proctitis.   Evaluation of the bowel is otherwise very limited on this examination.   Short-term follow-up may be performed for reevaluation as clinically   warranted.    Bilateral perinephric soft tissue stranding. Suggest correlation with   urinalysis to exclude infection. Multiple hypodense right renal lesions   cannot be definitively characterized. Diffusely heterogeneous enhancement   of the right kidney  and  mildly heterogeneous enhancement of the left   kidney may be artifactual in nature due to the motion; however, renal   abnormality, including pyelonephritis or infarct  cannot be excluded in   this setting. Clinical and laboratory correlation is again recommended.   Renal ultrasound is suggested for further evaluation.    CT Head and C Spine:  IMPRESSION:    CT HEAD: Unremarkable head CT.    CT cervical spine:   No vertebral fracture is recognized.  Posterior   fusion is noted at C3-C7 utilizing pedicle screws and fusion rods with   laminectomies at C4-C6.   Moderate to severe degenerative disc disease   and spondylosis at C2-3 through C7-T1 with loss of disc height and   associated degenerative endplate changes. There is narrowing of the   BILATERAL C2-3 through C7-T1 neural foramina due to uncovertebral   spurring and facet osteophytic hypertrophy    EKG:     CHIEF COMPLAINT: Diarrhea/diminished PO intake    HPI:  73M w/ BPH (c/b urinary retention s/p Harkins catheter placement; exchanged monthly), CKD stage IIIb, and cardiac amyloidosis brought in by daughter for diarrhea (loose-watery BM's) over the past few days along w/ nausea and poor PO intake. Additionally, pt was recent fall yesterday w/ possible head trauma. Pt is poor historian, most information collected by pt's daughter. Pt recently hospitalized in Dec for urosepsis and abdominal pain and d/c w/o antibiotics due to suspicion for contamination. MICU consulted by ED due to hypotension requiring norepi gtt.     In ED initially normotensive to 120s/90s, but febrile and tachycardic, met sepsis criteria. Received vanc/zosyn, neg CT head and C spine. Then became hypotensive to 70s-80s/60s despite 2 L IVF. On time of MICU assessment pt /76 on 0.08 ,cg/kg/min of norepi. Tired appearing but responding to questions appropriately. Denies abdominal pain, SOB, chest pain. reports diarrhea/cough/congestion/back pain.    PAST MEDICAL & SURGICAL HISTORY:  Cardiac amyloidosis      Stenosis, cervical spine      BPH (benign prostatic hyperplasia)      Indwelling Harkins catheter present      Stage 3 chronic kidney disease      S/P cataract extraction  B/L      S/P nasal polypectomy      History of fusion of cervical spine    Allergies    No Known Allergies    Intolerances    Social History:  No tobacco use, denies EtOH, denies substance use     Family History; No family history of amyloidosis. No prostate cancer.  OBJECTIVE:  ICU Vital Signs Last 24 Hrs  T(C): 36.7 (2024 10:45), Max: 38.1 (2024 03:03)  T(F): 98 (2024 10:45), Max: 100.6 (2024 03:03)  HR: 79 (2024 10:45) (78 - 105)  BP: 103/65 (2024 10:45) (76/45 - 128/102)  BP(mean): 78 (2024 10:45) (52 - 88)  ABP: --  ABP(mean): --  RR: 20 (2024 10:45) (12 - 23)  SpO2: 99% (2024 10:45) (96% - 100%)    O2 Parameters below as of 2024 10:45  Patient On (Oxygen Delivery Method): room air    CAPILLARY BLOOD GLUCOSE    PHYSICAL EXAM:  GENERAL: NAD, well-groomed, well-developed  HEAD:  Atraumatic, Normocephalic  EYES: EOMI, PERRLA   NECK: Supple   NERVOUS SYSTEM:  Alert & Oriented X3, tired appearing but responds to questions appropriately  CHEST/LUNG: fine crackles in left lower and middle fields, no wheeze, ronchi  HEART: Regular rate and rhythm; No murmurs, rubs, or gallops  ABDOMEN: Soft, Nontender, Nondistended; Bowel sounds present  EXTREMITIES:  No clubbing, cyanosis, or edema    HOSPITAL MEDICATIONS:  MEDICATIONS  (STANDING):  norepinephrine Infusion 0.05 MICROgram(s)/kG/Min (6.7 mL/Hr) IV Continuous <Continuous>    MEDICATIONS  (PRN):      LABS:                        10.3   6.10  )-----------( 239      ( 2024 03:55 )             31.5         135  |  102  |  27<H>  ----------------------------<  68<L>  4.9   |  19<L>  |  1.92<H>    Ca    9.4      2024 03:55    TPro  7.3  /  Alb  3.3  /  TBili  1.2  /  DBili  x   /  AST  223<H>  /  ALT  63<H>  /  AlkPhos  155<H>        Urinalysis Basic - ( 2024 06:01 )    Color: Dark Yellow / Appearance: Cloudy / S.015 / pH: x  Gluc: x / Ketone: Trace mg/dL  / Bili: Negative / Urobili: 0.2 mg/dL   Blood: x / Protein: 100 mg/dL / Nitrite: Negative   Leuk Esterase: Moderate / RBC: >50 /HPF / WBC >25 /HPF   Sq Epi: x / Non Sq Epi: x / Bacteria: Negative /HPF        Venous Blood Gas:   @ 06:02  7.37/35/48/20/83.9  VBG Lactate: 2.1  Venous Blood Gas:   @ 03:00  7.37/39/31/22/50.9  VBG Lactate: 2.5      MICROBIOLOGY:     RADIOLOGY:  CT Lumbar Spine    IMPRESSION:  Grade 1 anterior spondylolisthesis at L4-5 on a degenerative   basis due to facet osteophytic hypertrophy. Mild degenerative disc   disease and spondylosis at L1-2 through L5-S1 with loss of disc height   and associated degenerative endplate changes. DISH involves the lower   thoracic spine. Mild disc bulges are noted at L1-2 through L5-S1 which flatten the ventral thecal sac and narrow the BILATERAL neural foramina.   Moderate central stenosis at L2-3, L3-4 and severe central stenosis at   L4-5 mild central stenosis at L5-S1 on a degenerative basis due to disc   bulge, facet osteophytic hypertrophy and redundancy of the ligamentum   flavum. Superimposed LEFT foraminal disc herniations at L2-3 and L4-5   further narrow the LEFT neural foramen.  No vertebral fracture is   recognized.    CTAP:    IMPRESSION:  Limited evaluation due to motion artifact.    Mildly distended fluid-filled rectum, which may represent proctitis.   Evaluation of the bowel is otherwise very limited on this examination.   Short-term follow-up may be performed for reevaluation as clinically   warranted.    Bilateral perinephric soft tissue stranding. Suggest correlation with   urinalysis to exclude infection. Multiple hypodense right renal lesions   cannot be definitively characterized. Diffusely heterogeneous enhancement   of the right kidney  and  mildly heterogeneous enhancement of the left   kidney may be artifactual in nature due to the motion; however, renal   abnormality, including pyelonephritis or infarct  cannot be excluded in   this setting. Clinical and laboratory correlation is again recommended.   Renal ultrasound is suggested for further evaluation.    CT Head and C Spine:  IMPRESSION:    CT HEAD: Unremarkable head CT.    CT cervical spine:   No vertebral fracture is recognized.  Posterior   fusion is noted at C3-C7 utilizing pedicle screws and fusion rods with   laminectomies at C4-C6.   Moderate to severe degenerative disc disease   and spondylosis at C2-3 through C7-T1 with loss of disc height and   associated degenerative endplate changes. There is narrowing of the   BILATERAL C2-3 through C7-T1 neural foramina due to uncovertebral   spurring and facet osteophytic hypertrophy    EKG:

## 2024-01-02 NOTE — DISCHARGE NOTE PROVIDER - NSDCFUADDAPPT_GEN_ALL_CORE_FT
APPTS ARE READY TO BE MADE: [ ] YES    Best Family or Patient Contact (if needed):    Additional Information about above appointments (if needed):    1: Internal Medicine   2: Infectious Disease   3:     Other comments or requests:    APPTS ARE READY TO BE MADE: [x ] YES    Best Family or Patient Contact (if needed):    Additional Information about above appointments (if needed):    1: Internal Medicine   2: Infectious Disease   3: Endocrinology     Other comments or requests:    APPTS ARE READY TO BE MADE: [x ] YES    Best Family or Patient Contact (if needed):    Additional Information about above appointments (if needed):    1: Internal Medicine   2: Infectious Disease   3: Endocrinology   4: cardiology  5: urology     Other comments or requests:    APPTS ARE READY TO BE MADE: [x ] YES    Best Family or Patient Contact (if needed):    Additional Information about above appointments (if needed):    1: Internal Medicine   2: Infectious Disease   3: Endocrinology   4: cardiology  5: urology     Other comments or requests:     Patient is being discharged to rehab. Caregiver will arrange follow up.

## 2024-01-02 NOTE — ED PROVIDER NOTE - CLINICAL SUMMARY MEDICAL DECISION MAKING FREE TEXT BOX
73M w/ BPH (c/b urinary retention s/p Morales catheter placement; exchanged monthly), CKD stage IIIb (Baseline Cr - 1.6; Dec 2023), and cardiac amyloidosis brought in by daughter for diarrhea (loose-watery BM's) over the past few days along w/ nausea and poor PO intake c/f sepsis 2/2 UTI vs intraabdominal infection (ie. C. diff i/so recent abx use). On PE, AOx2 and hypotensive (MAP - 65). Labs notable for elevated lactate, adryan, transaminitis. Pending CT abd/pelvis w/ CT head + spine i/so recent fall. Lab work sent. Will continue w/ fluid resuscitation. Urology consulted for difficult morales placement.

## 2024-01-02 NOTE — ED PROVIDER NOTE - OBJECTIVE STATEMENT
73M w/ BPH (c/b urinary retention s/p Morales catheter placement; exchanged monthly), CKD stage IIIb, and cardiac amyloidosis brought in by daughter for diarrhea (loose-watery BM's) over the past few days along w/ nausea and poor PO intake. Additionally, pt was recent fall yesterday w/ possible head trauma. Pt is poor historian, most information collected by pt's daughter. Pt recently hospitalized in Dec for urosepsis and abdominal pain and d/c w/o antibiotics due to suspicion for contamination. Notable leakage of urine around morales site at home.  Denies CB, SOB, blurry vision, or LOC.

## 2024-01-02 NOTE — H&P ADULT - ASSESSMENT
Mr. Sanchez is a  73M w/ BPH (c/b urinary retention s/p Harkins catheter placement; exchanged monthly), CKD stage IIIb, and cardiac amyloidosis brought in by daughter for diarrhea found to have hypotension likely due to sepsis.

## 2024-01-02 NOTE — ED PROVIDER NOTE - PROGRESS NOTE DETAILS
urine appears contaminated.  Indwelling Morales removed and plan to be replaced. However, morales unable to be placed by ED team despite 3 different RNs attempting to do so.  According to daughter patient does have a history of difficult catheter placement and often require urology to place it.  Urology consulted for Morales placement.  Urology successfully replaced the Morales catheter. Pt with worsening hypotension despite IVF. Levo gtt. Pending imaging to assess underlying etiology of shock. Will determine need for MICU vs SICU s/p imaging. Ventura Winkler MD (PGY-3) Patient reassessed.  Signed out with Levophed requirement, stroke state felt to be secondary to urosepsis, distributive.  POCUS ultrasound shows IVC at 1.5 cm, collapsible, will treat with another liter of fluid and trend pressor requirements with this.  Pending CT abdomen pelvis for transaminitis and generalized shock workup.  Disposition pending diagnostic results and clinical course. Ventura Winkler MD (PGY-3) Pressures trending down after third bolus of fluid, clinically stable otherwise.  Pending CT read. Ventura Winkler MD (PGY-3) I received a call from radiology.  CT abdomen pelvis was limited by motion.  There is some evidence of possible pyelonephritis.  There is a ventral wall hernia containing 1 loop of bowel, which is with evidence of obstruction, there is no clinical correlation on physical exam upon reassessment.  He still requires 0.15 of Levophed to keep MAP being at 73.  Will bolus an additional 500 of lactated ringer.  Will reassess levo requirement after fluid, if continues to require will require formal MICU consultation. Ventura Winkler MD (PGY-3) continuing levo requirement. micu consulted. Ventura Winkler MD (PGY-3) micu rejected. recommending fluid optimization and midodrine. admission to floor for urosepsis.

## 2024-01-02 NOTE — ED PROVIDER NOTE - TOBACCO USE
Patient is also here for her annual exam.  She has not done her mammogram or DEXA scan yet.  She like those reordered.  She does have the Cologuard box still at home.  She is going to check to see if it still within his expiration date.  If not she will call let me know and I will send in a new order for her.   Never smoker

## 2024-01-02 NOTE — ED PROVIDER NOTE - ADDITIONAL PROGRESS NOTE...
ED Nurse Note:



report given to WILY Key. Med surge unit needs to switch the room not to put 2 
psychic pt in one room. Yesi will call me when the room is ready. Additional Progress Note...

## 2024-01-02 NOTE — ED ADULT NURSE NOTE - NSICDXPASTMEDICALHX_GEN_ALL_CORE_FT
PAST MEDICAL HISTORY:  BPH (benign prostatic hyperplasia)     Cardiac amyloidosis     Indwelling Harkins catheter present     Stage 3 chronic kidney disease     Stenosis, cervical spine

## 2024-01-02 NOTE — ED ADULT NURSE REASSESSMENT NOTE - NS ED NURSE REASSESS COMMENT FT1
Patient bad bowel movement, patient cleaned turned and repositioned in stretcher. Stool samples sent per order.

## 2024-01-02 NOTE — H&P ADULT - NSHPPHYSICALEXAM_GEN_ALL_CORE
T(C): 36.8 (01-02-24 @ 14:59), Max: 38.1 (01-02-24 @ 03:03)  HR: 77 (01-02-24 @ 14:59) (73 - 105)  BP: 105/63 (01-02-24 @ 14:59) (76/45 - 128/102)  RR: 18 (01-02-24 @ 14:59) (12 - 23)  SpO2: 100% (01-02-24 @ 14:59) (95% - 100%)    CONSTITUTIONAL: Well groomed, no apparent distress  EYES: PERRLA and symmetric, EOMI, No conjunctival or scleral injection, non-icteric  ENMT: Oral mucosa with moist membranes. Normal dentition; no pharyngeal injection or exudates  RESP: No respiratory distress, no use of accessory muscles; CTA b/l, no WRR  CV: RRR, +S1S2, no MRG; no JVD; no peripheral edema  GI: Soft, NT, ND, no rebound, no guarding; no palpable masses; no hepatosplenomegaly; no hernia palpated  MSK: Normal gait; No digital clubbing or cyanosis; examination of the (head/neck/spine/ribs/pelvis, RUE, LUE, RLE, LLE) without misalignment,            Normal ROM without pain, no spinal tenderness, normal muscle strength/tone  SKIN: No rashes or ulcers noted; no subcutaneous nodules or induration palpable  NEURO: CN II-XII intact; normal reflexes in upper and lower extremities, sensation intact in upper and lower extremities b/l to light touch   PSYCH: Appropriate insight/judgment; A+O x 3, mood and affect appropriate, recent/remote memory intact T(C): 36.8 (01-02-24 @ 14:59), Max: 38.1 (01-02-24 @ 03:03)  HR: 77 (01-02-24 @ 14:59) (73 - 105)  BP: 105/63 (01-02-24 @ 14:59) (76/45 - 128/102)  RR: 18 (01-02-24 @ 14:59) (12 - 23)  SpO2: 100% (01-02-24 @ 14:59) (95% - 100%)    CONSTITUTIONAL: Well groomed, no apparent distress  EYES: PERRLA and symmetric, EOMI, No conjunctival or scleral injection, non-icteric  ENMT: Oral mucosa with moist membranes. Normal dentition; no pharyngeal injection or exudates  RESP: No respiratory distress, no use of accessory muscles; CTA b/l, no WRR  CV: RRR, +S1S2, no MRG; no JVD; no peripheral edema  GI: Soft, NT, ND, no rebound, no guarding; no palpable masses; no hepatosplenomegaly; no hernia palpated  MSK: Normal ROM without pain, no spinal tenderness, normal muscle strength/tone  SKIN: No rashes or ulcers noted; no subcutaneous nodules or induration palpable  NEURO: CN II-XII not tested , sensation intact in upper and lower extremities b/l to light touch   PSYCH: Appropriate insight/judgment; A+O x 3, mood and affect appropriate, recent/remote memory intact

## 2024-01-02 NOTE — CONSULT NOTE ADULT - ASSESSMENT
74 yo M BPH history of morales for urinary retention, initially with diarrhea and falls  Has fevers, no leukocytosis  Initially with diarrhea/falls  Chronic morales, has had pain at site  CT with proctitis, fluid filled rectum, bilateral perinephric soft tissue stranding; renal lesions  UA+  C diff negative  COVID+  Had hypotension, with evaluation by MICU  COVID alone? UTI given symptoms and history?  Overall, UTI, COVID, fever  - Zosyn 3.375g q 8  - DC Vanco  - RemD 5 days then DC  - Monitor for further fevers  - Hold on Dexa unless progressive O2 requirements  - Follow up pending UCX/BCX  - F/U GI PCR    Vernon Escobar MD  Contact on TEAMS messaging from 9am - 5pm  From 5pm-9am, on weekends, or if no response call [phone] 72 yo M BPH history of morales for urinary retention, initially with diarrhea and falls  Has fevers, no leukocytosis  Initially with diarrhea/falls  Chronic morales, has had pain at site  CT with proctitis, fluid filled rectum, bilateral perinephric soft tissue stranding; renal lesions  UA+  C diff negative  COVID+  Had hypotension, with evaluation by MICU  COVID alone? UTI given symptoms and history?  Overall, UTI, COVID, fever  - Zosyn 3.375g q 8  - DC Vanco  - RemD 5 days then DC  - Monitor for further fevers  - Hold on Dexa unless progressive O2 requirements  - Follow up pending UCX/BCX  - F/U GI PCR    Vernon Escobar MD  Contact on TEAMS messaging from 9am - 5pm  From 5pm-9am, on weekends, or if no response call [phone]

## 2024-01-02 NOTE — ED ADULT NURSE REASSESSMENT NOTE - NS ED NURSE REASSESS COMMENT FT1
Spoke with MD Arizmendi, made aware of BP. 500cc of fluids to be given per order, no additional RN interventions required at this time. Per MD, 1600 dose of zosyn to be held, 4 hour infusion to be given 8 hours after 1440 dose of zosyn.

## 2024-01-02 NOTE — DISCHARGE NOTE PROVIDER - NSFOLLOWUPCLINICS_GEN_ALL_ED_FT
[place]  Internal Medicine  [address] [place] [address]  Phone: [phone]  Fax:   Follow Up Time: 1 week    [facility] - Infectious Disease  Infectious Disease  [address] [place] [address] [place] [address]  Phone: [phone]  Fax:   Follow Up Time: 1 week     [place]  Internal Medicine  [address] [place] [address]  Phone: [phone]  Fax:   Follow Up Time: 1 week    [facility] - Infectious Disease  Infectious Disease  [address] [place] [address] [place] [address]  Phone: [phone]  Fax:   Follow Up Time: 1 week     [place]  Internal Medicine  [address] [place] [address]  Phone: [phone]  Fax:   Follow Up Time: 1 week    [facility] - Infectious Disease  Infectious Disease  [address] [place] [address] [place] [address]  Phone: [phone]  Fax:   Follow Up Time: 1 week     [place]  Internal Medicine  [address] [place] [address]  Phone: [phone]  Fax:   Follow Up Time: 1 week    [facility] - Infectious Disease  Infectious Disease  [address] [place] [address] [place] [address]  Phone: [phone]  Fax:   Follow Up Time: 1 week     [facility] - Infectious Disease  Infectious Disease  [address] [facility], [place]ious [place] [address]  Phone: [phone]  Fax:   Follow Up Time: 1 week    [place]  Internal Medicine  [address] [place] [address]  Phone: [phone]  Fax:   Follow Up Time: 1 week    [facility]  Endocrinology  [address] [place] [address]  Phone: [phone]  Fax:   Follow Up Time: 2 weeks     [facility] - Infectious Disease  Infectious Disease  [address] [facility], [place] [address]  Phone: [phone]  Fax:   Follow Up Time: 1 week    [place]  Internal Medicine  [address] [place] [address]  Phone: [phone]  Fax:   Follow Up Time: 1 week    [facility] End[facility]  Endocrinology  [address] [place] [address]  Phone: [phone]  Fax:   Follow Up Time: 2 weeks     [facility] - Infectious Disease  Infectious Disease  [address] [facility], [place] [address]  Phone: [phone]  Fax:   Follow Up Time: 1 week    [place]  Internal Medicine  [address] [place] [address]  Phone: [phone]  Fax:   Follow Up Time: 1 week    [facility] Endocrin[facility]  Endocrinology  [address] [place] [address]  Phone: [phone]  Fax:   Follow Up Time: 2 weeks     [facility] - Infectious Disease  Infectious Disease  [address] [facility], [place] [address]  Phone: [phone]  Fax:   Follow Up Time: 1 week    [place]  Internal Medicine  [address] [place] [address]  Phone: [phone]  Fax:   Follow Up Time: 1 week    [facility] Endocrinology  Endocrinology  [address] [place] [address]  Phone: [phone]  Fax:   Follow Up Time: 2 weeks

## 2024-01-02 NOTE — PROCEDURE NOTE - ADDITIONAL PROCEDURE DETAILS
Called by ED for difficult morales placement in the setting of chronic morales.    Using aseptic technique, area prepped in traditional sterile fashion, lidocaine urojet instilled into urethra, and 16F coude catheter placed without resistance. Turbid yellow urine drained. 10cc sterile water placed into balloon and morales catheter secured with stat lock. pt tolerated procedure well. plan for morales per primary team. please page with any acute  concerns or questions.  p: [phone] Called by ED for difficult morales placement in the setting of chronic morales.    Using aseptic technique, area prepped in traditional sterile fashion, lidocaine urojet instilled into urethra, and 16F coude catheter placed without resistance. Turbid yellow urine drained. 10cc sterile water placed into balloon and morales catheter secured with stat lock. pt tolerated procedure well. plan for morales per primary team. please page with any acute  concerns or questions.  p: [phone]

## 2024-01-02 NOTE — ED PROVIDER NOTE - NS ED ROS FT
REVIEW OF SYSTEMS:    CONSTITUTIONAL:  No weakness, fevers or chills  EYES/ENT:  No visual changes;  No vertigo or throat pain   NECK:  No pain or stiffness  RESPIRATORY:  No cough, wheezing, hemoptysis; No shortness of breath  CARDIOVASCULAR:  No chest pain or palpitations  GASTROINTESTINAL:  + diarrhea w/ nausea; no abdominal tenderness   GENITOURINARY:  Urine leakage around morales site   MUSCULOSKELETAL:  FROM all extremities, normal strength, No calf tenderness  NEUROLOGICAL:  No numbness or weakness  SKIN:  No itching, rashes

## 2024-01-02 NOTE — H&P ADULT - PROBLEM SELECTOR PLAN 1
-previous hx of E.faecalis and Stenotrophomonas maltophilia  -s/p Vanc and Zosyn in ED  -ID consulted would appreciate recs  -Midodrine PRN   -F/U UCx and Blood Cx -previous hx of E.faecalis and Stenotrophomonas maltophilia  -Likely Pyelo in context of CT with perinephric standing, may also be due to proctitis   -s/p Vanc and Zosyn in ED  -ID consulted recs appreciated -> D/C vanc   -Midodrine PRN for MAP >65  -F/U UCx and Blood Cx

## 2024-01-02 NOTE — DISCHARGE NOTE PROVIDER - NSDCCPCAREPLAN_GEN_ALL_CORE_FT
PRINCIPAL DISCHARGE DIAGNOSIS  Diagnosis: Sepsis  Assessment and Plan of Treatment: You came because your Blood pressures very low likely due to your diarreaa and probably due to an underlying infection in the urinary tract. We treated your low pressures with fluids and midodrine. We treated your infection with Zosyn.   If you develop fevers, chills,     PRINCIPAL DISCHARGE DIAGNOSIS  Diagnosis: Sepsis  Assessment and Plan of Treatment: You came because your Blood pressures very low likely due to your diarreaa and probably due to an underlying infection in the urinary tract. We treated your low pressures with fluids and midodrine. We treated your infection with Zosyn. We treated your diarrhea with loperamide.  If you develop fevers, chills, or generally feel unwell please seke urgent medical attention.     PRINCIPAL DISCHARGE DIAGNOSIS  Diagnosis: Sepsis  Assessment and Plan of Treatment: You came because your Blood pressures very low likely due to your diarreaa and probably due to an underlying infection in the urinary tract. We treated your low pressures with fluids and midodrine. We treated your infection with Zosyn. We treated your diarrhea with loperamide.  If you develop fevers, chills, or generally feel unwell please seke urgent medical attention.      SECONDARY DISCHARGE DIAGNOSES  Diagnosis: 2019 novel coronavirus disease (COVID-19)  Assessment and Plan of Treatment: In the hospital, you tested positive for COVID-19. You never required any supplemental oxygen. We initially treated your viral infection with remdesivir, which is a medication that is used to help hospitalized patients with COVID-19. However, you developed a temporary kidney injury, and we were concerned that remdesivir may have contributed to your worsened kidney function, so the remdesivir was stopped. Your symptoms gradually improved and completed your necessary isolation period in the hospital. Your infection has now resolved.    Diagnosis: Adrenal insufficiency  Assessment and Plan of Treatment: During you hospital stay, you were found to have adrenal insufficiency. In adrenal insufficiency, your body cannot make certain hormones on its own. These hormones do many things in the body, such as help keep blood sugar levels and blood pressure normal. When we don't have enough of these hormones, it can cause severe symptoms which can be life-threatening. Because your body is not making enough of these hormones, we started you on a medication called hydrocortisone, which replaces the hormones your body should be making naturally. It is very important that you take your hydrocortisone every day, two times per day as directed: please take hydrocortisone 20 mg every morning at 8AM, and hydrocortisone 10 mg every afternoon at 3 PM.   If you develop any illness outside the hospital, you will need to take 2-3x your home dose of hydrocortisone in order to make sure your body can cope with this illness/injury.   If you have a major illness or undergo surgery, you will need an even higher dose of the hydrocortisone - in these circumstances, please take hydrocortisone 50 mg every 8 hours, until the illness resolves or until you speak with your endocrinologist.  If you ever become unable to take pills by mouth, you will need to take an emergency injection to replace your hydrocortisone. We will send you home with a prescription for Solu-Cortef Act-O-Vial which can be given in an emergency if you cannot take your hydrocortisone by mouth.   You will need to follow up with the endocrinologists outside the hospital so they can continue to manage your adrenal insufficiency. You will need another MRI of your brain and pituitary gland in 6-12 months for follow up.  You should obtain a medical alert bracelet or necklace to inform emergency providers that you have adrenal insufficiency. Please go to this link to request a medical alert bracelet: [email or web address]       PRINCIPAL DISCHARGE DIAGNOSIS  Diagnosis: Sepsis  Assessment and Plan of Treatment: You came because your Blood pressures very low likely due to your diarrhea and probably due to an underlying infection in the urinary tract. We treated your low pressures with fluids and midodrine. We treated your infection with an antibiotic called Zosyn. We treated your diarrhea with loperamide.  If you develop fevers, chills, or generally feel unwell please seek urgent medical attention.      SECONDARY DISCHARGE DIAGNOSES  Diagnosis: 2019 novel coronavirus disease (COVID-19)  Assessment and Plan of Treatment: In the hospital, you tested positive for COVID-19. You never required any supplemental oxygen. We initially treated your viral infection with remdesivir, which is a medication that is used to help hospitalized patients with COVID-19. However, you developed a temporary kidney injury, and we were concerned that remdesivir may have contributed to your worsened kidney function, so the remdesivir was stopped. Your symptoms gradually improved and completed your necessary isolation period in the hospital. Your infection has now resolved.    Diagnosis: Adrenal insufficiency  Assessment and Plan of Treatment: During you hospital stay, you were found to have adrenal insufficiency. In adrenal insufficiency, your body cannot make certain hormones on its own. These hormones do many things in the body, such as help keep blood sugar levels and blood pressure normal. When we don't have enough of these hormones, it can cause severe symptoms which can be life-threatening. Because your body is not making enough of these hormones, we started you on a medication called hydrocortisone, which replaces the hormones your body should be making naturally. It is very important that you take your hydrocortisone every day, two times per day as directed: please take hydrocortisone 20 mg every morning at 8AM, and hydrocortisone 10 mg every afternoon at 3 PM.   If you develop any illness outside the hospital, you will need to take 2-3x your home dose of hydrocortisone in order to make sure your body can cope with this illness/injury.   If you have a major illness or undergo surgery, you will need an even higher dose of the hydrocortisone - in these circumstances, please take hydrocortisone 50 mg every 8 hours, until the illness resolves or until you speak with your endocrinologist.  If you ever become unable to take pills by mouth, you will need to take an emergency injection to replace your hydrocortisone. We will send you home with a prescription for Solu-Cortef Act-O-Vial which can be given in an emergency if you cannot take your hydrocortisone by mouth.   You will need to follow up with the endocrinologists outside the hospital so they can continue to manage your adrenal insufficiency. You will need another MRI of your brain and pituitary gland in 6-12 months for follow up.  You should obtain a medical alert bracelet or necklace to inform emergency providers that you have adrenal insufficiency. Please go to this link to request a medical alert bracelet: [email or web address]       PRINCIPAL DISCHARGE DIAGNOSIS  Diagnosis: Sepsis  Assessment and Plan of Treatment: You came because your Blood pressures very low likely due to your diarrhea and probably due to an underlying infection in the urinary tract. We treated your low pressures with fluids and midodrine. We treated your infection with an antibiotic called Zosyn. We treated your diarrhea with loperamide.  If you develop fevers, chills, or generally feel unwell please seek urgent medical attention.      SECONDARY DISCHARGE DIAGNOSES  Diagnosis: 2019 novel coronavirus disease (COVID-19)  Assessment and Plan of Treatment: In the hospital, you tested positive for COVID-19. You never required any supplemental oxygen. We initially treated your viral infection with remdesivir, which is a medication that is used to help hospitalized patients with COVID-19. However, you developed a temporary kidney injury, and we were concerned that remdesivir may have contributed to your worsened kidney function, so the remdesivir was stopped. Your symptoms gradually improved and completed your necessary isolation period in the hospital. Your infection has now resolved.    Diagnosis: Adrenal insufficiency  Assessment and Plan of Treatment: During you hospital stay, you were found to have adrenal insufficiency. In adrenal insufficiency, your body cannot make certain hormones on its own. These hormones do many things in the body, such as help keep blood sugar levels and blood pressure normal. When we don't have enough of these hormones, it can cause severe symptoms which can be life-threatening. Because your body is not making enough of these hormones, we started you on a medication called hydrocortisone, which replaces the hormones your body should be making naturally. It is very important that you take your hydrocortisone every day, two times per day as directed: please take hydrocortisone 20 mg every morning at 8AM, and hydrocortisone 10 mg every afternoon at 3 PM. Please follow up with your endocrinologist in 1-2 weeks at [address] [place] [address], [address], [place] ([phone]).  If you develop any illness outside the hospital, you will need to take 2-3x your home dose of hydrocortisone in order to make sure your body can cope with this illness/injury.   If you have a major illness or undergo surgery, you will need an even higher dose of the hydrocortisone - in these circumstances, please take hydrocortisone 50 mg every 8 hours, until the illness resolves or until you speak with your endocrinologist.  If you ever become unable to take pills by mouth, you will need to take an emergency injection to replace your hydrocortisone. We will send you home with a prescription for Solu-Cortef Act-O-Vial which can be given in an emergency if you cannot take your hydrocortisone by mouth.   You will need to follow up with the endocrinologists outside the hospital so they can continue to manage your adrenal insufficiency. You will need another MRI of your brain and pituitary gland in 6-12 months for follow up.  You should obtain a medical alert bracelet or necklace to inform emergency providers that you have adrenal insufficiency. Please go to this link to request a medical alert bracelet: [email or web address]       PRINCIPAL DISCHARGE DIAGNOSIS  Diagnosis: Sepsis  Assessment and Plan of Treatment: You came because your Blood pressures very low likely due to your diarrhea and probably due to an underlying infection in the urinary tract. We treated your low pressures with fluids and midodrine. We treated your infection with an antibiotic called Zosyn. We treated your diarrhea with loperamide.  If you develop fevers, chills, or generally feel unwell please seek urgent medical attention.      SECONDARY DISCHARGE DIAGNOSES  Diagnosis: 2019 novel coronavirus disease (COVID-19)  Assessment and Plan of Treatment: In the hospital, you tested positive for COVID-19. You never required any supplemental oxygen. We initially treated your viral infection with remdesivir, which is a medication that is used to help hospitalized patients with COVID-19. However, you developed a temporary kidney injury, and we were concerned that remdesivir may have contributed to your worsened kidney function, so the remdesivir was stopped. Your symptoms gradually improved and completed your necessary isolation period in the hospital. Your infection has now resolved.    Diagnosis: Adrenal insufficiency  Assessment and Plan of Treatment: During you hospital stay, you were found to have adrenal insufficiency. In adrenal insufficiency, your body cannot make certain hormones on its own. These hormones do many things in the body, such as help keep blood sugar levels and blood pressure normal. When we don't have enough of these hormones, it can cause severe symptoms which can be life-threatening. Because your body is not making enough of these hormones, we started you on a medication called hydrocortisone, which replaces the hormones your body should be making naturally. It is very important that you take your hydrocortisone every day, two times per day as directed: please take hydrocortisone 20 mg every morning at 8AM, and hydrocortisone 10 mg every afternoon at 3 PM. Please follow up with your endocrinologist in 1-2 weeks at [address] [place] [address], [address], [place] ([phone]).  If you develop any illness outside the hospital, you will need to take 2-3x your home dose of hydrocortisone in order to make sure your body can cope with this illness/injury.   If you have a major illness or undergo surgery, you will need an even higher dose of the hydrocortisone - in these circumstances, please take hydrocortisone 50 mg every 8 hours, until the illness resolves or until you speak with your endocrinologist.  If you ever become unable to take pills by mouth, you will need to take an emergency injection to replace your hydrocortisone. We will send you home with a prescription for Solu-Cortef Act-O-Vial which can be given in an emergency if you cannot take your hydrocortisone by mouth.   You will need to follow up with the endocrinologists outside the hospital so they can continue to manage your adrenal insufficiency. You will need another MRI of your brain and pituitary gland in 6-12 months for follow up.  You should obtain a medical alert bracelet or necklace to inform emergency providers that you have adrenal insufficiency. Please go to this link to request a medical alert bracelet: [email or web address]       PRINCIPAL DISCHARGE DIAGNOSIS  Diagnosis: Sepsis  Assessment and Plan of Treatment: You came because your Blood pressures very low likely due to your diarrhea and probably due to an underlying infection in the urinary tract. We treated your low pressures with fluids and midodrine. We treated your infection with an antibiotic called Zosyn. We treated your diarrhea with loperamide.  If you develop fevers, chills, or generally feel unwell please seek urgent medical attention.      SECONDARY DISCHARGE DIAGNOSES  Diagnosis: 2019 novel coronavirus disease (COVID-19)  Assessment and Plan of Treatment: In the hospital, you tested positive for COVID-19. You never required any supplemental oxygen. We initially treated your viral infection with remdesivir, which is a medication that is used to help hospitalized patients with COVID-19. However, you developed a temporary kidney injury, and we were concerned that remdesivir may have contributed to your worsened kidney function, so the remdesivir was stopped. Your symptoms gradually improved and completed your necessary isolation period in the hospital. Your infection has now resolved.    Diagnosis: Adrenal insufficiency  Assessment and Plan of Treatment: During you hospital stay, you were found to have adrenal insufficiency. In adrenal insufficiency, your body cannot make certain hormones on its own. These hormones do many things in the body, such as help keep blood sugar levels and blood pressure normal. When we don't have enough of these hormones, it can cause severe symptoms which can be life-threatening. Because your body is not making enough of these hormones, we started you on a medication called hydrocortisone, which replaces the hormones your body should be making naturally. It is very important that you take your hydrocortisone every day, two times per day as directed: please take hydrocortisone 20 mg every morning at 8AM, and hydrocortisone 10 mg every afternoon at 3 PM. Please follow up with your endocrinologist in 1-2 weeks at [address] [place] [address], [address], [place] ([phone]).  If you develop any illness outside the hospital, you will need to take 2-3x your home dose of hydrocortisone in order to make sure your body can cope with this illness/injury.   If you have a major illness or undergo surgery, you will need an even higher dose of the hydrocortisone - in these circumstances, please take hydrocortisone 50 mg every 8 hours, until the illness resolves or until you speak with your endocrinologist.  If you ever become unable to take pills by mouth, you will need to take an emergency injection to replace your hydrocortisone. We will send you home with a prescription for Solu-Cortef Act-O-Vial which can be given in an emergency if you cannot take your hydrocortisone by mouth.   You will need to follow up with the endocrinologists outside the hospital so they can continue to manage your adrenal insufficiency. You will need another MRI of your brain and pituitary gland in 6-12 months for follow up.  You should obtain a medical alert bracelet or necklace to inform emergency providers that you have adrenal insufficiency. Please go to this link to request a medical alert bracelet: [email or web address]       PRINCIPAL DISCHARGE DIAGNOSIS  Diagnosis: Sepsis  Assessment and Plan of Treatment: You came because your Blood pressures very low likely due to your diarrhea and probably due to an underlying infection in the urinary tract. We treated your low pressures with fluids and midodrine. We treated your infection with an antibiotic called Zosyn. We treated your diarrhea with loperamide.  If you develop fevers, chills, or generally feel unwell please seek urgent medical attention.      SECONDARY DISCHARGE DIAGNOSES  Diagnosis: 2019 novel coronavirus disease (COVID-19)  Assessment and Plan of Treatment: In the hospital, you tested positive for COVID-19. You never required any supplemental oxygen. We initially treated your viral infection with remdesivir, which is a medication that is used to help hospitalized patients with COVID-19. However, you developed a temporary kidney injury, and we were concerned that remdesivir may have contributed to your worsened kidney function, so the remdesivir was stopped. Your symptoms gradually improved and completed your necessary isolation period in the hospital. Your infection has now resolved.    Diagnosis: Adrenal insufficiency  Assessment and Plan of Treatment: During you hospital stay, you were found to have adrenal insufficiency. In adrenal insufficiency, your body cannot make certain hormones on its own. These hormones do many things in the body, such as help keep blood sugar levels and blood pressure normal. When we don't have enough of these hormones, it can cause severe symptoms which can be life-threatening. Because your body is not making enough of these hormones, we started you on a medication called hydrocortisone, which replaces the hormones your body should be making naturally. It is very important that you take your hydrocortisone every day, two times per day as directed: please take hydrocortisone 20 mg every morning at 8AM, and hydrocortisone 10 mg every afternoon at 3 PM. Please follow up with your endocrinologist in 1-2 weeks at [address] [place] [address], [address], [place] ([phone]).  If you develop any illness outside the hospital, you will need to take 2-3x your home dose of hydrocortisone in order to make sure your body can cope with this illness/injury.   If you have a major illness or undergo surgery, you will need an even higher dose of the hydrocortisone - in these circumstances, please take hydrocortisone 50 mg every 8 hours, until the illness resolves or until you speak with your endocrinologist.  If you ever become unable to take pills by mouth, you will need to take an emergency injection to replace your hydrocortisone. We will send you home with a prescription for Solu-Cortef Act-O-Vial which can be given in an emergency if you cannot take your hydrocortisone by mouth.   You will need to follow up with the endocrinologists outside the hospital so they can continue to manage your adrenal insufficiency. You will need another MRI of your brain and pituitary gland in 6-12 months for follow up.  You should obtain a medical alert bracelet or necklace to inform emergency providers that you have adrenal insufficiency. Please go to this link to request a medical alert bracelet: [email or web address]

## 2024-01-02 NOTE — DISCHARGE NOTE PROVIDER - PROVIDER RX CONTACT NUMBER
[phone]

## 2024-01-02 NOTE — CONSULT NOTE ADULT - ATTENDING COMMENTS
Attending Attestation:    Patient seen and examined with resident/fellow.  Agree with above except as noted.  72 yo male with chronic Harkins catheter who presents with hypotension and malaise. Pt is s/p fall.    Pt also c/o fever . No chest pain or SOB. Pt has received 3 liters of fluids and 10mg midodrine. Pt feeling better. Pt now with [id number] off pressors.    On exam pt lying in bed in NAD. RR 18 02 saturation is 96% on room air.  Lungs Clear to A   COR S1 S2 wnl   Abd soft nontender.  Ext -CCE    Pt is COVID +    A/P  72 yo male with cardiac amyloidosis, BPH with  severe sepsis with hypotension that has responded to fluids and 1 dose of midodrine.  Recommend:  1. Continue broad spectrum antibiotics for UTI  2. Follow up urine cx  3. midodrine 10mg tid  4. No treatment with decadron or remdesivir at this point. Pt oxygenating well.    Pt is not  a MICU candidate at this time. Attending Attestation:    Patient seen and examined with resident/fellow.  Agree with above except as noted.  74 yo male with chronic Harkins catheter who presents with hypotension and malaise. Pt is s/p fall.    Pt also c/o fever . No chest pain or SOB. Pt has received 3 liters of fluids and 10mg midodrine. Pt feeling better. Pt now with [id number] off pressors.    On exam pt lying in bed in NAD. RR 18 02 saturation is 96% on room air.  Lungs Clear to A   COR S1 S2 wnl   Abd soft nontender.  Ext -CCE    Pt is COVID +    A/P  74 yo male with cardiac amyloidosis, BPH with  severe sepsis with hypotension that has responded to fluids and 1 dose of midodrine.  Recommend:  1. Continue broad spectrum antibiotics for UTI  2. Follow up urine cx  3. midodrine 10mg tid  4. No treatment with decadron or remdesivir at this point. Pt oxygenating well.    Pt is not  a MICU candidate at this time.

## 2024-01-02 NOTE — DISCHARGE NOTE PROVIDER - NSDCMRMEDTOKEN_GEN_ALL_CORE_FT
colchicine 0.6 mg oral capsule: 1 cap(s) orally once a day  finasteride 5 mg oral tablet: 1 tab(s) orally once a day  fluticasone 50 mcg/inh nasal spray: 1 spray(s) nasal 2 times a day  sodium chloride 0.65% nasal spray: 2 spray(s) nasal 3 times a day  tamsulosin 0.4 mg oral capsule: 1 cap(s) orally once a day (at bedtime)  Vyndamax 61 mg oral capsule: 1 cap(s) orally once a day   finasteride 5 mg oral tablet: 1 tab(s) orally once a day  fluticasone 50 mcg/inh nasal spray: 1 spray(s) in each nostril 2 times a day as needed  furosemide 40 mg oral tablet: 1 tab(s) orally once a day as needed  gabapentin 100 mg oral capsule: 1 cap(s) orally once a day (at bedtime)  mirtazapine 7.5 mg oral tablet: 1 tab(s) orally once a day at 6 PM  Saline Mist 0.65% nasal spray: 2 spray(s) intranasally as needed  tamsulosin 0.4 mg oral capsule: 1 cap(s) orally once a day (at bedtime)  Vyndamax 61 mg oral capsule: 1 cap(s) orally once a day   3:1 commode: 3:1 commode for 1 year (ICD10:R54 debility)  finasteride 5 mg oral tablet: 1 tab(s) orally once a day  fluticasone 50 mcg/inh nasal spray: 1 spray(s) in each nostril 2 times a day as needed  mirtazapine 7.5 mg oral tablet: 1 tab(s) orally once a day at 6 PM  Saline Mist 0.65% nasal spray: 2 spray(s) intranasally as needed  Transport Wheelchair: Transport Wheelchair for 1 year (ICD10:R54 debility)  height: 5 feet 4 inches, 162.56 cm  weight: 71.5 kg  Vyndamax 61 mg oral capsule: 1 cap(s) orally once a day   finasteride 5 mg oral tablet: 1 tab(s) orally once a day  fluticasone 50 mcg/inh nasal spray: 1 spray(s) in each nostril 2 times a day as needed  hydrocortisone 10 mg oral tablet: 1 tab(s) orally once a day Take daily at 3 PM  hydrocortisone 20 mg oral tablet: 1 tab(s) orally once a day Take daily at 8AM  mirtazapine 7.5 mg oral tablet: 1 tab(s) orally once a day at 6 PM  Saline Mist 0.65% nasal spray: 2 spray(s) intranasally as needed  Vyndamax 61 mg oral capsule: 1 cap(s) orally once a day   finasteride 5 mg oral tablet: 1 tab(s) orally once a day  fluticasone 50 mcg/inh nasal spray: 1 spray(s) in each nostril 2 times a day as needed  hydrocortisone 10 mg oral tablet: 1 tab(s) orally once a day Take daily at 3 PM  hydrocortisone 20 mg oral tablet: 1 tab(s) orally once a day Take daily at 8AM  mirtazapine 7.5 mg oral tablet: 1 tab(s) orally once a day at 6 PM  Saline Mist 0.65% nasal spray: 2 spray(s) intranasally 2 times a day as needed  sodium bicarbonate 650 mg oral tablet: 1 tab(s) orally 2 times a day  Vyndamax 61 mg oral capsule: 1 cap(s) orally once a day

## 2024-01-02 NOTE — DISCHARGE NOTE PROVIDER - NSDCFUSCHEDAPPT_GEN_ALL_CORE_FT
Evonne Marion  [facility]  UROLOGY [address]  Scheduled Appointment: 01/04/2024    Cleveland Dinh  [facility]  OTOLARYNG [address] [facility]  Scheduled Appointment: 01/09/2024    [facility]  UROLOGY [address]  Scheduled Appointment: 01/16/2024    Vicente Milan  [facility]  UROLOGY [address]  Scheduled Appointment: 01/16/2024    [facility]  GERIATRICS [address] [place]   Scheduled Appointment: 01/22/2024    MACARIO CLINE  [facility]  CARDIOLOGY [address] [facility]   Scheduled Appointment: 02/14/2024     Evonne Marion  [facility]  UROLOGY [address]  Scheduled Appointment: 01/04/2024    Cleveland Dinh  [facility]  OTOLARYNG [address] [facility]  Scheduled Appointment: 01/09/2024    [facility]  UROLOGY [address]  Scheduled Appointment: 01/16/2024    Vicente Milan  [facility]  UROLOGY [address]  Scheduled Appointment: 01/16/2024    [facility]  GERIATRICS [address] [place]   Scheduled Appointment: 01/22/2024    MACARIO CLINE  [facility]  CARDIOLOGY [address] [facility]   Scheduled Appointment: 02/14/2024     Evonne Marion  [facility]  UROLOGY [address]  Scheduled Appointment: 01/04/2024    Cleveland Dinh  [facility]  OTOLARYNG [address] [facility]  Scheduled Appointment: 01/09/2024    [facility]  UROLOGY [address]  Scheduled Appointment: 01/16/2024    Vicente Milan  [facility]  UROLOGY [address]  Scheduled Appointment: 01/16/2024    [facility]  GERIATRICS [address] [place]   Scheduled Appointment: 01/22/2024    MACARIO CLINE  [facility]  CARDIOLOGY [address] [facility]   Scheduled Appointment: 02/14/2024     Evonne Marion  [facility]  UROLOGY [address]  Scheduled Appointment: 01/04/2024    Cleveland Dinh  [facility]  OTOLARYNG [address] [facility]  Scheduled Appointment: 01/09/2024    [facility]  UROLOGY [address]  Scheduled Appointment: 01/16/2024    Vicente Milan  [facility]  UROLOGY [address]  Scheduled Appointment: 01/16/2024    [facility]  GERIATRICS [address] [place]   Scheduled Appointment: 01/22/2024    MACARIO CLINE  [facility]  CARDIOLOGY [address] [facility]   Scheduled Appointment: 02/14/2024     Cleveland Dinh  [facility]  OTOLARYNG [address] [facility]  Scheduled Appointment: 01/09/2024    [facility]  UROLOGY [address]  Scheduled Appointment: 01/16/2024    Vicente Milan  [facility]  UROLOGY [address]  Scheduled Appointment: 01/16/2024    [facility]  GERIATRICS [address] [place]   Scheduled Appointment: 01/22/2024    MACARIO CLINE  [facility]  CARDIOLOGY [address] [facility]   Scheduled Appointment: 02/14/2024    Evonne Marion  [facility]  UROLOGY [address]  Scheduled Appointment: 02/29/2024     Cleveland Dinh  [facility]  OTOLARYNG [address] [facility]  Scheduled Appointment: 01/09/2024    [facility]  UROLOGY [address]  Scheduled Appointment: 01/16/2024    Vicente Milan  [facility]  UROLOGY [address]  Scheduled Appointment: 01/16/2024    [facility]  GERIATRICS [address] [place]   Scheduled Appointment: 01/22/2024    MACARIO CLINE  [facility]  CARDIOLOGY [address] [facility]   Scheduled Appointment: 02/14/2024    Evonne aMrion  [facility]  UROLOGY [address]  Scheduled Appointment: 02/29/2024     Cleveland Dinh  [facility]  OTOLARYNG [address] [facility]  Scheduled Appointment: 01/09/2024    [facility]  UROLOGY [address]  Scheduled Appointment: 01/16/2024    Vicente Milan  [facility]  UROLOGY [address]  Scheduled Appointment: 01/16/2024    [facility]  GERIATRICS [address] [place]   Scheduled Appointment: 01/22/2024    MACARIO CLINE  [facility]  CARDIOLOGY [address] [facility]   Scheduled Appointment: 02/14/2024    Evonne Marion  [facility]  UROLOGY [address]  Scheduled Appointment: 02/29/2024     Cleveland Dinh  [facility]  OTOLARYNG [address] [facility]  Scheduled Appointment: 01/09/2024    [facility]  UROLOGY [address]  Scheduled Appointment: 01/16/2024    Vicente Milan  [facility]  UROLOGY [address]  Scheduled Appointment: 01/16/2024    [facility]  GERIATRICS [address] [place]   Scheduled Appointment: 01/22/2024    MACARIO CLINE  [facility]  CARDIOLOGY [address] [facility]   Scheduled Appointment: 02/14/2024    Evonne Marion  [facility]  UROLOGY [address]  Scheduled Appointment: 02/29/2024     [facility]  UROLOGY [address]  Scheduled Appointment: 01/16/2024    Vicente Milan  [facility]  UROLOGY [address]  Scheduled Appointment: 01/16/2024    [facility]  GERIATRICS [address] [place]   Scheduled Appointment: 01/22/2024    MACARIO CLINE  [facility]  CARDIOLOGY [address] [facility]   Scheduled Appointment: 02/14/2024    Evonne Marion  [facility]  UROLOGY [address]  Scheduled Appointment: 02/29/2024     [facility]  UROLOGY [address]  Scheduled Appointment: 01/16/2024    Vicente Milan  [facility]  UROLOGY [address]  Scheduled Appointment: 01/16/2024    [facility]  GERIATRICS [address] [place]   Scheduled Appointment: 01/22/2024    MACARIO CLINE  [facility]  CARDIOLOGY [address] [facility]   Scheduled Appointment: 02/14/2024    Evonne Marion  [facility]  UROLOGY [address]  Scheduled Appointment: 02/29/2024     [facility]  UROLOGY [address]  Scheduled Appointment: 01/16/2024    Vicente Milan  [facility]  UROLOGY [address]  Scheduled Appointment: 01/16/2024    [facility]  GERIATRICS [address] [place]   Scheduled Appointment: 01/22/2024    MACARIO CLINE  [facility]  CARDIOLOGY [address] [facility]   Scheduled Appointment: 02/14/2024    Evonne Marion  [facility]  UROLOGY [address]  Scheduled Appointment: 02/29/2024     [facility]  UROLOGY [address]  Scheduled Appointment: 01/16/2024    Vicente Milan  [facility]  UROLOGY [address]  Scheduled Appointment: 01/16/2024    [facility]  GERIATRICS [address] [place]   Scheduled Appointment: 01/22/2024    MACARIO CLINE  [facility]  CARDIOLOGY [address] [facility]   Scheduled Appointment: 02/14/2024    Evonne Marion  [facility]  UROLOGY [address]  Scheduled Appointment: 02/29/2024

## 2024-01-02 NOTE — CONSULT NOTE ADULT - SUBJECTIVE AND OBJECTIVE BOX
"HPI: 73M w/ BPH (c/b urinary retention s/p Morales catheter placement; exchanged monthly), CKD stage IIIb, and cardiac amyloidosis brought in by daughter for diarrhea (loose-watery BM's) over the past few days along w/ nausea and poor PO intake. Additionally, pt was recent fall yesterday w/ possible head trauma. Pt is poor historian, most information collected by pt's daughter. Pt recently hospitalized in Dec for urosepsis and abdominal pain and d/c w/o antibiotics due to suspicion for contamination. Notable leakage of urine around morales site at home.  Denies CB, SOB, blurry vision, or LOC."    Above reviewed. 74 yo M BPH history of morales for urinary retention, initially with diarrhea and falls  Patient states has recently had coughing  Had diarrhea  Also noted some pain at the morales site/bladder  He is not sure when he had it changed last but was hospitalized in December  No other focal complaints  ID called for further eval     PAST MEDICAL & SURGICAL HISTORY:  Cardiac amyloidosis      Stenosis, cervical spine      BPH (benign prostatic hyperplasia)      Indwelling Morales catheter present      Stage 3 chronic kidney disease      S/P cataract extraction  B/L      S/P nasal polypectomy      History of fusion of cervical spine      Allergies    No Known Allergies    Intolerances    ANTIMICROBIALS:      OTHER MEDS:      SOCIAL HISTORY: No tobacco, no alcohol, no illicit drugs     FAMILY HISTORY: No pertinent family history relating to chief complaint    Drug Dosing Weight  Height (cm): 162.6 (2024 01:42)  Weight (kg): 71.5 (2024 05:37)  BMI (kg/m2): 27 (2024 05:37)  BSA (m2): 1.77 (2024 05:37)    PE:    Vital Signs Last 24 Hrs  T(C): 36.8 (2024 14:59), Max: 38.1 (2024 03:03)  T(F): 98.3 (2024 14:59), Max: 100.6 (2024 03:03)  HR: 77 (2024 14:59) (73 - 105)  BP: 105/63 (2024 14:59) (76/45 - 128/102)  BP(mean): 77 (2024 14:59) (52 - 100)  RR: 18 (2024 14:59) (12 - 23)  SpO2: 100% (2024 14:59) (95% - 100%)    Gen: AOx3, NAD, non-toxic, pleasant  CV: S1+S2 normal, nontachycardic  Resp: Clear bilat, no resp distress, no crackles/wheezes, RA  Abd: Soft, nontender, +BS  Ext: No LE edema, no wounds  : No Morales  IV/Skin: No thrombophlebitis  Msk: No low back pain, no arthralgias, no joint swelling  Neuro: No sensory deficits, no motor deficits    LABS:                        9.1    4.01  )-----------( 166      ( 2024 15:35 )             27.9         135  |  102  |  27<H>  ----------------------------<  68<L>  4.9   |  19<L>  |  1.92<H>    Ca    9.4      2024 03:55    TPro  7.3  /  Alb  3.3  /  TBili  1.2  /  DBili  x   /  AST  223<H>  /  ALT  63<H>  /  AlkPhos  155<H>      Urinalysis Basic - ( 2024 06:01 )    Color: Dark Yellow / Appearance: Cloudy / S.015 / pH: x  Gluc: x / Ketone: Trace mg/dL  / Bili: Negative / Urobili: 0.2 mg/dL   Blood: x / Protein: 100 mg/dL / Nitrite: Negative   Leuk Esterase: Moderate / RBC: >50 /HPF / WBC >25 /HPF   Sq Epi: x / Non Sq Epi: x / Bacteria: Negative /HPF    MICROBIOLOGY:    .Blood Blood-Peripheral  23   No growth at 5 days  --  --    .Blood Blood-Peripheral  23   No growth at 5 days  --  Enterococcus faecalis  Stenotrophomonas maltophilia    Clostridium difficile GDH Toxins A&amp;B, EIA:   Negative (24 @ 13:55)  Clostridium difficile GDH Interpretation: Negative for toxigenic C. Difficile.  This specimen is negative for C.  Difficile glutamate dehydrogenase (GDH) antigen and negative for C.  Difficile Toxins A & B, by EIA.  GDH is a highly sensitive screening  marker for C. Difficile that is produced in large amounts by all C.  Difficile strains, both toxigenic and nontoxigenic.  This assay has not  been validated as a test of cure.  Repeat testing during the same episode  of diarrhea is of limited value and is discouraged.  The results of this  assay should always be interpreted in conjunction with patient's clinical  history. (24 @ 13:55)    RADIOLOGY:     CT    IMPRESSION:  Limited evaluation due to motion artifact.    Mildly distended fluid-filled rectum, which may represent proctitis.   Evaluation of the bowel is otherwise very limited on this examination.   Short-term follow-up may be performed for reevaluation as clinically   warranted.    Bilateral perinephric soft tissue stranding. Suggest correlation with   urinalysis to exclude infection. Multiple hypodense right renal lesions   cannot be definitively characterized. Diffusely heterogeneous enhancement   of the right kidney  and  mildly heterogeneous enhancement of the left   kidney may be artifactual in nature due to the motion; however, renal   abnormality, including pyelonephritis or infarct  cannot be excluded in   this setting. Clinical and laboratory correlation is again recommended.   Renal ultrasound is suggested for further evaluation.

## 2024-01-02 NOTE — ED PROVIDER NOTE - ATTENDING CONTRIBUTION TO CARE
Patient with history of BPH with indwelling Harkins, recent admission for UTI, presents with diarrhea, vomiting, low back pain.  Patient has chronic low back pain but states over the last few days the pain has been more severe.  He has had several bouts of diarrhea and vomiting.  He denies any abdominal pain, any change in urinary symptoms.  His daughter who is at bedside states his urine looks very dark and she is concerned about a recurrence of her urinary infection.  She also thinks has been having a fever.  she is also worried because she thinks is gait has been somewhat unstable over the last few days and has led to a minor fall. On exam patient is febrile, initially normotensive, gradually dropped his blood pressure while in the ED and became hypotensive.  Patient appears to be neurologically at his baseline, appears uncomfortable due to his back pain with mild diffuse low back tenderness, nontender abdomen.  No external signs of trauma.  Due to change in vital signs, there is concern about ongoing sepsis versus  less likely traumatic injury.  Patient will had sepsis labs sent, will get imaging to rule out intra-abdominal pathology or traumatic injury from recent fall.  Patient received broad-spectrum antibiotics, was given 2 L of fluid, started on Levophed.  Will likely require ICU admission pending labs and imaging.

## 2024-01-02 NOTE — H&P ADULT - PROBLEM SELECTOR PLAN 5
Baseline creatinine is, Cr today is 1.92  -Urine studies -likely in context of sepsis, can trend with remdesivir

## 2024-01-02 NOTE — H&P ADULT - PROBLEM SELECTOR PLAN 2
-Hold anti-diarreal in context of potential viral or bacterial GI infection -Hold anti-diarrheal medications in context of potential viral or bacterial GI infection

## 2024-01-02 NOTE — ED ADULT NURSE NOTE - CCCP TRG CHIEF CMPLNT
diarrhea Cephalexin Pregnancy And Lactation Text: This medication is Pregnancy Category B and considered safe during pregnancy.  It is also excreted in breast milk but can be used safely for shorter doses.

## 2024-01-02 NOTE — ED ADULT NURSE REASSESSMENT NOTE - NS ED NURSE REASSESS COMMENT FT1
RODNEY room notified RN that patient had 8 beats of Vtach. MD Snow made aware, no RN intervention required at this time. MD also aware of BP, additional IVF to be ordered, no additional RN interventions required.

## 2024-01-02 NOTE — DISCHARGE NOTE PROVIDER - CARE PROVIDERS DIRECT ADDRESSES
,[email or web address] ,[email or web address] ,[email or web address] ,[email or web address]

## 2024-01-02 NOTE — ED ADULT NURSE NOTE - NSFALLRISKINTERV_ED_ALL_ED
Assistance OOB with selected safe patient handling equipment if applicable/Assistance with ambulation/Communicate fall risk and risk factors to all staff, patient, and family/Monitor gait and stability/Provide visual cue: yellow wristband, yellow gown, etc/Reinforce activity limits and safety measures with patient and family/Call bell, personal items and telephone in reach/Instruct patient to call for assistance before getting out of bed/chair/stretcher/Non-slip footwear applied when patient is off stretcher/[place] to call system/Physically safe environment - no spills, clutter or unnecessary equipment/Purposeful Proactive Rounding/Room/bathroom lighting operational, light cord in reach Assistance OOB with selected safe patient handling equipment if applicable/Assistance with ambulation/Communicate fall risk and risk factors to all staff, patient, and family/Monitor gait and stability/Provide visual cue: yellow wristband, yellow gown, etc/Reinforce activity limits and safety measures with patient and family/Call bell, personal items and telephone in reach/Instruct patient to call for assistance before getting out of bed/chair/stretcher/Non-slip footwear applied when patient is off stretcher/[place] to call system/Physically safe environment - no spills, clutter or unnecessary equipment/Purposeful Proactive Rounding/Room/bathroom lighting operational, light cord in reach

## 2024-01-02 NOTE — H&P ADULT - PROBLEM SELECTOR PLAN 7
Diet: Regular  DVT-P; Heparin  Dispo; Pending medical optimization Baseline creatinine is, Cr today is 1.92  -Urine studies

## 2024-01-02 NOTE — CONSULT NOTE ADULT - ASSESSMENT
73 year old male with history of BPH (c/b urinary retention s/p Harkins catheter placement two months ago, exchanged monthly), CKD stage IIIb, and cardiac amyloidosis who presented to the ED for diarrhea/poor PO intake, generalized malaise. Found to be septic, source likely UTI/Covid, MICU consulted for norepi requirement.    ***NOTE INCOMPLETE***    Hypotension:  Suspect multifactorial distributive shock i/s/o sepsis and hypovolemia i/s/o diarrhea  -s/p 3.5 L IVF in ED  -  /86 (MAP 87) on 0.08 mcg/kg/min norepi, would recommend downtitration with MAP goal >65 as pt is clinically stable and seems to be improving, may be able to come off norepi  - additionally give 10-20 mg midodrine now as pt tolerating PO, can continue q8h if patient able to maintain BP off IV vasopressors  - MICU to reassess within 1 hr to further assess toleration of downtitration of pressor requirements    Sepsis:  - agree with broad spectrum abx with vanc zosyn, pt with multiple organisms on Urine culture in past, most recently E faec, pseudomonas on remote admission  - pt without signs of fluid overload, non hypoxic on RA, fluids as tolerated.   - acetaminophen for fevers    SARAH on CKD:  - suspect multifactorial prerenal and intrinsic i/s/o hypovolemia/diarrhea and sepsis/UTI  - treat UTI    COVID:  Nonhypoxia, no o2 requirement, no decadron at this time      MICU to reassess s/p further downtritation of norepi as pt may be able to come off given MAPs in 80s at time of assessment           73 year old male with history of BPH (c/b urinary retention s/p Harkins catheter placement two months ago, exchanged monthly), CKD stage IIIb, and cardiac amyloidosis who presented to the ED for diarrhea/poor PO intake, generalized malaise. Found to be septic, source likely UTI/Covid, MICU consulted for norepi requirement.    Hypotension:  Suspect multifactorial distributive shock i/s/o sepsis and hypovolemia i/s/o diarrhea  -s/p 3.5 L IVF in ED  -  /86 (MAP 87) on 0.08 mcg/kg/min norepi, would recommend downtitration with MAP goal >65 as pt is clinically stable and seems to be improving, may be able to come off norepi  - additionally give 10-20 mg midodrine now as pt tolerating PO, can continue q8h if patient able to maintain BP off IV vasopressors  - On MICU reassessment patient MAP 70 off of norepi gtt, mentating at baseline, A&O x3, answering questions appropriately    Sepsis:  - agree with broad spectrum abx with vanc zosyn, pt with multiple organisms on Urine culture in past, most recently E faec, pseudomonas on remote admission  - pt without signs of fluid overload, non hypoxic on RA, fluids as tolerated.   - acetaminophen for fevers    SARAH on CKD:  - suspect multifactorial prerenal and intrinsic i/s/o hypovolemia/diarrhea and sepsis/UTI  - treat UTI    COVID:  No hypoxia, no o2 requirement, no decadron at this time      Patient maintaing MAP >65 off IV vasopressors, c/w rehydration i/s/o persistent diarrhea, c/w midodrine 20 q8h  No MICU requirements at this time, reconsult as needed    Cholo Delvalle MD (PGY-4)

## 2024-01-02 NOTE — H&P ADULT - HISTORY OF PRESENT ILLNESS
Mr. Sanchez is a  73M w/ BPH (c/b urinary retention s/p Harkins catheter placement; exchanged monthly), CKD stage IIIb, and cardiac amyloidosis brought in by daughter for diarrhea (loose-watery BM's) over the past few days along with nausea and poor PO intake.   Medications   FARCOLDER.   The patient had a fall yesterday which was.   Pt was recently hospitalized in December for urosepsis and abdominal pain .     In the ED VS were notable for low SBPs in 80-90s at one point pt was tachycardic to 102. Labs and Imaging were notable for HgB 9.2, Cr 1.92, , , ALT 63, U/A LE, and Pan CT notable for spinal stenosis, spoondolysthesis, procitits, and pyelonephritis. In the ED pt received 4L of fluids, ofirmev, midodrine, zoysn, vancomycin, and norephinephrine GTT. MICU consulted by ED due to hypotension requiring norepi gtt, pt not deemed a candidate at the time.     Mr. Sanchez is a 73M w/ BPH (c/b urinary retention s/p Harkins catheter placement; exchanged monthly), CKD stage IIIb, and cardiac amyloidosis brought in by daughter for diarrhea (loose-watery BM's) over the past few days. The pt reports no new medications or food triggers. No relieving factors. Pt could not delineate category of diarrhea.  The patient had a fall yesterday, pt denied any preceding nausea or associated LOC. Pt was recently hospitalized in December for urosepsis and abdominal pain and was found to have E. Faecalis and S. Maltophilia     In the ED VS were notable for low SBPs in 80-90s at one point pt was tachycardic to 102. Labs and imaging were notable for HgB 9.2, Cr 1.92, , , ALT 63, U/A LE, and Pan CT notable for spinal stenosis, spoondolysthesis, procitits, and pyelonephritis. In the ED pt received 4L of fluids, ofirmev, midodrine, zoysn, vancomycin, and norephinephrine GTT. MICU consulted by ED due to hypotension requiring norepi gtt, pt not deemed a candidate at the time.

## 2024-01-02 NOTE — H&P ADULT - NSHPSOCIALHISTORY_GEN_ALL_CORE
Pt lives with. Pt does not drink or smoke. Pt lives with daughter and wife . Pt does not drink or smoke.

## 2024-01-02 NOTE — H&P ADULT - NSHPLABSRESULTS_GEN_ALL_CORE
LABS:                          9.1    4.01  )-----------( 166      ( 02 Jan 2024 15:35 )             27.9     01-02    135  |  104  |  26<H>  ----------------------------<  112<H>  4.6   |  19<L>  |  1.86<H>    Ca    8.4      02 Jan 2024 15:35    TPro  6.1  /  Alb  2.9<L>  /  TBili  0.9  /  DBili  x   /  AST  175<H>  /  ALT  52<H>  /  AlkPhos  117  01-02    LIVER FUNCTIONS - ( 02 Jan 2024 15:35 )  Alb: 2.9 g/dL / Pro: 6.1 g/dL / ALK PHOS: 117 U/L / ALT: 52 U/L / AST: 175 U/L / GGT: x             Urinalysis Basic - ( 02 Jan 2024 15:35 )    Color: x / Appearance: x / SG: x / pH: x  Gluc: 112 mg/dL / Ketone: x  / Bili: x / Urobili: x   Blood: x / Protein: x / Nitrite: x   Leuk Esterase: x / RBC: x / WBC x   Sq Epi: x / Non Sq Epi: x / Bacteria: x

## 2024-01-02 NOTE — H&P ADULT - ATTENDING COMMENTS
Mr. Sanchez is a  73M w/ BPH (c/b urinary retention s/p Harkins catheter placement; exchanged monthly), CKD stage IIIb, and cardiac amyloidosis brought in by daughter for diarrhea found to have hypotension likely due to sepsis.     1. Pyleo/ Urosepsis S/p 4L IVF and now with midodrine 20 mg q8h. Started on Zosyn. CT abd pel showing perinephric stranding. Harkins changed in ER by urology. MICU consulted due to pt initially requiring pressors, now off. Will monitor MAP. If continued hypotension may fluid bolus.   2. COVID+: C/w remdesivir per ID appreciate their recs for abx and antivirals. Not requiring O2 at this time.   3. Elevated LFTs possibly shock liver in the setting of hypotension, trend while on remdesivir, hold hepatotoxic agents  4. CKD Trend Cr Hold nephrotoxic agents  5. BPH: Harkins changed in ED by urology, maintain.   6. Amyloidosis: Will consult cardiology for management   7. Fall 2/2 to dizziness: Check Orthostatics. Rehydrating.     Other details as above Mr. Sanchez is a  73M w/ BPH (c/b urinary retention s/p Harkins catheter placement; exchanged monthly), CKD stage IIIb, and cardiac amyloidosis brought in by daughter for diarrhea found to have hypotension likely due to sepsis.     1. Pyleo/ Urosepsis S/p 4L IVF and now with midodrine 20 mg q8h. Started on Zosyn. CT abd pel showing perinephric stranding. Harkins changed in ER by urology. MICU consulted due to pt initially requiring pressors, now off. Will monitor MAP. If continued hypotension may fluid bolus.   2. COVID+: C/w remdesivir per ID appreciate their recs for abx and antivirals. Not requiring O2 at this time.   3. Elevated LFTs possibly shock liver in the setting of hypotension, trend while on remdesivir, hold hepatotoxic agents  4. CKD Trend Cr Hold nephrotoxic agents  5. BPH: Harkins changed in ED by urology, maintain.   6. Amyloidosis: Will consult cardiology for management   7. Fall 2/2 to dizziness: CTH negative for acute changes. CT L spine showing spinal stenosis L2-L5. Check Orthostatics. Rehydrating.     Other details as above

## 2024-01-02 NOTE — DISCHARGE NOTE PROVIDER - CARE PROVIDER_API CALL
Vicente Milan  Urology  [address] [place] [address], [address]  [place] [address]  Phone: [phone]  Fax: [phone]  Established Patient  Follow Up Time: Routine    Rose Mary Covarrubias  NP in [address], [address]  [place] [address]  Phone: [phone]  Fax: [phone]  Established Patient  Follow Up Time: Routine   Vicente Milan  Urology  [address] [place] [address], [address]  [place] [address]  Phone: [phone]  Fax: [phone]  Established Patient  Follow Up Time: Routine    Rose Mary Covarrubias  NP in [address], [address]  [place] [address]  Phone: [phone]  Fax: [phone]  Established Patient  Follow Up Time: Routine   Vicente Milan  Urology  [address] [place] [address], [address]  [place] [address]  Phone: [phone]  Fax: [phone]  Established Patient  Follow Up Time: Routine    Rose Mary Covarrubias  NP in [address], [address]  [place] [address]  Phone: [phone]  Fax: [phone]  Established Patient  Follow Up Time: Routine   Vicente Milan  Urology  [address] [place] [address], [address]  [place] [address]  Phone: [phone]  Fax: [phone]  Established Patient  Follow Up Time: Routine    Rose Mary Covarrubias  NP in [address], [address]  [place] [address]  Phone: [phone]  Fax: [phone]  Established Patient  Follow Up Time: Routine

## 2024-01-02 NOTE — DISCHARGE NOTE PROVIDER - NSDCCPTREATMENT_GEN_ALL_CORE_FT
PRINCIPAL PROCEDURE  Procedure: CT abdomen pelvis  Findings and Treatment:   Mildly distended fluid-filled rectum, which may represent proctitis.   Evaluation of the bowel is otherwise very limited on this examination.   Short-term follow-up may be performed for reevaluation as clinically   warranted.  Bilateral perinephric soft tissue stranding. Suggest correlation with   urinalysis to exclude infection. Multiple hypodense right renal lesions   cannot be definitively characterized. Diffusely heterogeneous enhancement   of the right kidney  and  mildly heterogeneous enhancement of the left   kidney may be artifactual in nature due to the motion; however, renal   abnormality, including pyelonephritis or infarct  cannot be excluded in   this setting. Clinical and laboratory correlation is again recommended.   Renal ultrasound is suggested for further evaluation.        SECONDARY PROCEDURE  Procedure: CT head  Findings and Treatment:   CT HEAD: Unremarkable head CT.      Procedure: CT cervical spine w IV contrast  Findings and Treatment: CT cervical spine:   No vertebral fracture is recognized.  Posterior   fusion is noted at C3-C7 utilizing pedicle screws and fusion rods with   laminectomies at C4-C6.   Moderate to severe degenerative disc disease   and spondylosis at C2-3 through C7-T1 with loss of disc height and   associated degenerative endplate changes. There is narrowing of the   BILATERAL C2-3 through C7-T1 neural foramina due to uncovertebral   spurring and facet osteophytic hypertrophy.    Procedure: CT lumbar spine  Findings and Treatment: Grade 1 anterior spondylolisthesis at L4-5 on a degenerative   basis due to facet osteophytic hypertrophy. Mild degenerative disc   disease and spondylosis at L1-2 through L5-S1 with loss of disc height   and associated degenerative endplate changes. DISH involves the lower   thoracic spine. Mild disc bulges are noted at L1-2 through L5-S1 which   flatten the ventral thecal sac and narrow the BILATERAL neural foramina.   Moderate central stenosis at L2-3, L3-4 and severe central stenosis at   L4-5 mild central stenosis at L5-S1 on a degenerative basis due to disc   bulge, facet osteophytic hypertrophy and redundancy of the ligamentum   flavum. Superimposed LEFT foraminal disc herniations at L2-3 and L4-5   further narrow the LEFT neural foramen.  No vertebral fracture is        PRINCIPAL PROCEDURE  Procedure: CT abdomen pelvis  Findings and Treatment:   Mildly distended fluid-filled rectum, which may represent proctitis.   Evaluation of the bowel is otherwise very limited on this examination.   Short-term follow-up may be performed for reevaluation as clinically   warranted.  Bilateral perinephric soft tissue stranding. Suggest correlation with   urinalysis to exclude infection. Multiple hypodense right renal lesions   cannot be definitively characterized. Diffusely heterogeneous enhancement   of the right kidney  and  mildly heterogeneous enhancement of the left   kidney may be artifactual in nature due to the motion; however, renal   abnormality, including pyelonephritis or infarct  cannot be excluded in   this setting. Clinical and laboratory correlation is again recommended.   Renal ultrasound is suggested for further evaluation.        SECONDARY PROCEDURE  Procedure: CT head  Findings and Treatment:   CT HEAD: Unremarkable head CT.      Procedure: CT cervical spine w IV contrast  Findings and Treatment: CT cervical spine:   No vertebral fracture is recognized.  Posterior   fusion is noted at C3-C7 utilizing pedicle screws and fusion rods with   laminectomies at C4-C6.   Moderate to severe degenerative disc disease   and spondylosis at C2-3 through C7-T1 with loss of disc height and   associated degenerative endplate changes. There is narrowing of the   BILATERAL C2-3 through C7-T1 neural foramina due to uncovertebral   spurring and facet osteophytic hypertrophy.    Procedure: CT lumbar spine  Findings and Treatment: Grade 1 anterior spondylolisthesis at L4-5 on a degenerative   basis due to facet osteophytic hypertrophy. Mild degenerative disc   disease and spondylosis at L1-2 through L5-S1 with loss of disc height   and associated degenerative endplate changes. DISH involves the lower   thoracic spine. Mild disc bulges are noted at L1-2 through L5-S1 which   flatten the ventral thecal sac and narrow the BILATERAL neural foramina.   Moderate central stenosis at L2-3, L3-4 and severe central stenosis at   L4-5 mild central stenosis at L5-S1 on a degenerative basis due to disc   bulge, facet osteophytic hypertrophy and redundancy of the ligamentum   flavum. Superimposed LEFT foraminal disc herniations at L2-3 and L4-5   further narrow the LEFT neural foramen.  No vertebral fracture is       Procedure: MRI brain  Findings and Treatment: 1/8/24 MRI brain w/wo contrast  VENTRICLES AND SULCI:  Normal.  INTRA-AXIAL:  No mass, or evidence of acute cerebral ischemia. No   abnormal enhancement. Scattered bilateral T2/FLAIR hyperintense foci,   likely chronic white matter ischemic type changes.  EXTRA-AXIAL:  No mass or collection.  VISUALIZED SINUSES:  Polyps mucosal thickening involving a few the   paranasal sinuses which appear similar to CT 1/2/2024.  VISUALIZED MASTOIDS:  Clear.  CALVARIUM:  Normal.  VASCULATURE:  Patent major arterial, and dural venous sinuses.  PITUITARY GLAND:  There is a nonspecific area of decreased enhancement   involving the left pituitary gland. This is best seen on series 12 image   6 and measures approximately 6.1 x 2.4 mm. Possibly of a pituitary   microadenoma cannot entirely excluded..  CHIASM:  Normal in appearance and position.  SUPRASELLAR: No mass or abnormal enhancement.  CAVERNOUS SINUSES: Normal.  MISCELLANEOUS:  None.  IMPRESSION:  Pituitary lesion suspected as described above.  Scattered bilateral T2/FLAIR hyperintensefoci, likely chronic white   matter ischemic type changes.    Procedure: XR foot right, 3 views  Findings and Treatment: FINDINGS:  No acute displaced fracture or dislocation.  Mixed productive and erosive arthropathy most advanced at the MTP joint   of the great toe with mild valgus deformity, as well as at the fifth   metatarsal base. Joint spaces are otherwise grossly anatomic in alignment  Bony mineralization within normal limits.  No aggressive osseous neoplasm.  No radiopaque foreign body.  IMPRESSION:  No acute displaced right foot fracture. Chronic gouty arthropathy.

## 2024-01-02 NOTE — ED ADULT NURSE REASSESSMENT NOTE - NS ED NURSE REASSESS COMMENT FT1
Patient had a run of 8 beats of v.tach MD Ruiz aware. Patient is awake and alert. Print out of EKG strip given to MD Ruiz.

## 2024-01-02 NOTE — ED ADULT NURSE REASSESSMENT NOTE - NS ED NURSE REASSESS COMMENT FT1
3 RNs attempted to replace pt chronic morales catheter. RN unsuccessful with morales insertion. Urology paged by MD to insert morales/

## 2024-01-02 NOTE — ED ADULT NURSE REASSESSMENT NOTE - NS ED NURSE REASSESS COMMENT FT1
MD Winkler aware of BP at this time. MICU to be consulted. Patient to remain on 0.1mcg/kg/min of levo. IVF running per order.

## 2024-01-02 NOTE — ED ADULT NURSE REASSESSMENT NOTE - NS ED NURSE REASSESS COMMENT FT1
Patient sitting in stretcher. Breathing spontaneous and unlabored on room air. CT results pending.  updated on plan of care. None known

## 2024-01-02 NOTE — ED ADULT NURSE REASSESSMENT NOTE - NS ED NURSE REASSESS COMMENT FT1
Report received from ILDEFONSO Lucero. Patient is A+Ox3, returned from CT scan at time of report. Patient c/o continued back pain. Denies abdominal pain. Breathing spontaneous and unlabored on room air. Skin warm dry and of color appropriate for ethnicity. NSR on cardiac monitor. Daughter at bedside. Patient and daughter updated on plan of care. Pending dispo. Report received from ILDEFONSO Lucero. Patient is A+Ox3, returned from CT scan at time of report. Patient c/o continued back pain. Denies abdominal pain. Breathing spontaneous and unlabored on room air. Skin warm dry and of color appropriate for ethnicity. NSR on cardiac monitor. Harkins catheter draining clear yellow urine. Daughter at bedside. Patient and daughter updated on plan of care. Pending dispo.

## 2024-01-02 NOTE — H&P ADULT - PROBLEM SELECTOR PLAN 4
- c/w Colchicine & Vyndamax - c/w Colchicine & Vyndamax  - Dr. Saravia consulted would appreciate recs

## 2024-01-02 NOTE — ED ADULT NURSE NOTE - OBJECTIVE STATEMENT
73y male PMH BPH, CKD, HTN to the ED from home via triage c/o of diarrhea. daughter providing most of the history states that pt has been experiencing a few episodes of watery diarrhea starting today. Pt denying having diarrhea at this time but stating that pt has back pain. As per daughter pt has chronic back pain. Pt febrile to 100.6. daughter reports that pt has frequent UTIs and sometimes pt has diarrhea with the UTIs. No recent abx use. Pt has chronic morales catheter which was changed a few weeks ago. Pt placed on cardiac monitor- in NSR at this time.  Stretcher in lowest position and locked, appropriate side rails in place, room cleared of clutter and safety hazards,  blankets given for comfort

## 2024-01-02 NOTE — DISCHARGE NOTE PROVIDER - HOSPITAL COURSE
Discharge Summary     Admit Date: 01-02-24  Discharge Date:    Admission diagnoses: Sepsis  Discharge diagnoses:     Hospital Course:   For full details, please see H&P, progress notes, consult notes and ancillary notes. Briefly, DEEPTHI GÓMEZ is a  73M w/ BPH (c/b urinary retention s/p Harkins catheter placement; exchanged monthly), CKD stage IIIb, and cardiac amyloidosis brought in by daughter for diarrhea found to have hypotension likely due to sepsis. The patients hypotension was treated with midodrine and fluids. We treated the patients sepsis with IV Zosyn.     On day of discharge, patient is clinically stable with no new exam findings or acute symptoms compared to prior. The patient was seen by the attending physician on the date of discharge and deemed stable and acceptable for discharge. The patient's chronic medical conditions were treated accordingly per the patient's home medication regimen. The patient's medication reconciliation (with changes made to chronic medications), follow up appointments, discharge orders, instructions, and significant lab and diagnostic studies are as noted.     Discharge follow up action items:     1. Follow up with PCP in 1-2 weeks. Follow up with Infectious Disease in one week.   2. Follow up labs, path, & imaging ***  3. Medication changes ***  4. On hold medications ***    Patient's ordered code status: ***    Patient disposition: ***     Discharge Summary     Admit Date: 01-02-24  Discharge Date:    Admission diagnoses: Sepsis  Discharge diagnoses:     Hospital Course:   For full details, please see H&P, progress notes, consult notes and ancillary notes. Briefly, DEEPTHI GÓMEZ is a  73M w/ BPH (c/b urinary retention s/p Harkins catheter placement; exchanged monthly), CKD stage IIIb, and cardiac amyloidosis brought in by daughter for diarrhea found to have hypotension likely due to sepsis. The patients hypotension was treated with midodrine and fluids. We treated the patients sepsis with IV Zosyn. We treated the patients diarrhea with loperamide.     On day of discharge, patient is clinically stable with no new exam findings or acute symptoms compared to prior. The patient was seen by the attending physician on the date of discharge and deemed stable and acceptable for discharge. The patient's chronic medical conditions were treated accordingly per the patient's home medication regimen. The patient's medication reconciliation (with changes made to chronic medications), follow up appointments, discharge orders, instructions, and significant lab and diagnostic studies are as noted.     Discharge follow up action items:     1. Follow up with PCP in 1-2 weeks. Follow up with Infectious Disease in one week.   2. Follow up labs, path, & imaging ***  3. Medication changes ***  4. On hold medications ***    Patient's ordered code status: ***    Patient disposition: ***     Discharge Summary     Admit Date: 01-02-24  Discharge Date:    Admission diagnoses: Sepsis  Discharge diagnoses:     Hospital Course:   For full details, please see H&P, progress notes, consult notes and ancillary notes. Briefly, DEEPTHI GÓMEZ is a  73M w/ BPH (c/b urinary retention s/p Harkins catheter placement; exchanged monthly), CKD stage IIIb, and cardiac amyloidosis brought in by daughter for diarrhea found to have hypotension likely due to sepsis, suspected urinary source supported by perinephric stranding on CT. Also with proctitis possibly contributing to sepsis picture. Additionally, the patient was found to be positive for COVID-19 which likely further contributed. Patient was initially treated with remdesivir but this was discontinued on 1/3 due to SARAH-on-CKD and concern that remdesivir may have contributed to worsening kidney function. We treated the patients sepsis with IV Zosyn. We treated the patients diarrhea with loperamide. The patients hypotension was treated with midodrine and fluids, briefly also required norepinephrine gtt but did not require long-term course of pressors and was deemed not to be candidate for MICU. 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 CT abdomen without adrenal gland abnormality, CT head without pituitary etiology however not an optimal study to evaluate pituitary and therefore MRI sella/pituitary was performed. MRI brain with pituitary protocol revealed nonspecific area of decreased enhancement involving left pituitary gland, per neuroradiology ddx pituitary adenoma vs pituitary cyst, per endocrinology no further inpatient workup required, recommended outpatient follow up with repeat MRI sella w/wo cont in 6-12 months to ensure no growth of the arlyn. Patient's adrenal insufficiency was treated with hydrocortisone, initially 20 mg BID, then weaned to 20 mg Q8AM and 10 mg Q3PM. Patient educated on stress dosing protocol, provided with education materials for adrenal insufficiency.    Hospital course c/b SARAH-on-CKD, was followed by nephrology, deemed to be likely 2/2 ATN ISO hemodynamic instability + pyelo +/- possible contribution from remdesivir. Patient also developed right foot pain, XR was obtained and findings were consistent with gouty arthropathy. Planned to treat pain with acetaminophen PRN however symptoms resolved spontaneously. Patient was also followed by the cardiology service for his cardiac amyloidosis, was continued on his home Vyndamax.     On day of discharge, patient is clinically stable with no new exam findings or acute symptoms compared to prior. The patient was seen by the attending physician on the date of discharge and deemed stable and acceptable for discharge. The patient's chronic medical conditions were treated accordingly per the patient's home medication regimen. The patient's medication reconciliation (with changes made to chronic medications), follow up appointments, discharge orders, instructions, and significant lab and diagnostic studies are as noted.     Discharge follow up action items:     1. Follow up with PCP in 1-2 weeks. Follow up with Cardiology in 1-2 weeks. Follow up with endocrinology in approx 2 weeks. Follow up with urology as scheduled.   2. Follow up labs, path, & imaging: Repeat MRI sella with and without contrast in 6-12 months.   3. Medication changes: ***   4. On hold medications ***    Patient's ordered code status: full    Patient disposition: CHERYL      Discharge Summary     Admit Date: 01-02-24  Discharge Date:    Admission diagnoses: Sepsis  Discharge diagnoses:     Hospital Course:   For full details, please see H&P, progress notes, consult notes and ancillary notes. Briefly, DEEPTHI GÓMEZ is a  73M w/ BPH (c/b urinary retention s/p Harkins catheter placement; exchanged monthly), CKD stage IIIb, and cardiac amyloidosis brought in by daughter for diarrhea found to have hypotension likely due to sepsis, suspected urinary source supported by perinephric stranding on CT. Also with proctitis possibly contributing to sepsis picture. Additionally, the patient was found to be positive for COVID-19 which likely further contributed. Patient was initially treated with remdesivir but this was discontinued on 1/3 due to SARAH-on-CKD and concern that remdesivir may have contributed to worsening kidney function. We treated the patients sepsis with IV Zosyn. We treated the patients diarrhea with loperamide. The patients hypotension was treated with midodrine and fluids, briefly also required norepinephrine gtt but did not require long-term course of pressors and was deemed not to be candidate for MICU. 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 CT abdomen without adrenal gland abnormality, CT head without pituitary etiology however not an optimal study to evaluate pituitary and therefore MRI sella/pituitary was performed. MRI brain with pituitary protocol revealed nonspecific area of decreased enhancement involving left pituitary gland, per neuroradiology ddx pituitary adenoma vs pituitary cyst, per endocrinology no further inpatient workup required, recommended outpatient follow up with repeat MRI sella w/wo cont in 6-12 months to ensure no growth of the arlyn. Patient's adrenal insufficiency was treated with hydrocortisone, initially 20 mg BID, then weaned to 20 mg Q8AM and 10 mg Q3PM. Patient educated on stress dosing protocol, provided with education materials for adrenal insufficiency.    Hospital course c/b SARAH-on-CKD, was followed by nephrology, deemed to be likely 2/2 ATN ISO hemodynamic instability + pyelo +/- possible contribution from remdesivir. Patient also developed right foot pain, XR was obtained and findings were consistent with gouty arthropathy. Planned to treat pain with acetaminophen PRN however symptoms resolved spontaneously. Patient was also followed by the cardiology service for his cardiac amyloidosis, was continued on his home Vyndamax.     On day of discharge, patient is clinically stable with no new exam findings or acute symptoms compared to prior. The patient was seen by the attending physician on the date of discharge and deemed stable and acceptable for discharge. The patient's chronic medical conditions were treated accordingly per the patient's home medication regimen. The patient's medication reconciliation (with changes made to chronic medications), follow up appointments, discharge orders, instructions, and significant lab and diagnostic studies are as noted.     Discharge follow up action items:     1. Follow up with PCP in 1-2 weeks. Follow up with Cardiology in 1-2 weeks. Follow up with endocrinology in approx 2 weeks. Follow up with urology as scheduled.   2. Follow up labs, path, & imaging: Repeat MRI sella with and without contrast in 6-12 months.   3. Medication changes: START hydrocortisone 20 mg QD at 8AM and hydrocortisone 10 mg QD at 3pm  4. On hold medications: hold gabapentin until next appointment with PCP     Patient's ordered code status: full    Patient disposition: CHERYL

## 2024-01-02 NOTE — DISCHARGE NOTE PROVIDER - PROVIDER TOKENS
PROVIDER:[TOKEN:[[id number]],FOLLOWUP:[Routine],ESTABLISHEDPATIENT:[T]],PROVIDER:[TOKEN:[[id number]],FOLLOWUP:[Routine],ESTABLISHEDPATIENT:[T]] PROVIDER:[TOKEN:[[id number]],FOLLOWUP:[Routine],ESTABLISHEDPATIENT:[T]],PROVIDER:[TOKEN:[[id number]],FOLLOWUP:[Routine],ESTABLISHEDPATIENT:[T]] PROVIDER:[TOKEN:[[id number]],FOLLOWUP:[Routine],ESTABLISHEDPATIENT:[T]],PROVIDER:[TOKEN:[[id number]],FOLLOWUP:[Routine],ESTABLISHEDPATIENT:[T]] PROVIDER:[TOKEN:[[id number]],FOLLOWUP:[Routine],ESTABLISHEDPATIENT:[T]],PROVIDER:[TOKEN:[[id number]],FOLLOWUP:[Routine],ESTABLISHEDPATIENT:[T]]

## 2024-01-03 DIAGNOSIS — D64.9 ANEMIA, UNSPECIFIED: ICD-10-CM

## 2024-01-03 LAB
A1C WITH ESTIMATED AVERAGE GLUCOSE RESULT: 5 % — SIGNIFICANT CHANGE UP (ref 4–5.6)
A1C WITH ESTIMATED AVERAGE GLUCOSE RESULT: 5 % — SIGNIFICANT CHANGE UP (ref 4–5.6)
ALBUMIN SERPL ELPH-MCNC: 3.1 G/DL — LOW (ref 3.3–5)
ALBUMIN SERPL ELPH-MCNC: 3.1 G/DL — LOW (ref 3.3–5)
ALP SERPL-CCNC: 113 U/L — SIGNIFICANT CHANGE UP (ref 40–120)
ALP SERPL-CCNC: 113 U/L — SIGNIFICANT CHANGE UP (ref 40–120)
ALT FLD-CCNC: 54 U/L — HIGH (ref 10–45)
ALT FLD-CCNC: 54 U/L — HIGH (ref 10–45)
ANION GAP SERPL CALC-SCNC: 11 MMOL/L — SIGNIFICANT CHANGE UP (ref 5–17)
ANION GAP SERPL CALC-SCNC: 11 MMOL/L — SIGNIFICANT CHANGE UP (ref 5–17)
APPEARANCE UR: CLEAR — SIGNIFICANT CHANGE UP
APPEARANCE UR: CLEAR — SIGNIFICANT CHANGE UP
AST SERPL-CCNC: 179 U/L — HIGH (ref 10–40)
AST SERPL-CCNC: 179 U/L — HIGH (ref 10–40)
BACTERIA # UR AUTO: NEGATIVE /HPF — SIGNIFICANT CHANGE UP
BACTERIA # UR AUTO: NEGATIVE /HPF — SIGNIFICANT CHANGE UP
BASOPHILS # BLD AUTO: 0.04 K/UL — SIGNIFICANT CHANGE UP (ref 0–0.2)
BASOPHILS # BLD AUTO: 0.04 K/UL — SIGNIFICANT CHANGE UP (ref 0–0.2)
BASOPHILS NFR BLD AUTO: 0.7 % — SIGNIFICANT CHANGE UP (ref 0–2)
BASOPHILS NFR BLD AUTO: 0.7 % — SIGNIFICANT CHANGE UP (ref 0–2)
BILIRUB SERPL-MCNC: 0.6 MG/DL — SIGNIFICANT CHANGE UP (ref 0.2–1.2)
BILIRUB SERPL-MCNC: 0.6 MG/DL — SIGNIFICANT CHANGE UP (ref 0.2–1.2)
BILIRUB UR-MCNC: NEGATIVE — SIGNIFICANT CHANGE UP
BILIRUB UR-MCNC: NEGATIVE — SIGNIFICANT CHANGE UP
BUN SERPL-MCNC: 30 MG/DL — HIGH (ref 7–23)
BUN SERPL-MCNC: 30 MG/DL — HIGH (ref 7–23)
CALCIUM SERPL-MCNC: 9.1 MG/DL — SIGNIFICANT CHANGE UP (ref 8.4–10.5)
CALCIUM SERPL-MCNC: 9.1 MG/DL — SIGNIFICANT CHANGE UP (ref 8.4–10.5)
CAST: 1 /LPF — SIGNIFICANT CHANGE UP (ref 0–4)
CAST: 1 /LPF — SIGNIFICANT CHANGE UP (ref 0–4)
CHLORIDE SERPL-SCNC: 108 MMOL/L — SIGNIFICANT CHANGE UP (ref 96–108)
CHLORIDE SERPL-SCNC: 108 MMOL/L — SIGNIFICANT CHANGE UP (ref 96–108)
CHOLEST SERPL-MCNC: 109 MG/DL — SIGNIFICANT CHANGE UP
CHOLEST SERPL-MCNC: 109 MG/DL — SIGNIFICANT CHANGE UP
CO2 SERPL-SCNC: 19 MMOL/L — LOW (ref 22–31)
CO2 SERPL-SCNC: 19 MMOL/L — LOW (ref 22–31)
COLOR SPEC: YELLOW — SIGNIFICANT CHANGE UP
COLOR SPEC: YELLOW — SIGNIFICANT CHANGE UP
CREAT ?TM UR-MCNC: 56 MG/DL — SIGNIFICANT CHANGE UP
CREAT ?TM UR-MCNC: 56 MG/DL — SIGNIFICANT CHANGE UP
CREAT SERPL-MCNC: 2.31 MG/DL — HIGH (ref 0.5–1.3)
CREAT SERPL-MCNC: 2.31 MG/DL — HIGH (ref 0.5–1.3)
DIFF PNL FLD: ABNORMAL
DIFF PNL FLD: ABNORMAL
EGFR: 29 ML/MIN/1.73M2 — LOW
EGFR: 29 ML/MIN/1.73M2 — LOW
EOSINOPHIL # BLD AUTO: 0.11 K/UL — SIGNIFICANT CHANGE UP (ref 0–0.5)
EOSINOPHIL # BLD AUTO: 0.11 K/UL — SIGNIFICANT CHANGE UP (ref 0–0.5)
EOSINOPHIL NFR BLD AUTO: 1.9 % — SIGNIFICANT CHANGE UP (ref 0–6)
EOSINOPHIL NFR BLD AUTO: 1.9 % — SIGNIFICANT CHANGE UP (ref 0–6)
ESTIMATED AVERAGE GLUCOSE: 97 MG/DL — SIGNIFICANT CHANGE UP (ref 68–114)
ESTIMATED AVERAGE GLUCOSE: 97 MG/DL — SIGNIFICANT CHANGE UP (ref 68–114)
GLUCOSE BLDC GLUCOMTR-MCNC: 126 MG/DL — HIGH (ref 70–99)
GLUCOSE BLDC GLUCOMTR-MCNC: 126 MG/DL — HIGH (ref 70–99)
GLUCOSE BLDC GLUCOMTR-MCNC: 45 MG/DL — CRITICAL LOW (ref 70–99)
GLUCOSE BLDC GLUCOMTR-MCNC: 45 MG/DL — CRITICAL LOW (ref 70–99)
GLUCOSE BLDC GLUCOMTR-MCNC: 49 MG/DL — CRITICAL LOW (ref 70–99)
GLUCOSE BLDC GLUCOMTR-MCNC: 49 MG/DL — CRITICAL LOW (ref 70–99)
GLUCOSE SERPL-MCNC: 49 MG/DL — CRITICAL LOW (ref 70–99)
GLUCOSE SERPL-MCNC: 49 MG/DL — CRITICAL LOW (ref 70–99)
GLUCOSE UR QL: NEGATIVE MG/DL — SIGNIFICANT CHANGE UP
GLUCOSE UR QL: NEGATIVE MG/DL — SIGNIFICANT CHANGE UP
HCT VFR BLD CALC: 30 % — LOW (ref 39–50)
HCT VFR BLD CALC: 30 % — LOW (ref 39–50)
HDLC SERPL-MCNC: 32 MG/DL — LOW
HDLC SERPL-MCNC: 32 MG/DL — LOW
HGB BLD-MCNC: 9.7 G/DL — LOW (ref 13–17)
HGB BLD-MCNC: 9.7 G/DL — LOW (ref 13–17)
IMM GRANULOCYTES NFR BLD AUTO: 0.2 % — SIGNIFICANT CHANGE UP (ref 0–0.9)
IMM GRANULOCYTES NFR BLD AUTO: 0.2 % — SIGNIFICANT CHANGE UP (ref 0–0.9)
KETONES UR-MCNC: NEGATIVE MG/DL — SIGNIFICANT CHANGE UP
KETONES UR-MCNC: NEGATIVE MG/DL — SIGNIFICANT CHANGE UP
LEUKOCYTE ESTERASE UR-ACNC: ABNORMAL
LEUKOCYTE ESTERASE UR-ACNC: ABNORMAL
LIPID PNL WITH DIRECT LDL SERPL: 58 MG/DL — SIGNIFICANT CHANGE UP
LIPID PNL WITH DIRECT LDL SERPL: 58 MG/DL — SIGNIFICANT CHANGE UP
LYMPHOCYTES # BLD AUTO: 2.13 K/UL — SIGNIFICANT CHANGE UP (ref 1–3.3)
LYMPHOCYTES # BLD AUTO: 2.13 K/UL — SIGNIFICANT CHANGE UP (ref 1–3.3)
LYMPHOCYTES # BLD AUTO: 36.2 % — SIGNIFICANT CHANGE UP (ref 13–44)
LYMPHOCYTES # BLD AUTO: 36.2 % — SIGNIFICANT CHANGE UP (ref 13–44)
MAGNESIUM SERPL-MCNC: 2.2 MG/DL — SIGNIFICANT CHANGE UP (ref 1.6–2.6)
MAGNESIUM SERPL-MCNC: 2.2 MG/DL — SIGNIFICANT CHANGE UP (ref 1.6–2.6)
MCHC RBC-ENTMCNC: 26.4 PG — LOW (ref 27–34)
MCHC RBC-ENTMCNC: 26.4 PG — LOW (ref 27–34)
MCHC RBC-ENTMCNC: 32.3 GM/DL — SIGNIFICANT CHANGE UP (ref 32–36)
MCHC RBC-ENTMCNC: 32.3 GM/DL — SIGNIFICANT CHANGE UP (ref 32–36)
MCV RBC AUTO: 81.7 FL — SIGNIFICANT CHANGE UP (ref 80–100)
MCV RBC AUTO: 81.7 FL — SIGNIFICANT CHANGE UP (ref 80–100)
MONOCYTES # BLD AUTO: 0.87 K/UL — SIGNIFICANT CHANGE UP (ref 0–0.9)
MONOCYTES # BLD AUTO: 0.87 K/UL — SIGNIFICANT CHANGE UP (ref 0–0.9)
MONOCYTES NFR BLD AUTO: 14.8 % — HIGH (ref 2–14)
MONOCYTES NFR BLD AUTO: 14.8 % — HIGH (ref 2–14)
NEUTROPHILS # BLD AUTO: 2.73 K/UL — SIGNIFICANT CHANGE UP (ref 1.8–7.4)
NEUTROPHILS # BLD AUTO: 2.73 K/UL — SIGNIFICANT CHANGE UP (ref 1.8–7.4)
NEUTROPHILS NFR BLD AUTO: 46.2 % — SIGNIFICANT CHANGE UP (ref 43–77)
NEUTROPHILS NFR BLD AUTO: 46.2 % — SIGNIFICANT CHANGE UP (ref 43–77)
NITRITE UR-MCNC: NEGATIVE — SIGNIFICANT CHANGE UP
NITRITE UR-MCNC: NEGATIVE — SIGNIFICANT CHANGE UP
NON HDL CHOLESTEROL: 78 MG/DL — SIGNIFICANT CHANGE UP
NON HDL CHOLESTEROL: 78 MG/DL — SIGNIFICANT CHANGE UP
NRBC # BLD: 0 /100 WBCS — SIGNIFICANT CHANGE UP (ref 0–0)
NRBC # BLD: 0 /100 WBCS — SIGNIFICANT CHANGE UP (ref 0–0)
OSMOLALITY UR: 378 MOS/KG — SIGNIFICANT CHANGE UP (ref 300–900)
OSMOLALITY UR: 378 MOS/KG — SIGNIFICANT CHANGE UP (ref 300–900)
PH UR: 6 — SIGNIFICANT CHANGE UP (ref 5–8)
PH UR: 6 — SIGNIFICANT CHANGE UP (ref 5–8)
PHOSPHATE SERPL-MCNC: 3.9 MG/DL — SIGNIFICANT CHANGE UP (ref 2.5–4.5)
PHOSPHATE SERPL-MCNC: 3.9 MG/DL — SIGNIFICANT CHANGE UP (ref 2.5–4.5)
PLATELET # BLD AUTO: 208 K/UL — SIGNIFICANT CHANGE UP (ref 150–400)
PLATELET # BLD AUTO: 208 K/UL — SIGNIFICANT CHANGE UP (ref 150–400)
POTASSIUM SERPL-MCNC: 4.4 MMOL/L — SIGNIFICANT CHANGE UP (ref 3.5–5.3)
POTASSIUM SERPL-MCNC: 4.4 MMOL/L — SIGNIFICANT CHANGE UP (ref 3.5–5.3)
POTASSIUM SERPL-SCNC: 4.4 MMOL/L — SIGNIFICANT CHANGE UP (ref 3.5–5.3)
POTASSIUM SERPL-SCNC: 4.4 MMOL/L — SIGNIFICANT CHANGE UP (ref 3.5–5.3)
POTASSIUM UR-SCNC: 25 MMOL/L — SIGNIFICANT CHANGE UP
POTASSIUM UR-SCNC: 25 MMOL/L — SIGNIFICANT CHANGE UP
PROT ?TM UR-MCNC: 98 MG/DL — HIGH (ref 0–12)
PROT ?TM UR-MCNC: 98 MG/DL — HIGH (ref 0–12)
PROT SERPL-MCNC: 6.7 G/DL — SIGNIFICANT CHANGE UP (ref 6–8.3)
PROT SERPL-MCNC: 6.7 G/DL — SIGNIFICANT CHANGE UP (ref 6–8.3)
PROT UR-MCNC: 100 MG/DL
PROT UR-MCNC: 100 MG/DL
PROT/CREAT UR-RTO: 1.8 RATIO — HIGH (ref 0–0.2)
PROT/CREAT UR-RTO: 1.8 RATIO — HIGH (ref 0–0.2)
RBC # BLD: 3.67 M/UL — LOW (ref 4.2–5.8)
RBC # BLD: 3.67 M/UL — LOW (ref 4.2–5.8)
RBC # FLD: 17.2 % — HIGH (ref 10.3–14.5)
RBC # FLD: 17.2 % — HIGH (ref 10.3–14.5)
RBC CASTS # UR COMP ASSIST: 228 /HPF — HIGH (ref 0–4)
RBC CASTS # UR COMP ASSIST: 228 /HPF — HIGH (ref 0–4)
REVIEW: SIGNIFICANT CHANGE UP
REVIEW: SIGNIFICANT CHANGE UP
SODIUM SERPL-SCNC: 138 MMOL/L — SIGNIFICANT CHANGE UP (ref 135–145)
SODIUM SERPL-SCNC: 138 MMOL/L — SIGNIFICANT CHANGE UP (ref 135–145)
SODIUM UR-SCNC: 87 MMOL/L — SIGNIFICANT CHANGE UP
SODIUM UR-SCNC: 87 MMOL/L — SIGNIFICANT CHANGE UP
SP GR SPEC: 1.02 — SIGNIFICANT CHANGE UP (ref 1–1.03)
SP GR SPEC: 1.02 — SIGNIFICANT CHANGE UP (ref 1–1.03)
SQUAMOUS # UR AUTO: 4 /HPF — SIGNIFICANT CHANGE UP (ref 0–5)
SQUAMOUS # UR AUTO: 4 /HPF — SIGNIFICANT CHANGE UP (ref 0–5)
T3 SERPL-MCNC: 57 NG/DL — LOW (ref 80–200)
T3 SERPL-MCNC: 57 NG/DL — LOW (ref 80–200)
T4 FREE SERPL-MCNC: 0.9 NG/DL — SIGNIFICANT CHANGE UP (ref 0.9–1.8)
T4 FREE SERPL-MCNC: 0.9 NG/DL — SIGNIFICANT CHANGE UP (ref 0.9–1.8)
TRIGL SERPL-MCNC: 103 MG/DL — SIGNIFICANT CHANGE UP
TRIGL SERPL-MCNC: 103 MG/DL — SIGNIFICANT CHANGE UP
TSH SERPL-MCNC: 5.54 UIU/ML — HIGH (ref 0.27–4.2)
TSH SERPL-MCNC: 5.54 UIU/ML — HIGH (ref 0.27–4.2)
UROBILINOGEN FLD QL: 0.2 MG/DL — SIGNIFICANT CHANGE UP (ref 0.2–1)
UROBILINOGEN FLD QL: 0.2 MG/DL — SIGNIFICANT CHANGE UP (ref 0.2–1)
UUN UR-MCNC: 369 MG/DL — SIGNIFICANT CHANGE UP
UUN UR-MCNC: 369 MG/DL — SIGNIFICANT CHANGE UP
VIT B12 SERPL-MCNC: 948 PG/ML — SIGNIFICANT CHANGE UP (ref 232–1245)
VIT B12 SERPL-MCNC: 948 PG/ML — SIGNIFICANT CHANGE UP (ref 232–1245)
WBC # BLD: 5.89 K/UL — SIGNIFICANT CHANGE UP (ref 3.8–10.5)
WBC # BLD: 5.89 K/UL — SIGNIFICANT CHANGE UP (ref 3.8–10.5)
WBC # FLD AUTO: 5.89 K/UL — SIGNIFICANT CHANGE UP (ref 3.8–10.5)
WBC # FLD AUTO: 5.89 K/UL — SIGNIFICANT CHANGE UP (ref 3.8–10.5)
WBC UR QL: 21 /HPF — HIGH (ref 0–5)
WBC UR QL: 21 /HPF — HIGH (ref 0–5)

## 2024-01-03 PROCEDURE — 99232 SBSQ HOSP IP/OBS MODERATE 35: CPT

## 2024-01-03 PROCEDURE — 99233 SBSQ HOSP IP/OBS HIGH 50: CPT | Mod: GC

## 2024-01-03 RX ORDER — LOPERAMIDE HCL 2 MG
2 TABLET ORAL THREE TIMES A DAY
Refills: 0 | Status: DISCONTINUED | OUTPATIENT
Start: 2024-01-03 | End: 2024-01-09

## 2024-01-03 RX ORDER — DEXTROSE 50 % IN WATER 50 %
10 SYRINGE (ML) INTRAVENOUS ONCE
Refills: 0 | Status: DISCONTINUED | OUTPATIENT
Start: 2024-01-03 | End: 2024-01-03

## 2024-01-03 RX ORDER — HEPARIN SODIUM 5000 [USP'U]/ML
5000 INJECTION INTRAVENOUS; SUBCUTANEOUS EVERY 8 HOURS
Refills: 0 | Status: DISCONTINUED | OUTPATIENT
Start: 2024-01-03 | End: 2024-01-05

## 2024-01-03 RX ORDER — DEXTROSE 50 % IN WATER 50 %
25 SYRINGE (ML) INTRAVENOUS ONCE
Refills: 0 | Status: DISCONTINUED | OUTPATIENT
Start: 2024-01-03 | End: 2024-01-15

## 2024-01-03 RX ORDER — DEXTROSE 50 % IN WATER 50 %
12.5 SYRINGE (ML) INTRAVENOUS ONCE
Refills: 0 | Status: DISCONTINUED | OUTPATIENT
Start: 2024-01-03 | End: 2024-01-15

## 2024-01-03 RX ORDER — DEXTROSE 50 % IN WATER 50 %
15 SYRINGE (ML) INTRAVENOUS ONCE
Refills: 0 | Status: DISCONTINUED | OUTPATIENT
Start: 2024-01-03 | End: 2024-01-15

## 2024-01-03 RX ORDER — GLUCAGON INJECTION, SOLUTION 0.5 MG/.1ML
1 INJECTION, SOLUTION SUBCUTANEOUS ONCE
Refills: 0 | Status: DISCONTINUED | OUTPATIENT
Start: 2024-01-03 | End: 2024-01-15

## 2024-01-03 RX ORDER — MIDODRINE HYDROCHLORIDE 2.5 MG/1
10 TABLET ORAL EVERY 8 HOURS
Refills: 0 | Status: DISCONTINUED | OUTPATIENT
Start: 2024-01-03 | End: 2024-01-04

## 2024-01-03 RX ADMIN — FINASTERIDE 5 MILLIGRAM(S): 5 TABLET, FILM COATED ORAL at 11:24

## 2024-01-03 RX ADMIN — HEPARIN SODIUM 5000 UNIT(S): 5000 INJECTION INTRAVENOUS; SUBCUTANEOUS at 21:17

## 2024-01-03 RX ADMIN — MIDODRINE HYDROCHLORIDE 10 MILLIGRAM(S): 2.5 TABLET ORAL at 21:14

## 2024-01-03 RX ADMIN — TAMSULOSIN HYDROCHLORIDE 0.4 MILLIGRAM(S): 0.4 CAPSULE ORAL at 21:14

## 2024-01-03 RX ADMIN — PIPERACILLIN AND TAZOBACTAM 25 GRAM(S): 4; .5 INJECTION, POWDER, LYOPHILIZED, FOR SOLUTION INTRAVENOUS at 13:34

## 2024-01-03 RX ADMIN — PIPERACILLIN AND TAZOBACTAM 25 GRAM(S): 4; .5 INJECTION, POWDER, LYOPHILIZED, FOR SOLUTION INTRAVENOUS at 06:30

## 2024-01-03 RX ADMIN — MIDODRINE HYDROCHLORIDE 20 MILLIGRAM(S): 2.5 TABLET ORAL at 13:22

## 2024-01-03 RX ADMIN — HEPARIN SODIUM 5000 UNIT(S): 5000 INJECTION INTRAVENOUS; SUBCUTANEOUS at 11:25

## 2024-01-03 RX ADMIN — Medication 0.6 MILLIGRAM(S): at 11:24

## 2024-01-03 RX ADMIN — Medication 1 SPRAY(S): at 21:14

## 2024-01-03 RX ADMIN — Medication 1 SPRAY(S): at 06:30

## 2024-01-03 RX ADMIN — PIPERACILLIN AND TAZOBACTAM 25 GRAM(S): 4; .5 INJECTION, POWDER, LYOPHILIZED, FOR SOLUTION INTRAVENOUS at 21:15

## 2024-01-03 NOTE — PHYSICAL THERAPY INITIAL EVALUATION ADULT - NSPTDMEREC_GEN_A_CORE
if home, Pt owns RW and shower chair with grab bars in tub. Pt would benefit from 3:1 commode. Pt is confined to a single room without a bathroom. Pt would benefit from transport wheelchair.

## 2024-01-03 NOTE — PROGRESS NOTE ADULT - PROBLEM SELECTOR PLAN 8
-- c/w Flomax and Finasteride Baseline creatinine is, Cr today is 1.92  -Urine studies  - Dced remdesivir

## 2024-01-03 NOTE — PROGRESS NOTE ADULT - SUBJECTIVE AND OBJECTIVE BOX
CC: F/U for UTI    Saw/spoke to patient. No fevers, no chills. No new complaints.    Allergies  No Known Allergies    ANTIMICROBIALS:  piperacillin/tazobactam IVPB.. 3.375 every 8 hours    PE:    Vital Signs Last 24 Hrs  T(C): 37.6 (2024 10:52), Max: 37.6 (2024 10:52)  T(F): 99.7 (2024 10:52), Max: 99.7 (2024 10:52)  HR: 101 (2024 12:01) (17 - 101)  BP: 99/61 (2024 12:01) (83/51 - 115/75)  BP(mean): 69 (2024 19:34) (61 - 79)  RR: 18 (2024 10:52) (18 - 22)  SpO2: 95% (2024 12:01) (94% - 100%)    Gen: AOx3, NAD, non-toxic  CV: Nontachycardic  Resp: Breathing comfortably, RA  Abd: Soft, nontender  IV/Skin: No thrombophlebitis    LABS:                        9.7    5.89  )-----------( 208      ( 2024 06:01 )             30.0     01-03    138  |  108  |  30<H>  ----------------------------<  49<LL>  4.4   |  19<L>  |  2.31<H>    Ca    9.1      2024 06:00  Phos  3.9     01-03  Mg     2.2     01-03    TPro  6.7  /  Alb  3.1<L>  /  TBili  0.6  /  DBili  x   /  AST  179<H>  /  ALT  54<H>  /  AlkPhos  113  01-03    Urinalysis Basic - ( 2024 06:41 )    Color: Yellow / Appearance: Clear / S.025 / pH: x  Gluc: x / Ketone: Negative mg/dL  / Bili: Negative / Urobili: 0.2 mg/dL   Blood: x / Protein: 100 mg/dL / Nitrite: Negative   Leuk Esterase: Small / RBC: 228 /HPF / WBC 21 /HPF   Sq Epi: x / Non Sq Epi: 4 /HPF / Bacteria: Negative /HPF    MICROBIOLOGY:    Catheterized Catheterized  24   50,000 - 99,000 CFU/mL Enterococcus faecalis  --  --    .Blood Blood-Peripheral  24   No growth at 24 hours  --  --    .Blood Blood-Peripheral  24   No growth at 24 hours  --  --    .Blood Blood-Peripheral  23   No growth at 5 days  --  --    .Blood Blood-Peripheral  23   No growth at 5 days  --  Enterococcus faecalis  Stenotrophomonas maltophilia    Clostridium difficile GDH Toxins A&amp;B, EIA:   Negative (24 @ 13:55)  Clostridium difficile GDH Interpretation: Negative for toxigenic C. Difficile.  This specimen is negative for C.  Difficile glutamate dehydrogenase (GDH) antigen and negative for C.  Difficile Toxins A & B, by EIA.  GDH is a highly sensitive screening  marker for C. Difficile that is produced in large amounts by all C.  Difficile strains, both toxigenic and nontoxigenic.  This assay has not  been validated as a test of cure.  Repeat testing during the same episode  of diarrhea is of limited value and is discouraged.  The results of this  assay should always be interpreted in conjunction with patient's clinical  history. (24 @ 13:55)    RADIOLOGY:    1/ CT    IMPRESSION:  Limited evaluation due to motion artifact.    Mildly distended fluid-filled rectum, which may represent proctitis.   Evaluation of the bowel is otherwise very limited on this examination.   Short-term follow-up may be performed for reevaluation as clinically   warranted.    Bilateral perinephric soft tissue stranding. Suggest correlation with   urinalysis to exclude infection. Multiple hypodense right renal lesions   cannot be definitively characterized. Diffusely heterogeneous enhancement   of the right kidney  and  mildly heterogeneous enhancement of the left   kidney may be artifactual in nature due to the motion; however, renal   abnormality, including pyelonephritis or infarct  cannot be excluded in   this setting. Clinical and laboratory correlation is again recommended.   Renal ultrasound is suggested for further evaluation.

## 2024-01-03 NOTE — PHYSICAL THERAPY INITIAL EVALUATION ADULT - PERTINENT HX OF CURRENT PROBLEM, REHAB EVAL
73M w/ BPH (c/b urinary retention s/p Harkins catheter placement; exchanged monthly), CKD stage IIIb, and cardiac amyloidosis brought in by daughter for diarrhea found to have hypotension likely due to sepsis, s/p Vanc and Zosyn in ED.    CT abdomen and pelvis: Limited evaluation due to motion artifact. Mildly distended fluid-filled rectum, which may represent proctitis. Short-term follow-up may be performed for reevaluation as clinically warranted. Bilateral perinephric soft tissue stranding. Suggest correlation with urinalysis to exclude infection. Multiple hypodense right renal lesions cannot be definitively characterized. Diffusely heterogeneous enhancement of the right kidney  and  mildly heterogeneous enhancement of the left kidney may be artifactual in nature due to the motion; however, renal abnormality, including pyelonephritis or infarct  cannot be excluded in this setting.  -CT HEAD: Unremarkable head CT.  -CT cervical spine:   No vertebral fracture is recognized.  Posterior fusion is noted at C3-C7 utilizing pedicle screws and fusion rods with laminectomies at C4-C6.   Moderate to severe degenerative disc disease and spondylosis at C2-3 through C7-T1 with loss of disc height and   associated degenerative endplate changes. There is narrowing of the BILATERAL C2-3 through C7-T1 neural foramina due to uncovertebral spurring and facet osteophytic hypertrophy.  -CT lumbar spine:  Grade 1 anterior spondylolisthesis at L4-5 on a degenerative basis due to facet osteophytic hypertrophy. Mild degenerative disc disease and spondylosis at L1-2 through L5-S1 with loss of disc height and associated degenerative endplate changes. DISH involves the lower thoracic spine. Mild disc bulges are noted at L1-2 through L5-S1 which flatten the ventral thecal sac and narrow the BILATERAL neural foramina. Moderate central stenosis at L2-3, L3-4 and severe central stenosis at L4-5 mild central stenosis at L5-S1 on a degenerative basis due to disc bulge, facet osteophytic hypertrophy and redundancy of the ligamentum flavum. Superimposed LEFT foraminal disc herniations at L2-3 and L4-5 further narrow the LEFT neural foramen.  No vertebral fracture is recognized.  -CXR: No focal consolidations.

## 2024-01-03 NOTE — CONSULT NOTE ADULT - ASSESSMENT
A/p  73M w/ BPH (c/b urinary retention s/p Harkins catheter placement; exchanged monthly), CKD stage IIIb, and cardiac amyloidosis brought in by daughter for diarrhea (loose-watery BM's) over the past few days.    #COVID  -   A/p  73M w/ BPH (c/b urinary retention s/p Harkins catheter placement; exchanged monthly), CKD stage IIIb, and cardiac amyloidosis brought in by daughter for diarrhea (loose-watery BM's) over the past few days.    #Sepsis  -I/s/o COVID + UTI  -Abx per med    #COVID  -CXR no focal consolidations  -Remdesivir per med  -Supportive care per med    #cardiac amyloid   -on vyndamax as outpt  -baseline sbp 100 as outpt  -Prior echo with  Nml LV systolic fxn, Severe left ventricular hypertrophy, Findings suggestive of infiltrative cardiomyopathy    #Hypotension  -Cont midodrine     #Hypoglycemia  -mgmt per med      dvt ppx

## 2024-01-03 NOTE — RAPID RESPONSE TEAM SUMMARY - NSSITUATIONBACKGROUNDRRT_GEN_ALL_CORE
Mr. Sanchez is a  73M w/ BPH (c/b urinary retention s/p Harkins catheter placement; exchanged monthly), CKD stage IIIb, and cardiac amyloidosis brought in by daughter for diarrhea found to have hypotension likely due to sepsis. RRT called for hypoglycemia to 40s.

## 2024-01-03 NOTE — PROGRESS NOTE ADULT - PROBLEM SELECTOR PLAN 7
Baseline creatinine is, Cr today is 1.92  -Urine studies -Isolation precautions and IV Remdesivir-> dced 1/3

## 2024-01-03 NOTE — PROGRESS NOTE ADULT - PROBLEM SELECTOR PLAN 6
-Isolation precautions and IV Remdesivir -likely in context of sepsis, can trend with remdesivir-> can dc in context of SARAH

## 2024-01-03 NOTE — PROGRESS NOTE ADULT - SUBJECTIVE AND OBJECTIVE BOX
***************************************************************  Jose Alejandro Arizmendi, PGY1  Internal Medicine   Preferred contact via [employer] Teams   ***************************************************************    DEEPTHI GÓMEZ  73y  MRN: [id number]    Patient is a 73y old  Male who presents with a chief complaint of Diarrhea/ (2024 17:46)      Interval/Overnight Events: no events ON.     Subjective: Pt seen and examined at bedside. Denies fever, CP, SOB, abn pain, N/V, dysuria. Tolerating diet.      MEDICATIONS  (STANDING):  chlorhexidine 2% Cloths 1 Application(s) Topical <User Schedule>  colchicine 0.6 milliGRAM(s) Oral daily  dextrose 50% Injectable 25 milliLiter(s) IV Push once  finasteride 5 milliGRAM(s) Oral daily  fluticasone propionate 50 MICROgram(s)/spray Nasal Spray 1 Spray(s) Both Nostrils two times a day  heparin   Injectable 5000 Unit(s) SubCutaneous every 8 hours  midodrine 20 milliGRAM(s) Oral every 8 hours  piperacillin/tazobactam IVPB.. 3.375 Gram(s) IV Intermittent every 8 hours  remdesivir  IVPB 100 milliGRAM(s) IV Intermittent every 24 hours  remdesivir  IVPB   IV Intermittent   sodium chloride 0.65% Nasal 1 Spray(s) Both Nostrils once  tamsulosin 0.4 milliGRAM(s) Oral at bedtime    MEDICATIONS  (PRN):      Objective:    Vitals: Vital Signs Last 24 Hrs  T(C): 37.4 (24 @ 06:47), Max: 37.4 (24 @ 00:41)  T(F): 99.4 (24 @ 06:47), Max: 99.4 (24 @ 00:41)  HR: 88 (24 @ 06:47) (17 - 91)  BP: 115/75 (24 @ 06:47) (83/51 - 127/71)  BP(mean): 69 (24 @ 19:34) (61 - 100)  RR: 18 (24 @ 06:47) (16 - 22)  SpO2: 98% (24 @ 06:47) (94% - 100%)                I&O's Summary    2024 07:01  -  2024 07:00  --------------------------------------------------------  IN: 0 mL / OUT: 1500 mL / NET: -1500 mL        PHYSICAL EXAM:  GENERAL: NAD  HEAD:  Atraumatic, Normocephalic  EYES: EOMI, conjunctiva and sclera clear  CHEST/LUNG: Clear to auscultation bilaterally; No rales, rhonchi, wheezing, or rubs  HEART: Regular rate and rhythm; No murmurs, rubs, or gallops  ABDOMEN: Soft, Nontender, Nondistended;   SKIN: No rashes or lesions  NERVOUS SYSTEM:  Alert & Oriented X3, no focal deficits    LABS:                        9.7    5.89  )-----------( 208      ( 2024 06:01 )             30.0                         9.1    4.01  )-----------( 166      ( 2024 15:35 )             27.9                         10.3   6.10  )-----------( 239      ( 2024 03:55 )             31.5     01-03    138  |  108  |  30<H>  ----------------------------<  49<LL>  4.4   |  19<L>  |  2.31<H>      135  |  104  |  26<H>  ----------------------------<  112<H>  4.6   |  19<L>  |  1.86<H>      135  |  102  |  27<H>  ----------------------------<  68<L>  4.9   |  19<L>  |  1.92<H>    Ca    9.1      2024 06:00  Ca    8.4      2024 15:35  Ca    9.4      2024 03:55  Phos  3.9     -  Mg     2.2     -    TPro  6.7  /  Alb  3.1<L>  /  TBili  0.6  /  DBili  x   /  AST  179<H>  /  ALT  54<H>  /  AlkPhos  113    TPro  6.1  /  Alb  2.9<L>  /  TBili  0.9  /  DBili  x   /  AST  175<H>  /  ALT  52<H>  /  AlkPhos  117    TPro  7.3  /  Alb  3.3  /  TBili  1.2  /  DBili  x   /  AST  223<H>  /  ALT  63<H>  /  AlkPhos  155<H>      CAPILLARY BLOOD GLUCOSE      POCT Blood Glucose.: 126 mg/dL (2024 06:57)  POCT Blood Glucose.: 45 mg/dL (2024 06:47)  POCT Blood Glucose.: 49 mg/dL (2024 06:36)        Urinalysis Basic - ( 2024 06:41 )    Color: Yellow / Appearance: Clear / S.025 / pH: x  Gluc: x / Ketone: Negative mg/dL  / Bili: Negative / Urobili: 0.2 mg/dL   Blood: x / Protein: 100 mg/dL / Nitrite: Negative   Leuk Esterase: Small / RBC: 228 /HPF / WBC 21 /HPF   Sq Epi: x / Non Sq Epi: 4 /HPF / Bacteria: Negative /HPF          RADIOLOGY & ADDITIONAL TESTS:    Imaging Personally Reviewed:  [x ] YES  [ ] NO    Consultants involved in case:   Consultant(s) Notes Reviewed:  [ x] YES  [ ] NO:   Care Discussed with Consultants/Other Providers [x ] YES  [ ] NO         ***************************************************************  Jose Alejandro Arizmendi, PGY1  Internal Medicine   Preferred contact via [employer] Teams   ***************************************************************    DEEPTHI GÓMEZ  73y  MRN: [id number]    Patient is a 73y old  Male who presents with a chief complaint of Diarrhea/ (2024 17:46)      Interval/Overnight Events: no events ON.     Subjective: Pt seen and examined at bedside. Denies fever, CP, SOB, abn pain, N/V, dysuria. Tolerating diet.      MEDICATIONS  (STANDING):  chlorhexidine 2% Cloths 1 Application(s) Topical <User Schedule>  colchicine 0.6 milliGRAM(s) Oral daily  dextrose 50% Injectable 25 milliLiter(s) IV Push once  finasteride 5 milliGRAM(s) Oral daily  fluticasone propionate 50 MICROgram(s)/spray Nasal Spray 1 Spray(s) Both Nostrils two times a day  heparin   Injectable 5000 Unit(s) SubCutaneous every 8 hours  midodrine 20 milliGRAM(s) Oral every 8 hours  piperacillin/tazobactam IVPB.. 3.375 Gram(s) IV Intermittent every 8 hours  remdesivir  IVPB 100 milliGRAM(s) IV Intermittent every 24 hours  remdesivir  IVPB   IV Intermittent   sodium chloride 0.65% Nasal 1 Spray(s) Both Nostrils once  tamsulosin 0.4 milliGRAM(s) Oral at bedtime    MEDICATIONS  (PRN):      Objective:    Vitals: Vital Signs Last 24 Hrs  T(C): 37.4 (24 @ 06:47), Max: 37.4 (24 @ 00:41)  T(F): 99.4 (24 @ 06:47), Max: 99.4 (24 @ 00:41)  HR: 88 (24 @ 06:47) (17 - 91)  BP: 115/75 (24 @ 06:47) (83/51 - 127/71)  BP(mean): 69 (24 @ 19:34) (61 - 100)  RR: 18 (24 @ 06:47) (16 - 22)  SpO2: 98% (24 @ 06:47) (94% - 100%)                I&O's Summary    2024 07:01  -  2024 07:00  --------------------------------------------------------  IN: 0 mL / OUT: 1500 mL / NET: -1500 mL        PHYSICAL EXAM:  GENERAL: NAD  HEAD:  Atraumatic, Normocephalic  EYES: EOMI, conjunctiva and sclera clear  CHEST/LUNG: Clear to auscultation bilaterally; No rales, rhonchi, wheezing, or rubs  HEART: Regular rate and rhythm; No murmurs, rubs, or gallops  ABDOMEN: Soft, Nontender, Nondistended;   SKIN: No rashes or lesions  NERVOUS SYSTEM:  Alert & Oriented X3, no focal deficits    LABS:                        9.7    5.89  )-----------( 208      ( 2024 06:01 )             30.0                         9.1    4.01  )-----------( 166      ( 2024 15:35 )             27.9                         10.3   6.10  )-----------( 239      ( 2024 03:55 )             31.5     01-03    138  |  108  |  30<H>  ----------------------------<  49<LL>  4.4   |  19<L>  |  2.31<H>      135  |  104  |  26<H>  ----------------------------<  112<H>  4.6   |  19<L>  |  1.86<H>      135  |  102  |  27<H>  ----------------------------<  68<L>  4.9   |  19<L>  |  1.92<H>    Ca    9.1      2024 06:00  Ca    8.4      2024 15:35  Ca    9.4      2024 03:55  Phos  3.9     -  Mg     2.2     -    TPro  6.7  /  Alb  3.1<L>  /  TBili  0.6  /  DBili  x   /  AST  179<H>  /  ALT  54<H>  /  AlkPhos  113    TPro  6.1  /  Alb  2.9<L>  /  TBili  0.9  /  DBili  x   /  AST  175<H>  /  ALT  52<H>  /  AlkPhos  117    TPro  7.3  /  Alb  3.3  /  TBili  1.2  /  DBili  x   /  AST  223<H>  /  ALT  63<H>  /  AlkPhos  155<H>      CAPILLARY BLOOD GLUCOSE      POCT Blood Glucose.: 126 mg/dL (2024 06:57)  POCT Blood Glucose.: 45 mg/dL (2024 06:47)  POCT Blood Glucose.: 49 mg/dL (2024 06:36)        Urinalysis Basic - ( 2024 06:41 )    Color: Yellow / Appearance: Clear / S.025 / pH: x  Gluc: x / Ketone: Negative mg/dL  / Bili: Negative / Urobili: 0.2 mg/dL   Blood: x / Protein: 100 mg/dL / Nitrite: Negative   Leuk Esterase: Small / RBC: 228 /HPF / WBC 21 /HPF   Sq Epi: x / Non Sq Epi: 4 /HPF / Bacteria: Negative /HPF          RADIOLOGY & ADDITIONAL TESTS:    Imaging Personally Reviewed:  [x ] YES  [ ] NO    Consultants involved in case:   Consultant(s) Notes Reviewed:  [ x] YES  [ ] NO:   Care Discussed with Consultants/Other Providers [x ] YES  [ ] NO         ***************************************************************  Jose Alejandro Arizmendi, PGY1  Internal Medicine   Preferred contact via [employer] Teams   ***************************************************************    DEEPTHI GÓMEZ  73y  MRN: [id number]    Patient is a 73y old  Male who presents with a chief complaint of Diarrhea/ (2024 17:46)      Interval/Overnight Events: RRT called for glucose of 50.    Subjective: Pt seen and examined at bedside. Pt still had dirrheaa, BM every 30 mins per pt.   MEDICATIONS  (STANDING):  chlorhexidine 2% Cloths 1 Application(s) Topical <User Schedule>  colchicine 0.6 milliGRAM(s) Oral daily  dextrose 50% Injectable 25 milliLiter(s) IV Push once  finasteride 5 milliGRAM(s) Oral daily  fluticasone propionate 50 MICROgram(s)/spray Nasal Spray 1 Spray(s) Both Nostrils two times a day  heparin   Injectable 5000 Unit(s) SubCutaneous every 8 hours  midodrine 20 milliGRAM(s) Oral every 8 hours  piperacillin/tazobactam IVPB.. 3.375 Gram(s) IV Intermittent every 8 hours  remdesivir  IVPB 100 milliGRAM(s) IV Intermittent every 24 hours  remdesivir  IVPB   IV Intermittent   sodium chloride 0.65% Nasal 1 Spray(s) Both Nostrils once  tamsulosin 0.4 milliGRAM(s) Oral at bedtime    MEDICATIONS  (PRN):      Objective:    Vitals: Vital Signs Last 24 Hrs  T(C): 37.4 (24 @ 06:47), Max: 37.4 (24 @ 00:41)  T(F): 99.4 (24 @ 06:47), Max: 99.4 (24 @ 00:41)  HR: 88 (24 @ 06:47) (17 - 91)  BP: 115/75 (24 @ 06:47) (83/51 - 127/71)  BP(mean): 69 (24 @ 19:34) (61 - 100)  RR: 18 (24 @ 06:47) (16 - 22)  SpO2: 98% (24 @ 06:47) (94% - 100%)                I&O's Summary    2024 07:01  -  2024 07:00  --------------------------------------------------------  IN: 0 mL / OUT: 1500 mL / NET: -1500 mL        PHYSICAL EXAM:  GENERAL: NAD  HEAD:  Atraumatic, Normocephalic  EYES: EOMI, conjunctiva and sclera clear  CHEST/LUNG: Clear to auscultation bilaterally; No rales, rhonchi, wheezing, or rubs  HEART: Regular rate and rhythm; No murmurs, rubs, or gallops  ABDOMEN: Soft, Nontender, Nondistended;   SKIN: No rashes or lesions  NERVOUS SYSTEM:  Alert & Oriented X3, no focal deficits    LABS:                        9.7    5.89  )-----------( 208      ( 2024 06:01 )             30.0                         9.1    4.01  )-----------( 166      ( 2024 15:35 )             27.9                         10.3   6.10  )-----------( 239      ( 2024 03:55 )             31.5     -03    138  |  108  |  30<H>  ----------------------------<  49<LL>  4.4   |  19<L>  |  2.31<H>  -02    135  |  104  |  26<H>  ----------------------------<  112<H>  4.6   |  19<L>  |  1.86<H>  01-02    135  |  102  |  27<H>  ----------------------------<  68<L>  4.9   |  19<L>  |  1.92<H>    Ca    9.1      2024 06:00  Ca    8.4      2024 15:35  Ca    9.4      2024 03:55  Phos  3.9     -  Mg     2.2     -    TPro  6.7  /  Alb  3.1<L>  /  TBili  0.6  /  DBili  x   /  AST  179<H>  /  ALT  54<H>  /  AlkPhos  113    TPro  6.1  /  Alb  2.9<L>  /  TBili  0.9  /  DBili  x   /  AST  175<H>  /  ALT  52<H>  /  AlkPhos  117    TPro  7.3  /  Alb  3.3  /  TBili  1.2  /  DBili  x   /  AST  223<H>  /  ALT  63<H>  /  AlkPhos  155<H>      CAPILLARY BLOOD GLUCOSE      POCT Blood Glucose.: 126 mg/dL (2024 06:57)  POCT Blood Glucose.: 45 mg/dL (2024 06:47)  POCT Blood Glucose.: 49 mg/dL (2024 06:36)        Urinalysis Basic - ( 2024 06:41 )    Color: Yellow / Appearance: Clear / S.025 / pH: x  Gluc: x / Ketone: Negative mg/dL  / Bili: Negative / Urobili: 0.2 mg/dL   Blood: x / Protein: 100 mg/dL / Nitrite: Negative   Leuk Esterase: Small / RBC: 228 /HPF / WBC 21 /HPF   Sq Epi: x / Non Sq Epi: 4 /HPF / Bacteria: Negative /HPF          RADIOLOGY & ADDITIONAL TESTS:    Imaging Personally Reviewed:  [x ] YES  [ ] NO    Consultants involved in case:   Consultant(s) Notes Reviewed:  [ x] YES  [ ] NO:   Care Discussed with Consultants/Other Providers [x ] YES  [ ] NO         ***************************************************************  Jose Alejandro Arizmendi, PGY1  Internal Medicine   Preferred contact via [employer] Teams   ***************************************************************    DEEPTHI GÓMEZ  73y  MRN: [id number]    Patient is a 73y old  Male who presents with a chief complaint of Diarrhea/ (2024 17:46)      Interval/Overnight Events: RRT called for glucose of 50.    Subjective: Pt seen and examined at bedside. Pt still had dirrheaa, BM every 30 mins per pt.   MEDICATIONS  (STANDING):  chlorhexidine 2% Cloths 1 Application(s) Topical <User Schedule>  colchicine 0.6 milliGRAM(s) Oral daily  dextrose 50% Injectable 25 milliLiter(s) IV Push once  finasteride 5 milliGRAM(s) Oral daily  fluticasone propionate 50 MICROgram(s)/spray Nasal Spray 1 Spray(s) Both Nostrils two times a day  heparin   Injectable 5000 Unit(s) SubCutaneous every 8 hours  midodrine 20 milliGRAM(s) Oral every 8 hours  piperacillin/tazobactam IVPB.. 3.375 Gram(s) IV Intermittent every 8 hours  remdesivir  IVPB 100 milliGRAM(s) IV Intermittent every 24 hours  remdesivir  IVPB   IV Intermittent   sodium chloride 0.65% Nasal 1 Spray(s) Both Nostrils once  tamsulosin 0.4 milliGRAM(s) Oral at bedtime    MEDICATIONS  (PRN):      Objective:    Vitals: Vital Signs Last 24 Hrs  T(C): 37.4 (24 @ 06:47), Max: 37.4 (24 @ 00:41)  T(F): 99.4 (24 @ 06:47), Max: 99.4 (24 @ 00:41)  HR: 88 (24 @ 06:47) (17 - 91)  BP: 115/75 (24 @ 06:47) (83/51 - 127/71)  BP(mean): 69 (24 @ 19:34) (61 - 100)  RR: 18 (24 @ 06:47) (16 - 22)  SpO2: 98% (24 @ 06:47) (94% - 100%)                I&O's Summary    2024 07:01  -  2024 07:00  --------------------------------------------------------  IN: 0 mL / OUT: 1500 mL / NET: -1500 mL        PHYSICAL EXAM:  GENERAL: NAD  HEAD:  Atraumatic, Normocephalic  EYES: EOMI, conjunctiva and sclera clear  CHEST/LUNG: Clear to auscultation bilaterally; No rales, rhonchi, wheezing, or rubs  HEART: Regular rate and rhythm; No murmurs, rubs, or gallops  ABDOMEN: Soft, Nontender, Nondistended;   SKIN: No rashes or lesions  NERVOUS SYSTEM:  Alert & Oriented X3, no focal deficits    LABS:                        9.7    5.89  )-----------( 208      ( 2024 06:01 )             30.0                         9.1    4.01  )-----------( 166      ( 2024 15:35 )             27.9                         10.3   6.10  )-----------( 239      ( 2024 03:55 )             31.5     -03    138  |  108  |  30<H>  ----------------------------<  49<LL>  4.4   |  19<L>  |  2.31<H>  -02    135  |  104  |  26<H>  ----------------------------<  112<H>  4.6   |  19<L>  |  1.86<H>  01-02    135  |  102  |  27<H>  ----------------------------<  68<L>  4.9   |  19<L>  |  1.92<H>    Ca    9.1      2024 06:00  Ca    8.4      2024 15:35  Ca    9.4      2024 03:55  Phos  3.9     -  Mg     2.2     -    TPro  6.7  /  Alb  3.1<L>  /  TBili  0.6  /  DBili  x   /  AST  179<H>  /  ALT  54<H>  /  AlkPhos  113    TPro  6.1  /  Alb  2.9<L>  /  TBili  0.9  /  DBili  x   /  AST  175<H>  /  ALT  52<H>  /  AlkPhos  117    TPro  7.3  /  Alb  3.3  /  TBili  1.2  /  DBili  x   /  AST  223<H>  /  ALT  63<H>  /  AlkPhos  155<H>      CAPILLARY BLOOD GLUCOSE      POCT Blood Glucose.: 126 mg/dL (2024 06:57)  POCT Blood Glucose.: 45 mg/dL (2024 06:47)  POCT Blood Glucose.: 49 mg/dL (2024 06:36)        Urinalysis Basic - ( 2024 06:41 )    Color: Yellow / Appearance: Clear / S.025 / pH: x  Gluc: x / Ketone: Negative mg/dL  / Bili: Negative / Urobili: 0.2 mg/dL   Blood: x / Protein: 100 mg/dL / Nitrite: Negative   Leuk Esterase: Small / RBC: 228 /HPF / WBC 21 /HPF   Sq Epi: x / Non Sq Epi: 4 /HPF / Bacteria: Negative /HPF          RADIOLOGY & ADDITIONAL TESTS:    Imaging Personally Reviewed:  [x ] YES  [ ] NO    Consultants involved in case:   Consultant(s) Notes Reviewed:  [ x] YES  [ ] NO:   Care Discussed with Consultants/Other Providers [x ] YES  [ ] NO

## 2024-01-03 NOTE — PROGRESS NOTE ADULT - PROBLEM SELECTOR PLAN 2
-Hold anti-diarrheal medications in context of potential viral or bacterial GI infection -Hold anti-diarrheal medications in context of potential viral or bacterial GI infection-> started loperamide 1/3

## 2024-01-03 NOTE — CONSULT NOTE ADULT - SUBJECTIVE AND OBJECTIVE BOX
CARDIOLOGY CONSULT - Dr. Saravia       HPI:  Mr. Sanchez is a 73M w/ BPH (c/b urinary retention s/p Harkins catheter placement; exchanged monthly), CKD stage IIIb, and cardiac amyloidosis brought in by daughter for diarrhea (loose-watery BM's) over the past few days. The pt reports no new medications or food triggers. No relieving factors. Pt could not delineate category of diarrhea.  The patient had a fall yesterday, pt denied any preceding nausea or associated LOC. Pt was recently hospitalized in December for urosepsis and abdominal pain and was found to have E. Faecalis and S. Maltophilia     In the ED VS were notable for low SBPs in 80-90s at one point pt was tachycardic to 102. Labs and imaging were notable for HgB 9.2, Cr 1.92, , , ALT 63, U/A LE, and Pan CT notable for spinal stenosis, spoondolysthesis, procitits, and pyelonephritis. In the ED pt received 4L of fluids, ofirmev, midodrine, zoysn, vancomycin, and norephinephrine GTT. MICU consulted by ED due to hypotension requiring norepi gtt, pt not deemed a candidate at the time.     (02 Jan 2024 15:54)      PAST MEDICAL & SURGICAL HISTORY:  Cardiac amyloidosis      Stenosis, cervical spine      BPH (benign prostatic hyperplasia)      Indwelling Harkins catheter present      Stage 3 chronic kidney disease      S/P cataract extraction  B/L      S/P nasal polypectomy      History of fusion of cervical spine      PREVIOUS DIAGNOSTIC TESTING:    [x] Echocardiogram:  < from: TTE W or WO Ultrasound Enhancing Agent (11.06.23 @ 10:22) >  CONCLUSIONS:      1. The left ventricular cavity is small. Left ventricular systolic function is normal with a calculated ejection fraction of 69 % by the Freeman's biplane method of disks. There is moderate (grade 2) left ventricular diastolic dysfunction. Severe left ventricular hypertrophy. Left ventricular global longitudinal strain is -10.7 % is abnormal (> -16%). Normal strain measurements noted at the left ventricular apex. Images were acquired on a [employer] ultrasound system and processed on the ultrasound machine with a heart rate of 82 bpm and a blood pressure of 102/64 mmHg. Findings suggestive of infiltrative cardiomyopathy.   2. Normal right ventricular cavity size, increasedwall thickness, and probably normal systolic function.   3. The left atrium is normal in size.   4. The inferior vena cava is normal in size measuring 1.61 cm in diameter, (normal <2.1cm) with normal inspiratory collapse (normal >50%) consistent with normal right atrial pressure (~3, range 0-5mmHg).   5. No pericardial effusion seen.    < end of copied text >    [ ]  Catheterization:  [ ] Stress Test:  	    MEDICATIONS:  Home Medications:  sodium chloride 0.65% nasal spray: 2 spray(s) nasal 3 times a day (02 Jan 2024 17:23)  Vyndamax 61 mg oral capsule: 1 cap(s) orally once a day (02 Jan 2024 17:23)      MEDICATIONS  (STANDING):  chlorhexidine 2% Cloths 1 Application(s) Topical <User Schedule>  colchicine 0.6 milliGRAM(s) Oral daily  dextrose 50% Injectable 25 Gram(s) IV Push once  dextrose 50% Injectable 12.5 Gram(s) IV Push once  dextrose 50% Injectable 25 milliLiter(s) IV Push once  dextrose 50% Injectable 25 Gram(s) IV Push once  dextrose Oral Gel 15 Gram(s) Oral once  finasteride 5 milliGRAM(s) Oral daily  fluticasone propionate 50 MICROgram(s)/spray Nasal Spray 1 Spray(s) Both Nostrils two times a day  glucagon  Injectable 1 milliGRAM(s) IntraMuscular once  heparin   Injectable 5000 Unit(s) SubCutaneous every 8 hours  midodrine 20 milliGRAM(s) Oral every 8 hours  piperacillin/tazobactam IVPB.. 3.375 Gram(s) IV Intermittent every 8 hours  remdesivir  IVPB 100 milliGRAM(s) IV Intermittent every 24 hours  remdesivir  IVPB   IV Intermittent   sodium chloride 0.65% Nasal 1 Spray(s) Both Nostrils once  tamsulosin 0.4 milliGRAM(s) Oral at bedtime      FAMILY HISTORY:      SOCIAL HISTORY:    [x] Non-smoker  [ ] Smoker  [ ] Alcohol    Allergies    No Known Allergies    Intolerances    	    REVIEW OF SYSTEMS:  CONSTITUTIONAL: No fever, weight loss, or fatigue  EYES: No eye pain, visual disturbances, or discharge  ENMT:  No difficulty hearing, tinnitus, vertigo; No sinus or throat pain  NECK: No pain or stiffness  RESPIRATORY: No cough, wheezing, chills or hemoptysis; No Shortness of Breath  CARDIOVASCULAR: No chest pain, palpitations, passing out, dizziness, or leg swelling  GASTROINTESTINAL: No abdominal or epigastric pain. No nausea, vomiting, or hematemesis; +Diarrhea. No constipation. No melena or hematochezia.  GENITOURINARY: No dysuria, frequency, hematuria, or incontinence  NEUROLOGICAL: No headaches, memory loss, loss of strength, numbness, or tremors  SKIN: No itching, burning, rashes, or lesions   	    [x] All others negative	  [ ] Unable to obtain    PHYSICAL EXAM:  T(C): 37.4 (01-03-24 @ 06:47), Max: 37.4 (01-03-24 @ 00:41)  HR: 88 (01-03-24 @ 06:47) (17 - 91)  BP: 115/75 (01-03-24 @ 06:47) (83/51 - 115/75)  RR: 18 (01-03-24 @ 06:47) (16 - 22)  SpO2: 98% (01-03-24 @ 06:47) (94% - 100%)  Wt(kg): --  I&O's Summary    02 Jan 2024 07:01  -  03 Jan 2024 07:00  --------------------------------------------------------  IN: 0 mL / OUT: 1500 mL / NET: -1500 mL        Appearance: Normal	  Psychiatry: A & O x 3, Mood & affect appropriate  HEENT:   Normal oral mucosa, PERRL, EOMI	  Lymphatic: No lymphadenopathy  Cardiovascular: Normal S1 S2,RRR, No JVD, No murmurs  Respiratory: Lungs clear to auscultation	  Gastrointestinal:  Soft, Non-tender, + BS	  Skin: No rashes, No ecchymoses, No cyanosis	  Neurologic: Non-focal  Extremities: Normal range of motion, No clubbing, cyanosis or edema  Vascular: Peripheral pulses palpable 2+ bilaterally    TELEMETRY: NSR 80s     ECG:  	  RADIOLOGY:  < from: Xray Chest 1 View- PORTABLE-Urgent (Xray Chest 1 View- PORTABLE-Urgent .) (01.02.24 @ 07:42) >    IMPRESSION:  No focal consolidations.    --- End of Report ---      < end of copied text >    OTHER: 	  	  LABS:	 	    CARDIAC MARKERS:                                  9.7    5.89  )-----------( 208      ( 03 Jan 2024 06:01 )             30.0     01-03    138  |  108  |  30<H>  ----------------------------<  49<LL>  4.4   |  19<L>  |  2.31<H>    Ca    9.1      03 Jan 2024 06:00  Phos  3.9     01-03  Mg     2.2     01-03    TPro  6.7  /  Alb  3.1<L>  /  TBili  0.6  /  DBili  x   /  AST  179<H>  /  ALT  54<H>  /  AlkPhos  113  01-03      proBNP:   Lipid Profile:   HgA1c:   TSH:        CARDIOLOGY CONSULT - Dr. Saravia       HPI:  Mr. Sanchez is a 73M w/ BPH (c/b urinary retention s/p Harkins catheter placement; exchanged monthly), CKD stage IIIb, and cardiac amyloidosis brought in by daughter for diarrhea (loose-watery BM's) over the past few days. The pt reports no new medications or food triggers. No relieving factors. Pt could not delineate category of diarrhea.  The patient had a fall yesterday, pt denied any preceding nausea or associated LOC. Pt was recently hospitalized in December for urosepsis and abdominal pain and was found to have E. Faecalis and S. Maltophilia     In the ED VS were notable for low SBPs in 80-90s at one point pt was tachycardic to 102. Labs and imaging were notable for HgB 9.2, Cr 1.92, , , ALT 63, U/A LE, and Pan CT notable for spinal stenosis, spoondolysthesis, procitits, and pyelonephritis. In the ED pt received 4L of fluids, ofirmev, midodrine, zoysn, vancomycin, and norephinephrine GTT. MICU consulted by ED due to hypotension requiring norepi gtt, pt not deemed a candidate at the time.     (02 Jan 2024 15:54)      PAST MEDICAL & SURGICAL HISTORY:  Cardiac amyloidosis      Stenosis, cervical spine      BPH (benign prostatic hyperplasia)      Indwelling Harkins catheter present      Stage 3 chronic kidney disease      S/P cataract extraction  B/L      S/P nasal polypectomy      History of fusion of cervical spine      PREVIOUS DIAGNOSTIC TESTING:    [x] Echocardiogram:  < from: TTE W or WO Ultrasound Enhancing Agent (11.06.23 @ 10:22) >  CONCLUSIONS:      1. The left ventricular cavity is small. Left ventricular systolic function is normal with a calculated ejection fraction of 69 % by the Freeman's biplane method of disks. There is moderate (grade 2) left ventricular diastolic dysfunction. Severe left ventricular hypertrophy. Left ventricular global longitudinal strain is -10.7 % is abnormal (> -16%). Normal strain measurements noted at the left ventricular apex. Images were acquired on a [employer] ultrasound system and processed on the ultrasound machine with a heart rate of 82 bpm and a blood pressure of 102/64 mmHg. Findings suggestive of infiltrative cardiomyopathy.   2. Normal right ventricular cavity size, increasedwall thickness, and probably normal systolic function.   3. The left atrium is normal in size.   4. The inferior vena cava is normal in size measuring 1.61 cm in diameter, (normal <2.1cm) with normal inspiratory collapse (normal >50%) consistent with normal right atrial pressure (~3, range 0-5mmHg).   5. No pericardial effusion seen.    < end of copied text >    [ ]  Catheterization:  [ ] Stress Test:  	    MEDICATIONS:  Home Medications:  sodium chloride 0.65% nasal spray: 2 spray(s) nasal 3 times a day (02 Jan 2024 17:23)  Vyndamax 61 mg oral capsule: 1 cap(s) orally once a day (02 Jan 2024 17:23)      MEDICATIONS  (STANDING):  chlorhexidine 2% Cloths 1 Application(s) Topical <User Schedule>  colchicine 0.6 milliGRAM(s) Oral daily  dextrose 50% Injectable 25 Gram(s) IV Push once  dextrose 50% Injectable 12.5 Gram(s) IV Push once  dextrose 50% Injectable 25 milliLiter(s) IV Push once  dextrose 50% Injectable 25 Gram(s) IV Push once  dextrose Oral Gel 15 Gram(s) Oral once  finasteride 5 milliGRAM(s) Oral daily  fluticasone propionate 50 MICROgram(s)/spray Nasal Spray 1 Spray(s) Both Nostrils two times a day  glucagon  Injectable 1 milliGRAM(s) IntraMuscular once  heparin   Injectable 5000 Unit(s) SubCutaneous every 8 hours  midodrine 20 milliGRAM(s) Oral every 8 hours  piperacillin/tazobactam IVPB.. 3.375 Gram(s) IV Intermittent every 8 hours  remdesivir  IVPB 100 milliGRAM(s) IV Intermittent every 24 hours  remdesivir  IVPB   IV Intermittent   sodium chloride 0.65% Nasal 1 Spray(s) Both Nostrils once  tamsulosin 0.4 milliGRAM(s) Oral at bedtime      FAMILY HISTORY:      SOCIAL HISTORY:    [x] Non-smoker  [ ] Smoker  [ ] Alcohol    Allergies    No Known Allergies    Intolerances    	    REVIEW OF SYSTEMS:  CONSTITUTIONAL: No fever, weight loss, or fatigue  EYES: No eye pain, visual disturbances, or discharge  ENMT:  No difficulty hearing, tinnitus, vertigo; No sinus or throat pain  NECK: No pain or stiffness  RESPIRATORY: No cough, wheezing, chills or hemoptysis; No Shortness of Breath  CARDIOVASCULAR: No chest pain, palpitations, passing out, dizziness, or leg swelling  GASTROINTESTINAL: No abdominal or epigastric pain. No nausea, vomiting, or hematemesis; +Diarrhea. No constipation. No melena or hematochezia.  GENITOURINARY: No dysuria, frequency, hematuria, or incontinence  NEUROLOGICAL: No headaches, memory loss, loss of strength, numbness, or tremors  SKIN: No itching, burning, rashes, or lesions   	    [x] All others negative	  [ ] Unable to obtain    PHYSICAL EXAM:  T(C): 37.4 (01-03-24 @ 06:47), Max: 37.4 (01-03-24 @ 00:41)  HR: 88 (01-03-24 @ 06:47) (17 - 91)  BP: 115/75 (01-03-24 @ 06:47) (83/51 - 115/75)  RR: 18 (01-03-24 @ 06:47) (16 - 22)  SpO2: 98% (01-03-24 @ 06:47) (94% - 100%)  Wt(kg): --  I&O's Summary    02 Jan 2024 07:01  -  03 Jan 2024 07:00  --------------------------------------------------------  IN: 0 mL / OUT: 1500 mL / NET: -1500 mL        Appearance: Normal	  Psychiatry: A & O x 3, Mood & affect appropriate  HEENT:   Normal oral mucosa, PERRL, EOMI	  Lymphatic: No lymphadenopathy  Cardiovascular: Normal S1 S2,RRR, No JVD, No murmurs  Respiratory: Lungs clear to auscultation	  Gastrointestinal:  Soft, Non-tender, + BS	  Skin: No rashes, No ecchymoses, No cyanosis	  Neurologic: Non-focal  Extremities: Normal range of motion, No clubbing, cyanosis or edema  Vascular: Peripheral pulses palpable 2+ bilaterally    TELEMETRY: NSR 80s     ECG:  	  RADIOLOGY:  < from: Xray Chest 1 View- PORTABLE-Urgent (Xray Chest 1 View- PORTABLE-Urgent .) (01.02.24 @ 07:42) >    IMPRESSION:  No focal consolidations.    --- End of Report ---      < end of copied text >    OTHER: 	  	  LABS:	 	    CARDIAC MARKERS:                                  9.7    5.89  )-----------( 208      ( 03 Jan 2024 06:01 )             30.0     01-03    138  |  108  |  30<H>  ----------------------------<  49<LL>  4.4   |  19<L>  |  2.31<H>    Ca    9.1      03 Jan 2024 06:00  Phos  3.9     01-03  Mg     2.2     01-03    TPro  6.7  /  Alb  3.1<L>  /  TBili  0.6  /  DBili  x   /  AST  179<H>  /  ALT  54<H>  /  AlkPhos  113  01-03      proBNP:   Lipid Profile:   HgA1c:   TSH:        CARDIOLOGY CONSULT - Dr. Saravia       HPI:  Mr. Sanchez is a 73M w/ BPH (c/b urinary retention s/p Harkins catheter placement; exchanged monthly), CKD stage IIIb, and cardiac amyloidosis brought in by daughter for diarrhea (loose-watery BM's) over the past few days. The pt reports no new medications or food triggers. No relieving factors. Pt could not delineate category of diarrhea.  The patient had a fall yesterday, pt denied any preceding nausea or associated LOC. Pt was recently hospitalized in December for urosepsis and abdominal pain and was found to have E. Faecalis and S. Maltophilia     In the ED VS were notable for low SBPs in 80-90s at one point pt was tachycardic to 102. Labs and imaging were notable for HgB 9.2, Cr 1.92, , , ALT 63, U/A LE, and Pan CT notable for spinal stenosis, spoondolysthesis, procitits, and pyelonephritis. In the ED pt received 4L of fluids, ofirmev, midodrine, zoysn, vancomycin, and norephinephrine GTT. MICU consulted by ED due to hypotension requiring norepi gtt, pt not deemed a candidate at the time.     (02 Jan 2024 15:54)      PAST MEDICAL & SURGICAL HISTORY:  Cardiac amyloidosis      Stenosis, cervical spine      BPH (benign prostatic hyperplasia)      Indwelling Harkins catheter present      Stage 3 chronic kidney disease      S/P cataract extraction  B/L      S/P nasal polypectomy      History of fusion of cervical spine      PREVIOUS DIAGNOSTIC TESTING:    [x] Echocardiogram:  < from: TTE W or WO Ultrasound Enhancing Agent (11.06.23 @ 10:22) >  CONCLUSIONS:      1. The left ventricular cavity is small. Left ventricular systolic function is normal with a calculated ejection fraction of 69 % by the Freeman's biplane method of disks. There is moderate (grade 2) left ventricular diastolic dysfunction. Severe left ventricular hypertrophy. Left ventricular global longitudinal strain is -10.7 % is abnormal (> -16%). Normal strain measurements noted at the left ventricular apex. Images were acquired on a [employer] ultrasound system and processed on the ultrasound machine with a heart rate of 82 bpm and a blood pressure of 102/64 mmHg. Findings suggestive of infiltrative cardiomyopathy.   2. Normal right ventricular cavity size, increasedwall thickness, and probably normal systolic function.   3. The left atrium is normal in size.   4. The inferior vena cava is normal in size measuring 1.61 cm in diameter, (normal <2.1cm) with normal inspiratory collapse (normal >50%) consistent with normal right atrial pressure (~3, range 0-5mmHg).   5. No pericardial effusion seen.    < end of copied text >    [ ]  Catheterization:  [ ] Stress Test:  	    MEDICATIONS:  Home Medications:  sodium chloride 0.65% nasal spray: 2 spray(s) nasal 3 times a day (02 Jan 2024 17:23)  Vyndamax 61 mg oral capsule: 1 cap(s) orally once a day (02 Jan 2024 17:23)      MEDICATIONS  (STANDING):  chlorhexidine 2% Cloths 1 Application(s) Topical <User Schedule>  colchicine 0.6 milliGRAM(s) Oral daily  dextrose 50% Injectable 25 Gram(s) IV Push once  dextrose 50% Injectable 12.5 Gram(s) IV Push once  dextrose 50% Injectable 25 milliLiter(s) IV Push once  dextrose 50% Injectable 25 Gram(s) IV Push once  dextrose Oral Gel 15 Gram(s) Oral once  finasteride 5 milliGRAM(s) Oral daily  fluticasone propionate 50 MICROgram(s)/spray Nasal Spray 1 Spray(s) Both Nostrils two times a day  glucagon  Injectable 1 milliGRAM(s) IntraMuscular once  heparin   Injectable 5000 Unit(s) SubCutaneous every 8 hours  midodrine 20 milliGRAM(s) Oral every 8 hours  piperacillin/tazobactam IVPB.. 3.375 Gram(s) IV Intermittent every 8 hours  remdesivir  IVPB 100 milliGRAM(s) IV Intermittent every 24 hours  remdesivir  IVPB   IV Intermittent   sodium chloride 0.65% Nasal 1 Spray(s) Both Nostrils once  tamsulosin 0.4 milliGRAM(s) Oral at bedtime      FAMILY HISTORY:      SOCIAL HISTORY:    [x] Non-smoker  [ ] Smoker  [ ] Alcohol    Allergies    No Known Allergies    Intolerances    	    REVIEW OF SYSTEMS:  CONSTITUTIONAL: No fever, weight loss, or fatigue  EYES: No eye pain, visual disturbances, or discharge  ENMT:  No difficulty hearing, tinnitus, vertigo; No sinus or throat pain  NECK: No pain or stiffness  RESPIRATORY: No cough, wheezing, chills or hemoptysis; No Shortness of Breath  CARDIOVASCULAR: No chest pain, palpitations, passing out, dizziness, or leg swelling  GASTROINTESTINAL: No abdominal or epigastric pain. No nausea, vomiting, or hematemesis; +Diarrhea. No constipation. No melena or hematochezia.  GENITOURINARY: No dysuria, frequency, hematuria, or incontinence  NEUROLOGICAL: No headaches, memory loss, loss of strength, numbness, or tremors  SKIN: No itching, burning, rashes, or lesions   	    [x] All others negative	  [ ] Unable to obtain    PHYSICAL EXAM:  T(C): 37.4 (01-03-24 @ 06:47), Max: 37.4 (01-03-24 @ 00:41)  HR: 88 (01-03-24 @ 06:47) (17 - 91)  BP: 115/75 (01-03-24 @ 06:47) (83/51 - 115/75)  RR: 18 (01-03-24 @ 06:47) (16 - 22)  SpO2: 98% (01-03-24 @ 06:47) (94% - 100%)  Wt(kg): --  I&O's Summary    02 Jan 2024 07:01  -  03 Jan 2024 07:00  --------------------------------------------------------  IN: 0 mL / OUT: 1500 mL / NET: -1500 mL        Appearance: Elderly male 	  Psychiatry: A & O x 3, Mood & affect appropriate  HEENT:   Normal oral mucosa, PERRL, EOMI	  Lymphatic: No lymphadenopathy  Cardiovascular: Normal S1 S2,RRR, No JVD, No murmurs  Respiratory: Lungs clear to auscultation b/l   Gastrointestinal:  Soft, Non-tender, + BS	  Skin: No rashes, No ecchymoses, No cyanosis	  Neurologic: Non-focal  Extremities: Normal range of motion, No clubbing, cyanosis or edema  Vascular: Peripheral pulses palpable 2+ bilaterally    TELEMETRY: NSR 80s     ECG:  NO EKG IN CHART	  RADIOLOGY:  < from: Xray Chest 1 View- PORTABLE-Urgent (Xray Chest 1 View- PORTABLE-Urgent .) (01.02.24 @ 07:42) >    IMPRESSION:  No focal consolidations.    --- End of Report ---      < end of copied text >    OTHER: 	  	  LABS:	 	    CARDIAC MARKERS:                                  9.7    5.89  )-----------( 208      ( 03 Jan 2024 06:01 )             30.0     01-03    138  |  108  |  30<H>  ----------------------------<  49<LL>  4.4   |  19<L>  |  2.31<H>    Ca    9.1      03 Jan 2024 06:00  Phos  3.9     01-03  Mg     2.2     01-03    TPro  6.7  /  Alb  3.1<L>  /  TBili  0.6  /  DBili  x   /  AST  179<H>  /  ALT  54<H>  /  AlkPhos  113  01-03      proBNP:   Lipid Profile:   HgA1c:   TSH:        CARDIOLOGY CONSULT - Dr. Saravia       HPI:  Mr. Sanchez is a 73M w/ BPH (c/b urinary retention s/p Harkins catheter placement; exchanged monthly), CKD stage IIIb, and cardiac amyloidosis brought in by daughter for diarrhea (loose-watery BM's) over the past few days. The pt reports no new medications or food triggers. No relieving factors. Pt could not delineate category of diarrhea.  The patient had a fall yesterday, pt denied any preceding nausea or associated LOC. Pt was recently hospitalized in December for urosepsis and abdominal pain and was found to have E. Faecalis and S. Maltophilia     In the ED VS were notable for low SBPs in 80-90s at one point pt was tachycardic to 102. Labs and imaging were notable for HgB 9.2, Cr 1.92, , , ALT 63, U/A LE, and Pan CT notable for spinal stenosis, spoondolysthesis, procitits, and pyelonephritis. In the ED pt received 4L of fluids, ofirmev, midodrine, zoysn, vancomycin, and norephinephrine GTT. MICU consulted by ED due to hypotension requiring norepi gtt, pt not deemed a candidate at the time.     (02 Jan 2024 15:54)      PAST MEDICAL & SURGICAL HISTORY:  Cardiac amyloidosis      Stenosis, cervical spine      BPH (benign prostatic hyperplasia)      Indwelling Harkins catheter present      Stage 3 chronic kidney disease      S/P cataract extraction  B/L      S/P nasal polypectomy      History of fusion of cervical spine      PREVIOUS DIAGNOSTIC TESTING:    [x] Echocardiogram:  < from: TTE W or WO Ultrasound Enhancing Agent (11.06.23 @ 10:22) >  CONCLUSIONS:      1. The left ventricular cavity is small. Left ventricular systolic function is normal with a calculated ejection fraction of 69 % by the Freeman's biplane method of disks. There is moderate (grade 2) left ventricular diastolic dysfunction. Severe left ventricular hypertrophy. Left ventricular global longitudinal strain is -10.7 % is abnormal (> -16%). Normal strain measurements noted at the left ventricular apex. Images were acquired on a [employer] ultrasound system and processed on the ultrasound machine with a heart rate of 82 bpm and a blood pressure of 102/64 mmHg. Findings suggestive of infiltrative cardiomyopathy.   2. Normal right ventricular cavity size, increasedwall thickness, and probably normal systolic function.   3. The left atrium is normal in size.   4. The inferior vena cava is normal in size measuring 1.61 cm in diameter, (normal <2.1cm) with normal inspiratory collapse (normal >50%) consistent with normal right atrial pressure (~3, range 0-5mmHg).   5. No pericardial effusion seen.    < end of copied text >    [ ]  Catheterization:  [ ] Stress Test:  	    MEDICATIONS:  Home Medications:  sodium chloride 0.65% nasal spray: 2 spray(s) nasal 3 times a day (02 Jan 2024 17:23)  Vyndamax 61 mg oral capsule: 1 cap(s) orally once a day (02 Jan 2024 17:23)      MEDICATIONS  (STANDING):  chlorhexidine 2% Cloths 1 Application(s) Topical <User Schedule>  colchicine 0.6 milliGRAM(s) Oral daily  dextrose 50% Injectable 25 Gram(s) IV Push once  dextrose 50% Injectable 12.5 Gram(s) IV Push once  dextrose 50% Injectable 25 milliLiter(s) IV Push once  dextrose 50% Injectable 25 Gram(s) IV Push once  dextrose Oral Gel 15 Gram(s) Oral once  finasteride 5 milliGRAM(s) Oral daily  fluticasone propionate 50 MICROgram(s)/spray Nasal Spray 1 Spray(s) Both Nostrils two times a day  glucagon  Injectable 1 milliGRAM(s) IntraMuscular once  heparin   Injectable 5000 Unit(s) SubCutaneous every 8 hours  midodrine 20 milliGRAM(s) Oral every 8 hours  piperacillin/tazobactam IVPB.. 3.375 Gram(s) IV Intermittent every 8 hours  remdesivir  IVPB 100 milliGRAM(s) IV Intermittent every 24 hours  remdesivir  IVPB   IV Intermittent   sodium chloride 0.65% Nasal 1 Spray(s) Both Nostrils once  tamsulosin 0.4 milliGRAM(s) Oral at bedtime      FAMILY HISTORY:      SOCIAL HISTORY:    [x] Non-smoker  [ ] Smoker  [ ] Alcohol    Allergies    No Known Allergies    Intolerances    	    REVIEW OF SYSTEMS:  CONSTITUTIONAL: No fever, weight loss, or fatigue  EYES: No eye pain, visual disturbances, or discharge  ENMT:  No difficulty hearing, tinnitus, vertigo; No sinus or throat pain  NECK: No pain or stiffness  RESPIRATORY: No cough, wheezing, chills or hemoptysis; No Shortness of Breath  CARDIOVASCULAR: No chest pain, palpitations, passing out, dizziness, or leg swelling  GASTROINTESTINAL: No abdominal or epigastric pain. No nausea, vomiting, or hematemesis; +Diarrhea. No constipation. No melena or hematochezia.  GENITOURINARY: No dysuria, frequency, hematuria, or incontinence  NEUROLOGICAL: No headaches, memory loss, loss of strength, numbness, or tremors  SKIN: No itching, burning, rashes, or lesions   	    [x] All others negative	  [ ] Unable to obtain    PHYSICAL EXAM:  T(C): 37.4 (01-03-24 @ 06:47), Max: 37.4 (01-03-24 @ 00:41)  HR: 88 (01-03-24 @ 06:47) (17 - 91)  BP: 115/75 (01-03-24 @ 06:47) (83/51 - 115/75)  RR: 18 (01-03-24 @ 06:47) (16 - 22)  SpO2: 98% (01-03-24 @ 06:47) (94% - 100%)  Wt(kg): --  I&O's Summary    02 Jan 2024 07:01  -  03 Jan 2024 07:00  --------------------------------------------------------  IN: 0 mL / OUT: 1500 mL / NET: -1500 mL        Appearance: Elderly male 	  Psychiatry: A & O x 3, Mood & affect appropriate  HEENT:   Normal oral mucosa, PERRL, EOMI	  Lymphatic: No lymphadenopathy  Cardiovascular: Normal S1 S2,RRR, No JVD, No murmurs  Respiratory: Lungs clear to auscultation b/l   Gastrointestinal:  Soft, Non-tender, + BS	  Skin: No rashes, No ecchymoses, No cyanosis	  Neurologic: Non-focal  Extremities: Normal range of motion, No clubbing, cyanosis or edema  Vascular: Peripheral pulses palpable 2+ bilaterally    TELEMETRY: NSR 80s     ECG:  NO EKG IN CHART	  RADIOLOGY:  < from: Xray Chest 1 View- PORTABLE-Urgent (Xray Chest 1 View- PORTABLE-Urgent .) (01.02.24 @ 07:42) >    IMPRESSION:  No focal consolidations.    --- End of Report ---      < end of copied text >    OTHER: 	  	  LABS:	 	    CARDIAC MARKERS:                                  9.7    5.89  )-----------( 208      ( 03 Jan 2024 06:01 )             30.0     01-03    138  |  108  |  30<H>  ----------------------------<  49<LL>  4.4   |  19<L>  |  2.31<H>    Ca    9.1      03 Jan 2024 06:00  Phos  3.9     01-03  Mg     2.2     01-03    TPro  6.7  /  Alb  3.1<L>  /  TBili  0.6  /  DBili  x   /  AST  179<H>  /  ALT  54<H>  /  AlkPhos  113  01-03      proBNP:   Lipid Profile:   HgA1c:   TSH:

## 2024-01-03 NOTE — PROGRESS NOTE ADULT - ASSESSMENT
74 yo M BPH history of morales for urinary retention, initially with diarrhea and falls  Has fevers, no leukocytosis  Initially with diarrhea/falls  Chronic morales, has had pain at site  CT with proctitis, fluid filled rectum, bilateral perinephric soft tissue stranding; renal lesions  UA+  C diff negative  COVID+  Had hypotension, with evaluation by MICU  COVID alone? UTI given symptoms and history?  Overall, UTI, COVID, fever  - Zosyn 3.375g q 8  - Team DCed RemD in the interim  - Monitor for further fevers  - Hold on Dexa unless progressive O2 requirements  - Follow up pending UCX (enterococcus)/BCXs  - F/U GI PCR--neg    Vernon Escobar MD  Contact on TEAMS messaging from 9am - 5pm  From 5pm-9am, on weekends, or if no response call [phone] 72 yo M BPH history of morales for urinary retention, initially with diarrhea and falls  Has fevers, no leukocytosis  Initially with diarrhea/falls  Chronic morales, has had pain at site  CT with proctitis, fluid filled rectum, bilateral perinephric soft tissue stranding; renal lesions  UA+  C diff negative  COVID+  Had hypotension, with evaluation by MICU  COVID alone? UTI given symptoms and history?  Overall, UTI, COVID, fever  - Zosyn 3.375g q 8  - Team DCed RemD in the interim  - Monitor for further fevers  - Hold on Dexa unless progressive O2 requirements  - Follow up pending UCX (enterococcus)/BCXs  - F/U GI PCR--neg    Vernon Escobar MD  Contact on TEAMS messaging from 9am - 5pm  From 5pm-9am, on weekends, or if no response call [phone]

## 2024-01-03 NOTE — PHYSICAL THERAPY INITIAL EVALUATION ADULT - ADDITIONAL COMMENTS
Pt with recent d/c.   Prior to admission pt reports being independent of all ADL's & functional mobility using RW. Pt resides in house with family 3 steps to enter. Pt owns RW. Pt with recent d/c.   Prior to admission pt reports being independent of all ADL's & functional mobility using RW. Pt resides in house with family 3 steps to enter, all needs on first floor. Pt owns RW and shower chair with grab bars in tub.

## 2024-01-03 NOTE — PROVIDER CONTACT NOTE (CRITICAL VALUE NOTIFICATION) - ASSESSMENT
AO x4, pt able to verbalize needs Pt denies and headache, dizziness, shortness of breath, weakness, headache. Pt verbalizes no pain, no acute distress noted; Asymptomatic

## 2024-01-03 NOTE — PROGRESS NOTE ADULT - PROBLEM SELECTOR PLAN 1
-previous hx of E.faecalis and Stenotrophomonas maltophilia  -Likely Pyelo in context of CT with perinephric standing, may also be due to proctitis   -s/p Vanc and Zosyn in ED  -ID consulted recs appreciated -> D/C vanc   -Midodrine PRN for MAP >65  -F/U UCx and Blood Cx

## 2024-01-03 NOTE — PROVIDER CONTACT NOTE (HYPOGLYCEMIA EVENT) - NS PROVIDER CONTACT BACKGROUND-HYPO
Age: 73y    Gender: Male    POCT Blood Glucose:  126 mg/dL (01-03-24 @ 06:57)  45 mg/dL (01-03-24 @ 06:47)  49 mg/dL (01-03-24 @ 06:36)      eMAr: Writer notified by  Juan Narvaez of glucose= 49 at 0634. Pt A0x4, responsive, denies dizziness, SOB, weakness. Provider notified. RRT called.

## 2024-01-03 NOTE — PROGRESS NOTE ADULT - ATTENDING COMMENTS
73M w/ BPH (c/b urinary retention s/p Harkins catheter placement; exchanged monthly), CKD stage IIIb, and cardiac amyloidosis brought in by daughter for diarrhea found to have hypotension likely due to sepsis.     Overnight had RRT called for hypoglycemia resoved with D50, pt now eating.     1. Pyleo/ Urosepsis S/p 4.5L IVF and now with midodrine 20 mg q8h with parameters. Started on Zosyn. CT abd pel showing perinephric stranding. Harkins changed in ER by urology. MICU consulted due to pt initially requiring pressors, now off. Will monitor MAP. If continued hypotension may fluid bolus.   2. COVID+: C/w ID recs for Zosyn. Not requiring O2 at this time.   3. Elevated LFTs possibly shock liver in the setting of hypotension, holding remdesivir, hold hepatotoxic agents, stable  4. SARAH on CKD Trend Cr Hold nephrotoxic agents. Holding Remdesivir in the setting of SARAH. Pt saturating well on room air.  5. BPH: Harkins changed in ED by urology, maintain.   6. Amyloidosis: Will consult cardiology for management   7. Fall 2/2 to dizziness: CTH negative for acute changes. CT L spine showing spinal stenosis L2-L5. Check Orthostatics. Rehydrating.   8. Hypoglycemia 1/3 RRt called D50 given, now eating

## 2024-01-03 NOTE — PATIENT PROFILE ADULT - FALL HARM RISK - HARM RISK INTERVENTIONS
Assistance with ambulation/Assistance OOB with selected safe patient handling equipment/Communicate Risk of Fall with Harm to all staff/Discuss with provider need for PT consult/Monitor gait and stability/Provide patient with walking aids - walker, cane, crutches/Reinforce activity limits and safety measures with patient and family/Tailored Fall Risk Interventions/Visual Cue: Yellow wristband and red socks/Bed in lowest position, wheels locked, appropriate side rails in place/Call bell, personal items and telephone in reach/Instruct patient to call for assistance before getting out of bed or chair/Non-slip footwear when patient is out of bed/[place] to call system/Physically safe environment - no spills, clutter or unnecessary equipment/Purposeful Proactive Rounding/Room/bathroom lighting operational, light cord in reach Assistance with ambulation/Assistance OOB with selected safe patient handling equipment/Communicate Risk of Fall with Harm to all staff/Discuss with provider need for PT consult/Monitor gait and stability/Provide patient with walking aids - walker, cane, crutches/Reinforce activity limits and safety measures with patient and family/Tailored Fall Risk Interventions/Visual Cue: Yellow wristband and red socks/Bed in lowest position, wheels locked, appropriate side rails in place/Call bell, personal items and telephone in reach/Instruct patient to call for assistance before getting out of bed or chair/Non-slip footwear when patient is out of bed/[place] to call system/Physically safe environment - no spills, clutter or unnecessary equipment/Purposeful Proactive Rounding/Room/bathroom lighting operational, light cord in reach

## 2024-01-03 NOTE — RAPID RESPONSE TEAM SUMMARY - NSADDTLFINDINGSRRT_GEN_ALL_CORE
On arrival, vitals hemodynamically stable. He denies any symptoms and is not toxic appearing. Improvement to 120s glucose after 25 cc d50. While he has sepsis requiring significant resuscitation yesterday, I do not believe his hypoglycemia currently is a sequela of his sepsis as he is well appearing and non toxic. I suspect his hypoglycemia may be due to low PO intake as he states he is hungry and was unable to eat any food yesterday.  - Cont to monitor with POC Glucose

## 2024-01-03 NOTE — PROGRESS NOTE ADULT - PROBLEM SELECTOR PLAN 3
-CT Head WNL  -F/U B12, TSH, Orthostatic vitals -CT Head WNL  -F/U B12, TSH, Orthostatic vitals-> TSH elevated free T4 and total T3 ordered

## 2024-01-03 NOTE — CONSULT NOTE ADULT - TIME BILLING
Patient seen and examined, agree with the above assessment and plan by SHAD Laurent  73M w/ BPH (c/b urinary retention s/p Harkins catheter placement; exchanged monthly), CKD stage IIIb, and cardiac amyloidosis brought in by daughter for diarrhea (loose-watery BM's) over the past few days.    #Sepsis  -I/s/o COVID + UTI  -Abx per med    #COVID  -CXR no focal consolidations  -Remdesivir per med  -Supportive care per med    #cardiac amyloid   -on vyndamax as outpt  -baseline sbp 100 as outpt  -Prior echo with  Nml LV systolic fxn, Severe left ventricular hypertrophy, Findings suggestive of infiltrative cardiomyopathy    #Hypotension  -Cont midodrine     #Hypoglycemia  -mgmt per med      dvt ppx
chart and data review, clinical assessment, and coordination of care. This excludes any time spent on separate procedures or teaching.

## 2024-01-04 DIAGNOSIS — I95.9 HYPOTENSION, UNSPECIFIED: ICD-10-CM

## 2024-01-04 LAB
-  AMPICILLIN: SIGNIFICANT CHANGE UP
-  AMPICILLIN: SIGNIFICANT CHANGE UP
-  CIPROFLOXACIN: SIGNIFICANT CHANGE UP
-  CIPROFLOXACIN: SIGNIFICANT CHANGE UP
-  LEVOFLOXACIN: SIGNIFICANT CHANGE UP
-  LEVOFLOXACIN: SIGNIFICANT CHANGE UP
-  NITROFURANTOIN: SIGNIFICANT CHANGE UP
-  NITROFURANTOIN: SIGNIFICANT CHANGE UP
-  TETRACYCLINE: SIGNIFICANT CHANGE UP
-  TETRACYCLINE: SIGNIFICANT CHANGE UP
-  VANCOMYCIN: SIGNIFICANT CHANGE UP
-  VANCOMYCIN: SIGNIFICANT CHANGE UP
ALBUMIN SERPL ELPH-MCNC: 2.9 G/DL — LOW (ref 3.3–5)
ALBUMIN SERPL ELPH-MCNC: 2.9 G/DL — LOW (ref 3.3–5)
ALP SERPL-CCNC: 83 U/L — SIGNIFICANT CHANGE UP (ref 40–120)
ALP SERPL-CCNC: 83 U/L — SIGNIFICANT CHANGE UP (ref 40–120)
ALT FLD-CCNC: 44 U/L — SIGNIFICANT CHANGE UP (ref 10–45)
ALT FLD-CCNC: 44 U/L — SIGNIFICANT CHANGE UP (ref 10–45)
ANION GAP SERPL CALC-SCNC: 11 MMOL/L — SIGNIFICANT CHANGE UP (ref 5–17)
ANION GAP SERPL CALC-SCNC: 11 MMOL/L — SIGNIFICANT CHANGE UP (ref 5–17)
APPEARANCE UR: CLEAR — SIGNIFICANT CHANGE UP
APPEARANCE UR: CLEAR — SIGNIFICANT CHANGE UP
AST SERPL-CCNC: 117 U/L — HIGH (ref 10–40)
AST SERPL-CCNC: 117 U/L — HIGH (ref 10–40)
BACTERIA # UR AUTO: NEGATIVE /HPF — SIGNIFICANT CHANGE UP
BACTERIA # UR AUTO: NEGATIVE /HPF — SIGNIFICANT CHANGE UP
BASOPHILS # BLD AUTO: 0.03 K/UL — SIGNIFICANT CHANGE UP (ref 0–0.2)
BASOPHILS # BLD AUTO: 0.03 K/UL — SIGNIFICANT CHANGE UP (ref 0–0.2)
BASOPHILS NFR BLD AUTO: 0.7 % — SIGNIFICANT CHANGE UP (ref 0–2)
BASOPHILS NFR BLD AUTO: 0.7 % — SIGNIFICANT CHANGE UP (ref 0–2)
BILIRUB SERPL-MCNC: 0.5 MG/DL — SIGNIFICANT CHANGE UP (ref 0.2–1.2)
BILIRUB SERPL-MCNC: 0.5 MG/DL — SIGNIFICANT CHANGE UP (ref 0.2–1.2)
BILIRUB UR-MCNC: NEGATIVE — SIGNIFICANT CHANGE UP
BILIRUB UR-MCNC: NEGATIVE — SIGNIFICANT CHANGE UP
BUN SERPL-MCNC: 32 MG/DL — HIGH (ref 7–23)
BUN SERPL-MCNC: 32 MG/DL — HIGH (ref 7–23)
CALCIUM SERPL-MCNC: 8.5 MG/DL — SIGNIFICANT CHANGE UP (ref 8.4–10.5)
CALCIUM SERPL-MCNC: 8.5 MG/DL — SIGNIFICANT CHANGE UP (ref 8.4–10.5)
CAST: 0 /LPF — SIGNIFICANT CHANGE UP (ref 0–4)
CAST: 0 /LPF — SIGNIFICANT CHANGE UP (ref 0–4)
CHLORIDE SERPL-SCNC: 107 MMOL/L — SIGNIFICANT CHANGE UP (ref 96–108)
CHLORIDE SERPL-SCNC: 107 MMOL/L — SIGNIFICANT CHANGE UP (ref 96–108)
CO2 SERPL-SCNC: 20 MMOL/L — LOW (ref 22–31)
CO2 SERPL-SCNC: 20 MMOL/L — LOW (ref 22–31)
COLOR SPEC: YELLOW — SIGNIFICANT CHANGE UP
COLOR SPEC: YELLOW — SIGNIFICANT CHANGE UP
CREAT ?TM UR-MCNC: 83 MG/DL — SIGNIFICANT CHANGE UP
CREAT ?TM UR-MCNC: 83 MG/DL — SIGNIFICANT CHANGE UP
CREAT SERPL-MCNC: 2.69 MG/DL — HIGH (ref 0.5–1.3)
CREAT SERPL-MCNC: 2.69 MG/DL — HIGH (ref 0.5–1.3)
CULTURE RESULTS: ABNORMAL
CULTURE RESULTS: ABNORMAL
DIFF PNL FLD: ABNORMAL
DIFF PNL FLD: ABNORMAL
EGFR: 24 ML/MIN/1.73M2 — LOW
EGFR: 24 ML/MIN/1.73M2 — LOW
EOSINOPHIL # BLD AUTO: 0.33 K/UL — SIGNIFICANT CHANGE UP (ref 0–0.5)
EOSINOPHIL # BLD AUTO: 0.33 K/UL — SIGNIFICANT CHANGE UP (ref 0–0.5)
EOSINOPHIL NFR BLD AUTO: 7.7 % — HIGH (ref 0–6)
EOSINOPHIL NFR BLD AUTO: 7.7 % — HIGH (ref 0–6)
FERRITIN SERPL-MCNC: 195 NG/ML — SIGNIFICANT CHANGE UP (ref 30–400)
FERRITIN SERPL-MCNC: 195 NG/ML — SIGNIFICANT CHANGE UP (ref 30–400)
FOLATE SERPL-MCNC: 13.7 NG/ML — SIGNIFICANT CHANGE UP
FOLATE SERPL-MCNC: 13.7 NG/ML — SIGNIFICANT CHANGE UP
GLUCOSE SERPL-MCNC: 79 MG/DL — SIGNIFICANT CHANGE UP (ref 70–99)
GLUCOSE SERPL-MCNC: 79 MG/DL — SIGNIFICANT CHANGE UP (ref 70–99)
GLUCOSE UR QL: NEGATIVE MG/DL — SIGNIFICANT CHANGE UP
GLUCOSE UR QL: NEGATIVE MG/DL — SIGNIFICANT CHANGE UP
HCT VFR BLD CALC: 25.5 % — LOW (ref 39–50)
HCT VFR BLD CALC: 25.5 % — LOW (ref 39–50)
HGB BLD-MCNC: 8.5 G/DL — LOW (ref 13–17)
HGB BLD-MCNC: 8.5 G/DL — LOW (ref 13–17)
IMM GRANULOCYTES NFR BLD AUTO: 0.2 % — SIGNIFICANT CHANGE UP (ref 0–0.9)
IMM GRANULOCYTES NFR BLD AUTO: 0.2 % — SIGNIFICANT CHANGE UP (ref 0–0.9)
IRON SATN MFR SERPL: 10 UG/DL — LOW (ref 45–165)
IRON SATN MFR SERPL: 10 UG/DL — LOW (ref 45–165)
IRON SATN MFR SERPL: 6 % — LOW (ref 16–55)
IRON SATN MFR SERPL: 6 % — LOW (ref 16–55)
KETONES UR-MCNC: NEGATIVE MG/DL — SIGNIFICANT CHANGE UP
KETONES UR-MCNC: NEGATIVE MG/DL — SIGNIFICANT CHANGE UP
LEUKOCYTE ESTERASE UR-ACNC: ABNORMAL
LEUKOCYTE ESTERASE UR-ACNC: ABNORMAL
LYMPHOCYTES # BLD AUTO: 2.14 K/UL — SIGNIFICANT CHANGE UP (ref 1–3.3)
LYMPHOCYTES # BLD AUTO: 2.14 K/UL — SIGNIFICANT CHANGE UP (ref 1–3.3)
LYMPHOCYTES # BLD AUTO: 49.8 % — HIGH (ref 13–44)
LYMPHOCYTES # BLD AUTO: 49.8 % — HIGH (ref 13–44)
MAGNESIUM SERPL-MCNC: 2.1 MG/DL — SIGNIFICANT CHANGE UP (ref 1.6–2.6)
MAGNESIUM SERPL-MCNC: 2.1 MG/DL — SIGNIFICANT CHANGE UP (ref 1.6–2.6)
MCHC RBC-ENTMCNC: 26.7 PG — LOW (ref 27–34)
MCHC RBC-ENTMCNC: 26.7 PG — LOW (ref 27–34)
MCHC RBC-ENTMCNC: 33.3 GM/DL — SIGNIFICANT CHANGE UP (ref 32–36)
MCHC RBC-ENTMCNC: 33.3 GM/DL — SIGNIFICANT CHANGE UP (ref 32–36)
MCV RBC AUTO: 80.2 FL — SIGNIFICANT CHANGE UP (ref 80–100)
MCV RBC AUTO: 80.2 FL — SIGNIFICANT CHANGE UP (ref 80–100)
METHOD TYPE: SIGNIFICANT CHANGE UP
METHOD TYPE: SIGNIFICANT CHANGE UP
MONOCYTES # BLD AUTO: 0.75 K/UL — SIGNIFICANT CHANGE UP (ref 0–0.9)
MONOCYTES # BLD AUTO: 0.75 K/UL — SIGNIFICANT CHANGE UP (ref 0–0.9)
MONOCYTES NFR BLD AUTO: 17.4 % — HIGH (ref 2–14)
MONOCYTES NFR BLD AUTO: 17.4 % — HIGH (ref 2–14)
NEUTROPHILS # BLD AUTO: 1.04 K/UL — LOW (ref 1.8–7.4)
NEUTROPHILS # BLD AUTO: 1.04 K/UL — LOW (ref 1.8–7.4)
NEUTROPHILS NFR BLD AUTO: 24.2 % — LOW (ref 43–77)
NEUTROPHILS NFR BLD AUTO: 24.2 % — LOW (ref 43–77)
NITRITE UR-MCNC: NEGATIVE — SIGNIFICANT CHANGE UP
NITRITE UR-MCNC: NEGATIVE — SIGNIFICANT CHANGE UP
NRBC # BLD: 0 /100 WBCS — SIGNIFICANT CHANGE UP (ref 0–0)
NRBC # BLD: 0 /100 WBCS — SIGNIFICANT CHANGE UP (ref 0–0)
ORGANISM # SPEC MICROSCOPIC CNT: ABNORMAL
PH UR: 6 — SIGNIFICANT CHANGE UP (ref 5–8)
PH UR: 6 — SIGNIFICANT CHANGE UP (ref 5–8)
PHOSPHATE SERPL-MCNC: 3.9 MG/DL — SIGNIFICANT CHANGE UP (ref 2.5–4.5)
PHOSPHATE SERPL-MCNC: 3.9 MG/DL — SIGNIFICANT CHANGE UP (ref 2.5–4.5)
PLATELET # BLD AUTO: 183 K/UL — SIGNIFICANT CHANGE UP (ref 150–400)
PLATELET # BLD AUTO: 183 K/UL — SIGNIFICANT CHANGE UP (ref 150–400)
POTASSIUM SERPL-MCNC: 3.8 MMOL/L — SIGNIFICANT CHANGE UP (ref 3.5–5.3)
POTASSIUM SERPL-MCNC: 3.8 MMOL/L — SIGNIFICANT CHANGE UP (ref 3.5–5.3)
POTASSIUM SERPL-SCNC: 3.8 MMOL/L — SIGNIFICANT CHANGE UP (ref 3.5–5.3)
POTASSIUM SERPL-SCNC: 3.8 MMOL/L — SIGNIFICANT CHANGE UP (ref 3.5–5.3)
POTASSIUM UR-SCNC: 30 MMOL/L — SIGNIFICANT CHANGE UP
POTASSIUM UR-SCNC: 30 MMOL/L — SIGNIFICANT CHANGE UP
PROT ?TM UR-MCNC: 59 MG/DL — HIGH (ref 0–12)
PROT ?TM UR-MCNC: 59 MG/DL — HIGH (ref 0–12)
PROT SERPL-MCNC: 6.1 G/DL — SIGNIFICANT CHANGE UP (ref 6–8.3)
PROT SERPL-MCNC: 6.1 G/DL — SIGNIFICANT CHANGE UP (ref 6–8.3)
PROT UR-MCNC: 100 MG/DL
PROT UR-MCNC: 100 MG/DL
PROT/CREAT UR-RTO: 0.7 RATIO — HIGH (ref 0–0.2)
PROT/CREAT UR-RTO: 0.7 RATIO — HIGH (ref 0–0.2)
RBC # BLD: 3.18 M/UL — LOW (ref 4.2–5.8)
RBC # BLD: 3.18 M/UL — LOW (ref 4.2–5.8)
RBC # FLD: 17.2 % — HIGH (ref 10.3–14.5)
RBC # FLD: 17.2 % — HIGH (ref 10.3–14.5)
RBC CASTS # UR COMP ASSIST: 8 /HPF — HIGH (ref 0–4)
RBC CASTS # UR COMP ASSIST: 8 /HPF — HIGH (ref 0–4)
SODIUM SERPL-SCNC: 138 MMOL/L — SIGNIFICANT CHANGE UP (ref 135–145)
SODIUM SERPL-SCNC: 138 MMOL/L — SIGNIFICANT CHANGE UP (ref 135–145)
SODIUM UR-SCNC: 63 MMOL/L — SIGNIFICANT CHANGE UP
SODIUM UR-SCNC: 63 MMOL/L — SIGNIFICANT CHANGE UP
SP GR SPEC: 1.02 — SIGNIFICANT CHANGE UP (ref 1–1.03)
SP GR SPEC: 1.02 — SIGNIFICANT CHANGE UP (ref 1–1.03)
SPECIMEN SOURCE: SIGNIFICANT CHANGE UP
SPECIMEN SOURCE: SIGNIFICANT CHANGE UP
SQUAMOUS # UR AUTO: 3 /HPF — SIGNIFICANT CHANGE UP (ref 0–5)
SQUAMOUS # UR AUTO: 3 /HPF — SIGNIFICANT CHANGE UP (ref 0–5)
TIBC SERPL-MCNC: 170 UG/DL — LOW (ref 220–430)
TIBC SERPL-MCNC: 170 UG/DL — LOW (ref 220–430)
TRANSFERRIN SERPL-MCNC: 123 MG/DL — LOW (ref 200–360)
TRANSFERRIN SERPL-MCNC: 123 MG/DL — LOW (ref 200–360)
UIBC SERPL-MCNC: 160 UG/DL — SIGNIFICANT CHANGE UP (ref 110–370)
UIBC SERPL-MCNC: 160 UG/DL — SIGNIFICANT CHANGE UP (ref 110–370)
UROBILINOGEN FLD QL: 0.2 MG/DL — SIGNIFICANT CHANGE UP (ref 0.2–1)
UROBILINOGEN FLD QL: 0.2 MG/DL — SIGNIFICANT CHANGE UP (ref 0.2–1)
UUN UR-MCNC: 477 MG/DL — SIGNIFICANT CHANGE UP
UUN UR-MCNC: 477 MG/DL — SIGNIFICANT CHANGE UP
WBC # BLD: 4.3 K/UL — SIGNIFICANT CHANGE UP (ref 3.8–10.5)
WBC # BLD: 4.3 K/UL — SIGNIFICANT CHANGE UP (ref 3.8–10.5)
WBC # FLD AUTO: 4.3 K/UL — SIGNIFICANT CHANGE UP (ref 3.8–10.5)
WBC # FLD AUTO: 4.3 K/UL — SIGNIFICANT CHANGE UP (ref 3.8–10.5)
WBC UR QL: 13 /HPF — HIGH (ref 0–5)
WBC UR QL: 13 /HPF — HIGH (ref 0–5)

## 2024-01-04 PROCEDURE — 99232 SBSQ HOSP IP/OBS MODERATE 35: CPT | Mod: GC

## 2024-01-04 PROCEDURE — 99232 SBSQ HOSP IP/OBS MODERATE 35: CPT

## 2024-01-04 RX ORDER — FLUTICASONE PROPIONATE 50 MCG
1 SPRAY, SUSPENSION NASAL
Refills: 0 | Status: DISCONTINUED | OUTPATIENT
Start: 2024-01-04 | End: 2024-01-15

## 2024-01-04 RX ORDER — SODIUM CHLORIDE 9 MG/ML
1000 INJECTION, SOLUTION INTRAVENOUS
Refills: 0 | Status: DISCONTINUED | OUTPATIENT
Start: 2024-01-04 | End: 2024-01-05

## 2024-01-04 RX ORDER — MIDODRINE HYDROCHLORIDE 2.5 MG/1
20 TABLET ORAL EVERY 8 HOURS
Refills: 0 | Status: DISCONTINUED | OUTPATIENT
Start: 2024-01-04 | End: 2024-01-08

## 2024-01-04 RX ADMIN — Medication 2 MILLIGRAM(S): at 01:11

## 2024-01-04 RX ADMIN — SODIUM CHLORIDE 80 MILLILITER(S): 9 INJECTION, SOLUTION INTRAVENOUS at 09:13

## 2024-01-04 RX ADMIN — CHLORHEXIDINE GLUCONATE 1 APPLICATION(S): 213 SOLUTION TOPICAL at 08:30

## 2024-01-04 RX ADMIN — MIDODRINE HYDROCHLORIDE 10 MILLIGRAM(S): 2.5 TABLET ORAL at 05:16

## 2024-01-04 RX ADMIN — MIDODRINE HYDROCHLORIDE 20 MILLIGRAM(S): 2.5 TABLET ORAL at 21:09

## 2024-01-04 RX ADMIN — PIPERACILLIN AND TAZOBACTAM 25 GRAM(S): 4; .5 INJECTION, POWDER, LYOPHILIZED, FOR SOLUTION INTRAVENOUS at 21:05

## 2024-01-04 RX ADMIN — FINASTERIDE 5 MILLIGRAM(S): 5 TABLET, FILM COATED ORAL at 11:26

## 2024-01-04 RX ADMIN — HEPARIN SODIUM 5000 UNIT(S): 5000 INJECTION INTRAVENOUS; SUBCUTANEOUS at 21:09

## 2024-01-04 RX ADMIN — PIPERACILLIN AND TAZOBACTAM 25 GRAM(S): 4; .5 INJECTION, POWDER, LYOPHILIZED, FOR SOLUTION INTRAVENOUS at 05:19

## 2024-01-04 RX ADMIN — SODIUM CHLORIDE 80 MILLILITER(S): 9 INJECTION, SOLUTION INTRAVENOUS at 21:16

## 2024-01-04 RX ADMIN — Medication 0.6 MILLIGRAM(S): at 11:26

## 2024-01-04 RX ADMIN — Medication 1 SPRAY(S): at 05:15

## 2024-01-04 RX ADMIN — HEPARIN SODIUM 5000 UNIT(S): 5000 INJECTION INTRAVENOUS; SUBCUTANEOUS at 13:02

## 2024-01-04 RX ADMIN — MIDODRINE HYDROCHLORIDE 20 MILLIGRAM(S): 2.5 TABLET ORAL at 13:03

## 2024-01-04 RX ADMIN — PIPERACILLIN AND TAZOBACTAM 25 GRAM(S): 4; .5 INJECTION, POWDER, LYOPHILIZED, FOR SOLUTION INTRAVENOUS at 13:02

## 2024-01-04 RX ADMIN — HEPARIN SODIUM 5000 UNIT(S): 5000 INJECTION INTRAVENOUS; SUBCUTANEOUS at 05:42

## 2024-01-04 NOTE — PROGRESS NOTE ADULT - PROBLEM SELECTOR PLAN 6
- c/w Colchicine & Vyndamax  - Dr. Saravia consulted would appreciate recs - c/w  Vyndamax  - Dr. Saravia consulted would appreciate recs

## 2024-01-04 NOTE — PROGRESS NOTE ADULT - ASSESSMENT
74 yo M BPH history of morales for urinary retention, initially with diarrhea and falls  Has fevers, no leukocytosis  Initially with diarrhea/falls  Chronic morales, has had pain at site  CT with proctitis, fluid filled rectum, bilateral perinephric soft tissue stranding; renal lesions  UA+  C diff negative  COVID+  Had hypotension, with evaluation by MICU  Note fever yesterday evening--residual COVID vs alternate process?  Overall, UTI, COVID, fever  - Zosyn 3.375g q 8  - Repeat BCXs x 2  - Monitor for further fevers  - Hold on Dexa unless progressive O2 requirements  - F/U GI PCR--neg  - Trend CBC with diff--note ? rising eosinophils? Monitor for any rash at bedside (? drug fever)    Vernon Escobar MD  Contact on TEAMS messaging from 9am - 5pm  From 5pm-9am, on weekends, or if no response call [phone] 72 yo M BPH history of morales for urinary retention, initially with diarrhea and falls  Has fevers, no leukocytosis  Initially with diarrhea/falls  Chronic morales, has had pain at site  CT with proctitis, fluid filled rectum, bilateral perinephric soft tissue stranding; renal lesions  UA+  C diff negative  COVID+  Had hypotension, with evaluation by MICU  Note fever yesterday evening--residual COVID vs alternate process?  Overall, UTI, COVID, fever  - Zosyn 3.375g q 8  - Repeat BCXs x 2  - Monitor for further fevers  - Hold on Dexa unless progressive O2 requirements  - F/U GI PCR--neg  - Trend CBC with diff--note ? rising eosinophils? Monitor for any rash at bedside (? drug fever)    Vernon Escobar MD  Contact on TEAMS messaging from 9am - 5pm  From 5pm-9am, on weekends, or if no response call [phone]

## 2024-01-04 NOTE — PROGRESS NOTE ADULT - PROBLEM SELECTOR PLAN 3
-Hold anti-diarrheal medications in context of potential viral or bacterial GI infection-> started loperamide 1/3 -Hold anti-diarrheal medications in context of potential viral or bacterial GI infection-> started loperamide 1/3  -IVF 80cc/hr for 24hrs

## 2024-01-04 NOTE — PROGRESS NOTE ADULT - SUBJECTIVE AND OBJECTIVE BOX
CARDIOLOGY FOLLOW UP - Dr. Saravia  DATE OF SERVICE: 1/4/23    CC  No cv complaints     REVIEW OF SYSTEMS:  CONSTITUTIONAL: No fever, weight loss, or fatigue  RESPIRATORY: No cough, wheezing, chills or hemoptysis; No Shortness of Breath  CARDIOVASCULAR: No chest pain, palpitations, passing out, dizziness, or leg swelling  GASTROINTESTINAL: No abdominal or epigastric pain. No nausea, vomiting, or hematemesis; No diarrhea or constipation. No melena or hematochezia.  VASCULAR: No edema     PHYSICAL EXAM:  T(C): 37.3 (01-04-24 @ 04:31), Max: 38.6 (01-03-24 @ 20:57)  HR: 80 (01-04-24 @ 04:31) (80 - 101)  BP: 95/50 (01-04-24 @ 04:31) (91/60 - 104/67)  RR: 18 (01-04-24 @ 04:31) (18 - 18)  SpO2: 95% (01-04-24 @ 04:31) (95% - 99%)  Wt(kg): --  I&O's Summary    03 Jan 2024 07:01  -  04 Jan 2024 07:00  --------------------------------------------------------  IN: 480 mL / OUT: 1350 mL / NET: -870 mL        Appearance: Elderly male 	  Cardiovascular: Normal S1 S2,RRR, No JVD, No murmurs  Respiratory: Lungs clear to auscultation b/l   Gastrointestinal:  Soft, Non-tender, + BS	  Extremities: Normal range of motion, No clubbing, cyanosis or edema      Home Medications:  sodium chloride 0.65% nasal spray: 2 spray(s) nasal 3 times a day (02 Jan 2024 17:23)  Vyndamax 61 mg oral capsule: 1 cap(s) orally once a day (02 Jan 2024 17:23)      MEDICATIONS  (STANDING):  chlorhexidine 2% Cloths 1 Application(s) Topical <User Schedule>  colchicine 0.6 milliGRAM(s) Oral daily  dextrose 50% Injectable 25 Gram(s) IV Push once  dextrose 50% Injectable 25 milliLiter(s) IV Push once  dextrose 50% Injectable 25 Gram(s) IV Push once  dextrose 50% Injectable 12.5 Gram(s) IV Push once  dextrose Oral Gel 15 Gram(s) Oral once  finasteride 5 milliGRAM(s) Oral daily  fluticasone propionate 50 MICROgram(s)/spray Nasal Spray 1 Spray(s) Both Nostrils two times a day  glucagon  Injectable 1 milliGRAM(s) IntraMuscular once  heparin   Injectable 5000 Unit(s) SubCutaneous every 8 hours  midodrine 20 milliGRAM(s) Oral every 8 hours  piperacillin/tazobactam IVPB.. 3.375 Gram(s) IV Intermittent every 8 hours  sodium chloride 0.65% Nasal 1 Spray(s) Both Nostrils once  tamsulosin 0.4 milliGRAM(s) Oral at bedtime  Vyndamax (Tafamidis) 61mg Capsule 1 Capsule(s) 1 Capsule(s) Oral daily      TELEMETRY: SR 80s    ECG:  	  RADIOLOGY:   DIAGNOSTIC TESTING:  [ ] Echocardiogram:  [ ]  Catheterization:  [ ] Stress Test:    OTHER: 	    LABS:	 	                            8.5    4.30  )-----------( 183      ( 04 Jan 2024 05:49 )             25.5     01-04    138  |  107  |  32<H>  ----------------------------<  79  3.8   |  20<L>  |  2.69<H>    Ca    8.5      04 Jan 2024 05:48  Phos  3.9     01-04  Mg     2.1     01-04    TPro  6.1  /  Alb  2.9<L>  /  TBili  0.5  /  DBili  x   /  AST  117<H>  /  ALT  44  /  AlkPhos  83  01-04

## 2024-01-04 NOTE — PROGRESS NOTE ADULT - ASSESSMENT
A/p  73M w/ BPH (c/b urinary retention s/p Harkins catheter placement; exchanged monthly), CKD stage IIIb, and cardiac amyloidosis brought in by daughter for diarrhea (loose-watery BM's) over the past few days.    #Sepsis  -I/s/o COVID + UTI  -Abx per med    #COVID  -CXR no focal consolidations  -Remdesivir per med  -Supportive care per med    #cardiac amyloid   -Cont vyndamax  -baseline sbp 100 as outpt  -Prior echo with  Nml LV systolic fxn, Severe left ventricular hypertrophy, Findings suggestive of infiltrative cardiomyopathy    #Hypotension  -Cont midodrine     #Hypoglycemia  -mgmt per med      dvt ppx

## 2024-01-04 NOTE — PROGRESS NOTE ADULT - SUBJECTIVE AND OBJECTIVE BOX
CC: F/U for Fever    Saw/spoke to patient. No fevers, no chills. No new complaints.    Allergies  No Known Allergies    ANTIMICROBIALS:  piperacillin/tazobactam IVPB.. 3.375 every 8 hours    PE:    Vital Signs Last 24 Hrs  T(C): 36.8 (2024 12:04), Max: 38.6 (2024 20:57)  T(F): 98.2 (2024 12:04), Max: 101.4 (2024 20:57)  HR: 94 (2024 12:04) (80 - 94)  BP: 100/63 (2024 12:04) (91/60 - 100/63)  RR: 18 (2024 12:04) (18 - 18)  SpO2: 98% (2024 12:04) (95% - 99%)    Gen: AOx3, NAD, non-toxic  CV: Nontachycardic  Resp: Breathing comfortably, RA  Abd: Soft, nontender  IV/Skin: No thrombophlebitis    LABS:                        8.5    4.30  )-----------( 183      ( 2024 05:49 )             25.5     01-04    138  |  107  |  32<H>  ----------------------------<  79  3.8   |  20<L>  |  2.69<H>    Ca    8.5      2024 05:48  Phos  3.9     -  Mg     2.1         TPro  6.1  /  Alb  2.9<L>  /  TBili  0.5  /  DBili  x   /  AST  117<H>  /  ALT  44  /  AlkPhos  83  01-04    Urinalysis Basic - ( 2024 14:09 )    Color: Yellow / Appearance: Clear / S.019 / pH: x  Gluc: x / Ketone: Negative mg/dL  / Bili: Negative / Urobili: 0.2 mg/dL   Blood: x / Protein: 100 mg/dL / Nitrite: Negative   Leuk Esterase: Moderate / RBC: 8 /HPF / WBC 13 /HPF   Sq Epi: x / Non Sq Epi: 3 /HPF / Bacteria: Negative /HPF    MICROBIOLOGY:    Catheterized Catheterized  24   50,000 - 99,000 CFU/mL Enterococcus faecalis  --  Enterococcus faecalis    .Blood Blood-Peripheral  24   No growth at 48 Hours  --  --    .Blood Blood-Peripheral  24   No growth at 48 Hours  --  --    .Blood Blood-Peripheral  23   No growth at 5 days  --  --    .Blood Blood-Peripheral  23   No growth at 5 days  --  Enterococcus faecalis  Stenotrophomonas maltophilia    Clostridium difficile GDH Toxins A&amp;B, EIA:   Negative (24 @ 13:55)  Clostridium difficile GDH Interpretation: Negative for toxigenic C. Difficile.  This specimen is negative for C.  Difficile glutamate dehydrogenase (GDH) antigen and negative for C.  Difficile Toxins A & B, by EIA.  GDH is a highly sensitive screening  marker for C. Difficile that is produced in large amounts by all C.  Difficile strains, both toxigenic and nontoxigenic.  This assay has not  been validated as a test of cure.  Repeat testing during the same episode  of diarrhea is of limited value and is discouraged.  The results of this  assay should always be interpreted in conjunction with patient's clinical  history. (24 @ 13:55)    RADIOLOGY:     CT      IMPRESSION:  Grade 1 anterior spondylolisthesis at L4-5 on a degenerative   basis due to facet osteophytic hypertrophy. Mild degenerative disc   disease and spondylosis at L1-2 through L5-S1 with loss of disc height   and associated degenerative endplate changes. DISH involves the lower   thoracic spine. Mild disc bulges are noted at L1-2 through L5-S1 which   flatten the ventral thecal sac and narrow the BILATERAL neural foramina.   Moderate central stenosis at L2-3, L3-4 and severe central stenosis at   L4-5 mild central stenosis at L5-S1 on a degenerative basis due to disc   bulge, facet osteophytic hypertrophy and redundancy of the ligamentum   flavum. Superimposed LEFT foraminal disc herniations at L2-3 and L4-5   further narrow the LEFT neural foramen.  No vertebral fracture is   recognized.

## 2024-01-04 NOTE — CONSULT NOTE ADULT - ASSESSMENT
73M w/ BPH (c/b urinary retention s/p Harkins catheter placement; exchanged monthly), CKD stage IIIb, and cardiac amyloidosis brought in by daughter for diarrhea (loose-watery BM's) over the past few days.  CKD stage 3 b and now SARAH in light of contrast study/hypotension and possibly dehydration as well   Subnephrotic range proteinuria      73M w/ BPH (c/b urinary retention s/p Morales catheter placement; exchanged monthly), CKD stage IIIb, and cardiac amyloidosis brought in by daughter for diarrhea (loose-watery BM's) over the past few days.  CKD stage 3 b and now SARAH in light of contrast study/hypotension and possibly dehydration as well   Subnephrotic range proteinuria     1 Renal- Mult reasons for SARAH(not sure it was the Remdesivir)  Cont the IVF at present rate  Trend serum creatinine   2 Endo-Check random cortisol level   3 Rhem-DC the colchicine as cause of diarrhea   4 -DC flomax as the BP is already soft and he has chronic morales  5 CVS-Midodrine to maintain MAP     Sayed [facility]   [phone]  73M w/ BPH (c/b urinary retention s/p Morales catheter placement; exchanged monthly), CKD stage IIIb, and cardiac amyloidosis brought in by daughter for diarrhea (loose-watery BM's) over the past few days.  CKD stage 3 b and now SARAH in light of contrast study/hypotension and possibly dehydration as well   Subnephrotic range proteinuria     1 Renal- Mult reasons for SARAH(not sure it was the Remdesivir)  Cont the IVF at present rate  Trend serum creatinine   2 Endo-Check random cortisol level   3 Rhem-DC the colchicine as cause of diarrhea   4 -DC flomax as the BP is already soft and he has chronic morales  5 [facility]-Midodrine to maintain MAP     Sayed [facility]   [phone]

## 2024-01-04 NOTE — PROGRESS NOTE ADULT - ATTENDING COMMENTS
73M w/ BPH (c/b urinary retention s/p Harkins catheter placement; exchanged monthly), CKD stage IIIb, and cardiac amyloidosis brought in by daughter for diarrhea found to have hypotension likely due to sepsis.     Pt continues to have watery diarrhea.    1. Pyleo/ Urosepsis S/p 4.5L IVF and now with midodrine 20 mg q8h with parameters. Started on Zosyn. CT abd pel showing perinephric stranding. Harkins changed in ER by urology. MICU consulted due to pt initially requiring pressors, now off. Will monitor MAP. IVF for continued diarrhea  2. COVID+: C/w ID recs for Zosyn. Not requiring O2 at this time.   3. Elevated LFTs possibly shock liver in the setting of hypotension, holding remdesivir, hold hepatotoxic agents, stable  4. LAVELLE on CKD Trend Cr Hold nephrotoxic agents. Holding Remdesivir in the setting of LAVELLE. Creatinine continues to rise. nephro consulted, [place] Possible contrast related/ hypotension  5. BPH: Harkins changed in ED by urology, maintain.   6. Amyloidosis: Cardiology recommends to continue with vyndamax  7. Fall 2/2 to dizziness: CTH negative for acute changes. CT L spine showing spinal stenosis L2-L5. Rehydrating. PT consulted   8. Hypoglycemia 1/3 RRT called D50 given, now eating and normoglycemic.

## 2024-01-04 NOTE — PROGRESS NOTE ADULT - ASSESSMENT
Mr. Sanchez is a  73M w/ BPH (c/b urinary retention s/p Harkins catheter placement; exchanged monthly), CKD stage IIIb, and cardiac amyloidosis brought in by daughter for diarrhea found to have hypotension likely due to UTI and hypotension iso of hypovolemia and infection.

## 2024-01-04 NOTE — PHARMACOTHERAPY INTERVENTION NOTE - COMMENTS
Confirmed home medications with [facility] pharmacy and patient's daughter Franci via telephone, updated in Outpatient Medication Review.     Home Medications:  fluticasone 50 mcg/inh nasal spray: 1 spray(s) in each nostril 2 times a day as needed   furosemide 40 mg oral tablet: 1 tab(s) orally once a day as needed  gabapentin 100 mg oral capsule: 1 cap(s) orally once a day (at bedtime)   mirtazapine 7.5 mg oral tablet: 1 tab(s) orally once a day at 6 PM   Saline Mist 0.65% nasal spray: 2 spray(s) intranasally as needed   Vyndamax 61 mg oral capsule: 1 cap(s) orally once a day     Recommended discontinuing colchicine (on old [place] med list) as pharmacy confirmed patient last filled it in June for a two days supply and daughter confirmed patient previously took it for gout but is no longer on it.    Laura Tobias, PharmD, BCPS  Clinical Pharmacy Specialist  [phone] or Teams    Confirmed home medications with [facility] pharmacy and patient's daughter Franci via telephone, updated in Outpatient Medication Review.     Home Medications:  fluticasone 50 mcg/inh nasal spray: 1 spray(s) in each nostril 2 times a day as needed   furosemide 40 mg oral tablet: 1 tab(s) orally once a day as needed  gabapentin 100 mg oral capsule: 1 cap(s) orally once a day (at bedtime)   mirtazapine 7.5 mg oral tablet: 1 tab(s) orally once a day at 6 PM   Saline Mist 0.65% nasal spray: 2 spray(s) intranasally as needed   Vyndamax 61 mg oral capsule: 1 cap(s) orally once a day     Recommended discontinuing colchicine (on old [place] med list) as pharmacy confirmed patient last filled it in June for a two days supply and daughter confirmed patient previously took it for gout but is no longer on it.    Laura Tobias, PharmD, BCPS  Clinical Pharmacy Specialist  [phone] or Teams

## 2024-01-04 NOTE — CONSULT NOTE ADULT - SUBJECTIVE AND OBJECTIVE BOX
NEPHROLOGY - NSN    Patient seen and examined.    HPI:  Mr. Gómez is a 73M w/ BPH (c/b urinary retention s/p Harkins catheter placement; exchanged monthly), CKD stage IIIb, and cardiac amyloidosis brought in by daughter for diarrhea (loose-watery BM's) over the past few days. The pt reports no new medications or food triggers. No relieving factors. Pt could not delineate category of diarrhea.  The patient had a fall yesterday, pt denied any preceding nausea or associated LOC. Pt was recently hospitalized in December for urosepsis and abdominal pain and was found to have E. Faecalis and S. Maltophilia     In the ED VS were notable for low SBPs in 80-90s at one point pt was tachycardic to 102. Labs and imaging were notable for HgB 9.2, Cr 1.92, , , ALT 63, U/A LE, and Pan CT notable for spinal stenosis, spoondolysthesis, procitits, and pyelonephritis. In the ED pt received 4L of fluids, ofirmev, midodrine, zoysn, vancomycin, and norephinephrine GTT. MICU consulted by ED due to hypotension requiring norepi gtt, pt not deemed a candidate at the time.  Found to have COVID and started on Remdesivir and then stopped   BP remains soft and creatinine cont to rise        (02 Jan 2024 15:54)      PAST MEDICAL & SURGICAL HISTORY:  Cardiac amyloidosis      Stenosis, cervical spine      BPH (benign prostatic hyperplasia)      Indwelling Harkins catheter present      Stage 3 chronic kidney disease      S/P cataract extraction  B/L      S/P nasal polypectomy      History of fusion of cervical spine          MEDICATIONS  (STANDING):  chlorhexidine 2% Cloths 1 Application(s) Topical <User Schedule>  colchicine 0.6 milliGRAM(s) Oral daily  dextrose 50% Injectable 25 Gram(s) IV Push once  dextrose 50% Injectable 25 milliLiter(s) IV Push once  dextrose 50% Injectable 25 Gram(s) IV Push once  dextrose 50% Injectable 12.5 Gram(s) IV Push once  dextrose Oral Gel 15 Gram(s) Oral once  finasteride 5 milliGRAM(s) Oral daily  fluticasone propionate 50 MICROgram(s)/spray Nasal Spray 1 Spray(s) Both Nostrils two times a day  glucagon  Injectable 1 milliGRAM(s) IntraMuscular once  heparin   Injectable 5000 Unit(s) SubCutaneous every 8 hours  lactated ringers. 1000 milliLiter(s) (80 mL/Hr) IV Continuous <Continuous>  midodrine 20 milliGRAM(s) Oral every 8 hours  piperacillin/tazobactam IVPB.. 3.375 Gram(s) IV Intermittent every 8 hours  sodium chloride 0.65% Nasal 1 Spray(s) Both Nostrils once  tamsulosin 0.4 milliGRAM(s) Oral at bedtime  Vyndamax (Tafamidis) 61mg Capsule 1 Capsule(s) 1 Capsule(s) Oral daily      Allergies    No Known Allergies    Intolerances        SOCIAL HISTORY:  Denies alcohol abuse, drug abuse or tobacco usage.     FAMILY HISTORY:      VITALS:  T(C): 37.3 (01-04-24 @ 04:31), Max: 38.6 (01-03-24 @ 20:57)  HR: 80 (01-04-24 @ 04:31) (80 - 87)  BP: 95/50 (01-04-24 @ 04:31) (91/60 - 97/54)  RR: 18 (01-04-24 @ 04:31) (18 - 18)  SpO2: 95% (01-04-24 @ 04:31) (95% - 99%)    REVIEW OF SYSTEMS:  + diarrhea.  Good oral intake and denies fatigue or weakness. All other pertinent systems are reviewed and are negative.    PHYSICAL EXAM:  Constitutional: NAD  HEENT: EOMI  Neck:  No JVD, supple   Respiratory: CTA B/L  Cardiovascular: S1 and S2, RRR  Gastrointestinal: + BS, soft, NT, ND  Extremities: No peripheral edema, + peripheral pulses  Neurological: A/O x 3, CN2-12 intact  Psychiatric: Normal mood, normal affect  : No Harkins  Skin: No rashes, C/D/I  Access: Not applicable    I and O's:    01-02 @ 07:01  -  01-03 @ 07:00  --------------------------------------------------------  IN: 0 mL / OUT: 1500 mL / NET: -1500 mL    01-03 @ 07:01  -  01-04 @ 07:00  --------------------------------------------------------  IN: 480 mL / OUT: 1350 mL / NET: -870 mL    01-04 @ 07:01  -  01-04 @ 12:04  --------------------------------------------------------  IN: 120 mL / OUT: 0 mL / NET: 120 mL          LABS:                        8.5    4.30  )-----------( 183      ( 04 Jan 2024 05:49 )             25.5     01-04    138  |  107  |  32<H>  ----------------------------<  79  3.8   |  20<L>  |  2.69<H>    Ca    8.5      04 Jan 2024 05:48  Phos  3.9     01-04  Mg     2.1     01-04    TPro  6.1  /  Alb  2.9<L>  /  TBili  0.5  /  DBili  x   /  AST  117<H>  /  ALT  44  /  AlkPhos  83  01-04      URINE:  Urinalysis Basic - ( 04 Jan 2024 05:48 )    Color: x / Appearance: x / SG: x / pH: x  Gluc: 79 mg/dL / Ketone: x  / Bili: x / Urobili: x   Blood: x / Protein: x / Nitrite: x   Leuk Esterase: x / RBC: x / WBC x   Sq Epi: x / Non Sq Epi: x / Bacteria: x      Sodium, Random Urine: 87 mmol/L (01-03 @ 06:40)  Creatinine, Random Urine: 56 mg/dL (01-03 @ 06:40)  Protein/Creatinine Ratio Calculation: 1.8 Ratio (01-03 @ 06:40)  Osmolality, Random Urine: 378 mos/kg (01-03 @ 06:40)  Potassium, Random Urine: 25 mmol/L (01-03 @ 06:40)    RADIOLOGY & ADDITIONAL STUDIES:    < from: CT Abdomen and Pelvis w/ IV Cont (01.02.24 @ 08:01) >    ACC: [id number] EXAM:  CT ABDOMEN AND PELVIS IC   ORDERED BY:  LEONARD BROCK     PROCEDURE DATE:  01/02/2024          INTERPRETATION:  CLINICAL INFORMATION: 73 year old male with back pain   and diarrhea    COMPARISON: CT abdomen/pelvis 12/17/2023, abdominal  ultrasound   12/16/2023, CTA of chest 4/23/2023    CONTRAST/COMPLICATIONS:  IV Contrast: Omnipaque 350  90 cc administered   10 cc discarded  Oral Contrast: NONE  Complications: None reported at time of study completion    PROCEDURE:  CT of the Abdomen and Pelvis was performed.  Sagittal and coronal reformats were performed.    FINDINGS:  This study is very limited due to motion artifact.    LOWER CHEST: Coronary artery calcifications. Motion artifact limits   evaluation of the lung bases, however, no gross focal consolidation or   pleural effusion.    LIVER: Within the limitations of the motion artifact, no focal   abnormality is identified.  BILE DUCTS: Normal caliber.  GALLBLADDER: Nondistended, otherwise not well assessed.  SPLEEN: Within normal limits.  PANCREAS: Not well assessed.  ADRENALS: Within normal limits.  KIDNEYS/URETERS: Limited evaluation due to motion artifact. Left upper   pole cyst. Multiple, ill-defined right indeterminate hypodense lesions   with diffusely heterogeneous enhancement of the right kidney and mildly   heterogeneous enhancement of the left kidney.. Bilateral perinephric soft   tissue stranding. No hydronephrosis.    BLADDER: Harkins catheter. Underdistended.  REPRODUCTIVE ORGANS: Prostate is enlarged.    BOWEL: Assessment of the bowel is very limited due to motion artifact and   lack of enteric contrast.. Mildly distended fluid-filled rectum with   increased mucosal enhancement.. . Appendix is not visualized. A small   bowel loop protrudes into a periumbilical hernia. No definite evidence   for obstruction.  PERITONEUM: No ascites.  VESSELS: Atherosclerotic changes  RETROPERITONEUM/LYMPH NODES: Mildly prominent  pelvic and retroperitoneal   lymph nodes, similar to prior. .  ABDOMINAL WALL: Small fat and bowel containing umbilical hernia. Mild   dependent subcutaneous edema.  BONES: Degenerative changes.    IMPRESSION:  Limited evaluation due to motion artifact.    Mildly distended fluid-filled rectum, which may represent proctitis.   Evaluation of the bowel is otherwise very limited on this examination.   Short-term follow-up may be performed for reevaluation as clinically   warranted.    Bilateral perinephric soft tissue stranding. Suggest correlation with   urinalysis to exclude infection. Multiple hypodense right renal lesions   cannot be definitively characterized. Diffusely heterogeneous enhancement   of the right kidney  and  mildly heterogeneous enhancement of the left   kidney may be artifactual in nature due to the motion; however, renal   abnormality, including pyelonephritis or infarct  cannot be excluded in   this setting. Clinical and laboratory correlation is again recommended.   Renal ultrasound is suggested for further evaluation.      Findings were discussed with   1/2/2024 9:15 AM by Dr. Jj with read back confirmation.    --- End of Report ---     < from: TTE W or WO Ultrasound Enhancing Agent (11.06.23 @ 10:22) >    TRANSTHORACIC ECHOCARDIOGRAM REPORT  ________________________________________________________________________________                                      _______       Pt. Name:       DEEPTHI GÓMEZ Study Date:    11/6/2023  MRN:            [id number]     YOB: 1950  Accession #:    [id number]     Age:           72 years  Account#:       [id number]      Gender:        M  Heart Rate:     82 bpm        Height:        64.00 in (162.56 cm)  Rhythm:                       Weight:        150.00 lb (68.04 kg)  Blood Pressure: 102/64 mmHg   BSA/BMI:       1.73 m² / 25.75 kg/m²  ________________________________________________________________________________________  Referring Physician:    [id number] Dago Saravia  Interpreting Physician: Regina Bedolla MD  Primary Sonographer:    Bob ESTEBAN    CPT:               ECHO TTE WO CON COMP W DOPP - 18260.m;3D RECONST W/O                     WK[facility]ATION - 43390.m;MYOCARDIAL STRAIN IMAGING - 53582.m  Indication(s):     Chest pain,unspecified - R07.9; Amyloidosis, unspecified -                     E85.9  Procedure:         Transthoracic echocardiogram with 2-D, M-mode and complete                     spectral and color flow Doppler. Strain imaging performed for      evaluation of regional and global myocardial shape and                     dimensions. Real time and full volume 3-dimensional imaging                     performed at the echo machine.  Ordering Location: [facility]  Study Information: Image quality for this study is good.    _______________________________________________________________________________________     CONCLUSIONS:      1. The left ventricular cavity is small. Left ventricular systolic function is normal with a calculated ejection fraction of 69 % by the Freeman's biplane method of disks. There is moderate (grade 2) left ventricular diastolic dysfunction. Severe left ventricular hypertrophy. Left ventricular global longitudinal strain is -10.7 % is abnormal (> -16%). Normal strain measurements noted at the left ventricular apex. Images were acquired on a Luna ultrasound system and processed on the ultrasound machine with a heart rate of 82 bpm and a blood pressure of 102/64 mmHg. Findings suggestive of infiltrative cardiomyopathy.   2. Normal right ventricular cavity size, increasedwall thickness, and probably normal systolic function.   3. The left atrium is normal in size.   4. The inferior vena cava is normal in size measuring 1.61 cm in diameter, (normal <2.1cm) with normal inspiratory collapse (normal >50%) consistent with normal right atrial pressure (~3, range 0-5mmHg).   5. No pericardial effusion seen.    ________________________________________________________________________________________  FINDINGS:    Left Ventricle:  The left ventricular cavity is small. Left ventricular systolic function is normal with a calculated ejection fraction of 69 % by the Freeman's biplane method of disks. There is moderate (grade 2) left ventricular diastolic dysfunction. Severe left ventricular hypertrophy. Left ventricular global longitudinal strain is -10.7 % is abnormal (> -16%). Normal strain measurements noted at the left ventricular apex. Images were acquired on a Luna ultrasound system and processed on the ultrasound machine with a heart rate of 82 bpm and a blood pressure of 102/64 mmHg. Findings suggestive of infiltrative cardiomyopathy.     Right Ventricle:  The right ventricular cavity is normal in size, increased wall thickness and probably normal systolic function. Tricuspid annular plane systolic excursion (TAPSE) is 1.9 cm (normal >=1.7 cm).     Left Atrium:  The left atrium is normal in size with an indexed volume of 30.85 ml/m².     Right Atrium:  The right atrium is normal in size with an indexed volume of 29.29 ml/m².     Aortic Valve:  The aortic valve is tricuspid with normal leaflet excursion with normal systolic excursion. There is fibrocalcific aortic valve sclerosis without stenosis. There is trace aortic regurgitation.    Mitral Valve:  Structurally normal mitral valve with normal leaflet excursion. There is trace mitral regurgitation.     Tricuspid Valve:  Structurally normal tricuspid valve with normal leaflet excursion. There is trace tricuspid regurgitation.     Pulmonic Valve:  Structurally normal pulmonic valve with normal leaflet excursion. There is mild pulmonic regurgitation.     Aorta:  The aortic root at the sinuses of Valsalva is normal in size. The ascending aorta diameter is normal in size.     Pericardium:  No pericardial effusion seen.     Systemic Veins:  The inferior vena cava is normal in size measuring 1.61 cm in diameter, (normal <2.1cm) with normal inspiratory collapse (normal >50%) consistent with normal right atrial pressure (~3, range 0-5mmHg).  ____________________________________________________________________  Quantitative Data:  Left Ventricle Measurements: (Indexed to BSA)     IVSd (2D):   1.9 cm  LVPWd (2D):  1.7 cm                           Strain Measurements:  LVIDd (2D):  3.5 cm                           Global Pk Long Strain:  -10.7 %  LVIDs (2D):  2.7 cm                           Endo Pk Strain A4C:     -10.1 %  LV Mass:     263 g  151.8 g/m²                Endo Pk Strain A2C:     -11.4 %  LV Vol d, MOD A2C: 51.1ml 29.52 ml/m²        Endo Pk Strain A3C:     -10.7 %  LV Vol d, MOD A4C: 43.6 ml 25.19 ml/m²  LV Vol d, MOD BP:  47.2 ml 27.25 ml/m²  LV Vol s, MOD A2C: 13.8 ml 7.97 ml/m²  LV Vol s, MOD A4C: 13.9 ml 8.03 ml/m²  LV Vol s, MOD BP:  14.6 ml 8.41 ml/m²  LVOT SV MOD BP:    32.6 ml  LV EF% MOD BP:     69 %     MV E Vmax:    0.90 m/s  MV A Vmax:    0.64 m/s  MV E/A:       1.40  e' lateral:   7.40 cm/s  e' medial:    6.74 cm/s  E/e' lateral: 12.18  E/e' medial:  13.37  E/e' Average: 12.74  MV DT: 243 msec    Aorta Measurements: (normal range) (Indexed to BSA)     Sinuses of Valsalva: 3.20 cm (3.1 - 3.7 cm)  Ao Asc prox:         3.30 cm       Left Atrium Measurements: (Indexed to BSA)  LA Diam 2D:        3.40 cm  LA Vol s, MOD A4C: 47.30 ml.  LA Vol s, MOD A2C: 55.60 ml.  LA Vol s, MOD BP:  53.40 ml. 30.85 ml/m².    Right Ventricle Measurements: Right Atrial Measurements:     TAPSE:            1.9 cm      RA Vol:       50.70 ml  TV Kimberly. S':       13.90 cm/s  RA Vol Index: 29.29 ml/m²  RV Base (RVID1):  3.1 cm  RV Mid (RVID2):   2.7 cm  RV Major (RVID3): 5.9 cm       LVOT / RVOT/ Qp/Qs Data: (Indexed to BSA)  LVOT Diameter: 2.00 cm  LVOT Vmax:     1.17 m/s  LVOT VTI:      23.70 cm  LVOT SV:       74.5 ml  43.02 ml/m²    Mitral Valve Measurements:     MV E Vmax: 0.9 m/s  MV A Vmax: 0.6 m/s  MV E/A:    1.4       Tricuspid Valve Measurements:     RA Pressure: 3 mmHg    ________________________________________________________________________________________  Electronically signed on 11/6/2023 at 4:36:26 PM by Regina Bedolla MD         *** Final *    < end of copied text >   NEPHROLOGY - NSN    Patient seen and examined.    HPI:  Mr. Gómez is a 73M w/ BPH (c/b urinary retention s/p Harkins catheter placement; exchanged monthly), CKD stage IIIb, and cardiac amyloidosis brought in by daughter for diarrhea (loose-watery BM's) over the past few days. The pt reports no new medications or food triggers. No relieving factors. Pt could not delineate category of diarrhea.  The patient had a fall yesterday, pt denied any preceding nausea or associated LOC. Pt was recently hospitalized in December for urosepsis and abdominal pain and was found to have E. Faecalis and S. Maltophilia     In the ED VS were notable for low SBPs in 80-90s at one point pt was tachycardic to 102. Labs and imaging were notable for HgB 9.2, Cr 1.92, , , ALT 63, U/A LE, and Pan CT notable for spinal stenosis, spoondolysthesis, procitits, and pyelonephritis. In the ED pt received 4L of fluids, ofirmev, midodrine, zoysn, vancomycin, and norephinephrine GTT. MICU consulted by ED due to hypotension requiring norepi gtt, pt not deemed a candidate at the time.  Found to have COVID and started on Remdesivir and then stopped   BP remains soft and creatinine cont to rise        (02 Jan 2024 15:54)      PAST MEDICAL & SURGICAL HISTORY:  Cardiac amyloidosis      Stenosis, cervical spine      BPH (benign prostatic hyperplasia)      Indwelling Harkins catheter present      Stage 3 chronic kidney disease      S/P cataract extraction  B/L      S/P nasal polypectomy      History of fusion of cervical spine          MEDICATIONS  (STANDING):  chlorhexidine 2% Cloths 1 Application(s) Topical <User Schedule>  colchicine 0.6 milliGRAM(s) Oral daily  dextrose 50% Injectable 25 Gram(s) IV Push once  dextrose 50% Injectable 25 milliLiter(s) IV Push once  dextrose 50% Injectable 25 Gram(s) IV Push once  dextrose 50% Injectable 12.5 Gram(s) IV Push once  dextrose Oral Gel 15 Gram(s) Oral once  finasteride 5 milliGRAM(s) Oral daily  fluticasone propionate 50 MICROgram(s)/spray Nasal Spray 1 Spray(s) Both Nostrils two times a day  glucagon  Injectable 1 milliGRAM(s) IntraMuscular once  heparin   Injectable 5000 Unit(s) SubCutaneous every 8 hours  lactated ringers. 1000 milliLiter(s) (80 mL/Hr) IV Continuous <Continuous>  midodrine 20 milliGRAM(s) Oral every 8 hours  piperacillin/tazobactam IVPB.. 3.375 Gram(s) IV Intermittent every 8 hours  sodium chloride 0.65% Nasal 1 Spray(s) Both Nostrils once  tamsulosin 0.4 milliGRAM(s) Oral at bedtime  Vyndamax (Tafamidis) 61mg Capsule 1 Capsule(s) 1 Capsule(s) Oral daily      Allergies    No Known Allergies    Intolerances        SOCIAL HISTORY:  Denies alcohol abuse, drug abuse or tobacco usage.     FAMILY HISTORY:      VITALS:  T(C): 37.3 (01-04-24 @ 04:31), Max: 38.6 (01-03-24 @ 20:57)  HR: 80 (01-04-24 @ 04:31) (80 - 87)  BP: 95/50 (01-04-24 @ 04:31) (91/60 - 97/54)  RR: 18 (01-04-24 @ 04:31) (18 - 18)  SpO2: 95% (01-04-24 @ 04:31) (95% - 99%)    REVIEW OF SYSTEMS:  + diarrhea.  Good oral intake and denies fatigue or weakness. All other pertinent systems are reviewed and are negative.    PHYSICAL EXAM:  Constitutional: NAD  HEENT: EOMI  Neck:  No JVD, supple   Respiratory: CTA B/L  Cardiovascular: S1 and S2, RRR  Gastrointestinal: + BS, soft, NT, ND  Extremities: No peripheral edema, + peripheral pulses  Neurological: A/O x 3, CN2-12 intact  Psychiatric: Normal mood, normal affect  : No Harkins  Skin: No rashes, C/D/I  Access: Not applicable    I and O's:    01-02 @ 07:01  -  01-03 @ 07:00  --------------------------------------------------------  IN: 0 mL / OUT: 1500 mL / NET: -1500 mL    01-03 @ 07:01  -  01-04 @ 07:00  --------------------------------------------------------  IN: 480 mL / OUT: 1350 mL / NET: -870 mL    01-04 @ 07:01  -  01-04 @ 12:04  --------------------------------------------------------  IN: 120 mL / OUT: 0 mL / NET: 120 mL          LABS:                        8.5    4.30  )-----------( 183      ( 04 Jan 2024 05:49 )             25.5     01-04    138  |  107  |  32<H>  ----------------------------<  79  3.8   |  20<L>  |  2.69<H>    Ca    8.5      04 Jan 2024 05:48  Phos  3.9     01-04  Mg     2.1     01-04    TPro  6.1  /  Alb  2.9<L>  /  TBili  0.5  /  DBili  x   /  AST  117<H>  /  ALT  44  /  AlkPhos  83  01-04      URINE:  Urinalysis Basic - ( 04 Jan 2024 05:48 )    Color: x / Appearance: x / SG: x / pH: x  Gluc: 79 mg/dL / Ketone: x  / Bili: x / Urobili: x   Blood: x / Protein: x / Nitrite: x   Leuk Esterase: x / RBC: x / WBC x   Sq Epi: x / Non Sq Epi: x / Bacteria: x      Sodium, Random Urine: 87 mmol/L (01-03 @ 06:40)  Creatinine, Random Urine: 56 mg/dL (01-03 @ 06:40)  Protein/Creatinine Ratio Calculation: 1.8 Ratio (01-03 @ 06:40)  Osmolality, Random Urine: 378 mos/kg (01-03 @ 06:40)  Potassium, Random Urine: 25 mmol/L (01-03 @ 06:40)    RADIOLOGY & ADDITIONAL STUDIES:    < from: CT Abdomen and Pelvis w/ IV Cont (01.02.24 @ 08:01) >    ACC: [id number] EXAM:  CT ABDOMEN AND PELVIS IC   ORDERED BY:  LEONARD BROCK     PROCEDURE DATE:  01/02/2024          INTERPRETATION:  CLINICAL INFORMATION: 73 year old male with back pain   and diarrhea    COMPARISON: CT abdomen/pelvis 12/17/2023, abdominal  ultrasound   12/16/2023, CTA of chest 4/23/2023    CONTRAST/COMPLICATIONS:  IV Contrast: Omnipaque 350  90 cc administered   10 cc discarded  Oral Contrast: NONE  Complications: None reported at time of study completion    PROCEDURE:  CT of the Abdomen and Pelvis was performed.  Sagittal and coronal reformats were performed.    FINDINGS:  This study is very limited due to motion artifact.    LOWER CHEST: Coronary artery calcifications. Motion artifact limits   evaluation of the lung bases, however, no gross focal consolidation or   pleural effusion.    LIVER: Within the limitations of the motion artifact, no focal   abnormality is identified.  BILE DUCTS: Normal caliber.  GALLBLADDER: Nondistended, otherwise not well assessed.  SPLEEN: Within normal limits.  PANCREAS: Not well assessed.  ADRENALS: Within normal limits.  KIDNEYS/URETERS: Limited evaluation due to motion artifact. Left upper   pole cyst. Multiple, ill-defined right indeterminate hypodense lesions   with diffusely heterogeneous enhancement of the right kidney and mildly   heterogeneous enhancement of the left kidney.. Bilateral perinephric soft   tissue stranding. No hydronephrosis.    BLADDER: Harkins catheter. Underdistended.  REPRODUCTIVE ORGANS: Prostate is enlarged.    BOWEL: Assessment of the bowel is very limited due to motion artifact and   lack of enteric contrast.. Mildly distended fluid-filled rectum with   increased mucosal enhancement.. . Appendix is not visualized. A small   bowel loop protrudes into a periumbilical hernia. No definite evidence   for obstruction.  PERITONEUM: No ascites.  VESSELS: Atherosclerotic changes  RETROPERITONEUM/LYMPH NODES: Mildly prominent  pelvic and retroperitoneal   lymph nodes, similar to prior. .  ABDOMINAL WALL: Small fat and bowel containing umbilical hernia. Mild   dependent subcutaneous edema.  BONES: Degenerative changes.    IMPRESSION:  Limited evaluation due to motion artifact.    Mildly distended fluid-filled rectum, which may represent proctitis.   Evaluation of the bowel is otherwise very limited on this examination.   Short-term follow-up may be performed for reevaluation as clinically   warranted.    Bilateral perinephric soft tissue stranding. Suggest correlation with   urinalysis to exclude infection. Multiple hypodense right renal lesions   cannot be definitively characterized. Diffusely heterogeneous enhancement   of the right kidney  and  mildly heterogeneous enhancement of the left   kidney may be artifactual in nature due to the motion; however, renal   abnormality, including pyelonephritis or infarct  cannot be excluded in   this setting. Clinical and laboratory correlation is again recommended.   Renal ultrasound is suggested for further evaluation.      Findings were discussed with   1/2/2024 9:15 AM by Dr. Jj with read back confirmation.    --- End of Report ---     < from: TTE W or WO Ultrasound Enhancing Agent (11.06.23 @ 10:22) >    TRANSTHORACIC ECHOCARDIOGRAM REPORT  ________________________________________________________________________________                                      _______       Pt. Name:       DEEPTHI GÓMEZ Study Date:    11/6/2023  MRN:            [id number]     YOB: 1950  Accession #:    [id number]     Age:           72 years  Account#:       [id number]      Gender:        M  Heart Rate:     82 bpm        Height:        64.00 in (162.56 cm)  Rhythm:                       Weight:        150.00 lb (68.04 kg)  Blood Pressure: 102/64 mmHg   BSA/BMI:       1.73 m² / 25.75 kg/m²  ________________________________________________________________________________________  Referring Physician:    [id number] Dago Saravia  Interpreting Physician: Regina Bedolla MD  Primary Sonographer:    Bob ESTEBAN    CPT:               ECHO TTE WO CON COMP W DOPP - 73343.m;3D RECONST W/O                     WKSTATION - 36028.m;MYOCARDIAL STRAIN IMAGING - 25447.m  Indication(s):     Chest pain,unspecified - R07.9; Amyloidosis, unspecified -                     E85.9  Procedure:         Transthoracic echocardiogram with 2-D, M-mode and complete                     spectral and color flow Doppler. Strain imaging performed for      evaluation of regional and global myocardial shape and                     dimensions. Real time and full volume 3-dimensional imaging                     performed at the echo machine.  Ordering Location: [facility]  Study Information: Image quality for this study is good.    _______________________________________________________________________________________     CONCLUSIONS:      1. The left ventricular cavity is small. Left ventricular systolic function is normal with a calculated ejection fraction of 69 % by the Freeman's biplane method of disks. There is moderate (grade 2) left ventricular diastolic dysfunction. Severe left ventricular hypertrophy. Left ventricular global longitudinal strain is -10.7 % is abnormal (> -16%). Normal strain measurements noted at the left ventricular apex. Images were acquired on a Luna ultrasound system and processed on the ultrasound machine with a heart rate of 82 bpm and a blood pressure of 102/64 mmHg. Findings suggestive of infiltrative cardiomyopathy.   2. Normal right ventricular cavity size, increasedwall thickness, and probably normal systolic function.   3. The left atrium is normal in size.   4. The inferior vena cava is normal in size measuring 1.61 cm in diameter, (normal <2.1cm) with normal inspiratory collapse (normal >50%) consistent with normal right atrial pressure (~3, range 0-5mmHg).   5. No pericardial effusion seen.    ________________________________________________________________________________________  FINDINGS:    Left Ventricle:  The left ventricular cavity is small. Left ventricular systolic function is normal with a calculated ejection fraction of 69 % by the Freeman's biplane method of disks. There is moderate (grade 2) left ventricular diastolic dysfunction. Severe left ventricular hypertrophy. Left ventricular global longitudinal strain is -10.7 % is abnormal (> -16%). Normal strain measurements noted at the left ventricular apex. Images were acquired on a Luna ultrasound system and processed on the ultrasound machine with a heart rate of 82 bpm and a blood pressure of 102/64 mmHg. Findings suggestive of infiltrative cardiomyopathy.     Right Ventricle:  The right ventricular cavity is normal in size, increased wall thickness and probably normal systolic function. Tricuspid annular plane systolic excursion (TAPSE) is 1.9 cm (normal >=1.7 cm).     Left Atrium:  The left atrium is normal in size with an indexed volume of 30.85 ml/m².     Right Atrium:  The right atrium is normal in size with an indexed volume of 29.29 ml/m².     Aortic Valve:  The aortic valve is tricuspid with normal leaflet excursion with normal systolic excursion. There is fibrocalcific aortic valve sclerosis without stenosis. There is trace aortic regurgitation.    Mitral Valve:  Structurally normal mitral valve with normal leaflet excursion. There is trace mitral regurgitation.     Tricuspid Valve:  Structurally normal tricuspid valve with normal leaflet excursion. There is trace tricuspid regurgitation.     Pulmonic Valve:  Structurally normal pulmonic valve with normal leaflet excursion. There is mild pulmonic regurgitation.     Aorta:  The aortic root at the sinuses of Valsalva is normal in size. The ascending aorta diameter is normal in size.     Pericardium:  No pericardial effusion seen.     Systemic Veins:  The inferior vena cava is normal in size measuring 1.61 cm in diameter, (normal <2.1cm) with normal inspiratory collapse (normal >50%) consistent with normal right atrial pressure (~3, range 0-5mmHg).  ____________________________________________________________________  Quantitative Data:  Left Ventricle Measurements: (Indexed to BSA)     IVSd (2D):   1.9 cm  LVPWd (2D):  1.7 cm                           Strain Measurements:  LVIDd (2D):  3.5 cm                           Global Pk Long Strain:  -10.7 %  LVIDs (2D):  2.7 cm                           Endo Pk Strain A4C:     -10.1 %  LV Mass:     263 g  151.8 g/m²                Endo Pk Strain A2C:     -11.4 %  LV Vol d, MOD A2C: 51.1ml 29.52 ml/m²        Endo Pk Strain A3C:     -10.7 %  LV Vol d, MOD A4C: 43.6 ml 25.19 ml/m²  LV Vol d, MOD BP:  47.2 ml 27.25 ml/m²  LV Vol s, MOD A2C: 13.8 ml 7.97 ml/m²  LV Vol s, MOD A4C: 13.9 ml 8.03 ml/m²  LV Vol s, MOD BP:  14.6 ml 8.41 ml/m²  LVOT SV MOD BP:    32.6 ml  LV EF% MOD BP:     69 %     MV E Vmax:    0.90 m/s  MV A Vmax:    0.64 m/s  MV E/A:       1.40  e' lateral:   7.40 cm/s  e' medial:    6.74 cm/s  E/e' lateral: 12.18  E/e' medial:  13.37  E/e' Average: 12.74  MV DT: 243 msec    Aorta Measurements: (normal range) (Indexed to BSA)     Sinuses of Valsalva: 3.20 cm (3.1 - 3.7 cm)  Ao Asc prox:         3.30 cm       Left Atrium Measurements: (Indexed to BSA)  LA Diam 2D:        3.40 cm  LA Vol s, MOD A4C: 47.30 ml.  LA Vol s, MOD A2C: 55.60 ml.  LA Vol s, MOD BP:  53.40 ml. 30.85 ml/m².    Right Ventricle Measurements: Right Atrial Measurements:     TAPSE:            1.9 cm      RA Vol:       50.70 ml  TV Kimberly. S':       13.90 cm/s  RA Vol Index: 29.29 ml/m²  RV Base (RVID1):  3.1 cm  RV Mid (RVID2):   2.7 cm  RV Major (RVID3): 5.9 cm       LVOT / RVOT/ Qp/Qs Data: (Indexed to BSA)  LVOT Diameter: 2.00 cm  LVOT Vmax:     1.17 m/s  LVOT VTI:      23.70 cm  LVOT SV:       74.5 ml  43.02 ml/m²    Mitral Valve Measurements:     MV E Vmax: 0.9 m/s  MV A Vmax: 0.6 m/s  MV E/A:    1.4       Tricuspid Valve Measurements:     RA Pressure: 3 mmHg    ________________________________________________________________________________________  Electronically signed on 11/6/2023 at 4:36:26 PM by Regina Bedolla MD         *** Final *    < end of copied text >   NEPHROLOGY - NSN    Patient seen and examined.    HPI:  Mr. Gómez is a 73M w/ BPH (c/b urinary retention s/p Harkins catheter placement; exchanged monthly), CKD stage IIIb, and cardiac amyloidosis brought in by daughter for diarrhea (loose-watery BM's) over the past few days. The pt reports no new medications or food triggers. No relieving factors. Pt could not delineate category of diarrhea.  The patient had a fall yesterday, pt denied any preceding nausea or associated LOC. Pt was recently hospitalized in December for urosepsis and abdominal pain and was found to have E. Faecalis and S. Maltophilia     In the ED VS were notable for low SBPs in 80-90s at one point pt was tachycardic to 102. Labs and imaging were notable for HgB 9.2, Cr 1.92, , , ALT 63, U/A LE, and Pan CT notable for spinal stenosis, spoondolysthesis, procitits, and pyelonephritis. In the ED pt received 4L of fluids, ofirmev, midodrine, zoysn, vancomycin, and norephinephrine GTT. MICU consulted by ED due to hypotension requiring norepi gtt, pt not deemed a candidate at the time.  Found to have COVID and started on Remdesivir and then stopped   BP remains soft and creatinine cont to rise        (02 Jan 2024 15:54)      PAST MEDICAL & SURGICAL HISTORY:  Cardiac amyloidosis      Stenosis, cervical spine      BPH (benign prostatic hyperplasia)      Indwelling Harkins catheter present      Stage 3 chronic kidney disease      S/P cataract extraction  B/L      S/P nasal polypectomy      History of fusion of cervical spine          MEDICATIONS  (STANDING):  chlorhexidine 2% Cloths 1 Application(s) Topical <User Schedule>  colchicine 0.6 milliGRAM(s) Oral daily  dextrose 50% Injectable 25 Gram(s) IV Push once  dextrose 50% Injectable 25 milliLiter(s) IV Push once  dextrose 50% Injectable 25 Gram(s) IV Push once  dextrose 50% Injectable 12.5 Gram(s) IV Push once  dextrose Oral Gel 15 Gram(s) Oral once  finasteride 5 milliGRAM(s) Oral daily  fluticasone propionate 50 MICROgram(s)/spray Nasal Spray 1 Spray(s) Both Nostrils two times a day  glucagon  Injectable 1 milliGRAM(s) IntraMuscular once  heparin   Injectable 5000 Unit(s) SubCutaneous every 8 hours  lactated ringers. 1000 milliLiter(s) (80 mL/Hr) IV Continuous <Continuous>  midodrine 20 milliGRAM(s) Oral every 8 hours  piperacillin/tazobactam IVPB.. 3.375 Gram(s) IV Intermittent every 8 hours  sodium chloride 0.65% Nasal 1 Spray(s) Both Nostrils once  tamsulosin 0.4 milliGRAM(s) Oral at bedtime  Vyndamax (Tafamidis) 61mg Capsule 1 Capsule(s) 1 Capsule(s) Oral daily      Allergies    No Known Allergies    Intolerances        SOCIAL HISTORY:  Denies alcohol abuse, drug abuse or tobacco usage.     FAMILY HISTORY:      VITALS:  T(C): 37.3 (01-04-24 @ 04:31), Max: 38.6 (01-03-24 @ 20:57)  HR: 80 (01-04-24 @ 04:31) (80 - 87)  BP: 95/50 (01-04-24 @ 04:31) (91/60 - 97/54)  RR: 18 (01-04-24 @ 04:31) (18 - 18)  SpO2: 95% (01-04-24 @ 04:31) (95% - 99%)    REVIEW OF SYSTEMS:  + diarrhea.  Good oral intake and denies fatigue or weakness. All other pertinent systems are reviewed and are negative.    PHYSICAL EXAM:  Constitutional: NAD  HEENT: EOMI  Neck:  No JVD, supple   Respiratory: CTA B/L  Cardiovascular: S1 and S2, RRR  Gastrointestinal: + BS, soft, NT, ND  Extremities: No peripheral edema, + peripheral pulses  Neurological: A/O x 3, CN2-12 intact  Psychiatric: Normal mood, normal affect  : +  Harkins  Skin: No rashes, C/D/I  Access: Not applicable    I and O's:    01-02 @ 07:01  -  01-03 @ 07:00  --------------------------------------------------------  IN: 0 mL / OUT: 1500 mL / NET: -1500 mL    01-03 @ 07:01  -  01-04 @ 07:00  --------------------------------------------------------  IN: 480 mL / OUT: 1350 mL / NET: -870 mL    01-04 @ 07:01  -  01-04 @ 12:04  --------------------------------------------------------  IN: 120 mL / OUT: 0 mL / NET: 120 mL          LABS:                        8.5    4.30  )-----------( 183      ( 04 Jan 2024 05:49 )             25.5     01-04    138  |  107  |  32<H>  ----------------------------<  79  3.8   |  20<L>  |  2.69<H>    Ca    8.5      04 Jan 2024 05:48  Phos  3.9     01-04  Mg     2.1     01-04    TPro  6.1  /  Alb  2.9<L>  /  TBili  0.5  /  DBili  x   /  AST  117<H>  /  ALT  44  /  AlkPhos  83  01-04      URINE:  Urinalysis Basic - ( 04 Jan 2024 05:48 )    Color: x / Appearance: x / SG: x / pH: x  Gluc: 79 mg/dL / Ketone: x  / Bili: x / Urobili: x   Blood: x / Protein: x / Nitrite: x   Leuk Esterase: x / RBC: x / WBC x   Sq Epi: x / Non Sq Epi: x / Bacteria: x      Sodium, Random Urine: 87 mmol/L (01-03 @ 06:40)  Creatinine, Random Urine: 56 mg/dL (01-03 @ 06:40)  Protein/Creatinine Ratio Calculation: 1.8 Ratio (01-03 @ 06:40)  Osmolality, Random Urine: 378 mos/kg (01-03 @ 06:40)  Potassium, Random Urine: 25 mmol/L (01-03 @ 06:40)    RADIOLOGY & ADDITIONAL STUDIES:    < from: CT Abdomen and Pelvis w/ IV Cont (01.02.24 @ 08:01) >    ACC: [id number] EXAM:  CT ABDOMEN AND PELVIS IC   ORDERED BY:  LEONARD BROCK     PROCEDURE DATE:  01/02/2024          INTERPRETATION:  CLINICAL INFORMATION: 73 year old male with back pain   and diarrhea    COMPARISON: CT abdomen/pelvis 12/17/2023, abdominal  ultrasound   12/16/2023, CTA of chest 4/23/2023    CONTRAST/COMPLICATIONS:  IV Contrast: Omnipaque 350  90 cc administered   10 cc discarded  Oral Contrast: NONE  Complications: None reported at time of study completion    PROCEDURE:  CT of the Abdomen and Pelvis was performed.  Sagittal and coronal reformats were performed.    FINDINGS:  This study is very limited due to motion artifact.    LOWER CHEST: Coronary artery calcifications. Motion artifact limits   evaluation of the lung bases, however, no gross focal consolidation or   pleural effusion.    LIVER: Within the limitations of the motion artifact, no focal   abnormality is identified.  BILE DUCTS: Normal caliber.  GALLBLADDER: Nondistended, otherwise not well assessed.  SPLEEN: Within normal limits.  PANCREAS: Not well assessed.  ADRENALS: Within normal limits.  KIDNEYS/URETERS: Limited evaluation due to motion artifact. Left upper   pole cyst. Multiple, ill-defined right indeterminate hypodense lesions   with diffusely heterogeneous enhancement of the right kidney and mildly   heterogeneous enhancement of the left kidney.. Bilateral perinephric soft   tissue stranding. No hydronephrosis.    BLADDER: Harkins catheter. Underdistended.  REPRODUCTIVE ORGANS: Prostate is enlarged.    BOWEL: Assessment of the bowel is very limited due to motion artifact and   lack of enteric contrast.. Mildly distended fluid-filled rectum with   increased mucosal enhancement.. . Appendix is not visualized. A small   bowel loop protrudes into a periumbilical hernia. No definite evidence   for obstruction.  PERITONEUM: No ascites.  VESSELS: Atherosclerotic changes  RETROPERITONEUM/LYMPH NODES: Mildly prominent  pelvic and retroperitoneal   lymph nodes, similar to prior. .  ABDOMINAL WALL: Small fat and bowel containing umbilical hernia. Mild   dependent subcutaneous edema.  BONES: Degenerative changes.    IMPRESSION:  Limited evaluation due to motion artifact.    Mildly distended fluid-filled rectum, which may represent proctitis.   Evaluation of the bowel is otherwise very limited on this examination.   Short-term follow-up may be performed for reevaluation as clinically   warranted.    Bilateral perinephric soft tissue stranding. Suggest correlation with   urinalysis to exclude infection. Multiple hypodense right renal lesions   cannot be definitively characterized. Diffusely heterogeneous enhancement   of the right kidney  and  mildly heterogeneous enhancement of the left   kidney may be artifactual in nature due to the motion; however, renal   abnormality, including pyelonephritis or infarct  cannot be excluded in   this setting. Clinical and laboratory correlation is again recommended.   Renal ultrasound is suggested for further evaluation.      Findings were discussed with   1/2/2024 9:15 AM by Dr. Jj with read back confirmation.    --- End of Report ---     < from: TTE W or WO Ultrasound Enhancing Agent (11.06.23 @ 10:22) >    TRANSTHORACIC ECHOCARDIOGRAM REPORT  ________________________________________________________________________________                                      _______       Pt. Name:       DEEPTHI GÓMEZ Study Date:    11/6/2023  MRN:            [id number]     YOB: 1950  Accession #:    [id number]     Age:           72 years  Account#:       [id number]      Gender:        M  Heart Rate:     82 bpm        Height:        64.00 in (162.56 cm)  Rhythm:                       Weight:        150.00 lb (68.04 kg)  Blood Pressure: 102/64 mmHg   BSA/BMI:       1.73 m² / 25.75 kg/m²  ________________________________________________________________________________________  Referring Physician:    [id number] Dago Saravia  Interpreting Physician: Regina Bedolla MD  Primary Sonographer:    Bob ESTEBAN    CPT:               ECHO TTE WO CON COMP W DOPP - 45621.m;3D RECONST W/O                     WKSTATION - 45432.m;MYOCARDIAL STRAIN IMAGING - 64179.m  Indication(s):     Chest pain,unspecified - R07.9; Amyloidosis, unspecified -                     E85.9  Procedure:         Transthoracic echocardiogram with 2-D, M-mode and complete                     spectral and color flow Doppler. Strain imaging performed for      evaluation of regional and global myocardial shape and                     dimensions. Real time and full volume 3-dimensional imaging                     performed at the echo machine.  Ordering Location: [facility]  Study Information: Image quality for this study is good.    _______________________________________________________________________________________     CONCLUSIONS:      1. The left ventricular cavity is small. Left ventricular systolic function is normal with a calculated ejection fraction of 69 % by the Freeman's biplane method of disks. There is moderate (grade 2) left ventricular diastolic dysfunction. Severe left ventricular hypertrophy. Left ventricular global longitudinal strain is -10.7 % is abnormal (> -16%). Normal strain measurements noted at the left ventricular apex. Images were acquired on a Luna ultrasound system and processed on the ultrasound machine with a heart rate of 82 bpm and a blood pressure of 102/64 mmHg. Findings suggestive of infiltrative cardiomyopathy.   2. Normal right ventricular cavity size, increasedwall thickness, and probably normal systolic function.   3. The left atrium is normal in size.   4. The inferior vena cava is normal in size measuring 1.61 cm in diameter, (normal <2.1cm) with normal inspiratory collapse (normal >50%) consistent with normal right atrial pressure (~3, range 0-5mmHg).   5. No pericardial effusion seen.    ________________________________________________________________________________________  FINDINGS:    Left Ventricle:  The left ventricular cavity is small. Left ventricular systolic function is normal with a calculated ejection fraction of 69 % by the Freeman's biplane method of disks. There is moderate (grade 2) left ventricular diastolic dysfunction. Severe left ventricular hypertrophy. Left ventricular global longitudinal strain is -10.7 % is abnormal (> -16%). Normal strain measurements noted at the left ventricular apex. Images were acquired on a Luna ultrasound system and processed on the ultrasound machine with a heart rate of 82 bpm and a blood pressure of 102/64 mmHg. Findings suggestive of infiltrative cardiomyopathy.     Right Ventricle:  The right ventricular cavity is normal in size, increased wall thickness and probably normal systolic function. Tricuspid annular plane systolic excursion (TAPSE) is 1.9 cm (normal >=1.7 cm).     Left Atrium:  The left atrium is normal in size with an indexed volume of 30.85 ml/m².     Right Atrium:  The right atrium is normal in size with an indexed volume of 29.29 ml/m².     Aortic Valve:  The aortic valve is tricuspid with normal leaflet excursion with normal systolic excursion. There is fibrocalcific aortic valve sclerosis without stenosis. There is trace aortic regurgitation.    Mitral Valve:  Structurally normal mitral valve with normal leaflet excursion. There is trace mitral regurgitation.     Tricuspid Valve:  Structurally normal tricuspid valve with normal leaflet excursion. There is trace tricuspid regurgitation.     Pulmonic Valve:  Structurally normal pulmonic valve with normal leaflet excursion. There is mild pulmonic regurgitation.     Aorta:  The aortic root at the sinuses of Valsalva is normal in size. The ascending aorta diameter is normal in size.     Pericardium:  No pericardial effusion seen.     Systemic Veins:  The inferior vena cava is normal in size measuring 1.61 cm in diameter, (normal <2.1cm) with normal inspiratory collapse (normal >50%) consistent with normal right atrial pressure (~3, range 0-5mmHg).  ____________________________________________________________________  Quantitative Data:  Left Ventricle Measurements: (Indexed to BSA)     IVSd (2D):   1.9 cm  LVPWd (2D):  1.7 cm                           Strain Measurements:  LVIDd (2D):  3.5 cm                           Global Pk Long Strain:  -10.7 %  LVIDs (2D):  2.7 cm                           Endo Pk Strain A4C:     -10.1 %  LV Mass:     263 g  151.8 g/m²                Endo Pk Strain A2C:     -11.4 %  LV Vol d, MOD A2C: 51.1ml 29.52 ml/m²        Endo Pk Strain A3C:     -10.7 %  LV Vol d, MOD A4C: 43.6 ml 25.19 ml/m²  LV Vol d, MOD BP:  47.2 ml 27.25 ml/m²  LV Vol s, MOD A2C: 13.8 ml 7.97 ml/m²  LV Vol s, MOD A4C: 13.9 ml 8.03 ml/m²  LV Vol s, MOD BP:  14.6 ml 8.41 ml/m²  LVOT SV MOD BP:    32.6 ml  LV EF% MOD BP:     69 %     MV E Vmax:    0.90 m/s  MV A Vmax:    0.64 m/s  MV E/A:       1.40  e' lateral:   7.40 cm/s  e' medial:    6.74 cm/s  E/e' lateral: 12.18  E/e' medial:  13.37  E/e' Average: 12.74  MV DT: 243 msec    Aorta Measurements: (normal range) (Indexed to BSA)     Sinuses of Valsalva: 3.20 cm (3.1 - 3.7 cm)  Ao Asc prox:         3.30 cm       Left Atrium Measurements: (Indexed to BSA)  LA Diam 2D:        3.40 cm  LA Vol s, MOD A4C: 47.30 ml.  LA Vol s, MOD A2C: 55.60 ml.  LA Vol s, MOD BP:  53.40 ml. 30.85 ml/m².    Right Ventricle Measurements: Right Atrial Measurements:     TAPSE:            1.9 cm      RA Vol:       50.70 ml  TV Kimberly. S':       13.90 cm/s  RA Vol Index: 29.29 ml/m²  RV Base (RVID1):  3.1 cm  RV Mid (RVID2):   2.7 cm  RV Major (RVID3): 5.9 cm       LVOT / RVOT/ Qp/Qs Data: (Indexed to BSA)  LVOT Diameter: 2.00 cm  LVOT Vmax:     1.17 m/s  LVOT VTI:      23.70 cm  LVOT SV:       74.5 ml  43.02 ml/m²    Mitral Valve Measurements:     MV E Vmax: 0.9 m/s  MV A Vmax: 0.6 m/s  MV E/A:    1.4       Tricuspid Valve Measurements:     RA Pressure: 3 mmHg    ________________________________________________________________________________________  Electronically signed on 11/6/2023 at 4:36:26 PM by Regina Bedolla MD         *** Final *    < end of copied text >   NEPHROLOGY - NSN    Patient seen and examined.    HPI:  Mr. Gómez is a 73M w/ BPH (c/b urinary retention s/p Harkins catheter placement; exchanged monthly), CKD stage IIIb, and cardiac amyloidosis brought in by daughter for diarrhea (loose-watery BM's) over the past few days. The pt reports no new medications or food triggers. No relieving factors. Pt could not delineate category of diarrhea.  The patient had a fall yesterday, pt denied any preceding nausea or associated LOC. Pt was recently hospitalized in December for urosepsis and abdominal pain and was found to have E. Faecalis and S. Maltophilia     In the ED VS were notable for low SBPs in 80-90s at one point pt was tachycardic to 102. Labs and imaging were notable for HgB 9.2, Cr 1.92, , , ALT 63, U/A LE, and Pan CT notable for spinal stenosis, spoondolysthesis, procitits, and pyelonephritis. In the ED pt received 4L of fluids, ofirmev, midodrine, zoysn, vancomycin, and norephinephrine GTT. MICU consulted by ED due to hypotension requiring norepi gtt, pt not deemed a candidate at the time.  Found to have COVID and started on Remdesivir and then stopped   BP remains soft and creatinine cont to rise        (02 Jan 2024 15:54)      PAST MEDICAL & SURGICAL HISTORY:  Cardiac amyloidosis      Stenosis, cervical spine      BPH (benign prostatic hyperplasia)      Indwelling Harkins catheter present      Stage 3 chronic kidney disease      S/P cataract extraction  B/L      S/P nasal polypectomy      History of fusion of cervical spine          MEDICATIONS  (STANDING):  chlorhexidine 2% Cloths 1 Application(s) Topical <User Schedule>  colchicine 0.6 milliGRAM(s) Oral daily  dextrose 50% Injectable 25 Gram(s) IV Push once  dextrose 50% Injectable 25 milliLiter(s) IV Push once  dextrose 50% Injectable 25 Gram(s) IV Push once  dextrose 50% Injectable 12.5 Gram(s) IV Push once  dextrose Oral Gel 15 Gram(s) Oral once  finasteride 5 milliGRAM(s) Oral daily  fluticasone propionate 50 MICROgram(s)/spray Nasal Spray 1 Spray(s) Both Nostrils two times a day  glucagon  Injectable 1 milliGRAM(s) IntraMuscular once  heparin   Injectable 5000 Unit(s) SubCutaneous every 8 hours  lactated ringers. 1000 milliLiter(s) (80 mL/Hr) IV Continuous <Continuous>  midodrine 20 milliGRAM(s) Oral every 8 hours  piperacillin/tazobactam IVPB.. 3.375 Gram(s) IV Intermittent every 8 hours  sodium chloride 0.65% Nasal 1 Spray(s) Both Nostrils once  tamsulosin 0.4 milliGRAM(s) Oral at bedtime  Vyndamax (Tafamidis) 61mg Capsule 1 Capsule(s) 1 Capsule(s) Oral daily      Allergies    No Known Allergies    Intolerances        SOCIAL HISTORY:  Denies alcohol abuse, drug abuse or tobacco usage.     FAMILY HISTORY:      VITALS:  T(C): 37.3 (01-04-24 @ 04:31), Max: 38.6 (01-03-24 @ 20:57)  HR: 80 (01-04-24 @ 04:31) (80 - 87)  BP: 95/50 (01-04-24 @ 04:31) (91/60 - 97/54)  RR: 18 (01-04-24 @ 04:31) (18 - 18)  SpO2: 95% (01-04-24 @ 04:31) (95% - 99%)    REVIEW OF SYSTEMS:  + diarrhea.  Good oral intake and denies fatigue or weakness. All other pertinent systems are reviewed and are negative.    PHYSICAL EXAM:  Constitutional: NAD  HEENT: EOMI  Neck:  No JVD, supple   Respiratory: CTA B/L  Cardiovascular: S1 and S2, RRR  Gastrointestinal: + BS, soft, NT, ND  Extremities: No peripheral edema, + peripheral pulses  Neurological: A/O x 3, CN2-12 intact  Psychiatric: Normal mood, normal affect  : +  Harkins  Skin: No rashes, C/D/I  Access: Not applicable    I and O's:    01-02 @ 07:01  -  01-03 @ 07:00  --------------------------------------------------------  IN: 0 mL / OUT: 1500 mL / NET: -1500 mL    01-03 @ 07:01  -  01-04 @ 07:00  --------------------------------------------------------  IN: 480 mL / OUT: 1350 mL / NET: -870 mL    01-04 @ 07:01  -  01-04 @ 12:04  --------------------------------------------------------  IN: 120 mL / OUT: 0 mL / NET: 120 mL          LABS:                        8.5    4.30  )-----------( 183      ( 04 Jan 2024 05:49 )             25.5     01-04    138  |  107  |  32<H>  ----------------------------<  79  3.8   |  20<L>  |  2.69<H>    Ca    8.5      04 Jan 2024 05:48  Phos  3.9     01-04  Mg     2.1     01-04    TPro  6.1  /  Alb  2.9<L>  /  TBili  0.5  /  DBili  x   /  AST  117<H>  /  ALT  44  /  AlkPhos  83  01-04      URINE:  Urinalysis Basic - ( 04 Jan 2024 05:48 )    Color: x / Appearance: x / SG: x / pH: x  Gluc: 79 mg/dL / Ketone: x  / Bili: x / Urobili: x   Blood: x / Protein: x / Nitrite: x   Leuk Esterase: x / RBC: x / WBC x   Sq Epi: x / Non Sq Epi: x / Bacteria: x      Sodium, Random Urine: 87 mmol/L (01-03 @ 06:40)  Creatinine, Random Urine: 56 mg/dL (01-03 @ 06:40)  Protein/Creatinine Ratio Calculation: 1.8 Ratio (01-03 @ 06:40)  Osmolality, Random Urine: 378 mos/kg (01-03 @ 06:40)  Potassium, Random Urine: 25 mmol/L (01-03 @ 06:40)    RADIOLOGY & ADDITIONAL STUDIES:    < from: CT Abdomen and Pelvis w/ IV Cont (01.02.24 @ 08:01) >    ACC: [id number] EXAM:  CT ABDOMEN AND PELVIS IC   ORDERED BY:  LEONARD BROCK     PROCEDURE DATE:  01/02/2024          INTERPRETATION:  CLINICAL INFORMATION: 73 year old male with back pain   and diarrhea    COMPARISON: CT abdomen/pelvis 12/17/2023, abdominal  ultrasound   12/16/2023, CTA of chest 4/23/2023    CONTRAST/COMPLICATIONS:  IV Contrast: Omnipaque 350  90 cc administered   10 cc discarded  Oral Contrast: NONE  Complications: None reported at time of study completion    PROCEDURE:  CT of the Abdomen and Pelvis was performed.  Sagittal and coronal reformats were performed.    FINDINGS:  This study is very limited due to motion artifact.    LOWER CHEST: Coronary artery calcifications. Motion artifact limits   evaluation of the lung bases, however, no gross focal consolidation or   pleural effusion.    LIVER: Within the limitations of the motion artifact, no focal   abnormality is identified.  BILE DUCTS: Normal caliber.  GALLBLADDER: Nondistended, otherwise not well assessed.  SPLEEN: Within normal limits.  PANCREAS: Not well assessed.  ADRENALS: Within normal limits.  KIDNEYS/URETERS: Limited evaluation due to motion artifact. Left upper   pole cyst. Multiple, ill-defined right indeterminate hypodense lesions   with diffusely heterogeneous enhancement of the right kidney and mildly   heterogeneous enhancement of the left kidney.. Bilateral perinephric soft   tissue stranding. No hydronephrosis.    BLADDER: Harkins catheter. Underdistended.  REPRODUCTIVE ORGANS: Prostate is enlarged.    BOWEL: Assessment of the bowel is very limited due to motion artifact and   lack of enteric contrast.. Mildly distended fluid-filled rectum with   increased mucosal enhancement.. . Appendix is not visualized. A small   bowel loop protrudes into a periumbilical hernia. No definite evidence   for obstruction.  PERITONEUM: No ascites.  VESSELS: Atherosclerotic changes  RETROPERITONEUM/LYMPH NODES: Mildly prominent  pelvic and retroperitoneal   lymph nodes, similar to prior. .  ABDOMINAL WALL: Small fat and bowel containing umbilical hernia. Mild   dependent subcutaneous edema.  BONES: Degenerative changes.    IMPRESSION:  Limited evaluation due to motion artifact.    Mildly distended fluid-filled rectum, which may represent proctitis.   Evaluation of the bowel is otherwise very limited on this examination.   Short-term follow-up may be performed for reevaluation as clinically   warranted.    Bilateral perinephric soft tissue stranding. Suggest correlation with   urinalysis to exclude infection. Multiple hypodense right renal lesions   cannot be definitively characterized. Diffusely heterogeneous enhancement   of the right kidney  and  mildly heterogeneous enhancement of the left   kidney may be artifactual in nature due to the motion; however, renal   abnormality, including pyelonephritis or infarct  cannot be excluded in   this setting. Clinical and laboratory correlation is again recommended.   Renal ultrasound is suggested for further evaluation.      Findings were discussed with   1/2/2024 9:15 AM by Dr. Jj with read back confirmation.    --- End of Report ---     < from: TTE W or WO Ultrasound Enhancing Agent (11.06.23 @ 10:22) >    TRANSTHORACIC ECHOCARDIOGRAM REPORT  ________________________________________________________________________________                                      _______       Pt. Name:       DEEPTHI GÓMEZ Study Date:    11/6/2023  MRN:            [id number]     YOB: 1950  Accession #:    [id number]     Age:           72 years  Account#:       [id number]      Gender:        M  Heart Rate:     82 bpm        Height:        64.00 in (162.56 cm)  Rhythm:                       Weight:        150.00 lb (68.04 kg)  Blood Pressure: 102/64 mmHg   BSA/BMI:       1.73 m² / 25.75 kg/m²  ________________________________________________________________________________________  Referring Physician:    [id number] Dago Saravia  Interpreting Physician: Regina Bedolla MD  Primary Sonographer:    Bob ESTEBAN    CPT:               ECHO TTE WO CON COMP W DOPP - 96062.m;3D RECONST W/O                     [facility]ATION - 08861.m;MYOCARDIAL STRAIN IMAGING - 80542.m  Indication(s):     Chest pain,unspecified - R07.9; Amyloidosis, unspecified -                     E85.9  Procedure:         Transthoracic echocardiogram with 2-D, M-mode and complete                     spectral and color flow Doppler. Strain imaging performed for      evaluation of regional and global myocardial shape and                     dimensions. Real time and full volume 3-dimensional imaging                     performed at the echo machine.  Ordering Location: [facility]  Study Information: Image quality for this study is good.    _______________________________________________________________________________________     CONCLUSIONS:      1. The left ventricular cavity is small. Left ventricular systolic function is normal with a calculated ejection fraction of 69 % by the Freeman's biplane method of disks. There is moderate (grade 2) left ventricular diastolic dysfunction. Severe left ventricular hypertrophy. Left ventricular global longitudinal strain is -10.7 % is abnormal (> -16%). Normal strain measurements noted at the left ventricular apex. Images were acquired on a Luna ultrasound system and processed on the ultrasound machine with a heart rate of 82 bpm and a blood pressure of 102/64 mmHg. Findings suggestive of infiltrative cardiomyopathy.   2. Normal right ventricular cavity size, increasedwall thickness, and probably normal systolic function.   3. The left atrium is normal in size.   4. The inferior vena cava is normal in size measuring 1.61 cm in diameter, (normal <2.1cm) with normal inspiratory collapse (normal >50%) consistent with normal right atrial pressure (~3, range 0-5mmHg).   5. No pericardial effusion seen.    ________________________________________________________________________________________  FINDINGS:    Left Ventricle:  The left ventricular cavity is small. Left ventricular systolic function is normal with a calculated ejection fraction of 69 % by the Freeman's biplane method of disks. There is moderate (grade 2) left ventricular diastolic dysfunction. Severe left ventricular hypertrophy. Left ventricular global longitudinal strain is -10.7 % is abnormal (> -16%). Normal strain measurements noted at the left ventricular apex. Images were acquired on a Luna ultrasound system and processed on the ultrasound machine with a heart rate of 82 bpm and a blood pressure of 102/64 mmHg. Findings suggestive of infiltrative cardiomyopathy.     Right Ventricle:  The right ventricular cavity is normal in size, increased wall thickness and probably normal systolic function. Tricuspid annular plane systolic excursion (TAPSE) is 1.9 cm (normal >=1.7 cm).     Left Atrium:  The left atrium is normal in size with an indexed volume of 30.85 ml/m².     Right Atrium:  The right atrium is normal in size with an indexed volume of 29.29 ml/m².     Aortic Valve:  The aortic valve is tricuspid with normal leaflet excursion with normal systolic excursion. There is fibrocalcific aortic valve sclerosis without stenosis. There is trace aortic regurgitation.    Mitral Valve:  Structurally normal mitral valve with normal leaflet excursion. There is trace mitral regurgitation.     Tricuspid Valve:  Structurally normal tricuspid valve with normal leaflet excursion. There is trace tricuspid regurgitation.     Pulmonic Valve:  Structurally normal pulmonic valve with normal leaflet excursion. There is mild pulmonic regurgitation.     Aorta:  The aortic root at the sinuses of Valsalva is normal in size. The ascending aorta diameter is normal in size.     Pericardium:  No pericardial effusion seen.     Systemic Veins:  The inferior vena cava is normal in size measuring 1.61 cm in diameter, (normal <2.1cm) with normal inspiratory collapse (normal >50%) consistent with normal right atrial pressure (~3, range 0-5mmHg).  ____________________________________________________________________  Quantitative Data:  Left Ventricle Measurements: (Indexed to BSA)     IVSd (2D):   1.9 cm  LVPWd (2D):  1.7 cm                           Strain Measurements:  LVIDd (2D):  3.5 cm                           Global Pk Long Strain:  -10.7 %  LVIDs (2D):  2.7 cm                           Endo Pk Strain A4C:     -10.1 %  LV Mass:     263 g  151.8 g/m²                Endo Pk Strain A2C:     -11.4 %  LV Vol d, MOD A2C: 51.1ml 29.52 ml/m²        Endo Pk Strain A3C:     -10.7 %  LV Vol d, MOD A4C: 43.6 ml 25.19 ml/m²  LV Vol d, MOD BP:  47.2 ml 27.25 ml/m²  LV Vol s, MOD A2C: 13.8 ml 7.97 ml/m²  LV Vol s, MOD A4C: 13.9 ml 8.03 ml/m²  LV Vol s, MOD BP:  14.6 ml 8.41 ml/m²  LVOT SV MOD BP:    32.6 ml  LV EF% MOD BP:     69 %     MV E Vmax:    0.90 m/s  MV A Vmax:    0.64 m/s  MV E/A:       1.40  e' lateral:   7.40 cm/s  e' medial:    6.74 cm/s  E/e' lateral: 12.18  E/e' medial:  13.37  E/e' Average: 12.74  MV DT: 243 msec    Aorta Measurements: (normal range) (Indexed to BSA)     Sinuses of Valsalva: 3.20 cm (3.1 - 3.7 cm)  Ao Asc prox:         3.30 cm       Left Atrium Measurements: (Indexed to BSA)  LA Diam 2D:        3.40 cm  LA Vol s, MOD A4C: 47.30 ml.  LA Vol s, MOD A2C: 55.60 ml.  LA Vol s, MOD BP:  53.40 ml. 30.85 ml/m².    Right Ventricle Measurements: Right Atrial Measurements:     TAPSE:            1.9 cm      RA Vol:       50.70 ml  TV Kimberly. S':       13.90 cm/s  RA Vol Index: 29.29 ml/m²  RV Base (RVID1):  3.1 cm  RV Mid (RVID2):   2.7 cm  RV Major (RVID3): 5.9 cm       LVOT / RVOT/ Qp/Qs Data: (Indexed to BSA)  LVOT Diameter: 2.00 cm  LVOT Vmax:     1.17 m/s  LVOT VTI:      23.70 cm  LVOT SV:       74.5 ml  43.02 ml/m²    Mitral Valve Measurements:     MV E Vmax: 0.9 m/s  MV A Vmax: 0.6 m/s  MV E/A:    1.4       Tricuspid Valve Measurements:     RA Pressure: 3 mmHg    ________________________________________________________________________________________  Electronically signed on 11/6/2023 at 4:36:26 PM by Regina Bedolla MD         *** Final *    < end of copied text >

## 2024-01-04 NOTE — PROGRESS NOTE ADULT - PROBLEM SELECTOR PLAN 1
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

## 2024-01-04 NOTE — PROGRESS NOTE ADULT - SUBJECTIVE AND OBJECTIVE BOX
Florian Pisano, PGY3  Pager [phone] [facility] or [phone] [facility]    Patient is a 73y old  Male who presents with a chief complaint of Diarrhea/ (04 Jan 2024 08:30)      SUBJECTIVE/INTERVAL EVENTS: Patient seen and examined at bedside.  OVN - remains intermittently febrile  Patient was seen and examined at bedside this morning. Denies any nausea/vomiting, headache, shortness of breath, abdominal pain or chest pain/palpitations. Patient responding appropriately to questions and able to make needs known. Vital signs/imaging/telemetry events reviewed.    Patient continues to endorse multiple episodes of diarrhea yesterday and so far 2 episodes today. He is tolerating po at this time.    MEDICATIONS  (STANDING):  chlorhexidine 2% Cloths 1 Application(s) Topical <User Schedule>  colchicine 0.6 milliGRAM(s) Oral daily  dextrose 50% Injectable 25 Gram(s) IV Push once  dextrose 50% Injectable 12.5 Gram(s) IV Push once  dextrose 50% Injectable 25 milliLiter(s) IV Push once  dextrose 50% Injectable 25 Gram(s) IV Push once  dextrose Oral Gel 15 Gram(s) Oral once  finasteride 5 milliGRAM(s) Oral daily  fluticasone propionate 50 MICROgram(s)/spray Nasal Spray 1 Spray(s) Both Nostrils two times a day  glucagon  Injectable 1 milliGRAM(s) IntraMuscular once  heparin   Injectable 5000 Unit(s) SubCutaneous every 8 hours  lactated ringers. 1000 milliLiter(s) (80 mL/Hr) IV Continuous <Continuous>  midodrine 20 milliGRAM(s) Oral every 8 hours  piperacillin/tazobactam IVPB.. 3.375 Gram(s) IV Intermittent every 8 hours  sodium chloride 0.65% Nasal 1 Spray(s) Both Nostrils once  tamsulosin 0.4 milliGRAM(s) Oral at bedtime  Vyndamax (Tafamidis) 61mg Capsule 1 Capsule(s) 1 Capsule(s) Oral daily    MEDICATIONS  (PRN):  loperamide 2 milliGRAM(s) Oral three times a day PRN Diarrhea      VITAL SIGNS:  T(F): 99.1 (01-04-24 @ 04:31), Max: 101.4 (01-03-24 @ 20:57)  HR: 80 (01-04-24 @ 04:31) (80 - 101)  BP: 95/50 (01-04-24 @ 04:31) (91/60 - 104/67)  RR: 18 (01-04-24 @ 04:31) (18 - 18)  SpO2: 95% (01-04-24 @ 04:31) (95% - 99%)    I&O's Summary    03 Jan 2024 07:01  -  04 Jan 2024 07:00  --------------------------------------------------------  IN: 480 mL / OUT: 1350 mL / NET: -870 mL      Daily     Daily     PHYSICAL EXAM:  Gen: Alert, NAD  HEENT: NCAT, conjunctiva clear, sclera anicteric, no erythema or exudates in the oropharynx, dry mucus membrane  Neck: Supple, no JVD  CV: RRR, S1S2, no m/r/g  Resp: CTAB, normal respiratory effort  Abd: Soft, nontender, nondistended, normal bowel sounds  Ext: no edema, no clubbing or cyanosis  Neuro: AOx3, CN2-12 grossly intact, GARCIA  SKIN: warm, perfused    LABS:                        8.5    4.30  )-----------( 183      ( 04 Jan 2024 05:49 )             25.5     Hgb Trend: 8.5<--, 9.7<--, 9.1<--, 10.3<--  01-04    138  |  107  |  32<H>  ----------------------------<  79  3.8   |  20<L>  |  2.69<H>    Ca    8.5      04 Jan 2024 05:48  Phos  3.9     01-04  Mg     2.1     01-04    TPro  6.1  /  Alb  2.9<L>  /  TBili  0.5  /  DBili  x   /  AST  117<H>  /  ALT  44  /  AlkPhos  83  01-04    Creatinine Trend: 2.69<--, 2.31<--, 1.86<--, 1.92<--, 1.65<--, 1.67<--  LIVER FUNCTIONS - ( 04 Jan 2024 05:48 )  Alb: 2.9 g/dL / Pro: 6.1 g/dL / ALK PHOS: 83 U/L / ALT: 44 U/L / AST: 117 U/L / GGT: x                 Urinalysis Basic - ( 04 Jan 2024 05:48 )    Color: x / Appearance: x / SG: x / pH: x  Gluc: 79 mg/dL / Ketone: x  / Bili: x / Urobili: x   Blood: x / Protein: x / Nitrite: x   Leuk Esterase: x / RBC: x / WBC x   Sq Epi: x / Non Sq Epi: x / Bacteria: x        CAPILLARY BLOOD GLUCOSE          RADIOLOGY & ADDITIONAL TESTS: Reviewed    Imaging Personally Reviewed:    Consultant(s) Notes Reviewed:      Care Discussed with Consultants/Other Providers:   Florian Pisano, PGY3  Pager [phone] [facility] or [phone] [facility]    Patient is a 73y old  Male who presents with a chief complaint of Diarrhea/ (04 Jan 2024 08:30)      SUBJECTIVE/INTERVAL EVENTS: Patient seen and examined at bedside.  OVN - remains intermittently febrile  Patient was seen and examined at bedside this morning. Denies any nausea/vomiting, headache, shortness of breath, abdominal pain or chest pain/palpitations. Patient responding appropriately to questions and able to make needs known. Vital signs/imaging/telemetry events reviewed.    Patient continues to endorse multiple episodes of diarrhea yesterday and so far 2 episodes today. He is tolerating po at this time.    MEDICATIONS  (STANDING):  chlorhexidine 2% Cloths 1 Application(s) Topical <User Schedule>  colchicine 0.6 milliGRAM(s) Oral daily  dextrose 50% Injectable 25 Gram(s) IV Push once  dextrose 50% Injectable 12.5 Gram(s) IV Push once  dextrose 50% Injectable 25 milliLiter(s) IV Push once  dextrose 50% Injectable 25 Gram(s) IV Push once  dextrose Oral Gel 15 Gram(s) Oral once  finasteride 5 milliGRAM(s) Oral daily  fluticasone propionate 50 MICROgram(s)/spray Nasal Spray 1 Spray(s) Both Nostrils two times a day  glucagon  Injectable 1 milliGRAM(s) IntraMuscular once  heparin   Injectable 5000 Unit(s) SubCutaneous every 8 hours  lactated ringers. 1000 milliLiter(s) (80 mL/Hr) IV Continuous <Continuous>  midodrine 20 milliGRAM(s) Oral every 8 hours  piperacillin/tazobactam IVPB.. 3.375 Gram(s) IV Intermittent every 8 hours  sodium chloride 0.65% Nasal 1 Spray(s) Both Nostrils once  tamsulosin 0.4 milliGRAM(s) Oral at bedtime  Vyndamax (Tafamidis) 61mg Capsule 1 Capsule(s) 1 Capsule(s) Oral daily    MEDICATIONS  (PRN):  loperamide 2 milliGRAM(s) Oral three times a day PRN Diarrhea      VITAL SIGNS:  T(F): 99.1 (01-04-24 @ 04:31), Max: 101.4 (01-03-24 @ 20:57)  HR: 80 (01-04-24 @ 04:31) (80 - 101)  BP: 95/50 (01-04-24 @ 04:31) (91/60 - 104/67)  RR: 18 (01-04-24 @ 04:31) (18 - 18)  SpO2: 95% (01-04-24 @ 04:31) (95% - 99%)    I&O's Summary    03 Jan 2024 07:01  -  04 Jan 2024 07:00  --------------------------------------------------------  IN: 480 mL / OUT: 1350 mL / NET: -870 mL      Daily     Daily     PHYSICAL EXAM:  Gen: Alert, NAD  HEENT: NCAT, conjunctiva clear, sclera anicteric, no erythema or exudates in the oropharynx, dry mucus membrane  Neck: Supple, no JVD  CV: RRR, S1S2, no m/r/g  Resp: CTAB, normal respiratory effort  Abd: Soft, nontender, nondistended, normal bowel sounds  Ext: no edema, no clubbing or cyanosis  Neuro: AOx3, CN2-12 grossly intact, GARCIA  SKIN: warm, perfused    LABS:                        8.5    4.30  )-----------( 183      ( 04 Jan 2024 05:49 )             25.5     Hgb Trend: 8.5<--, 9.7<--, 9.1<--, 10.3<--  01-04    138  |  107  |  32<H>  ----------------------------<  79  3.8   |  20<L>  |  2.69<H>    Ca    8.5      04 Jan 2024 05:48  Phos  3.9     01-04  Mg     2.1     01-04    TPro  6.1  /  Alb  2.9<L>  /  TBili  0.5  /  DBili  x   /  AST  117<H>  /  ALT  44  /  AlkPhos  83  01-04    Creatinine Trend: 2.69<--, 2.31<--, 1.86<--, 1.92<--, 1.65<--, 1.67<--  LIVER FUNCTIONS - ( 04 Jan 2024 05:48 )  Alb: 2.9 g/dL / Pro: 6.1 g/dL / ALK PHOS: 83 U/L / ALT: 44 U/L / AST: 117 U/L / GGT: x                 Urinalysis Basic - ( 04 Jan 2024 05:48 )    Color: x / Appearance: x / SG: x / pH: x  Gluc: 79 mg/dL / Ketone: x  / Bili: x / Urobili: x   Blood: x / Protein: x / Nitrite: x   Leuk Esterase: x / RBC: x / WBC x   Sq Epi: x / Non Sq Epi: x / Bacteria: x        CAPILLARY BLOOD GLUCOSE          RADIOLOGY & ADDITIONAL TESTS: Reviewed    Imaging Personally Reviewed:    Consultant(s) Notes Reviewed:      Care Discussed with Consultants/Other Providers:

## 2024-01-04 NOTE — PROGRESS NOTE ADULT - PROBLEM SELECTOR PLAN 4
At this time likely iso of hypovolemia given clinical history and physical exam, although cannot rule out distributive.     -c/w midodrine 20mg tid wean as tolerated  -will trial 80cc/hr LR for next 24hrs will monitor fluid status carefully given cardiac hx  -strict i's and o's with standing weight

## 2024-01-04 NOTE — PROGRESS NOTE ADULT - PROBLEM SELECTOR PLAN 2
Baseline creatinine is, Cr today is 2.69  -patient urinating lower concern for obstruction will order kidney/bladder us  -may be iso of atn given hypotension on presentation vs prerenal given fluid loss  -will trial IVF  - Dced remdesivir Baseline creatinine is, Cr today is 2.69  -patient has morales lower concern for obstruction will order kidney/bladder us for r/o hydro  -may be iso of atn given hypotension on presentation vs prerenal given fluid loss vs remdesvir  -will trial IVF  - Dced remdesivir  -cs nephro

## 2024-01-05 LAB
ALBUMIN SERPL ELPH-MCNC: 2.7 G/DL — LOW (ref 3.3–5)
ALBUMIN SERPL ELPH-MCNC: 2.7 G/DL — LOW (ref 3.3–5)
ALP SERPL-CCNC: 69 U/L — SIGNIFICANT CHANGE UP (ref 40–120)
ALP SERPL-CCNC: 69 U/L — SIGNIFICANT CHANGE UP (ref 40–120)
ALT FLD-CCNC: 37 U/L — SIGNIFICANT CHANGE UP (ref 10–45)
ALT FLD-CCNC: 37 U/L — SIGNIFICANT CHANGE UP (ref 10–45)
ANION GAP SERPL CALC-SCNC: 10 MMOL/L — SIGNIFICANT CHANGE UP (ref 5–17)
ANION GAP SERPL CALC-SCNC: 10 MMOL/L — SIGNIFICANT CHANGE UP (ref 5–17)
AST SERPL-CCNC: 83 U/L — HIGH (ref 10–40)
AST SERPL-CCNC: 83 U/L — HIGH (ref 10–40)
BASOPHILS # BLD AUTO: 0.03 K/UL — SIGNIFICANT CHANGE UP (ref 0–0.2)
BASOPHILS # BLD AUTO: 0.03 K/UL — SIGNIFICANT CHANGE UP (ref 0–0.2)
BASOPHILS NFR BLD AUTO: 0.8 % — SIGNIFICANT CHANGE UP (ref 0–2)
BASOPHILS NFR BLD AUTO: 0.8 % — SIGNIFICANT CHANGE UP (ref 0–2)
BILIRUB SERPL-MCNC: 0.3 MG/DL — SIGNIFICANT CHANGE UP (ref 0.2–1.2)
BILIRUB SERPL-MCNC: 0.3 MG/DL — SIGNIFICANT CHANGE UP (ref 0.2–1.2)
BLD GP AB SCN SERPL QL: NEGATIVE — SIGNIFICANT CHANGE UP
BLD GP AB SCN SERPL QL: NEGATIVE — SIGNIFICANT CHANGE UP
BUN SERPL-MCNC: 30 MG/DL — HIGH (ref 7–23)
BUN SERPL-MCNC: 30 MG/DL — HIGH (ref 7–23)
CALCIUM SERPL-MCNC: 8.3 MG/DL — LOW (ref 8.4–10.5)
CALCIUM SERPL-MCNC: 8.3 MG/DL — LOW (ref 8.4–10.5)
CHLORIDE SERPL-SCNC: 108 MMOL/L — SIGNIFICANT CHANGE UP (ref 96–108)
CHLORIDE SERPL-SCNC: 108 MMOL/L — SIGNIFICANT CHANGE UP (ref 96–108)
CO2 SERPL-SCNC: 19 MMOL/L — LOW (ref 22–31)
CO2 SERPL-SCNC: 19 MMOL/L — LOW (ref 22–31)
CORTIS AM PEAK SERPL-MCNC: 3.1 UG/DL — LOW (ref 6–18.4)
CORTIS AM PEAK SERPL-MCNC: 3.1 UG/DL — LOW (ref 6–18.4)
CREAT SERPL-MCNC: 2.57 MG/DL — HIGH (ref 0.5–1.3)
CREAT SERPL-MCNC: 2.57 MG/DL — HIGH (ref 0.5–1.3)
EGFR: 25 ML/MIN/1.73M2 — LOW
EGFR: 25 ML/MIN/1.73M2 — LOW
EOSINOPHIL # BLD AUTO: 0.48 K/UL — SIGNIFICANT CHANGE UP (ref 0–0.5)
EOSINOPHIL # BLD AUTO: 0.48 K/UL — SIGNIFICANT CHANGE UP (ref 0–0.5)
EOSINOPHIL NFR BLD AUTO: 13.3 % — HIGH (ref 0–6)
EOSINOPHIL NFR BLD AUTO: 13.3 % — HIGH (ref 0–6)
GLUCOSE SERPL-MCNC: 78 MG/DL — SIGNIFICANT CHANGE UP (ref 70–99)
GLUCOSE SERPL-MCNC: 78 MG/DL — SIGNIFICANT CHANGE UP (ref 70–99)
HCT VFR BLD CALC: 21.5 % — LOW (ref 39–50)
HCT VFR BLD CALC: 21.5 % — LOW (ref 39–50)
HCT VFR BLD CALC: 25.5 % — LOW (ref 39–50)
HCT VFR BLD CALC: 25.5 % — LOW (ref 39–50)
HGB BLD-MCNC: 7.1 G/DL — LOW (ref 13–17)
HGB BLD-MCNC: 7.1 G/DL — LOW (ref 13–17)
HGB BLD-MCNC: 8.4 G/DL — LOW (ref 13–17)
HGB BLD-MCNC: 8.4 G/DL — LOW (ref 13–17)
IMM GRANULOCYTES NFR BLD AUTO: 0.3 % — SIGNIFICANT CHANGE UP (ref 0–0.9)
IMM GRANULOCYTES NFR BLD AUTO: 0.3 % — SIGNIFICANT CHANGE UP (ref 0–0.9)
LYMPHOCYTES # BLD AUTO: 2.13 K/UL — SIGNIFICANT CHANGE UP (ref 1–3.3)
LYMPHOCYTES # BLD AUTO: 2.13 K/UL — SIGNIFICANT CHANGE UP (ref 1–3.3)
LYMPHOCYTES # BLD AUTO: 59 % — HIGH (ref 13–44)
LYMPHOCYTES # BLD AUTO: 59 % — HIGH (ref 13–44)
MAGNESIUM SERPL-MCNC: 2 MG/DL — SIGNIFICANT CHANGE UP (ref 1.6–2.6)
MAGNESIUM SERPL-MCNC: 2 MG/DL — SIGNIFICANT CHANGE UP (ref 1.6–2.6)
MCHC RBC-ENTMCNC: 26.4 PG — LOW (ref 27–34)
MCHC RBC-ENTMCNC: 26.4 PG — LOW (ref 27–34)
MCHC RBC-ENTMCNC: 26.8 PG — LOW (ref 27–34)
MCHC RBC-ENTMCNC: 26.8 PG — LOW (ref 27–34)
MCHC RBC-ENTMCNC: 32.9 GM/DL — SIGNIFICANT CHANGE UP (ref 32–36)
MCHC RBC-ENTMCNC: 32.9 GM/DL — SIGNIFICANT CHANGE UP (ref 32–36)
MCHC RBC-ENTMCNC: 33 GM/DL — SIGNIFICANT CHANGE UP (ref 32–36)
MCHC RBC-ENTMCNC: 33 GM/DL — SIGNIFICANT CHANGE UP (ref 32–36)
MCV RBC AUTO: 80.2 FL — SIGNIFICANT CHANGE UP (ref 80–100)
MCV RBC AUTO: 80.2 FL — SIGNIFICANT CHANGE UP (ref 80–100)
MCV RBC AUTO: 81.1 FL — SIGNIFICANT CHANGE UP (ref 80–100)
MCV RBC AUTO: 81.1 FL — SIGNIFICANT CHANGE UP (ref 80–100)
MONOCYTES # BLD AUTO: 0.33 K/UL — SIGNIFICANT CHANGE UP (ref 0–0.9)
MONOCYTES # BLD AUTO: 0.33 K/UL — SIGNIFICANT CHANGE UP (ref 0–0.9)
MONOCYTES NFR BLD AUTO: 9.1 % — SIGNIFICANT CHANGE UP (ref 2–14)
MONOCYTES NFR BLD AUTO: 9.1 % — SIGNIFICANT CHANGE UP (ref 2–14)
NEUTROPHILS # BLD AUTO: 0.63 K/UL — LOW (ref 1.8–7.4)
NEUTROPHILS # BLD AUTO: 0.63 K/UL — LOW (ref 1.8–7.4)
NEUTROPHILS NFR BLD AUTO: 17.5 % — LOW (ref 43–77)
NEUTROPHILS NFR BLD AUTO: 17.5 % — LOW (ref 43–77)
NRBC # BLD: 0 /100 WBCS — SIGNIFICANT CHANGE UP (ref 0–0)
PHOSPHATE SERPL-MCNC: 3.4 MG/DL — SIGNIFICANT CHANGE UP (ref 2.5–4.5)
PHOSPHATE SERPL-MCNC: 3.4 MG/DL — SIGNIFICANT CHANGE UP (ref 2.5–4.5)
PLATELET # BLD AUTO: 163 K/UL — SIGNIFICANT CHANGE UP (ref 150–400)
PLATELET # BLD AUTO: 163 K/UL — SIGNIFICANT CHANGE UP (ref 150–400)
PLATELET # BLD AUTO: 178 K/UL — SIGNIFICANT CHANGE UP (ref 150–400)
PLATELET # BLD AUTO: 178 K/UL — SIGNIFICANT CHANGE UP (ref 150–400)
POTASSIUM SERPL-MCNC: 4.1 MMOL/L — SIGNIFICANT CHANGE UP (ref 3.5–5.3)
POTASSIUM SERPL-MCNC: 4.1 MMOL/L — SIGNIFICANT CHANGE UP (ref 3.5–5.3)
POTASSIUM SERPL-SCNC: 4.1 MMOL/L — SIGNIFICANT CHANGE UP (ref 3.5–5.3)
POTASSIUM SERPL-SCNC: 4.1 MMOL/L — SIGNIFICANT CHANGE UP (ref 3.5–5.3)
PROT SERPL-MCNC: 5.6 G/DL — LOW (ref 6–8.3)
PROT SERPL-MCNC: 5.6 G/DL — LOW (ref 6–8.3)
RBC # BLD: 2.65 M/UL — LOW (ref 4.2–5.8)
RBC # BLD: 2.65 M/UL — LOW (ref 4.2–5.8)
RBC # BLD: 3.18 M/UL — LOW (ref 4.2–5.8)
RBC # BLD: 3.18 M/UL — LOW (ref 4.2–5.8)
RBC # FLD: 17.4 % — HIGH (ref 10.3–14.5)
RH IG SCN BLD-IMP: POSITIVE — SIGNIFICANT CHANGE UP
RH IG SCN BLD-IMP: POSITIVE — SIGNIFICANT CHANGE UP
SODIUM SERPL-SCNC: 137 MMOL/L — SIGNIFICANT CHANGE UP (ref 135–145)
SODIUM SERPL-SCNC: 137 MMOL/L — SIGNIFICANT CHANGE UP (ref 135–145)
WBC # BLD: 3.61 K/UL — LOW (ref 3.8–10.5)
WBC # BLD: 3.61 K/UL — LOW (ref 3.8–10.5)
WBC # BLD: 4.43 K/UL — SIGNIFICANT CHANGE UP (ref 3.8–10.5)
WBC # BLD: 4.43 K/UL — SIGNIFICANT CHANGE UP (ref 3.8–10.5)
WBC # FLD AUTO: 3.61 K/UL — LOW (ref 3.8–10.5)
WBC # FLD AUTO: 3.61 K/UL — LOW (ref 3.8–10.5)
WBC # FLD AUTO: 4.43 K/UL — SIGNIFICANT CHANGE UP (ref 3.8–10.5)
WBC # FLD AUTO: 4.43 K/UL — SIGNIFICANT CHANGE UP (ref 3.8–10.5)

## 2024-01-05 PROCEDURE — 99232 SBSQ HOSP IP/OBS MODERATE 35: CPT

## 2024-01-05 PROCEDURE — 99233 SBSQ HOSP IP/OBS HIGH 50: CPT

## 2024-01-05 RX ORDER — HEPARIN SODIUM 5000 [USP'U]/ML
5000 INJECTION INTRAVENOUS; SUBCUTANEOUS EVERY 12 HOURS
Refills: 0 | Status: DISCONTINUED | OUTPATIENT
Start: 2024-01-05 | End: 2024-01-08

## 2024-01-05 RX ORDER — SODIUM CHLORIDE 9 MG/ML
1000 INJECTION, SOLUTION INTRAVENOUS
Refills: 0 | Status: COMPLETED | OUTPATIENT
Start: 2024-01-05 | End: 2024-01-06

## 2024-01-05 RX ORDER — IRON SUCROSE 20 MG/ML
200 INJECTION, SOLUTION INTRAVENOUS EVERY 24 HOURS
Refills: 0 | Status: COMPLETED | OUTPATIENT
Start: 2024-01-05 | End: 2024-01-07

## 2024-01-05 RX ADMIN — PIPERACILLIN AND TAZOBACTAM 25 GRAM(S): 4; .5 INJECTION, POWDER, LYOPHILIZED, FOR SOLUTION INTRAVENOUS at 21:31

## 2024-01-05 RX ADMIN — MIDODRINE HYDROCHLORIDE 20 MILLIGRAM(S): 2.5 TABLET ORAL at 05:06

## 2024-01-05 RX ADMIN — FINASTERIDE 5 MILLIGRAM(S): 5 TABLET, FILM COATED ORAL at 12:10

## 2024-01-05 RX ADMIN — HEPARIN SODIUM 5000 UNIT(S): 5000 INJECTION INTRAVENOUS; SUBCUTANEOUS at 12:58

## 2024-01-05 RX ADMIN — IRON SUCROSE 110 MILLIGRAM(S): 20 INJECTION, SOLUTION INTRAVENOUS at 12:57

## 2024-01-05 RX ADMIN — Medication 1 SPRAY(S): at 12:11

## 2024-01-05 RX ADMIN — MIDODRINE HYDROCHLORIDE 20 MILLIGRAM(S): 2.5 TABLET ORAL at 21:29

## 2024-01-05 RX ADMIN — CHLORHEXIDINE GLUCONATE 1 APPLICATION(S): 213 SOLUTION TOPICAL at 09:04

## 2024-01-05 RX ADMIN — SODIUM CHLORIDE 70 MILLILITER(S): 9 INJECTION, SOLUTION INTRAVENOUS at 21:35

## 2024-01-05 RX ADMIN — PIPERACILLIN AND TAZOBACTAM 25 GRAM(S): 4; .5 INJECTION, POWDER, LYOPHILIZED, FOR SOLUTION INTRAVENOUS at 13:57

## 2024-01-05 RX ADMIN — SODIUM CHLORIDE 70 MILLILITER(S): 9 INJECTION, SOLUTION INTRAVENOUS at 12:10

## 2024-01-05 RX ADMIN — Medication 2 MILLIGRAM(S): at 23:57

## 2024-01-05 RX ADMIN — MIDODRINE HYDROCHLORIDE 20 MILLIGRAM(S): 2.5 TABLET ORAL at 12:57

## 2024-01-05 RX ADMIN — PIPERACILLIN AND TAZOBACTAM 25 GRAM(S): 4; .5 INJECTION, POWDER, LYOPHILIZED, FOR SOLUTION INTRAVENOUS at 05:06

## 2024-01-05 RX ADMIN — HEPARIN SODIUM 5000 UNIT(S): 5000 INJECTION INTRAVENOUS; SUBCUTANEOUS at 05:06

## 2024-01-05 NOTE — PROGRESS NOTE ADULT - ASSESSMENT
73M w/ BPH (c/b urinary retention s/p Morales catheter placement; exchanged monthly), CKD stage IIIb, and cardiac amyloidosis brought in by daughter for diarrhea (loose-watery BM's) over the past few days.  CKD stage 3 b and now SARAH in light of contrast study/hypotension and possibly dehydration as well   Subnephrotic range proteinuria     1 Renal- Mult reasons for SARAH(not sure it was the Remdesivir)  Cont the IVF but reduce to 70 cc/hr   Trend serum creatinine which is improving   2 Endo-Check random cortisol level   3 Rhem-off  the colchicine as cause of diarrhea   4 -DC flomax as the BP is already soft and he has chronic morales  5 CVS-Midodrine to maintain MAP   6 Heme- Drop in HH;  Start IV venofer 200mg IVP qd x 3     Sayed [facility]   [phone]             73M w/ BPH (c/b urinary retention s/p Morales catheter placement; exchanged monthly), CKD stage IIIb, and cardiac amyloidosis brought in by daughter for diarrhea (loose-watery BM's) over the past few days.  CKD stage 3 b and now SARAH in light of contrast study/hypotension and possibly dehydration as well   Subnephrotic range proteinuria     1 Renal- Mult reasons for SARAH(not sure it was the Remdesivir)  Cont the IVF but reduce to 70 cc/hr   Trend serum creatinine which is improving   2 Endo-Check random cortisol level   3 Rhem-off  the colchicine as cause of diarrhea   4 -DC flomax as the BP is already soft and he has chronic morales  5 CVS-Midodrine to maintain MAP   6 Heme- Drop in HH;  Start IV venofer 200mg IVP qd x 3     Sayed [facility]   [phone]

## 2024-01-05 NOTE — PROGRESS NOTE ADULT - PROBLEM SELECTOR PLAN 2
Baseline creatinine is, Cr today is 2.69  -patient has morales lower concern for obstruction will order kidney/bladder us for r/o hydro  -may be iso of atn given hypotension on presentation vs prerenal given fluid loss vs remdesvir  -will trial IVF  - Dced remdesivir  -cs nephro Baseline creatinine is, Cr today is 2.69  -patient has morales lower concern for obstruction will order kidney/bladder us for r/o hydro  -may be iso of atn given hypotension on presentation vs prerenal given fluid loss vs remdesvir  -will trial IVF  - Dced remdesivir  -cs nephro  - Fena 1.4%

## 2024-01-05 NOTE — PROGRESS NOTE ADULT - ASSESSMENT
A/p  73M w/ BPH (c/b urinary retention s/p Harkins catheter placement; exchanged monthly), CKD stage IIIb, and cardiac amyloidosis brought in by daughter for diarrhea (loose-watery BM's) over the past few days.    #Sepsis  -I/s/o COVID + UTI  -Abx per med    #COVID  -CXR no focal consolidations  -Remdesivir per med  -Supportive care per med    #cardiac amyloid   -Cont vyndamax  -baseline sbp 100 as outpt  -Prior echo with  Nml LV systolic fxn, Severe left ventricular hypertrophy, Findings suggestive of infiltrative cardiomyopathy    #Hypotension  -co dizziness while in chair, hypotension noted- Cont midodrine -ct head from 1/2 unremarkable     #Hypoglycemia  -mgmt per med      dvt ppx

## 2024-01-05 NOTE — PROGRESS NOTE ADULT - PROBLEM SELECTOR PLAN 3
-Hold anti-diarrheal medications in context of potential viral or bacterial GI infection-> started loperamide 1/3  -IVF 80cc/hr for 24hrs

## 2024-01-05 NOTE — PROGRESS NOTE ADULT - PROBLEM SELECTOR PLAN 4
At this time likely iso of hypovolemia given clinical history and physical exam, although cannot rule out distributive.     -c/w midodrine 20mg tid wean as tolerated  -will trial 80cc/hr LR for next 24hrs will monitor fluid status carefully given cardiac hx  -strict i's and o's with standing weight At this time likely iso of hypovolemia given clinical history and physical exam, although cannot rule out distributive.     -c/w midodrine 20mg tid wean as tolerated  -strict i's and o's with standing weight

## 2024-01-05 NOTE — PROGRESS NOTE ADULT - ASSESSMENT
74 yo M BPH history of morales for urinary retention, initially with diarrhea and falls  Has fevers, no leukocytosis  Initially with diarrhea/falls  Chronic morales, has had pain at site  CT with proctitis, fluid filled rectum, bilateral perinephric soft tissue stranding; renal lesions  UA+, UCX low CFU E faecalis--seems low likelihood to be active pathogen  C diff negative  COVID+  Had hypotension, with evaluation by MICU  Note fever yesterday evening--residual COVID vs alternate process?  Fever resolved? Note still rising eosinphils  Overall, UTI, COVID, fever  - Zosyn 3.375g q 8, if DC planning can send out on PO Augmentin to complete 7 days (but would continue Zosyn for now unless active signs allergic reaction, then would need alternate agent)  - F/U BCXs  - Monitor for further fevers  - Hold on Dexa unless progressive O2 requirements  - Trend CBC with diff--note ? rising eosinophils? Monitor for any rash at bedside (? drug fever)    Vernon Escobar MD  Contact on TEAMS messaging from 9am - 5pm  From 5pm-9am, on weekends, or if no response call [phone] 72 yo M BPH history of morales for urinary retention, initially with diarrhea and falls  Has fevers, no leukocytosis  Initially with diarrhea/falls  Chronic morales, has had pain at site  CT with proctitis, fluid filled rectum, bilateral perinephric soft tissue stranding; renal lesions  UA+, UCX low CFU E faecalis--seems low likelihood to be active pathogen  C diff negative  COVID+  Had hypotension, with evaluation by MICU  Note fever yesterday evening--residual COVID vs alternate process?  Fever resolved? Note still rising eosinphils  Overall, UTI, COVID, fever  - Zosyn 3.375g q 8, if DC planning can send out on PO Augmentin to complete 7 days (but would continue Zosyn for now unless active signs allergic reaction, then would need alternate agent)  - F/U BCXs  - Monitor for further fevers  - Hold on Dexa unless progressive O2 requirements  - Trend CBC with diff--note ? rising eosinophils? Monitor for any rash at bedside (? drug fever)    Vernon Escobar MD  Contact on TEAMS messaging from 9am - 5pm  From 5pm-9am, on weekends, or if no response call [phone]

## 2024-01-05 NOTE — PROGRESS NOTE ADULT - REASON FOR ADMISSION
Impression: Primary open-angle glaucoma, bilateral, moderate stage: A50.5883.  Plan: cpm: latanoprost qhs OU, ? if having issues on swelling OS, pt has retinal telegecasia that is improving w/ injections, last hvf was stable and disc photos where taken previously, today's ant seg oct and  IOP are normal and in good place so monitor x 4 months w/ cee and disc photos OU Diarrhea/

## 2024-01-05 NOTE — PROGRESS NOTE ADULT - SUBJECTIVE AND OBJECTIVE BOX
CARDIOLOGY FOLLOW UP - Dr. Saravia  DATE OF SERVICE:1/5/24    CC no cp or sob       REVIEW OF SYSTEMS:  CONSTITUTIONAL: No fever, weight loss, or fatigue  RESPIRATORY: No cough, wheezing, chills or hemoptysis; No Shortness of Breath  CARDIOVASCULAR: No chest pain, palpitations, passing out, dizziness, or leg swelling  GASTROINTESTINAL: No abdominal or epigastric pain. No nausea, vomiting, or hematemesis; No diarrhea or constipation. No melena or hematochezia.  VASCULAR: No edema     PHYSICAL EXAM:  T(C): 37.1 (01-05-24 @ 04:11), Max: 37.1 (01-05-24 @ 04:11)  HR: 74 (01-05-24 @ 04:11) (67 - 94)  BP: 93/57 (01-05-24 @ 04:11) (93/57 - 103/63)  RR: 18 (01-05-24 @ 04:11) (18 - 18)  SpO2: 97% (01-05-24 @ 10:14) (95% - 99%)  Wt(kg): --  I&O's Summary    04 Jan 2024 07:01  -  05 Jan 2024 07:00  --------------------------------------------------------  IN: 240 mL / OUT: 1100 mL / NET: -860 mL    05 Jan 2024 07:01  -  05 Jan 2024 11:07  --------------------------------------------------------  IN: 120 mL / OUT: 0 mL / NET: 120 mL        Appearance: Normal	  Cardiovascular: Normal S1 S2,RRR, No JVD, No murmurs  Respiratory: Lungs clear to auscultation	  Gastrointestinal:  Soft, Non-tender, + BS	  Extremities: Normal range of motion, No clubbing, cyanosis or edema      Home Medications:  fluticasone 50 mcg/inh nasal spray: 1 spray(s) in each nostril 2 times a day as needed (04 Jan 2024 12:08)  furosemide 40 mg oral tablet: 1 tab(s) orally once a day as needed (04 Jan 2024 12:08)  gabapentin 100 mg oral capsule: 1 cap(s) orally once a day (at bedtime) (04 Jan 2024 12:08)  mirtazapine 7.5 mg oral tablet: 1 tab(s) orally once a day at 6 PM (04 Jan 2024 12:08)  Saline Mist 0.65% nasal spray: 2 spray(s) intranasally as needed (04 Jan 2024 12:08)  Vyndamax 61 mg oral capsule: 1 cap(s) orally once a day (04 Jan 2024 12:08)      MEDICATIONS  (STANDING):  chlorhexidine 2% Cloths 1 Application(s) Topical <User Schedule>  dextrose 50% Injectable 25 Gram(s) IV Push once  dextrose 50% Injectable 12.5 Gram(s) IV Push once  dextrose 50% Injectable 25 milliLiter(s) IV Push once  dextrose 50% Injectable 25 Gram(s) IV Push once  dextrose Oral Gel 15 Gram(s) Oral once  finasteride 5 milliGRAM(s) Oral daily  glucagon  Injectable 1 milliGRAM(s) IntraMuscular once  heparin   Injectable 5000 Unit(s) SubCutaneous every 8 hours  heparin   Injectable 5000 Unit(s) SubCutaneous every 12 hours  iron sucrose IVPB 200 milliGRAM(s) IV Intermittent every 24 hours  lactated ringers. 1000 milliLiter(s) (70 mL/Hr) IV Continuous <Continuous>  midodrine 20 milliGRAM(s) Oral every 8 hours  piperacillin/tazobactam IVPB.. 3.375 Gram(s) IV Intermittent every 8 hours  sodium chloride 0.65% Nasal 1 Spray(s) Both Nostrils once  Vyndamax (Tafamidis) 61mg Capsule 1 Capsule(s) 1 Capsule(s) Oral daily      TELEMETRY: nsr pvc 	    ECG:  	  RADIOLOGY:   DIAGNOSTIC TESTING:  [ ] Echocardiogram:  [ ]  Catheterization:  [ ] Stress Test:    OTHER: 	    LABS:	 	                            7.1    3.61  )-----------( 163      ( 05 Jan 2024 05:18 )             21.5     01-05    137  |  108  |  30<H>  ----------------------------<  78  4.1   |  19<L>  |  2.57<H>    Ca    8.3<L>      05 Jan 2024 05:18  Phos  3.4     01-05  Mg     2.0     01-05    TPro  5.6<L>  /  Alb  2.7<L>  /  TBili  0.3  /  DBili  x   /  AST  83<H>  /  ALT  37  /  AlkPhos  69  01-05

## 2024-01-05 NOTE — PROGRESS NOTE ADULT - PROBLEM SELECTOR PLAN 5
-CT Head WNL  -F/U B12, TSH, Orthostatic vitals-> TSH elevated free T4 and total T3 ordered -CT Head WNL  -F/U B12, TSH, Orthostatic vitals-> TSH elevated free T4 and total T3 ordered, T4 wnl

## 2024-01-05 NOTE — PROGRESS NOTE ADULT - ATTENDING COMMENTS
73M w/ BPH (c/b urinary retention s/p Harkins catheter placement; exchanged monthly), CKD stage IIIb, and cardiac amyloidosis brought in by daughter for diarrhea found to have hypotension likely due to UTI and hypotension iso of hypovolemia and infection.     Plan:   - On Zosyn for UTI E. Faecalis, will de-escalate based on ID recs   - Vitals stable, BP on low-normotensive, patient with diarrhea, GI PCR negative. Encourage PO intake or gentle IVF   - SARAH on CKD, will continue to trend, avoid nephrotoxins, likely intrinsic       Rest of management as per resident     VTE PPx: HSQ   Diet: reg  Dispo: CHERYL

## 2024-01-05 NOTE — PROGRESS NOTE ADULT - SUBJECTIVE AND OBJECTIVE BOX
***************************************************************  Jose Alejandro Arizmendi, PGY1  Internal Medicine   Preferred contact via [employer] Teams   ***************************************************************    DEEPTHI GÓMEZ  73y  MRN: [id number]    Patient is a 73y old  Male who presents with a chief complaint of Diarrhea/ (04 Jan 2024 12:03)      Interval/Overnight Events: no events ON.     Subjective: Pt seen and examined at bedside. Denies fever, CP, SOB, abn pain, N/V, dysuria. Tolerating diet.      MEDICATIONS  (STANDING):  chlorhexidine 2% Cloths 1 Application(s) Topical <User Schedule>  dextrose 50% Injectable 12.5 Gram(s) IV Push once  dextrose 50% Injectable 25 milliLiter(s) IV Push once  dextrose 50% Injectable 25 Gram(s) IV Push once  dextrose 50% Injectable 25 Gram(s) IV Push once  dextrose Oral Gel 15 Gram(s) Oral once  finasteride 5 milliGRAM(s) Oral daily  glucagon  Injectable 1 milliGRAM(s) IntraMuscular once  heparin   Injectable 5000 Unit(s) SubCutaneous every 8 hours  lactated ringers. 1000 milliLiter(s) (80 mL/Hr) IV Continuous <Continuous>  midodrine 20 milliGRAM(s) Oral every 8 hours  piperacillin/tazobactam IVPB.. 3.375 Gram(s) IV Intermittent every 8 hours  sodium chloride 0.65% Nasal 1 Spray(s) Both Nostrils once  Vyndamax (Tafamidis) 61mg Capsule 1 Capsule(s) 1 Capsule(s) Oral daily    MEDICATIONS  (PRN):  fluticasone propionate 50 MICROgram(s)/spray Nasal Spray 1 Spray(s) Both Nostrils two times a day PRN congestion  loperamide 2 milliGRAM(s) Oral three times a day PRN Diarrhea      Objective:    Vitals: Vital Signs Last 24 Hrs  T(C): 37.1 (01-05-24 @ 04:11), Max: 37.1 (01-05-24 @ 04:11)  T(F): 98.8 (01-05-24 @ 04:11), Max: 98.8 (01-05-24 @ 04:11)  HR: 74 (01-05-24 @ 04:11) (67 - 94)  BP: 93/57 (01-05-24 @ 04:11) (93/57 - 103/63)  BP(mean): --  RR: 18 (01-05-24 @ 04:11) (18 - 18)  SpO2: 95% (01-05-24 @ 04:11) (95% - 99%)                I&O's Summary    04 Jan 2024 07:01  -  05 Jan 2024 07:00  --------------------------------------------------------  IN: 240 mL / OUT: 1100 mL / NET: -860 mL        PHYSICAL EXAM:  GENERAL: NAD  HEAD:  Atraumatic, Normocephalic  EYES: EOMI, conjunctiva and sclera clear  CHEST/LUNG: Clear to auscultation bilaterally; No rales, rhonchi, wheezing, or rubs  HEART: Regular rate and rhythm; No murmurs, rubs, or gallops  ABDOMEN: Soft, Nontender, Nondistended;   SKIN: No rashes or lesions  NERVOUS SYSTEM:  Alert & Oriented X3, no focal deficits    LABS:                        7.1    3.61  )-----------( 163      ( 05 Jan 2024 05:18 )             21.5                         8.5    4.30  )-----------( 183      ( 04 Jan 2024 05:49 )             25.5                         9.7    5.89  )-----------( 208      ( 03 Jan 2024 06:01 )             30.0     01-05    137  |  108  |  30<H>  ----------------------------<  78  4.1   |  19<L>  |  2.57<H>  01-04    138  |  107  |  32<H>  ----------------------------<  79  3.8   |  20<L>  |  2.69<H>  01-03    138  |  108  |  30<H>  ----------------------------<  49<LL>  4.4   |  19<L>  |  2.31<H>    Ca    8.3<L>      05 Jan 2024 05:18  Ca    8.5      04 Jan 2024 05:48  Ca    9.1      03 Jan 2024 06:00  Phos  3.4     01-05  Mg     2.0     01-05    TPro  5.6<L>  /  Alb  2.7<L>  /  TBili  0.3  /  DBili  x   /  AST  83<H>  /  ALT  37  /  AlkPhos  69  01-05  TPro  6.1  /  Alb  2.9<L>  /  TBili  0.5  /  DBili  x   /  AST  117<H>  /  ALT  44  /  AlkPhos  83  01-04  TPro  6.7  /  Alb  3.1<L>  /  TBili  0.6  /  DBili  x   /  AST  179<H>  /  ALT  54<H>  /  AlkPhos  113  01-03    CAPILLARY BLOOD GLUCOSE            Urinalysis Basic - ( 05 Jan 2024 05:18 )    Color: x / Appearance: x / SG: x / pH: x  Gluc: 78 mg/dL / Ketone: x  / Bili: x / Urobili: x   Blood: x / Protein: x / Nitrite: x   Leuk Esterase: x / RBC: x / WBC x   Sq Epi: x / Non Sq Epi: x / Bacteria: x          RADIOLOGY & ADDITIONAL TESTS:    Imaging Personally Reviewed:  [x ] YES  [ ] NO    Consultants involved in case:   Consultant(s) Notes Reviewed:  [ x] YES  [ ] NO:   Care Discussed with Consultants/Other Providers [x ] YES  [ ] NO         ***************************************************************  Jose Alejandro Arizmendi, PGY1  Internal Medicine   Preferred contact via Microsoft Teams   ***************************************************************    DEEPTHI GÓMEZ  73y  MRN: [id number]    Patient is a 73y old  Male who presents with a chief complaint of Diarrhea/ (04 Jan 2024 12:03)      Interval/Overnight Events: no events ON.     Subjective: Pt seen and examined at bedside. Denies fever, CP, SOB, abn pain, N/V, dysuria. Tolerating diet.      MEDICATIONS  (STANDING):  chlorhexidine 2% Cloths 1 Application(s) Topical <User Schedule>  dextrose 50% Injectable 12.5 Gram(s) IV Push once  dextrose 50% Injectable 25 milliLiter(s) IV Push once  dextrose 50% Injectable 25 Gram(s) IV Push once  dextrose 50% Injectable 25 Gram(s) IV Push once  dextrose Oral Gel 15 Gram(s) Oral once  finasteride 5 milliGRAM(s) Oral daily  glucagon  Injectable 1 milliGRAM(s) IntraMuscular once  heparin   Injectable 5000 Unit(s) SubCutaneous every 8 hours  lactated ringers. 1000 milliLiter(s) (80 mL/Hr) IV Continuous <Continuous>  midodrine 20 milliGRAM(s) Oral every 8 hours  piperacillin/tazobactam IVPB.. 3.375 Gram(s) IV Intermittent every 8 hours  sodium chloride 0.65% Nasal 1 Spray(s) Both Nostrils once  Vyndamax (Tafamidis) 61mg Capsule 1 Capsule(s) 1 Capsule(s) Oral daily    MEDICATIONS  (PRN):  fluticasone propionate 50 MICROgram(s)/spray Nasal Spray 1 Spray(s) Both Nostrils two times a day PRN congestion  loperamide 2 milliGRAM(s) Oral three times a day PRN Diarrhea      Objective:    Vitals: Vital Signs Last 24 Hrs  T(C): 37.1 (01-05-24 @ 04:11), Max: 37.1 (01-05-24 @ 04:11)  T(F): 98.8 (01-05-24 @ 04:11), Max: 98.8 (01-05-24 @ 04:11)  HR: 74 (01-05-24 @ 04:11) (67 - 94)  BP: 93/57 (01-05-24 @ 04:11) (93/57 - 103/63)  BP(mean): --  RR: 18 (01-05-24 @ 04:11) (18 - 18)  SpO2: 95% (01-05-24 @ 04:11) (95% - 99%)                I&O's Summary    04 Jan 2024 07:01  -  05 Jan 2024 07:00  --------------------------------------------------------  IN: 240 mL / OUT: 1100 mL / NET: -860 mL        PHYSICAL EXAM:  GENERAL: NAD  HEAD:  Atraumatic, Normocephalic  EYES: EOMI, conjunctiva and sclera clear  CHEST/LUNG: Clear to auscultation bilaterally; No rales, rhonchi, wheezing, or rubs  HEART: Regular rate and rhythm; No murmurs, rubs, or gallops  ABDOMEN: Soft, Nontender, Nondistended;   SKIN: No rashes or lesions  NERVOUS SYSTEM:  Alert & Oriented X3, no focal deficits    LABS:                        7.1    3.61  )-----------( 163      ( 05 Jan 2024 05:18 )             21.5                         8.5    4.30  )-----------( 183      ( 04 Jan 2024 05:49 )             25.5                         9.7    5.89  )-----------( 208      ( 03 Jan 2024 06:01 )             30.0     01-05    137  |  108  |  30<H>  ----------------------------<  78  4.1   |  19<L>  |  2.57<H>  01-04    138  |  107  |  32<H>  ----------------------------<  79  3.8   |  20<L>  |  2.69<H>  01-03    138  |  108  |  30<H>  ----------------------------<  49<LL>  4.4   |  19<L>  |  2.31<H>    Ca    8.3<L>      05 Jan 2024 05:18  Ca    8.5      04 Jan 2024 05:48  Ca    9.1      03 Jan 2024 06:00  Phos  3.4     01-05  Mg     2.0     01-05    TPro  5.6<L>  /  Alb  2.7<L>  /  TBili  0.3  /  DBili  x   /  AST  83<H>  /  ALT  37  /  AlkPhos  69  01-05  TPro  6.1  /  Alb  2.9<L>  /  TBili  0.5  /  DBili  x   /  AST  117<H>  /  ALT  44  /  AlkPhos  83  01-04  TPro  6.7  /  Alb  3.1<L>  /  TBili  0.6  /  DBili  x   /  AST  179<H>  /  ALT  54<H>  /  AlkPhos  113  01-03    CAPILLARY BLOOD GLUCOSE            Urinalysis Basic - ( 05 Jan 2024 05:18 )    Color: x / Appearance: x / SG: x / pH: x  Gluc: 78 mg/dL / Ketone: x  / Bili: x / Urobili: x   Blood: x / Protein: x / Nitrite: x   Leuk Esterase: x / RBC: x / WBC x   Sq Epi: x / Non Sq Epi: x / Bacteria: x          RADIOLOGY & ADDITIONAL TESTS:    Imaging Personally Reviewed:  [x ] YES  [ ] NO    Consultants involved in case:   Consultant(s) Notes Reviewed:  [ x] YES  [ ] NO:   Care Discussed with Consultants/Other Providers [x ] YES  [ ] NO         ***************************************************************  Jose Alejandro Arizmendi, PGY1  Internal Medicine   Preferred contact via [employer] Teams   ***************************************************************    DEEPTHI GÓMEZ  73y  MRN: [id number]    Patient is a 73y old  Male who presents with a chief complaint of Diarrhea/ (04 Jan 2024 12:03)      Interval/Overnight Events: no events ON.     Subjective: Pt seen and examined at bedside. Diahreaa improving 2 BM's today.     MEDICATIONS  (STANDING):  chlorhexidine 2% Cloths 1 Application(s) Topical <User Schedule>  dextrose 50% Injectable 12.5 Gram(s) IV Push once  dextrose 50% Injectable 25 milliLiter(s) IV Push once  dextrose 50% Injectable 25 Gram(s) IV Push once  dextrose 50% Injectable 25 Gram(s) IV Push once  dextrose Oral Gel 15 Gram(s) Oral once  finasteride 5 milliGRAM(s) Oral daily  glucagon  Injectable 1 milliGRAM(s) IntraMuscular once  heparin   Injectable 5000 Unit(s) SubCutaneous every 8 hours  lactated ringers. 1000 milliLiter(s) (80 mL/Hr) IV Continuous <Continuous>  midodrine 20 milliGRAM(s) Oral every 8 hours  piperacillin/tazobactam IVPB.. 3.375 Gram(s) IV Intermittent every 8 hours  sodium chloride 0.65% Nasal 1 Spray(s) Both Nostrils once  Vyndamax (Tafamidis) 61mg Capsule 1 Capsule(s) 1 Capsule(s) Oral daily    MEDICATIONS  (PRN):  fluticasone propionate 50 MICROgram(s)/spray Nasal Spray 1 Spray(s) Both Nostrils two times a day PRN congestion  loperamide 2 milliGRAM(s) Oral three times a day PRN Diarrhea      Objective:    Vitals: Vital Signs Last 24 Hrs  T(C): 37.1 (01-05-24 @ 04:11), Max: 37.1 (01-05-24 @ 04:11)  T(F): 98.8 (01-05-24 @ 04:11), Max: 98.8 (01-05-24 @ 04:11)  HR: 74 (01-05-24 @ 04:11) (67 - 94)  BP: 93/57 (01-05-24 @ 04:11) (93/57 - 103/63)  BP(mean): --  RR: 18 (01-05-24 @ 04:11) (18 - 18)  SpO2: 95% (01-05-24 @ 04:11) (95% - 99%)                I&O's Summary    04 Jan 2024 07:01  -  05 Jan 2024 07:00  --------------------------------------------------------  IN: 240 mL / OUT: 1100 mL / NET: -860 mL        PHYSICAL EXAM:  GENERAL: NAD  HEAD:  Atraumatic, Normocephalic  EYES: EOMI, conjunctiva and sclera clear  CHEST/LUNG: Clear to auscultation bilaterally; No rales, rhonchi, wheezing, or rubs  HEART: Regular rate and rhythm; No murmurs, rubs, or gallops  ABDOMEN: Soft, Nontender, Nondistended;   SKIN: No rashes or lesions  NERVOUS SYSTEM:  Alert & Oriented X3, no focal deficits    LABS:                        7.1    3.61  )-----------( 163      ( 05 Jan 2024 05:18 )             21.5                         8.5    4.30  )-----------( 183      ( 04 Jan 2024 05:49 )             25.5                         9.7    5.89  )-----------( 208      ( 03 Jan 2024 06:01 )             30.0     01-05    137  |  108  |  30<H>  ----------------------------<  78  4.1   |  19<L>  |  2.57<H>  01-04    138  |  107  |  32<H>  ----------------------------<  79  3.8   |  20<L>  |  2.69<H>  01-03    138  |  108  |  30<H>  ----------------------------<  49<LL>  4.4   |  19<L>  |  2.31<H>    Ca    8.3<L>      05 Jan 2024 05:18  Ca    8.5      04 Jan 2024 05:48  Ca    9.1      03 Jan 2024 06:00  Phos  3.4     01-05  Mg     2.0     01-05    TPro  5.6<L>  /  Alb  2.7<L>  /  TBili  0.3  /  DBili  x   /  AST  83<H>  /  ALT  37  /  AlkPhos  69  01-05  TPro  6.1  /  Alb  2.9<L>  /  TBili  0.5  /  DBili  x   /  AST  117<H>  /  ALT  44  /  AlkPhos  83  01-04  TPro  6.7  /  Alb  3.1<L>  /  TBili  0.6  /  DBili  x   /  AST  179<H>  /  ALT  54<H>  /  AlkPhos  113  01-03    CAPILLARY BLOOD GLUCOSE            Urinalysis Basic - ( 05 Jan 2024 05:18 )    Color: x / Appearance: x / SG: x / pH: x  Gluc: 78 mg/dL / Ketone: x  / Bili: x / Urobili: x   Blood: x / Protein: x / Nitrite: x   Leuk Esterase: x / RBC: x / WBC x   Sq Epi: x / Non Sq Epi: x / Bacteria: x          RADIOLOGY & ADDITIONAL TESTS:    Imaging Personally Reviewed:  [x ] YES  [ ] NO    Consultants involved in case:   Consultant(s) Notes Reviewed:  [ x] YES  [ ] NO:   Care Discussed with Consultants/Other Providers [x ] YES  [ ] NO         ***************************************************************  Jose Alejandro Arizmendi, PGY1  Internal Medicine   Preferred contact via [employer] Teams   ***************************************************************    DEEPTHI GÓMEZ  73y  MRN: [id number]    Patient is a 73y old  Male who presents with a chief complaint of Diarrhea/ (04 Jan 2024 12:03)      Interval/Overnight Events: no events ON.     Subjective: Pt seen and examined at bedside. Diahreaa improving 2 BM's today.     MEDICATIONS  (STANDING):  chlorhexidine 2% Cloths 1 Application(s) Topical <User Schedule>  dextrose 50% Injectable 12.5 Gram(s) IV Push once  dextrose 50% Injectable 25 milliLiter(s) IV Push once  dextrose 50% Injectable 25 Gram(s) IV Push once  dextrose 50% Injectable 25 Gram(s) IV Push once  dextrose Oral Gel 15 Gram(s) Oral once  finasteride 5 milliGRAM(s) Oral daily  glucagon  Injectable 1 milliGRAM(s) IntraMuscular once  heparin   Injectable 5000 Unit(s) SubCutaneous every 8 hours  lactated ringers. 1000 milliLiter(s) (80 mL/Hr) IV Continuous <Continuous>  midodrine 20 milliGRAM(s) Oral every 8 hours  piperacillin/tazobactam IVPB.. 3.375 Gram(s) IV Intermittent every 8 hours  sodium chloride 0.65% Nasal 1 Spray(s) Both Nostrils once  Vyndamax (Tafamidis) 61mg Capsule 1 Capsule(s) 1 Capsule(s) Oral daily    MEDICATIONS  (PRN):  fluticasone propionate 50 MICROgram(s)/spray Nasal Spray 1 Spray(s) Both Nostrils two times a day PRN congestion  loperamide 2 milliGRAM(s) Oral three times a day PRN Diarrhea      Objective:    Vitals: Vital Signs Last 24 Hrs  T(C): 37.1 (01-05-24 @ 04:11), Max: 37.1 (01-05-24 @ 04:11)  T(F): 98.8 (01-05-24 @ 04:11), Max: 98.8 (01-05-24 @ 04:11)  HR: 74 (01-05-24 @ 04:11) (67 - 94)  BP: 93/57 (01-05-24 @ 04:11) (93/57 - 103/63)  BP(mean): --  RR: 18 (01-05-24 @ 04:11) (18 - 18)  SpO2: 95% (01-05-24 @ 04:11) (95% - 99%)                I&O's Summary    04 Jan 2024 07:01  -  05 Jan 2024 07:00  --------------------------------------------------------  IN: 240 mL / OUT: 1100 mL / NET: -860 mL        PHYSICAL EXAM:  GENERAL: NAD  HEAD:  Atraumatic, Normocephalic  EYES: EOMI, conjunctiva and sclera clear  CHEST/LUNG: Clear to auscultation bilaterally; No rales, rhonchi, wheezing, or rubs  HEART: Regular rate and rhythm; No murmurs, rubs, or gallops  ABDOMEN: Soft, Nontender, Nondistended;   SKIN: No rashes or lesions  NERVOUS SYSTEM:  Alert & Oriented X3, no focal deficits    LABS:                        7.1    3.61  )-----------( 163      ( 05 Jan 2024 05:18 )             21.5                         8.5    4.30  )-----------( 183      ( 04 Jan 2024 05:49 )             25.5                         9.7    5.89  )-----------( 208      ( 03 Jan 2024 06:01 )             30.0     01-05    137  |  108  |  30<H>  ----------------------------<  78  4.1   |  19<L>  |  2.57<H>  01-04    138  |  107  |  32<H>  ----------------------------<  79  3.8   |  20<L>  |  2.69<H>  01-03    138  |  108  |  30<H>  ----------------------------<  49<LL>  4.4   |  19<L>  |  2.31<H>    Ca    8.3<L>      05 Jan 2024 05:18  Ca    8.5      04 Jan 2024 05:48  Ca    9.1      03 Jan 2024 06:00  Phos  3.4     01-05  Mg     2.0     01-05    TPro  5.6<L>  /  Alb  2.7<L>  /  TBili  0.3  /  DBili  x   /  AST  83<H>  /  ALT  37  /  AlkPhos  69  01-05  TPro  6.1  /  Alb  2.9<L>  /  TBili  0.5  /  DBili  x   /  AST  117<H>  /  ALT  44  /  AlkPhos  83  01-04  TPro  6.7  /  Alb  3.1<L>  /  TBili  0.6  /  DBili  x   /  AST  179<H>  /  ALT  54<H>  /  AlkPhos  113  01-03    CAPILLARY BLOOD GLUCOSE            Urinalysis Basic - ( 05 Jan 2024 05:18 )    Color: x / Appearance: x / SG: x / pH: x  Gluc: 78 mg/dL / Ketone: x  / Bili: x / Urobili: x   Blood: x / Protein: x / Nitrite: x   Leuk Esterase: x / RBC: x / WBC x   Sq Epi: x / Non Sq Epi: x / Bacteria: x          RADIOLOGY & ADDITIONAL TESTS:    Imaging Personally Reviewed:  [x ] YES  [ ] NO    Consultants involved in case:   Consultant(s) Notes Reviewed:  [ x] YES  [ ] NO:   Care Discussed with Consultants/Other Providers [x ] YES  [ ] NO

## 2024-01-05 NOTE — PROGRESS NOTE ADULT - SUBJECTIVE AND OBJECTIVE BOX
CC: F/U for Fever    Saw/spoke to patient. No fevers, no chills. No new complaints.    Allergies  No Known Allergies    ANTIMICROBIALS:  piperacillin/tazobactam IVPB.. 3.375 every 8 hours    PE:    Vital Signs Last 24 Hrs  T(C): 37 (05 Jan 2024 11:11), Max: 37.1 (05 Jan 2024 04:11)  T(F): 98.6 (05 Jan 2024 11:11), Max: 98.8 (05 Jan 2024 04:11)  HR: 66 (05 Jan 2024 11:11) (66 - 79)  BP: 95/59 (05 Jan 2024 11:11) (93/57 - 103/63)  RR: 18 (05 Jan 2024 11:11) (18 - 18)  SpO2: 98% (05 Jan 2024 11:11) (95% - 99%)    Gen: AOx3, NAD, non-toxic  CV: Nontachycardic  Resp: Breathing comfortably, RA  Abd: Soft, nontender  IV/Skin: No thrombophlebitis    LABS:                        8.4    4.43  )-----------( 178      ( 05 Jan 2024 13:08 )             25.5     01-05    137  |  108  |  30<H>  ----------------------------<  78  4.1   |  19<L>  |  2.57<H>    Ca    8.3<L>      05 Jan 2024 05:18  Phos  3.4     01-05  Mg     2.0     01-05    TPro  5.6<L>  /  Alb  2.7<L>  /  TBili  0.3  /  DBili  x   /  AST  83<H>  /  ALT  37  /  AlkPhos  69  01-05    Urinalysis Basic - ( 05 Jan 2024 05:18 )    Color: x / Appearance: x / SG: x / pH: x  Gluc: 78 mg/dL / Ketone: x  / Bili: x / Urobili: x   Blood: x / Protein: x / Nitrite: x   Leuk Esterase: x / RBC: x / WBC x   Sq Epi: x / Non Sq Epi: x / Bacteria: x    MICROBIOLOGY:    Catheterized Catheterized  01-02-24   50,000 - 99,000 CFU/mL Enterococcus faecalis  --  Enterococcus faecalis    .Blood Blood-Peripheral  01-02-24   No growth at 72 Hours  --  --    .Blood Blood-Peripheral  01-02-24   No growth at 72 Hours  --  --    .Blood Blood-Peripheral  12-16-23   No growth at 5 days  --  --    .Blood Blood-Peripheral  12-16-23   No growth at 5 days  --  Enterococcus faecalis  Stenotrophomonas maltophilia    Clostridium difficile GDH Toxins A&amp;B, EIA:   Negative (01-02-24 @ 13:55)  Clostridium difficile GDH Interpretation: Negative for toxigenic C. Difficile.  This specimen is negative for C.  Difficile glutamate dehydrogenase (GDH) antigen and negative for C.  Difficile Toxins A & B, by EIA.  GDH is a highly sensitive screening  marker for C. Difficile that is produced in large amounts by all C.  Difficile strains, both toxigenic and nontoxigenic.  This assay has not  been validated as a test of cure.  Repeat testing during the same episode  of diarrhea is of limited value and is discouraged.  The results of this  assay should always be interpreted in conjunction with patient's clinical  history. (01-02-24 @ 13:55)    RADIOLOGY:    1/2    IMPRESSION:  Grade 1 anterior spondylolisthesis at L4-5 on a degenerative   basis due to facet osteophytic hypertrophy. Mild degenerative disc   disease and spondylosis at L1-2 through L5-S1 with loss of disc height   and associated degenerative endplate changes. DISH involves the lower   thoracic spine. Mild disc bulges are noted at L1-2 through L5-S1 which   flatten the ventral thecal sac and narrow the BILATERAL neural foramina.   Moderate central stenosis at L2-3, L3-4 and severe central stenosis at   L4-5 mild central stenosis at L5-S1 on a degenerative basis due to disc   bulge, facet osteophytic hypertrophy and redundancy of the ligamentum   flavum. Superimposed LEFT foraminal disc herniations at L2-3 and L4-5   further narrow the LEFT neural foramen.  No vertebral fracture is   recognized.

## 2024-01-05 NOTE — PROGRESS NOTE ADULT - SUBJECTIVE AND OBJECTIVE BOX
[facility] [phone]        Patient seen and examined         MEDICATIONS  (STANDING):  chlorhexidine 2% Cloths 1 Application(s) Topical <User Schedule>  dextrose 50% Injectable 25 Gram(s) IV Push once  dextrose 50% Injectable 12.5 Gram(s) IV Push once  dextrose 50% Injectable 25 milliLiter(s) IV Push once  dextrose 50% Injectable 25 Gram(s) IV Push once  dextrose Oral Gel 15 Gram(s) Oral once  finasteride 5 milliGRAM(s) Oral daily  glucagon  Injectable 1 milliGRAM(s) IntraMuscular once  heparin   Injectable 5000 Unit(s) SubCutaneous every 12 hours  heparin   Injectable 5000 Unit(s) SubCutaneous every 8 hours  lactated ringers. 1000 milliLiter(s) (80 mL/Hr) IV Continuous <Continuous>  midodrine 20 milliGRAM(s) Oral every 8 hours  piperacillin/tazobactam IVPB.. 3.375 Gram(s) IV Intermittent every 8 hours  sodium chloride 0.65% Nasal 1 Spray(s) Both Nostrils once  Vyndamax (Tafamidis) 61mg Capsule 1 Capsule(s) 1 Capsule(s) Oral daily      VITAL:  T(C): , Max: 37.1 (01-05-24 @ 04:11)  T(F): , Max: 98.8 (01-05-24 @ 04:11)  HR: 74 (01-05-24 @ 04:11)  BP: 93/57 (01-05-24 @ 04:11)  BP(mean): --  RR: 18 (01-05-24 @ 04:11)  SpO2: 97% (01-05-24 @ 10:14)  Wt(kg): --    I and O's:    01-04 @ 07:01  -  01-05 @ 07:00  --------------------------------------------------------  IN: 240 mL / OUT: 1100 mL / NET: -860 mL    01-05 @ 07:01  -  01-05 @ 10:56  --------------------------------------------------------  IN: 120 mL / OUT: 0 mL / NET: 120 mL          PHYSICAL EXAM:    Constitutional: NAD  Neck:  No JVD  Respiratory: CTAB/L  Cardiovascular: S1 and S2  Gastrointestinal: BS+, soft, NT/ND  Extremities: No peripheral edema  Neurological: A/O x 3, no focal deficits  Psychiatric: Normal mood, normal affect  : + Harkins  Skin: No rashes  Access: Not applicable    LABS:                        7.1    3.61  )-----------( 163      ( 05 Jan 2024 05:18 )             21.5     01-05    137  |  108  |  30<H>  ----------------------------<  78  4.1   |  19<L>  |  2.57<H>    Ca    8.3<L>      05 Jan 2024 05:18  Phos  3.4     01-05  Mg     2.0     01-05    TPro  5.6<L>  /  Alb  2.7<L>  /  TBili  0.3  /  DBili  x   /  AST  83<H>  /  ALT  37  /  AlkPhos  69  01-05          Urine Studies:  Urinalysis Basic - ( 05 Jan 2024 05:18 )    Color: x / Appearance: x / SG: x / pH: x  Gluc: 78 mg/dL / Ketone: x  / Bili: x / Urobili: x   Blood: x / Protein: x / Nitrite: x   Leuk Esterase: x / RBC: x / WBC x   Sq Epi: x / Non Sq Epi: x / Bacteria: x      Sodium, Random Urine: 63 mmol/L (01-04 @ 14:09)  Creatinine, Random Urine: 83 mg/dL (01-04 @ 14:09)  Protein/Creatinine Ratio Calculation: 0.7 Ratio (01-04 @ 14:09)  Potassium, Random Urine: 30 mmol/L (01-04 @ 14:09)        RADIOLOGY & ADDITIONAL STUDIES:             [facility] [phone]        Patient seen and examined         MEDICATIONS  (STANDING):  chlorhexidine 2% Cloths 1 Application(s) Topical <User Schedule>  dextrose 50% Injectable 25 Gram(s) IV Push once  dextrose 50% Injectable 12.5 Gram(s) IV Push once  dextrose 50% Injectable 25 milliLiter(s) IV Push once  dextrose 50% Injectable 25 Gram(s) IV Push once  dextrose Oral Gel 15 Gram(s) Oral once  finasteride 5 milliGRAM(s) Oral daily  glucagon  Injectable 1 milliGRAM(s) IntraMuscular once  heparin   Injectable 5000 Unit(s) SubCutaneous every 12 hours  heparin   Injectable 5000 Unit(s) SubCutaneous every 8 hours  lactated ringers. 1000 milliLiter(s) (80 mL/Hr) IV Continuous <Continuous>  midodrine 20 milliGRAM(s) Oral every 8 hours  piperacillin/tazobactam IVPB.. 3.375 Gram(s) IV Intermittent every 8 hours  sodium chloride 0.65% Nasal 1 Spray(s) Both Nostrils once  Vyndamax (Tafamidis) 61mg Capsule 1 Capsule(s) 1 Capsule(s) Oral daily      VITAL:  T(C): , Max: 37.1 (01-05-24 @ 04:11)  T(F): , Max: 98.8 (01-05-24 @ 04:11)  HR: 74 (01-05-24 @ 04:11)  BP: 93/57 (01-05-24 @ 04:11)  BP(mean): --  RR: 18 (01-05-24 @ 04:11)  SpO2: 97% (01-05-24 @ 10:14)  Wt(kg): --    I and O's:    01-04 @ 07:01  -  01-05 @ 07:00  --------------------------------------------------------  IN: 240 mL / OUT: 1100 mL / NET: -860 mL    01-05 @ 07:01  -  01-05 @ 10:56  --------------------------------------------------------  IN: 120 mL / OUT: 0 mL / NET: 120 mL          PHYSICAL EXAM:    Constitutional: NAD  Neck:  No JVD  Respiratory: CTAB/L  Cardiovascular: S1 and S2  Gastrointestinal: BS+, soft, NT/ND  Extremities: No peripheral edema  Neurological: A/O x 3, no focal deficits  Psychiatric: Normal mood, normal affect  : + Harkins  Skin: No rashes  Access: Not applicable    LABS:                        7.1    3.61  )-----------( 163      ( 05 Jan 2024 05:18 )             21.5     01-05    137  |  108  |  30<H>  ----------------------------<  78  4.1   |  19<L>  |  2.57<H>    Ca    8.3<L>      05 Jan 2024 05:18  Phos  3.4     01-05  Mg     2.0     01-05    TPro  5.6<L>  /  Alb  2.7<L>  /  TBili  0.3  /  DBili  x   /  AST  83<H>  /  ALT  37  /  AlkPhos  69  01-05          Urine Studies:  Urinalysis Basic - ( 05 Jan 2024 05:18 )    Color: x / Appearance: x / SG: x / pH: x  Gluc: 78 mg/dL / Ketone: x  / Bili: x / Urobili: x   Blood: x / Protein: x / Nitrite: x   Leuk Esterase: x / RBC: x / WBC x   Sq Epi: x / Non Sq Epi: x / Bacteria: x      Sodium, Random Urine: 63 mmol/L (01-04 @ 14:09)  Creatinine, Random Urine: 83 mg/dL (01-04 @ 14:09)  Protein/Creatinine Ratio Calculation: 0.7 Ratio (01-04 @ 14:09)  Potassium, Random Urine: 30 mmol/L (01-04 @ 14:09)        RADIOLOGY & ADDITIONAL STUDIES:

## 2024-01-06 LAB
ACTH SER-ACNC: 2.5 PG/ML — LOW (ref 7.2–63.3)
ACTH SER-ACNC: 2.5 PG/ML — LOW (ref 7.2–63.3)
ALBUMIN SERPL ELPH-MCNC: 3 G/DL — LOW (ref 3.3–5)
ALBUMIN SERPL ELPH-MCNC: 3 G/DL — LOW (ref 3.3–5)
ALP SERPL-CCNC: 74 U/L — SIGNIFICANT CHANGE UP (ref 40–120)
ALP SERPL-CCNC: 74 U/L — SIGNIFICANT CHANGE UP (ref 40–120)
ALT FLD-CCNC: 37 U/L — SIGNIFICANT CHANGE UP (ref 10–45)
ALT FLD-CCNC: 37 U/L — SIGNIFICANT CHANGE UP (ref 10–45)
ANION GAP SERPL CALC-SCNC: 11 MMOL/L — SIGNIFICANT CHANGE UP (ref 5–17)
ANION GAP SERPL CALC-SCNC: 11 MMOL/L — SIGNIFICANT CHANGE UP (ref 5–17)
AST SERPL-CCNC: 68 U/L — HIGH (ref 10–40)
AST SERPL-CCNC: 68 U/L — HIGH (ref 10–40)
BASOPHILS # BLD AUTO: 0.05 K/UL — SIGNIFICANT CHANGE UP (ref 0–0.2)
BASOPHILS # BLD AUTO: 0.05 K/UL — SIGNIFICANT CHANGE UP (ref 0–0.2)
BASOPHILS NFR BLD AUTO: 0.9 % — SIGNIFICANT CHANGE UP (ref 0–2)
BASOPHILS NFR BLD AUTO: 0.9 % — SIGNIFICANT CHANGE UP (ref 0–2)
BILIRUB SERPL-MCNC: 0.4 MG/DL — SIGNIFICANT CHANGE UP (ref 0.2–1.2)
BILIRUB SERPL-MCNC: 0.4 MG/DL — SIGNIFICANT CHANGE UP (ref 0.2–1.2)
BUN SERPL-MCNC: 30 MG/DL — HIGH (ref 7–23)
BUN SERPL-MCNC: 30 MG/DL — HIGH (ref 7–23)
CALCIUM SERPL-MCNC: 8.4 MG/DL — SIGNIFICANT CHANGE UP (ref 8.4–10.5)
CALCIUM SERPL-MCNC: 8.4 MG/DL — SIGNIFICANT CHANGE UP (ref 8.4–10.5)
CHLORIDE SERPL-SCNC: 106 MMOL/L — SIGNIFICANT CHANGE UP (ref 96–108)
CHLORIDE SERPL-SCNC: 106 MMOL/L — SIGNIFICANT CHANGE UP (ref 96–108)
CO2 SERPL-SCNC: 18 MMOL/L — LOW (ref 22–31)
CO2 SERPL-SCNC: 18 MMOL/L — LOW (ref 22–31)
CREAT SERPL-MCNC: 2.57 MG/DL — HIGH (ref 0.5–1.3)
CREAT SERPL-MCNC: 2.57 MG/DL — HIGH (ref 0.5–1.3)
EGFR: 25 ML/MIN/1.73M2 — LOW
EGFR: 25 ML/MIN/1.73M2 — LOW
EOSINOPHIL # BLD AUTO: 0.63 K/UL — HIGH (ref 0–0.5)
EOSINOPHIL # BLD AUTO: 0.63 K/UL — HIGH (ref 0–0.5)
EOSINOPHIL NFR BLD AUTO: 11.1 % — HIGH (ref 0–6)
EOSINOPHIL NFR BLD AUTO: 11.1 % — HIGH (ref 0–6)
GLUCOSE SERPL-MCNC: 75 MG/DL — SIGNIFICANT CHANGE UP (ref 70–99)
GLUCOSE SERPL-MCNC: 75 MG/DL — SIGNIFICANT CHANGE UP (ref 70–99)
HCT VFR BLD CALC: 27.6 % — LOW (ref 39–50)
HCT VFR BLD CALC: 27.6 % — LOW (ref 39–50)
HGB BLD-MCNC: 9.1 G/DL — LOW (ref 13–17)
HGB BLD-MCNC: 9.1 G/DL — LOW (ref 13–17)
IMM GRANULOCYTES NFR BLD AUTO: 0.5 % — SIGNIFICANT CHANGE UP (ref 0–0.9)
IMM GRANULOCYTES NFR BLD AUTO: 0.5 % — SIGNIFICANT CHANGE UP (ref 0–0.9)
LYMPHOCYTES # BLD AUTO: 3.28 K/UL — SIGNIFICANT CHANGE UP (ref 1–3.3)
LYMPHOCYTES # BLD AUTO: 3.28 K/UL — SIGNIFICANT CHANGE UP (ref 1–3.3)
LYMPHOCYTES # BLD AUTO: 57.6 % — HIGH (ref 13–44)
LYMPHOCYTES # BLD AUTO: 57.6 % — HIGH (ref 13–44)
MAGNESIUM SERPL-MCNC: 2 MG/DL — SIGNIFICANT CHANGE UP (ref 1.6–2.6)
MAGNESIUM SERPL-MCNC: 2 MG/DL — SIGNIFICANT CHANGE UP (ref 1.6–2.6)
MCHC RBC-ENTMCNC: 26.5 PG — LOW (ref 27–34)
MCHC RBC-ENTMCNC: 26.5 PG — LOW (ref 27–34)
MCHC RBC-ENTMCNC: 33 GM/DL — SIGNIFICANT CHANGE UP (ref 32–36)
MCHC RBC-ENTMCNC: 33 GM/DL — SIGNIFICANT CHANGE UP (ref 32–36)
MCV RBC AUTO: 80.5 FL — SIGNIFICANT CHANGE UP (ref 80–100)
MCV RBC AUTO: 80.5 FL — SIGNIFICANT CHANGE UP (ref 80–100)
MONOCYTES # BLD AUTO: 0.42 K/UL — SIGNIFICANT CHANGE UP (ref 0–0.9)
MONOCYTES # BLD AUTO: 0.42 K/UL — SIGNIFICANT CHANGE UP (ref 0–0.9)
MONOCYTES NFR BLD AUTO: 7.4 % — SIGNIFICANT CHANGE UP (ref 2–14)
MONOCYTES NFR BLD AUTO: 7.4 % — SIGNIFICANT CHANGE UP (ref 2–14)
NEUTROPHILS # BLD AUTO: 1.28 K/UL — LOW (ref 1.8–7.4)
NEUTROPHILS # BLD AUTO: 1.28 K/UL — LOW (ref 1.8–7.4)
NEUTROPHILS NFR BLD AUTO: 22.5 % — LOW (ref 43–77)
NEUTROPHILS NFR BLD AUTO: 22.5 % — LOW (ref 43–77)
NRBC # BLD: 0 /100 WBCS — SIGNIFICANT CHANGE UP (ref 0–0)
NRBC # BLD: 0 /100 WBCS — SIGNIFICANT CHANGE UP (ref 0–0)
PHOSPHATE SERPL-MCNC: 3.7 MG/DL — SIGNIFICANT CHANGE UP (ref 2.5–4.5)
PHOSPHATE SERPL-MCNC: 3.7 MG/DL — SIGNIFICANT CHANGE UP (ref 2.5–4.5)
PLATELET # BLD AUTO: 186 K/UL — SIGNIFICANT CHANGE UP (ref 150–400)
PLATELET # BLD AUTO: 186 K/UL — SIGNIFICANT CHANGE UP (ref 150–400)
POTASSIUM SERPL-MCNC: 4.5 MMOL/L — SIGNIFICANT CHANGE UP (ref 3.5–5.3)
POTASSIUM SERPL-MCNC: 4.5 MMOL/L — SIGNIFICANT CHANGE UP (ref 3.5–5.3)
POTASSIUM SERPL-SCNC: 4.5 MMOL/L — SIGNIFICANT CHANGE UP (ref 3.5–5.3)
POTASSIUM SERPL-SCNC: 4.5 MMOL/L — SIGNIFICANT CHANGE UP (ref 3.5–5.3)
PROT SERPL-MCNC: 6.3 G/DL — SIGNIFICANT CHANGE UP (ref 6–8.3)
PROT SERPL-MCNC: 6.3 G/DL — SIGNIFICANT CHANGE UP (ref 6–8.3)
RBC # BLD: 3.43 M/UL — LOW (ref 4.2–5.8)
RBC # BLD: 3.43 M/UL — LOW (ref 4.2–5.8)
RBC # FLD: 17.3 % — HIGH (ref 10.3–14.5)
RBC # FLD: 17.3 % — HIGH (ref 10.3–14.5)
SODIUM SERPL-SCNC: 135 MMOL/L — SIGNIFICANT CHANGE UP (ref 135–145)
SODIUM SERPL-SCNC: 135 MMOL/L — SIGNIFICANT CHANGE UP (ref 135–145)
WBC # BLD: 5.69 K/UL — SIGNIFICANT CHANGE UP (ref 3.8–10.5)
WBC # BLD: 5.69 K/UL — SIGNIFICANT CHANGE UP (ref 3.8–10.5)
WBC # FLD AUTO: 5.69 K/UL — SIGNIFICANT CHANGE UP (ref 3.8–10.5)
WBC # FLD AUTO: 5.69 K/UL — SIGNIFICANT CHANGE UP (ref 3.8–10.5)

## 2024-01-06 PROCEDURE — 99233 SBSQ HOSP IP/OBS HIGH 50: CPT | Mod: GC

## 2024-01-06 RX ORDER — SODIUM CHLORIDE 9 MG/ML
1000 INJECTION, SOLUTION INTRAVENOUS
Refills: 0 | Status: DISCONTINUED | OUTPATIENT
Start: 2024-01-06 | End: 2024-01-13

## 2024-01-06 RX ADMIN — Medication 2 MILLIGRAM(S): at 11:55

## 2024-01-06 RX ADMIN — CHLORHEXIDINE GLUCONATE 1 APPLICATION(S): 213 SOLUTION TOPICAL at 08:07

## 2024-01-06 RX ADMIN — MIDODRINE HYDROCHLORIDE 20 MILLIGRAM(S): 2.5 TABLET ORAL at 13:06

## 2024-01-06 RX ADMIN — SODIUM CHLORIDE 70 MILLILITER(S): 9 INJECTION, SOLUTION INTRAVENOUS at 13:05

## 2024-01-06 RX ADMIN — HEPARIN SODIUM 5000 UNIT(S): 5000 INJECTION INTRAVENOUS; SUBCUTANEOUS at 17:36

## 2024-01-06 RX ADMIN — IRON SUCROSE 110 MILLIGRAM(S): 20 INJECTION, SOLUTION INTRAVENOUS at 11:09

## 2024-01-06 RX ADMIN — HEPARIN SODIUM 5000 UNIT(S): 5000 INJECTION INTRAVENOUS; SUBCUTANEOUS at 00:40

## 2024-01-06 RX ADMIN — HEPARIN SODIUM 5000 UNIT(S): 5000 INJECTION INTRAVENOUS; SUBCUTANEOUS at 05:12

## 2024-01-06 RX ADMIN — FINASTERIDE 5 MILLIGRAM(S): 5 TABLET, FILM COATED ORAL at 11:09

## 2024-01-06 RX ADMIN — MIDODRINE HYDROCHLORIDE 20 MILLIGRAM(S): 2.5 TABLET ORAL at 05:11

## 2024-01-06 RX ADMIN — PIPERACILLIN AND TAZOBACTAM 25 GRAM(S): 4; .5 INJECTION, POWDER, LYOPHILIZED, FOR SOLUTION INTRAVENOUS at 05:13

## 2024-01-06 RX ADMIN — PIPERACILLIN AND TAZOBACTAM 25 GRAM(S): 4; .5 INJECTION, POWDER, LYOPHILIZED, FOR SOLUTION INTRAVENOUS at 13:06

## 2024-01-06 RX ADMIN — SODIUM CHLORIDE 70 MILLILITER(S): 9 INJECTION, SOLUTION INTRAVENOUS at 00:42

## 2024-01-06 RX ADMIN — PIPERACILLIN AND TAZOBACTAM 25 GRAM(S): 4; .5 INJECTION, POWDER, LYOPHILIZED, FOR SOLUTION INTRAVENOUS at 22:57

## 2024-01-06 NOTE — PROGRESS NOTE ADULT - PROBLEM SELECTOR PLAN 3
-Hold anti-diarrheal medications in context of potential viral or bacterial GI infection-> started loperamide 1/3  -IVF 80cc/hr for 24hrs -Hold anti-diarrheal medications in context of potential viral or bacterial GI infection-> started loperamide 1/3  -Continue IVF

## 2024-01-06 NOTE — PROGRESS NOTE ADULT - ATTENDING COMMENTS
73M w/ BPH (c/b urinary retention s/p Harkins catheter placement; exchanged monthly), CKD stage IIIb, and cardiac amyloidosis brought in by daughter for diarrhea found to have hypotension likely due to sepsis.     1. Pyleo/ Urosepsis S/p 4.5L IVF and now with midodrine 20 mg q8h with parameters. C/w Zosyn. CT abd pel showing perinephric stranding. Harkins changed in ER by urology. ID following  2. COVID+: Not requiring O2 at this time.   3. Elevated LFTs. Holding remdesivir, hold hepatotoxic agents, downtrending   4. LAVELLE on CKD Trend Cr Hold nephrotoxic agents. Holding Remdesivir in the setting of LAVELLE. Creatinine continues to rise. nephro consulted, [place] Possible contrast related/ hypotension  5. BPH: Harkins changed in ED by urology, maintain.   6. Amyloidosis: Cardiology recommends to continue with vyndamax  7. Fall 2/2 to dizziness: CTH negative for acute changes. CT L spine showing spinal stenosis L2-L5. Rehydrating. PT

## 2024-01-06 NOTE — PROGRESS NOTE ADULT - PROBLEM SELECTOR PLAN 4
At this time likely iso of hypovolemia given clinical history and physical exam, although cannot rule out distributive.     -c/w midodrine 20mg tid wean as tolerated  -strict i's and o's with standing weight

## 2024-01-06 NOTE — PROGRESS NOTE ADULT - ASSESSMENT
A/p  73M w/ BPH (c/b urinary retention s/p Harkins catheter placement; exchanged monthly), CKD stage IIIb, and cardiac amyloidosis brought in by daughter for diarrhea (loose-watery BM's) over the past few days.    #Sepsis  -I/s/o COVID + UTI  -Abx per med    #COVID  -CXR no focal consolidations  -sp Remdesivir  -Supportive care per med    #cardiac amyloid   -Cont vyndamax  -baseline sbp 100 as outpt  -Prior echo with  Nml LV systolic fxn, Severe left ventricular hypertrophy, Findings suggestive of infiltrative cardiomyopathy    #Hypotension  -co dizziness while in chair, hypotension noted- Cont midodrine -ct head from 1/2 unremarkable     #Hypoglycemia  -mgmt per med      dvt ppx     A/p  73M w/ BPH (c/b urinary retention s/p Harkins catheter placement; exchanged monthly), CKD stage IIIb, and cardiac amyloidosis brought in by daughter for diarrhea (loose-watery BM's) over the past few days.    #Sepsis  -I/s/o COVID + UTI  -Abx per med    #COVID  -CXR no focal consolidations  -sp Remdesivir  -Supportive care per med    #cardiac amyloid   -Cont vyndamax  -baseline sbp 100 as outpt  -Prior echo with  Nml LV systolic fxn, Severe left ventricular hypertrophy, Findings suggestive of infiltrative cardiomyopathy    #Hypotension  -co dizziness while in chair, hypotension noted- Cont midodrine -ct head from 1/2 unremarkable   -If dizziness persists can consider increasing mido    #Hypoglycemia  -mgmt per med      dvt ppx

## 2024-01-06 NOTE — PROGRESS NOTE ADULT - SUBJECTIVE AND OBJECTIVE BOX
***************************************************************  Jose Alejandro Arizmendi, PGY1  Internal Medicine   Preferred contact via [employer] Teams   ***************************************************************    DEEPTHI GÓMEZ  73y  MRN: [id number]    Patient is a 73y old  Male who presents with a chief complaint of Diarrhea/ (05 Jan 2024 11:07)      Interval/Overnight Events: no events ON.     Subjective: Pt seen and examined at bedside. Denies fever, CP, SOB, abn pain, N/V, dysuria. Tolerating diet.      MEDICATIONS  (STANDING):  chlorhexidine 2% Cloths 1 Application(s) Topical <User Schedule>  dextrose 50% Injectable 12.5 Gram(s) IV Push once  dextrose 50% Injectable 25 milliLiter(s) IV Push once  dextrose 50% Injectable 25 Gram(s) IV Push once  dextrose 50% Injectable 25 Gram(s) IV Push once  dextrose Oral Gel 15 Gram(s) Oral once  finasteride 5 milliGRAM(s) Oral daily  glucagon  Injectable 1 milliGRAM(s) IntraMuscular once  heparin   Injectable 5000 Unit(s) SubCutaneous every 12 hours  iron sucrose IVPB 200 milliGRAM(s) IV Intermittent every 24 hours  lactated ringers. 1000 milliLiter(s) (70 mL/Hr) IV Continuous <Continuous>  midodrine 20 milliGRAM(s) Oral every 8 hours  piperacillin/tazobactam IVPB.. 3.375 Gram(s) IV Intermittent every 8 hours  sodium chloride 0.65% Nasal 1 Spray(s) Both Nostrils once  Vyndamax (Tafamidis) 61mg Capsule 1 Capsule(s) 1 Capsule(s) Oral daily    MEDICATIONS  (PRN):  fluticasone propionate 50 MICROgram(s)/spray Nasal Spray 1 Spray(s) Both Nostrils two times a day PRN congestion  loperamide 2 milliGRAM(s) Oral three times a day PRN Diarrhea      Objective:    Vitals: Vital Signs Last 24 Hrs  T(C): 36.7 (01-06-24 @ 04:49), Max: 37 (01-05-24 @ 11:11)  T(F): 98.1 (01-06-24 @ 04:49), Max: 98.6 (01-05-24 @ 11:11)  HR: 68 (01-06-24 @ 04:49) (64 - 68)  BP: 100/59 (01-06-24 @ 04:49) (95/59 - 100/59)  BP(mean): --  RR: 18 (01-06-24 @ 04:49) (18 - 18)  SpO2: 98% (01-06-24 @ 04:49) (97% - 98%)                I&O's Summary    05 Jan 2024 07:01  -  06 Jan 2024 07:00  --------------------------------------------------------  IN: 480 mL / OUT: 1800 mL / NET: -1320 mL        PHYSICAL EXAM:  GENERAL: NAD  HEAD:  Atraumatic, Normocephalic  EYES: EOMI, conjunctiva and sclera clear  CHEST/LUNG: Clear to auscultation bilaterally; No rales, rhonchi, wheezing, or rubs  HEART: Regular rate and rhythm; No murmurs, rubs, or gallops  ABDOMEN: Soft, Nontender, Nondistended;   SKIN: No rashes or lesions  NERVOUS SYSTEM:  Alert & Oriented X3, no focal deficits    LABS:                        9.1    5.69  )-----------( 186      ( 06 Jan 2024 06:22 )             27.6                         8.4    4.43  )-----------( 178      ( 05 Jan 2024 13:08 )             25.5                         7.1    3.61  )-----------( 163      ( 05 Jan 2024 05:18 )             21.5     01-06    135  |  106  |  30<H>  ----------------------------<  75  4.5   |  18<L>  |  2.57<H>  01-05    137  |  108  |  30<H>  ----------------------------<  78  4.1   |  19<L>  |  2.57<H>  01-04    138  |  107  |  32<H>  ----------------------------<  79  3.8   |  20<L>  |  2.69<H>    Ca    8.4      06 Jan 2024 06:23  Ca    8.3<L>      05 Jan 2024 05:18  Ca    8.5      04 Jan 2024 05:48  Phos  3.7     01-06  Mg     2.0     01-06    TPro  6.3  /  Alb  3.0<L>  /  TBili  0.4  /  DBili  x   /  AST  68<H>  /  ALT  37  /  AlkPhos  74  01-06  TPro  5.6<L>  /  Alb  2.7<L>  /  TBili  0.3  /  DBili  x   /  AST  83<H>  /  ALT  37  /  AlkPhos  69  01-05  TPro  6.1  /  Alb  2.9<L>  /  TBili  0.5  /  DBili  x   /  AST  117<H>  /  ALT  44  /  AlkPhos  83  01-04    CAPILLARY BLOOD GLUCOSE            Urinalysis Basic - ( 06 Jan 2024 06:23 )    Color: x / Appearance: x / SG: x / pH: x  Gluc: 75 mg/dL / Ketone: x  / Bili: x / Urobili: x   Blood: x / Protein: x / Nitrite: x   Leuk Esterase: x / RBC: x / WBC x   Sq Epi: x / Non Sq Epi: x / Bacteria: x          RADIOLOGY & ADDITIONAL TESTS:    Imaging Personally Reviewed:  [x ] YES  [ ] NO    Consultants involved in case:   Consultant(s) Notes Reviewed:  [ x] YES  [ ] NO:   Care Discussed with Consultants/Other Providers [x ] YES  [ ] NO         ***************************************************************  Jose Alejandro Arizmendi, PGY1  Internal Medicine   Preferred contact via [employer] Teams   ***************************************************************    DEEPTHI GÓMEZ  73y  MRN: [id number]    Patient is a 73y old  Male who presents with a chief complaint of Diarrhea/ (05 Jan 2024 11:07)      Interval/Overnight Events: no events ON.     Subjective: Pt seen and examined at bedside. Denies fever, CP, SOB, abn pain, N/V, dysuria. Tolerating diet.      MEDICATIONS  (STANDING):  chlorhexidine 2% Cloths 1 Application(s) Topical <User Schedule>  dextrose 50% Injectable 12.5 Gram(s) IV Push once  dextrose 50% Injectable 25 milliLiter(s) IV Push once  dextrose 50% Injectable 25 Gram(s) IV Push once  dextrose 50% Injectable 25 Gram(s) IV Push once  dextrose Oral Gel 15 Gram(s) Oral once  finasteride 5 milliGRAM(s) Oral daily  glucagon  Injectable 1 milliGRAM(s) IntraMuscular once  heparin   Injectable 5000 Unit(s) SubCutaneous every 12 hours  iron sucrose IVPB 200 milliGRAM(s) IV Intermittent every 24 hours  lactated ringers. 1000 milliLiter(s) (70 mL/Hr) IV Continuous <Continuous>  midodrine 20 milliGRAM(s) Oral every 8 hours  piperacillin/tazobactam IVPB.. 3.375 Gram(s) IV Intermittent every 8 hours  sodium chloride 0.65% Nasal 1 Spray(s) Both Nostrils once  Vyndamax (Tafamidis) 61mg Capsule 1 Capsule(s) 1 Capsule(s) Oral daily    MEDICATIONS  (PRN):  fluticasone propionate 50 MICROgram(s)/spray Nasal Spray 1 Spray(s) Both Nostrils two times a day PRN congestion  loperamide 2 milliGRAM(s) Oral three times a day PRN Diarrhea      Objective:    Vitals: Vital Signs Last 24 Hrs  T(C): 36.7 (01-06-24 @ 04:49), Max: 37 (01-05-24 @ 11:11)  T(F): 98.1 (01-06-24 @ 04:49), Max: 98.6 (01-05-24 @ 11:11)  HR: 68 (01-06-24 @ 04:49) (64 - 68)  BP: 100/59 (01-06-24 @ 04:49) (95/59 - 100/59)  BP(mean): --  RR: 18 (01-06-24 @ 04:49) (18 - 18)  SpO2: 98% (01-06-24 @ 04:49) (97% - 98%)                I&O's Summary    05 Jan 2024 07:01  -  06 Jan 2024 07:00  --------------------------------------------------------  IN: 480 mL / OUT: 1800 mL / NET: -1320 mL        PHYSICAL EXAM:  GENERAL: NAD  HEAD:  Atraumatic, Normocephalic  EYES: EOMI, conjunctiva and sclera clear  CHEST/LUNG: Clear to auscultation bilaterally; No rales, rhonchi, wheezing, or rubs  HEART: Regular rate and rhythm; No murmurs, rubs, or gallops  ABDOMEN: Soft, Nontender, Nondistended;   SKIN: No rashes or lesions  NERVOUS SYSTEM:  Alert & Oriented X3, no focal deficits    LABS:                        9.1    5.69  )-----------( 186      ( 06 Jan 2024 06:22 )             27.6                         8.4    4.43  )-----------( 178      ( 05 Jan 2024 13:08 )             25.5                         7.1    3.61  )-----------( 163      ( 05 Jan 2024 05:18 )             21.5     01-06    135  |  106  |  30<H>  ----------------------------<  75  4.5   |  18<L>  |  2.57<H>  01-05    137  |  108  |  30<H>  ----------------------------<  78  4.1   |  19<L>  |  2.57<H>  01-04    138  |  107  |  32<H>  ----------------------------<  79  3.8   |  20<L>  |  2.69<H>    Ca    8.4      06 Jan 2024 06:23  Ca    8.3<L>      05 Jan 2024 05:18  Ca    8.5      04 Jan 2024 05:48  Phos  3.7     01-06  Mg     2.0     01-06    TPro  6.3  /  Alb  3.0<L>  /  TBili  0.4  /  DBili  x   /  AST  68<H>  /  ALT  37  /  AlkPhos  74  01-06  TPro  5.6<L>  /  Alb  2.7<L>  /  TBili  0.3  /  DBili  x   /  AST  83<H>  /  ALT  37  /  AlkPhos  69  01-05  TPro  6.1  /  Alb  2.9<L>  /  TBili  0.5  /  DBili  x   /  AST  117<H>  /  ALT  44  /  AlkPhos  83  01-04    CAPILLARY BLOOD GLUCOSE            Urinalysis Basic - ( 06 Jan 2024 06:23 )    Color: x / Appearance: x / SG: x / pH: x  Gluc: 75 mg/dL / Ketone: x  / Bili: x / Urobili: x   Blood: x / Protein: x / Nitrite: x   Leuk Esterase: x / RBC: x / WBC x   Sq Epi: x / Non Sq Epi: x / Bacteria: x          RADIOLOGY & ADDITIONAL TESTS:    Imaging Personally Reviewed:  [x ] YES  [ ] NO    Consultants involved in case:   Consultant(s) Notes Reviewed:  [ x] YES  [ ] NO:   Care Discussed with Consultants/Other Providers [x ] YES  [ ] NO         ***************************************************************  Jose Alejandro Arizmendi, PGY1  Internal Medicine   Preferred contact via [employer] Teams   ***************************************************************    DEEPTHI GÓMEZ  73y  MRN: [id number]    Patient is a 73y old  Male who presents with a chief complaint of Diarrhea/ (05 Jan 2024 11:07)      Interval/Overnight Events: no events ON.     Subjective: Pt seen and examined at bedside. Pt pleasant and resting calmly.   MEDICATIONS  (STANDING):  chlorhexidine 2% Cloths 1 Application(s) Topical <User Schedule>  dextrose 50% Injectable 12.5 Gram(s) IV Push once  dextrose 50% Injectable 25 milliLiter(s) IV Push once  dextrose 50% Injectable 25 Gram(s) IV Push once  dextrose 50% Injectable 25 Gram(s) IV Push once  dextrose Oral Gel 15 Gram(s) Oral once  finasteride 5 milliGRAM(s) Oral daily  glucagon  Injectable 1 milliGRAM(s) IntraMuscular once  heparin   Injectable 5000 Unit(s) SubCutaneous every 12 hours  iron sucrose IVPB 200 milliGRAM(s) IV Intermittent every 24 hours  lactated ringers. 1000 milliLiter(s) (70 mL/Hr) IV Continuous <Continuous>  midodrine 20 milliGRAM(s) Oral every 8 hours  piperacillin/tazobactam IVPB.. 3.375 Gram(s) IV Intermittent every 8 hours  sodium chloride 0.65% Nasal 1 Spray(s) Both Nostrils once  Vyndamax (Tafamidis) 61mg Capsule 1 Capsule(s) 1 Capsule(s) Oral daily    MEDICATIONS  (PRN):  fluticasone propionate 50 MICROgram(s)/spray Nasal Spray 1 Spray(s) Both Nostrils two times a day PRN congestion  loperamide 2 milliGRAM(s) Oral three times a day PRN Diarrhea      Objective:    Vitals: Vital Signs Last 24 Hrs  T(C): 36.7 (01-06-24 @ 04:49), Max: 37 (01-05-24 @ 11:11)  T(F): 98.1 (01-06-24 @ 04:49), Max: 98.6 (01-05-24 @ 11:11)  HR: 68 (01-06-24 @ 04:49) (64 - 68)  BP: 100/59 (01-06-24 @ 04:49) (95/59 - 100/59)  BP(mean): --  RR: 18 (01-06-24 @ 04:49) (18 - 18)  SpO2: 98% (01-06-24 @ 04:49) (97% - 98%)                I&O's Summary    05 Jan 2024 07:01  -  06 Jan 2024 07:00  --------------------------------------------------------  IN: 480 mL / OUT: 1800 mL / NET: -1320 mL        PHYSICAL EXAM:  GENERAL: NAD  HEAD:  Atraumatic, Normocephalic  EYES: EOMI, conjunctiva and sclera clear  CHEST/LUNG: Clear to auscultation bilaterally; No rales, rhonchi, wheezing, or rubs  HEART: Regular rate and rhythm; No murmurs, rubs, or gallops  ABDOMEN: Soft, Nontender, Nondistended;   SKIN: No rashes or lesions  NERVOUS SYSTEM:  Alert & Oriented X3, no focal deficits    LABS:                        9.1    5.69  )-----------( 186      ( 06 Jan 2024 06:22 )             27.6                         8.4    4.43  )-----------( 178      ( 05 Jan 2024 13:08 )             25.5                         7.1    3.61  )-----------( 163      ( 05 Jan 2024 05:18 )             21.5     01-06    135  |  106  |  30<H>  ----------------------------<  75  4.5   |  18<L>  |  2.57<H>  01-05    137  |  108  |  30<H>  ----------------------------<  78  4.1   |  19<L>  |  2.57<H>  01-04    138  |  107  |  32<H>  ----------------------------<  79  3.8   |  20<L>  |  2.69<H>    Ca    8.4      06 Jan 2024 06:23  Ca    8.3<L>      05 Jan 2024 05:18  Ca    8.5      04 Jan 2024 05:48  Phos  3.7     01-06  Mg     2.0     01-06    TPro  6.3  /  Alb  3.0<L>  /  TBili  0.4  /  DBili  x   /  AST  68<H>  /  ALT  37  /  AlkPhos  74  01-06  TPro  5.6<L>  /  Alb  2.7<L>  /  TBili  0.3  /  DBili  x   /  AST  83<H>  /  ALT  37  /  AlkPhos  69  01-05  TPro  6.1  /  Alb  2.9<L>  /  TBili  0.5  /  DBili  x   /  AST  117<H>  /  ALT  44  /  AlkPhos  83  01-04    CAPILLARY BLOOD GLUCOSE            Urinalysis Basic - ( 06 Jan 2024 06:23 )    Color: x / Appearance: x / SG: x / pH: x  Gluc: 75 mg/dL / Ketone: x  / Bili: x / Urobili: x   Blood: x / Protein: x / Nitrite: x   Leuk Esterase: x / RBC: x / WBC x   Sq Epi: x / Non Sq Epi: x / Bacteria: x          RADIOLOGY & ADDITIONAL TESTS:    Imaging Personally Reviewed:  [x ] YES  [ ] NO    Consultants involved in case:   Consultant(s) Notes Reviewed:  [ x] YES  [ ] NO:   Care Discussed with Consultants/Other Providers [x ] YES  [ ] NO         ***************************************************************  Jose Alejandro Arizmendi, PGY1  Internal Medicine   Preferred contact via [employer] Teams   ***************************************************************    DEEPTHI GÓMEZ  73y  MRN: [id number]    Patient is a 73y old  Male who presents with a chief complaint of Diarrhea/ (05 Jan 2024 11:07)      Interval/Overnight Events: no events ON.     Subjective: Pt seen and examined at bedside. Pt pleasant and resting calmly.   MEDICATIONS  (STANDING):  chlorhexidine 2% Cloths 1 Application(s) Topical <User Schedule>  dextrose 50% Injectable 12.5 Gram(s) IV Push once  dextrose 50% Injectable 25 milliLiter(s) IV Push once  dextrose 50% Injectable 25 Gram(s) IV Push once  dextrose 50% Injectable 25 Gram(s) IV Push once  dextrose Oral Gel 15 Gram(s) Oral once  finasteride 5 milliGRAM(s) Oral daily  glucagon  Injectable 1 milliGRAM(s) IntraMuscular once  heparin   Injectable 5000 Unit(s) SubCutaneous every 12 hours  iron sucrose IVPB 200 milliGRAM(s) IV Intermittent every 24 hours  lactated ringers. 1000 milliLiter(s) (70 mL/Hr) IV Continuous <Continuous>  midodrine 20 milliGRAM(s) Oral every 8 hours  piperacillin/tazobactam IVPB.. 3.375 Gram(s) IV Intermittent every 8 hours  sodium chloride 0.65% Nasal 1 Spray(s) Both Nostrils once  Vyndamax (Tafamidis) 61mg Capsule 1 Capsule(s) 1 Capsule(s) Oral daily    MEDICATIONS  (PRN):  fluticasone propionate 50 MICROgram(s)/spray Nasal Spray 1 Spray(s) Both Nostrils two times a day PRN congestion  loperamide 2 milliGRAM(s) Oral three times a day PRN Diarrhea      Objective:    Vitals: Vital Signs Last 24 Hrs  T(C): 36.7 (01-06-24 @ 04:49), Max: 37 (01-05-24 @ 11:11)  T(F): 98.1 (01-06-24 @ 04:49), Max: 98.6 (01-05-24 @ 11:11)  HR: 68 (01-06-24 @ 04:49) (64 - 68)  BP: 100/59 (01-06-24 @ 04:49) (95/59 - 100/59)  BP(mean): --  RR: 18 (01-06-24 @ 04:49) (18 - 18)  SpO2: 98% (01-06-24 @ 04:49) (97% - 98%)                I&O's Summary    05 Jan 2024 07:01  -  06 Jan 2024 07:00  --------------------------------------------------------  IN: 480 mL / OUT: 1800 mL / NET: -1320 mL        PHYSICAL EXAM:  GENERAL: NAD  HEAD:  Atraumatic, Normocephalic  EYES: EOMI, conjunctiva and sclera clear  CHEST/LUNG: Clear to auscultation bilaterally; No rales, rhonchi, wheezing, or rubs  HEART: Regular rate and rhythm; No murmurs, rubs, or gallops  ABDOMEN: Soft, Nontender, Nondistended;   SKIN: No rashes or lesions  NERVOUS SYSTEM:  Alert & Oriented X3, no focal deficits    LABS:                        9.1    5.69  )-----------( 186      ( 06 Jan 2024 06:22 )             27.6                         8.4    4.43  )-----------( 178      ( 05 Jan 2024 13:08 )             25.5                         7.1    3.61  )-----------( 163      ( 05 Jan 2024 05:18 )             21.5     01-06    135  |  106  |  30<H>  ----------------------------<  75  4.5   |  18<L>  |  2.57<H>  01-05    137  |  108  |  30<H>  ----------------------------<  78  4.1   |  19<L>  |  2.57<H>  01-04    138  |  107  |  32<H>  ----------------------------<  79  3.8   |  20<L>  |  2.69<H>    Ca    8.4      06 Jan 2024 06:23  Ca    8.3<L>      05 Jan 2024 05:18  Ca    8.5      04 Jan 2024 05:48  Phos  3.7     01-06  Mg     2.0     01-06    TPro  6.3  /  Alb  3.0<L>  /  TBili  0.4  /  DBili  x   /  AST  68<H>  /  ALT  37  /  AlkPhos  74  01-06  TPro  5.6<L>  /  Alb  2.7<L>  /  TBili  0.3  /  DBili  x   /  AST  83<H>  /  ALT  37  /  AlkPhos  69  01-05  TPro  6.1  /  Alb  2.9<L>  /  TBili  0.5  /  DBili  x   /  AST  117<H>  /  ALT  44  /  AlkPhos  83  01-04    CAPILLARY BLOOD GLUCOSE            Urinalysis Basic - ( 06 Jan 2024 06:23 )    Color: x / Appearance: x / SG: x / pH: x  Gluc: 75 mg/dL / Ketone: x  / Bili: x / Urobili: x   Blood: x / Protein: x / Nitrite: x   Leuk Esterase: x / RBC: x / WBC x   Sq Epi: x / Non Sq Epi: x / Bacteria: x          RADIOLOGY & ADDITIONAL TESTS:    Imaging Personally Reviewed:  [x ] YES  [ ] NO    Consultants involved in case:   Consultant(s) Notes Reviewed:  [ x] YES  [ ] NO:   Care Discussed with Consultants/Other Providers [x ] YES  [ ] NO

## 2024-01-06 NOTE — PROGRESS NOTE ADULT - PROBLEM SELECTOR PLAN 1
-met SIRs criteria on admission with concerning source of GI vs UTI  -previous hx of E.faecalis and Stenotrophomonas maltophilia  -Likely Pyelo in context of CT with perinephric standing, may also be due to proctitis       -ID consulted recs appreciated   - c/w zosyn 1/2-  -ucx demonstrating efaecalis pending sens  -bcx ngtd  -gi pcr and cdiff neg -met SIRs criteria on admission with concerning source of GI vs UTI  -previous hx of E.faecalis and Stenotrophomonas maltophilia  -Likely Pyelo in context of CT with perinephric standing, may also be due to proctitis       -ID consulted recs appreciated   - c/w zosyn for 7 day course   -ucx demonstrating efaecalis pending sens  -bcx ngtd  -gi pcr and cdiff neg

## 2024-01-06 NOTE — PROGRESS NOTE ADULT - ASSESSMENT
on room air at present  still with diarrhea  otherwise no complaints     chlorhexidine 2% Cloths 1 Application(s) Topical <User Schedule>  dextrose 50% Injectable 25 Gram(s) IV Push once  dextrose 50% Injectable 12.5 Gram(s) IV Push once  dextrose 50% Injectable 25 milliLiter(s) IV Push once  dextrose 50% Injectable 25 Gram(s) IV Push once  dextrose Oral Gel 15 Gram(s) Oral once  finasteride 5 milliGRAM(s) Oral daily  fluticasone propionate 50 MICROgram(s)/spray Nasal Spray 1 Spray(s) Both Nostrils two times a day PRN  glucagon  Injectable 1 milliGRAM(s) IntraMuscular once  heparin   Injectable 5000 Unit(s) SubCutaneous every 12 hours  iron sucrose IVPB 200 milliGRAM(s) IV Intermittent every 24 hours  lactated ringers. 1000 milliLiter(s) IV Continuous <Continuous>  loperamide 2 milliGRAM(s) Oral three times a day PRN  midodrine 20 milliGRAM(s) Oral every 8 hours  piperacillin/tazobactam IVPB.. 3.375 Gram(s) IV Intermittent every 8 hours  sodium chloride 0.65% Nasal 1 Spray(s) Both Nostrils once  Vyndamax (Tafamidis) 61mg Capsule 1 Capsule(s) 1 Capsule(s) Oral daily      VITAL:  T(C): , Max: 36.8 (01-06-24 @ 10:52)  T(F): , Max: 98.3 (01-06-24 @ 10:52)  HR: 61 (01-06-24 @ 10:52)  BP: 110/68 (01-06-24 @ 10:52)  BP(mean): --  RR: 18 (01-06-24 @ 10:52)  SpO2: 99% (01-06-24 @ 10:52)  Wt(kg): --    01-05-24 @ 07:01  -  01-06-24 @ 07:00  --------------------------------------------------------  IN: 480 mL / OUT: 1800 mL / NET: -1320 mL    01-06-24 @ 07:01  -  01-06-24 @ 11:56  --------------------------------------------------------  IN: 120 mL / OUT: 0 mL / NET: 120 mL        PHYSICAL EXAM:  Constitutional: NAD  Neck:  No JVD  Respiratory: CTAB/L  Cardiovascular: S1 and S2  Gastrointestinal: BS+, soft, NT/ND  Extremities: No peripheral edema  Neurological: A/O x 3, no focal deficits  Psychiatric: Normal mood, normal affect  : + Morales  Skin: No rashes  Access: Not applicable  LABS:                          9.1    5.69  )-----------( 186      ( 06 Jan 2024 06:22 )             27.6     Na(135)/K(4.5)/Cl(106)/HCO3(18)/BUN(30)/Cr(2.57)Glu(75)/Ca(8.4)/Mg(2.0)/PO4(3.7)    01-06 @ 06:23  Na(137)/K(4.1)/Cl(108)/HCO3(19)/BUN(30)/Cr(2.57)Glu(78)/Ca(8.3)/Mg(2.0)/PO4(3.4)    01-05 @ 05:18  Na(138)/K(3.8)/Cl(107)/HCO3(20)/BUN(32)/Cr(2.69)Glu(79)/Ca(8.5)/Mg(2.1)/PO4(3.9)    01-04 @ 05:48    Urinalysis Basic - ( 06 Jan 2024 06:23 )    Color: x / Appearance: x / SG: x / pH: x  Gluc: 75 mg/dL / Ketone: x  / Bili: x / Urobili: x   Blood: x / Protein: x / Nitrite: x   Leuk Esterase: x / RBC: x / WBC x   Sq Epi: x / Non Sq Epi: x / Bacteria: x      Sodium, Random Urine: 63 mmol/L (01-04 @ 14:09)  Creatinine, Random Urine: 83 mg/dL (01-04 @ 14:09)  Protein/Creatinine Ratio Calculation: 0.7 Ratio (01-04 @ 14:09)  Potassium, Random Urine: 30 mmol/L (01-04 @ 14:09)        ASSESSMENT/PLAN  73M w/ BPH (c/b urinary retention s/p Morales catheter placement; exchanged monthly), CKD stage IIIb, and cardiac amyloidosis brought in by daughter for diarrhea (loose-watery BM's) over the past few days.  CKD stage 3 b and now SARAH in light of contrast study/hypotension and possibly dehydration as well   Subnephrotic range proteinuria     1 Renal-  scr stable at present  Cont the IVF but reduce to 70 cc/hr  for now  Trend serum creatinine which  has improved and stable at present  2 Lytes controlled  3 Rhem-off  the colchicine as cause of diarrhea   4 -he has chronic morales  5 CVS- BP improving  on Midodrine to maintain MAP   6 Heme- hgb improving ;  continue IV venofer 200mg IVP qd x 3 (Day 2 of 3)        Rober Alvarenga NP-BC  [employer]  [phone]   on room air at present  still with diarrhea  otherwise no complaints     chlorhexidine 2% Cloths 1 Application(s) Topical <User Schedule>  dextrose 50% Injectable 25 Gram(s) IV Push once  dextrose 50% Injectable 12.5 Gram(s) IV Push once  dextrose 50% Injectable 25 milliLiter(s) IV Push once  dextrose 50% Injectable 25 Gram(s) IV Push once  dextrose Oral Gel 15 Gram(s) Oral once  finasteride 5 milliGRAM(s) Oral daily  fluticasone propionate 50 MICROgram(s)/spray Nasal Spray 1 Spray(s) Both Nostrils two times a day PRN  glucagon  Injectable 1 milliGRAM(s) IntraMuscular once  heparin   Injectable 5000 Unit(s) SubCutaneous every 12 hours  iron sucrose IVPB 200 milliGRAM(s) IV Intermittent every 24 hours  lactated ringers. 1000 milliLiter(s) IV Continuous <Continuous>  loperamide 2 milliGRAM(s) Oral three times a day PRN  midodrine 20 milliGRAM(s) Oral every 8 hours  piperacillin/tazobactam IVPB.. 3.375 Gram(s) IV Intermittent every 8 hours  sodium chloride 0.65% Nasal 1 Spray(s) Both Nostrils once  Vyndamax (Tafamidis) 61mg Capsule 1 Capsule(s) 1 Capsule(s) Oral daily      VITAL:  T(C): , Max: 36.8 (01-06-24 @ 10:52)  T(F): , Max: 98.3 (01-06-24 @ 10:52)  HR: 61 (01-06-24 @ 10:52)  BP: 110/68 (01-06-24 @ 10:52)  BP(mean): --  RR: 18 (01-06-24 @ 10:52)  SpO2: 99% (01-06-24 @ 10:52)  Wt(kg): --    01-05-24 @ 07:01  -  01-06-24 @ 07:00  --------------------------------------------------------  IN: 480 mL / OUT: 1800 mL / NET: -1320 mL    01-06-24 @ 07:01  -  01-06-24 @ 11:56  --------------------------------------------------------  IN: 120 mL / OUT: 0 mL / NET: 120 mL        PHYSICAL EXAM:  Constitutional: NAD  Neck:  No JVD  Respiratory: CTAB/L  Cardiovascular: S1 and S2  Gastrointestinal: BS+, soft, NT/ND  Extremities: No peripheral edema  Neurological: A/O x 3, no focal deficits  Psychiatric: Normal mood, normal affect  : + Morales  Skin: No rashes  Access: Not applicable  LABS:                          9.1    5.69  )-----------( 186      ( 06 Jan 2024 06:22 )             27.6     Na(135)/K(4.5)/Cl(106)/HCO3(18)/BUN(30)/Cr(2.57)Glu(75)/Ca(8.4)/Mg(2.0)/PO4(3.7)    01-06 @ 06:23  Na(137)/K(4.1)/Cl(108)/HCO3(19)/BUN(30)/Cr(2.57)Glu(78)/Ca(8.3)/Mg(2.0)/PO4(3.4)    01-05 @ 05:18  Na(138)/K(3.8)/Cl(107)/HCO3(20)/BUN(32)/Cr(2.69)Glu(79)/Ca(8.5)/Mg(2.1)/PO4(3.9)    01-04 @ 05:48    Urinalysis Basic - ( 06 Jan 2024 06:23 )    Color: x / Appearance: x / SG: x / pH: x  Gluc: 75 mg/dL / Ketone: x  / Bili: x / Urobili: x   Blood: x / Protein: x / Nitrite: x   Leuk Esterase: x / RBC: x / WBC x   Sq Epi: x / Non Sq Epi: x / Bacteria: x      Sodium, Random Urine: 63 mmol/L (01-04 @ 14:09)  Creatinine, Random Urine: 83 mg/dL (01-04 @ 14:09)  Protein/Creatinine Ratio Calculation: 0.7 Ratio (01-04 @ 14:09)  Potassium, Random Urine: 30 mmol/L (01-04 @ 14:09)        ASSESSMENT/PLAN  73M w/ BPH (c/b urinary retention s/p Morales catheter placement; exchanged monthly), CKD stage IIIb, and cardiac amyloidosis brought in by daughter for diarrhea (loose-watery BM's) over the past few days.  CKD stage 3 b and now SARAH in light of contrast study/hypotension and possibly dehydration as well   Subnephrotic range proteinuria     1 Renal-  scr stable at present  Cont the IVF but reduce to 70 cc/hr  for now  Trend serum creatinine which  has improved and stable at present  2 Lytes controlled  3 Rhem-off  the colchicine as cause of diarrhea   4 -he has chronic morales  5 CVS- BP improving  on Midodrine to maintain MAP   6 Heme- hgb improving ;  continue IV venofer 200mg IVP qd x 3 (Day 2 of 3)        Rober Alvarenga NP-BC  [employer]  [phone]

## 2024-01-06 NOTE — PROGRESS NOTE ADULT - SUBJECTIVE AND OBJECTIVE BOX
CARDIOLOGY FOLLOW UP - Dr. Saravia  DATE OF SERVICE: 1/6/24    CC  No CV complaints     REVIEW OF SYSTEMS:  CONSTITUTIONAL: No fever, weight loss, or fatigue  RESPIRATORY: No cough, wheezing, chills or hemoptysis; No Shortness of Breath  CARDIOVASCULAR: No chest pain, palpitations, passing out, dizziness, or leg swelling  GASTROINTESTINAL: No abdominal or epigastric pain. No nausea, vomiting, or hematemesis; No diarrhea or constipation. No melena or hematochezia.  VASCULAR: No edema     PHYSICAL EXAM:  T(C): 36.7 (01-06-24 @ 04:49), Max: 37 (01-05-24 @ 11:11)  HR: 68 (01-06-24 @ 04:49) (64 - 68)  BP: 100/59 (01-06-24 @ 04:49) (95/59 - 100/59)  RR: 18 (01-06-24 @ 04:49) (18 - 18)  SpO2: 98% (01-06-24 @ 04:49) (97% - 98%)  Wt(kg): --  I&O's Summary    05 Jan 2024 07:01  -  06 Jan 2024 07:00  --------------------------------------------------------  IN: 480 mL / OUT: 1800 mL / NET: -1320 mL        Appearance: Elderly male 	  Cardiovascular: Normal S1 S2,RRR, No JVD, No murmurs  Respiratory: Lungs clear to auscultation b/l   Gastrointestinal:  Soft, Non-tender, + BS	  Extremities: Normal range of motion, No clubbing, cyanosis or edema      Home Medications:  fluticasone 50 mcg/inh nasal spray: 1 spray(s) in each nostril 2 times a day as needed (04 Jan 2024 12:08)  furosemide 40 mg oral tablet: 1 tab(s) orally once a day as needed (04 Jan 2024 12:08)  gabapentin 100 mg oral capsule: 1 cap(s) orally once a day (at bedtime) (04 Jan 2024 12:08)  mirtazapine 7.5 mg oral tablet: 1 tab(s) orally once a day at 6 PM (04 Jan 2024 12:08)  Saline Mist 0.65% nasal spray: 2 spray(s) intranasally as needed (04 Jan 2024 12:08)  Vyndamax 61 mg oral capsule: 1 cap(s) orally once a day (04 Jan 2024 12:08)      MEDICATIONS  (STANDING):  chlorhexidine 2% Cloths 1 Application(s) Topical <User Schedule>  dextrose 50% Injectable 25 Gram(s) IV Push once  dextrose 50% Injectable 25 milliLiter(s) IV Push once  dextrose 50% Injectable 25 Gram(s) IV Push once  dextrose 50% Injectable 12.5 Gram(s) IV Push once  dextrose Oral Gel 15 Gram(s) Oral once  finasteride 5 milliGRAM(s) Oral daily  glucagon  Injectable 1 milliGRAM(s) IntraMuscular once  heparin   Injectable 5000 Unit(s) SubCutaneous every 12 hours  iron sucrose IVPB 200 milliGRAM(s) IV Intermittent every 24 hours  lactated ringers. 1000 milliLiter(s) (70 mL/Hr) IV Continuous <Continuous>  midodrine 20 milliGRAM(s) Oral every 8 hours  piperacillin/tazobactam IVPB.. 3.375 Gram(s) IV Intermittent every 8 hours  sodium chloride 0.65% Nasal 1 Spray(s) Both Nostrils once  Vyndamax (Tafamidis) 61mg Capsule 1 Capsule(s) 1 Capsule(s) Oral daily      TELEMETRY: NSR 60-70s, PVCs	    ECG:  	  RADIOLOGY:   DIAGNOSTIC TESTING:  [ ] Echocardiogram:  [ ]  Catheterization:  [ ] Stress Test:    OTHER: 	    LABS:	 	                            9.1    5.69  )-----------( 186      ( 06 Jan 2024 06:22 )             27.6     01-06    135  |  106  |  30<H>  ----------------------------<  75  4.5   |  18<L>  |  2.57<H>    Ca    8.4      06 Jan 2024 06:23  Phos  3.7     01-06  Mg     2.0     01-06    TPro  6.3  /  Alb  3.0<L>  /  TBili  0.4  /  DBili  x   /  AST  68<H>  /  ALT  37  /  AlkPhos  74  01-06

## 2024-01-06 NOTE — PROGRESS NOTE ADULT - PROBLEM SELECTOR PLAN 2
Baseline creatinine is, Cr today is 2.69  -patient has morales lower concern for obstruction will order kidney/bladder us for r/o hydro  -may be iso of atn given hypotension on presentation vs prerenal given fluid loss vs remdesvir  -will trial IVF  - Dced remdesivir  -cs nephro  - Fena 1.4% Baseline creatinine is, Cr today is 2.69  -patient has morales lower concern for obstruction will order kidney/bladder us for r/o hydro  -may be iso of atn given hypotension on presentation vs prerenal given fluid loss vs remdesvir  -will trial IVF  - Dced remdesivir  -cs nephro  - Fena 1.4%  - VBG in AM

## 2024-01-07 DIAGNOSIS — E27.40 UNSPECIFIED ADRENOCORTICAL INSUFFICIENCY: ICD-10-CM

## 2024-01-07 DIAGNOSIS — R79.89 OTHER SPECIFIED ABNORMAL FINDINGS OF BLOOD CHEMISTRY: ICD-10-CM

## 2024-01-07 DIAGNOSIS — E16.2 HYPOGLYCEMIA, UNSPECIFIED: ICD-10-CM

## 2024-01-07 LAB
ALBUMIN SERPL ELPH-MCNC: 3.1 G/DL — LOW (ref 3.3–5)
ALBUMIN SERPL ELPH-MCNC: 3.1 G/DL — LOW (ref 3.3–5)
ALP SERPL-CCNC: 81 U/L — SIGNIFICANT CHANGE UP (ref 40–120)
ALP SERPL-CCNC: 81 U/L — SIGNIFICANT CHANGE UP (ref 40–120)
ALT FLD-CCNC: 30 U/L — SIGNIFICANT CHANGE UP (ref 10–45)
ALT FLD-CCNC: 30 U/L — SIGNIFICANT CHANGE UP (ref 10–45)
ANION GAP SERPL CALC-SCNC: 14 MMOL/L — SIGNIFICANT CHANGE UP (ref 5–17)
ANION GAP SERPL CALC-SCNC: 14 MMOL/L — SIGNIFICANT CHANGE UP (ref 5–17)
AST SERPL-CCNC: 53 U/L — HIGH (ref 10–40)
AST SERPL-CCNC: 53 U/L — HIGH (ref 10–40)
BASE EXCESS BLDV CALC-SCNC: -6.2 MMOL/L — LOW (ref -2–3)
BASE EXCESS BLDV CALC-SCNC: -6.2 MMOL/L — LOW (ref -2–3)
BASOPHILS # BLD AUTO: 0.04 K/UL — SIGNIFICANT CHANGE UP (ref 0–0.2)
BASOPHILS # BLD AUTO: 0.04 K/UL — SIGNIFICANT CHANGE UP (ref 0–0.2)
BASOPHILS NFR BLD AUTO: 0.7 % — SIGNIFICANT CHANGE UP (ref 0–2)
BASOPHILS NFR BLD AUTO: 0.7 % — SIGNIFICANT CHANGE UP (ref 0–2)
BILIRUB SERPL-MCNC: 0.3 MG/DL — SIGNIFICANT CHANGE UP (ref 0.2–1.2)
BILIRUB SERPL-MCNC: 0.3 MG/DL — SIGNIFICANT CHANGE UP (ref 0.2–1.2)
BUN SERPL-MCNC: 26 MG/DL — HIGH (ref 7–23)
BUN SERPL-MCNC: 26 MG/DL — HIGH (ref 7–23)
CA-I SERPL-SCNC: 1.2 MMOL/L — SIGNIFICANT CHANGE UP (ref 1.15–1.33)
CA-I SERPL-SCNC: 1.2 MMOL/L — SIGNIFICANT CHANGE UP (ref 1.15–1.33)
CALCIUM SERPL-MCNC: 8.4 MG/DL — SIGNIFICANT CHANGE UP (ref 8.4–10.5)
CALCIUM SERPL-MCNC: 8.4 MG/DL — SIGNIFICANT CHANGE UP (ref 8.4–10.5)
CHLORIDE BLDV-SCNC: 108 MMOL/L — SIGNIFICANT CHANGE UP (ref 96–108)
CHLORIDE BLDV-SCNC: 108 MMOL/L — SIGNIFICANT CHANGE UP (ref 96–108)
CHLORIDE SERPL-SCNC: 106 MMOL/L — SIGNIFICANT CHANGE UP (ref 96–108)
CHLORIDE SERPL-SCNC: 106 MMOL/L — SIGNIFICANT CHANGE UP (ref 96–108)
CO2 BLDV-SCNC: 21 MMOL/L — LOW (ref 22–26)
CO2 BLDV-SCNC: 21 MMOL/L — LOW (ref 22–26)
CO2 SERPL-SCNC: 18 MMOL/L — LOW (ref 22–31)
CO2 SERPL-SCNC: 18 MMOL/L — LOW (ref 22–31)
CORTIS AM PEAK SERPL-MCNC: 2.3 UG/DL — LOW (ref 6–18.4)
CORTIS AM PEAK SERPL-MCNC: 2.3 UG/DL — LOW (ref 6–18.4)
CREAT SERPL-MCNC: 2.58 MG/DL — HIGH (ref 0.5–1.3)
CREAT SERPL-MCNC: 2.58 MG/DL — HIGH (ref 0.5–1.3)
CULTURE RESULTS: SIGNIFICANT CHANGE UP
EGFR: 25 ML/MIN/1.73M2 — LOW
EGFR: 25 ML/MIN/1.73M2 — LOW
EOSINOPHIL # BLD AUTO: 0.67 K/UL — HIGH (ref 0–0.5)
EOSINOPHIL # BLD AUTO: 0.67 K/UL — HIGH (ref 0–0.5)
EOSINOPHIL NFR BLD AUTO: 11.9 % — HIGH (ref 0–6)
EOSINOPHIL NFR BLD AUTO: 11.9 % — HIGH (ref 0–6)
GAS PNL BLDV: 137 MMOL/L — SIGNIFICANT CHANGE UP (ref 136–145)
GAS PNL BLDV: 137 MMOL/L — SIGNIFICANT CHANGE UP (ref 136–145)
GAS PNL BLDV: SIGNIFICANT CHANGE UP
GLUCOSE BLDV-MCNC: 61 MG/DL — LOW (ref 70–99)
GLUCOSE BLDV-MCNC: 61 MG/DL — LOW (ref 70–99)
GLUCOSE SERPL-MCNC: 66 MG/DL — LOW (ref 70–99)
GLUCOSE SERPL-MCNC: 66 MG/DL — LOW (ref 70–99)
HCO3 BLDV-SCNC: 20 MMOL/L — LOW (ref 22–29)
HCO3 BLDV-SCNC: 20 MMOL/L — LOW (ref 22–29)
HCT VFR BLD CALC: 26.7 % — LOW (ref 39–50)
HCT VFR BLD CALC: 26.7 % — LOW (ref 39–50)
HCT VFR BLDA CALC: 27 % — LOW (ref 39–51)
HCT VFR BLDA CALC: 27 % — LOW (ref 39–51)
HGB BLD CALC-MCNC: 9.1 G/DL — LOW (ref 12.6–17.4)
HGB BLD CALC-MCNC: 9.1 G/DL — LOW (ref 12.6–17.4)
HGB BLD-MCNC: 8.9 G/DL — LOW (ref 13–17)
HGB BLD-MCNC: 8.9 G/DL — LOW (ref 13–17)
IMM GRANULOCYTES NFR BLD AUTO: 0.4 % — SIGNIFICANT CHANGE UP (ref 0–0.9)
IMM GRANULOCYTES NFR BLD AUTO: 0.4 % — SIGNIFICANT CHANGE UP (ref 0–0.9)
LACTATE BLDV-MCNC: 2.1 MMOL/L — HIGH (ref 0.5–2)
LACTATE BLDV-MCNC: 2.1 MMOL/L — HIGH (ref 0.5–2)
LYMPHOCYTES # BLD AUTO: 2.89 K/UL — SIGNIFICANT CHANGE UP (ref 1–3.3)
LYMPHOCYTES # BLD AUTO: 2.89 K/UL — SIGNIFICANT CHANGE UP (ref 1–3.3)
LYMPHOCYTES # BLD AUTO: 51.3 % — HIGH (ref 13–44)
LYMPHOCYTES # BLD AUTO: 51.3 % — HIGH (ref 13–44)
MAGNESIUM SERPL-MCNC: 2 MG/DL — SIGNIFICANT CHANGE UP (ref 1.6–2.6)
MAGNESIUM SERPL-MCNC: 2 MG/DL — SIGNIFICANT CHANGE UP (ref 1.6–2.6)
MCHC RBC-ENTMCNC: 26.8 PG — LOW (ref 27–34)
MCHC RBC-ENTMCNC: 26.8 PG — LOW (ref 27–34)
MCHC RBC-ENTMCNC: 33.3 GM/DL — SIGNIFICANT CHANGE UP (ref 32–36)
MCHC RBC-ENTMCNC: 33.3 GM/DL — SIGNIFICANT CHANGE UP (ref 32–36)
MCV RBC AUTO: 80.4 FL — SIGNIFICANT CHANGE UP (ref 80–100)
MCV RBC AUTO: 80.4 FL — SIGNIFICANT CHANGE UP (ref 80–100)
MONOCYTES # BLD AUTO: 0.42 K/UL — SIGNIFICANT CHANGE UP (ref 0–0.9)
MONOCYTES # BLD AUTO: 0.42 K/UL — SIGNIFICANT CHANGE UP (ref 0–0.9)
MONOCYTES NFR BLD AUTO: 7.5 % — SIGNIFICANT CHANGE UP (ref 2–14)
MONOCYTES NFR BLD AUTO: 7.5 % — SIGNIFICANT CHANGE UP (ref 2–14)
NEUTROPHILS # BLD AUTO: 1.59 K/UL — LOW (ref 1.8–7.4)
NEUTROPHILS # BLD AUTO: 1.59 K/UL — LOW (ref 1.8–7.4)
NEUTROPHILS NFR BLD AUTO: 28.2 % — LOW (ref 43–77)
NEUTROPHILS NFR BLD AUTO: 28.2 % — LOW (ref 43–77)
NRBC # BLD: 0 /100 WBCS — SIGNIFICANT CHANGE UP (ref 0–0)
NRBC # BLD: 0 /100 WBCS — SIGNIFICANT CHANGE UP (ref 0–0)
PCO2 BLDV: 39 MMHG — LOW (ref 42–55)
PCO2 BLDV: 39 MMHG — LOW (ref 42–55)
PH BLDV: 7.31 — LOW (ref 7.32–7.43)
PH BLDV: 7.31 — LOW (ref 7.32–7.43)
PHOSPHATE SERPL-MCNC: 3.2 MG/DL — SIGNIFICANT CHANGE UP (ref 2.5–4.5)
PHOSPHATE SERPL-MCNC: 3.2 MG/DL — SIGNIFICANT CHANGE UP (ref 2.5–4.5)
PLATELET # BLD AUTO: 198 K/UL — SIGNIFICANT CHANGE UP (ref 150–400)
PLATELET # BLD AUTO: 198 K/UL — SIGNIFICANT CHANGE UP (ref 150–400)
PO2 BLDV: 41 MMHG — SIGNIFICANT CHANGE UP (ref 25–45)
PO2 BLDV: 41 MMHG — SIGNIFICANT CHANGE UP (ref 25–45)
POTASSIUM BLDV-SCNC: 4.1 MMOL/L — SIGNIFICANT CHANGE UP (ref 3.5–5.1)
POTASSIUM BLDV-SCNC: 4.1 MMOL/L — SIGNIFICANT CHANGE UP (ref 3.5–5.1)
POTASSIUM SERPL-MCNC: 4.1 MMOL/L — SIGNIFICANT CHANGE UP (ref 3.5–5.3)
POTASSIUM SERPL-MCNC: 4.1 MMOL/L — SIGNIFICANT CHANGE UP (ref 3.5–5.3)
POTASSIUM SERPL-SCNC: 4.1 MMOL/L — SIGNIFICANT CHANGE UP (ref 3.5–5.3)
POTASSIUM SERPL-SCNC: 4.1 MMOL/L — SIGNIFICANT CHANGE UP (ref 3.5–5.3)
PROT SERPL-MCNC: 6.3 G/DL — SIGNIFICANT CHANGE UP (ref 6–8.3)
PROT SERPL-MCNC: 6.3 G/DL — SIGNIFICANT CHANGE UP (ref 6–8.3)
RBC # BLD: 3.32 M/UL — LOW (ref 4.2–5.8)
RBC # BLD: 3.32 M/UL — LOW (ref 4.2–5.8)
RBC # FLD: 17.3 % — HIGH (ref 10.3–14.5)
RBC # FLD: 17.3 % — HIGH (ref 10.3–14.5)
SAO2 % BLDV: 69.2 % — SIGNIFICANT CHANGE UP (ref 67–88)
SAO2 % BLDV: 69.2 % — SIGNIFICANT CHANGE UP (ref 67–88)
SODIUM SERPL-SCNC: 138 MMOL/L — SIGNIFICANT CHANGE UP (ref 135–145)
SODIUM SERPL-SCNC: 138 MMOL/L — SIGNIFICANT CHANGE UP (ref 135–145)
SPECIMEN SOURCE: SIGNIFICANT CHANGE UP
WBC # BLD: 5.63 K/UL — SIGNIFICANT CHANGE UP (ref 3.8–10.5)
WBC # BLD: 5.63 K/UL — SIGNIFICANT CHANGE UP (ref 3.8–10.5)
WBC # FLD AUTO: 5.63 K/UL — SIGNIFICANT CHANGE UP (ref 3.8–10.5)
WBC # FLD AUTO: 5.63 K/UL — SIGNIFICANT CHANGE UP (ref 3.8–10.5)

## 2024-01-07 PROCEDURE — 99223 1ST HOSP IP/OBS HIGH 75: CPT

## 2024-01-07 PROCEDURE — 99233 SBSQ HOSP IP/OBS HIGH 50: CPT | Mod: GC

## 2024-01-07 RX ORDER — ONDANSETRON 8 MG/1
4 TABLET, FILM COATED ORAL DAILY
Refills: 0 | Status: DISCONTINUED | OUTPATIENT
Start: 2024-01-07 | End: 2024-01-15

## 2024-01-07 RX ORDER — FUROSEMIDE 40 MG
1 TABLET ORAL
Refills: 0 | DISCHARGE

## 2024-01-07 RX ORDER — HYDROCORTISONE 20 MG
20 TABLET ORAL
Refills: 0 | Status: COMPLETED | OUTPATIENT
Start: 2024-01-07 | End: 2024-01-07

## 2024-01-07 RX ORDER — HYDROCORTISONE 20 MG
20 TABLET ORAL
Refills: 0 | Status: DISCONTINUED | OUTPATIENT
Start: 2024-01-08 | End: 2024-01-10

## 2024-01-07 RX ORDER — GABAPENTIN 400 MG/1
1 CAPSULE ORAL
Refills: 0 | DISCHARGE

## 2024-01-07 RX ADMIN — PIPERACILLIN AND TAZOBACTAM 25 GRAM(S): 4; .5 INJECTION, POWDER, LYOPHILIZED, FOR SOLUTION INTRAVENOUS at 20:31

## 2024-01-07 RX ADMIN — CHLORHEXIDINE GLUCONATE 1 APPLICATION(S): 213 SOLUTION TOPICAL at 05:30

## 2024-01-07 RX ADMIN — MIDODRINE HYDROCHLORIDE 20 MILLIGRAM(S): 2.5 TABLET ORAL at 13:33

## 2024-01-07 RX ADMIN — HEPARIN SODIUM 5000 UNIT(S): 5000 INJECTION INTRAVENOUS; SUBCUTANEOUS at 05:23

## 2024-01-07 RX ADMIN — Medication 20 MILLIGRAM(S): at 17:50

## 2024-01-07 RX ADMIN — FINASTERIDE 5 MILLIGRAM(S): 5 TABLET, FILM COATED ORAL at 10:58

## 2024-01-07 RX ADMIN — IRON SUCROSE 110 MILLIGRAM(S): 20 INJECTION, SOLUTION INTRAVENOUS at 11:08

## 2024-01-07 RX ADMIN — HEPARIN SODIUM 5000 UNIT(S): 5000 INJECTION INTRAVENOUS; SUBCUTANEOUS at 17:51

## 2024-01-07 RX ADMIN — SODIUM CHLORIDE 70 MILLILITER(S): 9 INJECTION, SOLUTION INTRAVENOUS at 10:59

## 2024-01-07 RX ADMIN — MIDODRINE HYDROCHLORIDE 20 MILLIGRAM(S): 2.5 TABLET ORAL at 05:22

## 2024-01-07 RX ADMIN — SODIUM CHLORIDE 70 MILLILITER(S): 9 INJECTION, SOLUTION INTRAVENOUS at 02:14

## 2024-01-07 RX ADMIN — PIPERACILLIN AND TAZOBACTAM 25 GRAM(S): 4; .5 INJECTION, POWDER, LYOPHILIZED, FOR SOLUTION INTRAVENOUS at 05:24

## 2024-01-07 RX ADMIN — Medication 20 MILLIGRAM(S): at 13:31

## 2024-01-07 RX ADMIN — PIPERACILLIN AND TAZOBACTAM 25 GRAM(S): 4; .5 INJECTION, POWDER, LYOPHILIZED, FOR SOLUTION INTRAVENOUS at 13:30

## 2024-01-07 NOTE — PROGRESS NOTE ADULT - SUBJECTIVE AND OBJECTIVE BOX
CARDIOLOGY FOLLOW UP - Dr. Saravia  DATE OF SERVICE: 1/7/24    CC  No CV complaints     REVIEW OF SYSTEMS:  CONSTITUTIONAL: No fever, weight loss, or fatigue  RESPIRATORY: No cough, wheezing, chills or hemoptysis; No Shortness of Breath  CARDIOVASCULAR: No chest pain, palpitations, passing out, dizziness, or leg swelling  GASTROINTESTINAL: No abdominal or epigastric pain. No nausea, vomiting, or hematemesis; No diarrhea or constipation. No melena or hematochezia.  VASCULAR: No edema     PHYSICAL EXAM:  T(C): 36.9 (01-07-24 @ 04:24), Max: 37.1 (01-06-24 @ 21:03)  HR: 69 (01-07-24 @ 04:24) (61 - 70)  BP: 113/68 (01-07-24 @ 04:24) (106/64 - 122/78)  RR: 18 (01-07-24 @ 04:24) (18 - 18)  SpO2: 95% (01-07-24 @ 04:24) (95% - 99%)  Wt(kg): --  I&O's Summary    06 Jan 2024 07:01  -  07 Jan 2024 07:00  --------------------------------------------------------  IN: 1080 mL / OUT: 1850 mL / NET: -770 mL        Appearance: Elderly male 	  Cardiovascular: Normal S1 S2,RRR, No JVD, No murmurs  Respiratory: Lungs clear to auscultation b/l 	  Gastrointestinal:  Soft, Non-tender, + BS	  Extremities: Normal range of motion, No clubbing, cyanosis or edema      Home Medications:  fluticasone 50 mcg/inh nasal spray: 1 spray(s) in each nostril 2 times a day as needed (04 Jan 2024 12:08)  furosemide 40 mg oral tablet: 1 tab(s) orally once a day as needed (04 Jan 2024 12:08)  gabapentin 100 mg oral capsule: 1 cap(s) orally once a day (at bedtime) (04 Jan 2024 12:08)  mirtazapine 7.5 mg oral tablet: 1 tab(s) orally once a day at 6 PM (04 Jan 2024 12:08)  Saline Mist 0.65% nasal spray: 2 spray(s) intranasally as needed (04 Jan 2024 12:08)  Vyndamax 61 mg oral capsule: 1 cap(s) orally once a day (04 Jan 2024 12:08)      MEDICATIONS  (STANDING):  chlorhexidine 2% Cloths 1 Application(s) Topical <User Schedule>  dextrose 50% Injectable 25 Gram(s) IV Push once  dextrose 50% Injectable 12.5 Gram(s) IV Push once  dextrose 50% Injectable 25 milliLiter(s) IV Push once  dextrose 50% Injectable 25 Gram(s) IV Push once  dextrose Oral Gel 15 Gram(s) Oral once  finasteride 5 milliGRAM(s) Oral daily  glucagon  Injectable 1 milliGRAM(s) IntraMuscular once  heparin   Injectable 5000 Unit(s) SubCutaneous every 12 hours  iron sucrose IVPB 200 milliGRAM(s) IV Intermittent every 24 hours  lactated ringers. 1000 milliLiter(s) (70 mL/Hr) IV Continuous <Continuous>  midodrine 20 milliGRAM(s) Oral every 8 hours  piperacillin/tazobactam IVPB.. 3.375 Gram(s) IV Intermittent every 8 hours  sodium chloride 0.65% Nasal 1 Spray(s) Both Nostrils once  Vyndamax (Tafamidis) 61mg Capsule 1 Capsule(s) 1 Capsule(s) Oral daily      TELEMETRY: NSR 60-70	    ECG:  	  RADIOLOGY:   DIAGNOSTIC TESTING:  [ ] Echocardiogram:  [ ]  Catheterization:  [ ] Stress Test:    OTHER: 	    LABS:	 	                            8.9    5.63  )-----------( 198      ( 07 Jan 2024 06:41 )             26.7     01-07    138  |  106  |  26<H>  ----------------------------<  66<L>  4.1   |  18<L>  |  2.58<H>    Ca    8.4      07 Jan 2024 06:40  Phos  3.2     01-07  Mg     2.0     01-07    TPro  6.3  /  Alb  3.1<L>  /  TBili  0.3  /  DBili  x   /  AST  53<H>  /  ALT  30  /  AlkPhos  81  01-07

## 2024-01-07 NOTE — PROGRESS NOTE ADULT - SUBJECTIVE AND OBJECTIVE BOX
***************************************************************  Jose Alejandro Arizmendi, PGY1  Internal Medicine   Preferred contact via Microsoft Teams   ***************************************************************    DEEPTHI GÓMEZ  73y  MRN: [id number]    Patient is a 73y old  Male who presents with a chief complaint of Diarrhea/ (06 Jan 2024 11:55)      Interval/Overnight Events: no events ON.     Subjective: Pt seen and examined at bedside. Denies fever, CP, SOB, abn pain, N/V, dysuria. Tolerating diet.      MEDICATIONS  (STANDING):  chlorhexidine 2% Cloths 1 Application(s) Topical <User Schedule>  dextrose 50% Injectable 25 Gram(s) IV Push once  dextrose 50% Injectable 12.5 Gram(s) IV Push once  dextrose 50% Injectable 25 milliLiter(s) IV Push once  dextrose 50% Injectable 25 Gram(s) IV Push once  dextrose Oral Gel 15 Gram(s) Oral once  finasteride 5 milliGRAM(s) Oral daily  glucagon  Injectable 1 milliGRAM(s) IntraMuscular once  heparin   Injectable 5000 Unit(s) SubCutaneous every 12 hours  iron sucrose IVPB 200 milliGRAM(s) IV Intermittent every 24 hours  lactated ringers. 1000 milliLiter(s) (70 mL/Hr) IV Continuous <Continuous>  midodrine 20 milliGRAM(s) Oral every 8 hours  piperacillin/tazobactam IVPB.. 3.375 Gram(s) IV Intermittent every 8 hours  sodium chloride 0.65% Nasal 1 Spray(s) Both Nostrils once  Vyndamax (Tafamidis) 61mg Capsule 1 Capsule(s) 1 Capsule(s) Oral daily    MEDICATIONS  (PRN):  fluticasone propionate 50 MICROgram(s)/spray Nasal Spray 1 Spray(s) Both Nostrils two times a day PRN congestion  loperamide 2 milliGRAM(s) Oral three times a day PRN Diarrhea      Objective:    Vitals: Vital Signs Last 24 Hrs  T(C): 36.9 (01-07-24 @ 04:24), Max: 37.1 (01-06-24 @ 21:03)  T(F): 98.4 (01-07-24 @ 04:24), Max: 98.7 (01-06-24 @ 21:03)  HR: 69 (01-07-24 @ 04:24) (61 - 70)  BP: 113/68 (01-07-24 @ 04:24) (106/64 - 122/78)  BP(mean): 92 (01-06-24 @ 21:03) (92 - 92)  RR: 18 (01-07-24 @ 04:24) (18 - 18)  SpO2: 95% (01-07-24 @ 04:24) (95% - 99%)                I&O's Summary    06 Jan 2024 07:01  -  07 Jan 2024 07:00  --------------------------------------------------------  IN: 1080 mL / OUT: 1850 mL / NET: -770 mL        PHYSICAL EXAM:  GENERAL: NAD  HEAD:  Atraumatic, Normocephalic  EYES: EOMI, conjunctiva and sclera clear  CHEST/LUNG: Clear to auscultation bilaterally; No rales, rhonchi, wheezing, or rubs  HEART: Regular rate and rhythm; No murmurs, rubs, or gallops  ABDOMEN: Soft, Nontender, Nondistended;   SKIN: No rashes or lesions  NERVOUS SYSTEM:  Alert & Oriented X3, no focal deficits    LABS:                        8.9    5.63  )-----------( 198      ( 07 Jan 2024 06:41 )             26.7                         9.1    5.69  )-----------( 186      ( 06 Jan 2024 06:22 )             27.6                         8.4    4.43  )-----------( 178      ( 05 Jan 2024 13:08 )             25.5     01-07    138  |  106  |  26<H>  ----------------------------<  66<L>  4.1   |  18<L>  |  2.58<H>  01-06    135  |  106  |  30<H>  ----------------------------<  75  4.5   |  18<L>  |  2.57<H>  01-05    137  |  108  |  30<H>  ----------------------------<  78  4.1   |  19<L>  |  2.57<H>    Ca    8.4      07 Jan 2024 06:40  Ca    8.4      06 Jan 2024 06:23  Ca    8.3<L>      05 Jan 2024 05:18  Phos  3.2     01-07  Mg     2.0     01-07    TPro  6.3  /  Alb  3.1<L>  /  TBili  0.3  /  DBili  x   /  AST  53<H>  /  ALT  30  /  AlkPhos  81  01-07  TPro  6.3  /  Alb  3.0<L>  /  TBili  0.4  /  DBili  x   /  AST  68<H>  /  ALT  37  /  AlkPhos  74  01-06  TPro  5.6<L>  /  Alb  2.7<L>  /  TBili  0.3  /  DBili  x   /  AST  83<H>  /  ALT  37  /  AlkPhos  69  01-05    CAPILLARY BLOOD GLUCOSE            Urinalysis Basic - ( 07 Jan 2024 06:40 )    Color: x / Appearance: x / SG: x / pH: x  Gluc: 66 mg/dL / Ketone: x  / Bili: x / Urobili: x   Blood: x / Protein: x / Nitrite: x   Leuk Esterase: x / RBC: x / WBC x   Sq Epi: x / Non Sq Epi: x / Bacteria: x          RADIOLOGY & ADDITIONAL TESTS:    Imaging Personally Reviewed:  [x ] YES  [ ] NO    Consultants involved in case:   Consultant(s) Notes Reviewed:  [ x] YES  [ ] NO:   Care Discussed with Consultants/Other Providers [x ] YES  [ ] NO         ***************************************************************  Jose Alejandro Arizmendi, PGY1  Internal Medicine   Preferred contact via Microsoft Teams   ***************************************************************    DEEPTHI GÓMEZ  73y  MRN: [id number]    Patient is a 73y old  Male who presents with a chief complaint of Diarrhea/ (06 Jan 2024 11:55)      Interval/Overnight Events: no events ON.     Subjective: Pt seen and examined at bedside. Denies fever, CP, SOB, abn pain, N/V, dysuria. Tolerating diet.      MEDICATIONS  (STANDING):  chlorhexidine 2% Cloths 1 Application(s) Topical <User Schedule>  dextrose 50% Injectable 25 Gram(s) IV Push once  dextrose 50% Injectable 12.5 Gram(s) IV Push once  dextrose 50% Injectable 25 milliLiter(s) IV Push once  dextrose 50% Injectable 25 Gram(s) IV Push once  dextrose Oral Gel 15 Gram(s) Oral once  finasteride 5 milliGRAM(s) Oral daily  glucagon  Injectable 1 milliGRAM(s) IntraMuscular once  heparin   Injectable 5000 Unit(s) SubCutaneous every 12 hours  iron sucrose IVPB 200 milliGRAM(s) IV Intermittent every 24 hours  lactated ringers. 1000 milliLiter(s) (70 mL/Hr) IV Continuous <Continuous>  midodrine 20 milliGRAM(s) Oral every 8 hours  piperacillin/tazobactam IVPB.. 3.375 Gram(s) IV Intermittent every 8 hours  sodium chloride 0.65% Nasal 1 Spray(s) Both Nostrils once  Vyndamax (Tafamidis) 61mg Capsule 1 Capsule(s) 1 Capsule(s) Oral daily    MEDICATIONS  (PRN):  fluticasone propionate 50 MICROgram(s)/spray Nasal Spray 1 Spray(s) Both Nostrils two times a day PRN congestion  loperamide 2 milliGRAM(s) Oral three times a day PRN Diarrhea      Objective:    Vitals: Vital Signs Last 24 Hrs  T(C): 36.9 (01-07-24 @ 04:24), Max: 37.1 (01-06-24 @ 21:03)  T(F): 98.4 (01-07-24 @ 04:24), Max: 98.7 (01-06-24 @ 21:03)  HR: 69 (01-07-24 @ 04:24) (61 - 70)  BP: 113/68 (01-07-24 @ 04:24) (106/64 - 122/78)  BP(mean): 92 (01-06-24 @ 21:03) (92 - 92)  RR: 18 (01-07-24 @ 04:24) (18 - 18)  SpO2: 95% (01-07-24 @ 04:24) (95% - 99%)                I&O's Summary    06 Jan 2024 07:01  -  07 Jan 2024 07:00  --------------------------------------------------------  IN: 1080 mL / OUT: 1850 mL / NET: -770 mL        PHYSICAL EXAM:  GENERAL: NAD  HEAD:  Atraumatic, Normocephalic  EYES: EOMI, conjunctiva and sclera clear  CHEST/LUNG: Clear to auscultation bilaterally; No rales, rhonchi, wheezing, or rubs  HEART: Regular rate and rhythm; No murmurs, rubs, or gallops  ABDOMEN: Soft, Nontender, Nondistended;   SKIN: No rashes or lesions  NERVOUS SYSTEM:  Alert & Oriented X3, no focal deficits    LABS:                        8.9    5.63  )-----------( 198      ( 07 Jan 2024 06:41 )             26.7                         9.1    5.69  )-----------( 186      ( 06 Jan 2024 06:22 )             27.6                         8.4    4.43  )-----------( 178      ( 05 Jan 2024 13:08 )             25.5     01-07    138  |  106  |  26<H>  ----------------------------<  66<L>  4.1   |  18<L>  |  2.58<H>  01-06    135  |  106  |  30<H>  ----------------------------<  75  4.5   |  18<L>  |  2.57<H>  01-05    137  |  108  |  30<H>  ----------------------------<  78  4.1   |  19<L>  |  2.57<H>    Ca    8.4      07 Jan 2024 06:40  Ca    8.4      06 Jan 2024 06:23  Ca    8.3<L>      05 Jan 2024 05:18  Phos  3.2     01-07  Mg     2.0     01-07    TPro  6.3  /  Alb  3.1<L>  /  TBili  0.3  /  DBili  x   /  AST  53<H>  /  ALT  30  /  AlkPhos  81  01-07  TPro  6.3  /  Alb  3.0<L>  /  TBili  0.4  /  DBili  x   /  AST  68<H>  /  ALT  37  /  AlkPhos  74  01-06  TPro  5.6<L>  /  Alb  2.7<L>  /  TBili  0.3  /  DBili  x   /  AST  83<H>  /  ALT  37  /  AlkPhos  69  01-05    CAPILLARY BLOOD GLUCOSE            Urinalysis Basic - ( 07 Jan 2024 06:40 )    Color: x / Appearance: x / SG: x / pH: x  Gluc: 66 mg/dL / Ketone: x  / Bili: x / Urobili: x   Blood: x / Protein: x / Nitrite: x   Leuk Esterase: x / RBC: x / WBC x   Sq Epi: x / Non Sq Epi: x / Bacteria: x          RADIOLOGY & ADDITIONAL TESTS:    Imaging Personally Reviewed:  [x ] YES  [ ] NO    Consultants involved in case:   Consultant(s) Notes Reviewed:  [ x] YES  [ ] NO:   Care Discussed with Consultants/Other Providers [x ] YES  [ ] NO         ***************************************************************  Jose Alejandro Arizmendi, PGY1  Internal Medicine   Preferred contact via [employer] Teams   ***************************************************************    DEEPTHI GÓMEZ  73y  MRN: [id number]    Patient is a 73y old  Male who presents with a chief complaint of Diarrhea/ (06 Jan 2024 11:55)      Interval/Overnight Events: no events ON.     Subjective: Pt seen and examined at bedside. No diarheaa.    MEDICATIONS  (STANDING):  chlorhexidine 2% Cloths 1 Application(s) Topical <User Schedule>  dextrose 50% Injectable 25 Gram(s) IV Push once  dextrose 50% Injectable 12.5 Gram(s) IV Push once  dextrose 50% Injectable 25 milliLiter(s) IV Push once  dextrose 50% Injectable 25 Gram(s) IV Push once  dextrose Oral Gel 15 Gram(s) Oral once  finasteride 5 milliGRAM(s) Oral daily  glucagon  Injectable 1 milliGRAM(s) IntraMuscular once  heparin   Injectable 5000 Unit(s) SubCutaneous every 12 hours  iron sucrose IVPB 200 milliGRAM(s) IV Intermittent every 24 hours  lactated ringers. 1000 milliLiter(s) (70 mL/Hr) IV Continuous <Continuous>  midodrine 20 milliGRAM(s) Oral every 8 hours  piperacillin/tazobactam IVPB.. 3.375 Gram(s) IV Intermittent every 8 hours  sodium chloride 0.65% Nasal 1 Spray(s) Both Nostrils once  Vyndamax (Tafamidis) 61mg Capsule 1 Capsule(s) 1 Capsule(s) Oral daily    MEDICATIONS  (PRN):  fluticasone propionate 50 MICROgram(s)/spray Nasal Spray 1 Spray(s) Both Nostrils two times a day PRN congestion  loperamide 2 milliGRAM(s) Oral three times a day PRN Diarrhea      Objective:    Vitals: Vital Signs Last 24 Hrs  T(C): 36.9 (01-07-24 @ 04:24), Max: 37.1 (01-06-24 @ 21:03)  T(F): 98.4 (01-07-24 @ 04:24), Max: 98.7 (01-06-24 @ 21:03)  HR: 69 (01-07-24 @ 04:24) (61 - 70)  BP: 113/68 (01-07-24 @ 04:24) (106/64 - 122/78)  BP(mean): 92 (01-06-24 @ 21:03) (92 - 92)  RR: 18 (01-07-24 @ 04:24) (18 - 18)  SpO2: 95% (01-07-24 @ 04:24) (95% - 99%)                I&O's Summary    06 Jan 2024 07:01  -  07 Jan 2024 07:00  --------------------------------------------------------  IN: 1080 mL / OUT: 1850 mL / NET: -770 mL        PHYSICAL EXAM:  GENERAL: NAD  HEAD:  Atraumatic, Normocephalic  EYES: EOMI, conjunctiva and sclera clear  CHEST/LUNG: Clear to auscultation bilaterally; No rales, rhonchi, wheezing, or rubs  HEART: Regular rate and rhythm; No murmurs, rubs, or gallops  ABDOMEN: Soft, Nontender, Nondistended;   SKIN: No rashes or lesions  NERVOUS SYSTEM:  Alert & Oriented X3, no focal deficits    LABS:                        8.9    5.63  )-----------( 198      ( 07 Jan 2024 06:41 )             26.7                         9.1    5.69  )-----------( 186      ( 06 Jan 2024 06:22 )             27.6                         8.4    4.43  )-----------( 178      ( 05 Jan 2024 13:08 )             25.5     01-07    138  |  106  |  26<H>  ----------------------------<  66<L>  4.1   |  18<L>  |  2.58<H>  01-06    135  |  106  |  30<H>  ----------------------------<  75  4.5   |  18<L>  |  2.57<H>  01-05    137  |  108  |  30<H>  ----------------------------<  78  4.1   |  19<L>  |  2.57<H>    Ca    8.4      07 Jan 2024 06:40  Ca    8.4      06 Jan 2024 06:23  Ca    8.3<L>      05 Jan 2024 05:18  Phos  3.2     01-07  Mg     2.0     01-07    TPro  6.3  /  Alb  3.1<L>  /  TBili  0.3  /  DBili  x   /  AST  53<H>  /  ALT  30  /  AlkPhos  81  01-07  TPro  6.3  /  Alb  3.0<L>  /  TBili  0.4  /  DBili  x   /  AST  68<H>  /  ALT  37  /  AlkPhos  74  01-06  TPro  5.6<L>  /  Alb  2.7<L>  /  TBili  0.3  /  DBili  x   /  AST  83<H>  /  ALT  37  /  AlkPhos  69  01-05    CAPILLARY BLOOD GLUCOSE            Urinalysis Basic - ( 07 Jan 2024 06:40 )    Color: x / Appearance: x / SG: x / pH: x  Gluc: 66 mg/dL / Ketone: x  / Bili: x / Urobili: x   Blood: x / Protein: x / Nitrite: x   Leuk Esterase: x / RBC: x / WBC x   Sq Epi: x / Non Sq Epi: x / Bacteria: x          RADIOLOGY & ADDITIONAL TESTS:    Imaging Personally Reviewed:  [x ] YES  [ ] NO    Consultants involved in case:   Consultant(s) Notes Reviewed:  [ x] YES  [ ] NO:   Care Discussed with Consultants/Other Providers [x ] YES  [ ] NO         ***************************************************************  Jose Alejandro Arizmendi, PGY1  Internal Medicine   Preferred contact via [employer] Teams   ***************************************************************    DEEPTHI GÓMEZ  73y  MRN: [id number]    Patient is a 73y old  Male who presents with a chief complaint of Diarrhea/ (06 Jan 2024 11:55)      Interval/Overnight Events: no events ON.     Subjective: Pt seen and examined at bedside. No diarheaa.    MEDICATIONS  (STANDING):  chlorhexidine 2% Cloths 1 Application(s) Topical <User Schedule>  dextrose 50% Injectable 25 Gram(s) IV Push once  dextrose 50% Injectable 12.5 Gram(s) IV Push once  dextrose 50% Injectable 25 milliLiter(s) IV Push once  dextrose 50% Injectable 25 Gram(s) IV Push once  dextrose Oral Gel 15 Gram(s) Oral once  finasteride 5 milliGRAM(s) Oral daily  glucagon  Injectable 1 milliGRAM(s) IntraMuscular once  heparin   Injectable 5000 Unit(s) SubCutaneous every 12 hours  iron sucrose IVPB 200 milliGRAM(s) IV Intermittent every 24 hours  lactated ringers. 1000 milliLiter(s) (70 mL/Hr) IV Continuous <Continuous>  midodrine 20 milliGRAM(s) Oral every 8 hours  piperacillin/tazobactam IVPB.. 3.375 Gram(s) IV Intermittent every 8 hours  sodium chloride 0.65% Nasal 1 Spray(s) Both Nostrils once  Vyndamax (Tafamidis) 61mg Capsule 1 Capsule(s) 1 Capsule(s) Oral daily    MEDICATIONS  (PRN):  fluticasone propionate 50 MICROgram(s)/spray Nasal Spray 1 Spray(s) Both Nostrils two times a day PRN congestion  loperamide 2 milliGRAM(s) Oral three times a day PRN Diarrhea      Objective:    Vitals: Vital Signs Last 24 Hrs  T(C): 36.9 (01-07-24 @ 04:24), Max: 37.1 (01-06-24 @ 21:03)  T(F): 98.4 (01-07-24 @ 04:24), Max: 98.7 (01-06-24 @ 21:03)  HR: 69 (01-07-24 @ 04:24) (61 - 70)  BP: 113/68 (01-07-24 @ 04:24) (106/64 - 122/78)  BP(mean): 92 (01-06-24 @ 21:03) (92 - 92)  RR: 18 (01-07-24 @ 04:24) (18 - 18)  SpO2: 95% (01-07-24 @ 04:24) (95% - 99%)                I&O's Summary    06 Jan 2024 07:01  -  07 Jan 2024 07:00  --------------------------------------------------------  IN: 1080 mL / OUT: 1850 mL / NET: -770 mL        PHYSICAL EXAM:  GENERAL: NAD  HEAD:  Atraumatic, Normocephalic  EYES: EOMI, conjunctiva and sclera clear  CHEST/LUNG: Clear to auscultation bilaterally; No rales, rhonchi, wheezing, or rubs  HEART: Regular rate and rhythm; No murmurs, rubs, or gallops  ABDOMEN: Soft, Nontender, Nondistended;   SKIN: No rashes or lesions  NERVOUS SYSTEM:  Alert & Oriented X3, no focal deficits    LABS:                        8.9    5.63  )-----------( 198      ( 07 Jan 2024 06:41 )             26.7                         9.1    5.69  )-----------( 186      ( 06 Jan 2024 06:22 )             27.6                         8.4    4.43  )-----------( 178      ( 05 Jan 2024 13:08 )             25.5     01-07    138  |  106  |  26<H>  ----------------------------<  66<L>  4.1   |  18<L>  |  2.58<H>  01-06    135  |  106  |  30<H>  ----------------------------<  75  4.5   |  18<L>  |  2.57<H>  01-05    137  |  108  |  30<H>  ----------------------------<  78  4.1   |  19<L>  |  2.57<H>    Ca    8.4      07 Jan 2024 06:40  Ca    8.4      06 Jan 2024 06:23  Ca    8.3<L>      05 Jan 2024 05:18  Phos  3.2     01-07  Mg     2.0     01-07    TPro  6.3  /  Alb  3.1<L>  /  TBili  0.3  /  DBili  x   /  AST  53<H>  /  ALT  30  /  AlkPhos  81  01-07  TPro  6.3  /  Alb  3.0<L>  /  TBili  0.4  /  DBili  x   /  AST  68<H>  /  ALT  37  /  AlkPhos  74  01-06  TPro  5.6<L>  /  Alb  2.7<L>  /  TBili  0.3  /  DBili  x   /  AST  83<H>  /  ALT  37  /  AlkPhos  69  01-05    CAPILLARY BLOOD GLUCOSE            Urinalysis Basic - ( 07 Jan 2024 06:40 )    Color: x / Appearance: x / SG: x / pH: x  Gluc: 66 mg/dL / Ketone: x  / Bili: x / Urobili: x   Blood: x / Protein: x / Nitrite: x   Leuk Esterase: x / RBC: x / WBC x   Sq Epi: x / Non Sq Epi: x / Bacteria: x          RADIOLOGY & ADDITIONAL TESTS:    Imaging Personally Reviewed:  [x ] YES  [ ] NO    Consultants involved in case:   Consultant(s) Notes Reviewed:  [ x] YES  [ ] NO:   Care Discussed with Consultants/Other Providers [x ] YES  [ ] NO

## 2024-01-07 NOTE — PROGRESS NOTE ADULT - PROBLEM SELECTOR PLAN 3
-Hold anti-diarrheal medications in context of potential viral or bacterial GI infection-> started loperamide 1/3  -Continue IVF

## 2024-01-07 NOTE — CONSULT NOTE ADULT - ASSESSMENT
Mr. Sanchez is a 73M w/ BPH (c/b urinary retention s/p Harkins catheter placement; exchanged monthly), CKD stage IIIb, and cardiac amyloidosis brought in by daughter for diarrhea (loose-watery BM's) over the past few days found to have COVID and urosepsis. Endocrinology consulted for evaluation for adrenal insufficiency.    #Adrenal insufficiency  #Hypoglycemia  #Sepsis  - 1/5 1:08pm cortisol 3.1, 1/7 6:42am cortisol 2.3  - 1/6 12pm ACTH 2.5  - BG on BMP 1/3 49  - no hyponatremia or hypoglycemia  - patient HD stable on midodrine    PLAN Mr. Sanchez is a 73M w/ BPH (c/b urinary retention s/p Harkins catheter placement; exchanged monthly), CKD stage IIIb, and cardiac amyloidosis brought in by daughter for diarrhea (loose-watery BM's) over the past few days found to have COVID and urosepsis. Endocrinology consulted for evaluation for adrenal insufficiency.    #Adrenal insufficiency  #Hypoglycemia  #Sepsis  #Abnormal thyroid function tests  - 1/5 1:08pm cortisol 3.1, 1/7 6:42am cortisol 2.3  - 1/6 12pm ACTH 2.5  - BG on BMP 1/3 49  - 1/3 TSH 5.54, TT3 57, FT4 0.9  - A1c 5< hypoglycemia defined as serum glucose <55 which patient had as above  - no hyponatremia or hypoglycemia  - patient HD stable on midodrine    PLAN Mr. Sanchez is a 73M w/ BPH (c/b urinary retention s/p Harkins catheter placement; exchanged monthly), CKD stage IIIb, and cardiac amyloidosis brought in by daughter for diarrhea (loose-watery BM's) over the past few days found to have COVID and urosepsis. Endocrinology consulted for evaluation for adrenal insufficiency.    #Adrenal insufficiency  #Hypoglycemia  #Sepsis  #Abnormal thyroid function tests  - 1/5 1:08pm cortisol 3.1, 1/7 6:42am cortisol 2.3  - 1/6 12pm ACTH 2.5  - BG on BMP 1/3 49  - 1/3 TSH 5.54, TT3 57, FT4 0.9  - A1c 5< hypoglycemia defined as serum glucose <55 which patient had as above  - no hyponatremia or hypoglycemia  - patient HD stable on midodrine  - endorses weight loss, fatigue, poor appetite  - no history of prolonged steroids, opioids, immunotherapy, radiation    PLAN Mr. Sanchez is a 73M w/ BPH (c/b urinary retention s/p Harkins catheter placement; exchanged monthly), CKD stage IIIb, and cardiac amyloidosis brought in by daughter for diarrhea (loose-watery BM's) over the past few days found to have COVID and urosepsis. Endocrinology consulted for evaluation for adrenal insufficiency.    #Adrenal insufficiency  #Hypoglycemia  #Sepsis  #Abnormal thyroid function tests  - 1/5 1:08pm cortisol 3.1, 1/7 6:42am cortisol 2.3  - 1/6 12pm ACTH 2.5  - BG on BMP 1/3 49  - 1/3 TSH 5.54, TT3 57, FT4 0.9  - A1c 5< hypoglycemia defined as serum glucose <55 which patient had as above  - no hyponatremia or hypoglycemia  - patient HD stable on midodrine  - endorses weight loss, fatigue, poor appetite  - no history of prolonged steroids, opioids, immunotherapy, radiation  - does not appear that tafamidis can cause adrenal insufficiency  - likely secondary adrenal insufficiency unclear cause at this point however can possibly be from amyloidosis vs pituitary etiology  PLAN  - give hydrocortisone 20mg PO now 12pm and 20mg PO at 5pm  - start hydrocortisone 20mg at 8am and 20mg at 3pm on 1/8  - repeat TSH, free thyroxine, total t3  - check prolactin, IGF-1  - check 8am LH, FSH and testosterone  - will likely require MRI sella/pituitary to screen for pituitary disorder, will decide pending above workup    Discussed with primary team.     Thank you for the interesting consult.    Vicente Milan MD, Endocrinology Fellow  For non-urgent follow up questions, please email [email or web address] or [email or web address].  Pager [phone] from 9am to 5pm. After hours and on weekends, please call [phone].     Mr. Sanchez is a 73M w/ BPH (c/b urinary retention s/p Harkins catheter placement; exchanged monthly), CKD stage IIIb, and cardiac amyloidosis brought in by daughter for diarrhea (loose-watery BM's) over the past few days found to have COVID and urosepsis. Endocrinology consulted for evaluation for adrenal insufficiency.    #Adrenal insufficiency  #Hypoglycemia  #Sepsis  #Abnormal thyroid function tests  - 1/5 1:08pm cortisol 3.1, 1/7 6:42am cortisol 2.3  - 1/6 12pm ACTH 2.5  - BG on BMP 1/3 49  - 1/3 TSH 5.54, TT3 57, FT4 0.9  - A1c 5< hypoglycemia defined as serum glucose <55 which patient had as above  - no hyponatremia or hypoglycemia  - patient HD stable on midodrine  - endorses weight loss, fatigue, poor appetite  - no history of prolonged steroids, opioids, immunotherapy, radiation  - does not appear that tafamidis can cause adrenal insufficiency  - likely secondary adrenal insufficiency unclear cause at this point however can possibly be from amyloidosis vs pituitary etiology  PLAN  - give hydrocortisone 20mg PO now 12pm and 20mg PO at 5pm  - start hydrocortisone 20mg at 8am and 20mg at 3pm on 1/8  - repeat TSH, free thyroxine, total t3  - check prolactin, IGF-1  - check 8am LH, FSH and testosterone  - will likely require MRI sella/pituitary to screen for pituitary disorder, will decide pending above workup    Discussed with primary team.     Thank you for the interesting consult.    Vicente Milan MD, Endocrinology Fellow  For non-urgent follow up questions, please email [email or web address] or [email or web address].  Pager [phone] from 9am to 5pm. After hours and on weekends, please call [phone].     Mr. Sanchez is a 73M w/ BPH (c/b urinary retention s/p Harkins catheter placement; exchanged monthly), CKD stage IIIb, and cardiac amyloidosis brought in by daughter for diarrhea (loose-watery BM's) over the past few days found to have COVID and urosepsis. Endocrinology consulted for evaluation for adrenal insufficiency.    #Adrenal insufficiency  #Hypoglycemia  #Sepsis  #Abnormal thyroid function tests  - 1/5 1:08pm cortisol 3.1, 1/7 6:42am cortisol 2.3  - 1/6 12pm ACTH 2.5  - BG on BMP 1/3 49  - 1/3 TSH 5.54, TT3 57, FT4 0.9  - A1c 5< hypoglycemia defined as serum glucose <55 which patient had as above  - CT abdomen 12/23 without adrenal gland abnormality  - CT head without pituitary etiology however not optimal test to evaluate pituitary  - no hyponatremia or hypoglycemia  - patient HD stable on midodrine  - endorses weight loss, fatigue, poor appetite  - no history of prolonged steroids, opioids, immunotherapy, radiation  - does not appear that tafamidis can cause adrenal insufficiency  - likely secondary adrenal insufficiency unclear cause at this point however can possibly be from amyloidosis vs pituitary etiology  PLAN  - give hydrocortisone 20mg PO now 12pm and 20mg PO at 5pm  - start hydrocortisone 20mg at 8am and 20mg at 3pm on 1/8  - repeat TSH, free thyroxine, total t3  - check prolactin, IGF-1  - check 8am LH, FSH and testosterone  - will likely require MRI sella/pituitary to screen for pituitary disorder, will decide pending above workup    Discussed with primary team.     Thank you for the interesting consult.    Vicente Milan MD, Endocrinology Fellow  For non-urgent follow up questions, please email [email or web address] or [email or web address].  Pager [phone] from 9am to 5pm. After hours and on weekends, please call [phone].     Mr. Sanchez is a 73M w/ BPH (c/b urinary retention s/p Harkins catheter placement; exchanged monthly), CKD stage IIIb, and cardiac amyloidosis brought in by daughter for diarrhea (loose-watery BM's) over the past few days found to have COVID and urosepsis. Endocrinology consulted for evaluation for adrenal insufficiency.    #Adrenal insufficiency  #Hypoglycemia  #Sepsis  #Abnormal thyroid function tests  - 1/5 1:08pm cortisol 3.1, 1/7 6:42am cortisol 2.3  - 1/6 12pm ACTH 2.5  - BG on BMP 1/3 49  - 1/3 TSH 5.54, TT3 57, FT4 0.9  - A1c 5< hypoglycemia defined as serum glucose <55 which patient had as above  - CT abdomen 12/23 without adrenal gland abnormality  - CT head without pituitary etiology however not optimal test to evaluate pituitary  - no hyponatremia or hypoglycemia  - patient HD stable on midodrine  - endorses weight loss, fatigue, poor appetite  - no history of prolonged steroids, opioids, immunotherapy, radiation  - does not appear that tafamidis can cause adrenal insufficiency  - likely secondary adrenal insufficiency unclear cause at this point however can possibly be from amyloidosis vs pituitary etiology  PLAN  - give hydrocortisone 20mg PO now 12pm and 20mg PO at 5pm  - start hydrocortisone 20mg at 8am and 20mg at 3pm on 1/8  - repeat TSH, free thyroxine, total t3  - check prolactin, IGF-1  - check 8am LH, FSH and testosterone  - will likely require MRI sella/pituitary to screen for pituitary disorder, will decide pending above workup    Discussed with primary team.     Thank you for the interesting consult.    Vicente Milan MD, Endocrinology Fellow  For non-urgent follow up questions, please email [email or web address] or [email or web address].  Pager [phone] from 9am to 5pm. After hours and on weekends, please call [phone].

## 2024-01-07 NOTE — PROGRESS NOTE ADULT - PROBLEM SELECTOR PLAN 4
At this time likely iso of hypovolemia given clinical history and physical exam, although cannot rule out distributive.     -c/w midodrine 20mg tid wean as tolerated  -strict i's and o's with standing weight At this time likely iso of hypovolemia given clinical history and physical exam, although cannot rule out distributive.     -c/w midodrine 20mg tid wean as tolerated  -strict i's and o's with standing weight  - ACTH and AM cortisol low-> Endo consulted would appreciate recs

## 2024-01-07 NOTE — CONSULT NOTE ADULT - ATTENDING COMMENTS
73M w/ BPH (c/b urinary retention s/p Harkins catheter placement; exchanged monthly), CKD stage IIIb, and cardiac amyloidosis brought in by daughter for diarrhea (loose-watery BM's) over the past few days found to have COVID and urosepsis. Endocrinology consulted for evaluation for adrenal insufficiency.  He has had low BP, hypoglycemia and fatigue. While some of the symptoms can be seen in sepsis, AM cortisol was 2.3, giving high suspicion for AI.  ACTH checked around noon was low suggesting possible central AI.  Etiology unclear.  Would check pituitary labs and consider MRI pituitary based on the labs. Would evaluate HPA axis in OP setting again.  Rest of the plan as per fellow's note. 73M w/ BPH (c/b urinary retention s/p Harkins catheter placement; exchanged monthly), CKD stage IIIb, and cardiac amyloidosis brought in by daughter for diarrhea (loose-watery BM's) over the past few days found to have COVID and urosepsis. Endocrinology consulted for evaluation for adrenal insufficiency.  He has had low BP, hypoglycemia and fatigue. While some of the symptoms can be seen in sepsis, AM cortisol was 2.3, giving high suspicion for AI.  BP improved after starting midodrine. ACTH checked around noon was low suggesting possible central AI.  Etiology unclear.  Would check pituitary labs and consider MRI pituitary based on the labs. Would evaluate HPA axis in OP setting again.  Rest of the plan as per fellow's note.

## 2024-01-07 NOTE — PROGRESS NOTE ADULT - PROBLEM SELECTOR PLAN 2
[address] [place] [address] [address]. [place] [address]  Dept: [phone]  Dept Fax: [phone]  Loc: [phone]    Edilia Scheuermann is a 9 y.o. male who presents today for 3 year well child exam.    Going into     Subjective:      History was provided by the mother. Current Issues:  Current concerns include: none  Toilet trained? yes    Review of Nutrition:  Current diet: regular    Health Supervision Questions:  No question data found. Social Screening:  Current child-care arrangements: : 3 days per week, 3.5 hrs per day  Opportunities for peer interaction? yes - at     Developmental screening:      Past Medical History   has no past medical history on file. Birth History  Birth History    Birth     Weight: 6 lb 2.9 oz (2.805 kg)     HC 34.3 cm (13.5\")    Apgar     One: 8.0     Five: 9.0    Delivery Method: Vaginal, Vacuum (Extractor)    Gestation Age: 39 3/7 wks        Immunizations  Immunization History   Administered Date(s) Administered    DTaP/Hib/IPV (Pentacel) 2015, 2015, 2015, 2016    Hepatitis A Ped/Adol (Havrix, Vaqta) 2016, 2016    Hepatitis B (Recombivax HB) 2015    Hepatitis B Ped/Adol (Engerix-B, Recombivax HB) 2015    Hepatitis B vaccine 2015    Influenza, Quadv, IM, (6 mo and older Fluzone, Flulaval, Fluarix and 3 yrs and older Afluria) 10/29/2018    MMR 2016    Pneumococcal Conjugate 13-valent (Morgan Luevanoyd) 2015, 2015, 2016    Rotavirus Pentavalent (RotaTeq) 2015    Rotavirus Vaccine 2015    Varicella (Varivax) 2016       Past Surgical History   has no past surgical history on file. Family History  family history is not on file. Social History   reports that he has never smoked.  He has never used smokeless tobacco. He reports that he does not drink alcohol or use drugs.    Medications  No current outpatient medications on file. Review of Systems by Age:  Review of Systems   Constitutional: Negative for activity change, appetite change, chills, fatigue, fever and irritability. HENT: Negative for congestion, ear pain and sore throat. Eyes: Negative for pain, discharge, redness and itching. Respiratory: Negative for cough, shortness of breath and wheezing. Cardiovascular: Negative for chest pain. Gastrointestinal: Negative for abdominal distention, abdominal pain, constipation, diarrhea, nausea and vomiting. Genitourinary: Negative for difficulty urinating and dysuria. Musculoskeletal: Negative for arthralgias and myalgias. Neurological: Negative for dizziness, light-headedness and headaches. Psychiatric/Behavioral: Negative for agitation, behavioral problems, decreased concentration and sleep disturbance. The patient is not hyperactive.         Objective:     Vitals:    08/26/20 1528   BP: 92/70   Site: Right Upper Arm   Position: Sitting   Cuff Size: Child   Pulse: 90   Temp: 98.1 °F (36.7 °C)   TempSrc: Temporal   SpO2: 96%   Weight: 42 lb (19.1 kg)   Height: 44.5\" (113 cm)       General:   alert, appears stated age and cooperative   Gait:   normal   Skin:   normal   Oral cavity:   lips, mucosa, and tongue normal; teeth and gums normal   Eyes:   sclerae white, pupils equal and reactive, red reflex normal bilaterally   Ears:   normal bilaterally   Neck:   no adenopathy, no carotid bruit, no JVD, supple, symmetrical, trachea midline and thyroid not enlarged, symmetric, no tenderness/mass/nodules   Lungs:  clear to auscultation bilaterally   Heart:   regular rate and rhythm, S1, S2 normal, no murmur, click, rub or gallop   Abdomen:  soft, non-tender; bowel sounds normal; no masses,  no organomegaly   :  normal male   Extremities:   extremities normal, atraumatic, no cyanosis or edema   Neuro:  normal without focal findings, mental status, speech normal, alert and oriented x3, CHERYL and reflexes normal and symmetric        No exam data present    Growth parameters are noted. Assessment/Plan:      Diagnosis Orders   1. Encounter for routine child health examination without abnormal findings       Normal exam  Ok for     No orders of the defined types were placed in this encounter. Return in about 1 year (around 8/26/2021). Age appropriate anticipatory guidance was reviewed in detail with parent/guardian.   given educational materials and well child handout - see patient instructions. Anticipatory guidance was reviewed. All questions answered. Parent/guardian voiced understanding.       Electronically signed by QUINCY Alvarez CNP on 8/31/2020 at 8:44 AM Baseline creatinine is, Cr today is 2.69  -patient has morales lower concern for obstruction will order kidney/bladder us for r/o hydro  -may be iso of atn given hypotension on presentation vs prerenal given fluid loss vs remdesvir  -will trial IVF  - Dced remdesivir  -cs nephro  - Fena 1.4%  - VBG in AM Baseline creatinine is, Cr today is 2.69  -patient has morales lower concern for obstruction will order kidney/bladder us for r/o hydro  -may be iso of atn given hypotension on presentation vs prerenal given fluid loss vs remdesvir  -will trial IVF  - Dced remdesivir  -cs nephro  - Fena 1.4%  - VBG in AM-> elevated lactate at 2.1 but pt not symptomatic Baseline creatinine is 1.6, Cr today is 2.58  -patient has morales lower concern for obstruction will order kidney/bladder us for r/o hydro  -may be iso of atn given hypotension on presentation vs prerenal given fluid loss vs remdesvir  -will trial IVF  - Dced remdesivir  -cs nephro  - Fena 1.4%  - VBG in AM-> elevated lactate at 2.1 but pt not symptomatic

## 2024-01-07 NOTE — PROGRESS NOTE ADULT - PROBLEM SELECTOR PLAN 1
-met SIRs criteria on admission with concerning source of GI vs UTI  -previous hx of E.faecalis and Stenotrophomonas maltophilia  -Likely Pyelo in context of CT with perinephric standing, may also be due to proctitis       -ID consulted recs appreciated   - c/w zosyn for 7 day course   -ucx demonstrating efaecalis pending sens  -bcx ngtd  -gi pcr and cdiff neg

## 2024-01-07 NOTE — CONSULT NOTE ADULT - SUBJECTIVE AND OBJECTIVE BOX
ENDOCRINE INITIAL CONSULT - adrenal insufficiency    HPI:  Mr. Sanchez is a 73M w/ BPH (c/b urinary retention s/p Harkins catheter placement; exchanged monthly), CKD stage IIIb, and cardiac amyloidosis brought in by daughter for diarrhea (loose-watery BM's) over the past few days. The pt reports no new medications or food triggers. No relieving factors. Pt could not delineate category of diarrhea.  The patient had a fall yesterday, pt denied any preceding nausea or associated LOC. Pt was recently hospitalized in December for urosepsis and abdominal pain and was found to have E. Faecalis and S. Maltophilia     In the ED VS were notable for low SBPs in 80-90s at one point pt was tachycardic to 102. Labs and imaging were notable for HgB 9.2, Cr 1.92, , , ALT 63, U/A LE, and Pan CT notable for spinal stenosis, spoondolysthesis, procitits, and pyelonephritis. In the ED pt received 4L of fluids, ofirmev, midodrine, zoysn, vancomycin, and norephinephrine GTT. MICU consulted by ED due to hypotension requiring norepi gtt, pt not deemed a candidate at the time.     (02 Jan 2024 15:54)      ENDOCRINE HISTORY     Endocrinologist:  Social History:  Family History:  Surgical History:  Insurance:  Resides In:    PAST MEDICAL & SURGICAL HISTORY:  Cardiac amyloidosis      Stenosis, cervical spine      BPH (benign prostatic hyperplasia)      Indwelling Harkins catheter present      Stage 3 chronic kidney disease      S/P cataract extraction  B/L      S/P nasal polypectomy      History of fusion of cervical spine          FAMILY HISTORY:      Social History:  Pt lives with daughter and wife . Pt does not drink or smoke. (02 Jan 2024 15:54)      Home Medications:  fluticasone 50 mcg/inh nasal spray: 1 spray(s) in each nostril 2 times a day as needed (04 Jan 2024 12:08)  mirtazapine 7.5 mg oral tablet: 1 tab(s) orally once a day at 6 PM (04 Jan 2024 12:08)  Saline Mist 0.65% nasal spray: 2 spray(s) intranasally as needed (04 Jan 2024 12:08)  Vyndamax 61 mg oral capsule: 1 cap(s) orally once a day (04 Jan 2024 12:08)      MEDICATIONS  (STANDING):  chlorhexidine 2% Cloths 1 Application(s) Topical <User Schedule>  dextrose 50% Injectable 25 milliLiter(s) IV Push once  dextrose 50% Injectable 25 Gram(s) IV Push once  dextrose 50% Injectable 25 Gram(s) IV Push once  dextrose 50% Injectable 12.5 Gram(s) IV Push once  dextrose Oral Gel 15 Gram(s) Oral once  finasteride 5 milliGRAM(s) Oral daily  glucagon  Injectable 1 milliGRAM(s) IntraMuscular once  heparin   Injectable 5000 Unit(s) SubCutaneous every 12 hours  iron sucrose IVPB 200 milliGRAM(s) IV Intermittent every 24 hours  lactated ringers. 1000 milliLiter(s) (70 mL/Hr) IV Continuous <Continuous>  midodrine 20 milliGRAM(s) Oral every 8 hours  piperacillin/tazobactam IVPB.. 3.375 Gram(s) IV Intermittent every 8 hours  sodium chloride 0.65% Nasal 1 Spray(s) Both Nostrils once  Vyndamax (Tafamidis) 61mg Capsule 1 Capsule(s) 1 Capsule(s) Oral daily    MEDICATIONS  (PRN):  fluticasone propionate 50 MICROgram(s)/spray Nasal Spray 1 Spray(s) Both Nostrils two times a day PRN congestion  loperamide 2 milliGRAM(s) Oral three times a day PRN Diarrhea      Allergies    No Known Allergies    Intolerances        REVIEW OF SYSTEMS  Constitutional: No fever  Eyes: No blurry vision  Neuro: No tremors  HEENT: No pain  Cardiovascular: No chest pain, palpitations  Respiratory: No SOB, no cough  GI: No nausea, vomiting, abdominal pain  : No dysuria  Skin: no rash  Psych: no depression  Endocrine: no polyuria, polydipsia  Hem/lymph: no swelling  Osteoporosis: no fractures  ALL OTHER SYSTEMS REVIEWED AND NEGATIVE     UNABLE TO OBTAIN     PHYSICAL EXAM   Vital Signs Last 24 Hrs  T(C): 36.9 (07 Jan 2024 04:24), Max: 37.1 (06 Jan 2024 21:03)  T(F): 98.4 (07 Jan 2024 04:24), Max: 98.7 (06 Jan 2024 21:03)  HR: 69 (07 Jan 2024 04:24) (61 - 70)  BP: 113/68 (07 Jan 2024 04:24) (106/64 - 122/78)  BP(mean): 92 (06 Jan 2024 21:03) (92 - 92)  RR: 18 (07 Jan 2024 04:24) (18 - 18)  SpO2: 95% (07 Jan 2024 04:24) (95% - 99%)    Parameters below as of 07 Jan 2024 04:24  Patient On (Oxygen Delivery Method): room air      GENERAL: NAD, well-groomed, well-developed  EYES: No proptosis, no lid lag, anicteric  HEENT:  Atraumatic, Normocephalic, moist mucous membranes  THYROID: Normal size, no palpable nodules  RESPIRATORY: Clear to auscultation bilaterally; No rales, rhonchi, wheezing  CARDIOVASCULAR: Regular rate and rhythm; No murmurs; no peripheral edema  GI: Soft, nontender, non distended, normal bowel sounds  SKIN: Dry, intact, No rashes or lesions  MUSCULOSKELETAL: Full range of motion, normal strength  NEURO: sensation intact, extraocular movements intact, no tremor  PSYCH: Alert and oriented x 3, normal affect, normal mood  CUSHING'S SIGNS: no striae    CAPILLARY BLOOD GLUCOSE          A1C with Estimated Average Glucose Result: 5.0 % (01-03-24 @ 06:01)  A1C with Estimated Average Glucose Result: 5.3 % (11-04-23 @ 10:45)                            8.9    5.63  )-----------( 198      ( 07 Jan 2024 06:41 )             26.7       01-07    138  |  106  |  26<H>  ----------------------------<  66<L>  4.1   |  18<L>  |  2.58<H>    Ca    8.4      07 Jan 2024 06:40  Phos  3.2     01-07  Mg     2.0     01-07    TPro  6.3  /  Alb  3.1<L>  /  TBili  0.3  /  DBili  x   /  AST  53<H>  /  ALT  30  /  AlkPhos  81  01-07      Thyroid Stimulating Hormone, Serum: 5.54 uIU/mL (01-03-24 @ 06:01)      LIPIDS    RADIOLOGY ENDOCRINE INITIAL CONSULT - adrenal insufficiency    HPI:  Mr. Sanchez is a 73M w/ BPH (c/b urinary retention s/p Harkins catheter placement; exchanged monthly), CKD stage IIIb, and cardiac amyloidosis brought in by daughter for diarrhea (loose-watery BM's) over the past few days. The pt reports no new medications or food triggers. No relieving factors. Pt could not delineate category of diarrhea.  The patient had a fall yesterday, pt denied any preceding nausea or associated LOC. Pt was recently hospitalized in December for urosepsis and abdominal pain and was found to have E. Faecalis and S. Maltophilia     In the ED VS were notable for low SBPs in 80-90s at one point pt was tachycardic to 102. Labs and imaging were notable for HgB 9.2, Cr 1.92, , , ALT 63, U/A LE, and Pan CT notable for spinal stenosis, spoondolysthesis, procitits, and pyelonephritis. In the ED pt received 4L of fluids, ofirmev, midodrine, zoysn, vancomycin, and norephinephrine GTT. MICU consulted by ED due to hypotension requiring norepi gtt, pt not deemed a candidate at the time.     (02 Jan 2024 15:54)      ENDOCRINE HISTORY   The patient denies being told he has a history of low cortisol, adrenal gland disorder, thyroid disease, pituitary disorder, radiation, chemotherapy, immunotherapy history. He denies history of steroid use, reports he may have taken opioids for one week a t some point but was not sure when. The patient reports he has been fatigued, had unexplained weight loss, diarrhea, poor appetite, dry skin. He denies nausea, vomiting, abdominal pain, fevers, chills, chest pain, sob, tremor, palpitations, hair loss.     Social History: denies illicit substance use  Family History: denies family history of adrenal gland disorder, thyroid disease or diabetes  Surgical History: as above    PAST MEDICAL & SURGICAL HISTORY:  Cardiac amyloidosis      Stenosis, cervical spine      BPH (benign prostatic hyperplasia)      Indwelling Harkins catheter present      Stage 3 chronic kidney disease      S/P cataract extraction  B/L      S/P nasal polypectomy      History of fusion of cervical spine          FAMILY HISTORY:      Social History:  Pt lives with daughter and wife . Pt does not drink or smoke. (02 Jan 2024 15:54)      Home Medications:  fluticasone 50 mcg/inh nasal spray: 1 spray(s) in each nostril 2 times a day as needed (04 Jan 2024 12:08)  mirtazapine 7.5 mg oral tablet: 1 tab(s) orally once a day at 6 PM (04 Jan 2024 12:08)  Saline Mist 0.65% nasal spray: 2 spray(s) intranasally as needed (04 Jan 2024 12:08)  Vyndamax 61 mg oral capsule: 1 cap(s) orally once a day (04 Jan 2024 12:08)      MEDICATIONS  (STANDING):  chlorhexidine 2% Cloths 1 Application(s) Topical <User Schedule>  dextrose 50% Injectable 25 milliLiter(s) IV Push once  dextrose 50% Injectable 25 Gram(s) IV Push once  dextrose 50% Injectable 25 Gram(s) IV Push once  dextrose 50% Injectable 12.5 Gram(s) IV Push once  dextrose Oral Gel 15 Gram(s) Oral once  finasteride 5 milliGRAM(s) Oral daily  glucagon  Injectable 1 milliGRAM(s) IntraMuscular once  heparin   Injectable 5000 Unit(s) SubCutaneous every 12 hours  iron sucrose IVPB 200 milliGRAM(s) IV Intermittent every 24 hours  lactated ringers. 1000 milliLiter(s) (70 mL/Hr) IV Continuous <Continuous>  midodrine 20 milliGRAM(s) Oral every 8 hours  piperacillin/tazobactam IVPB.. 3.375 Gram(s) IV Intermittent every 8 hours  sodium chloride 0.65% Nasal 1 Spray(s) Both Nostrils once  Vyndamax (Tafamidis) 61mg Capsule 1 Capsule(s) 1 Capsule(s) Oral daily    MEDICATIONS  (PRN):  fluticasone propionate 50 MICROgram(s)/spray Nasal Spray 1 Spray(s) Both Nostrils two times a day PRN congestion  loperamide 2 milliGRAM(s) Oral three times a day PRN Diarrhea      Allergies    No Known Allergies    Intolerances        REVIEW OF SYSTEMS  Constitutional: No fever, endorses fatigue, unexplained weight loss  Eyes: No blurry vision  Neuro: No tremors  HEENT: No pain  Cardiovascular: No chest pain, palpitations  Respiratory: No SOB, no cough  GI: No nausea, vomiting, abdominal pain, endorses diarrhea  : No dysuria  Skin: dry skin  Psych: no depression  Endocrine: no polyuria, polydipsia  Hem/lymph: no swelling  Osteoporosis: no fractures  ALL OTHER SYSTEMS REVIEWED AND NEGATIVE     PHYSICAL EXAM   Vital Signs Last 24 Hrs  T(C): 36.9 (07 Jan 2024 04:24), Max: 37.1 (06 Jan 2024 21:03)  T(F): 98.4 (07 Jan 2024 04:24), Max: 98.7 (06 Jan 2024 21:03)  HR: 69 (07 Jan 2024 04:24) (61 - 70)  BP: 113/68 (07 Jan 2024 04:24) (106/64 - 122/78)  BP(mean): 92 (06 Jan 2024 21:03) (92 - 92)  RR: 18 (07 Jan 2024 04:24) (18 - 18)  SpO2: 95% (07 Jan 2024 04:24) (95% - 99%)    Parameters below as of 07 Jan 2024 04:24  Patient On (Oxygen Delivery Method): room air      GENERAL: NAD, lying in bed  EYES: No proptosis, anicteric  HEENT:  Atraumatic, Normocephalic, mildly dry mucous membranes  THYROID: Normal size, no palpable nodules  RESPIRATORY: non paradoxical breathing, on room air  CARDIOVASCULAR: did not appear cyanotic, minimal peripheral edema  GI: Soft, nontender, non distended  SKIN: Dry, intact  MUSCULOSKELETAL: moving extremities spontaneously  NEURO: sensation intact, extraocular movements intact, no tremor  PSYCH: Answering questions appropriately, normal affect, normal mood  CUSHING'S SIGNS: no striae, no acanthosis, no dorsocervical fat pad    CAPILLARY BLOOD GLUCOSE          A1C with Estimated Average Glucose Result: 5.0 % (01-03-24 @ 06:01)  A1C with Estimated Average Glucose Result: 5.3 % (11-04-23 @ 10:45)                            8.9    5.63  )-----------( 198      ( 07 Jan 2024 06:41 )             26.7       01-07    138  |  106  |  26<H>  ----------------------------<  66<L>  4.1   |  18<L>  |  2.58<H>    Ca    8.4      07 Jan 2024 06:40  Phos  3.2     01-07  Mg     2.0     01-07    TPro  6.3  /  Alb  3.1<L>  /  TBili  0.3  /  DBili  x   /  AST  53<H>  /  ALT  30  /  AlkPhos  81  01-07      Thyroid Stimulating Hormone, Serum: 5.54 uIU/mL (01-03-24 @ 06:01)      LIPIDS    RADIOLOGY

## 2024-01-07 NOTE — PROGRESS NOTE ADULT - ASSESSMENT
No pain, no shortness of breath    Review of systems: All 10 points ROS was obtained except as above.     chlorhexidine 2% Cloths 1 Application(s) Topical <User Schedule>  dextrose 50% Injectable 12.5 Gram(s) IV Push once  dextrose 50% Injectable 25 milliLiter(s) IV Push once  dextrose 50% Injectable 25 Gram(s) IV Push once  dextrose 50% Injectable 25 Gram(s) IV Push once  dextrose Oral Gel 15 Gram(s) Oral once  finasteride 5 milliGRAM(s) Oral daily  fluticasone propionate 50 MICROgram(s)/spray Nasal Spray 1 Spray(s) Both Nostrils two times a day PRN  glucagon  Injectable 1 milliGRAM(s) IntraMuscular once  heparin   Injectable 5000 Unit(s) SubCutaneous every 12 hours  lactated ringers. 1000 milliLiter(s) IV Continuous <Continuous>  loperamide 2 milliGRAM(s) Oral three times a day PRN  midodrine 20 milliGRAM(s) Oral every 8 hours  piperacillin/tazobactam IVPB.. 3.375 Gram(s) IV Intermittent every 8 hours  sodium chloride 0.65% Nasal 1 Spray(s) Both Nostrils once  Vyndamax (Tafamidis) 61mg Capsule 1 Capsule(s) 1 Capsule(s) Oral daily      VITAL:  T(C): , Max: 37.1 (01-06-24 @ 21:03)  T(F): , Max: 98.7 (01-06-24 @ 21:03)  HR: 69 (01-07-24 @ 13:30)  BP: 104/67 (01-07-24 @ 13:30)  BP(mean): 92 (01-06-24 @ 21:03)  RR: 18 (01-07-24 @ 11:12)  SpO2: 96% (01-07-24 @ 11:12)  Wt(kg): --    01-06-24 @ 07:01  -  01-07-24 @ 07:00  --------------------------------------------------------  IN: 1080 mL / OUT: 1850 mL / NET: -770 mL    01-07-24 @ 07:01  -  01-07-24 @ 19:11  --------------------------------------------------------  IN: 755 mL / OUT: 850 mL / NET: -95 mL        PHYSICAL EXAM:  Constitutional: NAD  Neck:  No JVD  Respiratory: CTAB/L  Cardiovascular: S1 and S2  Gastrointestinal: BS+, soft, NT/ND  Extremities: No peripheral edema  Neurological: A/O x 3, no focal deficits  Psychiatric: Normal mood, normal affect  : + Morales  Skin: No rashes  Access: Not applicable     LABS:                          8.9    5.63  )-----------( 198      ( 07 Jan 2024 06:41 )             26.7     Na(138)/K(4.1)/Cl(106)/HCO3(18)/BUN(26)/Cr(2.58)Glu(66)/Ca(8.4)/Mg(2.0)/PO4(3.2)    01-07 @ 06:40  Na(135)/K(4.5)/Cl(106)/HCO3(18)/BUN(30)/Cr(2.57)Glu(75)/Ca(8.4)/Mg(2.0)/PO4(3.7)    01-06 @ 06:23  Na(137)/K(4.1)/Cl(108)/HCO3(19)/BUN(30)/Cr(2.57)Glu(78)/Ca(8.3)/Mg(2.0)/PO4(3.4)    01-05 @ 05:18    Urinalysis Basic - ( 07 Jan 2024 06:40 )    Color: x / Appearance: x / SG: x / pH: x  Gluc: 66 mg/dL / Ketone: x  / Bili: x / Urobili: x   Blood: x / Protein: x / Nitrite: x   Leuk Esterase: x / RBC: x / WBC x   Sq Epi: x / Non Sq Epi: x / Bacteria: x            ASSESSMENT/PLAN  ASSESSMENT/PLAN  73M w/ BPH (c/b urinary retention s/p Morales catheter placement; exchanged monthly), CKD stage IIIb, and cardiac amyloidosis brought in by daughter for diarrhea (loose-watery BM's) over the past few days.  CKD stage 3 b and now SARAH in light of contrast study/hypotension and possibly dehydration as well   Subnephrotic range proteinuria     1 Renal-  scr stable at present  hold the IVF  for now  Trend serum creatinine which  is stable at present  2 Lytes acceptable  3 -he has chronic morales  4 CVS- BP soft but stable  on Midodrine to maintain MAP   5 Heme- hgb down relative to yesterday ;   IV venofer 200mg IVP qd x 3 (Day 3 of 3)          Rober Alvarenga NP-BC  [facility]  [phone]   No pain, no shortness of breath    Review of systems: All 10 points ROS was obtained except as above.     chlorhexidine 2% Cloths 1 Application(s) Topical <User Schedule>  dextrose 50% Injectable 12.5 Gram(s) IV Push once  dextrose 50% Injectable 25 milliLiter(s) IV Push once  dextrose 50% Injectable 25 Gram(s) IV Push once  dextrose 50% Injectable 25 Gram(s) IV Push once  dextrose Oral Gel 15 Gram(s) Oral once  finasteride 5 milliGRAM(s) Oral daily  fluticasone propionate 50 MICROgram(s)/spray Nasal Spray 1 Spray(s) Both Nostrils two times a day PRN  glucagon  Injectable 1 milliGRAM(s) IntraMuscular once  heparin   Injectable 5000 Unit(s) SubCutaneous every 12 hours  lactated ringers. 1000 milliLiter(s) IV Continuous <Continuous>  loperamide 2 milliGRAM(s) Oral three times a day PRN  midodrine 20 milliGRAM(s) Oral every 8 hours  piperacillin/tazobactam IVPB.. 3.375 Gram(s) IV Intermittent every 8 hours  sodium chloride 0.65% Nasal 1 Spray(s) Both Nostrils once  Vyndamax (Tafamidis) 61mg Capsule 1 Capsule(s) 1 Capsule(s) Oral daily      VITAL:  T(C): , Max: 37.1 (01-06-24 @ 21:03)  T(F): , Max: 98.7 (01-06-24 @ 21:03)  HR: 69 (01-07-24 @ 13:30)  BP: 104/67 (01-07-24 @ 13:30)  BP(mean): 92 (01-06-24 @ 21:03)  RR: 18 (01-07-24 @ 11:12)  SpO2: 96% (01-07-24 @ 11:12)  Wt(kg): --    01-06-24 @ 07:01  -  01-07-24 @ 07:00  --------------------------------------------------------  IN: 1080 mL / OUT: 1850 mL / NET: -770 mL    01-07-24 @ 07:01  -  01-07-24 @ 19:11  --------------------------------------------------------  IN: 755 mL / OUT: 850 mL / NET: -95 mL        PHYSICAL EXAM:  Constitutional: NAD  Neck:  No JVD  Respiratory: CTAB/L  Cardiovascular: S1 and S2  Gastrointestinal: BS+, soft, NT/ND  Extremities: No peripheral edema  Neurological: A/O x 3, no focal deficits  Psychiatric: Normal mood, normal affect  : + Morales  Skin: No rashes  Access: Not applicable     LABS:                          8.9    5.63  )-----------( 198      ( 07 Jan 2024 06:41 )             26.7     Na(138)/K(4.1)/Cl(106)/HCO3(18)/BUN(26)/Cr(2.58)Glu(66)/Ca(8.4)/Mg(2.0)/PO4(3.2)    01-07 @ 06:40  Na(135)/K(4.5)/Cl(106)/HCO3(18)/BUN(30)/Cr(2.57)Glu(75)/Ca(8.4)/Mg(2.0)/PO4(3.7)    01-06 @ 06:23  Na(137)/K(4.1)/Cl(108)/HCO3(19)/BUN(30)/Cr(2.57)Glu(78)/Ca(8.3)/Mg(2.0)/PO4(3.4)    01-05 @ 05:18    Urinalysis Basic - ( 07 Jan 2024 06:40 )    Color: x / Appearance: x / SG: x / pH: x  Gluc: 66 mg/dL / Ketone: x  / Bili: x / Urobili: x   Blood: x / Protein: x / Nitrite: x   Leuk Esterase: x / RBC: x / WBC x   Sq Epi: x / Non Sq Epi: x / Bacteria: x            ASSESSMENT/PLAN  ASSESSMENT/PLAN  73M w/ BPH (c/b urinary retention s/p Morales catheter placement; exchanged monthly), CKD stage IIIb, and cardiac amyloidosis brought in by daughter for diarrhea (loose-watery BM's) over the past few days.  CKD stage 3 b and now SARAH in light of contrast study/hypotension and possibly dehydration as well   Subnephrotic range proteinuria     1 Renal-  scr stable at present  hold the IVF  for now  Trend serum creatinine which  is stable at present  2 Lytes acceptable  3 -he has chronic morales  4 CVS- BP soft but stable  on Midodrine to maintain MAP   5 Heme- hgb down relative to yesterday ;   IV venofer 200mg IVP qd x 3 (Day 3 of 3)          Rober Alvarenga NP-BC  [facility] [facility] [employer]  [phone]

## 2024-01-07 NOTE — PROGRESS NOTE ADULT - ASSESSMENT
A/p  73M w/ BPH (c/b urinary retention s/p Harkins catheter placement; exchanged monthly), CKD stage IIIb, and cardiac amyloidosis brought in by daughter for diarrhea (loose-watery BM's) over the past few days.    #Sepsis  -I/s/o COVID + UTI  -Abx per med    #COVID  -CXR no focal consolidations  -sp Remdesivir  -Supportive care per med    #cardiac amyloid   -Cont vyndamax  -baseline sbp 100 as outpt  -Prior echo with  Nml LV systolic fxn, Severe left ventricular hypertrophy, Findings suggestive of infiltrative cardiomyopathy    #Hypotension  -BP soft but stable  -Continue midodrine  -GENTLE IVF - watch for overload     #Hypoglycemia  -mgmt per med      dvt ppx

## 2024-01-07 NOTE — PROGRESS NOTE ADULT - ATTENDING COMMENTS
73M w/ BPH (c/b urinary retention s/p Harkins catheter placement; exchanged monthly), CKD stage IIIb, and cardiac amyloidosis brought in by daughter for diarrhea found to have hypotension likely due to sepsis.     1. P[place]o/ Urosepsis S/p 4.5L IVF and now with midodrine 20 mg q8h with parameters. C/w Zosyn x7 days D1:1/3. CT abd pel showing perinephric stranding. Harkins changed in ER by urology. ID following  2. COVID+: Not requiring O2 at this time.   3. Elevated LFTs. Holding remdesivir, hold hepatotoxic agents, downtrending   4. SARAH on CKD Trend Cr Hold nephrotoxic agents. Holding Remdesivir in the setting of SARAH. Creatinine continues to rise. nephro consulted, AKO Possible contrast related/ hypotension  5. BPH: Harkins changed in ED by urology, maintain.   6. Amyloidosis: Cardiology recommends to continue with vyndamax  7. AM cortisol 2.3, ACTH low suggesting possible central AI. Endo consulted. F/u recs

## 2024-01-08 LAB
ALBUMIN SERPL ELPH-MCNC: 3.1 G/DL — LOW (ref 3.3–5)
ALBUMIN SERPL ELPH-MCNC: 3.1 G/DL — LOW (ref 3.3–5)
ALP SERPL-CCNC: 86 U/L — SIGNIFICANT CHANGE UP (ref 40–120)
ALP SERPL-CCNC: 86 U/L — SIGNIFICANT CHANGE UP (ref 40–120)
ALT FLD-CCNC: 26 U/L — SIGNIFICANT CHANGE UP (ref 10–45)
ALT FLD-CCNC: 26 U/L — SIGNIFICANT CHANGE UP (ref 10–45)
ANION GAP SERPL CALC-SCNC: 15 MMOL/L — SIGNIFICANT CHANGE UP (ref 5–17)
ANION GAP SERPL CALC-SCNC: 15 MMOL/L — SIGNIFICANT CHANGE UP (ref 5–17)
AST SERPL-CCNC: 36 U/L — SIGNIFICANT CHANGE UP (ref 10–40)
AST SERPL-CCNC: 36 U/L — SIGNIFICANT CHANGE UP (ref 10–40)
BASE EXCESS BLDV CALC-SCNC: -6.4 MMOL/L — LOW (ref -2–3)
BASE EXCESS BLDV CALC-SCNC: -6.4 MMOL/L — LOW (ref -2–3)
BASOPHILS # BLD AUTO: 0.02 K/UL — SIGNIFICANT CHANGE UP (ref 0–0.2)
BASOPHILS # BLD AUTO: 0.02 K/UL — SIGNIFICANT CHANGE UP (ref 0–0.2)
BASOPHILS NFR BLD AUTO: 0.4 % — SIGNIFICANT CHANGE UP (ref 0–2)
BASOPHILS NFR BLD AUTO: 0.4 % — SIGNIFICANT CHANGE UP (ref 0–2)
BILIRUB SERPL-MCNC: 0.4 MG/DL — SIGNIFICANT CHANGE UP (ref 0.2–1.2)
BILIRUB SERPL-MCNC: 0.4 MG/DL — SIGNIFICANT CHANGE UP (ref 0.2–1.2)
BUN SERPL-MCNC: 23 MG/DL — SIGNIFICANT CHANGE UP (ref 7–23)
BUN SERPL-MCNC: 23 MG/DL — SIGNIFICANT CHANGE UP (ref 7–23)
CA-I SERPL-SCNC: 1.14 MMOL/L — LOW (ref 1.15–1.33)
CA-I SERPL-SCNC: 1.14 MMOL/L — LOW (ref 1.15–1.33)
CALCIUM SERPL-MCNC: 8.7 MG/DL — SIGNIFICANT CHANGE UP (ref 8.4–10.5)
CALCIUM SERPL-MCNC: 8.7 MG/DL — SIGNIFICANT CHANGE UP (ref 8.4–10.5)
CHLORIDE BLDV-SCNC: 109 MMOL/L — HIGH (ref 96–108)
CHLORIDE BLDV-SCNC: 109 MMOL/L — HIGH (ref 96–108)
CHLORIDE SERPL-SCNC: 107 MMOL/L — SIGNIFICANT CHANGE UP (ref 96–108)
CHLORIDE SERPL-SCNC: 107 MMOL/L — SIGNIFICANT CHANGE UP (ref 96–108)
CO2 BLDV-SCNC: 18 MMOL/L — LOW (ref 22–26)
CO2 BLDV-SCNC: 18 MMOL/L — LOW (ref 22–26)
CO2 SERPL-SCNC: 17 MMOL/L — LOW (ref 22–31)
CO2 SERPL-SCNC: 17 MMOL/L — LOW (ref 22–31)
CREAT SERPL-MCNC: 2.33 MG/DL — HIGH (ref 0.5–1.3)
CREAT SERPL-MCNC: 2.33 MG/DL — HIGH (ref 0.5–1.3)
EGFR: 28 ML/MIN/1.73M2 — LOW
EGFR: 28 ML/MIN/1.73M2 — LOW
EOSINOPHIL # BLD AUTO: 0.01 K/UL — SIGNIFICANT CHANGE UP (ref 0–0.5)
EOSINOPHIL # BLD AUTO: 0.01 K/UL — SIGNIFICANT CHANGE UP (ref 0–0.5)
EOSINOPHIL NFR BLD AUTO: 0.2 % — SIGNIFICANT CHANGE UP (ref 0–6)
EOSINOPHIL NFR BLD AUTO: 0.2 % — SIGNIFICANT CHANGE UP (ref 0–6)
FSH SERPL-MCNC: 6.6 IU/L — SIGNIFICANT CHANGE UP (ref 1.5–12.4)
FSH SERPL-MCNC: 6.6 IU/L — SIGNIFICANT CHANGE UP (ref 1.5–12.4)
GAS PNL BLDV: 133 MMOL/L — LOW (ref 136–145)
GAS PNL BLDV: 133 MMOL/L — LOW (ref 136–145)
GAS PNL BLDV: SIGNIFICANT CHANGE UP
GAS PNL BLDV: SIGNIFICANT CHANGE UP
GLUCOSE BLDV-MCNC: 114 MG/DL — HIGH (ref 70–99)
GLUCOSE BLDV-MCNC: 114 MG/DL — HIGH (ref 70–99)
GLUCOSE SERPL-MCNC: 114 MG/DL — HIGH (ref 70–99)
GLUCOSE SERPL-MCNC: 114 MG/DL — HIGH (ref 70–99)
HCO3 BLDV-SCNC: 18 MMOL/L — LOW (ref 22–29)
HCO3 BLDV-SCNC: 18 MMOL/L — LOW (ref 22–29)
HCT VFR BLD CALC: 27.3 % — LOW (ref 39–50)
HCT VFR BLD CALC: 27.3 % — LOW (ref 39–50)
HCT VFR BLDA CALC: 28 % — LOW (ref 39–51)
HCT VFR BLDA CALC: 28 % — LOW (ref 39–51)
HGB BLD CALC-MCNC: 9.4 G/DL — LOW (ref 12.6–17.4)
HGB BLD CALC-MCNC: 9.4 G/DL — LOW (ref 12.6–17.4)
HGB BLD-MCNC: 9.1 G/DL — LOW (ref 13–17)
HGB BLD-MCNC: 9.1 G/DL — LOW (ref 13–17)
IMM GRANULOCYTES NFR BLD AUTO: 0.8 % — SIGNIFICANT CHANGE UP (ref 0–0.9)
IMM GRANULOCYTES NFR BLD AUTO: 0.8 % — SIGNIFICANT CHANGE UP (ref 0–0.9)
LACTATE BLDV-MCNC: 1.5 MMOL/L — SIGNIFICANT CHANGE UP (ref 0.5–2)
LACTATE BLDV-MCNC: 1.5 MMOL/L — SIGNIFICANT CHANGE UP (ref 0.5–2)
LH SERPL-ACNC: 11.1 IU/L — SIGNIFICANT CHANGE UP
LH SERPL-ACNC: 11.1 IU/L — SIGNIFICANT CHANGE UP
LYMPHOCYTES # BLD AUTO: 1.62 K/UL — SIGNIFICANT CHANGE UP (ref 1–3.3)
LYMPHOCYTES # BLD AUTO: 1.62 K/UL — SIGNIFICANT CHANGE UP (ref 1–3.3)
LYMPHOCYTES # BLD AUTO: 33.9 % — SIGNIFICANT CHANGE UP (ref 13–44)
LYMPHOCYTES # BLD AUTO: 33.9 % — SIGNIFICANT CHANGE UP (ref 13–44)
MAGNESIUM SERPL-MCNC: 2 MG/DL — SIGNIFICANT CHANGE UP (ref 1.6–2.6)
MAGNESIUM SERPL-MCNC: 2 MG/DL — SIGNIFICANT CHANGE UP (ref 1.6–2.6)
MCHC RBC-ENTMCNC: 26.8 PG — LOW (ref 27–34)
MCHC RBC-ENTMCNC: 26.8 PG — LOW (ref 27–34)
MCHC RBC-ENTMCNC: 33.3 GM/DL — SIGNIFICANT CHANGE UP (ref 32–36)
MCHC RBC-ENTMCNC: 33.3 GM/DL — SIGNIFICANT CHANGE UP (ref 32–36)
MCV RBC AUTO: 80.3 FL — SIGNIFICANT CHANGE UP (ref 80–100)
MCV RBC AUTO: 80.3 FL — SIGNIFICANT CHANGE UP (ref 80–100)
MONOCYTES # BLD AUTO: 0.19 K/UL — SIGNIFICANT CHANGE UP (ref 0–0.9)
MONOCYTES # BLD AUTO: 0.19 K/UL — SIGNIFICANT CHANGE UP (ref 0–0.9)
MONOCYTES NFR BLD AUTO: 4 % — SIGNIFICANT CHANGE UP (ref 2–14)
MONOCYTES NFR BLD AUTO: 4 % — SIGNIFICANT CHANGE UP (ref 2–14)
NEUTROPHILS # BLD AUTO: 2.9 K/UL — SIGNIFICANT CHANGE UP (ref 1.8–7.4)
NEUTROPHILS # BLD AUTO: 2.9 K/UL — SIGNIFICANT CHANGE UP (ref 1.8–7.4)
NEUTROPHILS NFR BLD AUTO: 60.7 % — SIGNIFICANT CHANGE UP (ref 43–77)
NEUTROPHILS NFR BLD AUTO: 60.7 % — SIGNIFICANT CHANGE UP (ref 43–77)
NRBC # BLD: 0 /100 WBCS — SIGNIFICANT CHANGE UP (ref 0–0)
NRBC # BLD: 0 /100 WBCS — SIGNIFICANT CHANGE UP (ref 0–0)
PCO2 BLDV: 29 MMHG — LOW (ref 42–55)
PCO2 BLDV: 29 MMHG — LOW (ref 42–55)
PH BLDV: 7.39 — SIGNIFICANT CHANGE UP (ref 7.32–7.43)
PH BLDV: 7.39 — SIGNIFICANT CHANGE UP (ref 7.32–7.43)
PHOSPHATE SERPL-MCNC: 3.3 MG/DL — SIGNIFICANT CHANGE UP (ref 2.5–4.5)
PHOSPHATE SERPL-MCNC: 3.3 MG/DL — SIGNIFICANT CHANGE UP (ref 2.5–4.5)
PLATELET # BLD AUTO: 216 K/UL — SIGNIFICANT CHANGE UP (ref 150–400)
PLATELET # BLD AUTO: 216 K/UL — SIGNIFICANT CHANGE UP (ref 150–400)
PO2 BLDV: 61 MMHG — HIGH (ref 25–45)
PO2 BLDV: 61 MMHG — HIGH (ref 25–45)
POTASSIUM BLDV-SCNC: 3.8 MMOL/L — SIGNIFICANT CHANGE UP (ref 3.5–5.1)
POTASSIUM BLDV-SCNC: 3.8 MMOL/L — SIGNIFICANT CHANGE UP (ref 3.5–5.1)
POTASSIUM SERPL-MCNC: 4.1 MMOL/L — SIGNIFICANT CHANGE UP (ref 3.5–5.3)
POTASSIUM SERPL-MCNC: 4.1 MMOL/L — SIGNIFICANT CHANGE UP (ref 3.5–5.3)
POTASSIUM SERPL-SCNC: 4.1 MMOL/L — SIGNIFICANT CHANGE UP (ref 3.5–5.3)
POTASSIUM SERPL-SCNC: 4.1 MMOL/L — SIGNIFICANT CHANGE UP (ref 3.5–5.3)
PROLACTIN SERPL-MCNC: 5.7 NG/ML — SIGNIFICANT CHANGE UP (ref 4.1–18.4)
PROLACTIN SERPL-MCNC: 5.7 NG/ML — SIGNIFICANT CHANGE UP (ref 4.1–18.4)
PROT SERPL-MCNC: 6.6 G/DL — SIGNIFICANT CHANGE UP (ref 6–8.3)
PROT SERPL-MCNC: 6.6 G/DL — SIGNIFICANT CHANGE UP (ref 6–8.3)
RBC # BLD: 3.4 M/UL — LOW (ref 4.2–5.8)
RBC # BLD: 3.4 M/UL — LOW (ref 4.2–5.8)
RBC # FLD: 17.5 % — HIGH (ref 10.3–14.5)
RBC # FLD: 17.5 % — HIGH (ref 10.3–14.5)
SAO2 % BLDV: 92.7 % — HIGH (ref 67–88)
SAO2 % BLDV: 92.7 % — HIGH (ref 67–88)
SODIUM SERPL-SCNC: 139 MMOL/L — SIGNIFICANT CHANGE UP (ref 135–145)
SODIUM SERPL-SCNC: 139 MMOL/L — SIGNIFICANT CHANGE UP (ref 135–145)
T3 SERPL-MCNC: 55 NG/DL — LOW (ref 80–200)
T3 SERPL-MCNC: 55 NG/DL — LOW (ref 80–200)
T4 FREE SERPL-MCNC: 0.9 NG/DL — SIGNIFICANT CHANGE UP (ref 0.9–1.8)
T4 FREE SERPL-MCNC: 0.9 NG/DL — SIGNIFICANT CHANGE UP (ref 0.9–1.8)
TESTOST FREE+TOTAL PANEL SERPL-MCNC: 433 NG/DL — SIGNIFICANT CHANGE UP (ref 300–740)
TESTOST FREE+TOTAL PANEL SERPL-MCNC: 433 NG/DL — SIGNIFICANT CHANGE UP (ref 300–740)
TSH SERPL-MCNC: 4.98 UIU/ML — HIGH (ref 0.27–4.2)
TSH SERPL-MCNC: 4.98 UIU/ML — HIGH (ref 0.27–4.2)
WBC # BLD: 4.78 K/UL — SIGNIFICANT CHANGE UP (ref 3.8–10.5)
WBC # BLD: 4.78 K/UL — SIGNIFICANT CHANGE UP (ref 3.8–10.5)
WBC # FLD AUTO: 4.78 K/UL — SIGNIFICANT CHANGE UP (ref 3.8–10.5)
WBC # FLD AUTO: 4.78 K/UL — SIGNIFICANT CHANGE UP (ref 3.8–10.5)

## 2024-01-08 PROCEDURE — 99233 SBSQ HOSP IP/OBS HIGH 50: CPT | Mod: GC

## 2024-01-08 PROCEDURE — 51702 INSERT TEMP BLADDER CATH: CPT

## 2024-01-08 PROCEDURE — 99232 SBSQ HOSP IP/OBS MODERATE 35: CPT

## 2024-01-08 PROCEDURE — 70553 MRI BRAIN STEM W/O & W/DYE: CPT | Mod: 26

## 2024-01-08 RX ORDER — HEPARIN SODIUM 5000 [USP'U]/ML
5000 INJECTION INTRAVENOUS; SUBCUTANEOUS EVERY 8 HOURS
Refills: 0 | Status: DISCONTINUED | OUTPATIENT
Start: 2024-01-08 | End: 2024-01-15

## 2024-01-08 RX ORDER — PIPERACILLIN AND TAZOBACTAM 4; .5 G/20ML; G/20ML
3.38 INJECTION, POWDER, LYOPHILIZED, FOR SOLUTION INTRAVENOUS EVERY 8 HOURS
Refills: 0 | Status: DISCONTINUED | OUTPATIENT
Start: 2024-01-08 | End: 2024-01-08

## 2024-01-08 RX ORDER — SODIUM BICARBONATE 1 MEQ/ML
1300 SYRINGE (ML) INTRAVENOUS
Refills: 0 | Status: DISCONTINUED | OUTPATIENT
Start: 2024-01-08 | End: 2024-01-09

## 2024-01-08 RX ORDER — MIDODRINE HYDROCHLORIDE 2.5 MG/1
10 TABLET ORAL EVERY 8 HOURS
Refills: 0 | Status: DISCONTINUED | OUTPATIENT
Start: 2024-01-08 | End: 2024-01-10

## 2024-01-08 RX ADMIN — CHLORHEXIDINE GLUCONATE 1 APPLICATION(S): 213 SOLUTION TOPICAL at 05:16

## 2024-01-08 RX ADMIN — PIPERACILLIN AND TAZOBACTAM 25 GRAM(S): 4; .5 INJECTION, POWDER, LYOPHILIZED, FOR SOLUTION INTRAVENOUS at 05:16

## 2024-01-08 RX ADMIN — HEPARIN SODIUM 5000 UNIT(S): 5000 INJECTION INTRAVENOUS; SUBCUTANEOUS at 23:12

## 2024-01-08 RX ADMIN — Medication 20 MILLIGRAM(S): at 08:35

## 2024-01-08 RX ADMIN — HEPARIN SODIUM 5000 UNIT(S): 5000 INJECTION INTRAVENOUS; SUBCUTANEOUS at 05:16

## 2024-01-08 RX ADMIN — FINASTERIDE 5 MILLIGRAM(S): 5 TABLET, FILM COATED ORAL at 12:27

## 2024-01-08 RX ADMIN — MIDODRINE HYDROCHLORIDE 10 MILLIGRAM(S): 2.5 TABLET ORAL at 13:36

## 2024-01-08 RX ADMIN — Medication 1300 MILLIGRAM(S): at 18:10

## 2024-01-08 RX ADMIN — Medication 20 MILLIGRAM(S): at 15:00

## 2024-01-08 RX ADMIN — HEPARIN SODIUM 5000 UNIT(S): 5000 INJECTION INTRAVENOUS; SUBCUTANEOUS at 13:36

## 2024-01-08 RX ADMIN — PIPERACILLIN AND TAZOBACTAM 25 GRAM(S): 4; .5 INJECTION, POWDER, LYOPHILIZED, FOR SOLUTION INTRAVENOUS at 13:34

## 2024-01-08 NOTE — PROGRESS NOTE ADULT - SUBJECTIVE AND OBJECTIVE BOX
Admitted for: Sepsis        Following for: adrenal insufficiency    Subjective:   Patient reports improvement in energy levels on hydrocortisone. Denies any N/V abdominal pain or poor appetite      MEDICATIONS  (STANDING):  chlorhexidine 2% Cloths 1 Application(s) Topical <User Schedule>  dextrose 50% Injectable 25 Gram(s) IV Push once  dextrose 50% Injectable 25 milliLiter(s) IV Push once  dextrose 50% Injectable 25 Gram(s) IV Push once  dextrose 50% Injectable 12.5 Gram(s) IV Push once  dextrose Oral Gel 15 Gram(s) Oral once  finasteride 5 milliGRAM(s) Oral daily  glucagon  Injectable 1 milliGRAM(s) IntraMuscular once  heparin   Injectable 5000 Unit(s) SubCutaneous every 8 hours  hydrocortisone 20 milliGRAM(s) Oral <User Schedule>  lactated ringers. 1000 milliLiter(s) (70 mL/Hr) IV Continuous <Continuous>  midodrine 10 milliGRAM(s) Oral every 8 hours  piperacillin/tazobactam IVPB.. 3.375 Gram(s) IV Intermittent every 8 hours  sodium bicarbonate 1300 milliGRAM(s) Oral two times a day  sodium chloride 0.65% Nasal 1 Spray(s) Both Nostrils once  Vyndamax (Tafamidis) 61mg Capsule 1 Capsule(s) 1 Capsule(s) Oral daily    MEDICATIONS  (PRN):  fluticasone propionate 50 MICROgram(s)/spray Nasal Spray 1 Spray(s) Both Nostrils two times a day PRN congestion  loperamide 2 milliGRAM(s) Oral three times a day PRN Diarrhea  ondansetron Injectable 4 milliGRAM(s) IV Push daily PRN Nausea      Allergies    No Known Allergies    Intolerances      PHYSICAL EXAM:  VITALS: T(C): 36.7 (01-08-24 @ 04:34)  T(F): 98 (01-08-24 @ 04:34), Max: 98.4 (01-08-24 @ 00:00)  HR: 71 (01-08-24 @ 04:34) (63 - 71)  BP: 121/62 (01-08-24 @ 04:34) (104/67 - 131/81)  RR:  (18 - 18)  SpO2:  (94% - 96%)  Wt(kg): --  GENERAL: NAD  EYES: No proptosis, no lid lag, anicteric  RESPIRATORY: Clear to auscultation bilaterally  CARDIOVASCULAR: Regular rate and rhythm  GI: Soft, nontender, non distended  EXT: b/l feet without wounds, 2+ pulses  PSYCH: Alert and oriented x 3, reactive affect      A1C with Estimated Average Glucose Result: 5.0 % (01-03-24 @ 06:01)  A1C with Estimated Average Glucose Result: 5.3 % (11-04-23 @ 10:45)  A1C with Estimated Average Glucose Result: 5.5 % (06-10-23 @ 16:29)  A1C with Estimated Average Glucose Result: 5.9 % (04-24-23 @ 07:02)          01-08    139  |  107  |  23  ----------------------------<  114<H>  4.1   |  17<L>  |  2.33<H>    eGFR: 28<L>    Ca    8.7      01-08  Mg     2.0     01-08  Phos  3.3     01-08    TPro  6.6  /  Alb  3.1<L>  /  TBili  0.4  /  DBili  x   /  AST  36  /  ALT  26  /  AlkPhos  86  01-08      Thyroid Function Tests:  01-03 @ 06:01 TSH 5.54 FreeT4 0.9 T3 57 Anti TPO -- Anti Thyroglobulin Ab -- TSI --

## 2024-01-08 NOTE — PROGRESS NOTE ADULT - SUBJECTIVE AND OBJECTIVE BOX
DEEPTHI GÓMEZ  73y  Male    Patient is a 73y old  Male who presents with a chief complaint of Diarrhea/ (07 Jan 2024 19:10)    INTERVAL HPI/OVERNIGHT EVENTS:  - PAT x 4s with exertion  - retaining >4s this AM, attempted to straight cath with no output  - nausea + small emesis -> zofran    T(C): 36.7 (01-08-24 @ 04:34), Max: 36.9 (01-08-24 @ 00:00)  HR: 71 (01-08-24 @ 04:34) (63 - 71)  BP: 121/62 (01-08-24 @ 04:34) (104/67 - 131/81)  RR: 18 (01-08-24 @ 04:34) (18 - 18)  SpO2: 96% (01-08-24 @ 04:34) (94% - 96%)  Wt(kg): --Vital Signs Last 24 Hrs  T(C): 36.7 (08 Jan 2024 04:34), Max: 36.9 (08 Jan 2024 00:00)  T(F): 98 (08 Jan 2024 04:34), Max: 98.4 (08 Jan 2024 00:00)  HR: 71 (08 Jan 2024 04:34) (63 - 71)  BP: 121/62 (08 Jan 2024 04:34) (104/67 - 131/81)  BP(mean): --  RR: 18 (08 Jan 2024 04:34) (18 - 18)  SpO2: 96% (08 Jan 2024 04:34) (94% - 96%)    Parameters below as of 08 Jan 2024 04:34  Patient On (Oxygen Delivery Method): room air        PHYSICAL EXAM:  GENERAL: NAD  HEAD:  Atraumatic, Normocephalic  EYES: EOMI, conjunctiva and sclera clear  CHEST/LUNG: Clear to auscultation bilaterally; No rales, rhonchi, wheezing, or rubs  HEART: Regular rate and rhythm; No murmurs, rubs, or gallops  ABDOMEN: Soft, Nontender, Nondistended;   SKIN: No rashes or lesions  NERVOUS SYSTEM:  Alert & Oriented X3, no focal deficits      Consultant(s) Notes Reviewed:  [x ] YES  [ ] NO  Care Discussed with Consultants/Other Providers [ x] YES  [ ] NO    LABS:                        9.1    4.78  )-----------( 216      ( 08 Jan 2024 05:59 )             27.3     01-08    139  |  107  |  23  ----------------------------<  114<H>  4.1   |  17<L>  |  2.33<H>    Ca    8.7      08 Jan 2024 05:58  Phos  3.3     01-08  Mg     2.0     01-08    TPro  6.6  /  Alb  3.1<L>  /  TBili  0.4  /  DBili  x   /  AST  36  /  ALT  26  /  AlkPhos  86  01-08        Urinalysis Basic - ( 08 Jan 2024 05:58 )    Color: x / Appearance: x / SG: x / pH: x  Gluc: 114 mg/dL / Ketone: x  / Bili: x / Urobili: x   Blood: x / Protein: x / Nitrite: x   Leuk Esterase: x / RBC: x / WBC x   Sq Epi: x / Non Sq Epi: x / Bacteria: x      CAPILLARY BLOOD GLUCOSE            Urinalysis Basic - ( 08 Jan 2024 05:58 )    Color: x / Appearance: x / SG: x / pH: x  Gluc: 114 mg/dL / Ketone: x  / Bili: x / Urobili: x   Blood: x / Protein: x / Nitrite: x   Leuk Esterase: x / RBC: x / WBC x   Sq Epi: x / Non Sq Epi: x / Bacteria: x        RADIOLOGY & ADDITIONAL TESTS:    Imaging Personally Reviewed:  [ ] YES  [ ] NO    HEALTH ISSUES - PROBLEM Dx:  Sepsis    Diarrhea    Cardiac amyloidosis    Acute kidney injury superimposed on CKD    BPH (benign prostatic hyperplasia)    Prophylactic measure    Fall    2019 novel coronavirus disease (COVID-19)    Abnormal transaminases    Anemia    Hypotension    Adrenal insufficiency    Hypoglycemia    Abnormal thyroid blood test         DEEPTHI GÓMEZ  73y  Male    Patient is a 73y old  Male who presents with a chief complaint of Diarrhea/ (07 Jan 2024 19:10)    INTERVAL HPI/OVERNIGHT EVENTS:  - PAT x 4s with exertion  - nausea + small emesis -> zofran  - endorsing persistent dizziness, worse in AM and when upright. Denies vision changes/auditory changes  - last 2 doses of midodrine held  - attempted TOV yesterday per pt request - pt still retaining >400, straight cath attempted but unsuccessful  - endorsing tingly sensation/pain in lower extremities/toes    T(C): 36.7 (01-08-24 @ 04:34), Max: 36.9 (01-08-24 @ 00:00)  HR: 71 (01-08-24 @ 04:34) (63 - 71)  BP: 121/62 (01-08-24 @ 04:34) (104/67 - 131/81)  RR: 18 (01-08-24 @ 04:34) (18 - 18)  SpO2: 96% (01-08-24 @ 04:34) (94% - 96%)  Wt(kg): --Vital Signs Last 24 Hrs  T(C): 36.7 (08 Jan 2024 04:34), Max: 36.9 (08 Jan 2024 00:00)  T(F): 98 (08 Jan 2024 04:34), Max: 98.4 (08 Jan 2024 00:00)  HR: 71 (08 Jan 2024 04:34) (63 - 71)  BP: 121/62 (08 Jan 2024 04:34) (104/67 - 131/81)  BP(mean): --  RR: 18 (08 Jan 2024 04:34) (18 - 18)  SpO2: 96% (08 Jan 2024 04:34) (94% - 96%)    Parameters below as of 08 Jan 2024 04:34  Patient On (Oxygen Delivery Method): room air        PHYSICAL EXAM:  GENERAL: NAD  HEAD:  Atraumatic, Normocephalic  EYES: EOMI, conjunctiva and sclera clear  CHEST/LUNG: Clear to auscultation bilaterally; No rales, rhonchi, wheezing, or rubs  HEART: Regular rate and rhythm; No murmurs, rubs, or gallops  ABDOMEN: Soft, Nondistended; mild suprapubic TTP (awaiting replacement of morales by urology), abdomen otherwise non-TTP  SKIN: No rashes or lesions  NERVOUS SYSTEM:  Alert & Oriented X3, no focal deficits      Consultant(s) Notes Reviewed:  [x ] YES  [ ] NO  Care Discussed with Consultants/Other Providers [ x] YES  [ ] NO    LABS:                        9.1    4.78  )-----------( 216      ( 08 Jan 2024 05:59 )             27.3     01-08    139  |  107  |  23  ----------------------------<  114<H>  4.1   |  17<L>  |  2.33<H>    Ca    8.7      08 Jan 2024 05:58  Phos  3.3     01-08  Mg     2.0     01-08    TPro  6.6  /  Alb  3.1<L>  /  TBili  0.4  /  DBili  x   /  AST  36  /  ALT  26  /  AlkPhos  86  01-08        Urinalysis Basic - ( 08 Jan 2024 05:58 )    Color: x / Appearance: x / SG: x / pH: x  Gluc: 114 mg/dL / Ketone: x  / Bili: x / Urobili: x   Blood: x / Protein: x / Nitrite: x   Leuk Esterase: x / RBC: x / WBC x   Sq Epi: x / Non Sq Epi: x / Bacteria: x      CAPILLARY BLOOD GLUCOSE            Urinalysis Basic - ( 08 Jan 2024 05:58 )    Color: x / Appearance: x / SG: x / pH: x  Gluc: 114 mg/dL / Ketone: x  / Bili: x / Urobili: x   Blood: x / Protein: x / Nitrite: x   Leuk Esterase: x / RBC: x / WBC x   Sq Epi: x / Non Sq Epi: x / Bacteria: x        RADIOLOGY & ADDITIONAL TESTS:    Imaging Personally Reviewed:  [ ] YES  [ ] NO    HEALTH ISSUES - PROBLEM Dx:  Sepsis    Diarrhea    Cardiac amyloidosis    Acute kidney injury superimposed on CKD    BPH (benign prostatic hyperplasia)    Prophylactic measure    Fall    2019 novel coronavirus disease (COVID-19)    Abnormal transaminases    Anemia    Hypotension    Adrenal insufficiency    Hypoglycemia    Abnormal thyroid blood test

## 2024-01-08 NOTE — PROGRESS NOTE ADULT - ASSESSMENT
A/p  73M w/ BPH (c/b urinary retention s/p Harkins catheter placement; exchanged monthly), CKD stage IIIb, and cardiac amyloidosis brought in by daughter for diarrhea (loose-watery BM's) over the past few days.    #Sepsis  -I/s/o COVID + UTI  -Abx per med    #COVID  -CXR no focal consolidations  -sp Remdesivir  -Supportive care per med    #cardiac amyloid   -Cont vyndamax  -baseline sbp 100 as outpt  -Prior echo with  Nml LV systolic fxn, Severe left ventricular hypertrophy, Findings suggestive of infiltrative cardiomyopathy    #Hypotension  -BP soft but stable  -Continue midodrine  -GENTLE IVF - watch for overload     #Hypoglycemia  -mgmt per med    #Dizziness  -Can repeat orthostatics  -Cont mido, gentle IVF  -PT for mobility      dvt ppx

## 2024-01-08 NOTE — PROGRESS NOTE ADULT - ASSESSMENT
No pain, no shortness of breath, doing fine today.     Review of systems: All 10 points ROS was obtained except as above.     chlorhexidine 2% Cloths 1 Application(s) Topical <User Schedule>  dextrose 50% Injectable 12.5 Gram(s) IV Push once  dextrose 50% Injectable 25 milliLiter(s) IV Push once  dextrose 50% Injectable 25 Gram(s) IV Push once  dextrose 50% Injectable 25 Gram(s) IV Push once  dextrose Oral Gel 15 Gram(s) Oral once  finasteride 5 milliGRAM(s) Oral daily  fluticasone propionate 50 MICROgram(s)/spray Nasal Spray 1 Spray(s) Both Nostrils two times a day PRN  glucagon  Injectable 1 milliGRAM(s) IntraMuscular once  heparin   Injectable 5000 Unit(s) SubCutaneous every 12 hours  lactated ringers. 1000 milliLiter(s) IV Continuous <Continuous>  loperamide 2 milliGRAM(s) Oral three times a day PRN  midodrine 20 milliGRAM(s) Oral every 8 hours  piperacillin/tazobactam IVPB.. 3.375 Gram(s) IV Intermittent every 8 hours  sodium chloride 0.65% Nasal 1 Spray(s) Both Nostrils once  Vyndamax (Tafamidis) 61mg Capsule 1 Capsule(s) 1 Capsule(s) Oral daily      VITAL:  T(C): , Max: 37.1 (01-06-24 @ 21:03)  T(F): , Max: 98.7 (01-06-24 @ 21:03)  HR: 69 (01-07-24 @ 13:30)  BP: 104/67 (01-07-24 @ 13:30)  BP(mean): 92 (01-06-24 @ 21:03)  RR: 18 (01-07-24 @ 11:12)  SpO2: 96% (01-07-24 @ 11:12)  Wt(kg): --    01-06-24 @ 07:01  -  01-07-24 @ 07:00  --------------------------------------------------------  IN: 1080 mL / OUT: 1850 mL / NET: -770 mL    01-07-24 @ 07:01  -  01-07-24 @ 19:11  --------------------------------------------------------  IN: 755 mL / OUT: 850 mL / NET: -95 mL        PHYSICAL EXAM:  Constitutional: NAD  Neck:  No JVD  Respiratory: CTAB/L  Cardiovascular: S1 and S2  Gastrointestinal: BS+, soft, NT/ND  Extremities: No peripheral edema  Neurological: A/O x 3, no focal deficits  Psychiatric: Normal mood, normal affect  : + Morales  Skin: No rashes  Access: Not applicable     LABS:                          8.9    5.63  )-----------( 198      ( 07 Jan 2024 06:41 )             26.7     Na(138)/K(4.1)/Cl(106)/HCO3(18)/BUN(26)/Cr(2.58)Glu(66)/Ca(8.4)/Mg(2.0)/PO4(3.2)    01-07 @ 06:40  Na(135)/K(4.5)/Cl(106)/HCO3(18)/BUN(30)/Cr(2.57)Glu(75)/Ca(8.4)/Mg(2.0)/PO4(3.7)    01-06 @ 06:23  Na(137)/K(4.1)/Cl(108)/HCO3(19)/BUN(30)/Cr(2.57)Glu(78)/Ca(8.3)/Mg(2.0)/PO4(3.4)    01-05 @ 05:18    Urinalysis Basic - ( 07 Jan 2024 06:40 )    Color: x / Appearance: x / SG: x / pH: x  Gluc: 66 mg/dL / Ketone: x  / Bili: x / Urobili: x   Blood: x / Protein: x / Nitrite: x   Leuk Esterase: x / RBC: x / WBC x   Sq Epi: x / Non Sq Epi: x / Bacteria: x            ASSESSMENT/PLAN  ASSESSMENT/PLAN  73M w/ BPH (c/b urinary retention s/p Morales catheter placement; exchanged monthly), CKD stage IIIb, and cardiac amyloidosis brought in by daughter for diarrhea (loose-watery BM's) over the past few days.  CKD stage 3 b and now SARAH in light of contrast study/hypotension and possibly dehydration as well   Subnephrotic range proteinuria     1 Renal-  scr stable/improved today  hold IVF  for now  Sodium bicarb 1300 BID  2 Lytes acceptable  3 -he has chronic morales  4 CVS- BP soft but stable  on Midodrine to maintain MAP       Kamari Asencio DO   [employer]  [phone]   No pain, no shortness of breath, doing fine today.     Review of systems: All 10 points ROS was obtained except as above.     chlorhexidine 2% Cloths 1 Application(s) Topical <User Schedule>  dextrose 50% Injectable 12.5 Gram(s) IV Push once  dextrose 50% Injectable 25 milliLiter(s) IV Push once  dextrose 50% Injectable 25 Gram(s) IV Push once  dextrose 50% Injectable 25 Gram(s) IV Push once  dextrose Oral Gel 15 Gram(s) Oral once  finasteride 5 milliGRAM(s) Oral daily  fluticasone propionate 50 MICROgram(s)/spray Nasal Spray 1 Spray(s) Both Nostrils two times a day PRN  glucagon  Injectable 1 milliGRAM(s) IntraMuscular once  heparin   Injectable 5000 Unit(s) SubCutaneous every 12 hours  lactated ringers. 1000 milliLiter(s) IV Continuous <Continuous>  loperamide 2 milliGRAM(s) Oral three times a day PRN  midodrine 20 milliGRAM(s) Oral every 8 hours  piperacillin/tazobactam IVPB.. 3.375 Gram(s) IV Intermittent every 8 hours  sodium chloride 0.65% Nasal 1 Spray(s) Both Nostrils once  Vyndamax (Tafamidis) 61mg Capsule 1 Capsule(s) 1 Capsule(s) Oral daily      VITAL:  T(C): , Max: 37.1 (01-06-24 @ 21:03)  T(F): , Max: 98.7 (01-06-24 @ 21:03)  HR: 69 (01-07-24 @ 13:30)  BP: 104/67 (01-07-24 @ 13:30)  BP(mean): 92 (01-06-24 @ 21:03)  RR: 18 (01-07-24 @ 11:12)  SpO2: 96% (01-07-24 @ 11:12)  Wt(kg): --    01-06-24 @ 07:01  -  01-07-24 @ 07:00  --------------------------------------------------------  IN: 1080 mL / OUT: 1850 mL / NET: -770 mL    01-07-24 @ 07:01  -  01-07-24 @ 19:11  --------------------------------------------------------  IN: 755 mL / OUT: 850 mL / NET: -95 mL        PHYSICAL EXAM:  Constitutional: NAD  Neck:  No JVD  Respiratory: CTAB/L  Cardiovascular: S1 and S2  Gastrointestinal: BS+, soft, NT/ND  Extremities: No peripheral edema  Neurological: A/O x 3, no focal deficits  Psychiatric: Normal mood, normal affect  : + Morales  Skin: No rashes  Access: Not applicable     LABS:                          8.9    5.63  )-----------( 198      ( 07 Jan 2024 06:41 )             26.7     Na(138)/K(4.1)/Cl(106)/HCO3(18)/BUN(26)/Cr(2.58)Glu(66)/Ca(8.4)/Mg(2.0)/PO4(3.2)    01-07 @ 06:40  Na(135)/K(4.5)/Cl(106)/HCO3(18)/BUN(30)/Cr(2.57)Glu(75)/Ca(8.4)/Mg(2.0)/PO4(3.7)    01-06 @ 06:23  Na(137)/K(4.1)/Cl(108)/HCO3(19)/BUN(30)/Cr(2.57)Glu(78)/Ca(8.3)/Mg(2.0)/PO4(3.4)    01-05 @ 05:18    Urinalysis Basic - ( 07 Jan 2024 06:40 )    Color: x / Appearance: x / SG: x / pH: x  Gluc: 66 mg/dL / Ketone: x  / Bili: x / Urobili: x   Blood: x / Protein: x / Nitrite: x   Leuk Esterase: x / RBC: x / WBC x   Sq Epi: x / Non Sq Epi: x / Bacteria: x            ASSESSMENT/PLAN  ASSESSMENT/PLAN  73M w/ BPH (c/b urinary retention s/p Morales catheter placement; exchanged monthly), CKD stage IIIb, and cardiac amyloidosis brought in by daughter for diarrhea (loose-watery BM's) over the past few days.  CKD stage 3 b and now SARAH in light of contrast study/hypotension and possibly dehydration as well   Subnephrotic range proteinuria     1 Renal-  scr stable/improved today  hold IVF  for now  Sodium bicarb 1300 BID  2 Lytes acceptable  3 -he has chronic morales  4 CVS- BP soft but stable  on Midodrine to maintain MAP       Kamari Asencio DO   [employer]  [phone]

## 2024-01-08 NOTE — PROGRESS NOTE ADULT - PROBLEM SELECTOR PLAN 9
-Isolation precautions and IV Remdesivir-> dced 1/3  -remains on room air  -hx of vaccinations -monitor but likely iso of sepsis

## 2024-01-08 NOTE — PROGRESS NOTE ADULT - PROBLEM SELECTOR PLAN 5
-CT Head WNL  -F/U B12, TSH, Orthostatic vitals-> TSH elevated free T4 and total T3 ordered, T4 wnl Likely hypovolemic ISO diarrhea but possibly with component of distributive shock   - midodrine 20 mg TID -> 10 mg TID on 1/8  - I/Os  - workup of adrenal insufficiency per endo - ACTH and cortisol low c/f secondary AI  - continue hydrocortisone 20 mg BID - 8AM and 3PM

## 2024-01-08 NOTE — PROGRESS NOTE ADULT - SUBJECTIVE AND OBJECTIVE BOX
CC: F/U for PNA    Saw/spoke to patient. No fevers, no chills. No new complaints.    Allergies  No Known Allergies    ANTIMICROBIALS:  piperacillin/tazobactam IVPB.. 3.375 every 8 hours    PE:    Vital Signs Last 24 Hrs  T(C): 36.8 (08 Jan 2024 11:53), Max: 36.9 (08 Jan 2024 00:00)  T(F): 98.3 (08 Jan 2024 11:53), Max: 98.4 (08 Jan 2024 00:00)  HR: 75 (08 Jan 2024 11:53) (63 - 75)  BP: 104/66 (08 Jan 2024 11:53) (104/66 - 131/81)  RR: 17 (08 Jan 2024 11:53) (17 - 18)  SpO2: 99% (08 Jan 2024 11:53) (94% - 99%)    Gen: AOx3, NAD, non-toxic  CV: Nontachycardic  Resp: Breathing comfortably, RA  Abd: Soft, nontender  IV/Skin: No thrombophlebitis    LABS:                        9.1    4.78  )-----------( 216      ( 08 Jan 2024 05:59 )             27.3     01-08    139  |  107  |  23  ----------------------------<  114<H>  4.1   |  17<L>  |  2.33<H>    Ca    8.7      08 Jan 2024 05:58  Phos  3.3     01-08  Mg     2.0     01-08    TPro  6.6  /  Alb  3.1<L>  /  TBili  0.4  /  DBili  x   /  AST  36  /  ALT  26  /  AlkPhos  86  01-08    Urinalysis Basic - ( 08 Jan 2024 05:58 )    Color: x / Appearance: x / SG: x / pH: x  Gluc: 114 mg/dL / Ketone: x  / Bili: x / Urobili: x   Blood: x / Protein: x / Nitrite: x   Leuk Esterase: x / RBC: x / WBC x   Sq Epi: x / Non Sq Epi: x / Bacteria: x    MICROBIOLOGY:    .Blood Blood  01-04-24   No growth at 72 Hours  --  --    Catheterized Catheterized  01-02-24   50,000 - 99,000 CFU/mL Enterococcus faecalis  --  Enterococcus faecalis    .Blood Blood-Peripheral  01-02-24   No growth at 5 days  --  --    .Blood Blood-Peripheral  01-02-24   No growth at 5 days  --  --    .Blood Blood-Peripheral  12-16-23   No growth at 5 days  --  --    .Blood Blood-Peripheral  12-16-23   No growth at 5 days  --  Enterococcus faecalis  Stenotrophomonas maltophilia    Clostridium difficile GDH Toxins A&amp;B, EIA:   Negative (01-02-24 @ 13:55)  Clostridium difficile GDH Interpretation: Negative for toxigenic C. Difficile.  This specimen is negative for C.  Difficile glutamate dehydrogenase (GDH) antigen and negative for C.  Difficile Toxins A & B, by EIA.  GDH is a highly sensitive screening  marker for C. Difficile that is produced in large amounts by all C.  Difficile strains, both toxigenic and nontoxigenic.  This assay has not  been validated as a test of cure.  Repeat testing during the same episode  of diarrhea is of limited value and is discouraged.  The results of this  assay should always be interpreted in conjunction with patient's clinical  history. (01-02-24 @ 13:55)    RADIOLOGY:    1/2 CT    IMPRESSION:  Grade 1 anterior spondylolisthesis at L4-5 on a degenerative   basis due to facet osteophytic hypertrophy. Mild degenerative disc   disease and spondylosis at L1-2 through L5-S1 with loss of disc height   and associated degenerative endplate changes. DISH involves the lower   thoracic spine. Mild disc bulges are noted at L1-2 through L5-S1 which   flatten the ventral thecal sac and narrow the BILATERAL neural foramina.   Moderate central stenosis at L2-3, L3-4 and severe central stenosis at   L4-5 mild central stenosis at L5-S1 on a degenerative basis due to disc   bulge, facet osteophytic hypertrophy and redundancy of the ligamentum   flavum. Superimposed LEFT foraminal disc herniations at L2-3 and L4-5   further narrow the LEFT neural foramen.  No vertebral fracture is   recognized.

## 2024-01-08 NOTE — PROGRESS NOTE ADULT - PROBLEM SELECTOR PLAN 4
At this time likely iso of hypovolemia given clinical history and physical exam, although cannot rule out distributive.     -c/w midodrine 20mg tid wean as tolerated  -strict i's and o's with standing weight  - ACTH and AM cortisol low-> Endo consulted would appreciate recs -Hold anti-diarrheal medications in context of potential viral or bacterial GI infection-> started loperamide 1/3  -diarrhea now resolved

## 2024-01-08 NOTE — PROGRESS NOTE ADULT - ATTENDING COMMENTS
Agree with Dr. Cadena's assessment and plan as outlined above. Reviewed all pertinent labs, and imaging studies. Modifications made as indicated above. 73 M with history of BPH, CKD stage 3, cardiac amyloidosis here for diarrhea, found to have COVID and urosepsis. Endocrinology consulted for adrenal insufficiency. Patient with am cortisol 2.3 with low ACTH 2.5, consistent with secondary adrenal insufficiency. Will obtain pituitary MRI to assess for pituitary etiology of secondary adrenal insufficiency. Check prolactin level and IGF-1. TFT not consistent with central hypothyroidism. Rest of the plan as above.

## 2024-01-08 NOTE — PROGRESS NOTE ADULT - ASSESSMENT
Mr. Sanchez is a 73M w/ BPH (c/b urinary retention s/p Harkins catheter placement; exchanged monthly), CKD stage IIIb, and cardiac amyloidosis brought in by daughter for diarrhea (loose-watery BM's) over the past few days found to have COVID and urosepsis. Endocrinology consulted for evaluation for adrenal insufficiency.    #Adrenal insufficiency - suspect secondary AI  #Hypoglycemia  #Sepsis  #Abnormal thyroid function tests  - 1/5 1:08pm cortisol 3.1, 1/7 6:42am cortisol 2.3  - 1/6 12pm ACTH 2.5  - BG on BMP 1/3 49  - 1/3 TSH 5.54, TT3 57, FT4 0.9  - A1c 5< hypoglycemia defined as serum glucose <55 which patient had as above  - CT abdomen 12/23 without adrenal gland abnormality  - CT head without pituitary etiology however not optimal test to evaluate pituitary  - no hyponatremia or hypoglycemia  - patient HD stable on midodrine  - endorses weight loss, fatigue, poor appetite  - no history of prolonged steroids, opioids, immunotherapy, radiation  - does not appear that tafamidis can cause adrenal insufficiency  - likely secondary adrenal insufficiency unclear cause at this point however can possibly be from amyloidosis vs pituitary etiology  PLAN  -c/w hydrocortisone 20mg at 8AM and 3PM    #R/o panhypopituitarism  #thyroid axis appears stable on 1/3  -pending repeat TSH, FT4, TT3 given low FT4 of 0.9 on 1/3/24    #Gonadal axis  -follow up:  -LH  -FSG  -Total testosterone    #prolactin - pending    #pituitary amyloidosis vs macroadenoma based on above workup  -can consider MRI sella with and without contrast to eval for pituitary amyloidosis or macroadenoma    Case discussed with Dr. Siddhartha Cadena MD  Endocrine Fellow  Can be reached via teams. For follow up questions, discharge recommendations, or new consults, please call answering service at [phone] (weekdays); [phone] (nights/weekends)   Mr. Sanchez is a 73M w/ BPH (c/b urinary retention s/p Harkins catheter placement; exchanged monthly), CKD stage IIIb, and cardiac amyloidosis brought in by daughter for diarrhea (loose-watery BM's) over the past few days found to have COVID and urosepsis. Endocrinology consulted for evaluation for adrenal insufficiency.    #Adrenal insufficiency - suspect secondary AI  #Hypoglycemia  #Sepsis  #Abnormal thyroid function tests  - 1/5 1:08pm cortisol 3.1, 1/7 6:42am cortisol 2.3  - 1/6 12pm ACTH 2.5  - BG on BMP 1/3 49  - 1/3 TSH 5.54, TT3 57, FT4 0.9  - A1c 5< hypoglycemia defined as serum glucose <55 which patient had as above  - CT abdomen 12/23 without adrenal gland abnormality  - CT head without pituitary etiology however not optimal test to evaluate pituitary  - no hyponatremia or hypoglycemia  - patient HD stable on midodrine  - endorses weight loss, fatigue, poor appetite  - no history of prolonged steroids, opioids, immunotherapy, radiation  - does not appear that tafamidis can cause adrenal insufficiency  - likely secondary adrenal insufficiency unclear cause at this point however can possibly be from amyloidosis vs pituitary etiology  PLAN  -c/w hydrocortisone 20mg at 8AM and 3PM    #R/o panhypopituitarism  #thyroid axis appears stable on 1/3  -pending repeat TSH, FT4, TT3 given low FT4 of 0.9 on 1/3/24    #Gonadal axis  -follow up:  -LH  -FSG  -Total testosterone    #prolactin - pending    #pituitary amyloidosis vs macroadenoma based on above workup  -can consider MRI sella with and without contrast to eval for pituitary amyloidosis or macroadenoma    Case discussed with Dr. Siddhartha Cadena MD  Endocrine Fellow  Can be reached via teams. For follow up questions, discharge recommendations, or new consults, please call answering service at [phone] (weekdays); [phone] (nights/weekends)   Mr. Sanchez is a 73M w/ BPH (c/b urinary retention s/p Harkins catheter placement; exchanged monthly), CKD stage IIIb, and cardiac amyloidosis brought in by daughter for diarrhea (loose-watery BM's) over the past few days found to have COVID and urosepsis. Endocrinology consulted for evaluation for adrenal insufficiency.    #Adrenal insufficiency - suspect secondary AI  #Hypoglycemia  #Sepsis  #Abnormal thyroid function tests  - 1/5 1:08pm cortisol 3.1, 1/7 6:42am cortisol 2.3  - 1/6 12pm ACTH 2.5  - BG on BMP 1/3 49  - 1/3 TSH 5.54, TT3 57, FT4 0.9  - A1c 5< hypoglycemia defined as serum glucose <55 which patient had as above  - CT abdomen 12/23 without adrenal gland abnormality  - CT head without pituitary etiology however not optimal test to evaluate pituitary  - no hyponatremia or hypoglycemia  - patient HD stable on midodrine  - endorses weight loss, fatigue, poor appetite  - no history of prolonged steroids, opioids, immunotherapy, radiation  - does not appear that tafamidis can cause adrenal insufficiency  - likely secondary adrenal insufficiency unclear cause at this point however can possibly be from amyloidosis vs pituitary etiology  PLAN  -c/w hydrocortisone 20mg at 8AM and 3PM    #R/o panhypopituitarism  #thyroid axis appears stable on 1/3  -pending repeat TSH, FT4, TT3 given low FT4 of 0.9 on 1/3/24    #Gonadal axis - normal   -follow up:  -LH 11.1  -FSH 6.6  -Total testosterone - 433    #prolactin - pending    #pituitary amyloidosis vs macroadenoma based on above workup  -can consider MRI sella with and without contrast to eval for pituitary amyloidosis or macroadenoma    Case discussed with Dr. Siddhartha Cadena MD  Endocrine Fellow  Can be reached via teams. For follow up questions, discharge recommendations, or new consults, please call answering service at [phone] (weekdays); [phone] (nights/weekends)   Mr. Sanchez is a 73M w/ BPH (c/b urinary retention s/p Harkins catheter placement; exchanged monthly), CKD stage IIIb, and cardiac amyloidosis brought in by daughter for diarrhea (loose-watery BM's) over the past few days found to have COVID and urosepsis. Endocrinology consulted for evaluation for adrenal insufficiency.    #Adrenal insufficiency - suspect secondary AI  #Hypoglycemia  #Sepsis  #Abnormal thyroid function tests  - 1/5 1:08pm cortisol 3.1, 1/7 6:42am cortisol 2.3  - 1/6 12pm ACTH 2.5  - BG on BMP 1/3 49  - 1/3 TSH 5.54, TT3 57, FT4 0.9  - A1c 5< hypoglycemia defined as serum glucose <55 which patient had as above  - CT abdomen 12/23 without adrenal gland abnormality  - CT head without pituitary etiology however not optimal test to evaluate pituitary  - no hyponatremia or hypoglycemia  - patient HD stable on midodrine  - endorses weight loss, fatigue, poor appetite  - no history of prolonged steroids, opioids, immunotherapy, radiation  - does not appear that tafamidis can cause adrenal insufficiency  - likely secondary adrenal insufficiency unclear cause at this point however can possibly be from amyloidosis vs pituitary etiology  PLAN  -c/w hydrocortisone 20mg at 8AM and 3PM    #R/o panhypopituitarism  #thyroid axis appears stable on 1/3  -pending repeat TSH, FT4, TT3 given low FT4 of 0.9 on 1/3/24    #Gonadal axis - normal   -follow up:  -LH 11.1  -FSH 6.6  -Total testosterone - 433    #prolactin - pending    #pituitary amyloidosis vs macroadenoma based on above workup  -can consider MRI sella with and without contrast to eval for pituitary amyloidosis or macroadenoma    Case discussed with Dr. Siddhartha Cadena MD  Endocrine Fellow  Can be reached via teams. For follow up questions, discharge recommendations, or new consults, please call answering service at [phone] (weekdays); [phone] (nights/weekends)

## 2024-01-08 NOTE — PROGRESS NOTE ADULT - SUBJECTIVE AND OBJECTIVE BOX
CARDIOLOGY FOLLOW UP - Dr. Saravia  DATE OF SERVICE: 1/8/24    CC  Examined this morning while pt sitting in chair  States he feels dizziness with standing  Endorsing decreased appetite and PO intake  Otherwise no CV complaints     REVIEW OF SYSTEMS:  CONSTITUTIONAL: No fever, weight loss, or fatigue  RESPIRATORY: No cough, wheezing, chills or hemoptysis; No Shortness of Breath  CARDIOVASCULAR: No chest pain, palpitations, passing out, leg swelling. +Dizziness with standing  GASTROINTESTINAL: +Decreased appetite. +Nausea. No abdominal or epigastric pain. No vomiting, or hematemesis; No diarrhea or constipation. No melena or hematochezia.  VASCULAR: No edema     PHYSICAL EXAM:  T(C): 36.7 (01-08-24 @ 04:34), Max: 36.9 (01-08-24 @ 00:00)  HR: 71 (01-08-24 @ 04:34) (63 - 71)  BP: 121/62 (01-08-24 @ 04:34) (104/67 - 131/81)  RR: 18 (01-08-24 @ 04:34) (18 - 18)  SpO2: 96% (01-08-24 @ 04:34) (94% - 96%)  Wt(kg): --  I&O's Summary    07 Jan 2024 07:01  -  08 Jan 2024 07:00  --------------------------------------------------------  IN: 995 mL / OUT: 1575 mL / NET: -580 mL        Appearance: Elderly male 	  Cardiovascular: Normal S1 S2,RRR, No JVD, No murmurs  Respiratory: Lungs clear to auscultation b/l   Gastrointestinal:  Soft, Non-tender, + BS	  Extremities: Normal range of motion, No clubbing, cyanosis or edema      Home Medications:  fluticasone 50 mcg/inh nasal spray: 1 spray(s) in each nostril 2 times a day as needed (04 Jan 2024 12:08)  mirtazapine 7.5 mg oral tablet: 1 tab(s) orally once a day at 6 PM (04 Jan 2024 12:08)  Saline Mist 0.65% nasal spray: 2 spray(s) intranasally as needed (04 Jan 2024 12:08)  Vyndamax 61 mg oral capsule: 1 cap(s) orally once a day (04 Jan 2024 12:08)      MEDICATIONS  (STANDING):  chlorhexidine 2% Cloths 1 Application(s) Topical <User Schedule>  dextrose 50% Injectable 25 milliLiter(s) IV Push once  dextrose 50% Injectable 25 Gram(s) IV Push once  dextrose 50% Injectable 25 Gram(s) IV Push once  dextrose 50% Injectable 12.5 Gram(s) IV Push once  dextrose Oral Gel 15 Gram(s) Oral once  finasteride 5 milliGRAM(s) Oral daily  glucagon  Injectable 1 milliGRAM(s) IntraMuscular once  heparin   Injectable 5000 Unit(s) SubCutaneous every 12 hours  hydrocortisone 20 milliGRAM(s) Oral <User Schedule>  lactated ringers. 1000 milliLiter(s) (70 mL/Hr) IV Continuous <Continuous>  midodrine 20 milliGRAM(s) Oral every 8 hours  piperacillin/tazobactam IVPB.. 3.375 Gram(s) IV Intermittent every 8 hours  sodium bicarbonate 1300 milliGRAM(s) Oral two times a day  sodium chloride 0.65% Nasal 1 Spray(s) Both Nostrils once  Vyndamax (Tafamidis) 61mg Capsule 1 Capsule(s) 1 Capsule(s) Oral daily      TELEMETRY: SB/SR 50-60s with PVCs	    ECG:  	  RADIOLOGY:   DIAGNOSTIC TESTING:  [ ] Echocardiogram:  [ ]  Catheterization:  [ ] Stress Test:    OTHER: 	    LABS:	 	                            9.1    4.78  )-----------( 216      ( 08 Jan 2024 05:59 )             27.3     01-08    139  |  107  |  23  ----------------------------<  114<H>  4.1   |  17<L>  |  2.33<H>    Ca    8.7      08 Jan 2024 05:58  Phos  3.3     01-08  Mg     2.0     01-08    TPro  6.6  /  Alb  3.1<L>  /  TBili  0.4  /  DBili  x   /  AST  36  /  ALT  26  /  AlkPhos  86  01-08

## 2024-01-08 NOTE — PROCEDURE NOTE - ADDITIONAL PROCEDURE DETAILS
UROLOGY PROGRESS NOTE:     Called to see patient for morales placement.  Last morales placed by urology.  pt had insisted on TOV but has failed    18F coude morales placed under typical sterile technique.  No complications.  Patient tolerated procedure well.  ~900cc clear yellow urine drained.  Morales plan as per primary team.

## 2024-01-08 NOTE — PROGRESS NOTE ADULT - PROBLEM SELECTOR PLAN 1
-met SIRs criteria on admission with concerning source of GI vs UTI  -previous hx of E.faecalis and Stenotrophomonas maltophilia  -Likely Pyelo in context of CT with perinephric standing, may also be due to proctitis       -ID consulted recs appreciated   - c/w zosyn for 7 day course   -ucx demonstrating efaecalis pending sens  -bcx ngtd  -gi pcr and cdiff neg -met SIRs criteria on admission with concerning source of GI vs UTI  -previous hx of E.faecalis and Stenotrophomonas maltophilia  -Likely Pyelo in context of CT with perinephric standing, may also be due to proctitis     -ID consulted recs appreciated   - on zosyn since 1/2/24 (received 1 dose on 1/2 PM), continuing for 7 day course per ID recs  -ucx demonstrating efaecalis, susceptible to ampicillin (covered by zosyn)  -bcx ngtd  -gi pcr and cdiff neg  - HDS, no longer meeting SIRS criteria

## 2024-01-08 NOTE — PROGRESS NOTE ADULT - PROBLEM SELECTOR PLAN 12
Diet: Regular  DVT-P; Heparin (increase from 5000 units q12h to q8h)   Dispo; Pending medical optimization

## 2024-01-08 NOTE — PROGRESS NOTE ADULT - PROBLEM SELECTOR PLAN 2
Baseline creatinine is 1.6, Cr today is 2.58  -patient has morales lower concern for obstruction will order kidney/bladder us for r/o hydro  -may be iso of atn given hypotension on presentation vs prerenal given fluid loss vs remdesvir  -will trial IVF  - Dced remdesivir  -cs nephro  - Fena 1.4%  - VBG in AM-> elevated lactate at 2.1 but pt not symptomatic Baseline creatinine is 1.6, Cr on admission 1.8 -> 2.69 on 1/4   - chronic morales, low c/f obstruction  -may be iso of atn given hypotension on presentation vs prerenal given fluid loss vs remdesvir  - Dced remdesivir  - Fena 1.4%  - VBG in AM-> elevated lactate at 2.1 but pt not symptomatic  - nephro following, appreciate recs  - trend SCr  - hold IVF for now per nephro/cards  - maintain morales for BPH

## 2024-01-08 NOTE — PROGRESS NOTE ADULT - PROBLEM SELECTOR PLAN 3
-Hold anti-diarrheal medications in context of potential viral or bacterial GI infection-> started loperamide 1/3  -Continue IVF - AM cortisol low, ACTH low c/f secondary AI  - endocrine following, appreciate recs  - CT abdomen 12/23 without adrenal abnormality, CTH without obvious pituitary etiology though not an optimal test to evaluate pituitary  - Repeat TSH, FT4, TT3 pending given low FT4 of 0.9 on 1/3  - gonadal axis normal   - MRI sella w/wo contrast ordered to eval for pituitary amyloid or macroadenoma  - prolactin pending  - continue hydrocortisone 20 mg BID (8AM and 3PM)

## 2024-01-08 NOTE — PROGRESS NOTE ADULT - PROBLEM SELECTOR PLAN 6
- c/w  Vyndamax  - Dr. Saravia consulted would appreciate recs -CT Head WNL  -F/U B12, TSH, Orthostatic vitals-> TSH elevated free T4 and total T3 ordered, T4 wnl  -pt reporting persistent dizziness, vertigo; worse in AM after lying down. No acute findings on CT on admission, will get MRI brain to r/o central etiology for dizziness    Plan:  - f/u MRI brain w/wo cont  - meclizine PRN for dizziness

## 2024-01-08 NOTE — PROGRESS NOTE ADULT - PROBLEM SELECTOR PLAN 8
-monitor but likely iso of sepsis -consistent with chronic disease w/low iron  - s/p venofer x3 day course  - Hgb stable ~9, no evidence of active bleeding

## 2024-01-08 NOTE — PROGRESS NOTE ADULT - PROBLEM SELECTOR PLAN 11
Diet: Regular  DVT-P; Heparin  Dispo; Pending medical optimization -- c/w Flomax and Finasteride  - maintain morales (failed TOV 1/7, morales replaced by urology 1/8)

## 2024-01-08 NOTE — PROGRESS NOTE ADULT - ASSESSMENT
74 yo M BPH history of morales for urinary retention, initially with diarrhea and falls  Has fevers, no leukocytosis  Initially with diarrhea/falls  Chronic morales, has had pain at site  CT with proctitis, fluid filled rectum, bilateral perinephric soft tissue stranding; renal lesions  UA+, UCX low CFU E faecalis--seems low likelihood to be active pathogen  C diff negative  COVID+  Had hypotension, with evaluation by MICU  Note fever yesterday evening--residual COVID vs alternate process?  Fever resolved? Note still rising eosinphils  Overall, UTI, COVID, fever  - Zosyn 3.375g q 8, if DC planning can send out on PO Augmentin to complete 7 days  - F/U BCXs  - Monitor for further fevers  - Hold on Dexa unless progressive O2 requirements    Vernon Escobar MD  Contact on TEAMS messaging from 9am - 5pm  From 5pm-9am, on weekends, or if no response call [phone] 74 yo M BPH history of morales for urinary retention, initially with diarrhea and falls  Has fevers, no leukocytosis  Initially with diarrhea/falls  Chronic morales, has had pain at site  CT with proctitis, fluid filled rectum, bilateral perinephric soft tissue stranding; renal lesions  UA+, UCX low CFU E faecalis--seems low likelihood to be active pathogen  C diff negative  COVID+  Had hypotension, with evaluation by MICU  Note fever yesterday evening--residual COVID vs alternate process?  Fever resolved? Note still rising eosinphils  Overall, UTI, COVID, fever  - Zosyn 3.375g q 8, if DC planning can send out on PO Augmentin to complete 7 days  - F/U BCXs  - Monitor for further fevers  - Hold on Dexa unless progressive O2 requirements    Vernon Escobar MD  Contact on TEAMS messaging from 9am - 5pm  From 5pm-9am, on weekends, or if no response call [phone]

## 2024-01-08 NOTE — PROGRESS NOTE ADULT - PROBLEM SELECTOR PLAN 7
-consistent with chronic disease w/low iron  -can replete prn with iron po vs  sucrose - Dr. Saravia consulted, appreciate recs  - c/w  Vyndamax

## 2024-01-08 NOTE — PROGRESS NOTE ADULT - ATTENDING COMMENTS
73M w/ BPH (c/b urinary retention s/p Morales catheter placement; exchanged monthly), CKD stage IIIb, and cardiac amyloidosis brought in by daughter for diarrhea found to have hypotension likely due to sepsis.     Pyleo/ Urosepsis S/p 4.5L IVF and now with midodrine 20 mg q8h with parameters. C/w Zosyn x7 days D1:1/3. CT abd pel showing perinephric stranding. ID following  Failed TOV; Morales replaced by urology. Maintain morales. F/u urology outpatient   COVID+: Not requiring O2 at this time.   SARAH on CKD - Cr improving renal following   Amyloidosis: Cardiology recommends to continue with vyndamax  AM cortisol 2.3, ACTH low suggesting possible central AI. Endo consulted. Started on hydrocortisone. MRI sella with and without contrast to eval for pituitary amyloidosis or macroadenoma

## 2024-01-08 NOTE — PROGRESS NOTE ADULT - PROBLEM SELECTOR PLAN 10
-- c/w Flomax and Finasteride -Isolation precautions and IV Remdesivir-> dced 1/3  -remains on room air  -hx of vaccinations

## 2024-01-09 ENCOUNTER — APPOINTMENT (OUTPATIENT)
Dept: OTOLARYNGOLOGY | Facility: CLINIC | Age: 74
End: 2024-01-09

## 2024-01-09 DIAGNOSIS — M79.671 PAIN IN RIGHT FOOT: ICD-10-CM

## 2024-01-09 LAB
ANION GAP SERPL CALC-SCNC: 13 MMOL/L — SIGNIFICANT CHANGE UP (ref 5–17)
ANION GAP SERPL CALC-SCNC: 13 MMOL/L — SIGNIFICANT CHANGE UP (ref 5–17)
ANISOCYTOSIS BLD QL: SIGNIFICANT CHANGE UP
ANISOCYTOSIS BLD QL: SIGNIFICANT CHANGE UP
BASOPHILS # BLD AUTO: 0 K/UL — SIGNIFICANT CHANGE UP (ref 0–0.2)
BASOPHILS # BLD AUTO: 0 K/UL — SIGNIFICANT CHANGE UP (ref 0–0.2)
BASOPHILS NFR BLD AUTO: 0 % — SIGNIFICANT CHANGE UP (ref 0–2)
BASOPHILS NFR BLD AUTO: 0 % — SIGNIFICANT CHANGE UP (ref 0–2)
BUN SERPL-MCNC: 22 MG/DL — SIGNIFICANT CHANGE UP (ref 7–23)
BUN SERPL-MCNC: 22 MG/DL — SIGNIFICANT CHANGE UP (ref 7–23)
CALCIUM SERPL-MCNC: 8.5 MG/DL — SIGNIFICANT CHANGE UP (ref 8.4–10.5)
CALCIUM SERPL-MCNC: 8.5 MG/DL — SIGNIFICANT CHANGE UP (ref 8.4–10.5)
CHLORIDE SERPL-SCNC: 109 MMOL/L — HIGH (ref 96–108)
CHLORIDE SERPL-SCNC: 109 MMOL/L — HIGH (ref 96–108)
CO2 SERPL-SCNC: 18 MMOL/L — LOW (ref 22–31)
CO2 SERPL-SCNC: 18 MMOL/L — LOW (ref 22–31)
CREAT SERPL-MCNC: 2.2 MG/DL — HIGH (ref 0.5–1.3)
CREAT SERPL-MCNC: 2.2 MG/DL — HIGH (ref 0.5–1.3)
EGFR: 30 ML/MIN/1.73M2 — LOW
EGFR: 30 ML/MIN/1.73M2 — LOW
ELLIPTOCYTES BLD QL SMEAR: SLIGHT — SIGNIFICANT CHANGE UP
ELLIPTOCYTES BLD QL SMEAR: SLIGHT — SIGNIFICANT CHANGE UP
EOSINOPHIL # BLD AUTO: 0 K/UL — SIGNIFICANT CHANGE UP (ref 0–0.5)
EOSINOPHIL # BLD AUTO: 0 K/UL — SIGNIFICANT CHANGE UP (ref 0–0.5)
EOSINOPHIL NFR BLD AUTO: 0 % — SIGNIFICANT CHANGE UP (ref 0–6)
EOSINOPHIL NFR BLD AUTO: 0 % — SIGNIFICANT CHANGE UP (ref 0–6)
GLUCOSE SERPL-MCNC: 110 MG/DL — HIGH (ref 70–99)
GLUCOSE SERPL-MCNC: 110 MG/DL — HIGH (ref 70–99)
HCT VFR BLD CALC: 24.5 % — LOW (ref 39–50)
HCT VFR BLD CALC: 24.5 % — LOW (ref 39–50)
HGB BLD-MCNC: 8.1 G/DL — LOW (ref 13–17)
HGB BLD-MCNC: 8.1 G/DL — LOW (ref 13–17)
INSULIN-LIKE GROWTH FACTOR 1 INTERPRETATION: SIGNIFICANT CHANGE UP
INSULIN-LIKE GROWTH FACTOR 1 INTERPRETATION: SIGNIFICANT CHANGE UP
INSULIN-LIKE GROWTH FACTOR 1: 36 NG/ML — SIGNIFICANT CHANGE UP (ref 22–221)
INSULIN-LIKE GROWTH FACTOR 1: 36 NG/ML — SIGNIFICANT CHANGE UP (ref 22–221)
LYMPHOCYTES # BLD AUTO: 1.11 K/UL — SIGNIFICANT CHANGE UP (ref 1–3.3)
LYMPHOCYTES # BLD AUTO: 1.11 K/UL — SIGNIFICANT CHANGE UP (ref 1–3.3)
LYMPHOCYTES # BLD AUTO: 20.2 % — SIGNIFICANT CHANGE UP (ref 13–44)
LYMPHOCYTES # BLD AUTO: 20.2 % — SIGNIFICANT CHANGE UP (ref 13–44)
MACROCYTES BLD QL: SIGNIFICANT CHANGE UP
MACROCYTES BLD QL: SIGNIFICANT CHANGE UP
MAGNESIUM SERPL-MCNC: 2.1 MG/DL — SIGNIFICANT CHANGE UP (ref 1.6–2.6)
MAGNESIUM SERPL-MCNC: 2.1 MG/DL — SIGNIFICANT CHANGE UP (ref 1.6–2.6)
MANUAL SMEAR VERIFICATION: SIGNIFICANT CHANGE UP
MANUAL SMEAR VERIFICATION: SIGNIFICANT CHANGE UP
MCHC RBC-ENTMCNC: 26.4 PG — LOW (ref 27–34)
MCHC RBC-ENTMCNC: 26.4 PG — LOW (ref 27–34)
MCHC RBC-ENTMCNC: 33.1 GM/DL — SIGNIFICANT CHANGE UP (ref 32–36)
MCHC RBC-ENTMCNC: 33.1 GM/DL — SIGNIFICANT CHANGE UP (ref 32–36)
MCV RBC AUTO: 79.8 FL — LOW (ref 80–100)
MCV RBC AUTO: 79.8 FL — LOW (ref 80–100)
MONOCYTES # BLD AUTO: 0.43 K/UL — SIGNIFICANT CHANGE UP (ref 0–0.9)
MONOCYTES # BLD AUTO: 0.43 K/UL — SIGNIFICANT CHANGE UP (ref 0–0.9)
MONOCYTES NFR BLD AUTO: 7.9 % — SIGNIFICANT CHANGE UP (ref 2–14)
MONOCYTES NFR BLD AUTO: 7.9 % — SIGNIFICANT CHANGE UP (ref 2–14)
NEUTROPHILS # BLD AUTO: 3.71 K/UL — SIGNIFICANT CHANGE UP (ref 1.8–7.4)
NEUTROPHILS # BLD AUTO: 3.71 K/UL — SIGNIFICANT CHANGE UP (ref 1.8–7.4)
NEUTROPHILS NFR BLD AUTO: 66.6 % — SIGNIFICANT CHANGE UP (ref 43–77)
NEUTROPHILS NFR BLD AUTO: 66.6 % — SIGNIFICANT CHANGE UP (ref 43–77)
NEUTS BAND # BLD: 0.9 % — SIGNIFICANT CHANGE UP (ref 0–8)
NEUTS BAND # BLD: 0.9 % — SIGNIFICANT CHANGE UP (ref 0–8)
PHOSPHATE SERPL-MCNC: 2.5 MG/DL — SIGNIFICANT CHANGE UP (ref 2.5–4.5)
PHOSPHATE SERPL-MCNC: 2.5 MG/DL — SIGNIFICANT CHANGE UP (ref 2.5–4.5)
PLAT MORPH BLD: NORMAL — SIGNIFICANT CHANGE UP
PLAT MORPH BLD: NORMAL — SIGNIFICANT CHANGE UP
PLATELET # BLD AUTO: 221 K/UL — SIGNIFICANT CHANGE UP (ref 150–400)
PLATELET # BLD AUTO: 221 K/UL — SIGNIFICANT CHANGE UP (ref 150–400)
POIKILOCYTOSIS BLD QL AUTO: SLIGHT — SIGNIFICANT CHANGE UP
POIKILOCYTOSIS BLD QL AUTO: SLIGHT — SIGNIFICANT CHANGE UP
POLYCHROMASIA BLD QL SMEAR: SLIGHT — SIGNIFICANT CHANGE UP
POLYCHROMASIA BLD QL SMEAR: SLIGHT — SIGNIFICANT CHANGE UP
POTASSIUM SERPL-MCNC: 4.2 MMOL/L — SIGNIFICANT CHANGE UP (ref 3.5–5.3)
POTASSIUM SERPL-MCNC: 4.2 MMOL/L — SIGNIFICANT CHANGE UP (ref 3.5–5.3)
POTASSIUM SERPL-SCNC: 4.2 MMOL/L — SIGNIFICANT CHANGE UP (ref 3.5–5.3)
POTASSIUM SERPL-SCNC: 4.2 MMOL/L — SIGNIFICANT CHANGE UP (ref 3.5–5.3)
PROLACTIN SERPL-MCNC: 6 NG/ML — SIGNIFICANT CHANGE UP (ref 4.1–18.4)
PROLACTIN SERPL-MCNC: 6 NG/ML — SIGNIFICANT CHANGE UP (ref 4.1–18.4)
RBC # BLD: 3.07 M/UL — LOW (ref 4.2–5.8)
RBC # BLD: 3.07 M/UL — LOW (ref 4.2–5.8)
RBC # FLD: 17.8 % — HIGH (ref 10.3–14.5)
RBC # FLD: 17.8 % — HIGH (ref 10.3–14.5)
RBC BLD AUTO: ABNORMAL
RBC BLD AUTO: ABNORMAL
SARS-COV-2 RNA SPEC QL NAA+PROBE: SIGNIFICANT CHANGE UP
SARS-COV-2 RNA SPEC QL NAA+PROBE: SIGNIFICANT CHANGE UP
SODIUM SERPL-SCNC: 140 MMOL/L — SIGNIFICANT CHANGE UP (ref 135–145)
SODIUM SERPL-SCNC: 140 MMOL/L — SIGNIFICANT CHANGE UP (ref 135–145)
TARGETS BLD QL SMEAR: SLIGHT — SIGNIFICANT CHANGE UP
TARGETS BLD QL SMEAR: SLIGHT — SIGNIFICANT CHANGE UP
VARIANT LYMPHS # BLD: 4.4 % — SIGNIFICANT CHANGE UP (ref 0–6)
VARIANT LYMPHS # BLD: 4.4 % — SIGNIFICANT CHANGE UP (ref 0–6)
WBC # BLD: 5.49 K/UL — SIGNIFICANT CHANGE UP (ref 3.8–10.5)
WBC # BLD: 5.49 K/UL — SIGNIFICANT CHANGE UP (ref 3.8–10.5)
WBC # FLD AUTO: 5.49 K/UL — SIGNIFICANT CHANGE UP (ref 3.8–10.5)
WBC # FLD AUTO: 5.49 K/UL — SIGNIFICANT CHANGE UP (ref 3.8–10.5)

## 2024-01-09 PROCEDURE — 99232 SBSQ HOSP IP/OBS MODERATE 35: CPT

## 2024-01-09 PROCEDURE — 73630 X-RAY EXAM OF FOOT: CPT | Mod: 26,RT

## 2024-01-09 PROCEDURE — 99232 SBSQ HOSP IP/OBS MODERATE 35: CPT | Mod: GC

## 2024-01-09 RX ORDER — SODIUM BICARBONATE 1 MEQ/ML
0.1 SYRINGE (ML) INTRAVENOUS
Qty: 150 | Refills: 0 | Status: DISCONTINUED | OUTPATIENT
Start: 2024-01-09 | End: 2024-01-10

## 2024-01-09 RX ORDER — ACETAMINOPHEN 500 MG
650 TABLET ORAL EVERY 6 HOURS
Refills: 0 | Status: DISCONTINUED | OUTPATIENT
Start: 2024-01-09 | End: 2024-01-15

## 2024-01-09 RX ADMIN — HEPARIN SODIUM 5000 UNIT(S): 5000 INJECTION INTRAVENOUS; SUBCUTANEOUS at 05:46

## 2024-01-09 RX ADMIN — FINASTERIDE 5 MILLIGRAM(S): 5 TABLET, FILM COATED ORAL at 12:13

## 2024-01-09 RX ADMIN — HEPARIN SODIUM 5000 UNIT(S): 5000 INJECTION INTRAVENOUS; SUBCUTANEOUS at 21:54

## 2024-01-09 RX ADMIN — Medication 20 MILLIGRAM(S): at 12:13

## 2024-01-09 RX ADMIN — Medication 50 MEQ/KG/HR: at 12:12

## 2024-01-09 RX ADMIN — Medication 20 MILLIGRAM(S): at 08:44

## 2024-01-09 RX ADMIN — Medication 1300 MILLIGRAM(S): at 05:46

## 2024-01-09 RX ADMIN — HEPARIN SODIUM 5000 UNIT(S): 5000 INJECTION INTRAVENOUS; SUBCUTANEOUS at 13:57

## 2024-01-09 RX ADMIN — CHLORHEXIDINE GLUCONATE 1 APPLICATION(S): 213 SOLUTION TOPICAL at 07:39

## 2024-01-09 NOTE — PROGRESS NOTE ADULT - ASSESSMENT
A/p  73M w/ BPH (c/b urinary retention s/p Harkins catheter placement; exchanged monthly), CKD stage IIIb, and cardiac amyloidosis brought in by daughter for diarrhea (loose-watery BM's) over the past few days.    #Sepsis  -I/s/o COVID + UTI  -Abx per med    #COVID  -CXR no focal consolidations  -sp Remdesivir  -Supportive care per med    #cardiac amyloid   -Cont vyndamax  -baseline sbp 100 as outpt  -Prior echo with nml LV systolic fxn, Severe left ventricular hypertrophy, Findings suggestive of infiltrative cardiomyopathy    #Hypotension  -BP soft but stable  -Continue midodrine  -GENTLE IVF - watch for overload     #Hypoglycemia  -mgmt per med    #Dizziness  -Orthostatics negative   -Cont mido, gentle IVF  -PT for mobility      dvt ppx

## 2024-01-09 NOTE — PROGRESS NOTE ADULT - PROBLEM SELECTOR PLAN 12
Diet: Regular  DVT-P; Heparin (increase from 5000 units q12h to q8h)   Dispo; Pending medical optimization -- c/w Flomax and Finasteride  - maintain morales (failed TOV 1/7, morales replaced by urology 1/8)

## 2024-01-09 NOTE — PROGRESS NOTE ADULT - PROBLEM SELECTOR PLAN 4
-Hold anti-diarrheal medications in context of potential viral or bacterial GI infection-> started loperamide 1/3  -diarrhea now resolved Pain at 1st MTP joint, ddx gout vs septic arthritis vs fracture (unlikely given no trauma)  - XR R foot  - acetaminophen PRN for pain  - Pain at 1st MTP joint, ddx gout vs less likely septic arthritis vs fracture (unlikely given no trauma)  - XR R foot - f/u result  - acetaminophen PRN for pain  - ERIN/PVRs to r/o PAD when off COVID isolation (per protocol cannot perform while on isolation)

## 2024-01-09 NOTE — PROGRESS NOTE ADULT - PROBLEM SELECTOR PLAN 5
Likely hypovolemic ISO diarrhea but possibly with component of distributive shock   - midodrine 20 mg TID -> 10 mg TID on 1/8  - I/Os  - workup of adrenal insufficiency per endo - ACTH and cortisol low c/f secondary AI  - continue hydrocortisone 20 mg BID - 8AM and 3PM -Hold anti-diarrheal medications in context of potential viral or bacterial GI infection-> started loperamide 1/3  -diarrhea now resolved -Hold anti-diarrheal medications in context of potential viral or bacterial GI infection-> started loperamide 1/3  -diarrhea now resolved  - loperamide stopped  - monitor for constipation

## 2024-01-09 NOTE — PROGRESS NOTE ADULT - ATTENDING COMMENTS
73M w/ BPH (c/b urinary retention s/p Morales catheter placement; exchanged monthly), CKD stage IIIb, and cardiac amyloidosis brought in by daughter for diarrhea found to have hypotension likely due to sepsis.     Pyleo/ Urosepsis S/p 4.5L IVF and now with midodrine 10 mg q8h with parameters. Wean midodrine as tolerated.   S/p zosyn. CT abd pel showing perinephric stranding. ID following  Failed TOV; Morales replaced by urology. Maintain morales. F/u urology outpatient   COVID+: Not requiring O2 at this time.   SARAH on CKD - Cr improving renal following   Amyloidosis: Cardiology recommends to continue with vyndamax  AM cortisol 2.3, ACTH low. C/f AI. Endo following. Started on hydrocortisone. MRI with possible pituitary microadenoma.

## 2024-01-09 NOTE — PROGRESS NOTE ADULT - SUBJECTIVE AND OBJECTIVE BOX
Admitted for: Sepsis        Following for: secondary adrenal insufficiency    Subjective:   Patient reports he is continuing to feel better.    MRI pituitary done:  There is a nonspecific area of decreased enhancement   involving the left pituitary gland. This is best seen on series 12 image   6 and measures approximately 6.1 x 2.4 mm. Possibly of a pituitary   microadenoma cannot entirely excluded..      MEDICATIONS  (STANDING):  chlorhexidine 2% Cloths 1 Application(s) Topical <User Schedule>  dextrose 50% Injectable 25 Gram(s) IV Push once  dextrose 50% Injectable 25 milliLiter(s) IV Push once  dextrose 50% Injectable 25 Gram(s) IV Push once  dextrose 50% Injectable 12.5 Gram(s) IV Push once  dextrose Oral Gel 15 Gram(s) Oral once  finasteride 5 milliGRAM(s) Oral daily  glucagon  Injectable 1 milliGRAM(s) IntraMuscular once  heparin   Injectable 5000 Unit(s) SubCutaneous every 8 hours  hydrocortisone 20 milliGRAM(s) Oral <User Schedule>  lactated ringers. 1000 milliLiter(s) (70 mL/Hr) IV Continuous <Continuous>  midodrine 10 milliGRAM(s) Oral every 8 hours  sodium bicarbonate  Infusion 0.105 mEq/kG/Hr (50 mL/Hr) IV Continuous <Continuous>  sodium chloride 0.65% Nasal 1 Spray(s) Both Nostrils once  Vyndamax (Tafamidis) 61mg Capsule 1 Capsule(s) 1 Capsule(s) Oral daily    MEDICATIONS  (PRN):  acetaminophen     Tablet .. 650 milliGRAM(s) Oral every 6 hours PRN Mild Pain (1 - 3), Moderate Pain (4 - 6)  fluticasone propionate 50 MICROgram(s)/spray Nasal Spray 1 Spray(s) Both Nostrils two times a day PRN congestion  ondansetron Injectable 4 milliGRAM(s) IV Push daily PRN Nausea      Allergies    No Known Allergies    Intolerances          PHYSICAL EXAM:  VITALS: T(C): 36.9 (01-09-24 @ 05:26)  T(F): 98.4 (01-09-24 @ 05:26), Max: 98.4 (01-09-24 @ 05:26)  HR: 65 (01-09-24 @ 05:26) (65 - 71)  BP: 117/75 (01-09-24 @ 05:26) (117/75 - 119/71)  RR:  (18 - 18)  SpO2:  (97% - 97%)  Wt(kg): --  GENERAL: NAD  EYES: No proptosis, no lid lag, anicteric  RESPIRATORY: Clear to auscultation bilaterally  CARDIOVASCULAR: Regular rate and rhythm  GI: Soft, nontender, non distended  EXT: b/l feet without wounds, 2+ pulses  PSYCH: Alert and oriented x 3, reactive affect      A1C with Estimated Average Glucose Result: 5.0 % (01-03-24 @ 06:01)  A1C with Estimated Average Glucose Result: 5.3 % (11-04-23 @ 10:45)  A1C with Estimated Average Glucose Result: 5.5 % (06-10-23 @ 16:29)  A1C with Estimated Average Glucose Result: 5.9 % (04-24-23 @ 07:02)          01-09    140  |  109<H>  |  22  ----------------------------<  110<H>  4.2   |  18<L>  |  2.20<H>    eGFR: 30<L>    Ca    8.5      01-09  Mg     2.1     01-09  Phos  2.5     01-09    TPro  6.6  /  Alb  3.1<L>  /  TBili  0.4  /  DBili  x   /  AST  36  /  ALT  26  /  AlkPhos  86  01-08      Thyroid Function Tests:  01-08 @ 06:00 TSH 4.98 FreeT4 0.9 T3 55 Anti TPO -- Anti Thyroglobulin Ab -- TSI --  01-03 @ 06:01 TSH 5.54 FreeT4 0.9 T3 57 Anti TPO -- Anti Thyroglobulin Ab -- TSI --

## 2024-01-09 NOTE — PROGRESS NOTE ADULT - PROBLEM SELECTOR PLAN 3
- AM cortisol low, ACTH low c/f secondary AI  - endocrine following, appreciate recs  - CT abdomen 12/23 without adrenal abnormality, CTH without obvious pituitary etiology though not an optimal test to evaluate pituitary  - Repeat TSH, FT4, TT3 pending given low FT4 of 0.9 on 1/3  - gonadal axis normal   - MRI sella w/wo contrast ordered to eval for pituitary amyloid or macroadenoma  - prolactin pending  - continue hydrocortisone 20 mg BID (8AM and 3PM) - AM cortisol low, ACTH low c/f secondary AI  - endocrine following, appreciate recs  - CT abdomen 12/23 without adrenal abnormality, CTH without obvious pituitary etiology though not an optimal test to evaluate pituitary  - Repeat TSH, FT4, TT3 pending given low FT4 of 0.9 on 1/3  - gonadal axis normal   - MRI sella w/wo contrast ordered to eval for pituitary amyloid or macroadenoma - f/u read   - continue hydrocortisone 20 mg BID (8AM and 3PM)  - Prolactin, ILGF-1 pending

## 2024-01-09 NOTE — PROGRESS NOTE ADULT - SUBJECTIVE AND OBJECTIVE BOX
DEEPTHI GÓMEZ  73y  Male    Patient is a 73y old  Male who presents with a chief complaint of Diarrhea/ (08 Jan 2024 11:10)    INTERVAL HPI/OVERNIGHT EVENTS:  - NAEON  - completed course of zosyn     T(C): 36.9 (01-09-24 @ 05:26), Max: 36.9 (01-09-24 @ 05:26)  HR: 65 (01-09-24 @ 05:26) (65 - 75)  BP: 117/75 (01-09-24 @ 05:26) (104/66 - 119/71)  RR: 18 (01-09-24 @ 05:26) (17 - 18)  SpO2: 97% (01-09-24 @ 05:26) (97% - 99%)  Wt(kg): --Vital Signs Last 24 Hrs  T(C): 36.9 (09 Jan 2024 05:26), Max: 36.9 (09 Jan 2024 05:26)  T(F): 98.4 (09 Jan 2024 05:26), Max: 98.4 (09 Jan 2024 05:26)  HR: 65 (09 Jan 2024 05:26) (65 - 75)  BP: 117/75 (09 Jan 2024 05:26) (104/66 - 119/71)  BP(mean): --  RR: 18 (09 Jan 2024 05:26) (17 - 18)  SpO2: 97% (09 Jan 2024 05:26) (97% - 99%)    Parameters below as of 09 Jan 2024 05:26  Patient On (Oxygen Delivery Method): room air        PHYSICAL EXAM:  GENERAL: NAD  HEAD:  Atraumatic, Normocephalic  EYES: EOMI, conjunctiva and sclera clear  CHEST/LUNG: Clear to auscultation bilaterally; No rales, rhonchi, wheezing, or rubs  HEART: Regular rate and rhythm; No murmurs, rubs, or gallops  ABDOMEN: Soft, nontender, Nondistended, no masses  : morales in place draining yellow urine  SKIN: No rashes or lesions  NERVOUS SYSTEM:  Alert & Oriented X3, no focal deficits      Consultant(s) Notes Reviewed:  [x ] YES  [ ] NO  Care Discussed with Consultants/Other Providers [ x] YES  [ ] NO    LABS:                        9.1    4.78  )-----------( 216      ( 08 Jan 2024 05:59 )             27.3     01-08    139  |  107  |  23  ----------------------------<  114<H>  4.1   |  17<L>  |  2.33<H>    Ca    8.7      08 Jan 2024 05:58  Phos  3.3     01-08  Mg     2.0     01-08    TPro  6.6  /  Alb  3.1<L>  /  TBili  0.4  /  DBili  x   /  AST  36  /  ALT  26  /  AlkPhos  86  01-08        Urinalysis Basic - ( 08 Jan 2024 05:58 )    Color: x / Appearance: x / SG: x / pH: x  Gluc: 114 mg/dL / Ketone: x  / Bili: x / Urobili: x   Blood: x / Protein: x / Nitrite: x   Leuk Esterase: x / RBC: x / WBC x   Sq Epi: x / Non Sq Epi: x / Bacteria: x      CAPILLARY BLOOD GLUCOSE            Urinalysis Basic - ( 08 Jan 2024 05:58 )    Color: x / Appearance: x / SG: x / pH: x  Gluc: 114 mg/dL / Ketone: x  / Bili: x / Urobili: x   Blood: x / Protein: x / Nitrite: x   Leuk Esterase: x / RBC: x / WBC x   Sq Epi: x / Non Sq Epi: x / Bacteria: x        RADIOLOGY & ADDITIONAL TESTS:    Imaging Personally Reviewed:  [ ] YES  [ ] NO    HEALTH ISSUES - PROBLEM Dx:  Sepsis    Acute kidney injury superimposed on CKD    Adrenal insufficiency    Diarrhea    Hypotension    Fall    Cardiac amyloidosis    Anemia    Abnormal transaminases    2019 novel coronavirus disease (COVID-19)    BPH (benign prostatic hyperplasia)    Prophylactic measure    Hypoglycemia    Abnormal thyroid blood test         DEEPTHI GÓMEZ  73y  Male    Patient is a 73y old  Male who presents with a chief complaint of Diarrhea/ (08 Jan 2024 11:10)    INTERVAL HPI/OVERNIGHT EVENTS:  - NAEON  - completed course of zosyn   - afebrile, HDS  - feeling better overall, cough improving, denies dyspnea/chest pain, no further episodes of diarrhea  - morales replaced on 1/8, draining well  - continues to endorse R foot pain, mostly at R 1st MTP joint. Worse with pressure/ambulation    T(C): 36.9 (01-09-24 @ 05:26), Max: 36.9 (01-09-24 @ 05:26)  HR: 65 (01-09-24 @ 05:26) (65 - 75)  BP: 117/75 (01-09-24 @ 05:26) (104/66 - 119/71)  RR: 18 (01-09-24 @ 05:26) (17 - 18)  SpO2: 97% (01-09-24 @ 05:26) (97% - 99%)  Wt(kg): --Vital Signs Last 24 Hrs  T(C): 36.9 (09 Jan 2024 05:26), Max: 36.9 (09 Jan 2024 05:26)  T(F): 98.4 (09 Jan 2024 05:26), Max: 98.4 (09 Jan 2024 05:26)  HR: 65 (09 Jan 2024 05:26) (65 - 75)  BP: 117/75 (09 Jan 2024 05:26) (104/66 - 119/71)  BP(mean): --  RR: 18 (09 Jan 2024 05:26) (17 - 18)  SpO2: 97% (09 Jan 2024 05:26) (97% - 99%)    Parameters below as of 09 Jan 2024 05:26  Patient On (Oxygen Delivery Method): room air    PHYSICAL EXAM:  GENERAL: NAD  HEAD:  Atraumatic, Normocephalic  EYES: EOMI, conjunctiva and sclera clear  CHEST/LUNG: Clear to auscultation bilaterally; No rales, rhonchi, wheezing, or rubs  HEART: Regular rate and rhythm; No murmurs, rubs, or gallops  ABDOMEN: Soft, nontender, Nondistended, no masses  : morales in place draining yellow urine  SKIN: No rashes or lesions  EXTREM: bony protrusion at R 1st MTP joint with no overlying skin changes. TTP. No other joint deformities. R foot otherwise nontender.   NERVOUS SYSTEM:  Alert & Oriented X3, no focal deficits      Consultant(s) Notes Reviewed:  [x ] YES  [ ] NO  Care Discussed with Consultants/Other Providers [ x] YES  [ ] NO    LABS:                        9.1    4.78  )-----------( 216      ( 08 Jan 2024 05:59 )             27.3     01-08    139  |  107  |  23  ----------------------------<  114<H>  4.1   |  17<L>  |  2.33<H>    Ca    8.7      08 Jan 2024 05:58  Phos  3.3     01-08  Mg     2.0     01-08    TPro  6.6  /  Alb  3.1<L>  /  TBili  0.4  /  DBili  x   /  AST  36  /  ALT  26  /  AlkPhos  86  01-08        Urinalysis Basic - ( 08 Jan 2024 05:58 )    Color: x / Appearance: x / SG: x / pH: x  Gluc: 114 mg/dL / Ketone: x  / Bili: x / Urobili: x   Blood: x / Protein: x / Nitrite: x   Leuk Esterase: x / RBC: x / WBC x   Sq Epi: x / Non Sq Epi: x / Bacteria: x      CAPILLARY BLOOD GLUCOSE            Urinalysis Basic - ( 08 Jan 2024 05:58 )    Color: x / Appearance: x / SG: x / pH: x  Gluc: 114 mg/dL / Ketone: x  / Bili: x / Urobili: x   Blood: x / Protein: x / Nitrite: x   Leuk Esterase: x / RBC: x / WBC x   Sq Epi: x / Non Sq Epi: x / Bacteria: x        RADIOLOGY & ADDITIONAL TESTS:    Imaging Personally Reviewed:  [ ] YES  [ ] NO    HEALTH ISSUES - PROBLEM Dx:  Sepsis    Acute kidney injury superimposed on CKD    Adrenal insufficiency    Diarrhea    Hypotension    Fall    Cardiac amyloidosis    Anemia    Abnormal transaminases    2019 novel coronavirus disease (COVID-19)    BPH (benign prostatic hyperplasia)    Prophylactic measure    Hypoglycemia    Abnormal thyroid blood test

## 2024-01-09 NOTE — PROGRESS NOTE ADULT - PROBLEM SELECTOR PLAN 7
- Dr. Saravia consulted, appreciate recs  - c/w  Vyndamax -CT Head WNL  -F/U B12, TSH, Orthostatic vitals-> TSH elevated free T4 and total T3 ordered, T4 wnl  -pt reporting persistent dizziness, vertigo; worse in AM after lying down. No acute findings on CT on admission, will get MRI brain to r/o central etiology for dizziness    Plan:  - f/u MRI brain w/wo cont  - meclizine PRN for dizziness

## 2024-01-09 NOTE — PROGRESS NOTE ADULT - ASSESSMENT
No pain, no shortness of breath. Cr improving.     Review of systems: All 10 points ROS was obtained except as above.     chlorhexidine 2% Cloths 1 Application(s) Topical <User Schedule>  dextrose 50% Injectable 12.5 Gram(s) IV Push once  dextrose 50% Injectable 25 milliLiter(s) IV Push once  dextrose 50% Injectable 25 Gram(s) IV Push once  dextrose 50% Injectable 25 Gram(s) IV Push once  dextrose Oral Gel 15 Gram(s) Oral once  finasteride 5 milliGRAM(s) Oral daily  fluticasone propionate 50 MICROgram(s)/spray Nasal Spray 1 Spray(s) Both Nostrils two times a day PRN  glucagon  Injectable 1 milliGRAM(s) IntraMuscular once  heparin   Injectable 5000 Unit(s) SubCutaneous every 12 hours  lactated ringers. 1000 milliLiter(s) IV Continuous <Continuous>  loperamide 2 milliGRAM(s) Oral three times a day PRN  midodrine 20 milliGRAM(s) Oral every 8 hours  piperacillin/tazobactam IVPB.. 3.375 Gram(s) IV Intermittent every 8 hours  sodium chloride 0.65% Nasal 1 Spray(s) Both Nostrils once  Vyndamax (Tafamidis) 61mg Capsule 1 Capsule(s) 1 Capsule(s) Oral daily      VITAL:  T(C): , Max: 37.1 (01-06-24 @ 21:03)  T(F): , Max: 98.7 (01-06-24 @ 21:03)  HR: 69 (01-07-24 @ 13:30)  BP: 104/67 (01-07-24 @ 13:30)  BP(mean): 92 (01-06-24 @ 21:03)  RR: 18 (01-07-24 @ 11:12)  SpO2: 96% (01-07-24 @ 11:12)  Wt(kg): --    01-06-24 @ 07:01  -  01-07-24 @ 07:00  --------------------------------------------------------  IN: 1080 mL / OUT: 1850 mL / NET: -770 mL    01-07-24 @ 07:01  -  01-07-24 @ 19:11  --------------------------------------------------------  IN: 755 mL / OUT: 850 mL / NET: -95 mL        PHYSICAL EXAM:  Constitutional: NAD  Neck:  No JVD  Respiratory: CTAB/L  Cardiovascular: S1 and S2  Gastrointestinal: BS+, soft, NT/ND  Extremities: No peripheral edema  Neurological: A/O x 3, no focal deficits  Psychiatric: Normal mood, normal affect  : + Morales  Skin: No rashes  Access: Not applicable     LABS:                          8.9    5.63  )-----------( 198      ( 07 Jan 2024 06:41 )             26.7     Na(138)/K(4.1)/Cl(106)/HCO3(18)/BUN(26)/Cr(2.58)Glu(66)/Ca(8.4)/Mg(2.0)/PO4(3.2)    01-07 @ 06:40  Na(135)/K(4.5)/Cl(106)/HCO3(18)/BUN(30)/Cr(2.57)Glu(75)/Ca(8.4)/Mg(2.0)/PO4(3.7)    01-06 @ 06:23  Na(137)/K(4.1)/Cl(108)/HCO3(19)/BUN(30)/Cr(2.57)Glu(78)/Ca(8.3)/Mg(2.0)/PO4(3.4)    01-05 @ 05:18    Urinalysis Basic - ( 07 Jan 2024 06:40 )    Color: x / Appearance: x / SG: x / pH: x  Gluc: 66 mg/dL / Ketone: x  / Bili: x / Urobili: x   Blood: x / Protein: x / Nitrite: x   Leuk Esterase: x / RBC: x / WBC x   Sq Epi: x / Non Sq Epi: x / Bacteria: x            ASSESSMENT/PLAN  ASSESSMENT/PLAN  73M w/ BPH (c/b urinary retention s/p Morales catheter placement; exchanged monthly), CKD stage IIIb, and cardiac amyloidosis brought in by daughter for diarrhea (loose-watery BM's) over the past few days.  CKD stage 3 b and now SARAH in light of contrast study/hypotension and possibly dehydration as well   Subnephrotic range proteinuria     1 Renal-  scr stable/improved today  -150 mEq sodium bicarbonate in 1L D5w @50cc/hr x10h  c/w Sodium bicarb 1300 BID  2 Lytes acceptable  3 -he has chronic morales  4 CVS- BP soft but stable  on Midodrine to maintain MAP       Kamari Asencio DO   [employer]  [phone]   No pain, no shortness of breath. Cr improving.     Review of systems: All 10 points ROS was obtained except as above.     chlorhexidine 2% Cloths 1 Application(s) Topical <User Schedule>  dextrose 50% Injectable 12.5 Gram(s) IV Push once  dextrose 50% Injectable 25 milliLiter(s) IV Push once  dextrose 50% Injectable 25 Gram(s) IV Push once  dextrose 50% Injectable 25 Gram(s) IV Push once  dextrose Oral Gel 15 Gram(s) Oral once  finasteride 5 milliGRAM(s) Oral daily  fluticasone propionate 50 MICROgram(s)/spray Nasal Spray 1 Spray(s) Both Nostrils two times a day PRN  glucagon  Injectable 1 milliGRAM(s) IntraMuscular once  heparin   Injectable 5000 Unit(s) SubCutaneous every 12 hours  lactated ringers. 1000 milliLiter(s) IV Continuous <Continuous>  loperamide 2 milliGRAM(s) Oral three times a day PRN  midodrine 20 milliGRAM(s) Oral every 8 hours  piperacillin/tazobactam IVPB.. 3.375 Gram(s) IV Intermittent every 8 hours  sodium chloride 0.65% Nasal 1 Spray(s) Both Nostrils once  Vyndamax (Tafamidis) 61mg Capsule 1 Capsule(s) 1 Capsule(s) Oral daily      VITAL:  T(C): , Max: 37.1 (01-06-24 @ 21:03)  T(F): , Max: 98.7 (01-06-24 @ 21:03)  HR: 69 (01-07-24 @ 13:30)  BP: 104/67 (01-07-24 @ 13:30)  BP(mean): 92 (01-06-24 @ 21:03)  RR: 18 (01-07-24 @ 11:12)  SpO2: 96% (01-07-24 @ 11:12)  Wt(kg): --    01-06-24 @ 07:01  -  01-07-24 @ 07:00  --------------------------------------------------------  IN: 1080 mL / OUT: 1850 mL / NET: -770 mL    01-07-24 @ 07:01  -  01-07-24 @ 19:11  --------------------------------------------------------  IN: 755 mL / OUT: 850 mL / NET: -95 mL        PHYSICAL EXAM:  Constitutional: NAD  Neck:  No JVD  Respiratory: CTAB/L  Cardiovascular: S1 and S2  Gastrointestinal: BS+, soft, NT/ND  Extremities: No peripheral edema  Neurological: A/O x 3, no focal deficits  Psychiatric: Normal mood, normal affect  : + Morales  Skin: No rashes  Access: Not applicable     LABS:                          8.9    5.63  )-----------( 198      ( 07 Jan 2024 06:41 )             26.7     Na(138)/K(4.1)/Cl(106)/HCO3(18)/BUN(26)/Cr(2.58)Glu(66)/Ca(8.4)/Mg(2.0)/PO4(3.2)    01-07 @ 06:40  Na(135)/K(4.5)/Cl(106)/HCO3(18)/BUN(30)/Cr(2.57)Glu(75)/Ca(8.4)/Mg(2.0)/PO4(3.7)    01-06 @ 06:23  Na(137)/K(4.1)/Cl(108)/HCO3(19)/BUN(30)/Cr(2.57)Glu(78)/Ca(8.3)/Mg(2.0)/PO4(3.4)    01-05 @ 05:18    Urinalysis Basic - ( 07 Jan 2024 06:40 )    Color: x / Appearance: x / SG: x / pH: x  Gluc: 66 mg/dL / Ketone: x  / Bili: x / Urobili: x   Blood: x / Protein: x / Nitrite: x   Leuk Esterase: x / RBC: x / WBC x   Sq Epi: x / Non Sq Epi: x / Bacteria: x            ASSESSMENT/PLAN  ASSESSMENT/PLAN  73M w/ BPH (c/b urinary retention s/p Morales catheter placement; exchanged monthly), CKD stage IIIb, and cardiac amyloidosis brought in by daughter for diarrhea (loose-watery BM's) over the past few days.  CKD stage 3 b and now SARAH in light of contrast study/hypotension and possibly dehydration as well   Subnephrotic range proteinuria     1 Renal-  scr stable/improved today  -150 mEq sodium bicarbonate in 1L D5w @50cc/hr x10h  c/w Sodium bicarb 1300 BID  2 Lytes acceptable  3 -he has chronic morales  4 CVS- BP soft but stable  on Midodrine to maintain MAP       Kamari Asencio DO   [employer]  [phone]

## 2024-01-09 NOTE — PROGRESS NOTE ADULT - PROBLEM SELECTOR PLAN 2
Baseline creatinine is 1.6, Cr on admission 1.8 -> 2.69 on 1/4   - chronic morales, low c/f obstruction  -may be iso of atn given hypotension on presentation vs prerenal given fluid loss vs remdesvir  - Dced remdesivir  - Fena 1.4%  - VBG in AM-> elevated lactate at 2.1 but pt not symptomatic  - nephro following, appreciate recs  - trend SCr  - hold IVF for now per nephro/cards  - maintain morales for BPH Baseline creatinine is 1.6, Cr on admission 1.8 -> 2.69 on 1/4 -> now downtrending, 2.2 today  - chronic morales, low c/f obstruction  -may be iso of atn given hypotension on presentation vs prerenal given fluid loss vs remdesvir  - Dced remdesivir  - Fena 1.4%  - VBG in AM-> elevated lactate at 2.1 but pt not symptomatic  - nephro following, appreciate recs  - trend SCr - downtrending steadily  - hold IVF for now per nephro/cards  - maintain morales for BPH

## 2024-01-09 NOTE — PROGRESS NOTE ADULT - PROBLEM SELECTOR PLAN 8
-consistent with chronic disease w/low iron  - s/p venofer x3 day course  - Hgb stable ~9, no evidence of active bleeding - Dr. Saravia consulted, appreciate recs  - c/w  Vyndamax

## 2024-01-09 NOTE — PROGRESS NOTE ADULT - SUBJECTIVE AND OBJECTIVE BOX
CARDIOLOGY FOLLOW UP - Dr. Saravia  DATE OF SERVICE: 1/9/24    CC  No acute cv events     REVIEW OF SYSTEMS:  CONSTITUTIONAL: No fever, weight loss, or fatigue  RESPIRATORY: No cough, wheezing, chills or hemoptysis; No Shortness of Breath  CARDIOVASCULAR: No chest pain, palpitations, passing out, dizziness, or leg swelling  GASTROINTESTINAL: No abdominal or epigastric pain. No nausea, vomiting, or hematemesis; No diarrhea or constipation. No melena or hematochezia.  VASCULAR: No edema     PHYSICAL EXAM:  T(C): 36.9 (01-09-24 @ 05:26), Max: 36.9 (01-09-24 @ 05:26)  HR: 65 (01-09-24 @ 05:26) (65 - 75)  BP: 117/75 (01-09-24 @ 05:26) (104/66 - 119/71)  RR: 18 (01-09-24 @ 05:26) (17 - 18)  SpO2: 97% (01-09-24 @ 05:26) (97% - 99%)  Wt(kg): --  I&O's Summary    08 Jan 2024 07:01  -  09 Jan 2024 07:00  --------------------------------------------------------  IN: 900 mL / OUT: 1800 mL / NET: -900 mL        Appearance: Elderly male 	  Cardiovascular: Normal S1 S2,RRR, No JVD, No murmurs  Respiratory: Lungs clear to auscultation b/l 	  Gastrointestinal:  Soft, Non-tender, + BS	  Extremities: Normal range of motion, No clubbing, cyanosis or edema      Home Medications:  fluticasone 50 mcg/inh nasal spray: 1 spray(s) in each nostril 2 times a day as needed (04 Jan 2024 12:08)  mirtazapine 7.5 mg oral tablet: 1 tab(s) orally once a day at 6 PM (04 Jan 2024 12:08)  Saline Mist 0.65% nasal spray: 2 spray(s) intranasally as needed (04 Jan 2024 12:08)  Vyndamax 61 mg oral capsule: 1 cap(s) orally once a day (04 Jan 2024 12:08)      MEDICATIONS  (STANDING):  chlorhexidine 2% Cloths 1 Application(s) Topical <User Schedule>  dextrose 50% Injectable 25 Gram(s) IV Push once  dextrose 50% Injectable 25 milliLiter(s) IV Push once  dextrose 50% Injectable 25 Gram(s) IV Push once  dextrose 50% Injectable 12.5 Gram(s) IV Push once  dextrose Oral Gel 15 Gram(s) Oral once  finasteride 5 milliGRAM(s) Oral daily  glucagon  Injectable 1 milliGRAM(s) IntraMuscular once  heparin   Injectable 5000 Unit(s) SubCutaneous every 8 hours  hydrocortisone 20 milliGRAM(s) Oral <User Schedule>  lactated ringers. 1000 milliLiter(s) (70 mL/Hr) IV Continuous <Continuous>  midodrine 10 milliGRAM(s) Oral every 8 hours  sodium bicarbonate 1300 milliGRAM(s) Oral two times a day  sodium chloride 0.65% Nasal 1 Spray(s) Both Nostrils once  Vyndamax (Tafamidis) 61mg Capsule 1 Capsule(s) 1 Capsule(s) Oral daily      TELEMETRY: NSR 60-70s	    ECG:  	  RADIOLOGY:   DIAGNOSTIC TESTING:  [ ] Echocardiogram:  [ ]  Catheterization:  [ ] Stress Test:    OTHER: 	    LABS:	 	                            8.1    5.49  )-----------( 221      ( 09 Jan 2024 07:18 )             24.5     01-09    140  |  109<H>  |  22  ----------------------------<  110<H>  4.2   |  18<L>  |  2.20<H>    Ca    8.5      09 Jan 2024 07:18  Phos  2.5     01-09  Mg     2.1     01-09    TPro  6.6  /  Alb  3.1<L>  /  TBili  0.4  /  DBili  x   /  AST  36  /  ALT  26  /  AlkPhos  86  01-08

## 2024-01-09 NOTE — PROGRESS NOTE ADULT - PROBLEM SELECTOR PLAN 1
-met SIRs criteria on admission with concerning source of GI vs UTI  -previous hx of E.faecalis and Stenotrophomonas maltophilia  -Likely Pyelo in context of CT with perinephric standing, may also be due to proctitis     -ID consulted recs appreciated   - on zosyn since 1/2/24 (received 1 dose on 1/2 PM), continuing for 7 day course per ID recs  -ucx demonstrating efaecalis, susceptible to ampicillin (covered by zosyn)  -bcx ngtd  -gi pcr and cdiff neg  - HDS, no longer meeting SIRS criteria -met SIRs criteria on admission with concerning source of GI vs UTI  -previous hx of E.faecalis and Stenotrophomonas maltophilia  -Likely Pyelo in context of CT with perinephric standing, may also be due to proctitis     - ID following  -ucx demonstrating efaecalis, susceptible to ampicillin (covered by zosyn)  -bcx ngtd  -gi pcr and cdiff neg  - HDS, no longer meeting SIRS criteria  - completed course of zosyn x7 days (1/2-1/8), HDS since discontinuation of vanc  - sepsis resolved

## 2024-01-09 NOTE — PROGRESS NOTE ADULT - PROBLEM SELECTOR PLAN 6
-CT Head WNL  -F/U B12, TSH, Orthostatic vitals-> TSH elevated free T4 and total T3 ordered, T4 wnl  -pt reporting persistent dizziness, vertigo; worse in AM after lying down. No acute findings on CT on admission, will get MRI brain to r/o central etiology for dizziness    Plan:  - f/u MRI brain w/wo cont  - meclizine PRN for dizziness Likely hypovolemic ISO diarrhea but possibly with component of distributive shock   - midodrine 20 mg TID -> 10 mg TID on 1/8  - I/Os  - workup of adrenal insufficiency per endo - ACTH and cortisol low c/f secondary AI  - continue hydrocortisone 20 mg BID - 8AM and 3PM Likely hypovolemic ISO diarrhea but possibly with component of distributive shock   - midodrine 20 mg TID -> 10 mg TID on 1/8  - I/Os  - workup of adrenal insufficiency per endo - ACTH and cortisol low c/f secondary AI  - continue hydrocortisone 20 mg BID - 8AM and 3PM  - last 2 doses of midodrine held due to adequate BPs, if continually holding doses may consider stopping

## 2024-01-09 NOTE — PROGRESS NOTE ADULT - ASSESSMENT
Mr. Sanchez is a 73M w/ BPH (c/b urinary retention s/p Harkins catheter placement; exchanged monthly), CKD stage IIIb, and cardiac amyloidosis brought in by daughter for diarrhea (loose-watery BM's) over the past few days found to have COVID and urosepsis. Endocrinology consulted for evaluation for adrenal insufficiency.    #Adrenal insufficiency - suspect secondary AI  #Hypoglycemia  #Sepsis  #Abnormal thyroid function tests  - 1/5 1:08pm cortisol 3.1, 1/7 6:42am cortisol 2.3  - 1/6 12pm ACTH 2.5  - BG on BMP 1/3 49  - 1/3 TSH 5.54, TT3 57, FT4 0.9  - A1c 5< hypoglycemia defined as serum glucose <55 which patient had as above  - CT abdomen 12/23 without adrenal gland abnormality  - CT head without pituitary etiology however not optimal test to evaluate pituitary  - no hyponatremia or hypoglycemia  - patient HD stable on midodrine  - endorses weight loss, fatigue, poor appetite  - no history of prolonged steroids, opioids, immunotherapy, radiation  - does not appear that tafamidis can cause adrenal insufficiency  - likely secondary adrenal insufficiency unclear cause at this point however can possibly be from amyloidosis vs pituitary etiology  PLAN  -c/w hydrocortisone 20mg at 8AM and 3PM    #R/o panhypopituitarism  #thyroid axis appears stable on 1/3  -pending repeat TSH, FT4, TT3 given low FT4 of 0.9 on 1/3/24    #Gonadal axis - normal   -follow up:  -LH 11.1  -FSH 6.6  -Total testosterone - 433    #prolactin - 7    #IGF1 - 36    #pituitary microadenoma- appears non-functional based on above biochemical profile and does not explain the low ACTH and cortisol  There is a nonspecific area of decreased enhancement   involving the left pituitary gland. This is best seen on series 12 image   6 and measures approximately 6.1 x 2.4 mm. Possibly of a pituitary   microadenoma cannot entirely excluded.  -      Case discussed with Dr. Siddhartha Cadena MD  Endocrine Fellow  Can be reached via teams. For follow up questions, discharge recommendations, or new consults, please call answering service at [phone] (weekdays); [phone] (nights/weekends)   Mr. Sanchez is a 73M w/ BPH (c/b urinary retention s/p Harkins catheter placement; exchanged monthly), CKD stage IIIb, and cardiac amyloidosis brought in by daughter for diarrhea (loose-watery BM's) over the past few days found to have COVID and urosepsis. Endocrinology consulted for evaluation for adrenal insufficiency.    #Adrenal insufficiency - suspect secondary AI  #Hypoglycemia  #Sepsis  #Abnormal thyroid function tests  - 1/5 1:08pm cortisol 3.1, 1/7 6:42am cortisol 2.3  - 1/6 12pm ACTH 2.5  - BG on BMP 1/3 49  - 1/3 TSH 5.54, TT3 57, FT4 0.9  - A1c 5< hypoglycemia defined as serum glucose <55 which patient had as above  - CT abdomen 12/23 without adrenal gland abnormality  - CT head without pituitary etiology however not optimal test to evaluate pituitary  - no hyponatremia or hypoglycemia  - patient HD stable on midodrine  - endorses weight loss, fatigue, poor appetite  - no history of prolonged steroids, opioids, immunotherapy, radiation  - does not appear that tafamidis can cause adrenal insufficiency  - likely secondary adrenal insufficiency unclear cause at this point however can possibly be from amyloidosis vs pituitary etiology  PLAN  -c/w hydrocortisone 20mg at 8AM and 3PM    #R/o panhypopituitarism  #thyroid axis appears stable on 1/3  -pending repeat TSH, FT4, TT3 given low FT4 of 0.9 on 1/3/24    #Gonadal axis - normal   -follow up:  -LH 11.1  -FSH 6.6  -Total testosterone - 433    #prolactin - 7    #IGF1 - 36    #pituitary microadenoma- appears non-functional based on above biochemical profile and does not explain the low ACTH and cortisol  There is a nonspecific area of decreased enhancement   involving the left pituitary gland. This is best seen on series 12 image   6 and measures approximately 6.1 x 2.4 mm. Possibly of a pituitary   microadenoma cannot entirely excluded.  -      Case discussed with Dr. Siddhartha Cadena MD  Endocrine Fellow  Can be reached via teams. For follow up questions, discharge recommendations, or new consults, please call answering service at [phone] (weekdays); [phone] (nights/weekends)   Mr. Sanchez is a 73M w/ BPH (c/b urinary retention s/p Harkins catheter placement; exchanged monthly), CKD stage IIIb, and cardiac amyloidosis brought in by daughter for diarrhea (loose-watery BM's) over the past few days found to have COVID and urosepsis. Endocrinology consulted for evaluation for adrenal insufficiency.    #Adrenal insufficiency - suspect secondary AI  #Hypoglycemia  #Sepsis  #Abnormal thyroid function tests  - 1/5 1:08pm cortisol 3.1, 1/7 6:42am cortisol 2.3  - 1/6 12pm ACTH 2.5  - BG on BMP 1/3 49  - 1/3 TSH 5.54, TT3 57, FT4 0.9  - A1c 5< hypoglycemia defined as serum glucose <55 which patient had as above  - CT abdomen 12/23 without adrenal gland abnormality  - CT head without pituitary etiology however not optimal test to evaluate pituitary  - no hyponatremia or hypoglycemia  - patient HD stable on midodrine  - endorses weight loss, fatigue, poor appetite  - no history of prolonged steroids, opioids, immunotherapy, radiation  - does not appear that tafamidis can cause adrenal insufficiency  - likely secondary adrenal insufficiency unclear cause at this point however can possibly be from amyloidosis vs pituitary etiology  PLAN  -c/w hydrocortisone 20mg at 8AM and 3PM    #R/o panhypopituitarism  #thyroid axis appears stable on 1/3  -pending repeat TSH, FT4, TT3 given low FT4 of 0.9 on 1/3/24    #Gonadal axis - normal   -follow up:  -LH 11.1  -FSH 6.6  -Total testosterone - 433    #prolactin - 7    #IGF1 - 36    #pituitary hypoenhancement-based on above biochemical profile and does not explain the low ACTH and cortisol  There is a nonspecific area of decreased enhancement   involving the left pituitary gland. This is best seen on series 12 image   6 and measures approximately 6.1 x 2.4 mm. Possibly of a pituitary   microadenoma cannot entirely excluded.  -will discuss with neuroradiology if this could also possibly be a finding of pituitary amyloidosis (attempted to call today but unsuccessful)   -may need to consider MRI sella with and without contrast as MRI brain may not give the best images for the pituitary      Case discussed with Dr. Siddhartha Cadena MD  Endocrine Fellow  Can be reached via teams. For follow up questions, discharge recommendations, or new consults, please call answering service at [phone] (weekdays); [phone] (nights/weekends)   Mr. Sanchez is a 73M w/ BPH (c/b urinary retention s/p Harkins catheter placement; exchanged monthly), CKD stage IIIb, and cardiac amyloidosis brought in by daughter for diarrhea (loose-watery BM's) over the past few days found to have COVID and urosepsis. Endocrinology consulted for evaluation for adrenal insufficiency.    #Adrenal insufficiency - suspect secondary AI  #Hypoglycemia  #Sepsis  #Abnormal thyroid function tests  - 1/5 1:08pm cortisol 3.1, 1/7 6:42am cortisol 2.3  - 1/6 12pm ACTH 2.5  - BG on BMP 1/3 49  - 1/3 TSH 5.54, TT3 57, FT4 0.9  - A1c 5< hypoglycemia defined as serum glucose <55 which patient had as above  - CT abdomen 12/23 without adrenal gland abnormality  - CT head without pituitary etiology however not optimal test to evaluate pituitary  - no hyponatremia or hypoglycemia  - patient HD stable on midodrine  - endorses weight loss, fatigue, poor appetite  - no history of prolonged steroids, opioids, immunotherapy, radiation  - does not appear that tafamidis can cause adrenal insufficiency  - likely secondary adrenal insufficiency unclear cause at this point however can possibly be from amyloidosis vs pituitary etiology  PLAN  -c/w hydrocortisone 20mg at 8AM and 3PM    #R/o panhypopituitarism  #thyroid axis appears stable on 1/3  -pending repeat TSH, FT4, TT3 given low FT4 of 0.9 on 1/3/24    #Gonadal axis - normal   -follow up:  -LH 11.1  -FSH 6.6  -Total testosterone - 433    #prolactin - 7    #IGF1 - 36    #pituitary hypoenhancement-based on above biochemical profile and does not explain the low ACTH and cortisol  There is a nonspecific area of decreased enhancement   involving the left pituitary gland. This is best seen on series 12 image   6 and measures approximately 6.1 x 2.4 mm. Possibly of a pituitary   microadenoma cannot entirely excluded.  -will discuss with neuroradiology if this could also possibly be a finding of pituitary amyloidosis (attempted to call today but unsuccessful)   -may need to consider MRI sella with and without contrast as MRI brain may not give the best images for the pituitary      Case discussed with Dr. Siddhartha Cadena MD  Endocrine Fellow  Can be reached via teams. For follow up questions, discharge recommendations, or new consults, please call answering service at [phone] (weekdays); [phone] (nights/weekends)

## 2024-01-09 NOTE — PROGRESS NOTE ADULT - ATTENDING COMMENTS
Agree with Dr. Cadena's assessment and plan as outlined above. Reviewed all pertinent labs, and imaging studies. Modifications made as indicated above. 73 M with history of BPH, CKD stage 3, cardiac amyloidosis here for diarrhea, found to have COVID and urosepsis. Endocrinology consulted for adrenal insufficiency. Patient with am cortisol 2.3 with low ACTH 2.5, consistent with secondary adrenal insufficiency. Brain MRI without obvious infiltrative disease, but shows a microadenoma. Will touch base with radiologist to inquire about possible hypophyitis. Rest of the plan as above.

## 2024-01-09 NOTE — PROGRESS NOTE ADULT - PROBLEM SELECTOR PLAN 11
-- c/w Flomax and Finasteride  - maintain morales (failed TOV 1/7, morales replaced by urology 1/8) -Isolation precautions and IV Remdesivir-> dced 1/3  -remains on room air  -hx of vaccinations

## 2024-01-09 NOTE — PROGRESS NOTE ADULT - SUBJECTIVE AND OBJECTIVE BOX
CC: F/U for COVID    Saw/spoke to patient. No fevers, no chills. No new complaints.    Allergies  No Known Allergies    ANTIMICROBIALS:      PE:    Vital Signs Last 24 Hrs  T(C): 36.6 (09 Jan 2024 13:12), Max: 36.9 (09 Jan 2024 05:26)  T(F): 97.8 (09 Jan 2024 13:12), Max: 98.4 (09 Jan 2024 05:26)  HR: 83 (09 Jan 2024 13:12) (65 - 83)  BP: 129/81 (09 Jan 2024 13:12) (117/75 - 129/81)  RR: 17 (09 Jan 2024 13:12) (17 - 18)  SpO2: 99% (09 Jan 2024 13:12) (97% - 99%)    Gen: AOx3, NAD, non-toxic  CV: Nontachycardic  Resp: Breathing comfortably, RA  Abd: Soft, nontender  IV/Skin: No thrombophlebitis    LABS:                        8.1    5.49  )-----------( 221      ( 09 Jan 2024 07:18 )             24.5     01-09    140  |  109<H>  |  22  ----------------------------<  110<H>  4.2   |  18<L>  |  2.20<H>    Ca    8.5      09 Jan 2024 07:18  Phos  2.5     01-09  Mg     2.1     01-09    TPro  6.6  /  Alb  3.1<L>  /  TBili  0.4  /  DBili  x   /  AST  36  /  ALT  26  /  AlkPhos  86  01-08    Urinalysis Basic - ( 09 Jan 2024 07:18 )    Color: x / Appearance: x / SG: x / pH: x  Gluc: 110 mg/dL / Ketone: x  / Bili: x / Urobili: x   Blood: x / Protein: x / Nitrite: x   Leuk Esterase: x / RBC: x / WBC x   Sq Epi: x / Non Sq Epi: x / Bacteria: x    MICROBIOLOGY:    .Blood Blood  01-04-24   No growth at 4 days  --  --    Catheterized Catheterized  01-02-24   50,000 - 99,000 CFU/mL Enterococcus faecalis  --  Enterococcus faecalis    .Blood Blood-Peripheral  01-02-24   No growth at 5 days  --  --    .Blood Blood-Peripheral  01-02-24   No growth at 5 days  --  --    .Blood Blood-Peripheral  12-16-23   No growth at 5 days  --  --    .Blood Blood-Peripheral  12-16-23   No growth at 5 days  --  Enterococcus faecalis  Stenotrophomonas maltophilia    RADIOLOGY:    1/9 XR    FINDINGS:  No acute displaced fracture or dislocation.  Mixed productive and erosive arthropathy most advanced at the MTP joint   of the great toe with mild valgus deformity, as well as at the fifth   metatarsal base. Joint spaces are otherwise grossly anatomic in alignment  Bony mineralization within normal limits.  No aggressive osseous neoplasm.  No radiopaque foreign body.    IMPRESSION:  No acute displaced right foot fracture. Chronic gouty arthropathy.

## 2024-01-09 NOTE — PROGRESS NOTE ADULT - ASSESSMENT
74 yo M BPH history of morales for urinary retention, initially with diarrhea and falls  Has fevers, no leukocytosis  Initially with diarrhea/falls  Chronic morales, has had pain at site  CT with proctitis, fluid filled rectum, bilateral perinephric soft tissue stranding; renal lesions  UA+, UCX low CFU E faecalis--seems low likelihood to be active pathogen  C diff negative  COVID+, was on RemD but DCed  Had hypotension, with evaluation by MICU  No further fever  Overall, UTI, COVID, fever  - S/p course Zosyn  - F/U BCXs  - Monitor for further fevers  - Hold on Dexa unless progressive O2 requirements    Signing off. Please call with further questions or change in status.    Vernon Escobar MD  Contact on TEAMS messaging from 9am - 5pm  From 5pm-9am, on weekends, or if no response call [phone] 74 yo M BPH history of morales for urinary retention, initially with diarrhea and falls  Has fevers, no leukocytosis  Initially with diarrhea/falls  Chronic morales, has had pain at site  CT with proctitis, fluid filled rectum, bilateral perinephric soft tissue stranding; renal lesions  UA+, UCX low CFU E faecalis--seems low likelihood to be active pathogen  C diff negative  COVID+, was on RemD but DCed  Had hypotension, with evaluation by MICU  No further fever  Overall, UTI, COVID, fever  - S/p course Zosyn  - F/U BCXs  - Monitor for further fevers  - Hold on Dexa unless progressive O2 requirements    Signing off. Please call with further questions or change in status.    Vernon Escobar MD  Contact on TEAMS messaging from 9am - 5pm  From 5pm-9am, on weekends, or if no response call [phone]

## 2024-01-09 NOTE — PROGRESS NOTE ADULT - PROBLEM SELECTOR PLAN 9
-monitor but likely iso of sepsis -consistent with chronic disease w/low iron  - s/p venofer x3 day course  - Hgb stable ~9, no evidence of active bleeding -consistent with chronic disease w/low iron  - s/p venofer x3 day course  - Hgb stable ~9, no evidence of active bleeding  - 1/9 drop from 9 -> 8, no evidence of bleeding and HDS. Continue to trend

## 2024-01-10 LAB
ALBUMIN SERPL ELPH-MCNC: 3.5 G/DL — SIGNIFICANT CHANGE UP (ref 3.3–5)
ALBUMIN SERPL ELPH-MCNC: 3.5 G/DL — SIGNIFICANT CHANGE UP (ref 3.3–5)
ALP SERPL-CCNC: 82 U/L — SIGNIFICANT CHANGE UP (ref 40–120)
ALP SERPL-CCNC: 82 U/L — SIGNIFICANT CHANGE UP (ref 40–120)
ALT FLD-CCNC: 19 U/L — SIGNIFICANT CHANGE UP (ref 10–45)
ALT FLD-CCNC: 19 U/L — SIGNIFICANT CHANGE UP (ref 10–45)
ANION GAP SERPL CALC-SCNC: 10 MMOL/L — SIGNIFICANT CHANGE UP (ref 5–17)
ANION GAP SERPL CALC-SCNC: 10 MMOL/L — SIGNIFICANT CHANGE UP (ref 5–17)
APPEARANCE UR: CLEAR — SIGNIFICANT CHANGE UP
APPEARANCE UR: CLEAR — SIGNIFICANT CHANGE UP
AST SERPL-CCNC: 22 U/L — SIGNIFICANT CHANGE UP (ref 10–40)
AST SERPL-CCNC: 22 U/L — SIGNIFICANT CHANGE UP (ref 10–40)
BACTERIA # UR AUTO: NEGATIVE /HPF — SIGNIFICANT CHANGE UP
BACTERIA # UR AUTO: NEGATIVE /HPF — SIGNIFICANT CHANGE UP
BASE EXCESS BLDV CALC-SCNC: -1 MMOL/L — SIGNIFICANT CHANGE UP (ref -2–3)
BASE EXCESS BLDV CALC-SCNC: -1 MMOL/L — SIGNIFICANT CHANGE UP (ref -2–3)
BILIRUB SERPL-MCNC: 0.3 MG/DL — SIGNIFICANT CHANGE UP (ref 0.2–1.2)
BILIRUB SERPL-MCNC: 0.3 MG/DL — SIGNIFICANT CHANGE UP (ref 0.2–1.2)
BILIRUB UR-MCNC: NEGATIVE — SIGNIFICANT CHANGE UP
BILIRUB UR-MCNC: NEGATIVE — SIGNIFICANT CHANGE UP
BUN SERPL-MCNC: 22 MG/DL — SIGNIFICANT CHANGE UP (ref 7–23)
BUN SERPL-MCNC: 22 MG/DL — SIGNIFICANT CHANGE UP (ref 7–23)
CA-I SERPL-SCNC: 1.16 MMOL/L — SIGNIFICANT CHANGE UP (ref 1.15–1.33)
CA-I SERPL-SCNC: 1.16 MMOL/L — SIGNIFICANT CHANGE UP (ref 1.15–1.33)
CALCIUM SERPL-MCNC: 8.6 MG/DL — SIGNIFICANT CHANGE UP (ref 8.4–10.5)
CALCIUM SERPL-MCNC: 8.6 MG/DL — SIGNIFICANT CHANGE UP (ref 8.4–10.5)
CAST: 4 /LPF — SIGNIFICANT CHANGE UP (ref 0–4)
CAST: 4 /LPF — SIGNIFICANT CHANGE UP (ref 0–4)
CHLORIDE BLDV-SCNC: 109 MMOL/L — HIGH (ref 96–108)
CHLORIDE BLDV-SCNC: 109 MMOL/L — HIGH (ref 96–108)
CHLORIDE SERPL-SCNC: 109 MMOL/L — HIGH (ref 96–108)
CHLORIDE SERPL-SCNC: 109 MMOL/L — HIGH (ref 96–108)
CO2 BLDV-SCNC: 25 MMOL/L — SIGNIFICANT CHANGE UP (ref 22–26)
CO2 BLDV-SCNC: 25 MMOL/L — SIGNIFICANT CHANGE UP (ref 22–26)
CO2 SERPL-SCNC: 22 MMOL/L — SIGNIFICANT CHANGE UP (ref 22–31)
CO2 SERPL-SCNC: 22 MMOL/L — SIGNIFICANT CHANGE UP (ref 22–31)
COLOR SPEC: YELLOW — SIGNIFICANT CHANGE UP
COLOR SPEC: YELLOW — SIGNIFICANT CHANGE UP
CREAT SERPL-MCNC: 2.32 MG/DL — HIGH (ref 0.5–1.3)
CREAT SERPL-MCNC: 2.32 MG/DL — HIGH (ref 0.5–1.3)
CULTURE RESULTS: SIGNIFICANT CHANGE UP
CYSTATIN C SERPL-MCNC: 2.15 MG/L — HIGH (ref 0.82–1.52)
CYSTATIN C SERPL-MCNC: 2.15 MG/L — HIGH (ref 0.82–1.52)
DIFF PNL FLD: ABNORMAL
DIFF PNL FLD: ABNORMAL
EGFR: 29 ML/MIN/1.73M2 — LOW
EGFR: 29 ML/MIN/1.73M2 — LOW
GAS PNL BLDV: 140 MMOL/L — SIGNIFICANT CHANGE UP (ref 136–145)
GAS PNL BLDV: 140 MMOL/L — SIGNIFICANT CHANGE UP (ref 136–145)
GAS PNL BLDV: SIGNIFICANT CHANGE UP
GAS PNL BLDV: SIGNIFICANT CHANGE UP
GFR/BSA.PRED SERPLBLD CYS-BASED-ARV: 27 ML/MIN/1.73M2 — LOW
GFR/BSA.PRED SERPLBLD CYS-BASED-ARV: 27 ML/MIN/1.73M2 — LOW
GLUCOSE BLDV-MCNC: 119 MG/DL — HIGH (ref 70–99)
GLUCOSE BLDV-MCNC: 119 MG/DL — HIGH (ref 70–99)
GLUCOSE SERPL-MCNC: 116 MG/DL — HIGH (ref 70–99)
GLUCOSE SERPL-MCNC: 116 MG/DL — HIGH (ref 70–99)
GLUCOSE UR QL: NEGATIVE MG/DL — SIGNIFICANT CHANGE UP
GLUCOSE UR QL: NEGATIVE MG/DL — SIGNIFICANT CHANGE UP
HCO3 BLDV-SCNC: 24 MMOL/L — SIGNIFICANT CHANGE UP (ref 22–29)
HCO3 BLDV-SCNC: 24 MMOL/L — SIGNIFICANT CHANGE UP (ref 22–29)
HCT VFR BLD CALC: 24.3 % — LOW (ref 39–50)
HCT VFR BLD CALC: 24.3 % — LOW (ref 39–50)
HCT VFR BLDA CALC: 26 % — LOW (ref 39–51)
HCT VFR BLDA CALC: 26 % — LOW (ref 39–51)
HGB BLD CALC-MCNC: 8.5 G/DL — LOW (ref 12.6–17.4)
HGB BLD CALC-MCNC: 8.5 G/DL — LOW (ref 12.6–17.4)
HGB BLD-MCNC: 8.1 G/DL — LOW (ref 13–17)
HGB BLD-MCNC: 8.1 G/DL — LOW (ref 13–17)
KETONES UR-MCNC: NEGATIVE MG/DL — SIGNIFICANT CHANGE UP
KETONES UR-MCNC: NEGATIVE MG/DL — SIGNIFICANT CHANGE UP
LACTATE BLDV-MCNC: 1.6 MMOL/L — SIGNIFICANT CHANGE UP (ref 0.5–2)
LACTATE BLDV-MCNC: 1.6 MMOL/L — SIGNIFICANT CHANGE UP (ref 0.5–2)
LEUKOCYTE ESTERASE UR-ACNC: ABNORMAL
LEUKOCYTE ESTERASE UR-ACNC: ABNORMAL
MAGNESIUM SERPL-MCNC: 2.1 MG/DL — SIGNIFICANT CHANGE UP (ref 1.6–2.6)
MAGNESIUM SERPL-MCNC: 2.1 MG/DL — SIGNIFICANT CHANGE UP (ref 1.6–2.6)
MCHC RBC-ENTMCNC: 27 PG — SIGNIFICANT CHANGE UP (ref 27–34)
MCHC RBC-ENTMCNC: 27 PG — SIGNIFICANT CHANGE UP (ref 27–34)
MCHC RBC-ENTMCNC: 33.3 GM/DL — SIGNIFICANT CHANGE UP (ref 32–36)
MCHC RBC-ENTMCNC: 33.3 GM/DL — SIGNIFICANT CHANGE UP (ref 32–36)
MCV RBC AUTO: 81 FL — SIGNIFICANT CHANGE UP (ref 80–100)
MCV RBC AUTO: 81 FL — SIGNIFICANT CHANGE UP (ref 80–100)
NITRITE UR-MCNC: NEGATIVE — SIGNIFICANT CHANGE UP
NITRITE UR-MCNC: NEGATIVE — SIGNIFICANT CHANGE UP
NRBC # BLD: 0 /100 WBCS — SIGNIFICANT CHANGE UP (ref 0–0)
NRBC # BLD: 0 /100 WBCS — SIGNIFICANT CHANGE UP (ref 0–0)
PCO2 BLDV: 37 MMHG — LOW (ref 42–55)
PCO2 BLDV: 37 MMHG — LOW (ref 42–55)
PH BLDV: 7.41 — SIGNIFICANT CHANGE UP (ref 7.32–7.43)
PH BLDV: 7.41 — SIGNIFICANT CHANGE UP (ref 7.32–7.43)
PH UR: 6 — SIGNIFICANT CHANGE UP (ref 5–8)
PH UR: 6 — SIGNIFICANT CHANGE UP (ref 5–8)
PHOSPHATE SERPL-MCNC: 2 MG/DL — LOW (ref 2.5–4.5)
PHOSPHATE SERPL-MCNC: 2 MG/DL — LOW (ref 2.5–4.5)
PLATELET # BLD AUTO: 257 K/UL — SIGNIFICANT CHANGE UP (ref 150–400)
PLATELET # BLD AUTO: 257 K/UL — SIGNIFICANT CHANGE UP (ref 150–400)
PO2 BLDV: 55 MMHG — HIGH (ref 25–45)
PO2 BLDV: 55 MMHG — HIGH (ref 25–45)
POTASSIUM BLDV-SCNC: 3.9 MMOL/L — SIGNIFICANT CHANGE UP (ref 3.5–5.1)
POTASSIUM BLDV-SCNC: 3.9 MMOL/L — SIGNIFICANT CHANGE UP (ref 3.5–5.1)
POTASSIUM SERPL-MCNC: 3.8 MMOL/L — SIGNIFICANT CHANGE UP (ref 3.5–5.3)
POTASSIUM SERPL-MCNC: 3.8 MMOL/L — SIGNIFICANT CHANGE UP (ref 3.5–5.3)
POTASSIUM SERPL-SCNC: 3.8 MMOL/L — SIGNIFICANT CHANGE UP (ref 3.5–5.3)
POTASSIUM SERPL-SCNC: 3.8 MMOL/L — SIGNIFICANT CHANGE UP (ref 3.5–5.3)
PROT SERPL-MCNC: 6.6 G/DL — SIGNIFICANT CHANGE UP (ref 6–8.3)
PROT SERPL-MCNC: 6.6 G/DL — SIGNIFICANT CHANGE UP (ref 6–8.3)
PROT UR-MCNC: SIGNIFICANT CHANGE UP MG/DL
PROT UR-MCNC: SIGNIFICANT CHANGE UP MG/DL
RBC # BLD: 3 M/UL — LOW (ref 4.2–5.8)
RBC # BLD: 3 M/UL — LOW (ref 4.2–5.8)
RBC # FLD: 18.7 % — HIGH (ref 10.3–14.5)
RBC # FLD: 18.7 % — HIGH (ref 10.3–14.5)
RBC CASTS # UR COMP ASSIST: 2 /HPF — SIGNIFICANT CHANGE UP (ref 0–4)
RBC CASTS # UR COMP ASSIST: 2 /HPF — SIGNIFICANT CHANGE UP (ref 0–4)
SAO2 % BLDV: 89.6 % — HIGH (ref 67–88)
SAO2 % BLDV: 89.6 % — HIGH (ref 67–88)
SODIUM SERPL-SCNC: 141 MMOL/L — SIGNIFICANT CHANGE UP (ref 135–145)
SODIUM SERPL-SCNC: 141 MMOL/L — SIGNIFICANT CHANGE UP (ref 135–145)
SODIUM UR-SCNC: 41 MMOL/L — SIGNIFICANT CHANGE UP
SODIUM UR-SCNC: 41 MMOL/L — SIGNIFICANT CHANGE UP
SP GR SPEC: 1.01 — SIGNIFICANT CHANGE UP (ref 1–1.03)
SP GR SPEC: 1.01 — SIGNIFICANT CHANGE UP (ref 1–1.03)
SPECIMEN SOURCE: SIGNIFICANT CHANGE UP
SQUAMOUS # UR AUTO: 1 /HPF — SIGNIFICANT CHANGE UP (ref 0–5)
SQUAMOUS # UR AUTO: 1 /HPF — SIGNIFICANT CHANGE UP (ref 0–5)
TESTOST FREE SERPL-MCNC: 2.3 PG/ML — LOW (ref 5.9–27)
TESTOST FREE SERPL-MCNC: 2.3 PG/ML — LOW (ref 5.9–27)
URATE SERPL-MCNC: 6.8 MG/DL — SIGNIFICANT CHANGE UP (ref 3.4–8.8)
URATE SERPL-MCNC: 6.8 MG/DL — SIGNIFICANT CHANGE UP (ref 3.4–8.8)
UROBILINOGEN FLD QL: 0.2 MG/DL — SIGNIFICANT CHANGE UP (ref 0.2–1)
UROBILINOGEN FLD QL: 0.2 MG/DL — SIGNIFICANT CHANGE UP (ref 0.2–1)
WBC # BLD: 7.64 K/UL — SIGNIFICANT CHANGE UP (ref 3.8–10.5)
WBC # BLD: 7.64 K/UL — SIGNIFICANT CHANGE UP (ref 3.8–10.5)
WBC # FLD AUTO: 7.64 K/UL — SIGNIFICANT CHANGE UP (ref 3.8–10.5)
WBC # FLD AUTO: 7.64 K/UL — SIGNIFICANT CHANGE UP (ref 3.8–10.5)
WBC UR QL: 18 /HPF — HIGH (ref 0–5)
WBC UR QL: 18 /HPF — HIGH (ref 0–5)

## 2024-01-10 PROCEDURE — 99232 SBSQ HOSP IP/OBS MODERATE 35: CPT | Mod: GC

## 2024-01-10 RX ORDER — SODIUM,POTASSIUM PHOSPHATES 278-250MG
1 POWDER IN PACKET (EA) ORAL ONCE
Refills: 0 | Status: COMPLETED | OUTPATIENT
Start: 2024-01-10 | End: 2024-01-10

## 2024-01-10 RX ORDER — HYDROCORTISONE 20 MG
20 TABLET ORAL DAILY
Refills: 0 | Status: DISCONTINUED | OUTPATIENT
Start: 2024-01-11 | End: 2024-01-15

## 2024-01-10 RX ORDER — HYDROCORTISONE 20 MG
10 TABLET ORAL DAILY
Refills: 0 | Status: DISCONTINUED | OUTPATIENT
Start: 2024-01-10 | End: 2024-01-15

## 2024-01-10 RX ORDER — SODIUM BICARBONATE 1 MEQ/ML
1300 SYRINGE (ML) INTRAVENOUS
Refills: 0 | Status: DISCONTINUED | OUTPATIENT
Start: 2024-01-10 | End: 2024-01-12

## 2024-01-10 RX ADMIN — FINASTERIDE 5 MILLIGRAM(S): 5 TABLET, FILM COATED ORAL at 12:12

## 2024-01-10 RX ADMIN — Medication 1300 MILLIGRAM(S): at 17:27

## 2024-01-10 RX ADMIN — Medication 1 PACKET(S): at 08:55

## 2024-01-10 RX ADMIN — HEPARIN SODIUM 5000 UNIT(S): 5000 INJECTION INTRAVENOUS; SUBCUTANEOUS at 13:55

## 2024-01-10 RX ADMIN — Medication 10 MILLIGRAM(S): at 13:56

## 2024-01-10 RX ADMIN — Medication 20 MILLIGRAM(S): at 08:55

## 2024-01-10 RX ADMIN — CHLORHEXIDINE GLUCONATE 1 APPLICATION(S): 213 SOLUTION TOPICAL at 05:27

## 2024-01-10 RX ADMIN — HEPARIN SODIUM 5000 UNIT(S): 5000 INJECTION INTRAVENOUS; SUBCUTANEOUS at 21:18

## 2024-01-10 NOTE — PROGRESS NOTE ADULT - PROBLEM SELECTOR PLAN 5
-Hold anti-diarrheal medications in context of potential viral or bacterial GI infection-> started loperamide 1/3  -diarrhea now resolved  - loperamide stopped  - monitor for constipation

## 2024-01-10 NOTE — PROGRESS NOTE ADULT - PROBLEM SELECTOR PLAN 9
-consistent with chronic disease w/low iron  - s/p venofer x3 day course  - Hgb stable ~9, no evidence of active bleeding  - 1/9 drop from 9 -> 8, no evidence of bleeding and HDS. Continue to trend  - Hgb stable at 8

## 2024-01-10 NOTE — PROGRESS NOTE ADULT - ASSESSMENT
A/p  73M w/ BPH (c/b urinary retention s/p Harkins catheter placement; exchanged monthly), CKD stage IIIb, and cardiac amyloidosis brought in by daughter for diarrhea (loose-watery BM's) over the past few days.    #Sepsis  -I/s/o COVID + UTI  -Abx per med    #COVID  -CXR no focal consolidations  -sp Remdesivir  -Supportive care per med    #cardiac amyloid   -Cont vyndamax  -baseline sbp 100 as outpt  -Prior echo with nml LV systolic fxn, Severe left ventricular hypertrophy, Findings suggestive of infiltrative cardiomyopathy    #Hypotension  -Continue midodrine  -GENTLE IVF - watch for overload     #Hypoglycemia  -mgmt per med    #Dizziness  -Orthostatics negative   -Cont mido, gentle IVF  -PT for mobility      dvt ppx

## 2024-01-10 NOTE — PROGRESS NOTE ADULT - PROBLEM SELECTOR PLAN 4
Pain at 1st MTP joint  - XR R foot c/w gouty arthropathy  - acetaminophen PRN for pain  - pain resolved on 1/10

## 2024-01-10 NOTE — PROGRESS NOTE ADULT - SUBJECTIVE AND OBJECTIVE BOX
Admitted for: Sepsis        Following for: adrenal insufficiency    Subjective:   Patient feels well. Has no complaints    MEDICATIONS  (STANDING):  chlorhexidine 2% Cloths 1 Application(s) Topical <User Schedule>  dextrose 50% Injectable 25 Gram(s) IV Push once  dextrose 50% Injectable 25 milliLiter(s) IV Push once  dextrose 50% Injectable 25 Gram(s) IV Push once  dextrose 50% Injectable 12.5 Gram(s) IV Push once  dextrose Oral Gel 15 Gram(s) Oral once  finasteride 5 milliGRAM(s) Oral daily  glucagon  Injectable 1 milliGRAM(s) IntraMuscular once  heparin   Injectable 5000 Unit(s) SubCutaneous every 8 hours  hydrocortisone 20 milliGRAM(s) Oral <User Schedule>  lactated ringers. 1000 milliLiter(s) (70 mL/Hr) IV Continuous <Continuous>  sodium bicarbonate 1300 milliGRAM(s) Oral two times a day  sodium chloride 0.65% Nasal 1 Spray(s) Both Nostrils once  Vyndamax (Tafamidis) 61mg Capsule 1 Capsule(s) 1 Capsule(s) Oral daily    MEDICATIONS  (PRN):  acetaminophen     Tablet .. 650 milliGRAM(s) Oral every 6 hours PRN Mild Pain (1 - 3), Moderate Pain (4 - 6)  fluticasone propionate 50 MICROgram(s)/spray Nasal Spray 1 Spray(s) Both Nostrils two times a day PRN congestion  ondansetron Injectable 4 milliGRAM(s) IV Push daily PRN Nausea      Allergies    No Known Allergies    Intolerances          PHYSICAL EXAM:  VITALS: T(C): 36.2 (01-10-24 @ 08:37)  T(F): 97.2 (01-10-24 @ 08:37), Max: 97.8 (01-09-24 @ 13:12)  HR: 79 (01-10-24 @ 08:37) (79 - 88)  BP: 134/80 (01-10-24 @ 08:37) (123/77 - 144/83)  RR:  (16 - 18)  SpO2:  (97% - 100%)  Wt(kg): --  GENERAL: NAD  EYES: No proptosis, no lid lag, anicteric  RESPIRATORY: Clear to auscultation bilaterally  CARDIOVASCULAR: Regular rate and rhythm  GI: Soft, nontender, non distended  EXT: b/l feet without wounds, 2+ pulses  PSYCH: Alert and oriented x 3, reactive affect      A1C with Estimated Average Glucose Result: 5.0 % (01-03-24 @ 06:01)  A1C with Estimated Average Glucose Result: 5.3 % (11-04-23 @ 10:45)  A1C with Estimated Average Glucose Result: 5.5 % (06-10-23 @ 16:29)  A1C with Estimated Average Glucose Result: 5.9 % (04-24-23 @ 07:02)          01-10    141  |  109<H>  |  22  ----------------------------<  116<H>  3.8   |  22  |  2.32<H>    eGFR: 29<L>    Ca    8.6      01-10  Mg     2.1     01-10  Phos  2.0     01-10    TPro  6.6  /  Alb  3.5  /  TBili  0.3  /  DBili  x   /  AST  22  /  ALT  19  /  AlkPhos  82  01-10      Thyroid Function Tests:  01-08 @ 06:00 TSH 4.98 FreeT4 0.9 T3 55 Anti TPO -- Anti Thyroglobulin Ab -- TSI --  01-03 @ 06:01 TSH 5.54 FreeT4 0.9 T3 57 Anti TPO -- Anti Thyroglobulin Ab -- TSI --

## 2024-01-10 NOTE — PROGRESS NOTE ADULT - PROBLEM SELECTOR PLAN 1
-met SIRs criteria on admission with concerning source of GI vs UTI  -previous hx of E.faecalis and Stenotrophomonas maltophilia  -Likely Pyelo in context of CT with perinephric standing, may also be due to proctitis, also with COVID-19     - ID following  -ucx demonstrating efaecalis, susceptible to ampicillin (covered by zosyn)  -bcx ngtd  -gi pcr and cdiff neg  - HDS, no longer meeting SIRS criteria  - completed course of zosyn x7 days (1/2-1/8), HDS since discontinuation of vanc  - leukocytosis now uptrending but still WNL, afebrile and HDS off antibiotics

## 2024-01-10 NOTE — PROGRESS NOTE ADULT - PROBLEM SELECTOR PLAN 7
-CT Head WNL  -F/U B12, TSH, Orthostatic vitals-> TSH elevated free T4 and total T3 ordered, T4 wnl  -pt reporting persistent dizziness, vertigo; worse in AM after lying down. No acute findings on CT on admission, will get MRI brain to r/o central etiology for dizziness    Plan:  - MRI brain with no acute findings, no space-occupying lesions  - meclizine PRN for dizziness  - patient states dizziness has resolved, may have been ISO sepsis/hypotension

## 2024-01-10 NOTE — PROGRESS NOTE ADULT - PROBLEM SELECTOR PLAN 3
- AM cortisol low, ACTH low c/f secondary AI  - endocrine following, appreciate recs  - CT abdomen  without adrenal abnormality, CTH without obvious pituitary etiology though not an optimal test to evaluate pituitary  - Repeat TSH, FT4, TT3 pending given low FT4 of 0.9 on 1/3  - gonadal axis normal   - continue hydrocortisone 20 mg BID (8AM and 3PM)  - MR brain/sella with nonspecific area of  enhancement involving L pituitary gland, per endo this finding was discussed with neuroradiology and may represent microadenoma vs cyst.   - per endo MR findings do not explain adrenal insufficiency, will need further workup with endo outpatient

## 2024-01-10 NOTE — PROGRESS NOTE ADULT - ATTENDING COMMENTS
73M w/ BPH (c/b urinary retention s/p Morales catheter placement; exchanged monthly), CKD stage IIIb, and cardiac amyloidosis brought in by daughter for diarrhea found to have hypotension likely due to sepsis.     Pyleo/ Urosepsis. S/p IVF, weaned off midodrine. S/p zosyn. Seen by ID. Resolved.   Failed TOV; Morales replaced by urology. Maintain morales. F/u urology outpatient   COVID+: Not requiring O2 at this time. Supportive measures.   SARAH on CKD - Cr improving renal following. F/u recs   Amyloidosis: Cardiology recommends to continue with vyndamax  AM cortisol 2.3, ACTH low. C/f AI. Endo following. Team discussed with neuroradiology re: MRI finding of possible pituitary microadenoma. per discussion differential diagnosis at this time includes a pituitary microadenoma vs a pituitary cyst. No evidence of any infiltrative process as per neuroradiology. Patient should have repeat MRI sella with and without contrast in 6-12 months to ensure there is no growth of this area.   Steroid taper per endo. No further inpt w/u and f/u endo outpatient.    Dispo - pending renal recs/Cr trend. Plan for CHERYL

## 2024-01-10 NOTE — PROGRESS NOTE ADULT - PROBLEM SELECTOR PLAN 2
Baseline creatinine is 1.6, Cr on admission 1.8 -> 2.69 on 1/4 -> downtrending   - chronic morales, low c/f obstruction  -may be iso of atn given hypotension on presentation vs prerenal given fluid loss vs remdesvir  - Dced remdesivir  - Fena 1.4%  - VBG -> elevated lactate at 2.1 but pt not symptomatic -> normalized   - nephro following, appreciate recs  - trend SCr - downtrending but slightly increased again today to 2.32 (2.2 on 1/9)   - hold IVF for now per nephro/cards  - maintain morales for BPH  - s/p bicarb gtt with subsequent normalization of bicarb. Will restart PO bicarb

## 2024-01-10 NOTE — PROGRESS NOTE ADULT - ASSESSMENT
Mr. Sanchez is a 73M w/ BPH (c/b urinary retention s/p Harkins catheter placement; exchanged monthly), CKD stage IIIb, and cardiac amyloidosis brought in by daughter for diarrhea (loose-watery BM's) over the past few days found to have COVID and urosepsis. Endocrinology consulted for evaluation for adrenal insufficiency.    #Adrenal insufficiency - suspect secondary AI  #Hypoglycemia  #Sepsis  #Abnormal thyroid function tests  - 1/5 1:08pm cortisol 3.1, 1/7 6:42am cortisol 2.3  - 1/6 12pm ACTH 2.5  - BG on BMP 1/3 49  - 1/3 TSH 5.54, TT3 57, FT4 0.9  - A1c 5< hypoglycemia defined as serum glucose <55 which patient had as above  - CT abdomen 12/23 without adrenal gland abnormality  - CT head without pituitary etiology however not optimal test to evaluate pituitary  - no hyponatremia or hypoglycemia  - patient HD stable on midodrine  - endorses weight loss, fatigue, poor appetite  - no history of prolonged steroids, opioids, immunotherapy, radiation  - does not appear that tafamidis can cause adrenal insufficiency  - likely secondary adrenal insufficiency unclear cause at this point however can possibly be from amyloidosis vs pituitary etiology  PLAN  -please decrease hydrocortisone to 20mg at 8AM and 10mg at 3PM    #R/o panhypopituitarism  #thyroid axis appears stable on 1/3  -pending repeat TSH, FT4, TT3 given low FT4 of 0.9 on 1/3/24    #Gonadal axis - normal   -follow up:  -LH 11.1  -FSH 6.6  -Total testosterone - 433    #prolactin - 7    #IGF1 - 36    #pituitary hypoenhancement- based on above biochemical profile this does not explain the low ACTH and cortisol  There is a nonspecific area of decreased enhancement   involving the left pituitary gland. This is best seen on series 12 image   6 and measures approximately 6.1 x 2.4 mm. Possibly of a pituitary   microadenoma cannot entirely excluded.  -discussed with neuroradiology and differential diagnosis at this time includes a pituitary microadenoma vs a pituitary cyst  -no evidence of any infiltrative process as per neuroradiology  -patient should have repeat MRI sella with and without contrast in 6-12 months to ensure there is no growth of this area    #discharge  -follow up at [facility] at [address] [place] [address], [address], [place] [address]; Ph # [phone]  -discharge on hydrocortisone 20mg at 8AM and 10mg at 3PM. Patient remains on this dose until seen by outpatient endocrinology  to help with dc planning-  - Please print and give the pt info section for patients under adrenal insufficiency (this will educate him regarding the warning signs of adrenal crisis )  -  patient that he should take 2-3x his home dose in cases of illness, fever, accidents, and surgery  - pt should receive stress dosing (hydrocortisone 50mg q8) with any major illness or surgical procedure  - Should pt be unable to tolerate PO and is unable to take hydrocortisone, he will need an emergency injection. Please discharge with a prescription for 100mg Solu-Cortef Act-O-Vial and the following instructions: [email or web address]  - pt should obtain a medical alert bracelet or necklace to inform emergency providers that he has adrenal [email or web address]   - Patient should also monitor BP closely at home      Case discussed with Dr. Florence Cadena MD  Endocrine Fellow  Can be reached via teams. For follow up questions, discharge recommendations, or new consults, please call answering service at [phone] (weekdays); [phone] (nights/weekends)   Mr. Sanchez is a 73M w/ BPH (c/b urinary retention s/p Harkins catheter placement; exchanged monthly), CKD stage IIIb, and cardiac amyloidosis brought in by daughter for diarrhea (loose-watery BM's) over the past few days found to have COVID and urosepsis. Endocrinology consulted for evaluation for adrenal insufficiency.    #Adrenal insufficiency - suspect secondary AI  #Hypoglycemia  #Sepsis  #Abnormal thyroid function tests  - 1/5 1:08pm cortisol 3.1, 1/7 6:42am cortisol 2.3  - 1/6 12pm ACTH 2.5  - BG on BMP 1/3 49  - 1/3 TSH 5.54, TT3 57, FT4 0.9  - A1c 5< hypoglycemia defined as serum glucose <55 which patient had as above  - CT abdomen 12/23 without adrenal gland abnormality  - CT head without pituitary etiology however not optimal test to evaluate pituitary  - no hyponatremia or hypoglycemia  - patient HD stable on midodrine  - endorses weight loss, fatigue, poor appetite  - no history of prolonged steroids, opioids, immunotherapy, radiation  - does not appear that tafamidis can cause adrenal insufficiency  - likely secondary adrenal insufficiency unclear cause at this point however can possibly be from amyloidosis vs pituitary etiology  PLAN  -please decrease hydrocortisone to 20mg at 8AM and 10mg at 3PM    #R/o panhypopituitarism  #thyroid axis appears stable on 1/3  -pending repeat TSH, FT4, TT3 given low FT4 of 0.9 on 1/3/24    #Gonadal axis - normal   -follow up:  -LH 11.1  -FSH 6.6  -Total testosterone - 433    #prolactin - 7    #IGF1 - 36    #pituitary hypoenhancement- based on above biochemical profile this does not explain the low ACTH and cortisol  There is a nonspecific area of decreased enhancement   involving the left pituitary gland. This is best seen on series 12 image   6 and measures approximately 6.1 x 2.4 mm. Possibly of a pituitary   microadenoma cannot entirely excluded.  -discussed with neuroradiology and differential diagnosis at this time includes a pituitary microadenoma vs a pituitary cyst  -no evidence of any infiltrative process as per neuroradiology  -patient should have repeat MRI sella with and without contrast in 6-12 months to ensure there is no growth of this area    #discharge  -follow up at [facility] Practice at [address] [place] [address], [address], [place] [address]; Ph # [phone]  -discharge on hydrocortisone 20mg at 8AM and 10mg at 3PM. Patient remains on this dose until seen by outpatient endocrinology  to help with dc planning-  - Please print and give the pt info section for patients under adrenal insufficiency (this will educate him regarding the warning signs of adrenal crisis )  -  patient that he should take 2-3x his home dose in cases of illness, fever, accidents, and surgery  - pt should receive stress dosing (hydrocortisone 50mg q8) with any major illness or surgical procedure  - Should pt be unable to tolerate PO and is unable to take hydrocortisone, he will need an emergency injection. Please discharge with a prescription for 100mg Solu-Cortef Act-O-Vial and the following instructions: [email or web address]  - pt should obtain a medical alert bracelet or necklace to inform emergency providers that he has adrenal [email or web address]   - Patient should also monitor BP closely at home      Case discussed with Dr. Florence Cadena MD  Endocrine Fellow  Can be reached via teams. For follow up questions, discharge recommendations, or new consults, please call answering service at [phone] (weekdays); [phone] (nights/weekends)

## 2024-01-10 NOTE — PROGRESS NOTE ADULT - ASSESSMENT
No pain, no shortness of breath. Stable Cr    Review of systems: All 10 points ROS was obtained except as above.     chlorhexidine 2% Cloths 1 Application(s) Topical <User Schedule>  dextrose 50% Injectable 12.5 Gram(s) IV Push once  dextrose 50% Injectable 25 milliLiter(s) IV Push once  dextrose 50% Injectable 25 Gram(s) IV Push once  dextrose 50% Injectable 25 Gram(s) IV Push once  dextrose Oral Gel 15 Gram(s) Oral once  finasteride 5 milliGRAM(s) Oral daily  fluticasone propionate 50 MICROgram(s)/spray Nasal Spray 1 Spray(s) Both Nostrils two times a day PRN  glucagon  Injectable 1 milliGRAM(s) IntraMuscular once  heparin   Injectable 5000 Unit(s) SubCutaneous every 12 hours  lactated ringers. 1000 milliLiter(s) IV Continuous <Continuous>  loperamide 2 milliGRAM(s) Oral three times a day PRN  midodrine 20 milliGRAM(s) Oral every 8 hours  piperacillin/tazobactam IVPB.. 3.375 Gram(s) IV Intermittent every 8 hours  sodium chloride 0.65% Nasal 1 Spray(s) Both Nostrils once  Vyndamax (Tafamidis) 61mg Capsule 1 Capsule(s) 1 Capsule(s) Oral daily      VITAL:  T(C): , Max: 37.1 (01-06-24 @ 21:03)  T(F): , Max: 98.7 (01-06-24 @ 21:03)  HR: 69 (01-07-24 @ 13:30)  BP: 104/67 (01-07-24 @ 13:30)  BP(mean): 92 (01-06-24 @ 21:03)  RR: 18 (01-07-24 @ 11:12)  SpO2: 96% (01-07-24 @ 11:12)  Wt(kg): --    01-06-24 @ 07:01  -  01-07-24 @ 07:00  --------------------------------------------------------  IN: 1080 mL / OUT: 1850 mL / NET: -770 mL    01-07-24 @ 07:01  -  01-07-24 @ 19:11  --------------------------------------------------------  IN: 755 mL / OUT: 850 mL / NET: -95 mL        PHYSICAL EXAM:  Constitutional: NAD  Neck:  No JVD  Respiratory: CTAB/L  Cardiovascular: S1 and S2  Gastrointestinal: BS+, soft, NT/ND  Extremities: No peripheral edema  Neurological: A/O x 3, no focal deficits  Psychiatric: Normal mood, normal affect  : + Morales  Skin: No rashes  Access: Not applicable     LABS:                          8.9    5.63  )-----------( 198      ( 07 Jan 2024 06:41 )             26.7     Na(138)/K(4.1)/Cl(106)/HCO3(18)/BUN(26)/Cr(2.58)Glu(66)/Ca(8.4)/Mg(2.0)/PO4(3.2)    01-07 @ 06:40  Na(135)/K(4.5)/Cl(106)/HCO3(18)/BUN(30)/Cr(2.57)Glu(75)/Ca(8.4)/Mg(2.0)/PO4(3.7)    01-06 @ 06:23  Na(137)/K(4.1)/Cl(108)/HCO3(19)/BUN(30)/Cr(2.57)Glu(78)/Ca(8.3)/Mg(2.0)/PO4(3.4)    01-05 @ 05:18    Urinalysis Basic - ( 07 Jan 2024 06:40 )    Color: x / Appearance: x / SG: x / pH: x  Gluc: 66 mg/dL / Ketone: x  / Bili: x / Urobili: x   Blood: x / Protein: x / Nitrite: x   Leuk Esterase: x / RBC: x / WBC x   Sq Epi: x / Non Sq Epi: x / Bacteria: x            ASSESSMENT/PLAN  ASSESSMENT/PLAN  73M w/ BPH (c/b urinary retention s/p Morales catheter placement; exchanged monthly), CKD stage IIIb, and cardiac amyloidosis brought in by daughter for diarrhea (loose-watery BM's) over the past few days.  CKD stage 3 b and now SARAH in light of contrast study/hypotension and possibly dehydration as well  Subnephrotic range proteinuria     1 Renal-  scr stable> suspect this may be his new baseline? 2.2-2.4?  -c/w Sodium bicarb 1300 BID  -UA, Hemalatha, uric acid, cystatin C  2 phosnak today x1  3 -he has chronic morales  4 CVS- BP soft but stable  on Midodrine to maintain MAP       Kamari Asencio DO   [facility]  [phone]   No pain, no shortness of breath. Stable Cr    Review of systems: All 10 points ROS was obtained except as above.     chlorhexidine 2% Cloths 1 Application(s) Topical <User Schedule>  dextrose 50% Injectable 12.5 Gram(s) IV Push once  dextrose 50% Injectable 25 milliLiter(s) IV Push once  dextrose 50% Injectable 25 Gram(s) IV Push once  dextrose 50% Injectable 25 Gram(s) IV Push once  dextrose Oral Gel 15 Gram(s) Oral once  finasteride 5 milliGRAM(s) Oral daily  fluticasone propionate 50 MICROgram(s)/spray Nasal Spray 1 Spray(s) Both Nostrils two times a day PRN  glucagon  Injectable 1 milliGRAM(s) IntraMuscular once  heparin   Injectable 5000 Unit(s) SubCutaneous every 12 hours  lactated ringers. 1000 milliLiter(s) IV Continuous <Continuous>  loperamide 2 milliGRAM(s) Oral three times a day PRN  midodrine 20 milliGRAM(s) Oral every 8 hours  piperacillin/tazobactam IVPB.. 3.375 Gram(s) IV Intermittent every 8 hours  sodium chloride 0.65% Nasal 1 Spray(s) Both Nostrils once  Vyndamax (Tafamidis) 61mg Capsule 1 Capsule(s) 1 Capsule(s) Oral daily      VITAL:  T(C): , Max: 37.1 (01-06-24 @ 21:03)  T(F): , Max: 98.7 (01-06-24 @ 21:03)  HR: 69 (01-07-24 @ 13:30)  BP: 104/67 (01-07-24 @ 13:30)  BP(mean): 92 (01-06-24 @ 21:03)  RR: 18 (01-07-24 @ 11:12)  SpO2: 96% (01-07-24 @ 11:12)  Wt(kg): --    01-06-24 @ 07:01  -  01-07-24 @ 07:00  --------------------------------------------------------  IN: 1080 mL / OUT: 1850 mL / NET: -770 mL    01-07-24 @ 07:01  -  01-07-24 @ 19:11  --------------------------------------------------------  IN: 755 mL / OUT: 850 mL / NET: -95 mL        PHYSICAL EXAM:  Constitutional: NAD  Neck:  No JVD  Respiratory: CTAB/L  Cardiovascular: S1 and S2  Gastrointestinal: BS+, soft, NT/ND  Extremities: No peripheral edema  Neurological: A/O x 3, no focal deficits  Psychiatric: Normal mood, normal affect  : + Morales  Skin: No rashes  Access: Not applicable     LABS:                          8.9    5.63  )-----------( 198      ( 07 Jan 2024 06:41 )             26.7     Na(138)/K(4.1)/Cl(106)/HCO3(18)/BUN(26)/Cr(2.58)Glu(66)/Ca(8.4)/Mg(2.0)/PO4(3.2)    01-07 @ 06:40  Na(135)/K(4.5)/Cl(106)/HCO3(18)/BUN(30)/Cr(2.57)Glu(75)/Ca(8.4)/Mg(2.0)/PO4(3.7)    01-06 @ 06:23  Na(137)/K(4.1)/Cl(108)/HCO3(19)/BUN(30)/Cr(2.57)Glu(78)/Ca(8.3)/Mg(2.0)/PO4(3.4)    01-05 @ 05:18    Urinalysis Basic - ( 07 Jan 2024 06:40 )    Color: x / Appearance: x / SG: x / pH: x  Gluc: 66 mg/dL / Ketone: x  / Bili: x / Urobili: x   Blood: x / Protein: x / Nitrite: x   Leuk Esterase: x / RBC: x / WBC x   Sq Epi: x / Non Sq Epi: x / Bacteria: x            ASSESSMENT/PLAN  ASSESSMENT/PLAN  73M w/ BPH (c/b urinary retention s/p Morales catheter placement; exchanged monthly), CKD stage IIIb, and cardiac amyloidosis brought in by daughter for diarrhea (loose-watery BM's) over the past few days.  CKD stage 3 b and now SARAH in light of contrast study/hypotension and possibly dehydration as well  Subnephrotic range proteinuria     1 Renal-  scr stable> suspect this may be his new baseline? 2.2-2.4?  -c/w Sodium bicarb 1300 BID  -UA, Hemalatha, uric acid, cystatin C  2 phosnak today x1  3 -he has chronic morales  4 CVS- BP soft but stable  on Midodrine to maintain MAP       Kamari Asencio DO   [employer]  [phone]

## 2024-01-10 NOTE — PROGRESS NOTE ADULT - PROBLEM SELECTOR PLAN 6
Likely hypovolemic ISO diarrhea but possibly with component of distributive shock   - midodrine 20 mg TID -> 10 mg TID on 1/8  - I/Os  - workup of adrenal insufficiency per endo - ACTH and cortisol low c/f secondary AI  - continue hydrocortisone 20 mg BID - 8AM and 3PM  - not requiring midodrine, last 5 doses held. Will discontinue.

## 2024-01-10 NOTE — PROGRESS NOTE ADULT - ATTENDING COMMENTS
Patient is a 73-year-old man with history of BPH, CKD stage IIIb, cardiac amyloidosis, brought in by daughter for diarrhea, found to have COVID and urosepsis.  Endocrinology consulted for evaluation of secondary adrenal insufficiency, with a low ACTH level and low cortisol level.  Patient is currently on hydrocortisone taper, recommend decreasing to discharge regimen of hydrocortisone 20 mg in the morning and 10 mg in the afternoon.  Discussed MRI pituitary standing with radiology, no infiltrative process is seen.  Therefore the etiology of his secondary adrenal insufficiency is unclear at this point.  Patient is, however, noted to have a subcentimeter, 6.1 x 2.4 mm, possible pituitary microadenoma.  Patient will establish care with outpatient endocrinology to reassess the HPA axis axis as an outpatient as well as other pituitary hormone function as an outpatient.

## 2024-01-10 NOTE — PROGRESS NOTE ADULT - SUBJECTIVE AND OBJECTIVE BOX
CARDIOLOGY FOLLOW UP - Dr. Saravia  DATE OF SERVICE: 1/10/24    CC no acute events       REVIEW OF SYSTEMS:  CONSTITUTIONAL: No fever, weight loss, or fatigue  RESPIRATORY: No cough, wheezing, chills or hemoptysis; No Shortness of Breath  CARDIOVASCULAR: No chest pain, palpitations, passing out, dizziness, or leg swelling  GASTROINTESTINAL: No abdominal or epigastric pain. No nausea, vomiting, or hematemesis; No diarrhea or constipation. No melena or hematochezia.  VASCULAR: No edema     PHYSICAL EXAM:  T(C): 36.2 (01-10-24 @ 08:37), Max: 36.6 (01-09-24 @ 13:12)  HR: 79 (01-10-24 @ 08:37) (79 - 88)  BP: 134/80 (01-10-24 @ 08:37) (123/77 - 144/83)  RR: 18 (01-10-24 @ 08:37) (16 - 18)  SpO2: 100% (01-10-24 @ 08:37) (97% - 100%)  Wt(kg): --  I&O's Summary    09 Jan 2024 07:01  -  10 Bernardino 2024 07:00  --------------------------------------------------------  IN: 1200 mL / OUT: 1450 mL / NET: -250 mL        Appearance: Normal	  Cardiovascular: Normal S1 S2,RRR, No JVD, No murmurs  Respiratory: Lungs clear to auscultation	  Gastrointestinal:  Soft, Non-tender, + BS	  Extremities: Normal range of motion, No clubbing, cyanosis or edema      Home Medications:  fluticasone 50 mcg/inh nasal spray: 1 spray(s) in each nostril 2 times a day as needed (04 Jan 2024 12:08)  mirtazapine 7.5 mg oral tablet: 1 tab(s) orally once a day at 6 PM (04 Jan 2024 12:08)  Saline Mist 0.65% nasal spray: 2 spray(s) intranasally as needed (04 Jan 2024 12:08)  Vyndamax 61 mg oral capsule: 1 cap(s) orally once a day (04 Jan 2024 12:08)      MEDICATIONS  (STANDING):  chlorhexidine 2% Cloths 1 Application(s) Topical <User Schedule>  dextrose 50% Injectable 25 Gram(s) IV Push once  dextrose 50% Injectable 12.5 Gram(s) IV Push once  dextrose 50% Injectable 25 milliLiter(s) IV Push once  dextrose 50% Injectable 25 Gram(s) IV Push once  dextrose Oral Gel 15 Gram(s) Oral once  finasteride 5 milliGRAM(s) Oral daily  glucagon  Injectable 1 milliGRAM(s) IntraMuscular once  heparin   Injectable 5000 Unit(s) SubCutaneous every 8 hours  hydrocortisone 20 milliGRAM(s) Oral <User Schedule>  lactated ringers. 1000 milliLiter(s) (70 mL/Hr) IV Continuous <Continuous>  midodrine 10 milliGRAM(s) Oral every 8 hours  sodium bicarbonate  Infusion 0.105 mEq/kG/Hr (50 mL/Hr) IV Continuous <Continuous>  sodium chloride 0.65% Nasal 1 Spray(s) Both Nostrils once  Vyndamax (Tafamidis) 61mg Capsule 1 Capsule(s) 1 Capsule(s) Oral daily      TELEMETRY: nsr 	    ECG:  	  RADIOLOGY:   DIAGNOSTIC TESTING:  [ ] Echocardiogram:  [ ]  Catheterization:  [ ] Stress Test:    OTHER: 	    LABS:	 	                            8.1    7.64  )-----------( 257      ( 10 Bernardino 2024 06:27 )             24.3     01-10    141  |  109<H>  |  22  ----------------------------<  116<H>  3.8   |  22  |  2.32<H>    Ca    8.6      10 Bernardino 2024 06:27  Phos  2.0     01-10  Mg     2.1     01-10    TPro  6.6  /  Alb  3.5  /  TBili  0.3  /  DBili  x   /  AST  22  /  ALT  19  /  AlkPhos  82  01-10

## 2024-01-10 NOTE — PROGRESS NOTE ADULT - SUBJECTIVE AND OBJECTIVE BOX
DEEPTHI GÓMEZ  73y  Male    Patient is a 73y old  Male who presents with a chief complaint of Diarrhea/ (10 Bernardino 2024 09:45)    INTERVAL HPI/OVERNIGHT EVENTS:  - last 5 doses of midodrine held, BPs 120s-140s/70s-80s  - MR brain/sella with small area of decreased enhancement, possibly microadenoma vs cyst per endo/neuroradiology   - no longer having R foot pain, did not require tylenol  - denies dizziness, subj fevers/chills, headaches, chest pain/palpitations, dyspnea, cough, abdominal pain, pain in extremities  - last BM yesterday AM, per patient normal consistency/color     T(C): 36.2 (01-10-24 @ 08:37), Max: 36.6 (01-09-24 @ 13:12)  HR: 79 (01-10-24 @ 08:37) (79 - 88)  BP: 134/80 (01-10-24 @ 08:37) (123/77 - 144/83)  RR: 18 (01-10-24 @ 08:37) (16 - 18)  SpO2: 100% (01-10-24 @ 08:37) (97% - 100%)  Wt(kg): --Vital Signs Last 24 Hrs  T(C): 36.2 (10 Bernardino 2024 08:37), Max: 36.6 (09 Jan 2024 13:12)  T(F): 97.2 (10 Bernardino 2024 08:37), Max: 97.8 (09 Jan 2024 13:12)  HR: 79 (10 Bernardino 2024 08:37) (79 - 88)  BP: 134/80 (10 Bernardino 2024 08:37) (123/77 - 144/83)  BP(mean): --  RR: 18 (10 Bernardino 2024 08:37) (16 - 18)  SpO2: 100% (10 Bernardino 2024 08:37) (97% - 100%)    Parameters below as of 10 Bernardino 2024 08:37  Patient On (Oxygen Delivery Method): room air    PHYSICAL EXAM:  HEAD:  Atraumatic, Normocephalic  EYES: EOMI, conjunctiva and sclera clear  CHEST/LUNG: Clear to auscultation bilaterally; No rales, rhonchi, wheezing, or rubs  HEART: Regular rate and rhythm; No murmurs, rubs, or gallops  ABDOMEN: Soft, nontender, Nondistended, no masses  : morales in place draining yellow urine  SKIN: No rashes or lesions  EXTREM: bony protrusion at R 1st MTP joint with no overlying skin changes. Non-TTP.   NERVOUS SYSTEM:  Alert & Oriented X3, no focal deficits    Consultant(s) Notes Reviewed:  [x ] YES  [ ] NO  Care Discussed with Consultants/Other Providers [ x] YES  [ ] NO    LABS:                        8.1    7.64  )-----------( 257      ( 10 Bernardino 2024 06:27 )             24.3     01-10    141  |  109<H>  |  22  ----------------------------<  116<H>  3.8   |  22  |  2.32<H>    Ca    8.6      10 Bernardino 2024 06:27  Phos  2.0     01-10  Mg     2.1     01-10    TPro  6.6  /  Alb  3.5  /  TBili  0.3  /  DBili  x   /  AST  22  /  ALT  19  /  AlkPhos  82  01-10        Urinalysis Basic - ( 10 Bernardino 2024 06:27 )    Color: x / Appearance: x / SG: x / pH: x  Gluc: 116 mg/dL / Ketone: x  / Bili: x / Urobili: x   Blood: x / Protein: x / Nitrite: x   Leuk Esterase: x / RBC: x / WBC x   Sq Epi: x / Non Sq Epi: x / Bacteria: x      CAPILLARY BLOOD GLUCOSE            Urinalysis Basic - ( 10 Bernardino 2024 06:27 )    Color: x / Appearance: x / SG: x / pH: x  Gluc: 116 mg/dL / Ketone: x  / Bili: x / Urobili: x   Blood: x / Protein: x / Nitrite: x   Leuk Esterase: x / RBC: x / WBC x   Sq Epi: x / Non Sq Epi: x / Bacteria: x        RADIOLOGY & ADDITIONAL TESTS:    Imaging Personally Reviewed:  [ ] YES  [ ] NO    HEALTH ISSUES - PROBLEM Dx:  Sepsis    Acute kidney injury superimposed on CKD    Adrenal insufficiency    Foot pain, right    Diarrhea    Hypotension    Fall    Cardiac amyloidosis    Anemia    Abnormal transaminases    2019 novel coronavirus disease (COVID-19)    BPH (benign prostatic hyperplasia)    Prophylactic measure    Hypoglycemia    Abnormal thyroid blood test

## 2024-01-11 LAB
ALBUMIN SERPL ELPH-MCNC: 3.4 G/DL — SIGNIFICANT CHANGE UP (ref 3.3–5)
ALBUMIN SERPL ELPH-MCNC: 3.4 G/DL — SIGNIFICANT CHANGE UP (ref 3.3–5)
ALP SERPL-CCNC: 86 U/L — SIGNIFICANT CHANGE UP (ref 40–120)
ALP SERPL-CCNC: 86 U/L — SIGNIFICANT CHANGE UP (ref 40–120)
ALT FLD-CCNC: 20 U/L — SIGNIFICANT CHANGE UP (ref 10–45)
ALT FLD-CCNC: 20 U/L — SIGNIFICANT CHANGE UP (ref 10–45)
ANION GAP SERPL CALC-SCNC: 12 MMOL/L — SIGNIFICANT CHANGE UP (ref 5–17)
ANION GAP SERPL CALC-SCNC: 12 MMOL/L — SIGNIFICANT CHANGE UP (ref 5–17)
AST SERPL-CCNC: 24 U/L — SIGNIFICANT CHANGE UP (ref 10–40)
AST SERPL-CCNC: 24 U/L — SIGNIFICANT CHANGE UP (ref 10–40)
BASOPHILS # BLD AUTO: 0.01 K/UL — SIGNIFICANT CHANGE UP (ref 0–0.2)
BASOPHILS # BLD AUTO: 0.01 K/UL — SIGNIFICANT CHANGE UP (ref 0–0.2)
BASOPHILS NFR BLD AUTO: 0.1 % — SIGNIFICANT CHANGE UP (ref 0–2)
BASOPHILS NFR BLD AUTO: 0.1 % — SIGNIFICANT CHANGE UP (ref 0–2)
BILIRUB SERPL-MCNC: 0.3 MG/DL — SIGNIFICANT CHANGE UP (ref 0.2–1.2)
BILIRUB SERPL-MCNC: 0.3 MG/DL — SIGNIFICANT CHANGE UP (ref 0.2–1.2)
BUN SERPL-MCNC: 22 MG/DL — SIGNIFICANT CHANGE UP (ref 7–23)
BUN SERPL-MCNC: 22 MG/DL — SIGNIFICANT CHANGE UP (ref 7–23)
CALCIUM SERPL-MCNC: 8.1 MG/DL — LOW (ref 8.4–10.5)
CALCIUM SERPL-MCNC: 8.1 MG/DL — LOW (ref 8.4–10.5)
CHLORIDE SERPL-SCNC: 107 MMOL/L — SIGNIFICANT CHANGE UP (ref 96–108)
CHLORIDE SERPL-SCNC: 107 MMOL/L — SIGNIFICANT CHANGE UP (ref 96–108)
CO2 SERPL-SCNC: 22 MMOL/L — SIGNIFICANT CHANGE UP (ref 22–31)
CO2 SERPL-SCNC: 22 MMOL/L — SIGNIFICANT CHANGE UP (ref 22–31)
CREAT SERPL-MCNC: 1.88 MG/DL — HIGH (ref 0.5–1.3)
CREAT SERPL-MCNC: 1.88 MG/DL — HIGH (ref 0.5–1.3)
EGFR: 37 ML/MIN/1.73M2 — LOW
EGFR: 37 ML/MIN/1.73M2 — LOW
EOSINOPHIL # BLD AUTO: 0.06 K/UL — SIGNIFICANT CHANGE UP (ref 0–0.5)
EOSINOPHIL # BLD AUTO: 0.06 K/UL — SIGNIFICANT CHANGE UP (ref 0–0.5)
EOSINOPHIL NFR BLD AUTO: 0.8 % — SIGNIFICANT CHANGE UP (ref 0–6)
EOSINOPHIL NFR BLD AUTO: 0.8 % — SIGNIFICANT CHANGE UP (ref 0–6)
GLUCOSE SERPL-MCNC: 112 MG/DL — HIGH (ref 70–99)
GLUCOSE SERPL-MCNC: 112 MG/DL — HIGH (ref 70–99)
HCT VFR BLD CALC: 26.1 % — LOW (ref 39–50)
HCT VFR BLD CALC: 26.1 % — LOW (ref 39–50)
HGB BLD-MCNC: 8.6 G/DL — LOW (ref 13–17)
HGB BLD-MCNC: 8.6 G/DL — LOW (ref 13–17)
IMM GRANULOCYTES NFR BLD AUTO: 1 % — HIGH (ref 0–0.9)
IMM GRANULOCYTES NFR BLD AUTO: 1 % — HIGH (ref 0–0.9)
LYMPHOCYTES # BLD AUTO: 2.36 K/UL — SIGNIFICANT CHANGE UP (ref 1–3.3)
LYMPHOCYTES # BLD AUTO: 2.36 K/UL — SIGNIFICANT CHANGE UP (ref 1–3.3)
LYMPHOCYTES # BLD AUTO: 33 % — SIGNIFICANT CHANGE UP (ref 13–44)
LYMPHOCYTES # BLD AUTO: 33 % — SIGNIFICANT CHANGE UP (ref 13–44)
MAGNESIUM SERPL-MCNC: 2 MG/DL — SIGNIFICANT CHANGE UP (ref 1.6–2.6)
MAGNESIUM SERPL-MCNC: 2 MG/DL — SIGNIFICANT CHANGE UP (ref 1.6–2.6)
MCHC RBC-ENTMCNC: 27 PG — SIGNIFICANT CHANGE UP (ref 27–34)
MCHC RBC-ENTMCNC: 27 PG — SIGNIFICANT CHANGE UP (ref 27–34)
MCHC RBC-ENTMCNC: 33 GM/DL — SIGNIFICANT CHANGE UP (ref 32–36)
MCHC RBC-ENTMCNC: 33 GM/DL — SIGNIFICANT CHANGE UP (ref 32–36)
MCV RBC AUTO: 81.8 FL — SIGNIFICANT CHANGE UP (ref 80–100)
MCV RBC AUTO: 81.8 FL — SIGNIFICANT CHANGE UP (ref 80–100)
MONOCYTES # BLD AUTO: 0.76 K/UL — SIGNIFICANT CHANGE UP (ref 0–0.9)
MONOCYTES # BLD AUTO: 0.76 K/UL — SIGNIFICANT CHANGE UP (ref 0–0.9)
MONOCYTES NFR BLD AUTO: 10.6 % — SIGNIFICANT CHANGE UP (ref 2–14)
MONOCYTES NFR BLD AUTO: 10.6 % — SIGNIFICANT CHANGE UP (ref 2–14)
MRSA PCR RESULT.: SIGNIFICANT CHANGE UP
MRSA PCR RESULT.: SIGNIFICANT CHANGE UP
NEUTROPHILS # BLD AUTO: 3.9 K/UL — SIGNIFICANT CHANGE UP (ref 1.8–7.4)
NEUTROPHILS # BLD AUTO: 3.9 K/UL — SIGNIFICANT CHANGE UP (ref 1.8–7.4)
NEUTROPHILS NFR BLD AUTO: 54.5 % — SIGNIFICANT CHANGE UP (ref 43–77)
NEUTROPHILS NFR BLD AUTO: 54.5 % — SIGNIFICANT CHANGE UP (ref 43–77)
NRBC # BLD: 0 /100 WBCS — SIGNIFICANT CHANGE UP (ref 0–0)
NRBC # BLD: 0 /100 WBCS — SIGNIFICANT CHANGE UP (ref 0–0)
PHOSPHATE SERPL-MCNC: 2 MG/DL — LOW (ref 2.5–4.5)
PHOSPHATE SERPL-MCNC: 2 MG/DL — LOW (ref 2.5–4.5)
PLATELET # BLD AUTO: 278 K/UL — SIGNIFICANT CHANGE UP (ref 150–400)
PLATELET # BLD AUTO: 278 K/UL — SIGNIFICANT CHANGE UP (ref 150–400)
POTASSIUM SERPL-MCNC: 3.9 MMOL/L — SIGNIFICANT CHANGE UP (ref 3.5–5.3)
POTASSIUM SERPL-MCNC: 3.9 MMOL/L — SIGNIFICANT CHANGE UP (ref 3.5–5.3)
POTASSIUM SERPL-SCNC: 3.9 MMOL/L — SIGNIFICANT CHANGE UP (ref 3.5–5.3)
POTASSIUM SERPL-SCNC: 3.9 MMOL/L — SIGNIFICANT CHANGE UP (ref 3.5–5.3)
PROT SERPL-MCNC: 6.6 G/DL — SIGNIFICANT CHANGE UP (ref 6–8.3)
PROT SERPL-MCNC: 6.6 G/DL — SIGNIFICANT CHANGE UP (ref 6–8.3)
RBC # BLD: 3.19 M/UL — LOW (ref 4.2–5.8)
RBC # BLD: 3.19 M/UL — LOW (ref 4.2–5.8)
RBC # FLD: 18.8 % — HIGH (ref 10.3–14.5)
RBC # FLD: 18.8 % — HIGH (ref 10.3–14.5)
S AUREUS DNA NOSE QL NAA+PROBE: SIGNIFICANT CHANGE UP
S AUREUS DNA NOSE QL NAA+PROBE: SIGNIFICANT CHANGE UP
SODIUM SERPL-SCNC: 141 MMOL/L — SIGNIFICANT CHANGE UP (ref 135–145)
SODIUM SERPL-SCNC: 141 MMOL/L — SIGNIFICANT CHANGE UP (ref 135–145)
WBC # BLD: 7.16 K/UL — SIGNIFICANT CHANGE UP (ref 3.8–10.5)
WBC # BLD: 7.16 K/UL — SIGNIFICANT CHANGE UP (ref 3.8–10.5)
WBC # FLD AUTO: 7.16 K/UL — SIGNIFICANT CHANGE UP (ref 3.8–10.5)
WBC # FLD AUTO: 7.16 K/UL — SIGNIFICANT CHANGE UP (ref 3.8–10.5)

## 2024-01-11 PROCEDURE — 99232 SBSQ HOSP IP/OBS MODERATE 35: CPT | Mod: GC

## 2024-01-11 RX ORDER — SODIUM,POTASSIUM PHOSPHATES 278-250MG
1 POWDER IN PACKET (EA) ORAL EVERY 6 HOURS
Refills: 0 | Status: COMPLETED | OUTPATIENT
Start: 2024-01-11 | End: 2024-01-11

## 2024-01-11 RX ADMIN — Medication 1 PACKET(S): at 12:04

## 2024-01-11 RX ADMIN — Medication 1300 MILLIGRAM(S): at 05:32

## 2024-01-11 RX ADMIN — CHLORHEXIDINE GLUCONATE 1 APPLICATION(S): 213 SOLUTION TOPICAL at 07:50

## 2024-01-11 RX ADMIN — Medication 1 PACKET(S): at 09:12

## 2024-01-11 RX ADMIN — Medication 1300 MILLIGRAM(S): at 17:22

## 2024-01-11 RX ADMIN — Medication 10 MILLIGRAM(S): at 05:33

## 2024-01-11 RX ADMIN — HEPARIN SODIUM 5000 UNIT(S): 5000 INJECTION INTRAVENOUS; SUBCUTANEOUS at 21:44

## 2024-01-11 RX ADMIN — HEPARIN SODIUM 5000 UNIT(S): 5000 INJECTION INTRAVENOUS; SUBCUTANEOUS at 05:32

## 2024-01-11 RX ADMIN — FINASTERIDE 5 MILLIGRAM(S): 5 TABLET, FILM COATED ORAL at 12:04

## 2024-01-11 RX ADMIN — HEPARIN SODIUM 5000 UNIT(S): 5000 INJECTION INTRAVENOUS; SUBCUTANEOUS at 13:45

## 2024-01-11 NOTE — PROGRESS NOTE ADULT - ASSESSMENT
No pain, no shortness of breath. Cr now improved    Review of systems: All 10 points ROS was obtained except as above.     chlorhexidine 2% Cloths 1 Application(s) Topical <User Schedule>  dextrose 50% Injectable 12.5 Gram(s) IV Push once  dextrose 50% Injectable 25 milliLiter(s) IV Push once  dextrose 50% Injectable 25 Gram(s) IV Push once  dextrose 50% Injectable 25 Gram(s) IV Push once  dextrose Oral Gel 15 Gram(s) Oral once  finasteride 5 milliGRAM(s) Oral daily  fluticasone propionate 50 MICROgram(s)/spray Nasal Spray 1 Spray(s) Both Nostrils two times a day PRN  glucagon  Injectable 1 milliGRAM(s) IntraMuscular once  heparin   Injectable 5000 Unit(s) SubCutaneous every 12 hours  lactated ringers. 1000 milliLiter(s) IV Continuous <Continuous>  loperamide 2 milliGRAM(s) Oral three times a day PRN  midodrine 20 milliGRAM(s) Oral every 8 hours  piperacillin/tazobactam IVPB.. 3.375 Gram(s) IV Intermittent every 8 hours  sodium chloride 0.65% Nasal 1 Spray(s) Both Nostrils once  Vyndamax (Tafamidis) 61mg Capsule 1 Capsule(s) 1 Capsule(s) Oral daily      VITAL:  T(C): , Max: 37.1 (01-06-24 @ 21:03)  T(F): , Max: 98.7 (01-06-24 @ 21:03)  HR: 69 (01-07-24 @ 13:30)  BP: 104/67 (01-07-24 @ 13:30)  BP(mean): 92 (01-06-24 @ 21:03)  RR: 18 (01-07-24 @ 11:12)  SpO2: 96% (01-07-24 @ 11:12)  Wt(kg): --    01-06-24 @ 07:01  -  01-07-24 @ 07:00  --------------------------------------------------------  IN: 1080 mL / OUT: 1850 mL / NET: -770 mL    01-07-24 @ 07:01  -  01-07-24 @ 19:11  --------------------------------------------------------  IN: 755 mL / OUT: 850 mL / NET: -95 mL        PHYSICAL EXAM:  Constitutional: NAD  Neck:  No JVD  Respiratory: CTAB/L  Cardiovascular: S1 and S2  Gastrointestinal: BS+, soft, NT/ND  Extremities: No peripheral edema  Neurological: A/O x 3, no focal deficits  Psychiatric: Normal mood, normal affect  : + Morales  Skin: No rashes  Access: Not applicable     LABS:                          8.9    5.63  )-----------( 198      ( 07 Jan 2024 06:41 )             26.7     Na(138)/K(4.1)/Cl(106)/HCO3(18)/BUN(26)/Cr(2.58)Glu(66)/Ca(8.4)/Mg(2.0)/PO4(3.2)    01-07 @ 06:40  Na(135)/K(4.5)/Cl(106)/HCO3(18)/BUN(30)/Cr(2.57)Glu(75)/Ca(8.4)/Mg(2.0)/PO4(3.7)    01-06 @ 06:23  Na(137)/K(4.1)/Cl(108)/HCO3(19)/BUN(30)/Cr(2.57)Glu(78)/Ca(8.3)/Mg(2.0)/PO4(3.4)    01-05 @ 05:18    Urinalysis Basic - ( 07 Jan 2024 06:40 )    Color: x / Appearance: x / SG: x / pH: x  Gluc: 66 mg/dL / Ketone: x  / Bili: x / Urobili: x   Blood: x / Protein: x / Nitrite: x   Leuk Esterase: x / RBC: x / WBC x   Sq Epi: x / Non Sq Epi: x / Bacteria: x            ASSESSMENT/PLAN  ASSESSMENT/PLAN  73M w/ BPH (c/b urinary retention s/p Morales catheter placement; exchanged monthly), CKD stage IIIb, and cardiac amyloidosis brought in by daughter for diarrhea (loose-watery BM's) over the past few days.  CKD stage 3 b and now SARAH in light of contrast study/hypotension and possibly dehydration as well  Subnephrotic range proteinuria     1 Renal-  scr stable now improving again. close to BL range (1.6-1.8)   -c/w Sodium bicarb 1300 BID  2 electrolytes- a/w phosnak today x2 today  3 -he has chronic morales  4 CVS- BP soft but stable  on Midodrine to maintain MAP   5. CHERYL planning    Kamari Asencio DO   [employer]  [phone]   No pain, no shortness of breath. Cr now improved    Review of systems: All 10 points ROS was obtained except as above.     chlorhexidine 2% Cloths 1 Application(s) Topical <User Schedule>  dextrose 50% Injectable 12.5 Gram(s) IV Push once  dextrose 50% Injectable 25 milliLiter(s) IV Push once  dextrose 50% Injectable 25 Gram(s) IV Push once  dextrose 50% Injectable 25 Gram(s) IV Push once  dextrose Oral Gel 15 Gram(s) Oral once  finasteride 5 milliGRAM(s) Oral daily  fluticasone propionate 50 MICROgram(s)/spray Nasal Spray 1 Spray(s) Both Nostrils two times a day PRN  glucagon  Injectable 1 milliGRAM(s) IntraMuscular once  heparin   Injectable 5000 Unit(s) SubCutaneous every 12 hours  lactated ringers. 1000 milliLiter(s) IV Continuous <Continuous>  loperamide 2 milliGRAM(s) Oral three times a day PRN  midodrine 20 milliGRAM(s) Oral every 8 hours  piperacillin/tazobactam IVPB.. 3.375 Gram(s) IV Intermittent every 8 hours  sodium chloride 0.65% Nasal 1 Spray(s) Both Nostrils once  Vyndamax (Tafamidis) 61mg Capsule 1 Capsule(s) 1 Capsule(s) Oral daily      VITAL:  T(C): , Max: 37.1 (01-06-24 @ 21:03)  T(F): , Max: 98.7 (01-06-24 @ 21:03)  HR: 69 (01-07-24 @ 13:30)  BP: 104/67 (01-07-24 @ 13:30)  BP(mean): 92 (01-06-24 @ 21:03)  RR: 18 (01-07-24 @ 11:12)  SpO2: 96% (01-07-24 @ 11:12)  Wt(kg): --    01-06-24 @ 07:01  -  01-07-24 @ 07:00  --------------------------------------------------------  IN: 1080 mL / OUT: 1850 mL / NET: -770 mL    01-07-24 @ 07:01  -  01-07-24 @ 19:11  --------------------------------------------------------  IN: 755 mL / OUT: 850 mL / NET: -95 mL        PHYSICAL EXAM:  Constitutional: NAD  Neck:  No JVD  Respiratory: CTAB/L  Cardiovascular: S1 and S2  Gastrointestinal: BS+, soft, NT/ND  Extremities: No peripheral edema  Neurological: A/O x 3, no focal deficits  Psychiatric: Normal mood, normal affect  : + Morales  Skin: No rashes  Access: Not applicable     LABS:                          8.9    5.63  )-----------( 198      ( 07 Jan 2024 06:41 )             26.7     Na(138)/K(4.1)/Cl(106)/HCO3(18)/BUN(26)/Cr(2.58)Glu(66)/Ca(8.4)/Mg(2.0)/PO4(3.2)    01-07 @ 06:40  Na(135)/K(4.5)/Cl(106)/HCO3(18)/BUN(30)/Cr(2.57)Glu(75)/Ca(8.4)/Mg(2.0)/PO4(3.7)    01-06 @ 06:23  Na(137)/K(4.1)/Cl(108)/HCO3(19)/BUN(30)/Cr(2.57)Glu(78)/Ca(8.3)/Mg(2.0)/PO4(3.4)    01-05 @ 05:18    Urinalysis Basic - ( 07 Jan 2024 06:40 )    Color: x / Appearance: x / SG: x / pH: x  Gluc: 66 mg/dL / Ketone: x  / Bili: x / Urobili: x   Blood: x / Protein: x / Nitrite: x   Leuk Esterase: x / RBC: x / WBC x   Sq Epi: x / Non Sq Epi: x / Bacteria: x            ASSESSMENT/PLAN  ASSESSMENT/PLAN  73M w/ BPH (c/b urinary retention s/p Morales catheter placement; exchanged monthly), CKD stage IIIb, and cardiac amyloidosis brought in by daughter for diarrhea (loose-watery BM's) over the past few days.  CKD stage 3 b and now SARAH in light of contrast study/hypotension and possibly dehydration as well  Subnephrotic range proteinuria     1 Renal-  scr stable now improving again. close to BL range (1.6-1.8)   -c/w Sodium bicarb 1300 BID  2 electrolytes- a/w phosnak today x2 today  3 -he has chronic morales  4 CVS- BP soft but stable  on Midodrine to maintain MAP   5. CHERYL planning    Kamari Asencio DO   [employer]  [phone]

## 2024-01-11 NOTE — CHART NOTE - NSCHARTNOTEFT_GEN_A_CORE
- Transport Chair  Due to patient's deconditioning and generalized weakness, secondary to debility. Patient will require a transport chair. Patient is unable to self propel in a standard wheelchair. This is necessary to achieve daily tasks and therapies which cannot be achieved with the use of a cane or rolling walker. Patient and family are in agreement with transport chair use at home and assistance will be provided if needed.    - 3:1 commode  Due to patient's deconditioning and generalized weakness, secondary to debility. Patient requires assistance with all ADLS and functional mobility. Patient is confined to a single room without a bathroom. Patient and family are in agreement with 3:1 commode use at home and assistance will be provided if needed.    Ann Marie Short MD  Internal Medicine, PGY-3

## 2024-01-11 NOTE — PROGRESS NOTE ADULT - ASSESSMENT
A/p  73M w/ BPH (c/b urinary retention s/p Harkins catheter placement; exchanged monthly), CKD stage IIIb, and cardiac amyloidosis brought in by daughter for diarrhea (loose-watery BM's) over the past few days.    #Sepsis  -I/s/o COVID + UTI  -Abx per med    #COVID  -CXR no focal consolidations  -sp Remdesivir  -Supportive care per med    #WCT  -16 beats noted on tele 1/11  -Keep Mg>2, K>4  -Will consider adding low dose bb if ectopy persists - however given soft bp will hold off for now    #cardiac amyloid   -Cont vyndamax  -baseline sbp 100 as outpt  -Prior echo with nml LV systolic fxn, Severe left ventricular hypertrophy, Findings suggestive of infiltrative cardiomyopathy    #Hypotension  -Continue midodrine    #Hypoglycemia  -mgmt per med    #Dizziness  -Improved   -Orthostatics negative   -Cont mido  -PT for mobility      dvt ppx

## 2024-01-11 NOTE — PROGRESS NOTE ADULT - SUBJECTIVE AND OBJECTIVE BOX
CARDIOLOGY FOLLOW UP - Dr. Saravia  DATE OF SERVICE: 1/11/24    CC  No acute cv events     REVIEW OF SYSTEMS:  CONSTITUTIONAL: No fever, weight loss, or fatigue  RESPIRATORY: No cough, wheezing, chills or hemoptysis; No Shortness of Breath  CARDIOVASCULAR: No chest pain, palpitations, passing out, dizziness, or leg swelling  GASTROINTESTINAL: No abdominal or epigastric pain. No nausea, vomiting, or hematemesis; No diarrhea or constipation. No melena or hematochezia.  VASCULAR: No edema     PHYSICAL EXAM:  T(C): 36.8 (01-11-24 @ 04:46), Max: 37.2 (01-10-24 @ 11:45)  HR: 78 (01-11-24 @ 04:46) (76 - 79)  BP: 120/72 (01-11-24 @ 04:46) (113/69 - 139/88)  RR: 18 (01-11-24 @ 04:46) (17 - 18)  SpO2: 97% (01-11-24 @ 04:46) (96% - 100%)  Wt(kg): --  I&O's Summary    10 Bernardino 2024 07:01  -  11 Jan 2024 07:00  --------------------------------------------------------  IN: 600 mL / OUT: 1400 mL / NET: -800 mL        Appearance: Elderly male 	  Cardiovascular: Normal S1 S2,RRR, No JVD, No murmurs  Respiratory: Lungs clear to auscultation b/l   Gastrointestinal:  Soft, Non-tender, + BS	  Extremities: Normal range of motion, No clubbing, cyanosis or edema      Home Medications:  fluticasone 50 mcg/inh nasal spray: 1 spray(s) in each nostril 2 times a day as needed (04 Jan 2024 12:08)  mirtazapine 7.5 mg oral tablet: 1 tab(s) orally once a day at 6 PM (04 Jan 2024 12:08)  Saline Mist 0.65% nasal spray: 2 spray(s) intranasally as needed (04 Jan 2024 12:08)  Vyndamax 61 mg oral capsule: 1 cap(s) orally once a day (04 Jan 2024 12:08)      MEDICATIONS  (STANDING):  chlorhexidine 2% Cloths 1 Application(s) Topical <User Schedule>  dextrose 50% Injectable 25 Gram(s) IV Push once  dextrose 50% Injectable 12.5 Gram(s) IV Push once  dextrose 50% Injectable 25 milliLiter(s) IV Push once  dextrose 50% Injectable 25 Gram(s) IV Push once  dextrose Oral Gel 15 Gram(s) Oral once  finasteride 5 milliGRAM(s) Oral daily  glucagon  Injectable 1 milliGRAM(s) IntraMuscular once  heparin   Injectable 5000 Unit(s) SubCutaneous every 8 hours  hydrocortisone 20 milliGRAM(s) Oral daily  hydrocortisone 10 milliGRAM(s) Oral daily  lactated ringers. 1000 milliLiter(s) (70 mL/Hr) IV Continuous <Continuous>  potassium phosphate / sodium phosphate Powder (PHOS-NaK) 1 Packet(s) Oral every 6 hours  sodium bicarbonate 1300 milliGRAM(s) Oral two times a day  sodium chloride 0.65% Nasal 1 Spray(s) Both Nostrils once  Vyndamax (Tafamidis) 61mg Capsule 1 Capsule(s) 1 Capsule(s) Oral daily      TELEMETRY: NSR 70s, 16 beats WCT	    ECG:  	  RADIOLOGY:   DIAGNOSTIC TESTING:  [ ] Echocardiogram:  [ ]  Catheterization:  [ ] Stress Test:    OTHER: 	    LABS:	 	                            8.6    7.16  )-----------( 278      ( 11 Jan 2024 05:41 )             26.1     01-11    141  |  107  |  22  ----------------------------<  112<H>  3.9   |  22  |  1.88<H>    Ca    8.1<L>      11 Jan 2024 05:40  Phos  2.0     01-11  Mg     2.0     01-11    TPro  6.6  /  Alb  3.4  /  TBili  0.3  /  DBili  x   /  AST  24  /  ALT  20  /  AlkPhos  86  01-11

## 2024-01-11 NOTE — PROGRESS NOTE ADULT - PROBLEM SELECTOR PLAN 3
- AM cortisol low, ACTH low c/f secondary AI  - endocrine following, appreciate recs  - CT abdomen  without adrenal abnormality, CTH without obvious pituitary etiology though not an optimal test to evaluate pituitary  - Repeat TSH, FT4, TT3 pending given low FT4 of 0.9 on 1/3  - gonadal axis normal   - continue hydrocortisone 20 mg BID (8AM and 3PM)  - MR brain/sella with nonspecific area of  enhancement involving L pituitary gland, per endo this finding was discussed with neuroradiology and may represent microadenoma vs cyst.   - per endo MR findings do not explain adrenal insufficiency, will need further workup with endo outpatient - AM cortisol low, ACTH low c/f secondary AI  - endocrine following, appreciate recs  - CT abdomen  without adrenal abnormality, CTH without obvious pituitary etiology though not an optimal test to evaluate pituitary  - Repeat TSH, FT4, TT3 pending given low FT4 of 0.9 on 1/3  - gonadal axis normal   - continue hydrocortisone 20 mg QD 8AM, 10 mg QD 3PM   - MR brain/sella with nonspecific area of  enhancement involving L pituitary gland, per endo this finding was discussed with neuroradiology and may represent microadenoma vs cyst.   - per endo MR findings do not explain adrenal insufficiency, will need further workup with endo outpatient

## 2024-01-11 NOTE — PROGRESS NOTE ADULT - ATTENDING COMMENTS
Agree with assessment and plan as above by Dr. Cadena. Reviewed all pertinent labs, glucose values, and imaging studies. Modifications made as indicated above. 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 Plan to d/c on hydrocortisone 20mg in the morning 10mg in the afternoon. Rest of pituitary axes do not require replacement at this time. Repeat TFTs as outpatient. Reassess HPA axis as outpatient. Will sign off at this time.       Live Ruby D.O  [phone] Agree with assessment and plan as above by Dr. Cadena. Reviewed all pertinent labs, glucose values, and imaging studies. Modifications made as indicated above. 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 Plan to d/c on hydrocortisone 20mg in the morning 10mg in the afternoon. Rest of pituitary axes do not require replacement at this time. Repeat TFTs as outpatient. Reassess HPA axis as outpatient. Will sign off at this time.       Live Ruby D.O  [phone]

## 2024-01-11 NOTE — PROGRESS NOTE ADULT - PROBLEM SELECTOR PLAN 6
Likely hypovolemic ISO diarrhea but possibly with component of distributive shock   - midodrine 20 mg TID -> 10 mg TID on 1/8  - I/Os  - workup of adrenal insufficiency per endo - ACTH and cortisol low c/f secondary AI  - continue hydrocortisone 20 mg BID - 8AM and 3PM  - not requiring midodrine, last 5 doses held. Will discontinue. Likely hypovolemic ISO diarrhea but possibly with component of distributive shock   - midodrine 20 mg TID -> 10 mg TID on 1/8 -> discontinued 1/10  - I/Os  - workup of adrenal insufficiency per endo - ACTH and cortisol low c/f secondary AI  - continue hydrocortisone 20 mg BID - 8AM and 3PM  - BPs stable off midodrine

## 2024-01-11 NOTE — PROGRESS NOTE ADULT - ATTENDING COMMENTS
73M w/ BPH (c/b urinary retention s/p Morales catheter placement; exchanged monthly), CKD stage IIIb, and cardiac amyloidosis brought in by daughter for diarrhea found to have hypotension likely due to sepsis.     Pyleo/ Urosepsis. S/p IVF, weaned off midodrine. S/p zosyn. Seen by ID. Resolved.   Failed TOV; Morales replaced by urology. Maintain morales. F/u urology outpatient   COVID+: Not requiring O2 at this time. Supportive measures.   SARAH on CKD - Cr improving renal following. F/u recs   Amyloidosis: Cardiology recommends to continue with vyndamax  AM cortisol 2.3, ACTH low. C/f AI. Endo following. Team discussed with neuroradiology re: MRI finding of possible pituitary microadenoma. per discussion differential diagnosis at this time includes a pituitary microadenoma vs a pituitary cyst. No evidence of any infiltrative process as per neuroradiology. Patient should have repeat MRI sella with and without contrast in 6-12 months to ensure there is no growth of this area.   Steroid taper per endo. No further inpt w/u and f/u endo outpatient.    Dispo - Pending CHERYL 73M w/ BPH (c/b urinary retention s/p Morales catheter placement; exchanged monthly), CKD stage IIIb, and cardiac amyloidosis brought in by daughter for diarrhea found to have hypotension likely due to sepsis.     Pyleo/ Urosepsis. S/p IVF, weaned off midodrine. S/p zosyn. Seen by ID. Resolved.   Failed TOV; Morales replaced by urology. Maintain morales. F/u urology outpatient   COVID+: Not requiring O2 at this time. Supportive measures.   SARAH on CKD - Cr improving renal following. F/u recs   Amyloidosis: Cardiology recommends to continue with vyndamax  AM cortisol 2.3, ACTH low. C/f AI. Endo following. Team discussed with neuroradiology re: MRI finding of possible pituitary microadenoma. per discussion differential diagnosis at this time includes a pituitary microadenoma vs a pituitary cyst. No evidence of any infiltrative process as per neuroradiology. Patient should have repeat MRI sella with and without contrast in 6-12 months to ensure there is no growth of this area.   Steroid taper per endo. No further inpt w/u and f/u endo outpatient.    Dispo - Pending CHERYL.

## 2024-01-11 NOTE — PROGRESS NOTE ADULT - PROBLEM SELECTOR PLAN 1
-met SIRs criteria on admission with concerning source of GI vs UTI  -previous hx of E.faecalis and Stenotrophomonas maltophilia  -Likely Pyelo in context of CT with perinephric standing, may also be due to proctitis, also with COVID-19     - ID following  -ucx demonstrating efaecalis, susceptible to ampicillin (covered by zosyn)  -bcx ngtd  -gi pcr and cdiff neg  - HDS, no longer meeting SIRS criteria  - completed course of zosyn x7 days (1/2-1/8), HDS since discontinuation of vanc

## 2024-01-11 NOTE — PROGRESS NOTE ADULT - ASSESSMENT
Mr. Sanchez is a 73M w/ BPH (c/b urinary retention s/p Harkins catheter placement; exchanged monthly), CKD stage IIIb, and cardiac amyloidosis brought in by daughter for diarrhea (loose-watery BM's) over the past few days found to have COVID and urosepsis. Endocrinology consulted for evaluation for adrenal insufficiency.    #Adrenal insufficiency - suspect secondary AI  #Hypoglycemia  #Sepsis  #Abnormal thyroid function tests  - 1/5 1:08pm cortisol 3.1, 1/7 6:42am cortisol 2.3  - 1/6 12pm ACTH 2.5  - BG on BMP 1/3 49  - 1/3 TSH 5.54, TT3 57, FT4 0.9  - A1c 5< hypoglycemia defined as serum glucose <55 which patient had as above  - CT abdomen 12/23 without adrenal gland abnormality  - CT head without pituitary etiology however not optimal test to evaluate pituitary  - no hyponatremia or hypoglycemia  - patient HD stable on midodrine  - endorses weight loss, fatigue, poor appetite  - no history of prolonged steroids, opioids, immunotherapy, radiation  - does not appear that tafamidis can cause adrenal insufficiency  - likely secondary adrenal insufficiency unclear cause at this point however can possibly be from amyloidosis vs pituitary etiology  PLAN  -please decrease hydrocortisone to 20mg at 8AM and 10mg at 3PM    #R/o panhypopituitarism  #thyroid axis appears stable on 1/3  -pending repeat TSH, FT4, TT3 given low FT4 of 0.9 on 1/3/24    #Gonadal axis - normal   -follow up:  -LH 11.1  -FSH 6.6  -Total testosterone - 433    #prolactin - 7    #IGF1 - 36    #pituitary hypoenhancement- based on above biochemical profile this does not explain the low ACTH and cortisol  There is a nonspecific area of decreased enhancement   involving the left pituitary gland. This is best seen on series 12 image   6 and measures approximately 6.1 x 2.4 mm. Possibly of a pituitary   microadenoma cannot entirely excluded.  -discussed with neuroradiology and differential diagnosis at this time includes a pituitary microadenoma vs a pituitary cyst  -no evidence of any infiltrative process as per neuroradiology  -patient should have repeat MRI sella with and without contrast in 6-12 months to ensure there is no growth of this area    #discharge  -follow up at [facility] at [address] [place] [address], [address], [place] [address]; Ph # [phone]  -discharge on hydrocortisone 20mg at 8AM and 10mg at 3PM. Patient remains on this dose until seen by outpatient endocrinology  to help with dc planning-  - Please print and give the pt info section for patients under adrenal insufficiency (this will educate him regarding the warning signs of adrenal crisis )  -  patient that he should take 2-3x his home dose in cases of illness, fever, accidents, and surgery  - pt should receive stress dosing (hydrocortisone 50mg q8) with any major illness or surgical procedure  - Should pt be unable to tolerate PO and is unable to take hydrocortisone, he will need an emergency injection. Please discharge with a prescription for 100mg Solu-Cortef Act-O-Vial and the following instructions: [email or web address]  - pt should obtain a medical alert bracelet or necklace to inform emergency providers that he has adrenal [email or web address]   - Patient should also monitor BP closely at home      Case discussed with Dr. Flori Cadena MD  Endocrine Fellow  Can be reached via teams. For follow up questions, discharge recommendations, or new consults, please call answering service at [phone] (weekdays); [phone] (nights/weekends)   Mr. Sanchez is a 73M w/ BPH (c/b urinary retention s/p Harkins catheter placement; exchanged monthly), CKD stage IIIb, and cardiac amyloidosis brought in by daughter for diarrhea (loose-watery BM's) over the past few days found to have COVID and urosepsis. Endocrinology consulted for evaluation for adrenal insufficiency.    #Adrenal insufficiency - suspect secondary AI  #Hypoglycemia  #Sepsis  #Abnormal thyroid function tests  - 1/5 1:08pm cortisol 3.1, 1/7 6:42am cortisol 2.3  - 1/6 12pm ACTH 2.5  - BG on BMP 1/3 49  - 1/3 TSH 5.54, TT3 57, FT4 0.9  - A1c 5< hypoglycemia defined as serum glucose <55 which patient had as above  - CT abdomen 12/23 without adrenal gland abnormality  - CT head without pituitary etiology however not optimal test to evaluate pituitary  - no hyponatremia or hypoglycemia  - patient HD stable on midodrine  - endorses weight loss, fatigue, poor appetite  - no history of prolonged steroids, opioids, immunotherapy, radiation  - does not appear that tafamidis can cause adrenal insufficiency  - likely secondary adrenal insufficiency unclear cause at this point however can possibly be from amyloidosis vs pituitary etiology  PLAN  -please decrease hydrocortisone to 20mg at 8AM and 10mg at 3PM    #R/o panhypopituitarism  #thyroid axis appears stable on 1/3  -pending repeat TSH, FT4, TT3 given low FT4 of 0.9 on 1/3/24    #Gonadal axis - normal   -follow up:  -LH 11.1  -FSH 6.6  -Total testosterone - 433    #prolactin - 7    #IGF1 - 36    #pituitary hypoenhancement- based on above biochemical profile this does not explain the low ACTH and cortisol  There is a nonspecific area of decreased enhancement   involving the left pituitary gland. This is best seen on series 12 image   6 and measures approximately 6.1 x 2.4 mm. Possibly of a pituitary   microadenoma cannot entirely excluded.  -discussed with neuroradiology and differential diagnosis at this time includes a pituitary microadenoma vs a pituitary cyst  -no evidence of any infiltrative process as per neuroradiology  -patient should have repeat MRI sella with and without contrast in 6-12 months to ensure there is no growth of this area    #discharge  -follow up at [facility] at [address] [place] [address], [address], [place] [address]; Ph # [phone]  -discharge on hydrocortisone 20mg at 8AM and 10mg at 3PM. Patient remains on this dose until seen by outpatient endocrinology  to help with dc planning-  - Please print and give the pt info section for patients under adrenal insufficiency (this will educate him regarding the warning signs of adrenal crisis )  -  patient that he should take 2-3x his home dose in cases of illness, fever, accidents, and surgery  - pt should receive stress dosing (hydrocortisone 50mg q8) with any major illness or surgical procedure  - Should pt be unable to tolerate PO and is unable to take hydrocortisone, he will need an emergency injection. Please discharge with a prescription for 100mg Solu-Cortef Act-O-Vial and the following instructions: [email or web address]  - pt should obtain a medical alert bracelet or necklace to inform emergency providers that he has adrenal [email or web address]   - Patient should also monitor BP closely at home      Case discussed with Dr. Flori Cadena MD  Endocrine Fellow  Can be reached via teams. For follow up questions, discharge recommendations, or new consults, please call answering service at [phone] (weekdays); [phone] (nights/weekends)   Mr. Sanchez is a 73M w/ BPH (c/b urinary retention s/p Harkins catheter placement; exchanged monthly), CKD stage IIIb, and cardiac amyloidosis brought in by daughter for diarrhea (loose-watery BM's) over the past few days found to have COVID and urosepsis. Endocrinology consulted for evaluation for adrenal insufficiency.    #Adrenal insufficiency - suspect secondary AI  #Hypoglycemia  #Sepsis  #Abnormal thyroid function tests  - 1/5 1:08pm cortisol 3.1, 1/7 6:42am cortisol 2.3  - 1/6 12pm ACTH 2.5  - BG on BMP 1/3 49  - 1/3 TSH 5.54, TT3 57, FT4 0.9  - A1c 5< hypoglycemia defined as serum glucose <55 which patient had as above  - CT abdomen 12/23 without adrenal gland abnormality  - CT head without pituitary etiology however not optimal test to evaluate pituitary  - no hyponatremia or hypoglycemia  - patient HD stable on midodrine  - endorses weight loss, fatigue, poor appetite  - no history of prolonged steroids, opioids, immunotherapy, radiation  - does not appear that tafamidis can cause adrenal insufficiency  - likely secondary adrenal insufficiency unclear cause at this point however can possibly be from amyloidosis vs pituitary etiology  PLAN  -monitor on hydrocortisone to 20mg at 8AM and 10mg at 3PM    #R/o panhypopituitarism  #thyroid axis appears stable on 1/3  -pending repeat TSH, FT4, TT3 given low FT4 of 0.9 on 1/3/24    #Gonadal axis - normal   -follow up:  -LH 11.1  -FSH 6.6  -Total testosterone - 433    #prolactin - 7    #IGF1 - 36    #pituitary hypoenhancement- based on above biochemical profile this does not explain the low ACTH and cortisol  There is a nonspecific area of decreased enhancement   involving the left pituitary gland. This is best seen on series 12 image   6 and measures approximately 6.1 x 2.4 mm. Possibly of a pituitary   microadenoma cannot entirely excluded.  -discussed with neuroradiology and differential diagnosis at this time includes a pituitary microadenoma vs a pituitary cyst  -no evidence of any infiltrative process as per neuroradiology  -patient should have repeat MRI sella with and without contrast in 6-12 months to ensure there is no growth of this area    #discharge  -follow up at [facility] at [address] [place] [address], [address], [place] [address]; Ph # [phone]  -discharge on hydrocortisone 20mg at 8AM and 10mg at 3PM. Patient remains on this dose until seen by outpatient endocrinology  to help with dc planning-  - Please print and give the pt info section for patients under adrenal insufficiency (this will educate him regarding the warning signs of adrenal crisis )  -  patient that he should take 2-3x his home dose in cases of illness, fever, accidents, and surgery  - pt should receive stress dosing (hydrocortisone 50mg q8) with any major illness or surgical procedure  - Should pt be unable to tolerate PO and is unable to take hydrocortisone, he will need an emergency injection. Please discharge with a prescription for 100mg Solu-Cortef Act-O-Vial and the following instructions: [email or web address]-[email or web address]  - pt should obtain a medical alert bracelet or necklace to inform emergency providers that he has adrenal [email or web address]   - Patient should also monitor BP closely at home      Case discussed with Dr. Ruby    Will sign off at this time. Please reconsult if needed.    Contreras Cadena MD  Endocrine Fellow  Can be reached via teams. For follow up questions, discharge recommendations, or new consults, please call answering service at [phone] (weekdays); [phone] (nights/weekends)   Mr. Sanchez is a 73M w/ BPH (c/b urinary retention s/p Harkins catheter placement; exchanged monthly), CKD stage IIIb, and cardiac amyloidosis brought in by daughter for diarrhea (loose-watery BM's) over the past few days found to have COVID and urosepsis. Endocrinology consulted for evaluation for adrenal insufficiency.    #Adrenal insufficiency - suspect secondary AI  #Hypoglycemia  #Sepsis  #Abnormal thyroid function tests  - 1/5 1:08pm cortisol 3.1, 1/7 6:42am cortisol 2.3  - 1/6 12pm ACTH 2.5  - BG on BMP 1/3 49  - 1/3 TSH 5.54, TT3 57, FT4 0.9  - A1c 5< hypoglycemia defined as serum glucose <55 which patient had as above  - CT abdomen 12/23 without adrenal gland abnormality  - CT head without pituitary etiology however not optimal test to evaluate pituitary  - no hyponatremia or hypoglycemia  - patient HD stable on midodrine  - endorses weight loss, fatigue, poor appetite  - no history of prolonged steroids, opioids, immunotherapy, radiation  - does not appear that tafamidis can cause adrenal insufficiency  - likely secondary adrenal insufficiency unclear cause at this point however can possibly be from amyloidosis vs pituitary etiology  PLAN  -monitor on hydrocortisone to 20mg at 8AM and 10mg at 3PM    #R/o panhypopituitarism  #thyroid axis appears stable on 1/3  -pending repeat TSH, FT4, TT3 given low FT4 of 0.9 on 1/3/24    #Gonadal axis - normal   -follow up:  -LH 11.1  -FSH 6.6  -Total testosterone - 433    #prolactin - 7    #IGF1 - 36    #pituitary hypoenhancement- based on above biochemical profile this does not explain the low ACTH and cortisol  There is a nonspecific area of decreased enhancement   involving the left pituitary gland. This is best seen on series 12 image   6 and measures approximately 6.1 x 2.4 mm. Possibly of a pituitary   microadenoma cannot entirely excluded.  -discussed with neuroradiology and differential diagnosis at this time includes a pituitary microadenoma vs a pituitary cyst  -no evidence of any infiltrative process as per neuroradiology  -patient should have repeat MRI sella with and without contrast in 6-12 months to ensure there is no growth of this area    #discharge  -follow up at [facility] at [address] [place] [address], [address], [place] [address]; Ph # [phone]  -discharge on hydrocortisone 20mg at 8AM and 10mg at 3PM. Patient remains on this dose until seen by outpatient endocrinology  to help with dc planning-  - Please print and give the pt info section for patients under adrenal insufficiency (this will educate him regarding the warning signs of adrenal crisis )  -  patient that he should take 2-3x his home dose in cases of illness, fever, accidents, and surgery  - pt should receive stress dosing (hydrocortisone 50mg q8) with any major illness or surgical procedure  - Should pt be unable to tolerate PO and is unable to take hydrocortisone, he will need an emergency injection. Please discharge with a prescription for 100mg Solu-Cortef Act-O-Vial and the following instructions: [email or web address]  - pt should obtain a medical alert bracelet or necklace to inform emergency providers that he has adrenal [email or web address]   - Patient should also monitor BP closely at home      Case discussed with Dr. Ruby    Will sign off at this time. Please reconsult if needed.    Contreras Cadena MD  Endocrine Fellow  Can be reached via teams. For follow up questions, discharge recommendations, or new consults, please call answering service at [phone] (weekdays); [phone] (nights/weekends)

## 2024-01-11 NOTE — PROGRESS NOTE ADULT - PROBLEM SELECTOR PLAN 2
Baseline creatinine is 1.6, Cr on admission 1.8 -> 2.69 on 1/4 -> downtrending   - chronic morales, low c/f obstruction  -may be iso of atn given hypotension on presentation vs prerenal given fluid loss vs remdesvir  - Dced remdesivir  - Fena 1.4%  - VBG -> elevated lactate at 2.1 but pt not symptomatic -> normalized   - nephro following, appreciate recs  - trend SCr - downtrending but slightly increased again today to 2.32 (2.2 on 1/9)   - hold IVF for now per nephro/cards  - maintain morales for BPH  - s/p bicarb gtt with subsequent normalization of bicarb. Will restart PO bicarb Baseline creatinine is 1.6, Cr on admission 1.8 -> 2.69 on 1/4 -> now back to baseline  - chronic morales, low c/f obstruction  -may be iso of atn given hypotension on presentation vs prerenal given fluid loss vs remdesvir  - Dced remdesivir  - Fena 1.4%  - VBG -> elevated lactate at 2.1 but pt not symptomatic -> normalized   - nephro following, appreciate recs  - trend SCr - now downtrended to baseline   - hold IVF for now per nephro/cards  - maintain morales for BPH  - s/p bicarb gtt with subsequent normalization of bicarb. PO bicarb restarted

## 2024-01-11 NOTE — PROGRESS NOTE ADULT - SUBJECTIVE AND OBJECTIVE BOX
Admitted for: Sepsis        Following for: adrenal insufficiency     Subjective:   Patient feels well today on decreased dose of hydrocrotisone. Deneis N/V abdominal pain.    Wife at bedside. Reviewed diagnosis of adrenal insufficiency, and its treatment as well as sick day rules    MEDICATIONS  (STANDING):  chlorhexidine 2% Cloths 1 Application(s) Topical <User Schedule>  dextrose 50% Injectable 25 Gram(s) IV Push once  dextrose 50% Injectable 25 milliLiter(s) IV Push once  dextrose 50% Injectable 25 Gram(s) IV Push once  dextrose 50% Injectable 12.5 Gram(s) IV Push once  dextrose Oral Gel 15 Gram(s) Oral once  finasteride 5 milliGRAM(s) Oral daily  glucagon  Injectable 1 milliGRAM(s) IntraMuscular once  heparin   Injectable 5000 Unit(s) SubCutaneous every 8 hours  hydrocortisone 10 milliGRAM(s) Oral daily  hydrocortisone 20 milliGRAM(s) Oral daily  lactated ringers. 1000 milliLiter(s) (70 mL/Hr) IV Continuous <Continuous>  sodium bicarbonate 1300 milliGRAM(s) Oral two times a day  sodium chloride 0.65% Nasal 1 Spray(s) Both Nostrils once  Vyndamax (Tafamidis) 61mg Capsule 1 Capsule(s) 1 Capsule(s) Oral daily    MEDICATIONS  (PRN):  acetaminophen     Tablet .. 650 milliGRAM(s) Oral every 6 hours PRN Mild Pain (1 - 3), Moderate Pain (4 - 6)  fluticasone propionate 50 MICROgram(s)/spray Nasal Spray 1 Spray(s) Both Nostrils two times a day PRN congestion  ondansetron Injectable 4 milliGRAM(s) IV Push daily PRN Nausea      Allergies    No Known Allergies    Intolerances          PHYSICAL EXAM:  VITALS: T(C): 36.8 (01-11-24 @ 12:12)  T(F): 98.3 (01-11-24 @ 12:12), Max: 98.3 (01-11-24 @ 04:46)  HR: 80 (01-11-24 @ 12:12) (76 - 80)  BP: 127/79 (01-11-24 @ 12:12) (120/72 - 139/88)  RR:  (16 - 18)  SpO2:  (96% - 98%)  Wt(kg): --  GENERAL: NAD  EYES: No proptosis, no lid lag, anicteric  RESPIRATORY: Clear to auscultation bilaterally  CARDIOVASCULAR: Regular rate and rhythm  GI: Soft, nontender, non distended  EXT: b/l feet without wounds, 2+ pulses  PSYCH: Alert and oriented x 3, reactive affect      A1C with Estimated Average Glucose Result: 5.0 % (01-03-24 @ 06:01)  A1C with Estimated Average Glucose Result: 5.3 % (11-04-23 @ 10:45)  A1C with Estimated Average Glucose Result: 5.5 % (06-10-23 @ 16:29)  A1C with Estimated Average Glucose Result: 5.9 % (04-24-23 @ 07:02)          01-11    141  |  107  |  22  ----------------------------<  112<H>  3.9   |  22  |  1.88<H>    eGFR: 37<L>    Ca    8.1<L>      01-11  Mg     2.0     01-11  Phos  2.0     01-11    TPro  6.6  /  Alb  3.4  /  TBili  0.3  /  DBili  x   /  AST  24  /  ALT  20  /  AlkPhos  86  01-11      Thyroid Function Tests:  01-08 @ 06:00 TSH 4.98 FreeT4 0.9 T3 55 Anti TPO -- Anti Thyroglobulin Ab -- TSI --  01-03 @ 06:01 TSH 5.54 FreeT4 0.9 T3 57 Anti TPO -- Anti Thyroglobulin Ab -- TSI --

## 2024-01-11 NOTE — PROGRESS NOTE ADULT - PROBLEM SELECTOR PLAN 9
-consistent with chronic disease w/low iron  - s/p venofer x3 day course  - Hgb stable ~9, no evidence of active bleeding  - 1/9 drop from 9 -> 8, no evidence of bleeding and HDS. Continue to trend  - Hgb stable at 8 - consistent with chronic disease w/low iron  - s/p venofer x3 day course  - Hgb stable ~9, no evidence of active bleeding  - 1/9 drop from 9 -> 8, no evidence of bleeding and HDS. Continue to trend  - Hgb stable at 8

## 2024-01-11 NOTE — PROGRESS NOTE ADULT - PROBLEM SELECTOR PLAN 7
-CT Head WNL  -F/U B12, TSH, Orthostatic vitals-> TSH elevated free T4 and total T3 ordered, T4 wnl  -pt reporting persistent dizziness, vertigo; worse in AM after lying down. No acute findings on CT on admission, will get MRI brain to r/o central etiology for dizziness    Plan:  - MRI brain with no acute findings, no space-occupying lesions  - meclizine PRN for dizziness  - patient states dizziness has resolved, may have been ISO sepsis/hypotension -CT Head WNL  -F/U B12, TSH, Orthostatic vitals-> TSH elevated free T4 and total T3 ordered, T4 wnl  -pt reporting persistent dizziness, vertigo; worse in AM after lying down. No acute findings on CT on admission, will get MRI brain to r/o central etiology for dizziness    Plan:  - MRI brain with no acute findings, no space-occupying lesions  - meclizine PRN for dizziness  - patient states dizziness has resolved, may have been ISO sepsis/hypotension, adrenal insufficiency

## 2024-01-11 NOTE — PROGRESS NOTE ADULT - SUBJECTIVE AND OBJECTIVE BOX
DEEPTHI GÓMEZ  73y  Male    Patient is a 73y old  Male who presents with a chief complaint of Diarrhea/ (10 Bernardino 2024 12:42)    INTERVAL HPI/OVERNIGHT EVENTS:  - 16 beats WCT, asymptomatic with stable BP   -     T(C): 36.8 (01-11-24 @ 04:46), Max: 37.2 (01-10-24 @ 11:45)  HR: 78 (01-11-24 @ 04:46) (76 - 79)  BP: 120/72 (01-11-24 @ 04:46) (113/69 - 139/88)  RR: 18 (01-11-24 @ 04:46) (17 - 18)  SpO2: 97% (01-11-24 @ 04:46) (96% - 100%)  Wt(kg): --Vital Signs Last 24 Hrs  T(C): 36.8 (11 Jan 2024 04:46), Max: 37.2 (10 Bernardino 2024 11:45)  T(F): 98.3 (11 Jan 2024 04:46), Max: 98.9 (10 Bernardino 2024 11:45)  HR: 78 (11 Jan 2024 04:46) (76 - 79)  BP: 120/72 (11 Jan 2024 04:46) (113/69 - 139/88)  BP(mean): --  RR: 18 (11 Jan 2024 04:46) (17 - 18)  SpO2: 97% (11 Jan 2024 04:46) (96% - 100%)    Parameters below as of 11 Jan 2024 04:46  Patient On (Oxygen Delivery Method): room air        PHYSICAL EXAM:  GENERAL: NAD, well-groomed, well-developed  HEAD:  Atraumatic, Normocephalic  EYES: EOMI, PERRLA, conjunctiva and sclera clear  ENMT: No tonsillar erythema, exudates, or enlargement; Moist mucous membranes, Good dentition, No lesions  NECK: Supple, No JVD, Normal thyroid  NERVOUS SYSTEM:  Alert & Oriented X3, Good concentration; Motor Strength 5/5 B/L upper and lower extremities; DTRs 2+ intact and symmetric  CHEST/LUNG: Clear to auscultation bilaterally; No rales, rhonchi, wheezing, or rubs  HEART: Regular rate and rhythm; No murmurs, rubs, or gallops  ABDOMEN: Soft, Nontender, Nondistended; Bowel sounds present  EXTREMITIES:  WWP, No clubbing, cyanosis, or edema  Vascular: 2+ peripheral pulses x4  LYMPH: No lymphadenopathy noted  SKIN: No rashes or lesions    Consultant(s) Notes Reviewed:  [x ] YES  [ ] NO  Care Discussed with Consultants/Other Providers [ x] YES  [ ] NO    LABS:                        8.6    7.16  )-----------( 278      ( 11 Jan 2024 05:41 )             26.1     01-11    141  |  107  |  22  ----------------------------<  112<H>  3.9   |  22  |  1.88<H>    Ca    8.1<L>      11 Jan 2024 05:40  Phos  2.0     01-11  Mg     2.0     01-11    TPro  6.6  /  Alb  3.4  /  TBili  0.3  /  DBili  x   /  AST  24  /  ALT  20  /  AlkPhos  86  01-11        Urinalysis Basic - ( 11 Jan 2024 05:40 )    Color: x / Appearance: x / SG: x / pH: x  Gluc: 112 mg/dL / Ketone: x  / Bili: x / Urobili: x   Blood: x / Protein: x / Nitrite: x   Leuk Esterase: x / RBC: x / WBC x   Sq Epi: x / Non Sq Epi: x / Bacteria: x      CAPILLARY BLOOD GLUCOSE            Urinalysis Basic - ( 11 Jan 2024 05:40 )    Color: x / Appearance: x / SG: x / pH: x  Gluc: 112 mg/dL / Ketone: x  / Bili: x / Urobili: x   Blood: x / Protein: x / Nitrite: x   Leuk Esterase: x / RBC: x / WBC x   Sq Epi: x / Non Sq Epi: x / Bacteria: x        RADIOLOGY & ADDITIONAL TESTS:    Imaging Personally Reviewed:  [ ] YES  [ ] NO    HEALTH ISSUES - PROBLEM Dx:  Sepsis    Acute kidney injury superimposed on CKD    Adrenal insufficiency    Foot pain, right    Diarrhea    Hypotension    Fall    Cardiac amyloidosis    Anemia    Abnormal transaminases    2019 novel coronavirus disease (COVID-19)    BPH (benign prostatic hyperplasia)    Prophylactic measure    Hypoglycemia    Abnormal thyroid blood test         DEEPTHI GÓMEZ  73y  Male    Patient is a 73y old  Male who presents with a chief complaint of Diarrhea/ (10 Bernardino 2024 12:42)    INTERVAL HPI/OVERNIGHT EVENTS:  - 16 beats WCT, asymptomatic with stable BP   -     T(C): 36.8 (01-11-24 @ 04:46), Max: 37.2 (01-10-24 @ 11:45)  HR: 78 (01-11-24 @ 04:46) (76 - 79)  BP: 120/72 (01-11-24 @ 04:46) (113/69 - 139/88)  RR: 18 (01-11-24 @ 04:46) (17 - 18)  SpO2: 97% (01-11-24 @ 04:46) (96% - 100%)  Wt(kg): --Vital Signs Last 24 Hrs  T(C): 36.8 (11 Jan 2024 04:46), Max: 37.2 (10 Bernardino 2024 11:45)  T(F): 98.3 (11 Jan 2024 04:46), Max: 98.9 (10 Bernardino 2024 11:45)  HR: 78 (11 Jan 2024 04:46) (76 - 79)  BP: 120/72 (11 Jan 2024 04:46) (113/69 - 139/88)  BP(mean): --  RR: 18 (11 Jan 2024 04:46) (17 - 18)  SpO2: 97% (11 Jan 2024 04:46) (96% - 100%)    Parameters below as of 11 Jan 2024 04:46  Patient On (Oxygen Delivery Method): room air        PHYSICAL EXAM:  HEAD:  Atraumatic, Normocephalic  EYES: EOMI, conjunctiva and sclera clear  CHEST/LUNG: Clear to auscultation bilaterally; No rales, rhonchi, wheezing, or rubs  HEART: Regular rate and rhythm; No murmurs, rubs, or gallops  ABDOMEN: Soft, nontender, Nondistended, no masses  : morales in place draining yellow urine  SKIN: No rashes or lesions  EXTREM: bony protrusion at R 1st MTP joint with no overlying skin changes. Non-TTP.   NERVOUS SYSTEM:  Alert & Oriented X3, no focal deficits    Consultant(s) Notes Reviewed:  [x ] YES  [ ] NO  Care Discussed with Consultants/Other Providers [ x] YES  [ ] NO    LABS:                        8.6    7.16  )-----------( 278      ( 11 Jan 2024 05:41 )             26.1     01-11    141  |  107  |  22  ----------------------------<  112<H>  3.9   |  22  |  1.88<H>    Ca    8.1<L>      11 Jan 2024 05:40  Phos  2.0     01-11  Mg     2.0     01-11    TPro  6.6  /  Alb  3.4  /  TBili  0.3  /  DBili  x   /  AST  24  /  ALT  20  /  AlkPhos  86  01-11        Urinalysis Basic - ( 11 Jan 2024 05:40 )    Color: x / Appearance: x / SG: x / pH: x  Gluc: 112 mg/dL / Ketone: x  / Bili: x / Urobili: x   Blood: x / Protein: x / Nitrite: x   Leuk Esterase: x / RBC: x / WBC x   Sq Epi: x / Non Sq Epi: x / Bacteria: x      CAPILLARY BLOOD GLUCOSE            Urinalysis Basic - ( 11 Jan 2024 05:40 )    Color: x / Appearance: x / SG: x / pH: x  Gluc: 112 mg/dL / Ketone: x  / Bili: x / Urobili: x   Blood: x / Protein: x / Nitrite: x   Leuk Esterase: x / RBC: x / WBC x   Sq Epi: x / Non Sq Epi: x / Bacteria: x        RADIOLOGY & ADDITIONAL TESTS:    Imaging Personally Reviewed:  [ ] YES  [ ] NO    HEALTH ISSUES - PROBLEM Dx:  Sepsis    Acute kidney injury superimposed on CKD    Adrenal insufficiency    Foot pain, right    Diarrhea    Hypotension    Fall    Cardiac amyloidosis    Anemia    Abnormal transaminases    2019 novel coronavirus disease (COVID-19)    BPH (benign prostatic hyperplasia)    Prophylactic measure    Hypoglycemia    Abnormal thyroid blood test         DEEPTHI GÓMEZ  73y  Male    Patient is a 73y old  Male who presents with a chief complaint of Diarrhea/ (10 Bernardino 2024 12:42)    INTERVAL HPI/OVERNIGHT EVENTS:  - 16 beats WCT, asymptomatic with stable BP   - feeling well this morning, states pain has resolved, denies dizziness. No other complaints  - BPs stable off midodrine  - patient and family now electing for CHERYL     T(C): 36.8 (01-11-24 @ 04:46), Max: 37.2 (01-10-24 @ 11:45)  HR: 78 (01-11-24 @ 04:46) (76 - 79)  BP: 120/72 (01-11-24 @ 04:46) (113/69 - 139/88)  RR: 18 (01-11-24 @ 04:46) (17 - 18)  SpO2: 97% (01-11-24 @ 04:46) (96% - 100%)  Wt(kg): --Vital Signs Last 24 Hrs  T(C): 36.8 (11 Jan 2024 04:46), Max: 37.2 (10 Bernardino 2024 11:45)  T(F): 98.3 (11 Jan 2024 04:46), Max: 98.9 (10 Bernardino 2024 11:45)  HR: 78 (11 Jan 2024 04:46) (76 - 79)  BP: 120/72 (11 Jan 2024 04:46) (113/69 - 139/88)  BP(mean): --  RR: 18 (11 Jan 2024 04:46) (17 - 18)  SpO2: 97% (11 Jan 2024 04:46) (96% - 100%)    Parameters below as of 11 Jan 2024 04:46  Patient On (Oxygen Delivery Method): room air    PHYSICAL EXAM:  HEAD:  Atraumatic, Normocephalic  EYES: EOMI, conjunctiva and sclera clear  CHEST/LUNG: Clear to auscultation bilaterally; No rales, rhonchi, wheezing, or rubs  HEART: Regular rate and rhythm; No murmurs, rubs, or gallops  ABDOMEN: Soft, nontender, Nondistended, no masses  : morales in place draining yellow urine  SKIN: No rashes or lesions  EXTREM: bony protrusion at R 1st MTP joint with no overlying skin changes. Non-TTP.   NERVOUS SYSTEM:  Alert & Oriented X3, no focal deficits    Consultant(s) Notes Reviewed:  [x ] YES  [ ] NO  Care Discussed with Consultants/Other Providers [ x] YES  [ ] NO    LABS:                        8.6    7.16  )-----------( 278      ( 11 Jan 2024 05:41 )             26.1     01-11    141  |  107  |  22  ----------------------------<  112<H>  3.9   |  22  |  1.88<H>    Ca    8.1<L>      11 Jan 2024 05:40  Phos  2.0     01-11  Mg     2.0     01-11    TPro  6.6  /  Alb  3.4  /  TBili  0.3  /  DBili  x   /  AST  24  /  ALT  20  /  AlkPhos  86  01-11        Urinalysis Basic - ( 11 Jan 2024 05:40 )    Color: x / Appearance: x / SG: x / pH: x  Gluc: 112 mg/dL / Ketone: x  / Bili: x / Urobili: x   Blood: x / Protein: x / Nitrite: x   Leuk Esterase: x / RBC: x / WBC x   Sq Epi: x / Non Sq Epi: x / Bacteria: x      CAPILLARY BLOOD GLUCOSE            Urinalysis Basic - ( 11 Jan 2024 05:40 )    Color: x / Appearance: x / SG: x / pH: x  Gluc: 112 mg/dL / Ketone: x  / Bili: x / Urobili: x   Blood: x / Protein: x / Nitrite: x   Leuk Esterase: x / RBC: x / WBC x   Sq Epi: x / Non Sq Epi: x / Bacteria: x        RADIOLOGY & ADDITIONAL TESTS:    Imaging Personally Reviewed:  [ ] YES  [ ] NO    HEALTH ISSUES - PROBLEM Dx:  Sepsis    Acute kidney injury superimposed on CKD    Adrenal insufficiency    Foot pain, right    Diarrhea    Hypotension    Fall    Cardiac amyloidosis    Anemia    Abnormal transaminases    2019 novel coronavirus disease (COVID-19)    BPH (benign prostatic hyperplasia)    Prophylactic measure    Hypoglycemia    Abnormal thyroid blood test

## 2024-01-12 LAB
ANION GAP SERPL CALC-SCNC: 10 MMOL/L — SIGNIFICANT CHANGE UP (ref 5–17)
ANION GAP SERPL CALC-SCNC: 10 MMOL/L — SIGNIFICANT CHANGE UP (ref 5–17)
BASOPHILS # BLD AUTO: 0.02 K/UL — SIGNIFICANT CHANGE UP (ref 0–0.2)
BASOPHILS # BLD AUTO: 0.02 K/UL — SIGNIFICANT CHANGE UP (ref 0–0.2)
BASOPHILS NFR BLD AUTO: 0.3 % — SIGNIFICANT CHANGE UP (ref 0–2)
BASOPHILS NFR BLD AUTO: 0.3 % — SIGNIFICANT CHANGE UP (ref 0–2)
BUN SERPL-MCNC: 21 MG/DL — SIGNIFICANT CHANGE UP (ref 7–23)
BUN SERPL-MCNC: 21 MG/DL — SIGNIFICANT CHANGE UP (ref 7–23)
CALCIUM SERPL-MCNC: 8.2 MG/DL — LOW (ref 8.4–10.5)
CALCIUM SERPL-MCNC: 8.2 MG/DL — LOW (ref 8.4–10.5)
CHLORIDE SERPL-SCNC: 107 MMOL/L — SIGNIFICANT CHANGE UP (ref 96–108)
CHLORIDE SERPL-SCNC: 107 MMOL/L — SIGNIFICANT CHANGE UP (ref 96–108)
CO2 SERPL-SCNC: 25 MMOL/L — SIGNIFICANT CHANGE UP (ref 22–31)
CO2 SERPL-SCNC: 25 MMOL/L — SIGNIFICANT CHANGE UP (ref 22–31)
CREAT SERPL-MCNC: 1.87 MG/DL — HIGH (ref 0.5–1.3)
CREAT SERPL-MCNC: 1.87 MG/DL — HIGH (ref 0.5–1.3)
EGFR: 37 ML/MIN/1.73M2 — LOW
EGFR: 37 ML/MIN/1.73M2 — LOW
EOSINOPHIL # BLD AUTO: 0.25 K/UL — SIGNIFICANT CHANGE UP (ref 0–0.5)
EOSINOPHIL # BLD AUTO: 0.25 K/UL — SIGNIFICANT CHANGE UP (ref 0–0.5)
EOSINOPHIL NFR BLD AUTO: 4 % — SIGNIFICANT CHANGE UP (ref 0–6)
EOSINOPHIL NFR BLD AUTO: 4 % — SIGNIFICANT CHANGE UP (ref 0–6)
GLUCOSE SERPL-MCNC: 77 MG/DL — SIGNIFICANT CHANGE UP (ref 70–99)
GLUCOSE SERPL-MCNC: 77 MG/DL — SIGNIFICANT CHANGE UP (ref 70–99)
HCT VFR BLD CALC: 28.9 % — LOW (ref 39–50)
HCT VFR BLD CALC: 28.9 % — LOW (ref 39–50)
HGB BLD-MCNC: 9.4 G/DL — LOW (ref 13–17)
HGB BLD-MCNC: 9.4 G/DL — LOW (ref 13–17)
IMM GRANULOCYTES NFR BLD AUTO: 1 % — HIGH (ref 0–0.9)
IMM GRANULOCYTES NFR BLD AUTO: 1 % — HIGH (ref 0–0.9)
LYMPHOCYTES # BLD AUTO: 3.15 K/UL — SIGNIFICANT CHANGE UP (ref 1–3.3)
LYMPHOCYTES # BLD AUTO: 3.15 K/UL — SIGNIFICANT CHANGE UP (ref 1–3.3)
LYMPHOCYTES # BLD AUTO: 49.9 % — HIGH (ref 13–44)
LYMPHOCYTES # BLD AUTO: 49.9 % — HIGH (ref 13–44)
MAGNESIUM SERPL-MCNC: 2.2 MG/DL — SIGNIFICANT CHANGE UP (ref 1.6–2.6)
MAGNESIUM SERPL-MCNC: 2.2 MG/DL — SIGNIFICANT CHANGE UP (ref 1.6–2.6)
MCHC RBC-ENTMCNC: 27.2 PG — SIGNIFICANT CHANGE UP (ref 27–34)
MCHC RBC-ENTMCNC: 27.2 PG — SIGNIFICANT CHANGE UP (ref 27–34)
MCHC RBC-ENTMCNC: 32.5 GM/DL — SIGNIFICANT CHANGE UP (ref 32–36)
MCHC RBC-ENTMCNC: 32.5 GM/DL — SIGNIFICANT CHANGE UP (ref 32–36)
MCV RBC AUTO: 83.8 FL — SIGNIFICANT CHANGE UP (ref 80–100)
MCV RBC AUTO: 83.8 FL — SIGNIFICANT CHANGE UP (ref 80–100)
MONOCYTES # BLD AUTO: 0.6 K/UL — SIGNIFICANT CHANGE UP (ref 0–0.9)
MONOCYTES # BLD AUTO: 0.6 K/UL — SIGNIFICANT CHANGE UP (ref 0–0.9)
MONOCYTES NFR BLD AUTO: 9.5 % — SIGNIFICANT CHANGE UP (ref 2–14)
MONOCYTES NFR BLD AUTO: 9.5 % — SIGNIFICANT CHANGE UP (ref 2–14)
NEUTROPHILS # BLD AUTO: 2.23 K/UL — SIGNIFICANT CHANGE UP (ref 1.8–7.4)
NEUTROPHILS # BLD AUTO: 2.23 K/UL — SIGNIFICANT CHANGE UP (ref 1.8–7.4)
NEUTROPHILS NFR BLD AUTO: 35.3 % — LOW (ref 43–77)
NEUTROPHILS NFR BLD AUTO: 35.3 % — LOW (ref 43–77)
NRBC # BLD: 0 /100 WBCS — SIGNIFICANT CHANGE UP (ref 0–0)
NRBC # BLD: 0 /100 WBCS — SIGNIFICANT CHANGE UP (ref 0–0)
PHOSPHATE SERPL-MCNC: 2.6 MG/DL — SIGNIFICANT CHANGE UP (ref 2.5–4.5)
PHOSPHATE SERPL-MCNC: 2.6 MG/DL — SIGNIFICANT CHANGE UP (ref 2.5–4.5)
PLATELET # BLD AUTO: 294 K/UL — SIGNIFICANT CHANGE UP (ref 150–400)
PLATELET # BLD AUTO: 294 K/UL — SIGNIFICANT CHANGE UP (ref 150–400)
POTASSIUM SERPL-MCNC: 4.2 MMOL/L — SIGNIFICANT CHANGE UP (ref 3.5–5.3)
POTASSIUM SERPL-MCNC: 4.2 MMOL/L — SIGNIFICANT CHANGE UP (ref 3.5–5.3)
POTASSIUM SERPL-SCNC: 4.2 MMOL/L — SIGNIFICANT CHANGE UP (ref 3.5–5.3)
POTASSIUM SERPL-SCNC: 4.2 MMOL/L — SIGNIFICANT CHANGE UP (ref 3.5–5.3)
RBC # BLD: 3.45 M/UL — LOW (ref 4.2–5.8)
RBC # BLD: 3.45 M/UL — LOW (ref 4.2–5.8)
RBC # FLD: 20 % — HIGH (ref 10.3–14.5)
RBC # FLD: 20 % — HIGH (ref 10.3–14.5)
SODIUM SERPL-SCNC: 142 MMOL/L — SIGNIFICANT CHANGE UP (ref 135–145)
SODIUM SERPL-SCNC: 142 MMOL/L — SIGNIFICANT CHANGE UP (ref 135–145)
WBC # BLD: 6.31 K/UL — SIGNIFICANT CHANGE UP (ref 3.8–10.5)
WBC # BLD: 6.31 K/UL — SIGNIFICANT CHANGE UP (ref 3.8–10.5)
WBC # FLD AUTO: 6.31 K/UL — SIGNIFICANT CHANGE UP (ref 3.8–10.5)
WBC # FLD AUTO: 6.31 K/UL — SIGNIFICANT CHANGE UP (ref 3.8–10.5)

## 2024-01-12 PROCEDURE — 99232 SBSQ HOSP IP/OBS MODERATE 35: CPT | Mod: GC

## 2024-01-12 RX ORDER — SODIUM BICARBONATE 1 MEQ/ML
650 SYRINGE (ML) INTRAVENOUS
Refills: 0 | Status: DISCONTINUED | OUTPATIENT
Start: 2024-01-12 | End: 2024-01-15

## 2024-01-12 RX ORDER — METOPROLOL TARTRATE 50 MG
12.5 TABLET ORAL
Refills: 0 | Status: DISCONTINUED | OUTPATIENT
Start: 2024-01-12 | End: 2024-01-15

## 2024-01-12 RX ADMIN — Medication 1300 MILLIGRAM(S): at 06:07

## 2024-01-12 RX ADMIN — HEPARIN SODIUM 5000 UNIT(S): 5000 INJECTION INTRAVENOUS; SUBCUTANEOUS at 06:07

## 2024-01-12 RX ADMIN — HEPARIN SODIUM 5000 UNIT(S): 5000 INJECTION INTRAVENOUS; SUBCUTANEOUS at 21:16

## 2024-01-12 RX ADMIN — Medication 10 MILLIGRAM(S): at 06:07

## 2024-01-12 RX ADMIN — CHLORHEXIDINE GLUCONATE 1 APPLICATION(S): 213 SOLUTION TOPICAL at 06:08

## 2024-01-12 RX ADMIN — Medication 20 MILLIGRAM(S): at 06:07

## 2024-01-12 RX ADMIN — HEPARIN SODIUM 5000 UNIT(S): 5000 INJECTION INTRAVENOUS; SUBCUTANEOUS at 13:10

## 2024-01-12 RX ADMIN — FINASTERIDE 5 MILLIGRAM(S): 5 TABLET, FILM COATED ORAL at 13:07

## 2024-01-12 NOTE — PROGRESS NOTE ADULT - SUBJECTIVE AND OBJECTIVE BOX
NEPHROLOGY     Patient seen and examined sitting in chair, breathing comfortably on room air, no complaints, in no acute distress.     MEDICATIONS  (STANDING):  chlorhexidine 2% Cloths 1 Application(s) Topical <User Schedule>  dextrose 50% Injectable 25 Gram(s) IV Push once  dextrose 50% Injectable 12.5 Gram(s) IV Push once  dextrose 50% Injectable 25 milliLiter(s) IV Push once  dextrose 50% Injectable 25 Gram(s) IV Push once  dextrose Oral Gel 15 Gram(s) Oral once  finasteride 5 milliGRAM(s) Oral daily  glucagon  Injectable 1 milliGRAM(s) IntraMuscular once  heparin   Injectable 5000 Unit(s) SubCutaneous every 8 hours  hydrocortisone 10 milliGRAM(s) Oral daily  hydrocortisone 20 milliGRAM(s) Oral daily  lactated ringers. 1000 milliLiter(s) (70 mL/Hr) IV Continuous <Continuous>  metoprolol tartrate 12.5 milliGRAM(s) Oral two times a day  sodium bicarbonate 1300 milliGRAM(s) Oral two times a day  sodium chloride 0.65% Nasal 1 Spray(s) Both Nostrils once  Vyndamax (Tafamidis) 61mg Capsule 1 Capsule(s) 1 Capsule(s) Oral daily    VITALS:  T(C): , Max: 36.7 (01-12-24 @ 05:16)  T(F): , Max: 98.1 (01-12-24 @ 05:16)  HR: 87 (01-12-24 @ 11:58)  BP: 117/73 (01-12-24 @ 11:58)  RR: 18 (01-12-24 @ 11:58)  SpO2: 98% (01-12-24 @ 11:58)    I and O's:    01-11 @ 07:01  -  01-12 @ 07:00  --------------------------------------------------------  IN: 1020 mL / OUT: 1800 mL / NET: -780 mL    01-12 @ 07:01  -  01-12 @ 12:52  --------------------------------------------------------  IN: 240 mL / OUT: 300 mL / NET: -60 mL    PHYSICAL EXAM:  Constitutional: NAD  Neck:  No JVD  Respiratory: CTAB/L  Cardiovascular: S1 and S2  Gastrointestinal: BS+, soft, NT/ND  Extremities: No peripheral edema  Neurological: A/O x 3, no focal deficits  Psychiatric: Normal mood, normal affect  : + Morales  Skin: No rashes    LABS:                        9.4    6.31  )-----------( 294      ( 12 Jan 2024 06:41 )             28.9     01-12    142  |  107  |  21  ----------------------------<  77  4.2   |  25  |  1.87<H>    Ca    8.2<L>      12 Jan 2024 06:39  Phos  2.6     01-12  Mg     2.2     01-12    TPro  6.6  /  Alb  3.4  /  TBili  0.3  /  DBili  x   /  AST  24  /  ALT  20  /  AlkPhos  86  01-11    Urine Studies:    Sodium, Random Urine: 41 mmol/L (01-10 @ 12:55)    ASSESSMENT/PLAN  73M w/ BPH (c/b urinary retention s/p Morales catheter placement; exchanged monthly), CKD stage IIIb, and cardiac amyloidosis brought in by daughter for diarrhea (loose-watery BM's) over the past few days.  CKD stage 3 b and now SARAH in light of contrast study/hypotension and possibly dehydration as well  Subnephrotic range proteinuria     1 Renal-  scr improving/stable, close to BL range (1.6-1.8)   reduce sodium bicarb to 650 BID  2 electrolytes- phos improved s/p phosnak 2 packets yesterday   3 -he has chronic morales  4 CVS- BP soft but stable now off Midodrine  5. CHERYL gabby Franco [facility]  [phone]  NEPHROLOGY     Patient seen and examined sitting in chair, breathing comfortably on room air, no complaints, in no acute distress.     MEDICATIONS  (STANDING):  chlorhexidine 2% Cloths 1 Application(s) Topical <User Schedule>  dextrose 50% Injectable 25 Gram(s) IV Push once  dextrose 50% Injectable 12.5 Gram(s) IV Push once  dextrose 50% Injectable 25 milliLiter(s) IV Push once  dextrose 50% Injectable 25 Gram(s) IV Push once  dextrose Oral Gel 15 Gram(s) Oral once  finasteride 5 milliGRAM(s) Oral daily  glucagon  Injectable 1 milliGRAM(s) IntraMuscular once  heparin   Injectable 5000 Unit(s) SubCutaneous every 8 hours  hydrocortisone 10 milliGRAM(s) Oral daily  hydrocortisone 20 milliGRAM(s) Oral daily  lactated ringers. 1000 milliLiter(s) (70 mL/Hr) IV Continuous <Continuous>  metoprolol tartrate 12.5 milliGRAM(s) Oral two times a day  sodium bicarbonate 1300 milliGRAM(s) Oral two times a day  sodium chloride 0.65% Nasal 1 Spray(s) Both Nostrils once  Vyndamax (Tafamidis) 61mg Capsule 1 Capsule(s) 1 Capsule(s) Oral daily    VITALS:  T(C): , Max: 36.7 (01-12-24 @ 05:16)  T(F): , Max: 98.1 (01-12-24 @ 05:16)  HR: 87 (01-12-24 @ 11:58)  BP: 117/73 (01-12-24 @ 11:58)  RR: 18 (01-12-24 @ 11:58)  SpO2: 98% (01-12-24 @ 11:58)    I and O's:    01-11 @ 07:01  -  01-12 @ 07:00  --------------------------------------------------------  IN: 1020 mL / OUT: 1800 mL / NET: -780 mL    01-12 @ 07:01  -  01-12 @ 12:52  --------------------------------------------------------  IN: 240 mL / OUT: 300 mL / NET: -60 mL    PHYSICAL EXAM:  Constitutional: NAD  Neck:  No JVD  Respiratory: CTAB/L  Cardiovascular: S1 and S2  Gastrointestinal: BS+, soft, NT/ND  Extremities: No peripheral edema  Neurological: A/O x 3, no focal deficits  Psychiatric: Normal mood, normal affect  : + Morales  Skin: No rashes    LABS:                        9.4    6.31  )-----------( 294      ( 12 Jan 2024 06:41 )             28.9     01-12    142  |  107  |  21  ----------------------------<  77  4.2   |  25  |  1.87<H>    Ca    8.2<L>      12 Jan 2024 06:39  Phos  2.6     01-12  Mg     2.2     01-12    TPro  6.6  /  Alb  3.4  /  TBili  0.3  /  DBili  x   /  AST  24  /  ALT  20  /  AlkPhos  86  01-11    Urine Studies:    Sodium, Random Urine: 41 mmol/L (01-10 @ 12:55)    ASSESSMENT/PLAN  73M w/ BPH (c/b urinary retention s/p Morales catheter placement; exchanged monthly), CKD stage IIIb, and cardiac amyloidosis brought in by daughter for diarrhea (loose-watery BM's) over the past few days.  CKD stage 3 b and now SARAH in light of contrast study/hypotension and possibly dehydration as well  Subnephrotic range proteinuria     1 Renal-  scr improving/stable, close to BL range (1.6-1.8)   reduce sodium bicarb to 650 BID  2 electrolytes- phos improved s/p phosnak 2 packets yesterday   3 -he has chronic morales  4 CVS- BP soft but stable now off Midodrine  5. CHERYL gabby [facility]  [phone]

## 2024-01-12 NOTE — PROGRESS NOTE ADULT - PROBLEM SELECTOR PLAN 1
-met SIRs criteria on admission with concerning source of GI vs UTI  -previous hx of E.faecalis and Stenotrophomonas maltophilia  -Likely Pyelo in context of CT with perinephric standing, may also be due to proctitis, also with COVID-19     - ID following  -ucx demonstrating efaecalis, susceptible to ampicillin (covered by zosyn)  -bcx ngtd  -gi pcr and cdiff neg  - HDS, no longer meeting SIRS criteria  - completed course of zosyn x7 days (1/2-1/8), HDS since discontinuation of vanc  - resolved

## 2024-01-12 NOTE — PROGRESS NOTE ADULT - PROBLEM SELECTOR PLAN 7
-CT Head WNL  -F/U B12, TSH, Orthostatic vitals-> TSH elevated free T4 and total T3 ordered, T4 wnl  -pt reporting persistent dizziness, vertigo; worse in AM after lying down. No acute findings on CT on admission, will get MRI brain to r/o central etiology for dizziness    Plan:  - MRI brain with no acute findings, no space-occupying lesions  - meclizine PRN for dizziness  - patient states dizziness has resolved, may have been ISO sepsis/hypotension, adrenal insufficiency

## 2024-01-12 NOTE — PROGRESS NOTE ADULT - SUBJECTIVE AND OBJECTIVE BOX
CARDIOLOGY FOLLOW UP - Dr. Saravia  DATE OF SERVICE: 1/12/24    CC  No CV complaints     REVIEW OF SYSTEMS:  CONSTITUTIONAL: No fever, weight loss, or fatigue  RESPIRATORY: No cough, wheezing, chills or hemoptysis; No Shortness of Breath  CARDIOVASCULAR: No chest pain, palpitations, passing out, dizziness, or leg swelling  GASTROINTESTINAL: No abdominal or epigastric pain. No nausea, vomiting, or hematemesis; No diarrhea or constipation. No melena or hematochezia.  VASCULAR: No edema     PHYSICAL EXAM:  T(C): 36.7 (01-12-24 @ 05:16), Max: 36.8 (01-11-24 @ 12:12)  HR: 83 (01-12-24 @ 05:16) (80 - 83)  BP: 139/87 (01-12-24 @ 05:16) (127/79 - 139/87)  RR: 18 (01-12-24 @ 05:16) (16 - 18)  SpO2: 100% (01-12-24 @ 05:16) (98% - 100%)  Wt(kg): --  I&O's Summary    11 Jan 2024 07:01  -  12 Jan 2024 07:00  --------------------------------------------------------  IN: 1020 mL / OUT: 1800 mL / NET: -780 mL        Appearance: Elderly male 	  Cardiovascular: Normal S1 S2,RRR, No JVD, No murmurs  Respiratory: Lungs clear to auscultation b/l   Gastrointestinal:  Soft, Non-tender, + BS	  Extremities: Normal range of motion, No clubbing, cyanosis or edema      Home Medications:  fluticasone 50 mcg/inh nasal spray: 1 spray(s) in each nostril 2 times a day as needed (04 Jan 2024 12:08)  mirtazapine 7.5 mg oral tablet: 1 tab(s) orally once a day at 6 PM (04 Jan 2024 12:08)  Saline Mist 0.65% nasal spray: 2 spray(s) intranasally as needed (04 Jan 2024 12:08)  Vyndamax 61 mg oral capsule: 1 cap(s) orally once a day (04 Jan 2024 12:08)      MEDICATIONS  (STANDING):  chlorhexidine 2% Cloths 1 Application(s) Topical <User Schedule>  dextrose 50% Injectable 12.5 Gram(s) IV Push once  dextrose 50% Injectable 25 milliLiter(s) IV Push once  dextrose 50% Injectable 25 Gram(s) IV Push once  dextrose 50% Injectable 25 Gram(s) IV Push once  dextrose Oral Gel 15 Gram(s) Oral once  finasteride 5 milliGRAM(s) Oral daily  glucagon  Injectable 1 milliGRAM(s) IntraMuscular once  heparin   Injectable 5000 Unit(s) SubCutaneous every 8 hours  hydrocortisone 10 milliGRAM(s) Oral daily  hydrocortisone 20 milliGRAM(s) Oral daily  lactated ringers. 1000 milliLiter(s) (70 mL/Hr) IV Continuous <Continuous>  sodium bicarbonate 1300 milliGRAM(s) Oral two times a day  sodium chloride 0.65% Nasal 1 Spray(s) Both Nostrils once  Vyndamax (Tafamidis) 61mg Capsule 1 Capsule(s) 1 Capsule(s) Oral daily      TELEMETRY: SR , 5 beats WCT    ECG:  	  RADIOLOGY:   DIAGNOSTIC TESTING:  [ ] Echocardiogram:  [ ]  Catheterization:  [ ] Stress Test:    OTHER: 	    LABS:	 	                            9.4    6.31  )-----------( 294      ( 12 Jan 2024 06:41 )             28.9     01-12    142  |  107  |  21  ----------------------------<  77  4.2   |  25  |  1.87<H>    Ca    8.2<L>      12 Jan 2024 06:39  Phos  2.6     01-12  Mg     2.2     01-12    TPro  6.6  /  Alb  3.4  /  TBili  0.3  /  DBili  x   /  AST  24  /  ALT  20  /  AlkPhos  86  01-11

## 2024-01-12 NOTE — PROGRESS NOTE ADULT - PROBLEM SELECTOR PLAN 6
Likely hypovolemic ISO diarrhea but possibly with component of distributive shock   - midodrine 20 mg TID -> 10 mg TID on 1/8 -> discontinued 1/10  - I/Os  - workup of adrenal insufficiency per endo - ACTH and cortisol low c/f secondary AI  - continue hydrocortisone 20 mg BID - 8AM and 3PM  - BPs stable off midodrine

## 2024-01-12 NOTE — PROGRESS NOTE ADULT - ATTENDING COMMENTS
73M w/ BPH (c/b urinary retention s/p Morales catheter placement; exchanged monthly), CKD stage IIIb, and cardiac amyloidosis brought in by daughter for diarrhea found to have hypotension likely due to sepsis.     Pyleo/ Urosepsis: S/p IVF, weaned off midodrine. S/p zosyn. Seen by ID. Resolved.   Failed TOV: Morales replaced by urology. Maintain morales. F/u urology outpatient   COVID+: Not requiring O2 at this time. Supportive measures.   SARAH on CKD - Cr improving renal following.   Amyloidosis: Cardiology recommends to continue with vyndamax. Started on metoprolol 12.5mg BID per cards for WCT.   Adrenal Insufficiency: AM cortisol 2.3, ACTH low. C/f AI. Endo following. Team discussed with neuroradiology re: MRI finding of possible pituitary microadenoma. per discussion differential diagnosis at this time includes a pituitary microadenoma vs a pituitary cyst. No evidence of any infiltrative process as per neuroradiology. Patient should have repeat MRI sella with and without contrast in 6-12 months to ensure there is no growth of this area.   Steroid taper per endo. No further inpt w/u and f/u endo outpatient.    Dispo - Pending CHERYL placement.

## 2024-01-12 NOTE — PROGRESS NOTE ADULT - PROBLEM SELECTOR PLAN 2
Baseline creatinine is 1.6, Cr on admission 1.8 -> 2.69 on 1/4 -> now back to baseline  - chronic morales, low c/f obstruction  -may be iso of atn given hypotension on presentation vs prerenal given fluid loss vs remdesvir  - Dced remdesivir  - Fena 1.4%  - VBG -> elevated lactate at 2.1 but pt not symptomatic -> normalized   - nephro following, appreciate recs  - trend SCr - now downtrended to baseline   - hold IVF for now per nephro/cards  - maintain morales for BPH  - s/p bicarb gtt with subsequent normalization of bicarb. PO bicarb restarted

## 2024-01-12 NOTE — PROGRESS NOTE ADULT - PROBLEM SELECTOR PLAN 9
- consistent with chronic disease w/low iron  - s/p venofer x3 day course  - Hgb stable ~9, no evidence of active bleeding  - 1/9 drop from 9 -> 8, no evidence of bleeding and HDS. Continue to trend  - Hgb stable at 8

## 2024-01-12 NOTE — PROGRESS NOTE ADULT - SUBJECTIVE AND OBJECTIVE BOX
DEEPTHI GÓMEZ  73y  Male    Patient is a 73y old  Male who presents with a chief complaint of Diarrhea/ (12 Jan 2024 09:52)    INTERVAL HPI/OVERNIGHT EVENTS:  - NAEON  - feeling well this morning, no complaints, denies any pain  - pending bed at [facility]     T(C): 36.7 (01-12-24 @ 11:58), Max: 36.7 (01-12-24 @ 05:16)  HR: 87 (01-12-24 @ 11:58) (80 - 87)  BP: 117/73 (01-12-24 @ 11:58) (117/73 - 139/87)  RR: 18 (01-12-24 @ 11:58) (18 - 18)  SpO2: 98% (01-12-24 @ 11:58) (98% - 100%)  Wt(kg): --Vital Signs Last 24 Hrs  T(C): 36.7 (12 Jan 2024 11:58), Max: 36.7 (12 Jan 2024 05:16)  T(F): 98 (12 Jan 2024 11:58), Max: 98.1 (12 Jan 2024 05:16)  HR: 87 (12 Jan 2024 11:58) (80 - 87)  BP: 117/73 (12 Jan 2024 11:58) (117/73 - 139/87)  BP(mean): --  RR: 18 (12 Jan 2024 11:58) (18 - 18)  SpO2: 98% (12 Jan 2024 11:58) (98% - 100%)    Parameters below as of 12 Jan 2024 11:58  Patient On (Oxygen Delivery Method): room air    PHYSICAL EXAM:  HEAD:  Atraumatic, Normocephalic  EYES: EOMI, conjunctiva and sclera clear  CHEST/LUNG: Clear to auscultation bilaterally; No rales, rhonchi, wheezing, or rubs  HEART: Regular rate and rhythm; No murmurs, rubs, or gallops  ABDOMEN: Soft, nontender, Nondistended, no masses  : morales in place draining yellow urine  SKIN: No rashes or lesions  EXTREM: bony protrusion at R 1st MTP joint with no overlying skin changes. Non-TTP.   NERVOUS SYSTEM:  Alert & Oriented X3, no focal deficits    Consultant(s) Notes Reviewed:  [x ] YES  [ ] NO  Care Discussed with Consultants/Other Providers [ x] YES  [ ] NO    LABS:                        9.4    6.31  )-----------( 294      ( 12 Jan 2024 06:41 )             28.9     01-12    142  |  107  |  21  ----------------------------<  77  4.2   |  25  |  1.87<H>    Ca    8.2<L>      12 Jan 2024 06:39  Phos  2.6     01-12  Mg     2.2     01-12    TPro  6.6  /  Alb  3.4  /  TBili  0.3  /  DBili  x   /  AST  24  /  ALT  20  /  AlkPhos  86  01-11        Urinalysis Basic - ( 12 Jan 2024 06:39 )    Color: x / Appearance: x / SG: x / pH: x  Gluc: 77 mg/dL / Ketone: x  / Bili: x / Urobili: x   Blood: x / Protein: x / Nitrite: x   Leuk Esterase: x / RBC: x / WBC x   Sq Epi: x / Non Sq Epi: x / Bacteria: x      CAPILLARY BLOOD GLUCOSE            Urinalysis Basic - ( 12 Jan 2024 06:39 )    Color: x / Appearance: x / SG: x / pH: x  Gluc: 77 mg/dL / Ketone: x  / Bili: x / Urobili: x   Blood: x / Protein: x / Nitrite: x   Leuk Esterase: x / RBC: x / WBC x   Sq Epi: x / Non Sq Epi: x / Bacteria: x        RADIOLOGY & ADDITIONAL TESTS:    Imaging Personally Reviewed:  [ ] YES  [ ] NO    HEALTH ISSUES - PROBLEM Dx:  Sepsis    Acute kidney injury superimposed on CKD    Adrenal insufficiency    Foot pain, right    Diarrhea    Hypotension    Fall    Cardiac amyloidosis    Anemia    Abnormal transaminases    2019 novel coronavirus disease (COVID-19)    BPH (benign prostatic hyperplasia)    Prophylactic measure    Hypoglycemia    Abnormal thyroid blood test

## 2024-01-12 NOTE — PROGRESS NOTE ADULT - ASSESSMENT
A/p  73M w/ BPH (c/b urinary retention s/p Harkins catheter placement; exchanged monthly), CKD stage IIIb, and cardiac amyloidosis brought in by daughter for diarrhea (loose-watery BM's) over the past few days.    #Sepsis  -I/s/o COVID + UTI  -Abx per med    #COVID  -CXR no focal consolidations  -sp Remdesivir  -Supportive care per med    #WCT  -16 beats noted on tele 1/11, 5 beats 1/12  -Keep Mg>2, K>4  -Add metoprolol 12.5mg bid     #cardiac amyloid   -Cont vyndamax  -baseline sbp 100 as outpt  -Prior echo with nml LV systolic fxn, Severe left ventricular hypertrophy, Findings suggestive of infiltrative cardiomyopathy    #Hypotension  -Continue midodrine    #Hypoglycemia  -mgmt per med    #Dizziness  -Improved   -Orthostatics negative   -Cont mido  -PT for mobility      dvt ppx

## 2024-01-12 NOTE — PROGRESS NOTE ADULT - ASSESSMENT
ASSESSMENT/PLAN  ASSESSMENT/PLAN  73M w/ BPH (c/b urinary retention s/p Morales catheter placement; exchanged monthly), CKD stage IIIb, and cardiac amyloidosis brought in by daughter for diarrhea (loose-watery BM's) over the past few days.  CKD stage 3 b and now SARAH in light of contrast study/hypotension and possibly dehydration as well  Subnephrotic range proteinuria     1 Renal-  scr stable now improving again. close to BL range (1.6-1.8)   -c/w Sodium bicarb 1300 BID  2 electrolytes- a/w phosnak today x2 today  3 -he has chronic morales  4 CVS- BP soft but stable  on Midodrine to maintain MAP   5. [facility] planning

## 2024-01-12 NOTE — PROGRESS NOTE ADULT - PROBLEM SELECTOR PLAN 3
- AM cortisol low, ACTH low c/f secondary AI  - endocrine following, appreciate recs  - CT abdomen  without adrenal abnormality, CTH without obvious pituitary etiology though not an optimal test to evaluate pituitary  - Repeat TSH, FT4, TT3 pending given low FT4 of 0.9 on 1/3  - gonadal axis normal   - continue hydrocortisone 20 mg QD 8AM, 10 mg QD 3PM - will be discharged on this regimen   - MR brain/sella with nonspecific area of  enhancement involving L pituitary gland, per endo this finding was discussed with neuroradiology and may represent microadenoma vs cyst.   - per endo MR findings do not explain adrenal insufficiency, will need further workup with endo outpatient

## 2024-01-13 PROCEDURE — 99232 SBSQ HOSP IP/OBS MODERATE 35: CPT | Mod: GC

## 2024-01-13 RX ORDER — PHENYLEPHRINE-SHARK LIVER OIL-MINERAL OIL-PETROLATUM RECTAL OINTMENT
1 OINTMENT (GRAM) RECTAL DAILY
Refills: 0 | Status: DISCONTINUED | OUTPATIENT
Start: 2024-01-13 | End: 2024-01-15

## 2024-01-13 RX ADMIN — HEPARIN SODIUM 5000 UNIT(S): 5000 INJECTION INTRAVENOUS; SUBCUTANEOUS at 05:08

## 2024-01-13 RX ADMIN — Medication 650 MILLIGRAM(S): at 05:07

## 2024-01-13 RX ADMIN — Medication 12.5 MILLIGRAM(S): at 05:07

## 2024-01-13 RX ADMIN — CHLORHEXIDINE GLUCONATE 1 APPLICATION(S): 213 SOLUTION TOPICAL at 05:08

## 2024-01-13 RX ADMIN — Medication 12.5 MILLIGRAM(S): at 18:00

## 2024-01-13 RX ADMIN — FINASTERIDE 5 MILLIGRAM(S): 5 TABLET, FILM COATED ORAL at 12:15

## 2024-01-13 RX ADMIN — HEPARIN SODIUM 5000 UNIT(S): 5000 INJECTION INTRAVENOUS; SUBCUTANEOUS at 14:23

## 2024-01-13 RX ADMIN — Medication 20 MILLIGRAM(S): at 05:07

## 2024-01-13 RX ADMIN — Medication 650 MILLIGRAM(S): at 17:54

## 2024-01-13 RX ADMIN — HEPARIN SODIUM 5000 UNIT(S): 5000 INJECTION INTRAVENOUS; SUBCUTANEOUS at 21:01

## 2024-01-13 RX ADMIN — Medication 10 MILLIGRAM(S): at 05:07

## 2024-01-13 NOTE — PROGRESS NOTE ADULT - ASSESSMENT
A/p  73M w/ BPH (c/b urinary retention s/p Harkins catheter placement; exchanged monthly), CKD stage IIIb, and cardiac amyloidosis brought in by daughter for diarrhea (loose-watery BM's) over the past few days.    #Sepsis  -I/s/o COVID + UTI  -Abx per med    #COVID  -CXR no focal consolidations  -sp Remdesivir  -Supportive care per med    #WCT  -16 beats noted on tele 1/11, 5 beats 1/12  -Keep Mg>2, K>4  -c/w metoprolol 12.5mg bid     #cardiac amyloid   -Cont vyndamax  -baseline sbp 100 as outpt  -Prior echo with nml LV systolic fxn, Severe left ventricular hypertrophy, Findings suggestive of infiltrative cardiomyopathy    #Hypotension  -resolved- sp midodrine    #Hypoglycemia  -mgmt per med    #Dizziness  -Improved   -Orthostatics negative   -sp mido  -PT for mobility      dvt ppx

## 2024-01-13 NOTE — PROGRESS NOTE ADULT - SUBJECTIVE AND OBJECTIVE BOX
Juliane Patino PGY1  pager [phone] or check resident tab for coverage    Patient is a 73y old  Male who presents with a chief complaint of Diarrhea/ (12 Jan 2024 12:52)      SUBJECTIVE / OVERNIGHT EVENTS:    MEDICATIONS  (STANDING):  chlorhexidine 2% Cloths 1 Application(s) Topical <User Schedule>  dextrose 50% Injectable 25 milliLiter(s) IV Push once  dextrose 50% Injectable 25 Gram(s) IV Push once  dextrose 50% Injectable 12.5 Gram(s) IV Push once  dextrose 50% Injectable 25 Gram(s) IV Push once  dextrose Oral Gel 15 Gram(s) Oral once  finasteride 5 milliGRAM(s) Oral daily  glucagon  Injectable 1 milliGRAM(s) IntraMuscular once  heparin   Injectable 5000 Unit(s) SubCutaneous every 8 hours  hydrocortisone 20 milliGRAM(s) Oral daily  hydrocortisone 10 milliGRAM(s) Oral daily  lactated ringers. 1000 milliLiter(s) (70 mL/Hr) IV Continuous <Continuous>  metoprolol tartrate 12.5 milliGRAM(s) Oral two times a day  sodium bicarbonate 650 milliGRAM(s) Oral two times a day  sodium chloride 0.65% Nasal 1 Spray(s) Both Nostrils once  Vyndamax (Tafamidis) 61mg Capsule 1 Capsule(s) 1 Capsule(s) Oral daily    MEDICATIONS  (PRN):  acetaminophen     Tablet .. 650 milliGRAM(s) Oral every 6 hours PRN Mild Pain (1 - 3), Moderate Pain (4 - 6)  fluticasone propionate 50 MICROgram(s)/spray Nasal Spray 1 Spray(s) Both Nostrils two times a day PRN congestion  hemorrhoidal Ointment 1 Application(s) Rectal daily PRN pain  ondansetron Injectable 4 milliGRAM(s) IV Push daily PRN Nausea      CAPILLARY BLOOD GLUCOSE        I&O's Summary    12 Jan 2024 07:01  -  13 Jan 2024 07:00  --------------------------------------------------------  IN: 1020 mL / OUT: 2050 mL / NET: -1030 mL        Vital Signs Last 24 Hrs  T(C): 36.9 (13 Jan 2024 04:53), Max: 37 (12 Jan 2024 21:01)  T(F): 98.4 (13 Jan 2024 04:53), Max: 98.6 (12 Jan 2024 21:01)  HR: 81 (13 Jan 2024 04:53) (75 - 87)  BP: 146/90 (13 Jan 2024 04:53) (117/73 - 146/90)  BP(mean): --  RR: 18 (13 Jan 2024 04:53) (18 - 18)  SpO2: 96% (13 Jan 2024 04:53) (96% - 98%)    Parameters below as of 13 Jan 2024 04:53  Patient On (Oxygen Delivery Method): room air        PHYSICAL EXAM:  GENERAL: no distress  PSYCH: A&O x3  HEAD: Atraumatic, Normocephalic  NECK: Supple, No JVD  CHEST/LUNG: clear to auscultation bilaterally  HEART: regular rate and rhythm, no murmurs  ABDOMEN: nontender to palpation, no rebound tenderness/guarding  EXTREMITIES: no edema on bilateral LE  NEUROLOGY: no focal neurologic deficit  SKIN: No rashes or lesions    LABS:                        9.4    6.31  )-----------( 294      ( 12 Jan 2024 06:41 )             28.9      01-12    142  |  107  |  21  ----------------------------<  77  4.2   |  25  |  1.87<H>    Ca    8.2<L>      12 Jan 2024 06:39  Phos  2.6     01-12  Mg     2.2     01-12            Urinalysis Basic - ( 12 Jan 2024 06:39 )    Color: x / Appearance: x / SG: x / pH: x  Gluc: 77 mg/dL / Ketone: x  / Bili: x / Urobili: x   Blood: x / Protein: x / Nitrite: x   Leuk Esterase: x / RBC: x / WBC x   Sq Epi: x / Non Sq Epi: x / Bacteria: x        RADIOLOGY & ADDITIONAL TESTS:    Imaging Personally Reviewed:    Consultant(s) Notes Reviewed:      Care Discussed with Consultants/Other Providers:   Juliane Patino PGY1  pager [phone] or check resident tab for coverage    Patient is a 73y old  Male who presents with a chief complaint of Diarrhea/ (12 Jan 2024 12:52)      SUBJECTIVE / OVERNIGHT EVENTS:    MEDICATIONS  (STANDING):  chlorhexidine 2% Cloths 1 Application(s) Topical <User Schedule>  dextrose 50% Injectable 25 milliLiter(s) IV Push once  dextrose 50% Injectable 25 Gram(s) IV Push once  dextrose 50% Injectable 12.5 Gram(s) IV Push once  dextrose 50% Injectable 25 Gram(s) IV Push once  dextrose Oral Gel 15 Gram(s) Oral once  finasteride 5 milliGRAM(s) Oral daily  glucagon  Injectable 1 milliGRAM(s) IntraMuscular once  heparin   Injectable 5000 Unit(s) SubCutaneous every 8 hours  hydrocortisone 20 milliGRAM(s) Oral daily  hydrocortisone 10 milliGRAM(s) Oral daily  lactated ringers. 1000 milliLiter(s) (70 mL/Hr) IV Continuous <Continuous>  metoprolol tartrate 12.5 milliGRAM(s) Oral two times a day  sodium bicarbonate 650 milliGRAM(s) Oral two times a day  sodium chloride 0.65% Nasal 1 Spray(s) Both Nostrils once  Vyndamax (Tafamidis) 61mg Capsule 1 Capsule(s) 1 Capsule(s) Oral daily    MEDICATIONS  (PRN):  acetaminophen     Tablet .. 650 milliGRAM(s) Oral every 6 hours PRN Mild Pain (1 - 3), Moderate Pain (4 - 6)  fluticasone propionate 50 MICROgram(s)/spray Nasal Spray 1 Spray(s) Both Nostrils two times a day PRN congestion  hemorrhoidal Ointment 1 Application(s) Rectal daily PRN pain  ondansetron Injectable 4 milliGRAM(s) IV Push daily PRN Nausea      CAPILLARY BLOOD GLUCOSE        I&O's Summary    12 Jan 2024 07:01  -  13 Jan 2024 07:00  --------------------------------------------------------  IN: 1020 mL / OUT: 2050 mL / NET: -1030 mL        Vital Signs Last 24 Hrs  T(C): 36.9 (13 Jan 2024 04:53), Max: 37 (12 Jan 2024 21:01)  T(F): 98.4 (13 Jan 2024 04:53), Max: 98.6 (12 Jan 2024 21:01)  HR: 81 (13 Jan 2024 04:53) (75 - 87)  BP: 146/90 (13 Jan 2024 04:53) (117/73 - 146/90)  BP(mean): --  RR: 18 (13 Jan 2024 04:53) (18 - 18)  SpO2: 96% (13 Jan 2024 04:53) (96% - 98%)    Parameters below as of 13 Jan 2024 04:53  Patient On (Oxygen Delivery Method): room air        PHYSICAL EXAM:  GENERAL: no distress  PSYCH: A&O x3  HEAD: Atraumatic, Normocephalic  NECK: Supple, No JVD  CHEST/LUNG: clear to auscultation bilaterally  HEART: regular rate and rhythm, no murmurs  ABDOMEN: nontender to palpation, no rebound tenderness/guarding  EXTREMITIES: no edema on bilateral LE  NEUROLOGY: no focal neurologic deficit  SKIN: No rashes or lesions    LABS:                        9.4    6.31  )-----------( 294      ( 12 Jan 2024 06:41 )             28.9      01-12    142  |  107  |  21  ----------------------------<  77  4.2   |  25  |  1.87<H>    Ca    8.2<L>      12 Jan 2024 06:39  Phos  2.6     01-12  Mg     2.2     01-12            Urinalysis Basic - ( 12 Jan 2024 06:39 )    Color: x / Appearance: x / SG: x / pH: x  Gluc: 77 mg/dL / Ketone: x  / Bili: x / Urobili: x   Blood: x / Protein: x / Nitrite: x   Leuk Esterase: x / RBC: x / WBC x   Sq Epi: x / Non Sq Epi: x / Bacteria: x        RADIOLOGY & ADDITIONAL TESTS:    Imaging Personally Reviewed:    Consultant(s) Notes Reviewed:      Care Discussed with Consultants/Other Providers:   Juliane Patino PGY1  pager [phone] or check resident tab for coverage    Patient is a 73y old  Male who presents with a chief complaint of Diarrhea/ (12 Jan 2024 12:52)      SUBJECTIVE / OVERNIGHT EVENTS: NAONE. Patient feeling well this AM. Denies any acute complaints. Inquiring regarding [facility] vs home. Denies n/v/d/c/fevers/pain.     MEDICATIONS  (STANDING):  chlorhexidine 2% Cloths 1 Application(s) Topical <User Schedule>  dextrose 50% Injectable 25 milliLiter(s) IV Push once  dextrose 50% Injectable 25 Gram(s) IV Push once  dextrose 50% Injectable 12.5 Gram(s) IV Push once  dextrose 50% Injectable 25 Gram(s) IV Push once  dextrose Oral Gel 15 Gram(s) Oral once  finasteride 5 milliGRAM(s) Oral daily  glucagon  Injectable 1 milliGRAM(s) IntraMuscular once  heparin   Injectable 5000 Unit(s) SubCutaneous every 8 hours  hydrocortisone 20 milliGRAM(s) Oral daily  hydrocortisone 10 milliGRAM(s) Oral daily  lactated ringers. 1000 milliLiter(s) (70 mL/Hr) IV Continuous <Continuous>  metoprolol tartrate 12.5 milliGRAM(s) Oral two times a day  sodium bicarbonate 650 milliGRAM(s) Oral two times a day  sodium chloride 0.65% Nasal 1 Spray(s) Both Nostrils once  Vyndamax (Tafamidis) 61mg Capsule 1 Capsule(s) 1 Capsule(s) Oral daily    MEDICATIONS  (PRN):  acetaminophen     Tablet .. 650 milliGRAM(s) Oral every 6 hours PRN Mild Pain (1 - 3), Moderate Pain (4 - 6)  fluticasone propionate 50 MICROgram(s)/spray Nasal Spray 1 Spray(s) Both Nostrils two times a day PRN congestion  hemorrhoidal Ointment 1 Application(s) Rectal daily PRN pain  ondansetron Injectable 4 milliGRAM(s) IV Push daily PRN Nausea      CAPILLARY BLOOD GLUCOSE        I&O's Summary    12 Jan 2024 07:01  -  13 Jan 2024 07:00  --------------------------------------------------------  IN: 1020 mL / OUT: 2050 mL / NET: -1030 mL        Vital Signs Last 24 Hrs  T(C): 36.9 (13 Jan 2024 04:53), Max: 37 (12 Jan 2024 21:01)  T(F): 98.4 (13 Jan 2024 04:53), Max: 98.6 (12 Jan 2024 21:01)  HR: 81 (13 Jan 2024 04:53) (75 - 87)  BP: 146/90 (13 Jan 2024 04:53) (117/73 - 146/90)  BP(mean): --  RR: 18 (13 Jan 2024 04:53) (18 - 18)  SpO2: 96% (13 Jan 2024 04:53) (96% - 98%)    Parameters below as of 13 Jan 2024 04:53  Patient On (Oxygen Delivery Method): room air        PHYSICAL EXAM:  GENERAL: no distress  PSYCH: A&O x3  HEAD: Atraumatic, Normocephalic  NECK: Supple, No JVD  CHEST/LUNG: clear to auscultation bilaterally  HEART: regular rate and rhythm, no murmurs  ABDOMEN: nontender to palpation, no rebound tenderness/guarding  EXTREMITIES: no edema on bilateral LE  NEUROLOGY: no focal neurologic deficit  SKIN: No rashes or lesions    LABS:                        9.4    6.31  )-----------( 294      ( 12 Jan 2024 06:41 )             28.9      01-12    142  |  107  |  21  ----------------------------<  77  4.2   |  25  |  1.87<H>    Ca    8.2<L>      12 Jan 2024 06:39  Phos  2.6     01-12  Mg     2.2     01-12            Urinalysis Basic - ( 12 Jan 2024 06:39 )    Color: x / Appearance: x / SG: x / pH: x  Gluc: 77 mg/dL / Ketone: x  / Bili: x / Urobili: x   Blood: x / Protein: x / Nitrite: x   Leuk Esterase: x / RBC: x / WBC x   Sq Epi: x / Non Sq Epi: x / Bacteria: x        RADIOLOGY & ADDITIONAL TESTS:    Imaging Personally Reviewed:    Consultant(s) Notes Reviewed:      Care Discussed with Consultants/Other Providers:   Juliane Patino PGY1  pager [phone] or check resident tab for coverage    Patient is a 73y old  Male who presents with a chief complaint of Diarrhea/ (12 Jan 2024 12:52)      SUBJECTIVE / OVERNIGHT EVENTS: NAONE. Patient feeling well this AM. Denies any acute complaints. Inquiring regarding [facility] vs home. Denies n/v/d/c/fevers/pain.     MEDICATIONS  (STANDING):  chlorhexidine 2% Cloths 1 Application(s) Topical <User Schedule>  dextrose 50% Injectable 25 milliLiter(s) IV Push once  dextrose 50% Injectable 25 Gram(s) IV Push once  dextrose 50% Injectable 12.5 Gram(s) IV Push once  dextrose 50% Injectable 25 Gram(s) IV Push once  dextrose Oral Gel 15 Gram(s) Oral once  finasteride 5 milliGRAM(s) Oral daily  glucagon  Injectable 1 milliGRAM(s) IntraMuscular once  heparin   Injectable 5000 Unit(s) SubCutaneous every 8 hours  hydrocortisone 20 milliGRAM(s) Oral daily  hydrocortisone 10 milliGRAM(s) Oral daily  lactated ringers. 1000 milliLiter(s) (70 mL/Hr) IV Continuous <Continuous>  metoprolol tartrate 12.5 milliGRAM(s) Oral two times a day  sodium bicarbonate 650 milliGRAM(s) Oral two times a day  sodium chloride 0.65% Nasal 1 Spray(s) Both Nostrils once  Vyndamax (Tafamidis) 61mg Capsule 1 Capsule(s) 1 Capsule(s) Oral daily    MEDICATIONS  (PRN):  acetaminophen     Tablet .. 650 milliGRAM(s) Oral every 6 hours PRN Mild Pain (1 - 3), Moderate Pain (4 - 6)  fluticasone propionate 50 MICROgram(s)/spray Nasal Spray 1 Spray(s) Both Nostrils two times a day PRN congestion  hemorrhoidal Ointment 1 Application(s) Rectal daily PRN pain  ondansetron Injectable 4 milliGRAM(s) IV Push daily PRN Nausea      CAPILLARY BLOOD GLUCOSE        I&O's Summary    12 Jan 2024 07:01  -  13 Jan 2024 07:00  --------------------------------------------------------  IN: 1020 mL / OUT: 2050 mL / NET: -1030 mL        Vital Signs Last 24 Hrs  T(C): 36.9 (13 Jan 2024 04:53), Max: 37 (12 Jan 2024 21:01)  T(F): 98.4 (13 Jan 2024 04:53), Max: 98.6 (12 Jan 2024 21:01)  HR: 81 (13 Jan 2024 04:53) (75 - 87)  BP: 146/90 (13 Jan 2024 04:53) (117/73 - 146/90)  BP(mean): --  RR: 18 (13 Jan 2024 04:53) (18 - 18)  SpO2: 96% (13 Jan 2024 04:53) (96% - 98%)    Parameters below as of 13 Jan 2024 04:53  Patient On (Oxygen Delivery Method): room air        PHYSICAL EXAM:  GENERAL: no distress  PSYCH: A&O x3  HEAD: Atraumatic, Normocephalic  NECK: Supple, No JVD  CHEST/LUNG: clear to auscultation bilaterally  HEART: regular rate and rhythm, no murmurs  ABDOMEN: nontender to palpation, no rebound tenderness/guarding  EXTREMITIES: no edema on bilateral LE  NEUROLOGY: no focal neurologic deficit  SKIN: No rashes or lesions    LABS:                        9.4    6.31  )-----------( 294      ( 12 Jan 2024 06:41 )             28.9      01-12    142  |  107  |  21  ----------------------------<  77  4.2   |  25  |  1.87<H>    Ca    8.2<L>      12 Jan 2024 06:39  Phos  2.6     01-12  Mg     2.2     01-12            Urinalysis Basic - ( 12 Jan 2024 06:39 )    Color: x / Appearance: x / SG: x / pH: x  Gluc: 77 mg/dL / Ketone: x  / Bili: x / Urobili: x   Blood: x / Protein: x / Nitrite: x   Leuk Esterase: x / RBC: x / WBC x   Sq Epi: x / Non Sq Epi: x / Bacteria: x        RADIOLOGY & ADDITIONAL TESTS:    Imaging Personally Reviewed:    Consultant(s) Notes Reviewed:      Care Discussed with Consultants/Other Providers:

## 2024-01-13 NOTE — PROGRESS NOTE ADULT - PROBLEM SELECTOR PLAN 4
Pain at 1st MTP joint  - XR R foot c/w gouty arthropathy  - acetaminophen PRN for pain  - pain resolved on 1/10 yes

## 2024-01-13 NOTE — PROGRESS NOTE ADULT - SUBJECTIVE AND OBJECTIVE BOX
CARDIOLOGY FOLLOW UP - Dr. Saravia  DATE OF SERVICE: 1/13/24    CC no acute cv events       REVIEW OF SYSTEMS:  CONSTITUTIONAL: No fever, weight loss, or fatigue  RESPIRATORY: No cough, wheezing, chills or hemoptysis; No Shortness of Breath  CARDIOVASCULAR: No chest pain, palpitations, passing out, dizziness, or leg swelling  GASTROINTESTINAL: No abdominal or epigastric pain. No nausea, vomiting, or hematemesis; No diarrhea or constipation. No melena or hematochezia.  VASCULAR: No edema     PHYSICAL EXAM:  T(C): 36.9 (01-13-24 @ 04:53), Max: 37 (01-12-24 @ 21:01)  HR: 81 (01-13-24 @ 04:53) (75 - 87)  BP: 146/90 (01-13-24 @ 04:53) (117/73 - 146/90)  RR: 18 (01-13-24 @ 04:53) (18 - 18)  SpO2: 96% (01-13-24 @ 04:53) (96% - 98%)  Wt(kg): --  I&O's Summary    12 Jan 2024 07:01  -  13 Jan 2024 07:00  --------------------------------------------------------  IN: 1020 mL / OUT: 2050 mL / NET: -1030 mL    13 Jan 2024 07:01  -  13 Jan 2024 11:10  --------------------------------------------------------  IN: 360 mL / OUT: 750 mL / NET: -390 mL        Appearance: Normal	  Cardiovascular: Normal S1 S2,RRR, No JVD, No murmurs  Respiratory: Lungs clear to auscultation	  Gastrointestinal:  Soft, Non-tender, + BS	  Extremities: Normal range of motion, No clubbing, cyanosis or edema      Home Medications:  fluticasone 50 mcg/inh nasal spray: 1 spray(s) in each nostril 2 times a day as needed (04 Jan 2024 12:08)  mirtazapine 7.5 mg oral tablet: 1 tab(s) orally once a day at 6 PM (04 Jan 2024 12:08)  Saline Mist 0.65% nasal spray: 2 spray(s) intranasally as needed (04 Jan 2024 12:08)  Vyndamax 61 mg oral capsule: 1 cap(s) orally once a day (04 Jan 2024 12:08)      MEDICATIONS  (STANDING):  chlorhexidine 2% Cloths 1 Application(s) Topical <User Schedule>  dextrose 50% Injectable 25 Gram(s) IV Push once  dextrose 50% Injectable 25 milliLiter(s) IV Push once  dextrose 50% Injectable 25 Gram(s) IV Push once  dextrose 50% Injectable 12.5 Gram(s) IV Push once  dextrose Oral Gel 15 Gram(s) Oral once  finasteride 5 milliGRAM(s) Oral daily  glucagon  Injectable 1 milliGRAM(s) IntraMuscular once  heparin   Injectable 5000 Unit(s) SubCutaneous every 8 hours  hydrocortisone 20 milliGRAM(s) Oral daily  hydrocortisone 10 milliGRAM(s) Oral daily  metoprolol tartrate 12.5 milliGRAM(s) Oral two times a day  sodium bicarbonate 650 milliGRAM(s) Oral two times a day  sodium chloride 0.65% Nasal 1 Spray(s) Both Nostrils once  Vyndamax (Tafamidis) 61mg Capsule 1 Capsule(s) 1 Capsule(s) Oral daily      TELEMETRY: nsr [id number]	    ECG:  	  RADIOLOGY:   DIAGNOSTIC TESTING:  [ ] Echocardiogram:  [ ]  Catheterization:  [ ] Stress Test:    OTHER: 	    LABS:	 	                            9.4    6.31  )-----------( 294      ( 12 Jan 2024 06:41 )             28.9     01-12    142  |  107  |  21  ----------------------------<  77  4.2   |  25  |  1.87<H>    Ca    8.2<L>      12 Jan 2024 06:39  Phos  2.6     01-12  Mg     2.2     01-12

## 2024-01-13 NOTE — PROGRESS NOTE ADULT - ATTENDING COMMENTS
73M w/ BPH (c/b urinary retention s/p Harkins catheter placement; exchanged monthly), CKD stage IIIb, and cardiac amyloidosis admitted for sepsis secondary to UTI, now resolved s/p IV hydration and antibiotics course.   COVID+: Not requiring O2 at this time. Supportive measures.   SARAH on CKD - Cr improving renal following.   Amyloidosis: Cardiology recommends to continue with vyndamax. Started on metoprolol 12.5mg BID per cards for WCT.   Adrenal Insufficiency: AM cortisol 2.3, ACTH low. C/f AI. Endo following. Team discussed with neuroradiology re: MRI finding of possible pituitary microadenoma. per discussion differential diagnosis at this time includes a pituitary microadenoma vs a pituitary cyst. No evidence of any infiltrative process as per neuroradiology. Patient should have repeat MRI sella with and without contrast in 6-12 months to ensure there is no growth of this area.   Steroid taper per endo. No further inpt w/u and f/u endo outpatient.    Dispo - Pending CHERYL placement.   Rest per resident documentation above    Time spent: 40 minutes    [facility] Division of Hospital Medicine  Ann Marie Alfaro MD  Available on TEAMS 8a-8p 73M w/ BPH (c/b urinary retention s/p Harkins catheter placement; exchanged monthly), CKD stage IIIb, and cardiac amyloidosis admitted for sepsis secondary to UTI, now resolved s/p IV hydration and antibiotics course.   COVID+: Not requiring O2 at this time. Supportive measures.   SARAH on CKD - Cr improving renal following.   Amyloidosis: Cardiology recommends to continue with vyndamax. Started on metoprolol 12.5mg BID per cards for WCT.   Adrenal Insufficiency: AM cortisol 2.3, ACTH low. C/f AI. Endo following. Team discussed with neuroradiology re: MRI finding of possible pituitary microadenoma. per discussion differential diagnosis at this time includes a pituitary microadenoma vs a pituitary cyst. No evidence of any infiltrative process as per neuroradiology. Patient should have repeat MRI sella with and without contrast in 6-12 months to ensure there is no growth of this area.   Steroid taper per endo. No further inpt w/u and f/u endo outpatient.    Dispo - Pending CHERYL placement.   Rest per resident documentation above    Time spent: 40 minutes    [facility] Division of Hospital Medicine  Ann Marie Alfaro MD  Available on TEAMS 8a-8p

## 2024-01-14 LAB
ALBUMIN SERPL ELPH-MCNC: 3.5 G/DL — SIGNIFICANT CHANGE UP (ref 3.3–5)
ALBUMIN SERPL ELPH-MCNC: 3.5 G/DL — SIGNIFICANT CHANGE UP (ref 3.3–5)
ALP SERPL-CCNC: 78 U/L — SIGNIFICANT CHANGE UP (ref 40–120)
ALP SERPL-CCNC: 78 U/L — SIGNIFICANT CHANGE UP (ref 40–120)
ALT FLD-CCNC: 14 U/L — SIGNIFICANT CHANGE UP (ref 10–45)
ALT FLD-CCNC: 14 U/L — SIGNIFICANT CHANGE UP (ref 10–45)
ANION GAP SERPL CALC-SCNC: 11 MMOL/L — SIGNIFICANT CHANGE UP (ref 5–17)
ANION GAP SERPL CALC-SCNC: 11 MMOL/L — SIGNIFICANT CHANGE UP (ref 5–17)
AST SERPL-CCNC: 18 U/L — SIGNIFICANT CHANGE UP (ref 10–40)
AST SERPL-CCNC: 18 U/L — SIGNIFICANT CHANGE UP (ref 10–40)
BASOPHILS # BLD AUTO: 0.02 K/UL — SIGNIFICANT CHANGE UP (ref 0–0.2)
BASOPHILS # BLD AUTO: 0.02 K/UL — SIGNIFICANT CHANGE UP (ref 0–0.2)
BASOPHILS NFR BLD AUTO: 0.3 % — SIGNIFICANT CHANGE UP (ref 0–2)
BASOPHILS NFR BLD AUTO: 0.3 % — SIGNIFICANT CHANGE UP (ref 0–2)
BILIRUB SERPL-MCNC: 0.3 MG/DL — SIGNIFICANT CHANGE UP (ref 0.2–1.2)
BILIRUB SERPL-MCNC: 0.3 MG/DL — SIGNIFICANT CHANGE UP (ref 0.2–1.2)
BUN SERPL-MCNC: 24 MG/DL — HIGH (ref 7–23)
BUN SERPL-MCNC: 24 MG/DL — HIGH (ref 7–23)
CALCIUM SERPL-MCNC: 8.2 MG/DL — LOW (ref 8.4–10.5)
CALCIUM SERPL-MCNC: 8.2 MG/DL — LOW (ref 8.4–10.5)
CHLORIDE SERPL-SCNC: 107 MMOL/L — SIGNIFICANT CHANGE UP (ref 96–108)
CHLORIDE SERPL-SCNC: 107 MMOL/L — SIGNIFICANT CHANGE UP (ref 96–108)
CO2 SERPL-SCNC: 23 MMOL/L — SIGNIFICANT CHANGE UP (ref 22–31)
CO2 SERPL-SCNC: 23 MMOL/L — SIGNIFICANT CHANGE UP (ref 22–31)
CREAT SERPL-MCNC: 1.79 MG/DL — HIGH (ref 0.5–1.3)
CREAT SERPL-MCNC: 1.79 MG/DL — HIGH (ref 0.5–1.3)
EGFR: 39 ML/MIN/1.73M2 — LOW
EGFR: 39 ML/MIN/1.73M2 — LOW
EOSINOPHIL # BLD AUTO: 0.13 K/UL — SIGNIFICANT CHANGE UP (ref 0–0.5)
EOSINOPHIL # BLD AUTO: 0.13 K/UL — SIGNIFICANT CHANGE UP (ref 0–0.5)
EOSINOPHIL NFR BLD AUTO: 2.1 % — SIGNIFICANT CHANGE UP (ref 0–6)
EOSINOPHIL NFR BLD AUTO: 2.1 % — SIGNIFICANT CHANGE UP (ref 0–6)
GLUCOSE SERPL-MCNC: 68 MG/DL — LOW (ref 70–99)
GLUCOSE SERPL-MCNC: 68 MG/DL — LOW (ref 70–99)
HCT VFR BLD CALC: 27.3 % — LOW (ref 39–50)
HCT VFR BLD CALC: 27.3 % — LOW (ref 39–50)
HGB BLD-MCNC: 8.9 G/DL — LOW (ref 13–17)
HGB BLD-MCNC: 8.9 G/DL — LOW (ref 13–17)
IMM GRANULOCYTES NFR BLD AUTO: 0.3 % — SIGNIFICANT CHANGE UP (ref 0–0.9)
IMM GRANULOCYTES NFR BLD AUTO: 0.3 % — SIGNIFICANT CHANGE UP (ref 0–0.9)
LYMPHOCYTES # BLD AUTO: 2.82 K/UL — SIGNIFICANT CHANGE UP (ref 1–3.3)
LYMPHOCYTES # BLD AUTO: 2.82 K/UL — SIGNIFICANT CHANGE UP (ref 1–3.3)
LYMPHOCYTES # BLD AUTO: 45.6 % — HIGH (ref 13–44)
LYMPHOCYTES # BLD AUTO: 45.6 % — HIGH (ref 13–44)
MAGNESIUM SERPL-MCNC: 2.1 MG/DL — SIGNIFICANT CHANGE UP (ref 1.6–2.6)
MAGNESIUM SERPL-MCNC: 2.1 MG/DL — SIGNIFICANT CHANGE UP (ref 1.6–2.6)
MCHC RBC-ENTMCNC: 27.1 PG — SIGNIFICANT CHANGE UP (ref 27–34)
MCHC RBC-ENTMCNC: 27.1 PG — SIGNIFICANT CHANGE UP (ref 27–34)
MCHC RBC-ENTMCNC: 32.6 GM/DL — SIGNIFICANT CHANGE UP (ref 32–36)
MCHC RBC-ENTMCNC: 32.6 GM/DL — SIGNIFICANT CHANGE UP (ref 32–36)
MCV RBC AUTO: 83.2 FL — SIGNIFICANT CHANGE UP (ref 80–100)
MCV RBC AUTO: 83.2 FL — SIGNIFICANT CHANGE UP (ref 80–100)
MONOCYTES # BLD AUTO: 0.56 K/UL — SIGNIFICANT CHANGE UP (ref 0–0.9)
MONOCYTES # BLD AUTO: 0.56 K/UL — SIGNIFICANT CHANGE UP (ref 0–0.9)
MONOCYTES NFR BLD AUTO: 9.1 % — SIGNIFICANT CHANGE UP (ref 2–14)
MONOCYTES NFR BLD AUTO: 9.1 % — SIGNIFICANT CHANGE UP (ref 2–14)
NEUTROPHILS # BLD AUTO: 2.63 K/UL — SIGNIFICANT CHANGE UP (ref 1.8–7.4)
NEUTROPHILS # BLD AUTO: 2.63 K/UL — SIGNIFICANT CHANGE UP (ref 1.8–7.4)
NEUTROPHILS NFR BLD AUTO: 42.6 % — LOW (ref 43–77)
NEUTROPHILS NFR BLD AUTO: 42.6 % — LOW (ref 43–77)
NRBC # BLD: 0 /100 WBCS — SIGNIFICANT CHANGE UP (ref 0–0)
NRBC # BLD: 0 /100 WBCS — SIGNIFICANT CHANGE UP (ref 0–0)
PHOSPHATE SERPL-MCNC: 2.3 MG/DL — LOW (ref 2.5–4.5)
PHOSPHATE SERPL-MCNC: 2.3 MG/DL — LOW (ref 2.5–4.5)
PLATELET # BLD AUTO: 286 K/UL — SIGNIFICANT CHANGE UP (ref 150–400)
PLATELET # BLD AUTO: 286 K/UL — SIGNIFICANT CHANGE UP (ref 150–400)
POTASSIUM SERPL-MCNC: 4.1 MMOL/L — SIGNIFICANT CHANGE UP (ref 3.5–5.3)
POTASSIUM SERPL-MCNC: 4.1 MMOL/L — SIGNIFICANT CHANGE UP (ref 3.5–5.3)
POTASSIUM SERPL-SCNC: 4.1 MMOL/L — SIGNIFICANT CHANGE UP (ref 3.5–5.3)
POTASSIUM SERPL-SCNC: 4.1 MMOL/L — SIGNIFICANT CHANGE UP (ref 3.5–5.3)
PROT SERPL-MCNC: 6.3 G/DL — SIGNIFICANT CHANGE UP (ref 6–8.3)
PROT SERPL-MCNC: 6.3 G/DL — SIGNIFICANT CHANGE UP (ref 6–8.3)
RBC # BLD: 3.28 M/UL — LOW (ref 4.2–5.8)
RBC # BLD: 3.28 M/UL — LOW (ref 4.2–5.8)
RBC # FLD: 20.8 % — HIGH (ref 10.3–14.5)
RBC # FLD: 20.8 % — HIGH (ref 10.3–14.5)
SARS-COV-2 RNA SPEC QL NAA+PROBE: SIGNIFICANT CHANGE UP
SARS-COV-2 RNA SPEC QL NAA+PROBE: SIGNIFICANT CHANGE UP
SODIUM SERPL-SCNC: 141 MMOL/L — SIGNIFICANT CHANGE UP (ref 135–145)
SODIUM SERPL-SCNC: 141 MMOL/L — SIGNIFICANT CHANGE UP (ref 135–145)
WBC # BLD: 6.18 K/UL — SIGNIFICANT CHANGE UP (ref 3.8–10.5)
WBC # BLD: 6.18 K/UL — SIGNIFICANT CHANGE UP (ref 3.8–10.5)
WBC # FLD AUTO: 6.18 K/UL — SIGNIFICANT CHANGE UP (ref 3.8–10.5)
WBC # FLD AUTO: 6.18 K/UL — SIGNIFICANT CHANGE UP (ref 3.8–10.5)

## 2024-01-14 PROCEDURE — 99232 SBSQ HOSP IP/OBS MODERATE 35: CPT | Mod: GC

## 2024-01-14 RX ORDER — HYDROCORTISONE 20 MG
1 TABLET ORAL
Qty: 0 | Refills: 0 | DISCHARGE
Start: 2024-01-14

## 2024-01-14 RX ORDER — SODIUM,POTASSIUM PHOSPHATES 278-250MG
1 POWDER IN PACKET (EA) ORAL ONCE
Refills: 0 | Status: COMPLETED | OUTPATIENT
Start: 2024-01-14 | End: 2024-01-14

## 2024-01-14 RX ADMIN — Medication 12.5 MILLIGRAM(S): at 17:25

## 2024-01-14 RX ADMIN — FINASTERIDE 5 MILLIGRAM(S): 5 TABLET, FILM COATED ORAL at 11:49

## 2024-01-14 RX ADMIN — Medication 10 MILLIGRAM(S): at 05:08

## 2024-01-14 RX ADMIN — Medication 12.5 MILLIGRAM(S): at 05:08

## 2024-01-14 RX ADMIN — HEPARIN SODIUM 5000 UNIT(S): 5000 INJECTION INTRAVENOUS; SUBCUTANEOUS at 14:47

## 2024-01-14 RX ADMIN — HEPARIN SODIUM 5000 UNIT(S): 5000 INJECTION INTRAVENOUS; SUBCUTANEOUS at 05:07

## 2024-01-14 RX ADMIN — Medication 650 MILLIGRAM(S): at 17:26

## 2024-01-14 RX ADMIN — CHLORHEXIDINE GLUCONATE 1 APPLICATION(S): 213 SOLUTION TOPICAL at 11:48

## 2024-01-14 RX ADMIN — Medication 1 PACKET(S): at 10:29

## 2024-01-14 RX ADMIN — Medication 650 MILLIGRAM(S): at 05:07

## 2024-01-14 RX ADMIN — Medication 20 MILLIGRAM(S): at 05:07

## 2024-01-14 RX ADMIN — HEPARIN SODIUM 5000 UNIT(S): 5000 INJECTION INTRAVENOUS; SUBCUTANEOUS at 21:16

## 2024-01-14 NOTE — PROVIDER CONTACT NOTE (OTHER) - BACKGROUND
73M w/ BPH, CKD stage IIIb, and cardiac amyloidosis brought in by daughter for diarrhea found to have hypotension likely due to sepsis.
Dx. Sepsis
73M w/ BPH, CKD stage IIIb, and cardiac amyloidosis brought in by daughter for diarrhea found to have hypotension likely due to sepsis.
Medication is his own meds from home, as per patient he has no more pills left
Dx.Sepsis
73M brought in by daughter for diarrhea found to have hypotension likely due to sepsis. Hx (c/b urinary retention s/p Harkins catheter placement; exchanged monthly
Dx sepsis
Pt admitted for sepsis

## 2024-01-14 NOTE — PROGRESS NOTE ADULT - ASSESSMENT
A/p  73M w/ BPH (c/b urinary retention s/p Harkins catheter placement; exchanged monthly), CKD stage IIIb, and cardiac amyloidosis brought in by daughter for diarrhea (loose-watery BM's) over the past few days.    #Sepsis  -I/s/o COVID + UTI  -Abx per med    #COVID  -CXR no focal consolidations  -sp Remdesivir  -Supportive care per med    #WCT  -16 beats noted on tele 1/11, 5 beats 1/12  -Keep Mg>2, K>4  -c/w metoprolol 12.5mg bid -- increase if ectopy continues     #cardiac amyloid   -Cont vyndamax  -baseline sbp 100 as outpt  -Prior echo with nml LV systolic fxn, Severe left ventricular hypertrophy, Findings suggestive of infiltrative cardiomyopathy    #Hypotension  -resolved- sp midodrine    #Hypoglycemia  -mgmt per med    #Dizziness  -Improved   -Orthostatics negative   -sp mido  -PT for mobility      dvt ppx

## 2024-01-14 NOTE — PROVIDER CONTACT NOTE (OTHER) - SITUATION
Patient did not take his vyndamax
retaining greater then 480 cc on bladder scan
5 beats AIVR
Pt verbalized to writer rectal/anus pain after passing a large formed brown stool. Pt requested to have something to help soothe the area.
patient had 16 beats WCT
PAT 4.2 seconds up to 127
PAT 2 seconds up to 135
Pt family arrived at bedside, brought in at home med "Vyndamax", states pt takes medication for the AM
pt c/o stomach discomfort and emesis episode

## 2024-01-14 NOTE — PROGRESS NOTE ADULT - ATTENDING COMMENTS
73M w/ BPH (c/b urinary retention s/p Harkins catheter placement; exchanged monthly), CKD stage IIIb, and cardiac amyloidosis admitted for sepsis secondary to UTI, now resolved s/p IV hydration and antibiotics course.   COVID+: Not requiring O2 at this time. Supportive measures.   SARAH on CKD - Cr improving renal following.   Amyloidosis: Cardiology recommends to continue with vyndamax. Started on metoprolol 12.5mg BID per cards for WCT.   Adrenal Insufficiency: AM cortisol 2.3, ACTH low. C/f AI. Endo following. Team discussed with neuroradiology re: MRI finding of possible pituitary microadenoma. per discussion differential diagnosis at this time includes a pituitary microadenoma vs a pituitary cyst. No evidence of any infiltrative process as per neuroradiology. Patient should have repeat MRI sella with and without contrast in 6-12 months to ensure there is no growth of this area.   Steroid taper per endo. No further inpt w/u and f/u endo outpatient.    Dispo - Pending CHERYL placement.   Rest per resident documentation above    [facility] Division of Hospital Medicine  Ann Marie Alfaro MD  Available on TEAMS 8a-8p . 73M w/ BPH (c/b urinary retention s/p Harkins catheter placement; exchanged monthly), CKD stage IIIb, and cardiac amyloidosis admitted for sepsis secondary to UTI, now resolved s/p IV hydration and antibiotics course.   COVID+: Not requiring O2 at this time. Supportive measures.   SARAH on CKD - Cr improving renal following.   Amyloidosis: Cardiology recommends to continue with vyndamax. Started on metoprolol 12.5mg BID per cards for WCT.   Adrenal Insufficiency: AM cortisol 2.3, ACTH low. C/f AI. Endo following. Team discussed with neuroradiology re: MRI finding of possible pituitary microadenoma. per discussion differential diagnosis at this time includes a pituitary microadenoma vs a pituitary cyst. No evidence of any infiltrative process as per neuroradiology. Patient should have repeat MRI sella with and without contrast in 6-12 months to ensure there is no growth of this area.   Steroid taper per endo. No further inpt w/u and f/u endo outpatient.    Dispo - Pending CHERYL placement.   Rest per resident documentation above    [facility] Division of Hospital Medicine  Ann Marie Alfaro MD  Available on TEAMS 8a-8p .

## 2024-01-14 NOTE — PROVIDER CONTACT NOTE (OTHER) - RECOMMENDATIONS
Awaiting provider orders
Nurse encourage patient to have family to bring meds
resident Gerber Alvares made aware
Notify provider
Per provider f/u with morning labs
Gerber Alvares made aware
Provider notified.
resident Gerber Alvares made aware
Awaiting provider orders, med reconciliation needed

## 2024-01-14 NOTE — PROVIDER CONTACT NOTE (OTHER) - ACTION/TREATMENT ORDERED:
Provider acknowledged message. No orders at this time.
straight cath x1 for urine rtn
Awaiting provider orders
Provider notified, no intervention
Awaiting new orders
PRN IVP zofran 4mg ordered
Hemorrhoid cream ordered, handoff given to AM RN.
no intervention @ this time. c/w telemetry monitoring

## 2024-01-14 NOTE — PROGRESS NOTE ADULT - SUBJECTIVE AND OBJECTIVE BOX
CARDIOLOGY FOLLOW UP - Dr. Saravia  DATE OF SERVICE: 1/14/24    CC brief pat otherwise no acute events       REVIEW OF SYSTEMS:  CONSTITUTIONAL: No fever, weight loss, or fatigue  RESPIRATORY: No cough, wheezing, chills or hemoptysis; No Shortness of Breath  CARDIOVASCULAR: No chest pain, palpitations, passing out, dizziness, or leg swelling  GASTROINTESTINAL: No abdominal or epigastric pain. No nausea, vomiting, or hematemesis; No diarrhea or constipation. No melena or hematochezia.  VASCULAR: No edema     PHYSICAL EXAM:  T(C): 36.6 (01-14-24 @ 10:30), Max: 36.9 (01-13-24 @ 21:08)  HR: 80 (01-14-24 @ 10:30) (76 - 85)  BP: 112/73 (01-14-24 @ 10:30) (112/73 - 136/87)  RR: 16 (01-14-24 @ 10:30) (16 - 18)  SpO2: 98% (01-14-24 @ 10:30) (96% - 99%)  Wt(kg): --  I&O's Summary    13 Jan 2024 07:01  -  14 Jan 2024 07:00  --------------------------------------------------------  IN: 1260 mL / OUT: 2450 mL / NET: -1190 mL    14 Jan 2024 07:01  -  14 Jan 2024 11:13  --------------------------------------------------------  IN: 0 mL / OUT: 400 mL / NET: -400 mL        Appearance: Normal	  Cardiovascular: Normal S1 S2,RRR, No JVD, No murmurs  Respiratory: Lungs clear to auscultation	  Gastrointestinal:  Soft, Non-tender, + BS	  Extremities: Normal range of motion, No clubbing, cyanosis or edema      Home Medications:  fluticasone 50 mcg/inh nasal spray: 1 spray(s) in each nostril 2 times a day as needed (04 Jan 2024 12:08)  mirtazapine 7.5 mg oral tablet: 1 tab(s) orally once a day at 6 PM (04 Jan 2024 12:08)  Saline Mist 0.65% nasal spray: 2 spray(s) intranasally as needed (04 Jan 2024 12:08)  Vyndamax 61 mg oral capsule: 1 cap(s) orally once a day (04 Jan 2024 12:08)      MEDICATIONS  (STANDING):  chlorhexidine 2% Cloths 1 Application(s) Topical <User Schedule>  dextrose 50% Injectable 25 Gram(s) IV Push once  dextrose 50% Injectable 12.5 Gram(s) IV Push once  dextrose 50% Injectable 25 milliLiter(s) IV Push once  dextrose 50% Injectable 25 Gram(s) IV Push once  dextrose Oral Gel 15 Gram(s) Oral once  finasteride 5 milliGRAM(s) Oral daily  glucagon  Injectable 1 milliGRAM(s) IntraMuscular once  heparin   Injectable 5000 Unit(s) SubCutaneous every 8 hours  hydrocortisone 20 milliGRAM(s) Oral daily  hydrocortisone 10 milliGRAM(s) Oral daily  metoprolol tartrate 12.5 milliGRAM(s) Oral two times a day  sodium bicarbonate 650 milliGRAM(s) Oral two times a day  sodium chloride 0.65% Nasal 1 Spray(s) Both Nostrils once  Vyndamax (Tafamidis) 61mg Capsule 1 Capsule(s) 1 Capsule(s) Oral daily      TELEMETRY: 	    ECG:  	  RADIOLOGY:   DIAGNOSTIC TESTING:  [ ] Echocardiogram:  [ ]  Catheterization:  [ ] Stress Test:    OTHER: 	    LABS:	 	                            8.9    6.18  )-----------( 286      ( 14 Jan 2024 06:16 )             27.3     01-14    141  |  107  |  24<H>  ----------------------------<  68<L>  4.1   |  23  |  1.79<H>    Ca    8.2<L>      14 Jan 2024 06:16  Phos  2.3     01-14  Mg     2.1     01-14    TPro  6.3  /  Alb  3.5  /  TBili  0.3  /  DBili  x   /  AST  18  /  ALT  14  /  AlkPhos  78  01-14

## 2024-01-14 NOTE — PROVIDER CONTACT NOTE (OTHER) - ASSESSMENT
The patient denies chest pain or discomfort. He has a history of 5 beats. VSS.
pt standing up to pee and having a BM during episode. pt is A&O4 on RA. COVID +. sinus rhythm on tele. pt denies SOB, palpitations, or pain.
AO x3, pt able to verbalize needs.  Pt denies and headache, dizziness, shortness of breath, headache and chest pain. Verbalizes rectal/anus burning and discomfort. No active bleeding noted.
Pt asymptomatic, denies chest pain or sob. /82, HR 75, O2 99%, temp 98
pt is A&O4 on RA. pt c/o stomach discomfort and emesis episode. states this was this first time that he threw up. small amount of clear colored sputum in basin. abd soft and nontender. bowel sounds present
pt is A&O4 on RA. bladder scan q8 for TOV s/p morales removal 1/8 @ 1130AM. pt voiding overnight, first  bladder scan 121. AM bladder scan 4 am 1/8 retaining greater then 480, pt states he feels pressure
Pt AO x3, pt able to verbalize needs. Pt denies and headache, dizziness, shortness of breath, headache and chest pain. Pt verbalizes no pain, no acute distress noted.
Patient is asymptomatic. /75, HR 76, O2 96%

## 2024-01-14 NOTE — PROVIDER CONTACT NOTE (OTHER) - DATE AND TIME:
02-Jan-2024 23:39
11-Jan-2024 22:06
07-Jan-2024 20:46
11-Jan-2024 04:11
08-Jan-2024 02:03
08-Jan-2024 04:26
09-Jan-2024 14:20
14-Jan-2024 03:56
13-Jan-2024 04:45

## 2024-01-14 NOTE — PROGRESS NOTE ADULT - PROBLEM SELECTOR PLAN 9
- consistent with chronic disease w/low iron  - s/p venofer x3 day course  - Hgb stable ~9, no evidence of active bleeding  - 1/9 drop from 9 -> 8, no evidence of bleeding and HDS. Continue to trend  - Hgb stable at 8--improved back to 9

## 2024-01-14 NOTE — PROGRESS NOTE ADULT - SUBJECTIVE AND OBJECTIVE BOX
DEEPTHI GÓMEZ  73y  Male    Patient is a 73y old  Male who presents with a chief complaint of Diarrhea/ (13 Jan 2024 11:10)    INTERVAL HPI/OVERNIGHT EVENTS:  - PAT x2s overnight, stable and asymptomatic  - no other events  - feeling well, at baseline. No new concerns today  - pending CHERYL     T(C): 36.7 (01-14-24 @ 04:33), Max: 36.9 (01-13-24 @ 21:08)  HR: 76 (01-14-24 @ 04:33) (76 - 85)  BP: 133/78 (01-14-24 @ 04:33) (117/74 - 136/87)  RR: 18 (01-14-24 @ 04:33) (18 - 18)  SpO2: 99% (01-14-24 @ 04:33) (96% - 99%)  Wt(kg): --Vital Signs Last 24 Hrs  T(C): 36.7 (14 Jan 2024 04:33), Max: 36.9 (13 Jan 2024 21:08)  T(F): 98 (14 Jan 2024 04:33), Max: 98.4 (13 Jan 2024 21:08)  HR: 76 (14 Jan 2024 04:33) (76 - 85)  BP: 133/78 (14 Jan 2024 04:33) (117/74 - 136/87)  BP(mean): --  RR: 18 (14 Jan 2024 04:33) (18 - 18)  SpO2: 99% (14 Jan 2024 04:33) (96% - 99%)    Parameters below as of 14 Jan 2024 04:33  Patient On (Oxygen Delivery Method): room air        PHYSICAL EXAM:  GENERAL: no distress  PSYCH: A&O x3  HEAD: Atraumatic, Normocephalic  NECK: Supple, No JVD  CHEST/LUNG: clear to auscultation bilaterally  HEART: regular rate and rhythm, no murmurs  ABDOMEN: nontender to palpation, no rebound tenderness/guarding  EXTREMITIES: no edema on bilateral LE  NEUROLOGY: no focal neurologic deficit  SKIN: No rashes or lesions    Consultant(s) Notes Reviewed:  [x ] YES  [ ] NO  Care Discussed with Consultants/Other Providers [ x] YES  [ ] NO    LABS:                        8.9    6.18  )-----------( 286      ( 14 Jan 2024 06:16 )             27.3     01-14    141  |  107  |  24<H>  ----------------------------<  68<L>  4.1   |  23  |  1.79<H>    Ca    8.2<L>      14 Jan 2024 06:16  Phos  2.3     01-14  Mg     2.1     01-14    TPro  6.3  /  Alb  3.5  /  TBili  0.3  /  DBili  x   /  AST  18  /  ALT  14  /  AlkPhos  78  01-14        Urinalysis Basic - ( 14 Jan 2024 06:16 )    Color: x / Appearance: x / SG: x / pH: x  Gluc: 68 mg/dL / Ketone: x  / Bili: x / Urobili: x   Blood: x / Protein: x / Nitrite: x   Leuk Esterase: x / RBC: x / WBC x   Sq Epi: x / Non Sq Epi: x / Bacteria: x      CAPILLARY BLOOD GLUCOSE            Urinalysis Basic - ( 14 Jan 2024 06:16 )    Color: x / Appearance: x / SG: x / pH: x  Gluc: 68 mg/dL / Ketone: x  / Bili: x / Urobili: x   Blood: x / Protein: x / Nitrite: x   Leuk Esterase: x / RBC: x / WBC x   Sq Epi: x / Non Sq Epi: x / Bacteria: x        RADIOLOGY & ADDITIONAL TESTS:    Imaging Personally Reviewed:  [ ] YES  [ ] NO    HEALTH ISSUES - PROBLEM Dx:  Sepsis    Acute kidney injury superimposed on CKD    Adrenal insufficiency    Foot pain, right    Diarrhea    Hypotension    Fall    Cardiac amyloidosis    Anemia    Abnormal transaminases    2019 novel coronavirus disease (COVID-19)    BPH (benign prostatic hyperplasia)    Prophylactic measure    Hypoglycemia    Abnormal thyroid blood test

## 2024-01-14 NOTE — PROVIDER CONTACT NOTE (OTHER) - NAME OF MD/NP/PA/DO NOTIFIED:
Shi Chaudhary
Renan Fishman
Ann Marie Short
resident Gerber Alvares
Renan Fishman
Gerber Alvares
resident Gerber Alvares
Gerber Alvares
Renan Fishman

## 2024-01-14 NOTE — PROVIDER CONTACT NOTE (OTHER) - REASON
PAT 4.2 seconds up to 127
Vyndamax at Home meds
per tele tech patient had 16 beats WCT
pt c/o stomach discomfort and emesis episode
vyndamax
Rectal/Anus Burning
5 beats AIVR
retaining greater then 480 cc on bladder scan
PAT 2 seconds up to 135

## 2024-01-15 ENCOUNTER — TRANSCRIPTION ENCOUNTER (OUTPATIENT)
Age: 74
End: 2024-01-15

## 2024-01-15 VITALS
SYSTOLIC BLOOD PRESSURE: 125 MMHG | RESPIRATION RATE: 18 BRPM | OXYGEN SATURATION: 99 % | DIASTOLIC BLOOD PRESSURE: 77 MMHG | HEART RATE: 86 BPM | TEMPERATURE: 98 F

## 2024-01-15 PROCEDURE — 96374 THER/PROPH/DIAG INJ IV PUSH: CPT

## 2024-01-15 PROCEDURE — 87507 IADNA-DNA/RNA PROBE TQ 12-25: CPT

## 2024-01-15 PROCEDURE — 87641 MR-STAPH DNA AMP PROBE: CPT

## 2024-01-15 PROCEDURE — 83550 IRON BINDING TEST: CPT

## 2024-01-15 PROCEDURE — 80061 LIPID PANEL: CPT

## 2024-01-15 PROCEDURE — 84146 ASSAY OF PROLACTIN: CPT

## 2024-01-15 PROCEDURE — 87040 BLOOD CULTURE FOR BACTERIA: CPT

## 2024-01-15 PROCEDURE — 84540 ASSAY OF URINE/UREA-N: CPT

## 2024-01-15 PROCEDURE — 97116 GAIT TRAINING THERAPY: CPT

## 2024-01-15 PROCEDURE — 82803 BLOOD GASES ANY COMBINATION: CPT

## 2024-01-15 PROCEDURE — 99291 CRITICAL CARE FIRST HOUR: CPT | Mod: 25

## 2024-01-15 PROCEDURE — 86900 BLOOD TYPING SEROLOGIC ABO: CPT

## 2024-01-15 PROCEDURE — 87077 CULTURE AEROBIC IDENTIFY: CPT

## 2024-01-15 PROCEDURE — 83002 ASSAY OF GONADOTROPIN (LH): CPT

## 2024-01-15 PROCEDURE — 87635 SARS-COV-2 COVID-19 AMP PRB: CPT

## 2024-01-15 PROCEDURE — 83605 ASSAY OF LACTIC ACID: CPT

## 2024-01-15 PROCEDURE — 84466 ASSAY OF TRANSFERRIN: CPT

## 2024-01-15 PROCEDURE — 94640 AIRWAY INHALATION TREATMENT: CPT

## 2024-01-15 PROCEDURE — 80048 BASIC METABOLIC PNL TOTAL CA: CPT

## 2024-01-15 PROCEDURE — 82024 ASSAY OF ACTH: CPT

## 2024-01-15 PROCEDURE — 74177 CT ABD & PELVIS W/CONTRAST: CPT | Mod: MA

## 2024-01-15 PROCEDURE — 84439 ASSAY OF FREE THYROXINE: CPT

## 2024-01-15 PROCEDURE — 82746 ASSAY OF FOLIC ACID SERUM: CPT

## 2024-01-15 PROCEDURE — 71045 X-RAY EXAM CHEST 1 VIEW: CPT

## 2024-01-15 PROCEDURE — 84300 ASSAY OF URINE SODIUM: CPT

## 2024-01-15 PROCEDURE — 85018 HEMOGLOBIN: CPT

## 2024-01-15 PROCEDURE — 87449 NOS EACH ORGANISM AG IA: CPT

## 2024-01-15 PROCEDURE — 84443 ASSAY THYROID STIM HORMONE: CPT

## 2024-01-15 PROCEDURE — 84295 ASSAY OF SERUM SODIUM: CPT

## 2024-01-15 PROCEDURE — 70450 CT HEAD/BRAIN W/O DYE: CPT | Mod: MA

## 2024-01-15 PROCEDURE — 82570 ASSAY OF URINE CREATININE: CPT

## 2024-01-15 PROCEDURE — 87637 SARSCOV2&INF A&B&RSV AMP PRB: CPT

## 2024-01-15 PROCEDURE — 87186 SC STD MICRODIL/AGAR DIL: CPT

## 2024-01-15 PROCEDURE — 86850 RBC ANTIBODY SCREEN: CPT

## 2024-01-15 PROCEDURE — 83540 ASSAY OF IRON: CPT

## 2024-01-15 PROCEDURE — 85025 COMPLETE CBC W/AUTO DIFF WBC: CPT

## 2024-01-15 PROCEDURE — 82962 GLUCOSE BLOOD TEST: CPT

## 2024-01-15 PROCEDURE — 84550 ASSAY OF BLOOD/URIC ACID: CPT

## 2024-01-15 PROCEDURE — 82728 ASSAY OF FERRITIN: CPT

## 2024-01-15 PROCEDURE — 97110 THERAPEUTIC EXERCISES: CPT

## 2024-01-15 PROCEDURE — 72125 CT NECK SPINE W/O DYE: CPT | Mod: MA

## 2024-01-15 PROCEDURE — 73630 X-RAY EXAM OF FOOT: CPT

## 2024-01-15 PROCEDURE — 82947 ASSAY GLUCOSE BLOOD QUANT: CPT

## 2024-01-15 PROCEDURE — 97162 PT EVAL MOD COMPLEX 30 MIN: CPT

## 2024-01-15 PROCEDURE — 93005 ELECTROCARDIOGRAM TRACING: CPT

## 2024-01-15 PROCEDURE — 93923 UPR/LXTR ART STDY 3+ LVLS: CPT

## 2024-01-15 PROCEDURE — 85014 HEMATOCRIT: CPT

## 2024-01-15 PROCEDURE — 85027 COMPLETE CBC AUTOMATED: CPT

## 2024-01-15 PROCEDURE — 82435 ASSAY OF BLOOD CHLORIDE: CPT

## 2024-01-15 PROCEDURE — 87324 CLOSTRIDIUM AG IA: CPT

## 2024-01-15 PROCEDURE — 83001 ASSAY OF GONADOTROPIN (FSH): CPT

## 2024-01-15 PROCEDURE — 86901 BLOOD TYPING SEROLOGIC RH(D): CPT

## 2024-01-15 PROCEDURE — A9585: CPT

## 2024-01-15 PROCEDURE — 87086 URINE CULTURE/COLONY COUNT: CPT

## 2024-01-15 PROCEDURE — 36415 COLL VENOUS BLD VENIPUNCTURE: CPT

## 2024-01-15 PROCEDURE — 70553 MRI BRAIN STEM W/O & W/DYE: CPT

## 2024-01-15 PROCEDURE — 97530 THERAPEUTIC ACTIVITIES: CPT

## 2024-01-15 PROCEDURE — 84480 ASSAY TRIIODOTHYRONINE (T3): CPT

## 2024-01-15 PROCEDURE — 82610 CYSTATIN C: CPT

## 2024-01-15 PROCEDURE — 82607 VITAMIN B-12: CPT

## 2024-01-15 PROCEDURE — 82533 TOTAL CORTISOL: CPT

## 2024-01-15 PROCEDURE — 96375 TX/PRO/DX INJ NEW DRUG ADDON: CPT

## 2024-01-15 PROCEDURE — 82330 ASSAY OF CALCIUM: CPT

## 2024-01-15 PROCEDURE — 87640 STAPH A DNA AMP PROBE: CPT

## 2024-01-15 PROCEDURE — 84133 ASSAY OF URINE POTASSIUM: CPT

## 2024-01-15 PROCEDURE — 83935 ASSAY OF URINE OSMOLALITY: CPT

## 2024-01-15 PROCEDURE — 84132 ASSAY OF SERUM POTASSIUM: CPT

## 2024-01-15 PROCEDURE — 81001 URINALYSIS AUTO W/SCOPE: CPT

## 2024-01-15 PROCEDURE — 84156 ASSAY OF PROTEIN URINE: CPT

## 2024-01-15 PROCEDURE — 84100 ASSAY OF PHOSPHORUS: CPT

## 2024-01-15 PROCEDURE — 84305 ASSAY OF SOMATOMEDIN: CPT

## 2024-01-15 PROCEDURE — 83036 HEMOGLOBIN GLYCOSYLATED A1C: CPT

## 2024-01-15 PROCEDURE — 99232 SBSQ HOSP IP/OBS MODERATE 35: CPT | Mod: GC

## 2024-01-15 PROCEDURE — 83735 ASSAY OF MAGNESIUM: CPT

## 2024-01-15 PROCEDURE — 80053 COMPREHEN METABOLIC PANEL: CPT

## 2024-01-15 RX ORDER — HYDROCORTISONE 20 MG
100 TABLET ORAL
Qty: 1 | Refills: 0
Start: 2024-01-15

## 2024-01-15 RX ORDER — METOPROLOL TARTRATE 50 MG
0.5 TABLET ORAL
Qty: 30 | Refills: 0
Start: 2024-01-15

## 2024-01-15 RX ORDER — TAMSULOSIN HYDROCHLORIDE 0.4 MG/1
1 CAPSULE ORAL
Qty: 30 | Refills: 0
Start: 2024-01-15 | End: 2024-02-13

## 2024-01-15 RX ORDER — SODIUM BICARBONATE 1 MEQ/ML
1 SYRINGE (ML) INTRAVENOUS
Qty: 0 | Refills: 0 | DISCHARGE
Start: 2024-01-15

## 2024-01-15 RX ADMIN — Medication 650 MILLIGRAM(S): at 05:46

## 2024-01-15 RX ADMIN — Medication 10 MILLIGRAM(S): at 05:25

## 2024-01-15 RX ADMIN — CHLORHEXIDINE GLUCONATE 1 APPLICATION(S): 213 SOLUTION TOPICAL at 08:24

## 2024-01-15 RX ADMIN — Medication 12.5 MILLIGRAM(S): at 05:25

## 2024-01-15 RX ADMIN — HEPARIN SODIUM 5000 UNIT(S): 5000 INJECTION INTRAVENOUS; SUBCUTANEOUS at 05:24

## 2024-01-15 RX ADMIN — Medication 20 MILLIGRAM(S): at 05:25

## 2024-01-15 RX ADMIN — FINASTERIDE 5 MILLIGRAM(S): 5 TABLET, FILM COATED ORAL at 11:26

## 2024-01-15 RX ADMIN — HEPARIN SODIUM 5000 UNIT(S): 5000 INJECTION INTRAVENOUS; SUBCUTANEOUS at 13:21

## 2024-01-15 NOTE — PROGRESS NOTE ADULT - SUBJECTIVE AND OBJECTIVE BOX
DEEPTHI GÓMEZ  73y  Male    Patient is a 73y old  Male who presents with a chief complaint of Diarrhea/ (15 Bernardino 2024 11:03)    INTERVAL HPI/OVERNIGHT EVENTS:  - NAEON  - HDS, no new complains  - pending CHERYL     T(C): 36.8 (01-15-24 @ 04:14), Max: 36.8 (01-14-24 @ 13:48)  HR: 78 (01-15-24 @ 04:14) (74 - 84)  BP: 124/77 (01-15-24 @ 04:14) (124/75 - 131/85)  RR: 18 (01-15-24 @ 04:14) (18 - 18)  SpO2: 98% (01-15-24 @ 04:14) (98% - 99%)  Wt(kg): --Vital Signs Last 24 Hrs  T(C): 36.8 (15 Bernardino 2024 04:14), Max: 36.8 (14 Jan 2024 13:48)  T(F): 98.3 (15 Bernardino 2024 04:14), Max: 98.3 (15 Bernardino 2024 04:14)  HR: 78 (15 Bernardino 2024 04:14) (74 - 84)  BP: 124/77 (15 Bernardino 2024 04:14) (124/75 - 131/85)  BP(mean): --  RR: 18 (15 Bernardino 2024 04:14) (18 - 18)  SpO2: 98% (15 Bernardino 2024 04:14) (98% - 99%)    Parameters below as of 15 Bernardino 2024 04:14  Patient On (Oxygen Delivery Method): room air    PHYSICAL EXAM:  GENERAL: no distress  PSYCH: A&O x3  HEAD: Atraumatic, Normocephalic  NECK: Supple, No JVD  CHEST/LUNG: clear to auscultation bilaterally  HEART: regular rate and rhythm, no murmurs  ABDOMEN: nontender to palpation, no rebound tenderness/guarding  EXTREMITIES: no edema on bilateral LE  NEUROLOGY: no focal neurologic deficit  SKIN: No rashes or lesions    Consultant(s) Notes Reviewed:  [x ] YES  [ ] NO  Care Discussed with Consultants/Other Providers [ x] YES  [ ] NO    LABS:                        8.9    6.18  )-----------( 286      ( 14 Jan 2024 06:16 )             27.3     01-14    141  |  107  |  24<H>  ----------------------------<  68<L>  4.1   |  23  |  1.79<H>    Ca    8.2<L>      14 Jan 2024 06:16  Phos  2.3     01-14  Mg     2.1     01-14    TPro  6.3  /  Alb  3.5  /  TBili  0.3  /  DBili  x   /  AST  18  /  ALT  14  /  AlkPhos  78  01-14        Urinalysis Basic - ( 14 Jan 2024 06:16 )    Color: x / Appearance: x / SG: x / pH: x  Gluc: 68 mg/dL / Ketone: x  / Bili: x / Urobili: x   Blood: x / Protein: x / Nitrite: x   Leuk Esterase: x / RBC: x / WBC x   Sq Epi: x / Non Sq Epi: x / Bacteria: x      CAPILLARY BLOOD GLUCOSE            Urinalysis Basic - ( 14 Jan 2024 06:16 )    Color: x / Appearance: x / SG: x / pH: x  Gluc: 68 mg/dL / Ketone: x  / Bili: x / Urobili: x   Blood: x / Protein: x / Nitrite: x   Leuk Esterase: x / RBC: x / WBC x   Sq Epi: x / Non Sq Epi: x / Bacteria: x        RADIOLOGY & ADDITIONAL TESTS:    Imaging Personally Reviewed:  [ ] YES  [ ] NO    HEALTH ISSUES - PROBLEM Dx:  Sepsis    Diarrhea    Cardiac amyloidosis    Acute kidney injury superimposed on CKD    BPH (benign prostatic hyperplasia)    Prophylactic measure    Fall    2019 novel coronavirus disease (COVID-19)    Abnormal transaminases    Anemia    Hypotension    Adrenal insufficiency    Hypoglycemia    Abnormal thyroid blood test    Foot pain, right

## 2024-01-15 NOTE — DISCHARGE NOTE NURSING/CASE MANAGEMENT/SOCIAL WORK - NSDCPEFALRISK_GEN_ALL_CORE
For information on Fall & Injury Prevention, visit: [email or web address] OR  [email or web address]-[email or web address]-injury OR  https://www.cdc.gov/steadi/patient.html For information on Fall & Injury Prevention, visit: [email or web address] OR  [email or web address]-injury OR  https://www.cdc.gov/steadi/patient.html For information on Fall & Injury Prevention, visit: [email or web address]://[email or web address]/[email or web address] OR  [email or web address]-[email or web address]-to-[email or web address]-injury OR  https://www.cdc.gov/steadi/patient.html

## 2024-01-15 NOTE — PROGRESS NOTE ADULT - PROVIDER SPECIALTY LIST ADULT
Cardiology
Cardiology
Infectious Disease
Internal Medicine
Internal Medicine
Nephrology
Endocrinology
Infectious Disease
Nephrology
Cardiology
Infectious Disease
Internal Medicine
Internal Medicine
Nephrology
Nephrology
Cardiology
Cardiology
Endocrinology
Nephrology
Cardiology
Cardiology
Nephrology
Nephrology
Cardiology
Cardiology
Endocrinology
Endocrinology
Internal Medicine

## 2024-01-15 NOTE — PROGRESS NOTE ADULT - SUBJECTIVE AND OBJECTIVE BOX
[facility] [phone]        Patient seen and examined in bed  Uneventful night         MEDICATIONS  (STANDING):  chlorhexidine 2% Cloths 1 Application(s) Topical <User Schedule>  dextrose 50% Injectable 25 milliLiter(s) IV Push once  dextrose 50% Injectable 25 Gram(s) IV Push once  dextrose 50% Injectable 25 Gram(s) IV Push once  dextrose 50% Injectable 12.5 Gram(s) IV Push once  dextrose Oral Gel 15 Gram(s) Oral once  finasteride 5 milliGRAM(s) Oral daily  glucagon  Injectable 1 milliGRAM(s) IntraMuscular once  heparin   Injectable 5000 Unit(s) SubCutaneous every 8 hours  hydrocortisone 20 milliGRAM(s) Oral daily  hydrocortisone 10 milliGRAM(s) Oral daily  metoprolol tartrate 12.5 milliGRAM(s) Oral two times a day  sodium bicarbonate 650 milliGRAM(s) Oral two times a day  sodium chloride 0.65% Nasal 1 Spray(s) Both Nostrils once  Vyndamax (Tafamidis) 61mg Capsule 1 Capsule(s) 1 Capsule(s) Oral daily      VITAL:  T(C): , Max: 36.8 (01-14-24 @ 13:48)  T(F): , Max: 98.3 (01-15-24 @ 04:14)  HR: 78 (01-15-24 @ 04:14)  BP: 124/77 (01-15-24 @ 04:14)  BP(mean): --  RR: 18 (01-15-24 @ 04:14)  SpO2: 98% (01-15-24 @ 04:14)  Wt(kg): --    I and O's:    01-14 @ 07:01  -  01-15 @ 07:00  --------------------------------------------------------  IN: 1200 mL / OUT: 2100 mL / NET: -900 mL          PHYSICAL EXAM:    Constitutional: NAD  Neck:  No JVD  Respiratory: CTAB/L  Cardiovascular: S1 and S2  Gastrointestinal: BS+, soft, NT/ND  Extremities: No peripheral edema  Neurological: A/O x 3, no focal deficits  Psychiatric: Normal mood, normal affect  : No Harkins  Skin: No rashes  Access: Not applicable    LABS:                        8.9    6.18  )-----------( 286      ( 14 Jan 2024 06:16 )             27.3     01-14    141  |  107  |  24<H>  ----------------------------<  68<L>  4.1   |  23  |  1.79<H>    Ca    8.2<L>      14 Jan 2024 06:16  Phos  2.3     01-14  Mg     2.1     01-14    TPro  6.3  /  Alb  3.5  /  TBili  0.3  /  DBili  x   /  AST  18  /  ALT  14  /  AlkPhos  78  01-14          Urine Studies:  Urinalysis Basic - ( 14 Jan 2024 06:16 )    Color: x / Appearance: x / SG: x / pH: x  Gluc: 68 mg/dL / Ketone: x  / Bili: x / Urobili: x   Blood: x / Protein: x / Nitrite: x   Leuk Esterase: x / RBC: x / WBC x   Sq Epi: x / Non Sq Epi: x / Bacteria: x            RADIOLOGY & ADDITIONAL STUDIES:        < from: Xray Foot AP + Lateral + Oblique, Right (01.09.24 @ 10:21) >    ACC: [id number] EXAM:  XR FOOT COMP MIN 3 VIEWS RT   ORDERED BY:  ALAINA ARIZA     PROCEDURE DATE:  01/09/2024          INTERPRETATION:  CLINICAL INFORMATION: Pain.    COMPARISON: None available.    TECHNIQUE: 3 nonweightbearing views of the right foot    FINDINGS:  No acute displaced fracture or dislocation.  Mixed productive and erosive arthropathy most advanced at the MTP joint   of the great toe with mild valgus deformity, as well as at the fifth   metatarsal base. Joint spaces are otherwise grossly anatomic in alignment  Bony mineralization within normal limits.  No aggressive osseous neoplasm.  No radiopaque foreign body.    IMPRESSION:  No acute displaced right foot fracture. Chronic gouty arthropathy.    --- End of Report ---            MICHAEL ARDON MD; Attending Radiologist  This document has been electronically signed. Jan 9 2024 10:38AM    < end of copied text >       [facility] [phone]        Patient seen and examined in bed  Uneventful night         MEDICATIONS  (STANDING):  chlorhexidine 2% Cloths 1 Application(s) Topical <User Schedule>  dextrose 50% Injectable 25 milliLiter(s) IV Push once  dextrose 50% Injectable 25 Gram(s) IV Push once  dextrose 50% Injectable 25 Gram(s) IV Push once  dextrose 50% Injectable 12.5 Gram(s) IV Push once  dextrose Oral Gel 15 Gram(s) Oral once  finasteride 5 milliGRAM(s) Oral daily  glucagon  Injectable 1 milliGRAM(s) IntraMuscular once  heparin   Injectable 5000 Unit(s) SubCutaneous every 8 hours  hydrocortisone 20 milliGRAM(s) Oral daily  hydrocortisone 10 milliGRAM(s) Oral daily  metoprolol tartrate 12.5 milliGRAM(s) Oral two times a day  sodium bicarbonate 650 milliGRAM(s) Oral two times a day  sodium chloride 0.65% Nasal 1 Spray(s) Both Nostrils once  Vyndamax (Tafamidis) 61mg Capsule 1 Capsule(s) 1 Capsule(s) Oral daily      VITAL:  T(C): , Max: 36.8 (01-14-24 @ 13:48)  T(F): , Max: 98.3 (01-15-24 @ 04:14)  HR: 78 (01-15-24 @ 04:14)  BP: 124/77 (01-15-24 @ 04:14)  BP(mean): --  RR: 18 (01-15-24 @ 04:14)  SpO2: 98% (01-15-24 @ 04:14)  Wt(kg): --    I and O's:    01-14 @ 07:01  -  01-15 @ 07:00  --------------------------------------------------------  IN: 1200 mL / OUT: 2100 mL / NET: -900 mL          PHYSICAL EXAM:    Constitutional: NAD  Neck:  No JVD  Respiratory: CTAB/L  Cardiovascular: S1 and S2  Gastrointestinal: BS+, soft, NT/ND  Extremities: No peripheral edema  Neurological: A/O x 3, no focal deficits  Psychiatric: Normal mood, normal affect  : No Harkins  Skin: No rashes  Access: Not applicable    LABS:                        8.9    6.18  )-----------( 286      ( 14 Jan 2024 06:16 )             27.3     01-14    141  |  107  |  24<H>  ----------------------------<  68<L>  4.1   |  23  |  1.79<H>    Ca    8.2<L>      14 Jan 2024 06:16  Phos  2.3     01-14  Mg     2.1     01-14    TPro  6.3  /  Alb  3.5  /  TBili  0.3  /  DBili  x   /  AST  18  /  ALT  14  /  AlkPhos  78  01-14          Urine Studies:  Urinalysis Basic - ( 14 Jan 2024 06:16 )    Color: x / Appearance: x / SG: x / pH: x  Gluc: 68 mg/dL / Ketone: x  / Bili: x / Urobili: x   Blood: x / Protein: x / Nitrite: x   Leuk Esterase: x / RBC: x / WBC x   Sq Epi: x / Non Sq Epi: x / Bacteria: x            RADIOLOGY & ADDITIONAL STUDIES:        < from: Xray Foot AP + Lateral + Oblique, Right (01.09.24 @ 10:21) >    ACC: [id number] EXAM:  XR FOOT COMP MIN 3 VIEWS RT   ORDERED BY:  ALAINA ARIZA     PROCEDURE DATE:  01/09/2024          INTERPRETATION:  CLINICAL INFORMATION: Pain.    COMPARISON: None available.    TECHNIQUE: 3 nonweightbearing views of the right foot    FINDINGS:  No acute displaced fracture or dislocation.  Mixed productive and erosive arthropathy most advanced at the MTP joint   of the great toe with mild valgus deformity, as well as at the fifth   metatarsal base. Joint spaces are otherwise grossly anatomic in alignment  Bony mineralization within normal limits.  No aggressive osseous neoplasm.  No radiopaque foreign body.    IMPRESSION:  No acute displaced right foot fracture. Chronic gouty arthropathy.    --- End of Report ---            MICHAEL ARDON MD; Attending Radiologist  This document has been electronically signed. Jan 9 2024 10:38AM    < end of copied text >       [facility] [phone]        Patient seen and examined in bed  Uneventful night         MEDICATIONS  (STANDING):  chlorhexidine 2% Cloths 1 Application(s) Topical <User Schedule>  dextrose 50% Injectable 25 milliLiter(s) IV Push once  dextrose 50% Injectable 25 Gram(s) IV Push once  dextrose 50% Injectable 25 Gram(s) IV Push once  dextrose 50% Injectable 12.5 Gram(s) IV Push once  dextrose Oral Gel 15 Gram(s) Oral once  finasteride 5 milliGRAM(s) Oral daily  glucagon  Injectable 1 milliGRAM(s) IntraMuscular once  heparin   Injectable 5000 Unit(s) SubCutaneous every 8 hours  hydrocortisone 20 milliGRAM(s) Oral daily  hydrocortisone 10 milliGRAM(s) Oral daily  metoprolol tartrate 12.5 milliGRAM(s) Oral two times a day  sodium bicarbonate 650 milliGRAM(s) Oral two times a day  sodium chloride 0.65% Nasal 1 Spray(s) Both Nostrils once  Vyndamax (Tafamidis) 61mg Capsule 1 Capsule(s) 1 Capsule(s) Oral daily      VITAL:  T(C): , Max: 36.8 (01-14-24 @ 13:48)  T(F): , Max: 98.3 (01-15-24 @ 04:14)  HR: 78 (01-15-24 @ 04:14)  BP: 124/77 (01-15-24 @ 04:14)  BP(mean): --  RR: 18 (01-15-24 @ 04:14)  SpO2: 98% (01-15-24 @ 04:14)  Wt(kg): --    I and O's:    01-14 @ 07:01  -  01-15 @ 07:00  --------------------------------------------------------  IN: 1200 mL / OUT: 2100 mL / NET: -900 mL          PHYSICAL EXAM:    Constitutional: NAD  Neck:  No JVD  Respiratory: CTAB/L  Cardiovascular: S1 and S2  Gastrointestinal: BS+, soft, NT/ND  Extremities: No peripheral edema  Neurological: A/O x 3, no focal deficits  Psychiatric: Normal mood, normal affect  : No Harkins  Skin: No rashes  Access: Not applicable    LABS:                        8.9    6.18  )-----------( 286      ( 14 Jan 2024 06:16 )             27.3     01-14    141  |  107  |  24<H>  ----------------------------<  68<L>  4.1   |  23  |  1.79<H>    Ca    8.2<L>      14 Jan 2024 06:16  Phos  2.3     01-14  Mg     2.1     01-14    TPro  6.3  /  Alb  3.5  /  TBili  0.3  /  DBili  x   /  AST  18  /  ALT  14  /  AlkPhos  78  01-14          Urine Studies:  Urinalysis Basic - ( 14 Jan 2024 06:16 )    Color: x / Appearance: x / SG: x / pH: x  Gluc: 68 mg/dL / Ketone: x  / Bili: x / Urobili: x   Blood: x / Protein: x / Nitrite: x   Leuk Esterase: x / RBC: x / WBC x   Sq Epi: x / Non Sq Epi: x / Bacteria: x            RADIOLOGY & ADDITIONAL STUDIES:        < from: Xray Foot AP + Lateral + Oblique, Right (01.09.24 @ 10:21) >    ACC: [id number] EXAM:  XR FOOT COMP MIN 3 VIEWS RT   ORDERED BY:  ALAINA ARIZA     PROCEDURE DATE:  01/09/2024          INTERPRETATION:  CLINICAL INFORMATION: Pain.    COMPARISON: None available.    TECHNIQUE: 3 nonweightbearing views of the right foot    FINDINGS:  No acute displaced fracture or dislocation.  Mixed productive and erosive arthropathy most advanced at the MTP joint   of the great toe with mild valgus deformity, as well as at the fifth   metatarsal base. Joint spaces are otherwise grossly anatomic in alignment  Bony mineralization within normal limits.  No aggressive osseous neoplasm.  No radiopaque foreign body.    IMPRESSION:  No acute displaced right foot fracture. Chronic gouty arthropathy.    --- End of Report ---            MICHAEL ARDON MD; Attending Radiologist  This document has been electronically signed. Jan 9 2024 10:38AM    < end of copied text >

## 2024-01-15 NOTE — PROGRESS NOTE ADULT - SUBJECTIVE AND OBJECTIVE BOX
CARDIOLOGY FOLLOW UP - Dr. Saravia  DATE OF SERVICE: 1/15/24    CC  No CV complaints     REVIEW OF SYSTEMS:  CONSTITUTIONAL: No fever, weight loss, or fatigue  RESPIRATORY: No cough, wheezing, chills or hemoptysis; No Shortness of Breath  CARDIOVASCULAR: No chest pain, palpitations, passing out, dizziness, or leg swelling  GASTROINTESTINAL: No abdominal or epigastric pain. No nausea, vomiting, or hematemesis; No diarrhea or constipation. No melena or hematochezia.  VASCULAR: No edema     PHYSICAL EXAM:  T(C): 36.8 (01-15-24 @ 04:14), Max: 36.8 (01-14-24 @ 13:48)  HR: 78 (01-15-24 @ 04:14) (74 - 84)  BP: 124/77 (01-15-24 @ 04:14) (112/73 - 131/85)  RR: 18 (01-15-24 @ 04:14) (16 - 18)  SpO2: 98% (01-15-24 @ 04:14) (98% - 99%)  Wt(kg): --  I&O's Summary    14 Jan 2024 07:01  -  15 Bernardino 2024 07:00  --------------------------------------------------------  IN: 1200 mL / OUT: 2100 mL / NET: -900 mL        Appearance: Elderly male 	  Cardiovascular: Normal S1 S2,RRR, No JVD, No murmurs  Respiratory: Lungs clear to auscultation b/l   Gastrointestinal:  Soft, Non-tender, + BS	  Extremities: Normal range of motion, No clubbing, cyanosis or edema      Home Medications:  fluticasone 50 mcg/inh nasal spray: 1 spray(s) in each nostril 2 times a day as needed (04 Jan 2024 12:08)  hydrocortisone 10 mg oral tablet: 1 tab(s) orally once a day Take daily at 3 PM (14 Jan 2024 13:14)  hydrocortisone 20 mg oral tablet: 1 tab(s) orally once a day Take daily at 8AM (14 Jan 2024 13:14)  mirtazapine 7.5 mg oral tablet: 1 tab(s) orally once a day at 6 PM (04 Jan 2024 12:08)  Saline Mist 0.65% nasal spray: 2 spray(s) intranasally as needed (04 Jan 2024 12:08)  Vyndamax 61 mg oral capsule: 1 cap(s) orally once a day (04 Jan 2024 12:08)      MEDICATIONS  (STANDING):  chlorhexidine 2% Cloths 1 Application(s) Topical <User Schedule>  dextrose 50% Injectable 25 Gram(s) IV Push once  dextrose 50% Injectable 25 milliLiter(s) IV Push once  dextrose 50% Injectable 25 Gram(s) IV Push once  dextrose 50% Injectable 12.5 Gram(s) IV Push once  dextrose Oral Gel 15 Gram(s) Oral once  finasteride 5 milliGRAM(s) Oral daily  glucagon  Injectable 1 milliGRAM(s) IntraMuscular once  heparin   Injectable 5000 Unit(s) SubCutaneous every 8 hours  hydrocortisone 20 milliGRAM(s) Oral daily  hydrocortisone 10 milliGRAM(s) Oral daily  metoprolol tartrate 12.5 milliGRAM(s) Oral two times a day  sodium bicarbonate 650 milliGRAM(s) Oral two times a day  sodium chloride 0.65% Nasal 1 Spray(s) Both Nostrils once  Vyndamax (Tafamidis) 61mg Capsule 1 Capsule(s) 1 Capsule(s) Oral daily      TELEMETRY: NSR 60-70s 	    ECG:  	  RADIOLOGY:   DIAGNOSTIC TESTING:  [ ] Echocardiogram:  [ ]  Catheterization:  [ ] Stress Test:    OTHER: 	    LABS:	 	                            8.9    6.18  )-----------( 286      ( 14 Jan 2024 06:16 )             27.3     01-14    141  |  107  |  24<H>  ----------------------------<  68<L>  4.1   |  23  |  1.79<H>    Ca    8.2<L>      14 Jan 2024 06:16  Phos  2.3     01-14  Mg     2.1     01-14    TPro  6.3  /  Alb  3.5  /  TBili  0.3  /  DBili  x   /  AST  18  /  ALT  14  /  AlkPhos  78  01-14

## 2024-01-15 NOTE — PROGRESS NOTE ADULT - ATTENDING COMMENTS
73M w/ BPH (c/b urinary retention s/p Harkins catheter placement; exchanged monthly), CKD stage IIIb, and cardiac amyloidosis admitted for sepsis secondary to UTI, now resolved s/p IV hydration and antibiotics course.   COVID+: Not requiring O2 at this time. Supportive measures.   SARAH on CKD - Cr improving renal following.   Amyloidosis: Cardiology recommends to continue with vyndamax. Started on metoprolol 12.5mg BID per cards for WCT.   Adrenal Insufficiency: AM cortisol 2.3, ACTH low. C/f AI. Endo following. Team discussed with neuroradiology re: MRI finding of possible pituitary microadenoma. per discussion differential diagnosis at this time includes a pituitary microadenoma vs a pituitary cyst. No evidence of any infiltrative process as per neuroradiology. Patient should have repeat MRI sella with and without contrast in 6-12 months to ensure there is no growth of this area.   Steroid taper per endo. No further inpt w/u and f/u endo outpatient.    Dispo - Pending CHERYL placement.   Rest per resident documentation above    [facility] Division of Hospital Medicine  Ann Marie Alfaro MD  Available on TEAMS 8a-8p . 73M w/ BPH (c/b urinary retention s/p Harkins catheter placement; exchanged monthly), CKD stage IIIb, and cardiac amyloidosis admitted for sepsis secondary to UTI, now resolved s/p IV hydration and antibiotics course.   COVID+: Not requiring O2 at this time. Supportive measures.   SARAH on CKD - Cr improving renal following.   Amyloidosis: Cardiology recommends to continue with vyndamax. Started on metoprolol 12.5mg BID per cards for WCT.   Adrenal Insufficiency: AM cortisol 2.3, ACTH low. C/f AI. Endo following. Team discussed with neuroradiology re: MRI finding of possible pituitary microadenoma. per discussion differential diagnosis at this time includes a pituitary microadenoma vs a pituitary cyst. No evidence of any infiltrative process as per neuroradiology. Patient should have repeat MRI sella with and without contrast in 6-12 months to ensure there is no growth of this area.   Steroid taper per endo. No further inpt w/u and f/u endo outpatient.    Dispo - Pending CHERYL placement.   Rest per resident documentation above    [facility] Division of Hospital Medicine  Ann Marie Alfaro MD  Available on TEAMS 8a-8p . 73M w/ BPH (c/b urinary retention s/p Harkins catheter placement; exchanged monthly), CKD stage IIIb, and cardiac amyloidosis admitted for sepsis secondary to UTI, now resolved s/p IV hydration and antibiotics course.   COVID+: Not requiring O2 at this time. Supportive measures.   SARAH on CKD - Cr improving renal following.   Amyloidosis: Cardiology recommends to continue with vyndamax. Started on metoprolol 12.5mg BID per cards for WCT.   Adrenal Insufficiency: AM cortisol 2.3, ACTH low. C/f AI. Endo following. Team discussed with neuroradiology re: MRI finding of possible pituitary microadenoma. per discussion differential diagnosis at this time includes a pituitary microadenoma vs a pituitary cyst. No evidence of any infiltrative process as per neuroradiology. Patient should have repeat MRI sella with and without contrast in 6-12 months to ensure there is no growth of this area.   Steroid taper per endo. No further inpt w/u and f/u endo outpatient.    Dispo - Pending CHERYL placement.   Rest per resident documentation above    [facility] Division of Hospital Medicine  Ann Marie Alfaro MD  Available on TEAMS 8a-8p .

## 2024-01-15 NOTE — PROGRESS NOTE ADULT - PROBLEM SELECTOR PLAN 3
- AM cortisol low, ACTH low c/f secondary AI  - endocrine following, appreciate recs  - CT abdomen  without adrenal abnormality, CTH without obvious pituitary etiology though not an optimal test to evaluate pituitary  - Repeat TSH, FT4, TT3 pending given low FT4 of 0.9 on 1/3  - gonadal axis normal   - continue hydrocortisone 20 mg QD 8AM, 10 mg QD 3PM - will be discharged on this regimen   - MR brain/sella with nonspecific area of  enhancement involving L pituitary gland, per endo this finding was discussed with neuroradiology and may represent microadenoma vs cyst.   - per endo MR findings do not explain adrenal insufficiency, will need further workup with endo outpatient  - upon d/c provide with education resources included in last endo note and rx for solu-cortef act-o-vial as outlined in endo recs

## 2024-01-15 NOTE — PROGRESS NOTE ADULT - TIME BILLING
Agree with above ACP note.  events noted  cv stable  cont current tx
Agree with above PA note.  cv stable  cont current tx  abx per medicine
Agree with above ACP note.  events noted  cv stable  cont current tx
Agree with above Np note.  cv stable  cont current tx  abx per medicine
Agree with above pa note.  cv stable  cont current tx
reviewing documentation/labs/imaging, interviewing and examining patient, documentation, coordinating care with ACP/CM/Specialists
Agree with above ACP note.  events noted  cv stable  cont current tx
Agree with above ACP note.  events noted  cv stable  cont current tx
Agree with above Np note.  cv stable  cont current tx  abx per medicine
Agree with above ACP note.  cv stable  cont current tx
reviewing documentation/labs/imaging, interviewing and examining patient, documentation, coordinating care with ACP/CM/Specialists
chart reviewing, history taking, physical exam, assessment and documentation, including speaking to specialist/SW/CM regarding the management.
reviewing documentation/labs/imaging, interviewing and examining patient, documentation, coordinating care with ACP/CM/Specialists

## 2024-01-15 NOTE — PROGRESS NOTE ADULT - ASSESSMENT
73M w/ BPH (c/b urinary retention s/p Morales catheter placement; exchanged monthly), CKD stage IIIb, and cardiac amyloidosis brought in by daughter for diarrhea (loose-watery BM's) over the past few days.  CKD stage 3 b and now SARAH in light of contrast study/hypotension and possibly dehydration as well  Subnephrotic range proteinuria     1 Renal-  scr improving/stable, close to BL range (1.6-1.8)   reduce sodium bicarb to 650 BID  2 electrolytes-  s/p phosnak 2 packets yesterday;  Encourage diary   3 -he has chronic morales  4 CVS- BP soft but stable now off Midodrine;  Prednisone   5. CHERYL planning     Sayed [employer]   [phone]     73M w/ BPH (c/b urinary retention s/p Morales catheter placement; exchanged monthly), CKD stage IIIb, and cardiac amyloidosis brought in by daughter for diarrhea (loose-watery BM's) over the past few days.  CKD stage 3 b and now SARAH in light of contrast study/hypotension and possibly dehydration as well  Subnephrotic range proteinuria     1 Renal-  scr improving/stable, close to BL range (1.6-1.8)   reduce sodium bicarb to 650 BID  2 electrolytes-  s/p phosnak 2 packets yesterday;  Encourage diary   3 -he has chronic morales  4 CVS- BP soft but stable now off Midodrine;  Prednisone   5. CHERYL planning     Sayed [employer]   [phone]     73M w/ BPH (c/b urinary retention s/p Morales catheter placement; exchanged monthly), CKD stage IIIb, and cardiac amyloidosis brought in by daughter for diarrhea (loose-watery BM's) over the past few days.  CKD stage 3 b and now SARAH in light of contrast study/hypotension and possibly dehydration as well  Subnephrotic range proteinuria     1 Renal-  scr improving/stable, close to BL range (1.6-1.8)   reduce sodium bicarb to 650 BID  2 electrolytes-  s/p phosnak 2 packets yesterday;  Encourage diary   3 -he has chronic morales  4 CVS- BP soft but stable now off Midodrine;  Prednisone   5. CHERYL planning     Sayed [employer]   [phone]

## 2024-01-15 NOTE — DISCHARGE NOTE NURSING/CASE MANAGEMENT/SOCIAL WORK - PATIENT PORTAL LINK FT
You can access the FollowMyHealth Patient Portal offered by [facility] by registering at the following website: [email or web address]. By joining [employer]’s FollowMyHealth portal, you will also be able to view your health information using other applications (apps) compatible with our system. You can access the FollowMyHealth Patient Portal offered by [facility] by registering at the following website: [email or web address]. By joining [employer]’s FollowMyHealth portal, you will also be able to view your health information using other applications (apps) compatible with our system. You can access the FollowMyHealth Patient Portal offered by [facility] by registering at the following website: [email or web address]. By joining [employer]’s FollowMyHealth portal, you will also be able to view your health information using other applications (apps) compatible with our system.

## 2024-01-15 NOTE — PROGRESS NOTE ADULT - ATTENDING SUPERVISION STATEMENT
Resident
Fellow
Resident

## 2024-01-15 NOTE — DISCHARGE NOTE NURSING/CASE MANAGEMENT/SOCIAL WORK - NSDCFUADDAPPT_GEN_ALL_CORE_FT
APPTS ARE READY TO BE MADE: [x ] YES    Best Family or Patient Contact (if needed):    Additional Information about above appointments (if needed):    1: Internal Medicine   2: Infectious Disease   3: Endocrinology   4: cardiology  5: urology     Other comments or requests:

## 2024-01-16 ENCOUNTER — APPOINTMENT (OUTPATIENT)
Dept: UROLOGY | Facility: CLINIC | Age: 74
End: 2024-01-16

## 2024-01-16 ENCOUNTER — NON-APPOINTMENT (OUTPATIENT)
Age: 74
End: 2024-01-16

## 2024-01-22 ENCOUNTER — APPOINTMENT (OUTPATIENT)
Dept: GERIATRICS | Facility: CLINIC | Age: 74
End: 2024-01-22

## 2024-01-23 ENCOUNTER — RX RENEWAL (OUTPATIENT)
Age: 74
End: 2024-01-23

## 2024-01-30 ENCOUNTER — APPOINTMENT (OUTPATIENT)
Dept: GERIATRICS | Facility: CLINIC | Age: 74
End: 2024-01-30

## 2024-02-06 ENCOUNTER — APPOINTMENT (OUTPATIENT)
Dept: GERIATRICS | Facility: CLINIC | Age: 74
End: 2024-02-06
Payer: MEDICARE

## 2024-02-06 ENCOUNTER — LABORATORY RESULT (OUTPATIENT)
Age: 74
End: 2024-02-06

## 2024-02-06 VITALS
WEIGHT: 147 LBS | HEART RATE: 93 BPM | RESPIRATION RATE: 15 BRPM | OXYGEN SATURATION: 99 % | BODY MASS INDEX: 25.23 KG/M2 | DIASTOLIC BLOOD PRESSURE: 80 MMHG | TEMPERATURE: 98.6 F | SYSTOLIC BLOOD PRESSURE: 136 MMHG

## 2024-02-06 DIAGNOSIS — I10 ESSENTIAL (PRIMARY) HYPERTENSION: ICD-10-CM

## 2024-02-06 DIAGNOSIS — U07.1 COVID-19: ICD-10-CM

## 2024-02-06 DIAGNOSIS — D35.2 BENIGN NEOPLASM OF PITUITARY GLAND: ICD-10-CM

## 2024-02-06 DIAGNOSIS — A41.9 SEPSIS, UNSPECIFIED ORGANISM: ICD-10-CM

## 2024-02-06 DIAGNOSIS — Z23 ENCOUNTER FOR IMMUNIZATION: ICD-10-CM

## 2024-02-06 PROCEDURE — G2211 COMPLEX E/M VISIT ADD ON: CPT

## 2024-02-06 PROCEDURE — 99214 OFFICE O/P EST MOD 30 MIN: CPT

## 2024-02-06 PROCEDURE — 97550 CAREGIVER TRAING 1ST 30 MIN: CPT

## 2024-02-06 PROCEDURE — 90677 PCV20 VACCINE IM: CPT

## 2024-02-06 PROCEDURE — G0009: CPT

## 2024-02-06 RX ORDER — FLUTICASONE PROPIONATE 50 UG/1
50 SPRAY, METERED NASAL
Qty: 16 | Refills: 0 | Status: DISCONTINUED | COMMUNITY
Start: 2023-04-27 | End: 2024-02-06

## 2024-02-06 RX ORDER — FUROSEMIDE 40 MG/1
40 TABLET ORAL TWICE DAILY
Qty: 60 | Refills: 3 | Status: DISCONTINUED | COMMUNITY
Start: 2023-11-16 | End: 2024-02-06

## 2024-02-06 RX ORDER — TRIAMCINOLONE ACETONIDE 1 MG/G
0.1 CREAM TOPICAL TWICE DAILY
Qty: 1 | Refills: 0 | Status: DISCONTINUED | COMMUNITY
Start: 2023-10-25 | End: 2024-02-06

## 2024-02-06 RX ORDER — GABAPENTIN 100 MG/1
100 CAPSULE ORAL
Qty: 30 | Refills: 0 | Status: DISCONTINUED | COMMUNITY
Start: 2023-12-26 | End: 2024-02-06

## 2024-02-07 ENCOUNTER — APPOINTMENT (OUTPATIENT)
Dept: UROLOGY | Facility: CLINIC | Age: 74
End: 2024-02-07
Payer: MEDICARE

## 2024-02-07 VITALS
BODY MASS INDEX: 25.1 KG/M2 | WEIGHT: 147 LBS | OXYGEN SATURATION: 100 % | DIASTOLIC BLOOD PRESSURE: 75 MMHG | HEIGHT: 64 IN | TEMPERATURE: 98.6 F | SYSTOLIC BLOOD PRESSURE: 119 MMHG | HEART RATE: 89 BPM | RESPIRATION RATE: 15 BRPM

## 2024-02-07 PROBLEM — A41.9 SEPSIS: Status: ACTIVE | Noted: 2024-02-06

## 2024-02-07 PROBLEM — I10 HTN (HYPERTENSION), BENIGN: Status: ACTIVE | Noted: 2024-02-06

## 2024-02-07 PROBLEM — Z23 ENCOUNTER FOR IMMUNIZATION: Status: ACTIVE | Noted: 2024-02-06 | Resolved: 2024-02-20

## 2024-02-07 PROBLEM — U07.1 COVID-19: Status: ACTIVE | Noted: 2024-02-06

## 2024-02-07 PROBLEM — D35.2 PITUITARY ADENOMA: Status: ACTIVE | Noted: 2024-02-06

## 2024-02-07 LAB
24R-OH-CALCIDIOL SERPL-MCNC: 25.7 PG/ML
ALBUMIN SERPL ELPH-MCNC: 4.6 G/DL
ALP BLD-CCNC: 146 U/L
ALT SERPL-CCNC: 29 U/L
ANION GAP SERPL CALC-SCNC: 14 MMOL/L
AST SERPL-CCNC: 34 U/L
BASOPHILS # BLD AUTO: 0.04 K/UL
BASOPHILS NFR BLD AUTO: 0.7 %
BILIRUB SERPL-MCNC: 0.4 MG/DL
BUN SERPL-MCNC: 36 MG/DL
CALCIUM SERPL-MCNC: 9.3 MG/DL
CHLORIDE SERPL-SCNC: 104 MMOL/L
CO2 SERPL-SCNC: 24 MMOL/L
CREAT SERPL-MCNC: 1.88 MG/DL
EGFR: 37 ML/MIN/1.73M2
EOSINOPHIL # BLD AUTO: 0.22 K/UL
EOSINOPHIL NFR BLD AUTO: 3.7 %
ESTIMATED AVERAGE GLUCOSE: 91 MG/DL
GLUCOSE SERPL-MCNC: 83 MG/DL
HBA1C MFR BLD HPLC: 4.8 %
HCT VFR BLD CALC: 41 %
HGB BLD-MCNC: 13.2 G/DL
IMM GRANULOCYTES NFR BLD AUTO: 0.2 %
LYMPHOCYTES # BLD AUTO: 2.42 K/UL
LYMPHOCYTES NFR BLD AUTO: 40.5 %
MAN DIFF?: NORMAL
MCHC RBC-ENTMCNC: 28.3 PG
MCHC RBC-ENTMCNC: 32.2 GM/DL
MCV RBC AUTO: 87.8 FL
MONOCYTES # BLD AUTO: 0.72 K/UL
MONOCYTES NFR BLD AUTO: 12 %
NEUTROPHILS # BLD AUTO: 2.57 K/UL
NEUTROPHILS NFR BLD AUTO: 42.9 %
PLATELET # BLD AUTO: 160 K/UL
POTASSIUM SERPL-SCNC: 5 MMOL/L
PROT SERPL-MCNC: 7.7 G/DL
RBC # BLD: 4.67 M/UL
RBC # FLD: 22.4 %
SODIUM SERPL-SCNC: 142 MMOL/L
TSH SERPL-ACNC: 1.21 UIU/ML
WBC # FLD AUTO: 5.98 K/UL

## 2024-02-07 PROCEDURE — 51702 INSERT TEMP BLADDER CATH: CPT

## 2024-02-07 RX ORDER — ACETAMINOPHEN 500 MG/1
500 TABLET ORAL
Refills: 0 | Status: DISCONTINUED | COMMUNITY
End: 2024-02-07

## 2024-02-07 RX ORDER — MIRTAZAPINE 7.5 MG/1
7.5 TABLET, FILM COATED ORAL
Qty: 30 | Refills: 0 | Status: DISCONTINUED | COMMUNITY
Start: 2023-11-20 | End: 2024-02-07

## 2024-02-07 RX ORDER — MIRTAZAPINE 7.5 MG/1
7.5 TABLET, FILM COATED ORAL
Qty: 30 | Refills: 0 | Status: DISCONTINUED | COMMUNITY
Start: 2024-02-06 | End: 2024-02-07

## 2024-02-07 NOTE — HISTORY OF PRESENT ILLNESS
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  Accompanied by Franci, tiffanie.  GENNY: Admitted to [facility] on January 2 and discharged on January 15 to [facility]. Brought in by daughter for diarrhea found to have hypotension likely due to sepsis, suspected urinary source supported by perinephric stranding on CT. Also with proctitis possibly contributing to sepsis picture. Additionally, the patient was found to be positive for COVID-19 which likely further contributed. Patient was initially treated with remdesivir but this was discontinued on 1/3 due to SARAH-on-CKD and concern that remdesivir may have contributed to worsening kidney function. Sepsis covered with Zosyn. Diarrhea treated with loperamide, hypotension treated with midodrine, fluids, short course of norepi gtt. Pituitary adenoma vs pituitary cyst in MRI of brain. Follow up with endo in 6-12 months for monitoring with repeat MRI of sella with and without contrast. Given stress dose steroids for adrenal insufficiency. SR of foot R side consistent with gouty arthropathy. No pain regimen needed.   Follow up with cards, endocrine, urology, repeat MRI sella w+w/o contrast in 6-12 months with endo. Holding gabapentin for now, hydrocortisone 20 mg daily at 8AM and then again at 3PM down to 10 mg at 3 pm.  Peripheral neuropathy: On Tylenol, was on gabapentin but held since hospitalization due to renal function. Ambulates with RW. PT was given at last visit, went to [facility] for rehab following discharge. Feels like his pain has significantly reduced following rehab and wants to stop gabapentin for now.  Insomnia: Was on remeron but was started on prednisone from hospital and has appetite has become voracious. Patient wants to hold remeron for since he is going to be on prednisone for some time. Sleeping well.  Edema: Swelling has significantly improved following hospitalization. Furosemide is taken as needed for now. Needs to follow up with cardiologist.  Obstructive uropathy: Following with urology. Has catheter, due for change by urologist.  SARAH on CKD: Improved in hospital. Likely due to sepsis process. Needs repeat labs.  Lumbago: Improved following rehab. Would like to continue with PT. Some vague neuropathy noted. Off of gabapentin for now. [No falls in past year] : Patient reported no falls in the past year [Walker] : walker [Little interest or pleasure doing things] : 1) Little interest or pleasure doing things [Feeling down, depressed, or hopeless] : 2) Feeling down, depressed, or hopeless [0] : 2) Feeling down, depressed, or hopeless: Not at all (0) [PHQ-2 Negative - No further assessment needed] : PHQ-2 Negative - No further assessment needed [[id number]] : 0

## 2024-02-08 LAB
CALCIUM SERPL-MCNC: 9.5 MG/DL
CHOLEST SERPL-MCNC: 205 MG/DL
HDLC SERPL-MCNC: 81 MG/DL
LDLC SERPL CALC-MCNC: 106 MG/DL
NONHDLC SERPL-MCNC: 124 MG/DL
PARATHYROID HORMONE INTACT: 104 PG/ML
PHOSPHATE SERPL-MCNC: 3.7 MG/DL
TRIGL SERPL-MCNC: 100 MG/DL

## 2024-02-12 DIAGNOSIS — M10.072 IDIOPATHIC GOUT, LEFT ANKLE AND FOOT: ICD-10-CM

## 2024-02-12 LAB — BACTERIA UR CULT: ABNORMAL

## 2024-02-14 ENCOUNTER — LABORATORY RESULT (OUTPATIENT)
Age: 74
End: 2024-02-14

## 2024-02-14 ENCOUNTER — NON-APPOINTMENT (OUTPATIENT)
Age: 74
End: 2024-02-14

## 2024-02-14 ENCOUNTER — APPOINTMENT (OUTPATIENT)
Dept: CARDIOLOGY | Facility: CLINIC | Age: 74
End: 2024-02-14
Payer: MEDICARE

## 2024-02-14 VITALS
OXYGEN SATURATION: 99 % | DIASTOLIC BLOOD PRESSURE: 78 MMHG | BODY MASS INDEX: 25.23 KG/M2 | SYSTOLIC BLOOD PRESSURE: 108 MMHG | HEART RATE: 85 BPM | WEIGHT: 147 LBS

## 2024-02-14 PROCEDURE — 93000 ELECTROCARDIOGRAM COMPLETE: CPT

## 2024-02-14 PROCEDURE — 99215 OFFICE O/P EST HI 40 MIN: CPT

## 2024-02-14 PROCEDURE — G2211 COMPLEX E/M VISIT ADD ON: CPT

## 2024-02-15 ENCOUNTER — LABORATORY RESULT (OUTPATIENT)
Age: 74
End: 2024-02-15

## 2024-02-16 NOTE — PATIENT PROFILE ADULT - DO YOU FEEL UNSAFE AT HOME, WORK, OR SCHOOL?
pantoprazole (PROTONIX) 40 MG tablet  Last refill: 2/1/23 #28 11 refill   Last lab: 2/7/23  Last office visit: 1/30/24  Scheduled office visit: 2/28/24    No medication protocol.  Proton Pump Inhibitor (PPI) Refill Protocol - 12 Month Protocol [id number] 03:16 PM   Protocol Details Seen by prescribing provider or same department within the last 12 months or has a future appt in 3 months - IF FAILED PLEASE LOOK AT CHART REVIEW FOR LAST VISIT AND PROCEED ACCORDINGLY    Not on Clopidogrel (Plavix) or if on, refill is for Pantoprazole (Protonix)    Medication (including dose and sig) on current meds list     Ok, to refill?        Multiple Vitamin (Tab-A-Forest) Tab  Last refill: 2/20/23 # refill   Last lab:2/7/23  Last office visit: 1/30/24  Scheduled office visit: 2/28/24    No medication protocol.  Vitamin Supplements Refill Protocol - 12 Month Protocol [id number]/2024 03:16 PM   Protocol Details Seen by prescribing provider or same department within the last 12 months or has a future appt in 3 months - IF FAILED PLEASE LOOK AT CHART REVIEW FOR LAST VISIT AND PROCEED ACCORDINGLY    Medication (including dose and sig) on current meds list     Ok, to refill?        simethicone (MYLICON) 80 MG chewable tablet  Last refill: 3/21/23 #28 11 refill   Last lab: 2/7/23  Last office visit: 1/30/24  Scheduled office visit: 2/28/24    No medication protocol.    Ok, to refill?     no

## 2024-02-18 NOTE — REASON FOR VISIT
[Other: ____] : [unfilled] [Other: _____] : [unfilled] [FreeTextEntry1] : 2/14/2024 Cy Sanchez returns today for scheduled follow up. He was hospitalized for sepsis in the setting of Covid infection, treated with remdesivir, and urinary tract infection. During his stay he was also found to be hypotensive, hypoglycemic and with 2 brief episodes of wide complex tachycardia (16 beats on 1/11/2024 and 5 beats of 1/12/2024). He was discharged from [facility] and now has a home nursing services and physical therapy. He is now with complaints of pain in the feet and describes electric type, continuous pain. He puts Salon Pas patches on which he finds helpful to alleviate the symptoms. He has been out of Vyndamax for 2 months, but is otherwise taking his medications as directed.

## 2024-02-18 NOTE — DISCUSSION/SUMMARY
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  He is NYHA Class II.  Amyloid is stage 2 based on pro-BNP < 3000 and GFR < 45.  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 Continue stabilizer therapy with Vyndamax.  Would plan to start silencer therapy with Amvuttra for patient with hereditary disease familial amyloid polyneuropathy.  At this time he is reluctant to begin treatment as he does not want to take an injection. Should he change his mind, we discussed the need for vitamin A supplementation with treatment. He and his daughter have been educated on vitamin A supplementation and signs and symptoms of vitamin A toxicity.   Patient has moderately reduced LVEF, but patients with amyloid cardiomyopathy often do not tolerate guideline directed medical therapy for HFrEF.  Labs today with further recommendations thereafter.

## 2024-02-18 NOTE — HISTORY OF PRESENT ILLNESS
[FreeTextEntry1] : Patient is a 73 year-old Black gentleman, born in [facility], in the [place] since age 36, with known past medical history of hypertension, noninsulin dependent type II diabetes Pain in the feet that he thought was gout. He presented to the ER with no cardiovascular complaints, but because of swelling in the feet, they checked troponin, found it mildly elevated, treated for ACS, got an Echo, and ultimately made the diagnosis of cardiac amyloidosis.  On 4/27/2023, patient had kappa: lambda ratio of 1.67 (normal in the setting of creatinine of 2 mg/dL); this makes AL-CA unlikely.  Technetium pyrophosphate scan positive.   8/19/2023 Cy Sanchez returns today for routine scheduled follow up. He is accompanied by his daughter, Franci. Since last visit he has begun therapy with Vyndamax 61 mg daily. He has also been contacted by the representatives of [facility] although there are still ongoing insurance issues for approval. Today he is feeling well and has no specific complaints. He continues to report neuropathy which he feels mostly in his hands and occasionally in his feet.  In October he is planning for surgical intervention of his nasal polyps which have caused obstruction and he is hopeful that his breathing will become easier. He denies any overt exertional dyspnea, orthopnea or PND.   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 He has 3 sisters, 2 in [place] and 1 in [facility].  He has 2 sons and 1 daughter.  9/19/2023 Mr. Sanchez returns today for scheduled follow up and preoperative cardiovascular evaluation prior to planned endoscopic sinus surgery with possible septoplasty and bilateral inferior turbinate reduction with Cleveland Dinh MD on 10/11/2023. Today he reports feeling fair overall. He feels as if he has lost his appetite and has been vomiting. He is seen today down 8 pounds from before. He does remain fairly sedentary, spending 16 hours a day in bed, because sitting down is painful to his lower back. With his usual activities, he denies any chest discomfort. His exertional dyspnea is chronic and unchanged. He is seen today with pitting edema to his right foot.  November 2023 - Patient returns today for follow-up in his usual state of health. He remains on Vyndamax, but he is currently hesitant to start Amvuttra because he is not sure about taking an injection.  He has some lower extremity edema.  He sleeps lying flat with two pillows. He has no dyspnea on exertion. He has no orthopnea or paroxysmal nocturnal dyspnea.

## 2024-02-18 NOTE — CARDIOLOGY SUMMARY
[de-identified] : 7/12/2023, sinus at 77 bpm, LAFB, early repolarization, normal voltage ECG [de-identified] : 4/24/2023\par  1. Normal left ventricular cavity size. The left ventricular wall thickness is moderately increased. The left\par  ventricular systolic function is mildly decreased with an ejection fraction of 41 % by Freeman's method\par  of disks. There is global left ventricular hypokinesis.\par  2. There is normal left ventricular diastolic function.\par  3. Global longitudinal strain is -8.0 % (abnormal > -16%). GLS was assessed on TomTec echo machine\par  with a heart rate of 80 bpm and a blood pressure of 104/65 mmHg.\par  4. Normal right ventricular cavity size, normal wall thickness and normal systolic function. The tricuspid\par  annular plane systolic excursion (TAPSE) is 2.4 cm (normal >=1.7 cm).\par  5. With increased left ventricular thickening, decreased left ventricular function and abnormal strain,\par  would suggest further evaluation for infiltrative cardiomyopathy vs hypertensive heart disease with\par  Cardiac MRI.\par  6. No significant valvular disease.\par  7. No pericardial effusion seen.\par  8. Compared to the transthoracic echocardiogram performed on 7/21/2015 There is a decrease in LVEF. [de-identified] : 4/25/2023, technetium pyrophosphate scan, grade 3

## 2024-02-18 NOTE — ADDENDUM
[FreeTextEntry1] : 9/22/2023 Venous doppler of the right leg was negative for deep vein thrombosis.  No cardiac contraindication to planned surgery.  Follow up in 2 months.

## 2024-02-20 ENCOUNTER — APPOINTMENT (OUTPATIENT)
Dept: GERIATRICS | Facility: CLINIC | Age: 74
End: 2024-02-20
Payer: MEDICARE

## 2024-02-20 DIAGNOSIS — M79.671 PAIN IN RIGHT FOOT: ICD-10-CM

## 2024-02-20 DIAGNOSIS — M10.9 GOUT, UNSPECIFIED: ICD-10-CM

## 2024-02-20 DIAGNOSIS — M79.674 PAIN IN RIGHT TOE(S): ICD-10-CM

## 2024-02-20 PROCEDURE — G2211 COMPLEX E/M VISIT ADD ON: CPT | Mod: NC,1L

## 2024-02-20 PROCEDURE — 99442: CPT

## 2024-02-21 PROBLEM — M79.671 RIGHT FOOT PAIN: Status: ACTIVE | Noted: 2024-02-21

## 2024-02-21 PROBLEM — M79.674 PAIN OF TOE OF RIGHT FOOT: Status: ACTIVE | Noted: 2024-02-21

## 2024-02-21 PROBLEM — M10.9 GOUT, ARTHRITIS: Status: ACTIVE | Noted: 2024-02-21

## 2024-02-29 ENCOUNTER — APPOINTMENT (OUTPATIENT)
Dept: UROLOGY | Facility: CLINIC | Age: 74
End: 2024-02-29
Payer: MEDICARE

## 2024-02-29 VITALS
HEART RATE: 82 BPM | DIASTOLIC BLOOD PRESSURE: 76 MMHG | BODY MASS INDEX: 25.1 KG/M2 | RESPIRATION RATE: 16 BRPM | WEIGHT: 147 LBS | TEMPERATURE: 97.3 F | OXYGEN SATURATION: 100 % | SYSTOLIC BLOOD PRESSURE: 115 MMHG | HEIGHT: 64 IN

## 2024-02-29 PROCEDURE — 99213 OFFICE O/P EST LOW 20 MIN: CPT | Mod: 25

## 2024-02-29 PROCEDURE — 51702 INSERT TEMP BLADDER CATH: CPT

## 2024-02-29 NOTE — ASSESSMENT
[FreeTextEntry1] : Discussed management options including continued morales/CIC, PAE, and extirpation with HoLEP vs SPP. Pt is very much interested in having one procedure only. Discussed management with suprapubic prostatectomy. Discussed risks of surgery including bleeding, infection, injury to intra-abdominal contents, urine leak. Discussed risks of ED and incontinence (low risk for this procedure).  --Schedule for robotic SPP eith umbilical hernia repair --Cath change today --Medical and cardiac clearance prior to surgery

## 2024-02-29 NOTE — PHYSICAL EXAM
[General Appearance - Well Developed] : well developed [General Appearance - Well Nourished] : well nourished [General Appearance - In No Acute Distress] : no acute distress [Exaggerated Use Of Accessory Muscles For Inspiration] : no accessory muscle use [Abdomen Soft] : soft [Abdomen Tenderness] : non-tender [Costovertebral Angle Tenderness] : no ~M costovertebral angle tenderness [Urethral Meatus] : meatus normal [Penis Abnormality] : normal circumcised penis [No Focal Deficits] : no focal deficits [Oriented To Time, Place, And Person] : oriented to person, place, and time [de-identified] : trace LE edema [de-identified] : reducible umbilical hernia [de-identified] : morales in place with clear yellow urine

## 2024-02-29 NOTE — HISTORY OF PRESENT ILLNESS
[FreeTextEntry1] : 74yo gentleman with cc of urinary retention and BPH.   Pt reports urinary retention since Oct following nasal polyp ENT surgery. Has failed trial of voids. At baseline prior to this, was getting up 4x to go. No prior episodes of retention. No hx of UTIs. Had PSA of 16 in the setting of this retention. Had MRI 11/2023 that showed 142cc prostate with no suspicious lesions. PiRAD2.   No prior abd surgeries.   Had hospitalization earlier this month for covid and sepsis (liekly 2/2 UTI). 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

## 2024-03-11 ENCOUNTER — APPOINTMENT (OUTPATIENT)
Dept: GERIATRICS | Facility: CLINIC | Age: 74
End: 2024-03-11
Payer: MEDICARE

## 2024-03-11 ENCOUNTER — NON-APPOINTMENT (OUTPATIENT)
Age: 74
End: 2024-03-11

## 2024-03-11 VITALS
TEMPERATURE: 97.2 F | HEIGHT: 64 IN | BODY MASS INDEX: 25.61 KG/M2 | OXYGEN SATURATION: 99 % | DIASTOLIC BLOOD PRESSURE: 70 MMHG | SYSTOLIC BLOOD PRESSURE: 106 MMHG | WEIGHT: 150 LBS | HEART RATE: 84 BPM

## 2024-03-11 DIAGNOSIS — Z01.818 ENCOUNTER FOR OTHER PREPROCEDURAL EXAMINATION: ICD-10-CM

## 2024-03-11 LAB
ALBUMIN SERPL ELPH-MCNC: 4.2 G/DL
ALP BLD-CCNC: 126 U/L
ALT SERPL-CCNC: 23 U/L
ANION GAP SERPL CALC-SCNC: 12 MMOL/L
AST SERPL-CCNC: 33 U/L
BASOPHILS # BLD AUTO: 0.05 K/UL
BASOPHILS NFR BLD AUTO: 0.9 %
BILIRUB SERPL-MCNC: 0.3 MG/DL
BUN SERPL-MCNC: 27 MG/DL
CALCIUM SERPL-MCNC: 9.2 MG/DL
CHLORIDE SERPL-SCNC: 99 MMOL/L
CO2 SERPL-SCNC: 24 MMOL/L
CREAT SERPL-MCNC: 1.83 MG/DL
EGFR: 38 ML/MIN/1.73M2
EOSINOPHIL # BLD AUTO: 0.21 K/UL
EOSINOPHIL NFR BLD AUTO: 3.8 %
GLUCOSE SERPL-MCNC: 106 MG/DL
HCT VFR BLD CALC: 37.3 %
HGB BLD-MCNC: 12.1 G/DL
IMM GRANULOCYTES NFR BLD AUTO: 0.2 %
LYMPHOCYTES # BLD AUTO: 2.62 K/UL
LYMPHOCYTES NFR BLD AUTO: 47.3 %
MAN DIFF?: NORMAL
MCHC RBC-ENTMCNC: 28.2 PG
MCHC RBC-ENTMCNC: 32.4 GM/DL
MCV RBC AUTO: 86.9 FL
MONOCYTES # BLD AUTO: 0.62 K/UL
MONOCYTES NFR BLD AUTO: 11.2 %
NEUTROPHILS # BLD AUTO: 2.03 K/UL
NEUTROPHILS NFR BLD AUTO: 36.6 %
PLATELET # BLD AUTO: 228 K/UL
POTASSIUM SERPL-SCNC: 4.3 MMOL/L
PROT SERPL-MCNC: 7.4 G/DL
RBC # BLD: 4.29 M/UL
RBC # FLD: 16.8 %
SODIUM SERPL-SCNC: 135 MMOL/L
WBC # FLD AUTO: 5.54 K/UL

## 2024-03-11 PROCEDURE — 93000 ELECTROCARDIOGRAM COMPLETE: CPT

## 2024-03-11 PROCEDURE — 99214 OFFICE O/P EST MOD 30 MIN: CPT

## 2024-03-11 NOTE — HISTORY OF PRESENT ILLNESS
[No falls in past year] : Patient reported no falls in the past year [Little interest or pleasure doing things] : 1) Little interest or pleasure doing things [0] : 2) Feeling down, depressed, or hopeless: Not at all (0) [Feeling down, depressed, or hopeless] : 2) Feeling down, depressed, or hopeless [PHQ-2 Negative - No further assessment needed] : PHQ-2 Negative - No further assessment needed [FreeTextEntry1] : 74 y/o M with PMHx of HTN, DM2 not on insulin, cardiac hereditary amyloidosis on Vyndamax, CKD stage 3b, BPH with obstructive uropathy on a chronic Harkins, chronic back pain presents to the practice for a medical clearance visit.  Accompanied by Franci, daughter.  Surgery: Robotic assisted laparoscopic simple prostatectomy Surgeon: Dr. Evonne Marion Date: 03/21/2024 Location: [facility]  METS: easily able to attain 4+ METS without chest pain, palpitations, SOB, or any other forms of cardiopulmonary distress.  RCRI: Elevated risk: no Ischemic heart disease: no CHF: yes CVA: no Insulin: no Cr > 2.0: yes (2/15/2024) Risk: class III, 10.1% 30-day risk of death, MI, or cardiac arrest [[id number]] : 0

## 2024-03-11 NOTE — ASSESSMENT
[FreeTextEntry1] : RTC in 1 month for follow up visit  [phone], attn medical clearance  [phone]

## 2024-03-12 ENCOUNTER — APPOINTMENT (OUTPATIENT)
Dept: OTOLARYNGOLOGY | Facility: CLINIC | Age: 74
End: 2024-03-12

## 2024-03-12 LAB — BACTERIA UR CULT: ABNORMAL

## 2024-03-18 ENCOUNTER — APPOINTMENT (OUTPATIENT)
Dept: CARDIOLOGY | Facility: CLINIC | Age: 74
End: 2024-03-18
Payer: MEDICARE

## 2024-03-18 ENCOUNTER — OUTPATIENT (OUTPATIENT)
Dept: OUTPATIENT SERVICES | Facility: HOSPITAL | Age: 74
LOS: 1 days | End: 2024-03-18

## 2024-03-18 VITALS
SYSTOLIC BLOOD PRESSURE: 124 MMHG | BODY MASS INDEX: 26.43 KG/M2 | HEART RATE: 88 BPM | WEIGHT: 154 LBS | OXYGEN SATURATION: 98 % | DIASTOLIC BLOOD PRESSURE: 64 MMHG

## 2024-03-18 VITALS
WEIGHT: 151.9 LBS | RESPIRATION RATE: 16 BRPM | TEMPERATURE: 98 F | HEART RATE: 80 BPM | SYSTOLIC BLOOD PRESSURE: 138 MMHG | HEIGHT: 62 IN | OXYGEN SATURATION: 99 % | DIASTOLIC BLOOD PRESSURE: 83 MMHG

## 2024-03-18 DIAGNOSIS — N40.1 BENIGN PROSTATIC HYPERPLASIA WITH LOWER URINARY TRACT SYMPTOMS: ICD-10-CM

## 2024-03-18 DIAGNOSIS — E78.5 HYPERLIPIDEMIA, UNSPECIFIED: ICD-10-CM

## 2024-03-18 DIAGNOSIS — E85.4 ORGAN-LIMITED AMYLOIDOSIS: ICD-10-CM

## 2024-03-18 DIAGNOSIS — E85.1 NEUROPATHIC HEREDOFAMILIAL AMYLOIDOSIS: ICD-10-CM

## 2024-03-18 DIAGNOSIS — Z98.1 ARTHRODESIS STATUS: Chronic | ICD-10-CM

## 2024-03-18 DIAGNOSIS — Z91.89 OTHER SPECIFIED PERSONAL RISK FACTORS, NOT ELSEWHERE CLASSIFIED: ICD-10-CM

## 2024-03-18 DIAGNOSIS — Z86.39 PERSONAL HISTORY OF OTHER ENDOCRINE, NUTRITIONAL AND METABOLIC DISEASE: ICD-10-CM

## 2024-03-18 DIAGNOSIS — Z01.810 ENCOUNTER FOR PREPROCEDURAL CARDIOVASCULAR EXAMINATION: ICD-10-CM

## 2024-03-18 DIAGNOSIS — N40.0 BENIGN PROSTATIC HYPERPLASIA WITHOUT LOWER URINARY TRACT SYMPTOMS: ICD-10-CM

## 2024-03-18 DIAGNOSIS — G63 NEUROPATHIC HEREDOFAMILIAL AMYLOIDOSIS: ICD-10-CM

## 2024-03-18 LAB
BLD GP AB SCN SERPL QL: NEGATIVE — SIGNIFICANT CHANGE UP
RH IG SCN BLD-IMP: POSITIVE — SIGNIFICANT CHANGE UP
RH IG SCN BLD-IMP: POSITIVE — SIGNIFICANT CHANGE UP

## 2024-03-18 PROCEDURE — 99214 OFFICE O/P EST MOD 30 MIN: CPT

## 2024-03-18 RX ORDER — FLUTICASONE PROPIONATE 50 MCG
1 SPRAY, SUSPENSION NASAL
Refills: 0 | DISCHARGE

## 2024-03-18 RX ORDER — MIRTAZAPINE 45 MG/1
1 TABLET, ORALLY DISINTEGRATING ORAL
Refills: 0 | DISCHARGE

## 2024-03-18 RX ORDER — SODIUM CHLORIDE 9 MG/ML
1000 INJECTION, SOLUTION INTRAVENOUS
Refills: 0 | Status: DISCONTINUED | OUTPATIENT
Start: 2024-03-21 | End: 2024-03-21

## 2024-03-18 RX ORDER — SODIUM CHLORIDE 9 MG/ML
3 INJECTION INTRAMUSCULAR; INTRAVENOUS; SUBCUTANEOUS EVERY 8 HOURS
Refills: 0 | Status: DISCONTINUED | OUTPATIENT
Start: 2024-03-21 | End: 2024-03-23

## 2024-03-18 RX ORDER — SODIUM CHLORIDE 0.65 %
2 AEROSOL, SPRAY (ML) NASAL
Qty: 0 | Refills: 0 | DISCHARGE

## 2024-03-18 NOTE — H&P PST ADULT - NEGATIVE ENMT SYMPTOMS
no hearing difficulty/no ear pain/no tinnitus/no vertigo/no sinus symptoms/no nasal congestion/no nasal discharge/no post-nasal discharge/no nose bleeds/no recurrent cold sores/no abnormal taste sensation/no gum bleeding/no throat pain

## 2024-03-18 NOTE — H&P PST ADULT - PROBLEM SELECTOR PLAN 1
Patient scheduled for surgery on: 3/21/24  Provided with verbal and written presurgical instructions  Verbalized understanding  with teach back on the following: Famotidine for GI prophylaxis and chlorhexidine wash    Lab specimen drawn at PST today: type/abo  In chart cbc, cmp, hga1c, ekg, echo    Cardiac evaluation scheduled for today, PST requested copy    Active UTI- patient instructed to continue antibiotic regimen as prescribed Patient scheduled for surgery on: 3/21/24  Provided with verbal and written presurgical instructions  Verbalized understanding  with teach back on the following: Famotidine for GI prophylaxis and chlorhexidine wash    Lab specimen drawn at PST today: type/abo  In chart cbc, cmp, hga1c, ekg, echo    Active UTI- patient instructed to continue antibiotic regimen as prescribed

## 2024-03-18 NOTE — H&P PST ADULT - RESPIRATORY
clear to auscultation bilaterally/no wheezes/no rales/no rhonchi clear to auscultation bilaterally/no wheezes/no rales/no rhonchi/no respiratory distress

## 2024-03-18 NOTE — H&P PST ADULT - HISTORY OF PRESENT ILLNESS
73 yr old male with PMH of BPH, NSTEMI (4/2023), amyloid cardiomyopathy, DM (A1c ; 5.0 1/23/24), SARAH. Pt reports hx of enlarged prostate, dysuria, frequent UTI, currently has Harkins catheter (last changed 2 weeks ago)- recent urine culture noted with  Pseudomonas aeruginosa, denies fever, chills, hematuria or flank pain; started Ciprofloxacin 3/17/24, scheduled for robotic assisted laparoscopic simple prostatectomy

## 2024-03-18 NOTE — H&P PST ADULT - PROBLEM SELECTOR PLAN 4
Patient instructed to take vyndamax, metoprolol on day of procedure with small sips of water, verbalized understanding.

## 2024-03-18 NOTE — PHYSICAL EXAM
[Well Developed] : well developed [Well Nourished] : well nourished [No Acute Distress] : no acute distress [Normal Conjunctiva] : normal conjunctiva [No Carotid Bruit] : no carotid bruit [Normal Venous Pressure] : normal venous pressure [Normal S1, S2] : normal S1, S2 [No Murmur] : no murmur [No Rub] : no rub [No Gallop] : no gallop [Clear Lung Fields] : clear lung fields [Good Air Entry] : good air entry [No Respiratory Distress] : no respiratory distress  [Soft] : abdomen soft [Non Tender] : non-tender [No Masses/organomegaly] : no masses/organomegaly [Normal Bowel Sounds] : normal bowel sounds [Normal Gait] : normal gait [No Edema] : no edema [No Cyanosis] : no cyanosis [No Varicosities] : no varicosities [No Clubbing] : no clubbing [No Rash] : no rash [No Skin Lesions] : no skin lesions [Moves all extremities] : moves all extremities [No Focal Deficits] : no focal deficits [Normal Speech] : normal speech [Alert and Oriented] : alert and oriented [Normal memory] : normal memory

## 2024-03-18 NOTE — H&P PST ADULT - CARDIOVASCULAR
details… regular rate and rhythm/S1 S2 present/no gallops/no rub/no murmur/vascular regular rate and rhythm/S1 S2 present/no gallops/no rub/no murmur/no pedal edema

## 2024-03-18 NOTE — H&P PST ADULT - NSICDXPASTMEDICALHX_GEN_ALL_CORE_FT
PAST MEDICAL HISTORY:  2019 novel coronavirus disease (COVID-19)     Abnormal gait     Adrenal insufficiency     BPH (benign prostatic hyperplasia)     Cardiac amyloidosis     DM (diabetes mellitus)     History of adrenal disorder     Indwelling Harkins catheter present     NSTEMI (non-ST elevation myocardial infarction)     Other specified sepsis     Peripheral edema     Pituitary adenoma     Retention of urine     Stage 3 chronic kidney disease     Stenosis, cervical spine      PAST MEDICAL HISTORY:  2019 novel coronavirus disease (COVID-19)     Abnormal gait     Adrenal insufficiency     BPH (benign prostatic hyperplasia)     Cardiac amyloidosis     DM (diabetes mellitus)     Indwelling Harkins catheter present     NSTEMI (non-ST elevation myocardial infarction)     Other specified sepsis     Peripheral edema     Retention of urine     Stage 3 chronic kidney disease     Stenosis, cervical spine

## 2024-03-18 NOTE — H&P PST ADULT - GENERAL COMMENTS
Recent admission to hospital for sepsis secondary to UTI, covid, noted with adrenal insufficiency etiology unknown started on hydrocortisone

## 2024-03-18 NOTE — H&P PST ADULT - GENERAL GENITOURINARY SYMPTOMS
hematuria in 1/2024, resolved now, recent urine cx positive on antibiotics/hematuria/bladder infections/dysuria/urinary hesitancy

## 2024-03-18 NOTE — H&P PST ADULT - GENITOURINARY COMMENTS
dx BPH, renal insufficiency, adrenal insufficiency- hospital note in chart, on hydrocortisone dx BPH, renal insufficiency, adrenal insufficiency- hospital note in chart, on hydrocortisone- was referred to to endo but did not follow up

## 2024-03-20 ENCOUNTER — TRANSCRIPTION ENCOUNTER (OUTPATIENT)
Age: 74
End: 2024-03-20

## 2024-03-20 NOTE — ASU PATIENT PROFILE, ADULT - AS SC BRADEN FRICTION
Apixaban/Eliquis is used to treat and prevent blood clots. If you are not able to swallow the tablets whole, they may be crushed and mixed in water, apple juice, or applesauce and promptly taken within four hours. Never skip a dose of Apixaban/Eliquis. If you forget to take your Apixaban/Eliquis, take a dose as soon as you remember. If it is almost time for your next Apixaban/Eliquis dose, wait until then and take a regular dose. DO NOT take an extra pill to ‘catch up’.  NEVER TAKE A DOUBLE DOSE. Notify your doctor that you missed a dose. Take Apixaban/Eliquis at the same time each morning and evening. Apixaban/Eliquis may be taken with other medication or food. (3) no apparent problem

## 2024-03-20 NOTE — ASU PATIENT PROFILE, ADULT - NSICDXPASTMEDICALHX_GEN_ALL_CORE_FT
PAST MEDICAL HISTORY:  2019 novel coronavirus disease (COVID-19)     Abnormal gait     Adrenal insufficiency     BPH (benign prostatic hyperplasia)     Cardiac amyloidosis     DM (diabetes mellitus)     Indwelling Harkins catheter present     NSTEMI (non-ST elevation myocardial infarction)     Other specified sepsis     Peripheral edema     Retention of urine     Stage 3 chronic kidney disease     Stenosis, cervical spine

## 2024-03-21 ENCOUNTER — RESULT REVIEW (OUTPATIENT)
Age: 74
End: 2024-03-21

## 2024-03-21 ENCOUNTER — APPOINTMENT (OUTPATIENT)
Dept: UROLOGY | Facility: HOSPITAL | Age: 74
End: 2024-03-21

## 2024-03-21 ENCOUNTER — INPATIENT (INPATIENT)
Facility: HOSPITAL | Age: 74
LOS: 1 days | Discharge: HOME | End: 2024-03-23
Attending: UROLOGY | Admitting: UROLOGY
Payer: MEDICARE

## 2024-03-21 VITALS
DIASTOLIC BLOOD PRESSURE: 75 MMHG | WEIGHT: 151.9 LBS | OXYGEN SATURATION: 99 % | TEMPERATURE: 98 F | HEART RATE: 82 BPM | SYSTOLIC BLOOD PRESSURE: 116 MMHG | HEIGHT: 62 IN | RESPIRATION RATE: 16 BRPM

## 2024-03-21 DIAGNOSIS — N40.1 BENIGN PROSTATIC HYPERPLASIA WITH LOWER URINARY TRACT SYMPTOMS: ICD-10-CM

## 2024-03-21 DIAGNOSIS — Z98.1 ARTHRODESIS STATUS: Chronic | ICD-10-CM

## 2024-03-21 LAB
ANION GAP SERPL CALC-SCNC: 12 MMOL/L — SIGNIFICANT CHANGE UP (ref 7–14)
BASOPHILS # BLD AUTO: 0.04 K/UL — SIGNIFICANT CHANGE UP (ref 0–0.2)
BASOPHILS NFR BLD AUTO: 0.6 % — SIGNIFICANT CHANGE UP (ref 0–2)
BUN SERPL-MCNC: 31 MG/DL — HIGH (ref 7–23)
CALCIUM SERPL-MCNC: 8.6 MG/DL — SIGNIFICANT CHANGE UP (ref 8.4–10.5)
CHLORIDE SERPL-SCNC: 108 MMOL/L — HIGH (ref 98–107)
CO2 SERPL-SCNC: 21 MMOL/L — LOW (ref 22–31)
CREAT SERPL-MCNC: 1.84 MG/DL — HIGH (ref 0.5–1.3)
EGFR: 38 ML/MIN/1.73M2 — LOW
EOSINOPHIL # BLD AUTO: 0.02 K/UL — SIGNIFICANT CHANGE UP (ref 0–0.5)
EOSINOPHIL NFR BLD AUTO: 0.3 % — SIGNIFICANT CHANGE UP (ref 0–6)
GLUCOSE SERPL-MCNC: 151 MG/DL — HIGH (ref 70–99)
HCT VFR BLD CALC: 30.8 % — LOW (ref 39–50)
HGB BLD-MCNC: 10.4 G/DL — LOW (ref 13–17)
IANC: 5.91 K/UL — SIGNIFICANT CHANGE UP (ref 1.8–7.4)
IMM GRANULOCYTES NFR BLD AUTO: 0.4 % — SIGNIFICANT CHANGE UP (ref 0–0.9)
LYMPHOCYTES # BLD AUTO: 1.07 K/UL — SIGNIFICANT CHANGE UP (ref 1–3.3)
LYMPHOCYTES # BLD AUTO: 14.8 % — SIGNIFICANT CHANGE UP (ref 13–44)
MAGNESIUM SERPL-MCNC: 2.1 MG/DL — SIGNIFICANT CHANGE UP (ref 1.6–2.6)
MCHC RBC-ENTMCNC: 28.5 PG — SIGNIFICANT CHANGE UP (ref 27–34)
MCHC RBC-ENTMCNC: 33.8 GM/DL — SIGNIFICANT CHANGE UP (ref 32–36)
MCV RBC AUTO: 84.4 FL — SIGNIFICANT CHANGE UP (ref 80–100)
MONOCYTES # BLD AUTO: 0.15 K/UL — SIGNIFICANT CHANGE UP (ref 0–0.9)
MONOCYTES NFR BLD AUTO: 2.1 % — SIGNIFICANT CHANGE UP (ref 2–14)
NEUTROPHILS # BLD AUTO: 5.91 K/UL — SIGNIFICANT CHANGE UP (ref 1.8–7.4)
NEUTROPHILS NFR BLD AUTO: 81.8 % — HIGH (ref 43–77)
NRBC # BLD: 0 /100 WBCS — SIGNIFICANT CHANGE UP (ref 0–0)
NRBC # FLD: 0 K/UL — SIGNIFICANT CHANGE UP (ref 0–0)
PHOSPHATE SERPL-MCNC: 4.3 MG/DL — SIGNIFICANT CHANGE UP (ref 2.5–4.5)
PLATELET # BLD AUTO: 170 K/UL — SIGNIFICANT CHANGE UP (ref 150–400)
POTASSIUM SERPL-MCNC: 4.7 MMOL/L — SIGNIFICANT CHANGE UP (ref 3.5–5.3)
POTASSIUM SERPL-SCNC: 4.7 MMOL/L — SIGNIFICANT CHANGE UP (ref 3.5–5.3)
RBC # BLD: 3.65 M/UL — LOW (ref 4.2–5.8)
RBC # FLD: 16.8 % — HIGH (ref 10.3–14.5)
SODIUM SERPL-SCNC: 141 MMOL/L — SIGNIFICANT CHANGE UP (ref 135–145)
WBC # BLD: 7.22 K/UL — SIGNIFICANT CHANGE UP (ref 3.8–10.5)
WBC # FLD AUTO: 7.22 K/UL — SIGNIFICANT CHANGE UP (ref 3.8–10.5)

## 2024-03-21 PROCEDURE — 88307 TISSUE EXAM BY PATHOLOGIST: CPT | Mod: 26

## 2024-03-21 PROCEDURE — 55867 LAPS SURG PRST8ECT SMPL STOT: CPT

## 2024-03-21 PROCEDURE — 51999 UNLISTED LAPS PX BLADDER: CPT

## 2024-03-21 PROCEDURE — 55867 LAPS SURG PRST8ECT SMPL STOT: CPT | Mod: AS

## 2024-03-21 PROCEDURE — 49591 RPR AA HRN 1ST < 3 CM RDC: CPT

## 2024-03-21 PROCEDURE — 51550 PARTIAL REMOVAL OF BLADDER: CPT | Mod: AS

## 2024-03-21 DEVICE — SURGICEL 2 X 14": Type: IMPLANTABLE DEVICE | Status: FUNCTIONAL

## 2024-03-21 DEVICE — LIGATING CLIPS WECK HEMOLOK POLYMER LARGE (PURPLE) 6: Type: IMPLANTABLE DEVICE | Status: FUNCTIONAL

## 2024-03-21 RX ORDER — SODIUM CHLORIDE 9 MG/ML
1000 INJECTION, SOLUTION INTRAVENOUS
Refills: 0 | Status: DISCONTINUED | OUTPATIENT
Start: 2024-03-21 | End: 2024-03-23

## 2024-03-21 RX ORDER — MEROPENEM 1 G/30ML
1000 INJECTION INTRAVENOUS EVERY 12 HOURS
Refills: 0 | Status: DISCONTINUED | OUTPATIENT
Start: 2024-03-21 | End: 2024-03-23

## 2024-03-21 RX ORDER — HYDROCORTISONE 20 MG
40 TABLET ORAL ONCE
Refills: 0 | Status: COMPLETED | OUTPATIENT
Start: 2024-03-22 | End: 2024-03-22

## 2024-03-21 RX ORDER — TAMSULOSIN HYDROCHLORIDE 0.4 MG/1
1 CAPSULE ORAL
Refills: 0 | DISCHARGE

## 2024-03-21 RX ORDER — HYDROMORPHONE HYDROCHLORIDE 2 MG/ML
0.5 INJECTION INTRAMUSCULAR; INTRAVENOUS; SUBCUTANEOUS
Refills: 0 | Status: DISCONTINUED | OUTPATIENT
Start: 2024-03-21 | End: 2024-03-22

## 2024-03-21 RX ORDER — ONDANSETRON 8 MG/1
4 TABLET, FILM COATED ORAL ONCE
Refills: 0 | Status: COMPLETED | OUTPATIENT
Start: 2024-03-21 | End: 2024-03-21

## 2024-03-21 RX ORDER — HYDROCORTISONE 20 MG
20 TABLET ORAL ONCE
Refills: 0 | Status: COMPLETED | OUTPATIENT
Start: 2024-03-22 | End: 2024-03-22

## 2024-03-21 RX ORDER — METOPROLOL TARTRATE 50 MG
12.5 TABLET ORAL
Refills: 0 | Status: DISCONTINUED | OUTPATIENT
Start: 2024-03-21 | End: 2024-03-23

## 2024-03-21 RX ORDER — OXYBUTYNIN CHLORIDE 5 MG
5 TABLET ORAL THREE TIMES A DAY
Refills: 0 | Status: DISCONTINUED | OUTPATIENT
Start: 2024-03-21 | End: 2024-03-23

## 2024-03-21 RX ORDER — SODIUM BICARBONATE 1 MEQ/ML
650 SYRINGE (ML) INTRAVENOUS
Refills: 0 | Status: DISCONTINUED | OUTPATIENT
Start: 2024-03-21 | End: 2024-03-23

## 2024-03-21 RX ORDER — DIAZEPAM 5 MG
2 TABLET ORAL EVERY 8 HOURS
Refills: 0 | Status: DISCONTINUED | OUTPATIENT
Start: 2024-03-21 | End: 2024-03-22

## 2024-03-21 RX ORDER — FINASTERIDE 5 MG/1
5 TABLET, FILM COATED ORAL DAILY
Refills: 0 | Status: DISCONTINUED | OUTPATIENT
Start: 2024-03-21 | End: 2024-03-23

## 2024-03-21 RX ORDER — METOCLOPRAMIDE HCL 10 MG
10 TABLET ORAL EVERY 6 HOURS
Refills: 0 | Status: DISCONTINUED | OUTPATIENT
Start: 2024-03-21 | End: 2024-03-23

## 2024-03-21 RX ORDER — OXYCODONE HYDROCHLORIDE 5 MG/1
5 TABLET ORAL ONCE
Refills: 0 | Status: DISCONTINUED | OUTPATIENT
Start: 2024-03-21 | End: 2024-03-21

## 2024-03-21 RX ORDER — ACETAMINOPHEN 500 MG
650 TABLET ORAL EVERY 6 HOURS
Refills: 0 | Status: DISCONTINUED | OUTPATIENT
Start: 2024-03-21 | End: 2024-03-23

## 2024-03-21 RX ORDER — KETOROLAC TROMETHAMINE 30 MG/ML
15 SYRINGE (ML) INJECTION EVERY 6 HOURS
Refills: 0 | Status: DISCONTINUED | OUTPATIENT
Start: 2024-03-21 | End: 2024-03-22

## 2024-03-21 RX ORDER — MEROPENEM 1 G/30ML
1000 INJECTION INTRAVENOUS ONCE
Refills: 0 | Status: DISCONTINUED | OUTPATIENT
Start: 2024-03-21 | End: 2024-03-21

## 2024-03-21 RX ORDER — METOPROLOL TARTRATE 50 MG
0.5 TABLET ORAL
Refills: 0 | DISCHARGE

## 2024-03-21 RX ORDER — HYDROCORTISONE 20 MG
20 TABLET ORAL ONCE
Refills: 0 | Status: COMPLETED | OUTPATIENT
Start: 2024-03-23 | End: 2024-03-23

## 2024-03-21 RX ORDER — OXYCODONE HYDROCHLORIDE 5 MG/1
5 TABLET ORAL EVERY 6 HOURS
Refills: 0 | Status: DISCONTINUED | OUTPATIENT
Start: 2024-03-21 | End: 2024-03-23

## 2024-03-21 RX ORDER — TAMSULOSIN HYDROCHLORIDE 0.4 MG/1
0.4 CAPSULE ORAL AT BEDTIME
Refills: 0 | Status: DISCONTINUED | OUTPATIENT
Start: 2024-03-21 | End: 2024-03-23

## 2024-03-21 RX ORDER — HYDROCORTISONE 20 MG
10 TABLET ORAL ONCE
Refills: 0 | Status: COMPLETED | OUTPATIENT
Start: 2024-03-23 | End: 2024-03-23

## 2024-03-21 RX ORDER — SENNA PLUS 8.6 MG/1
2 TABLET ORAL AT BEDTIME
Refills: 0 | Status: DISCONTINUED | OUTPATIENT
Start: 2024-03-21 | End: 2024-03-23

## 2024-03-21 RX ORDER — LIDOCAINE 4 G/100G
1 CREAM TOPICAL
Refills: 0 | Status: DISCONTINUED | OUTPATIENT
Start: 2024-03-21 | End: 2024-03-23

## 2024-03-21 RX ORDER — HEPARIN SODIUM 5000 [USP'U]/ML
5000 INJECTION INTRAVENOUS; SUBCUTANEOUS EVERY 8 HOURS
Refills: 0 | Status: DISCONTINUED | OUTPATIENT
Start: 2024-03-21 | End: 2024-03-23

## 2024-03-21 RX ADMIN — Medication 5 MILLIGRAM(S): at 18:42

## 2024-03-21 RX ADMIN — HYDROMORPHONE HYDROCHLORIDE 0.5 MILLIGRAM(S): 2 INJECTION INTRAMUSCULAR; INTRAVENOUS; SUBCUTANEOUS at 17:23

## 2024-03-21 RX ADMIN — HYDROMORPHONE HYDROCHLORIDE 0.5 MILLIGRAM(S): 2 INJECTION INTRAMUSCULAR; INTRAVENOUS; SUBCUTANEOUS at 17:39

## 2024-03-21 RX ADMIN — ONDANSETRON 4 MILLIGRAM(S): 8 TABLET, FILM COATED ORAL at 22:23

## 2024-03-21 RX ADMIN — SODIUM CHLORIDE 75 MILLILITER(S): 9 INJECTION, SOLUTION INTRAVENOUS at 17:29

## 2024-03-21 RX ADMIN — Medication 15 MILLIGRAM(S): at 18:00

## 2024-03-21 RX ADMIN — LIDOCAINE 1 APPLICATION(S): 4 CREAM TOPICAL at 21:07

## 2024-03-21 RX ADMIN — OXYCODONE HYDROCHLORIDE 5 MILLIGRAM(S): 5 TABLET ORAL at 20:04

## 2024-03-21 RX ADMIN — Medication 650 MILLIGRAM(S): at 21:05

## 2024-03-21 RX ADMIN — Medication 2 MILLIGRAM(S): at 21:05

## 2024-03-21 RX ADMIN — HEPARIN SODIUM 5000 UNIT(S): 5000 INJECTION INTRAVENOUS; SUBCUTANEOUS at 21:05

## 2024-03-21 RX ADMIN — Medication 650 MILLIGRAM(S): at 22:22

## 2024-03-21 RX ADMIN — TAMSULOSIN HYDROCHLORIDE 0.4 MILLIGRAM(S): 0.4 CAPSULE ORAL at 21:05

## 2024-03-21 RX ADMIN — HYDROMORPHONE HYDROCHLORIDE 0.5 MILLIGRAM(S): 2 INJECTION INTRAMUSCULAR; INTRAVENOUS; SUBCUTANEOUS at 17:35

## 2024-03-21 RX ADMIN — OXYCODONE HYDROCHLORIDE 5 MILLIGRAM(S): 5 TABLET ORAL at 19:36

## 2024-03-21 RX ADMIN — SODIUM CHLORIDE 3 MILLILITER(S): 9 INJECTION INTRAMUSCULAR; INTRAVENOUS; SUBCUTANEOUS at 21:13

## 2024-03-21 RX ADMIN — SENNA PLUS 2 TABLET(S): 8.6 TABLET ORAL at 21:06

## 2024-03-21 RX ADMIN — Medication 10 MILLIGRAM(S): at 22:23

## 2024-03-21 RX ADMIN — HYDROMORPHONE HYDROCHLORIDE 0.5 MILLIGRAM(S): 2 INJECTION INTRAMUSCULAR; INTRAVENOUS; SUBCUTANEOUS at 17:50

## 2024-03-21 RX ADMIN — Medication 15 MILLIGRAM(S): at 17:42

## 2024-03-21 NOTE — ASU PREOP CHECKLIST - DENTURES
Progress Notes by Brianna Thomas AU.D at 03/10/17 04:05 PM     Author:  Brianna Thomas AU.D Service:  (none) Author Type:  Audiologist     Filed:  03/10/17 04:08 PM Encounter Date:  3/10/2017 Status:  Signed     :  Brianna Thomas AU.D (Audiologist)            Lc was here to be fit with binaural hearing aids. During the fitting Lc stated that he could not tolerate the hearing aid in the right ear. He has been a previous hearing aid user in the left ear only for approximately 15 years.     Due to his strong negative reaction to amplification in the right ear, it was decided that only the left ear will be aided.     Care and use of the hearing aid was discussed. The purchase agreement was signed. Lc will return in 2 weeks for recheck.     The right hearing aid was sent back to the .[CK1.1C]         Revision History        User Key Date/Time User Provider Type Action    > CK1.1 03/10/17 04:08 PM Brianna Thomas AU.D Audiologist Sign    C - Copied            
no

## 2024-03-21 NOTE — BRIEF OPERATIVE NOTE - OPERATION/FINDINGS
? urachal mass, large prostate After robot was docked, noted exophytic bladder protrusion. Cystoscopy done indicating possible bladder mass. Now s/p RAL partial cystectomy at dome of bladder and suprapubic prostatectomy. EBL 100cc.

## 2024-03-21 NOTE — PROGRESS NOTE ADULT - ASSESSMENT
72y/o male s/p Robotic-assisted simple prostatectomy, partial cystectomy, s/p stress dose steroid therapy, stable.     -continue morales  -continue CBI, monitor color, attempt to wean and clamp overnight.   -Analgesia prn  -Antiemetics prn  -DVT prophylaxis  -Incentive spirometry  -Clears   -OOB  -IVF  -Meropenem  -Steroid taper ordered as per Endocrine recommendations    -AM labs

## 2024-03-21 NOTE — BRIEF OPERATIVE NOTE - NSICDXBRIEFPROCEDURE_GEN_ALL_CORE_FT
PROCEDURES:  Cystoscopy 21-Mar-2024 17:52:36  Justyn Urbina  Robotic-assisted simple prostatectomy 21-Mar-2024 17:52:49  Justyn Urbina  Robot-assisted partial cystectomy 21-Mar-2024 17:53:15  Justyn Urbina

## 2024-03-21 NOTE — BRIEF OPERATIVE NOTE - COMMENTS
I, Cydney Rollins PA-C, served as the first assistant in this operation. I assisted in placing ports, docking, and targeting the da Dameon robot, first assisted at the surgical field while the surgeon was performing the operation at the robotic console by providing instrument exchanges, tissue retraction, suction and irrigation, specimen retrieval, passing and removing sutures and sponges, undocking the robotic platform, and closed surgical wounds.

## 2024-03-21 NOTE — CHART NOTE - NSCHARTNOTEFT_GEN_A_CORE
73 yr old male with PMH of BPH, NSTEMI (4/2023), amyloid cardiomyopathy, DM (A1c ; 5.0 1/23/24), SARAH. Pt reports hx of enlarged prostate, dysuria, frequent UTI, currently has Harkins catheter (last changed 2 weeks ago)- recent urine culture noted with  Pseudomonas aeruginosa, denies fever, chills, hematuria or flank pain; started Ciprofloxacin 3/17/24, scheduled for robotic assisted laparoscopic simple prostatectomy and partial cystectomy. Endocrine consulted for adrenal insufficiency and post-op steroid management.    Home meds: hydrocortisone 20mg at 8am and 10mg at 3pm    Prior workup  - 1/5/24 1:08pm cortisol 3.1, 1/7/24 6:42am cortisol 2.3  - 1/6/24 12pm ACTH 2.5  - 1/3/24 TSH 5.54, TT3 57, FT4 0.9  - other pituitary labs unremarkable  - CT abdomen 12/23 without adrenal gland abnormality  - likely secondary adrenal insufficiency unclear cause at this point, can possibly be from amyloidosis vs pituitary etiology    MRI pituitary 1/8/24: There is a nonspecific area of decreased enhancement   involving the left pituitary gland. This is best seen on series 12 image   6 and measures approximately 6.1 x 2.4 mm. Possibly of a pituitary   microadenoma cannot entirely excluded.  -discussed with neuroradiology and differential diagnosis at this time includes a pituitary microadenoma vs a pituitary cyst  -no evidence of any infiltrative process as per neuroradiology  -patient should have repeat MRI sella with and without contrast in 6-12 months to ensure there is no growth of this area        Recommendations:  - POD#0: s/p hydrocortisone 100mg IV x1 given intraoperatively at ~1:15pm today, no other steroids needed today  - POD#1: If BP remains stable, decrease to hydrocortisone oral 40mg at 8am and 20mg at 3pm (double the home dose)  - POD#2: If BP remains stable, decrease to home dose of hydrocortisone oral 20mg at 8am and 10mg at 3pm  - if patient with persistent nausea, fatigue, decreased appetite on POD#2, can continue with double the home dose until patient feels better  - patient follow up at Endocrine Practice at [address] [place] [address], [address], [place] [address]; Ph # [phone]    Endocrinology will sign off at this time. Please re-consult us if there any questions or concerns or if the patient becomes hemodynamically unstable.    Discussed case w/ attending Dr. Aries Gale MD  Endocrine Fellow  Can be reached via [employer] teams.    For follow up questions, discharge recommendations, or new consults, please email [email or web address] ([email or web address]) or [email or web address] ([facility]) or call answering service at [phone] (weekdays); [phone] (nights/weekends).  For emergiencies please page fellow on call.

## 2024-03-22 DIAGNOSIS — I10 ESSENTIAL (PRIMARY) HYPERTENSION: ICD-10-CM

## 2024-03-22 DIAGNOSIS — E27.40 UNSPECIFIED ADRENOCORTICAL INSUFFICIENCY: ICD-10-CM

## 2024-03-22 PROBLEM — A41.89 OTHER SPECIFIED SEPSIS: Chronic | Status: ACTIVE | Noted: 2024-03-18

## 2024-03-22 PROBLEM — R33.9 RETENTION OF URINE, UNSPECIFIED: Chronic | Status: ACTIVE | Noted: 2024-03-18

## 2024-03-22 PROBLEM — R60.0 LOCALIZED EDEMA: Chronic | Status: ACTIVE | Noted: 2024-03-18

## 2024-03-22 PROBLEM — E11.9 TYPE 2 DIABETES MELLITUS WITHOUT COMPLICATIONS: Chronic | Status: ACTIVE | Noted: 2024-03-18

## 2024-03-22 PROBLEM — I21.4 NON-ST ELEVATION (NSTEMI) MYOCARDIAL INFARCTION: Chronic | Status: ACTIVE | Noted: 2024-03-18

## 2024-03-22 LAB
ANION GAP SERPL CALC-SCNC: 8 MMOL/L — SIGNIFICANT CHANGE UP (ref 7–14)
BASOPHILS # BLD AUTO: 0.02 K/UL — SIGNIFICANT CHANGE UP (ref 0–0.2)
BASOPHILS NFR BLD AUTO: 0.2 % — SIGNIFICANT CHANGE UP (ref 0–2)
BUN SERPL-MCNC: 33 MG/DL — HIGH (ref 7–23)
CALCIUM SERPL-MCNC: 8.8 MG/DL — SIGNIFICANT CHANGE UP (ref 8.4–10.5)
CHLORIDE SERPL-SCNC: 105 MMOL/L — SIGNIFICANT CHANGE UP (ref 98–107)
CO2 SERPL-SCNC: 25 MMOL/L — SIGNIFICANT CHANGE UP (ref 22–31)
CREAT FLD-MCNC: 1.9 MG/DL — SIGNIFICANT CHANGE UP
CREAT SERPL-MCNC: 2 MG/DL — HIGH (ref 0.5–1.3)
EGFR: 34 ML/MIN/1.73M2 — LOW
EOSINOPHIL # BLD AUTO: 0.01 K/UL — SIGNIFICANT CHANGE UP (ref 0–0.5)
EOSINOPHIL NFR BLD AUTO: 0.1 % — SIGNIFICANT CHANGE UP (ref 0–6)
GLUCOSE BLDC GLUCOMTR-MCNC: 103 MG/DL — HIGH (ref 70–99)
GLUCOSE BLDC GLUCOMTR-MCNC: 84 MG/DL — SIGNIFICANT CHANGE UP (ref 70–99)
GLUCOSE SERPL-MCNC: 94 MG/DL — SIGNIFICANT CHANGE UP (ref 70–99)
HCT VFR BLD CALC: 30.5 % — LOW (ref 39–50)
HGB BLD-MCNC: 10.3 G/DL — LOW (ref 13–17)
IANC: 5.66 K/UL — SIGNIFICANT CHANGE UP (ref 1.8–7.4)
IMM GRANULOCYTES NFR BLD AUTO: 0.4 % — SIGNIFICANT CHANGE UP (ref 0–0.9)
LYMPHOCYTES # BLD AUTO: 1.96 K/UL — SIGNIFICANT CHANGE UP (ref 1–3.3)
LYMPHOCYTES # BLD AUTO: 23.4 % — SIGNIFICANT CHANGE UP (ref 13–44)
MCHC RBC-ENTMCNC: 28.3 PG — SIGNIFICANT CHANGE UP (ref 27–34)
MCHC RBC-ENTMCNC: 33.8 GM/DL — SIGNIFICANT CHANGE UP (ref 32–36)
MCV RBC AUTO: 83.8 FL — SIGNIFICANT CHANGE UP (ref 80–100)
MONOCYTES # BLD AUTO: 0.69 K/UL — SIGNIFICANT CHANGE UP (ref 0–0.9)
MONOCYTES NFR BLD AUTO: 8.2 % — SIGNIFICANT CHANGE UP (ref 2–14)
NEUTROPHILS # BLD AUTO: 5.66 K/UL — SIGNIFICANT CHANGE UP (ref 1.8–7.4)
NEUTROPHILS NFR BLD AUTO: 67.7 % — SIGNIFICANT CHANGE UP (ref 43–77)
NRBC # BLD: 0 /100 WBCS — SIGNIFICANT CHANGE UP (ref 0–0)
NRBC # FLD: 0 K/UL — SIGNIFICANT CHANGE UP (ref 0–0)
PLATELET # BLD AUTO: 160 K/UL — SIGNIFICANT CHANGE UP (ref 150–400)
POTASSIUM SERPL-MCNC: 5.2 MMOL/L — SIGNIFICANT CHANGE UP (ref 3.5–5.3)
POTASSIUM SERPL-SCNC: 5.2 MMOL/L — SIGNIFICANT CHANGE UP (ref 3.5–5.3)
RBC # BLD: 3.64 M/UL — LOW (ref 4.2–5.8)
RBC # FLD: 16.9 % — HIGH (ref 10.3–14.5)
SODIUM SERPL-SCNC: 138 MMOL/L — SIGNIFICANT CHANGE UP (ref 135–145)
WBC # BLD: 8.37 K/UL — SIGNIFICANT CHANGE UP (ref 3.8–10.5)
WBC # FLD AUTO: 8.37 K/UL — SIGNIFICANT CHANGE UP (ref 3.8–10.5)

## 2024-03-22 PROCEDURE — 99222 1ST HOSP IP/OBS MODERATE 55: CPT

## 2024-03-22 RX ADMIN — TAMSULOSIN HYDROCHLORIDE 0.4 MILLIGRAM(S): 0.4 CAPSULE ORAL at 22:32

## 2024-03-22 RX ADMIN — Medication 650 MILLIGRAM(S): at 17:23

## 2024-03-22 RX ADMIN — Medication 650 MILLIGRAM(S): at 23:18

## 2024-03-22 RX ADMIN — Medication 650 MILLIGRAM(S): at 05:31

## 2024-03-22 RX ADMIN — HEPARIN SODIUM 5000 UNIT(S): 5000 INJECTION INTRAVENOUS; SUBCUTANEOUS at 05:31

## 2024-03-22 RX ADMIN — HEPARIN SODIUM 5000 UNIT(S): 5000 INJECTION INTRAVENOUS; SUBCUTANEOUS at 22:32

## 2024-03-22 RX ADMIN — HEPARIN SODIUM 5000 UNIT(S): 5000 INJECTION INTRAVENOUS; SUBCUTANEOUS at 14:04

## 2024-03-22 RX ADMIN — MEROPENEM 100 MILLIGRAM(S): 1 INJECTION INTRAVENOUS at 12:05

## 2024-03-22 RX ADMIN — SODIUM CHLORIDE 3 MILLILITER(S): 9 INJECTION INTRAMUSCULAR; INTRAVENOUS; SUBCUTANEOUS at 14:13

## 2024-03-22 RX ADMIN — SENNA PLUS 2 TABLET(S): 8.6 TABLET ORAL at 22:32

## 2024-03-22 RX ADMIN — SODIUM CHLORIDE 3 MILLILITER(S): 9 INJECTION INTRAMUSCULAR; INTRAVENOUS; SUBCUTANEOUS at 06:58

## 2024-03-22 RX ADMIN — LIDOCAINE 1 APPLICATION(S): 4 CREAM TOPICAL at 00:58

## 2024-03-22 RX ADMIN — Medication 650 MILLIGRAM(S): at 12:05

## 2024-03-22 RX ADMIN — Medication 20 MILLIGRAM(S): at 17:23

## 2024-03-22 RX ADMIN — SODIUM CHLORIDE 3 MILLILITER(S): 9 INJECTION INTRAMUSCULAR; INTRAVENOUS; SUBCUTANEOUS at 21:29

## 2024-03-22 RX ADMIN — Medication 40 MILLIGRAM(S): at 07:55

## 2024-03-22 RX ADMIN — Medication 15 MILLIGRAM(S): at 01:42

## 2024-03-22 RX ADMIN — Medication 2 MILLIGRAM(S): at 14:09

## 2024-03-22 RX ADMIN — LIDOCAINE 1 APPLICATION(S): 4 CREAM TOPICAL at 05:31

## 2024-03-22 RX ADMIN — Medication 650 MILLIGRAM(S): at 13:05

## 2024-03-22 RX ADMIN — MEROPENEM 100 MILLIGRAM(S): 1 INJECTION INTRAVENOUS at 00:58

## 2024-03-22 RX ADMIN — Medication 650 MILLIGRAM(S): at 22:31

## 2024-03-22 RX ADMIN — Medication 15 MILLIGRAM(S): at 06:30

## 2024-03-22 RX ADMIN — LIDOCAINE 1 APPLICATION(S): 4 CREAM TOPICAL at 22:43

## 2024-03-22 RX ADMIN — Medication 15 MILLIGRAM(S): at 05:31

## 2024-03-22 RX ADMIN — Medication 15 MILLIGRAM(S): at 00:58

## 2024-03-22 RX ADMIN — Medication 650 MILLIGRAM(S): at 06:30

## 2024-03-22 RX ADMIN — Medication 650 MILLIGRAM(S): at 18:23

## 2024-03-22 RX ADMIN — FINASTERIDE 5 MILLIGRAM(S): 5 TABLET, FILM COATED ORAL at 12:05

## 2024-03-22 NOTE — CONSULT NOTE ADULT - SUBJECTIVE AND OBJECTIVE BOX
CHIEF COMPLAINT: Patient is a 73y old  Male who presents with a chief complaint of Elective procedure Robot assisted laparoscopic simple prostatectomy (22 Mar 2024 05:56)      HPI: 73 yr old male with PMH of BPH, NSTEMI (4/2023), amyloid cardiomyopathy, DM (A1c ; 5.0 1/23/24), SARAH. Pt reports hx of enlarged prostate, dysuria, frequent UTI, currently has Harkins catheter (last changed 2 weeks ago)- recent urine culture noted with  Pseudomonas aeruginosa, denies fever, chills, hematuria or flank pain; started Ciprofloxacin 3/17/24, scheduled for robotic assisted laparoscopic simple prostatectomy      (18 Mar 2024 08:14)      Pain Symptoms if applicable:             	                           none	    mild         moderate         severe  	                            0	     1-3	      4-6	          7-10  Pain:  Location:	  Modifying factors:	  Associated symptoms:	    Allergies    No Known Allergies    Intolerances        HOME MEDICATIONS: [x] Reviewed    MEDICATIONS  (STANDING):  acetaminophen     Tablet .. 650 milliGRAM(s) Oral every 6 hours  finasteride 5 milliGRAM(s) Oral daily  heparin   Injectable 5000 Unit(s) SubCutaneous every 8 hours  hydrocortisone 20 milliGRAM(s) Oral once  lactated ringers. 1000 milliLiter(s) (75 mL/Hr) IV Continuous <Continuous>  lidocaine 5% Ointment 1 Application(s) Topical every 3 hours  meropenem  IVPB 1000 milliGRAM(s) IV Intermittent every 12 hours  metoprolol tartrate 12.5 milliGRAM(s) Oral two times a day  senna 2 Tablet(s) Oral at bedtime  sodium bicarbonate 650 milliGRAM(s) Oral two times a day  sodium chloride 0.9% lock flush 3 milliLiter(s) IV Push every 8 hours  tamsulosin 0.4 milliGRAM(s) Oral at bedtime    MEDICATIONS  (PRN):  metoclopramide Injectable 10 milliGRAM(s) IV Push every 6 hours PRN Nausea and/or Vomiting  oxybutynin 5 milliGRAM(s) Oral three times a day PRN Bladder spasms  oxyCODONE    IR 5 milliGRAM(s) Oral every 6 hours PRN Severe Pain (7 - 10)    PAST MEDICAL & SURGICAL HISTORY:  Cardiac amyloidosis  Stenosis, cervical spine  BPH (benign prostatic hyperplasia)  Indwelling Harkins catheter present  Stage 3 chronic kidney disease  2019 novel coronavirus disease (COVID-19)  Retention of urine  NSTEMI (non-ST elevation myocardial infarction)  DM (diabetes mellitus)  Abnormal gait  Other specified sepsis  Peripheral edema  Adrenal insufficiency  S/P cataract extraction  B/L  S/P nasal polypectomy  History of fusion of cervical spine  [X] Reviewed     SOCIAL HISTORY:  Denies substance use, former smoker    FAMILY HISTORY:  [x] No pertinent family history in first degree relatives     REVIEW OF SYSTEMS:    [x] All other ROS negative  [  ] Unable to obtain due to poor mental status    Vital Signs Last 24 Hrs  T(C): 37.1 (22 Mar 2024 13:50), Max: 37.1 (22 Mar 2024 09:45)  T(F): 98.7 (22 Mar 2024 13:50), Max: 98.7 (22 Mar 2024 09:45)  HR: 83 (22 Mar 2024 13:50) (64 - 85)  BP: 103/74 (22 Mar 2024 13:50) (90/55 - 116/77)  BP(mean): 76 (21 Mar 2024 23:00) (68 - 92)  RR: 17 (22 Mar 2024 13:50) (4 - 24)  SpO2: 98% (22 Mar 2024 13:50) (92% - 100%)    Parameters below as of 22 Mar 2024 13:50  Patient On (Oxygen Delivery Method): room air    PHYSICAL EXAM:    GENERAL: NAD, laying in bed  EYES: conjunctiva and sclera clear  ENMT: Moist mucous membranes  RESPIRATORY: Clear to auscultation bilaterally; No rales, rhonchi, wheezing, or rubs  CARDIOVASCULAR: Regular rate and rhythm; Nl s1, s2, no significant edema  GASTROINTESTINAL: Soft, Nontender, Nondistended  EXTREMITIES:  No clubbing, cyanosis, or edema    LABS:                        10.3   8.37  )-----------( 160      ( 22 Mar 2024 06:12 )             30.5     03-22    138  |  105  |  33<H>  ----------------------------<  94  5.2   |  25  |  2.00<H>    Ca    8.8      22 Mar 2024 06:12  Phos  4.3     03-21  Mg     2.10     03-21    Urinalysis Basic - ( 22 Mar 2024 06:12 )    Color: x / Appearance: x / SG: x / pH: x  Gluc: 94 mg/dL / Ketone: x  / Bili: x / Urobili: x   Blood: x / Protein: x / Nitrite: x   Leuk Esterase: x / RBC: x / WBC x   Sq Epi: x / Non Sq Epi: x / Bacteria: x    CAPILLARY BLOOD GLUCOSE    POCT Blood Glucose.: 103 mg/dL (22 Mar 2024 11:46)    RADIOLOGY & ADDITIONAL STUDIES: Reviewed              [x] Care Discussed with Consultants/Other Providers: Urology PA - discussed

## 2024-03-22 NOTE — PROGRESS NOTE ADULT - ASSESSMENT
Mr. Sanchez is a 73 year old man POD1 from RAL SPP & partial cystectomy. Pt recovering adequately w/o issues but has not passed flatus.     3/21/24: POD0. Endocrine consulted for stress dose steroids  3/22/24: Pain well controlled, CBI clamped CYU.    Plan  -AM labs  -BÁRBARA Cr  -IVF  -Dulcolax  -CLD  -Stress dose steroids per Endo:   --3/22/24 40mg oral hydrocortisone @8am, 20mg oral hydrocortisone     @3pm today. Monitor BP.  --3/23/24: if BP stable; 20 mg hydrocortisone @8am an 10mg @3pm  Dispo: possible home w/ morales today vs tmrw Mr. Sanchez is a 73 year old man POD1 from RAL SPP & partial cystectomy. Pt recovering adequately w/o issues but has not passed flatus.     3/21/24: POD0. Endocrine consulted for stress dose steroids  3/22/24: Pain well controlled, CBI clamped CYU.    Plan  -AM labs  -BÁRBARA Cr  -IVF  -CLD  -Stress dose steroids per Endo:   --3/22/24 40mg oral hydrocortisone @8am, 20mg oral hydrocortisone     @3pm today. Monitor BP.  --3/23/24: if BP stable; 20 mg hydrocortisone @8am an 10mg @3pm  Dispo: possible home w/ morales today vs tmrw

## 2024-03-22 NOTE — CONSULT NOTE ADULT - PROBLEM SELECTOR RECOMMENDATION 2
Appreciate endocrinology input, now started on stress dose steroids with taper plan as outlined in their note  BP stable

## 2024-03-22 NOTE — CONSULT NOTE ADULT - PROBLEM SELECTOR RECOMMENDATION 9
s/p OR 3/21 cystoscopy, robotic-assisted simple prostatectomy, robot-assisted partial cystectomy  - Continued management per Urology  - On stress dose steroids per endocrinology

## 2024-03-22 NOTE — PATIENT PROFILE ADULT - FALL HARM RISK - HARM RISK INTERVENTIONS
Assistance with ambulation/Assistance OOB with selected safe patient handling equipment/Communicate Risk of Fall with Harm to all staff/Discuss with provider need for PT consult/Monitor gait and stability/Provide patient with walking aids - walker, cane, crutches/Reinforce activity limits and safety measures with patient and family/Sit up slowly, dangle for a short time, stand at bedside before walking/Tailored Fall Risk Interventions/Use of alarms - bed, chair and/or voice tab/Visual Cue: Yellow wristband and red socks/Bed in lowest position, wheels locked, appropriate side rails in place/Call bell, personal items and telephone in reach/Instruct patient to call for assistance before getting out of bed or chair/Non-slip footwear when patient is out of bed/[place] to call system/Physically safe environment - no spills, clutter or unnecessary equipment/Purposeful Proactive Rounding/Room/bathroom lighting operational, light cord in reach

## 2024-03-22 NOTE — CONSULT NOTE ADULT - ASSESSMENT
73 yr old male with PMH of BPH, NSTEMI, amyloid cardiomyopathy, DM, adrenal insufficiency, presented for cystoscopy, robotic-assisted simple prostatectomy, robot-assisted partial cystectomy, s/p OR 3/21/2024.

## 2024-03-23 ENCOUNTER — TRANSCRIPTION ENCOUNTER (OUTPATIENT)
Age: 74
End: 2024-03-23

## 2024-03-23 VITALS
HEART RATE: 80 BPM | SYSTOLIC BLOOD PRESSURE: 129 MMHG | OXYGEN SATURATION: 95 % | DIASTOLIC BLOOD PRESSURE: 81 MMHG | RESPIRATION RATE: 17 BRPM | TEMPERATURE: 98 F

## 2024-03-23 LAB
-  AMPICILLIN/SULBACTAM: SIGNIFICANT CHANGE UP
-  AMPICILLIN: SIGNIFICANT CHANGE UP
-  CEFAZOLIN: SIGNIFICANT CHANGE UP
-  CIPROFLOXACIN: SIGNIFICANT CHANGE UP
-  DAPTOMYCIN: SIGNIFICANT CHANGE UP
-  GENTAMICIN: SIGNIFICANT CHANGE UP
-  LEVOFLOXACIN: SIGNIFICANT CHANGE UP
-  LINEZOLID: SIGNIFICANT CHANGE UP
-  OXACILLIN: SIGNIFICANT CHANGE UP
-  PENICILLIN: SIGNIFICANT CHANGE UP
-  RIFAMPIN: SIGNIFICANT CHANGE UP
-  TETRACYCLINE: SIGNIFICANT CHANGE UP
-  TETRACYCLINE: SIGNIFICANT CHANGE UP
-  TRIMETHOPRIM/SULFAMETHOXAZOLE: SIGNIFICANT CHANGE UP
-  VANCOMYCIN: SIGNIFICANT CHANGE UP
-  VANCOMYCIN: SIGNIFICANT CHANGE UP
ANION GAP SERPL CALC-SCNC: 9 MMOL/L — SIGNIFICANT CHANGE UP (ref 7–14)
BLD GP AB SCN SERPL QL: NEGATIVE — SIGNIFICANT CHANGE UP
BUN SERPL-MCNC: 35 MG/DL — HIGH (ref 7–23)
CALCIUM SERPL-MCNC: 8.6 MG/DL — SIGNIFICANT CHANGE UP (ref 8.4–10.5)
CHLORIDE SERPL-SCNC: 105 MMOL/L — SIGNIFICANT CHANGE UP (ref 98–107)
CO2 SERPL-SCNC: 24 MMOL/L — SIGNIFICANT CHANGE UP (ref 22–31)
CREAT SERPL-MCNC: 2.35 MG/DL — HIGH (ref 0.5–1.3)
CULTURE RESULTS: ABNORMAL
EGFR: 28 ML/MIN/1.73M2 — LOW
GLUCOSE SERPL-MCNC: 90 MG/DL — SIGNIFICANT CHANGE UP (ref 70–99)
HCT VFR BLD CALC: 30.5 % — LOW (ref 39–50)
HGB BLD-MCNC: 10.3 G/DL — LOW (ref 13–17)
MCHC RBC-ENTMCNC: 28.2 PG — SIGNIFICANT CHANGE UP (ref 27–34)
MCHC RBC-ENTMCNC: 33.8 GM/DL — SIGNIFICANT CHANGE UP (ref 32–36)
MCV RBC AUTO: 83.6 FL — SIGNIFICANT CHANGE UP (ref 80–100)
METHOD TYPE: SIGNIFICANT CHANGE UP
METHOD TYPE: SIGNIFICANT CHANGE UP
NRBC # BLD: 0 /100 WBCS — SIGNIFICANT CHANGE UP (ref 0–0)
NRBC # FLD: 0 K/UL — SIGNIFICANT CHANGE UP (ref 0–0)
ORGANISM # SPEC MICROSCOPIC CNT: ABNORMAL
PLATELET # BLD AUTO: 162 K/UL — SIGNIFICANT CHANGE UP (ref 150–400)
POTASSIUM SERPL-MCNC: 4.7 MMOL/L — SIGNIFICANT CHANGE UP (ref 3.5–5.3)
POTASSIUM SERPL-SCNC: 4.7 MMOL/L — SIGNIFICANT CHANGE UP (ref 3.5–5.3)
RBC # BLD: 3.65 M/UL — LOW (ref 4.2–5.8)
RBC # FLD: 16.6 % — HIGH (ref 10.3–14.5)
RH IG SCN BLD-IMP: POSITIVE — SIGNIFICANT CHANGE UP
SODIUM SERPL-SCNC: 138 MMOL/L — SIGNIFICANT CHANGE UP (ref 135–145)
SPECIMEN SOURCE: SIGNIFICANT CHANGE UP
WBC # BLD: 7.56 K/UL — SIGNIFICANT CHANGE UP (ref 3.8–10.5)
WBC # FLD AUTO: 7.56 K/UL — SIGNIFICANT CHANGE UP (ref 3.8–10.5)

## 2024-03-23 PROCEDURE — 99232 SBSQ HOSP IP/OBS MODERATE 35: CPT

## 2024-03-23 RX ORDER — LEVOFLOXACIN 5 MG/ML
1 INJECTION, SOLUTION INTRAVENOUS
Qty: 7 | Refills: 0
Start: 2024-03-23 | End: 2024-03-29

## 2024-03-23 RX ORDER — ACETAMINOPHEN 500 MG
2 TABLET ORAL
Qty: 0 | Refills: 0 | DISCHARGE
Start: 2024-03-23

## 2024-03-23 RX ORDER — OXYCODONE HYDROCHLORIDE 5 MG/1
1 TABLET ORAL
Qty: 8 | Refills: 0
Start: 2024-03-23

## 2024-03-23 RX ORDER — CIPROFLOXACIN LACTATE 400MG/40ML
1 VIAL (ML) INTRAVENOUS
Refills: 0 | DISCHARGE

## 2024-03-23 RX ORDER — HYDROCORTISONE 20 MG
100 TABLET ORAL
Qty: 0 | Refills: 0 | DISCHARGE

## 2024-03-23 RX ORDER — SENNA PLUS 8.6 MG/1
2 TABLET ORAL
Qty: 0 | Refills: 0 | DISCHARGE
Start: 2024-03-23

## 2024-03-23 RX ORDER — LIDOCAINE 4 G/100G
1 CREAM TOPICAL
Qty: 0 | Refills: 0 | DISCHARGE
Start: 2024-03-23

## 2024-03-23 RX ORDER — POLYETHYLENE GLYCOL 3350 17 G/17G
17 POWDER, FOR SOLUTION ORAL
Qty: 0 | Refills: 0 | DISCHARGE

## 2024-03-23 RX ADMIN — Medication 20 MILLIGRAM(S): at 08:53

## 2024-03-23 RX ADMIN — Medication 650 MILLIGRAM(S): at 17:45

## 2024-03-23 RX ADMIN — LIDOCAINE 1 APPLICATION(S): 4 CREAM TOPICAL at 00:44

## 2024-03-23 RX ADMIN — Medication 650 MILLIGRAM(S): at 05:27

## 2024-03-23 RX ADMIN — Medication 12.5 MILLIGRAM(S): at 05:26

## 2024-03-23 RX ADMIN — Medication 650 MILLIGRAM(S): at 05:26

## 2024-03-23 RX ADMIN — SODIUM CHLORIDE 3 MILLILITER(S): 9 INJECTION INTRAMUSCULAR; INTRAVENOUS; SUBCUTANEOUS at 13:36

## 2024-03-23 RX ADMIN — HEPARIN SODIUM 5000 UNIT(S): 5000 INJECTION INTRAVENOUS; SUBCUTANEOUS at 13:38

## 2024-03-23 RX ADMIN — Medication 12.5 MILLIGRAM(S): at 17:45

## 2024-03-23 RX ADMIN — LIDOCAINE 1 APPLICATION(S): 4 CREAM TOPICAL at 05:27

## 2024-03-23 RX ADMIN — MEROPENEM 100 MILLIGRAM(S): 1 INJECTION INTRAVENOUS at 00:44

## 2024-03-23 RX ADMIN — Medication 650 MILLIGRAM(S): at 11:30

## 2024-03-23 RX ADMIN — SODIUM CHLORIDE 75 MILLILITER(S): 9 INJECTION, SOLUTION INTRAVENOUS at 10:29

## 2024-03-23 RX ADMIN — HEPARIN SODIUM 5000 UNIT(S): 5000 INJECTION INTRAVENOUS; SUBCUTANEOUS at 05:28

## 2024-03-23 RX ADMIN — Medication 650 MILLIGRAM(S): at 10:28

## 2024-03-23 RX ADMIN — SODIUM CHLORIDE 75 MILLILITER(S): 9 INJECTION, SOLUTION INTRAVENOUS at 00:38

## 2024-03-23 RX ADMIN — Medication 650 MILLIGRAM(S): at 17:44

## 2024-03-23 RX ADMIN — LIDOCAINE 1 APPLICATION(S): 4 CREAM TOPICAL at 17:46

## 2024-03-23 RX ADMIN — Medication 10 MILLIGRAM(S): at 16:38

## 2024-03-23 RX ADMIN — FINASTERIDE 5 MILLIGRAM(S): 5 TABLET, FILM COATED ORAL at 12:09

## 2024-03-23 RX ADMIN — SODIUM CHLORIDE 3 MILLILITER(S): 9 INJECTION INTRAMUSCULAR; INTRAVENOUS; SUBCUTANEOUS at 05:19

## 2024-03-23 NOTE — DIETITIAN INITIAL EVALUATION ADULT - PHYSCIAL ASSESSMENT
No overt physical signs of muscle/fat losses at present per visual assessment. Patient requested writer to exit. Palpation not warranted at present.

## 2024-03-23 NOTE — DIETITIAN INITIAL EVALUATION ADULT - PERTINENT LABORATORY DATA
03-23    138  |  105  |  35<H>  ----------------------------<  90  4.7   |  24  |  2.35<H>    Ca    8.6      23 Mar 2024 05:22  Phos  4.3     03-21  Mg     2.10     03-21    A1C with Estimated Average Glucose Result: 5.0 % (01-03-24 @ 06:01)  A1C with Estimated Average Glucose Result: 5.3 % (11-04-23 @ 10:45)  A1C with Estimated Average Glucose Result: 5.5 % (06-10-23 @ 16:29)

## 2024-03-23 NOTE — DISCHARGE NOTE NURSING/CASE MANAGEMENT/SOCIAL WORK - PATIENT PORTAL LINK FT
You can access the FollowMyHealth Patient Portal offered by [facility] by registering at the following website: [email or web address]. By joining [facility]’s FollowMyHealth portal, you will also be able to view your health information using other applications (apps) compatible with our system.

## 2024-03-23 NOTE — DIETITIAN INITIAL EVALUATION ADULT - ADD RECOMMEND
1. Monitor PO intake/tolerance, labs, weights, BMs, and skin integrity  2. Please document % PO intake in nursing flowsheets to assess nutritional intake/monitor need for further intervention(s).   3. Please obtain weekly wt  4. Provide feeding assistance prn   5. Encourage PO intake at meal times

## 2024-03-23 NOTE — DISCHARGE NOTE PROVIDER - NSDCCPCAREPLAN_GEN_ALL_CORE_FT
PRINCIPAL DISCHARGE DIAGNOSIS  Diagnosis: BPH (benign prostatic hyperplasia)  Assessment and Plan of Treatment: Empty morales bag as needed as instructed.  Keep hydrated.  No heavy lifting or straining for 4 to 6 weeks, avoid constipation. You may have intermittent pink tinged urine and small amounts of leakage around morales (due to bladder spasms).  This is normal.   If your urine becomes bright red or with clots, or there is no urine in the bag, please call the office. You may shower, just pat white strips dry, they will fall off in a few weeks. Change dressing at drain site daily or as needed until dry.  Call Dr. Marion's office to confirm the time of your follow up appointment on Thursday, 3/28 for morales removal and further management.  Call the office if you have fever greater than 101, no urine in bag, pain not relieved with pain medication, nausea/vomiting.        SECONDARY DISCHARGE DIAGNOSES  Diagnosis: Adrenal insufficiency  Assessment and Plan of Treatment: Continue current home medications and follow up with your primary care provider  Follow up with your endocrinologist and have a follow up MRI performed      Diagnosis: Hypertension  Assessment and Plan of Treatment: Continue current home medications and follow up with your primary care provider      Diagnosis: Cardiac amyloidosis  Assessment and Plan of Treatment: Continue current home medications and follow up with your primary care provider

## 2024-03-23 NOTE — PROGRESS NOTE ADULT - ASSESSMENT
74 yo s/p RAL SPP & partial cystectomy 3/21/2024, h/o AI, endocrinology curbsided for steroid dosing postop recs: 3/22/24 40mg oral hydrocortisone @8am, 20mg oral hydrocortisone     @3pm today. Monitor BP.  --3/23/24: if BP stable; 20 mg hydrocortisone @8am an 10mg @3pm, placed on meropenem    3/21: POD0. Endocrine consulted for stress dose steroids  3/22: Pain well controlled, tolerating CLD, no N/V, no flatus, VSS, BÁRBARA Cr = serum  3/23: CBI clear, VSS, tolerating CLD, no N/V, + flatus, is hungry, BÁRBARA removed, OR cx S aureus/Efaec      Plan  -AM labs reviewed  -advance diet  -clamp CBI  -continue meropenem  -morales teaching  -f/u OR cx  -Stress dose steroids per Endo  -DVT prophy, IS, OOB, ambulate  -d/c home with morales on levaquin x 7 days to f/u with Dr. Marion 3/28

## 2024-03-23 NOTE — PROGRESS NOTE ADULT - SUBJECTIVE AND OBJECTIVE BOX
ANESTHESIA POSTOP CHECK    73y Male POSTOP DAY 1     Vital Signs Last 24 Hrs  T(C): 37.1 (22 Mar 2024 09:45), Max: 37.1 (22 Mar 2024 09:45)  T(F): 98.7 (22 Mar 2024 09:45), Max: 98.7 (22 Mar 2024 09:45)  HR: 85 (22 Mar 2024 09:45) (64 - 85)  BP: 108/66 (22 Mar 2024 09:45) (90/55 - 116/77)  BP(mean): 76 (21 Mar 2024 23:00) (68 - 92)  RR: 17 (22 Mar 2024 09:45) (4 - 24)  SpO2: 100% (22 Mar 2024 09:45) (92% - 100%)    Parameters below as of 22 Mar 2024 09:45  Patient On (Oxygen Delivery Method): room air      I&O's Summary    21 Mar 2024 07:01  -  22 Mar 2024 07:00  --------------------------------------------------------  IN: 575 mL / OUT: 80 mL / NET: 495 mL    22 Mar 2024 07:01  -  22 Mar 2024 13:37  --------------------------------------------------------  IN: 0 mL / OUT: 25 mL / NET: -25 mL        [X ] NO APPARENT ANESTHESIA COMPLICATIONS      Comments: 
Overnight events:  None, CBI remained clear, VSS    Subjective:  Pt offers no complaints, tolerating CLD, no N/V, + flatus    Objective:    Vital signs  T(C): , Max: 37.2 (03-22-24 @ 17:17)  HR: 71 (03-23-24 @ 09:03)  BP: 119/71 (03-23-24 @ 09:03)  SpO2: 97% (03-23-24 @ 09:03)  Wt(kg): --    Output   CBI clear  03-22 @ 07:01  -  03-23 @ 07:00  --------------------------------------------------------  IN: 1095 mL / OUT: 220 mL / NET: 875 mL    03-23 @ 07:01  -  03-23 @ 11:54  --------------------------------------------------------  IN: 0 mL / OUT: 55 mL / NET: -55 mL        Gen: NAD  Abd: jerry c/d/i, soft, nontender, nondistended, BÁRBARA removed   : carmen secured    Labs                        10.3   7.56  )-----------( 162      ( 23 Mar 2024 05:22 )             30.5     23 Mar 2024 05:22    138    |  105    |  35     ----------------------------<  90     4.7     |  24     |  2.35     Ca    8.6        23 Mar 2024 05:22  Phos  4.3       21 Mar 2024 18:07  Mg     2.10      21 Mar 2024 18:07        
 Note    Post op Check    s/p Robotic-assisted simple prostatectomy, partial cystectomy  EBL 100ml     s/p 100mg Hydrocortisone IV intra-op for stress dosed steroid treatment.     Patient seen and examined without c/o surgical site pain, denies nausea    Vital Signs Last 24 Hrs  T(C): 36.1 (21 Mar 2024 21:00), Max: 36.9 (21 Mar 2024 10:38)  T(F): 97 (21 Mar 2024 21:00), Max: 98.4 (21 Mar 2024 10:38)  HR: 67 (21 Mar 2024 21:45) (66 - 82)  BP: 113/76 (21 Mar 2024 21:45) (90/55 - 116/77)  BP(mean): 91 (21 Mar 2024 21:45) (68 - 92)  RR: 14 (21 Mar 2024 21:45) (4 - 24)  SpO2: 99% (21 Mar 2024 21:45) (92% - 100%)    Parameters below as of 21 Mar 2024 21:45  Patient On (Oxygen Delivery Method): room air    I&O's Summary    21 Mar 2024 07:01  -  21 Mar 2024 21:58  --------------------------------------------------------  IN: 425 mL / OUT: 50 mL / NET: 375 mL    PHYSICAL EXAM:     Constitutional: well appearing, A+O, no distress    Respiratory: clear     Cardiovascular: regular     Gastrointestinal: soft, minimal distention, minimal tenderness    Genitourinary: morales in place, with CBI draining clear, gentle flush of NS administered secondary to c/o bladder fullness, catheter flushes easily, no clots present, no CBI retention present.     Extremities: venodynes in place       LABS:                   10.4   7.22  )-----------( 170      ( 21 Mar 2024 18:07 )             30.8       03-21    141  |  108<H>  |  31<H>  ----------------------------<  151<H>  4.7   |  21<L>  |  1.84<H>    Ca    8.6      21 Mar 2024 18:07  Phos  4.3     03-21  Mg     2.10     03-21
Subjective    Pt is POD1 from RAL SPP, partial cystectomy & umbilical hernia repair. No acute overnight events. Patient resting comfortably in bed this morning. Pt without issues, no longer complaining of bladder pain, denies fever, chills, nausea, or emesis. Pt has not yet passed flatus. Pt has not ambulated but willing to with assistance.     Objective    Vital signs  T(F): , Max: 98.4 (03-21-24 @ 10:38)  HR: 64 (03-22-24 @ 01:32)  BP: 102/67 (03-22-24 @ 01:32)  SpO2: 100% (03-22-24 @ 01:32)  Wt(kg): --    Output     OUT:    Bulb (mL): 65 mL  Total OUT: 65 mL    Total NET: -65 mL          Gen: NAD  Abd: soft, nontender, nondistended, midline incision w/ strikethrough, other incisions c/d/i. BÁRBARA drain serosanguinous fluid  : morales secured in place, CBI clamped  draining CYU    Labs      03-21 @ 18:07    WBC 7.22  / Hct 30.8  / SCr 1.84           
Dr. Jasmyne Kim  Pager [phone]    PROGRESS NOTE:     Patient is a 73y old  Male who presents with a chief complaint of Simple prostatectomy and partial cystectomy (23 Mar 2024 08:40)      SUBJECTIVE / OVERNIGHT EVENTS: pt is hemodynamically stable, no chest pain/sob  ADDITIONAL REVIEW OF SYSTEMS: afebrile, tolerating diet     MEDICATIONS  (STANDING):  acetaminophen     Tablet .. 650 milliGRAM(s) Oral every 6 hours  finasteride 5 milliGRAM(s) Oral daily  heparin   Injectable 5000 Unit(s) SubCutaneous every 8 hours  hydrocortisone 10 milliGRAM(s) Oral once  lactated ringers. 1000 milliLiter(s) (75 mL/Hr) IV Continuous <Continuous>  lidocaine 5% Ointment 1 Application(s) Topical every 3 hours  metoprolol tartrate 12.5 milliGRAM(s) Oral two times a day  senna 2 Tablet(s) Oral at bedtime  sodium bicarbonate 650 milliGRAM(s) Oral two times a day  sodium chloride 0.9% lock flush 3 milliLiter(s) IV Push every 8 hours  tamsulosin 0.4 milliGRAM(s) Oral at bedtime    MEDICATIONS  (PRN):  metoclopramide Injectable 10 milliGRAM(s) IV Push every 6 hours PRN Nausea and/or Vomiting  oxybutynin 5 milliGRAM(s) Oral three times a day PRN Bladder spasms  oxyCODONE    IR 5 milliGRAM(s) Oral every 6 hours PRN Severe Pain (7 - 10)      CAPILLARY BLOOD GLUCOSE        I&O's Summary    22 Mar 2024 07:01  -  23 Mar 2024 07:00  --------------------------------------------------------  IN: 1095 mL / OUT: 220 mL / NET: 875 mL    23 Mar 2024 07:01  -  23 Mar 2024 12:08  --------------------------------------------------------  IN: 0 mL / OUT: 55 mL / NET: -55 mL        PHYSICAL EXAM:  Vital Signs Last 24 Hrs  T(C): 36.7 (23 Mar 2024 09:03), Max: 37.2 (22 Mar 2024 17:17)  T(F): 98.1 (23 Mar 2024 09:03), Max: 99 (22 Mar 2024 17:17)  HR: 71 (23 Mar 2024 09:03) (62 - 83)  BP: 119/71 (23 Mar 2024 09:03) (103/60 - 123/70)  BP(mean): --  RR: 17 (23 Mar 2024 09:03) (17 - 18)  SpO2: 97% (23 Mar 2024 09:03) (96% - 98%)    Parameters below as of 23 Mar 2024 09:03  Patient On (Oxygen Delivery Method): room air      CONSTITUTIONAL:   RESPIRATORY: Normal respiratory effort; lungs are clear to auscultation bilaterally  CARDIOVASCULAR: Regular rate and rhythm, normal S1 and S2, no murmur/rub/gallop; No lower extremity edema; Peripheral pulses are 2+ bilaterally  ABDOMEN: soft, mildly distended, wounds clean  : morales in place  MUSCULOSKELETAL: no clubbing or cyanosis of digits; no joint swelling or tenderness to palpation  PSYCH: A+O to person, place, and time; affect appropriate    LABS:                        10.3   7.56  )-----------( 162      ( 23 Mar 2024 05:22 )             30.5     03-23    138  |  105  |  35<H>  ----------------------------<  90  4.7   |  24  |  2.35<H>    Ca    8.6      23 Mar 2024 05:22  Phos  4.3     03-21  Mg     2.10     03-21            Urinalysis Basic - ( 23 Mar 2024 05:22 )    Color: x / Appearance: x / SG: x / pH: x  Gluc: 90 mg/dL / Ketone: x  / Bili: x / Urobili: x   Blood: x / Protein: x / Nitrite: x   Leuk Esterase: x / RBC: x / WBC x   Sq Epi: x / Non Sq Epi: x / Bacteria: x        Culture - Urine (collected 21 Mar 2024 13:05)  Source: OR Collect BLADDER URINE  Preliminary Report (22 Mar 2024 19:18):    50,000 - 99,000 CFU/mL Staphylococcus aureus    10,000 - 49,000 CFU/mL Enterococcus faecalis        RADIOLOGY & ADDITIONAL TESTS:  Results Reviewed:   Imaging Personally Reviewed:  Electrocardiogram Personally Reviewed:    COORDINATION OF CARE:  Care Discussed with Consultants/Other Providers [Y/N]: urology SHAD Hidalgo dc home today on home dose hydrocortisone  Prior or Outpatient Records Reviewed [Y/N]:

## 2024-03-23 NOTE — DIETITIAN INITIAL EVALUATION ADULT - NUTRITIONGOAL OUTCOME1
Adequate oral intake (>75% at most meals, 100% oral nutrition supplement)   Maintain weight (+-2%)  Minimize signs and symptoms of muscle/fat wasting

## 2024-03-23 NOTE — DISCHARGE NOTE PROVIDER - HOSPITAL COURSE
72 yo M s/p RAL simple prostatectomy, partial cystectomy on 3/21/2024, pt given stress dose steroids intraop, and per endo double home doses on POD #1 and back to home doses on POD #2. Pt remained hemodynamically stable for the duration of his hospitalization. 74 yo M s/p RAL simple prostatectomy, partial cystectomy on 3/21/2024, pt given stress dose steroids intraop, and per endo double home doses on POD #1 and back to home doses on POD #2. Pt remained hemodynamically stable for the duration of his hospitalization.  POD #2 CBI clamped and urine remained acceptable in color, return of GI function, diet advanced without incident.  Pt d/c with morales to leg bag on levaquin to f/u with Dr. Marion on 3/28

## 2024-03-23 NOTE — DIETITIAN INITIAL EVALUATION ADULT - HEIGHT FOR BMI (CENTIMETERS)
Anesthesia Start and Stop Event Times     Date Time Event    10/20/2023 1853 Anesthesia Start     1855 Ready for Procedure    10/21/2023 0017 Anesthesia Stop        Responsible Staff  10/20/23 to 10/21/23    Name Role Begin End    Kojo Cardenas M.D. Anesth 1853 0017        Overtime Reason:  no overtime (within assigned shift)    Comments:                                                       157.5

## 2024-03-23 NOTE — DISCHARGE NOTE PROVIDER - NSDCFUSCHEDAPPT_GEN_ALL_CORE_FT
Evonne Marion  [facility]  Scheduled Appointment: 03/27/2024    Evonne Marion  [facility]  UROLOGY [address]  Scheduled Appointment: 03/28/2024    [facility]  GERIATRICS [address]   Scheduled Appointment: 04/08/2024    Nery Elmore  [facility]  NEPHRO [address]  Scheduled Appointment: 04/15/2024    Evonne Marion  [facility] PreAdmits  Scheduled Appointment: 04/17/2024    Staci Mota  [facility]  PEDALLERGY [address] [facility]  Scheduled Appointment: 05/06/2024    MACARIO CLINE  [facility]  CARDIOLOGY [address] [facility]   Scheduled Appointment: 05/15/2024     Evonne Marion  [facility]  UROLOGY [address]  Scheduled Appointment: 04/08/2024    [facility]  GERIATRICS [address]   Scheduled Appointment: 04/08/2024    Nery Elmore  [facility]  NEPHRO [address]  Scheduled Appointment: 04/15/2024    Evonne Marion  [facility] PreAdmits  Scheduled Appointment: 04/17/2024    Staci Mota  [facility]  PEDALLERGY [address] [facility]  Scheduled Appointment: 05/13/2024    MACARIO CLINE  [facility]  CARDIOLOGY [address] [facility]   Scheduled Appointment: 05/15/2024

## 2024-03-23 NOTE — DIETITIAN INITIAL EVALUATION ADULT - PERSON TAUGHT/METHOD
Affirmed patient concerns for not eating in 2 days. Commended his great appetite/reported great intake. Encouraged continued adequate oral intake. Affirmed with patient case will be discussed with team to advance diet when feasible./verbal instruction/individual instruction/patient instructed

## 2024-03-23 NOTE — PROGRESS NOTE ADULT - PROBLEM SELECTOR PLAN 1
s/p OR 3/21 cystoscopy, robotic-assisted simple prostatectomy, robot-assisted partial cystectomy  - Continued management per Urology  - hemodynamically stable, s/p stress steroid, dc home today on home hydrocortisone 20mg am and 10 mg pm  - pt with adryan on ckd3, Cr uptrending to 2.3, IVF, trend  - dispo: DC home today per primary team s/p OR 3/21 cystoscopy, robotic-assisted simple prostatectomy, robot-assisted partial cystectomy  - Continued management per Urology  - hemodynamically stable, s/p stress steroid, dc home today on home hydrocortisone 20mg am and 10 mg pm  - pt with adryan on ckd3, Cr uptrending to 2.3, IVF, trend  - OR cx staph aureus, enterococcus, given imipenem, ordered a dose of levaquin per   - dispo: DC home today per primary team s/p OR 3/21 cystoscopy, robotic-assisted simple prostatectomy, robot-assisted partial cystectomy  - Continued management per Urology  - hemodynamically stable, s/p stress steroid, dc home today on home hydrocortisone 20mg am and 10 mg pm  - pt with adryan on ckd3, Cr uptrending to 2.3, IVF, trend  - OR cx staph aureus, enterococcus, given meropenem, ordered a dose of levaquin per   - dispo: DC home today per primary team

## 2024-03-23 NOTE — DISCHARGE NOTE PROVIDER - CARE PROVIDER_API CALL
Evonne Marion  Urology  [address] [place] [id number]  Phone: [phone]  Fax: [phone]  Follow Up Time:

## 2024-03-23 NOTE — DISCHARGE NOTE PROVIDER - NSDCMRMEDTOKEN_GEN_ALL_CORE_FT
acetaminophen 325 mg oral tablet: 2 tab(s) orally every 6 hours  Cipro 500 mg oral tablet: 1 tab(s) orally 2 times a day  finasteride 5 mg oral tablet: 1 tab(s) orally once a day  hydrocortisone 10 mg oral tablet: 1 tab(s) orally once a day Take daily at 3 PM  hydrocortisone 20 mg oral tablet: 1 tab(s) orally once a day Take daily at 8AM  metoprolol tartrate 25 mg oral tablet: 0.5 tab(s) orally 2 times a day  sodium bicarbonate 650 mg oral tablet: 1 tab(s) orally 2 times a day  Solu-CORTEF Act-O-Vial 100 mg injection: 100 milligram(s) intraocular once a day  tamsulosin 0.4 mg oral capsule: 1 cap(s) orally once a day  Vitamin D 1 capsule po Weekly (Frdays):   Vyndamax 61 mg oral capsule: 1 cap(s) orally once a day   acetaminophen 325 mg oral tablet: 2 tab(s) orally every 6 hours  finasteride 5 mg oral tablet: 1 tab(s) orally once a day  hydrocortisone 10 mg oral tablet: 1 tab(s) orally once a day Take daily at 3 PM  hydrocortisone 20 mg oral tablet: 1 tab(s) orally once a day Take daily at 8AM  levoFLOXacin 250 mg oral tablet: 1 tab(s) orally once a day  lidocaine 5% topical ointment: 1 Apply topically to affected area every 3 hours  metoprolol tartrate 25 mg oral tablet: 0.5 tab(s) orally 2 times a day  senna leaf extract oral tablet: 2 tab(s) orally once a day (at bedtime)  sodium bicarbonate 650 mg oral tablet: 1 tab(s) orally 2 times a day  Solu-CORTEF Act-O-Vial 100 mg injection: 100 milligram(s) intraocular once a day  tamsulosin 0.4 mg oral capsule: 1 cap(s) orally once a day  Vitamin D 1 capsule po Weekly (Frdays):   Vyndamax 61 mg oral capsule: 1 cap(s) orally once a day   acetaminophen 325 mg oral tablet: 2 tab(s) orally every 6 hours  finasteride 5 mg oral tablet: 1 tab(s) orally once a day  hydrocortisone 10 mg oral tablet: 1 tab(s) orally once a day Take daily at 3 PM  hydrocortisone 20 mg oral tablet: 1 tab(s) orally once a day Take daily at 8AM  levoFLOXacin 250 mg oral tablet: 1 tab(s) orally once a day  lidocaine 5% topical ointment: 1 Apply topically to affected area every 3 hours  metoprolol tartrate 25 mg oral tablet: 0.5 tab(s) orally 2 times a day  oxyCODONE 5 mg oral tablet: 1 tab(s) orally every 6 hours as needed for severe pain MDD: 4  polyethylene glycol 3350 oral powder for reconstitution: 17 gram(s) orally once a day as needed for  constipation  senna leaf extract oral tablet: 2 tab(s) orally once a day (at bedtime)  sodium bicarbonate 650 mg oral tablet: 1 tab(s) orally 2 times a day  Solu-CORTEF Act-O-Vial 100 mg injection: 100 milligram(s) intraocular once a day  tamsulosin 0.4 mg oral capsule: 1 cap(s) orally once a day  Vitamin D 1 capsule po Weekly (Frdays):   Vyndamax 61 mg oral capsule: 1 cap(s) orally once a day

## 2024-03-23 NOTE — DIETITIAN INITIAL EVALUATION ADULT - OTHER INFO
73M PMH BPH, NSTEMI, amyloid cardiomyopathy, DM, adrenal insufficiency presented for cystoscopy, simple prostatectomy, partial cystectomy, s/p OR 3/21/2024.    Patient met chairside this AM in high spirits. Stated he has been in bed since surgery and this morning was his first movement to the chair; "I feel good". Patient endorsed a big appetite PTA and at present. Requesting food since he last ate on Thursday 3/21. Voiced he spoke with MD to eat. At the time of writer's visit, patient's diet order was "clear liquids". Diet order changed by MD after writer's visit to "carb consistent, no snacks". Patient voiced he does not eat pork or beef. OK chicken and fish. No other food preferences voiced. Patient denied food allergies. Denied nausea/vomitting/diarrhea/constipation. Voiced he did not have a bowel movement yet; was feeling to have a bowel movement at present. Requested writer to exit. Request honored.     Weight trend: ~150 lbs (68.2 kg) self reported UBW. Per HIE: 71.5 kg (1/2/24) -> 68.9 kg (3/21/24); -3.6% x 3mo    Labs reviewed, A1C well managed (5.0)

## 2024-03-23 NOTE — DIETITIAN INITIAL EVALUATION ADULT - DIET TYPE
A1C well managed (5.0) especially for age, liberalize to promote oral intake and given advanced age/regular A1C well managed (5.0) especially for age, liberalize diet to promote intake. Add Glucerna 1x/d/regular/supplement (specify)

## 2024-03-23 NOTE — DIETITIAN INITIAL EVALUATION ADULT - PERTINENT MEDS FT
MEDICATIONS  (STANDING):  acetaminophen     Tablet .. 650 milliGRAM(s) Oral every 6 hours  finasteride 5 milliGRAM(s) Oral daily  heparin   Injectable 5000 Unit(s) SubCutaneous every 8 hours  hydrocortisone 10 milliGRAM(s) Oral once  lactated ringers. 1000 milliLiter(s) (75 mL/Hr) IV Continuous <Continuous>  lidocaine 5% Ointment 1 Application(s) Topical every 3 hours  metoprolol tartrate 12.5 milliGRAM(s) Oral two times a day  senna 2 Tablet(s) Oral at bedtime  sodium bicarbonate 650 milliGRAM(s) Oral two times a day  sodium chloride 0.9% lock flush 3 milliLiter(s) IV Push every 8 hours  tamsulosin 0.4 milliGRAM(s) Oral at bedtime    MEDICATIONS  (PRN):  metoclopramide Injectable 10 milliGRAM(s) IV Push every 6 hours PRN Nausea and/or Vomiting  oxybutynin 5 milliGRAM(s) Oral three times a day PRN Bladder spasms  oxyCODONE    IR 5 milliGRAM(s) Oral every 6 hours PRN Severe Pain (7 - 10)

## 2024-03-25 ENCOUNTER — NON-APPOINTMENT (OUTPATIENT)
Age: 74
End: 2024-03-25

## 2024-03-25 PROBLEM — E27.40 UNSPECIFIED ADRENOCORTICAL INSUFFICIENCY: Chronic | Status: ACTIVE | Noted: 2024-03-18

## 2024-03-25 PROBLEM — U07.1 COVID-19: Chronic | Status: ACTIVE | Noted: 2024-03-18

## 2024-03-25 PROBLEM — R26.9 UNSPECIFIED ABNORMALITIES OF GAIT AND MOBILITY: Chronic | Status: ACTIVE | Noted: 2024-03-18

## 2024-03-27 DIAGNOSIS — A49.9 URINARY TRACT INFECTION, SITE NOT SPECIFIED: ICD-10-CM

## 2024-03-27 DIAGNOSIS — N39.0 URINARY TRACT INFECTION, SITE NOT SPECIFIED: ICD-10-CM

## 2024-03-27 LAB — SURGICAL PATHOLOGY STUDY: SIGNIFICANT CHANGE UP

## 2024-03-28 ENCOUNTER — APPOINTMENT (OUTPATIENT)
Dept: UROLOGY | Facility: CLINIC | Age: 74
End: 2024-03-28
Payer: MEDICARE

## 2024-03-28 VITALS
HEIGHT: 64 IN | RESPIRATION RATE: 16 BRPM | TEMPERATURE: 97.3 F | DIASTOLIC BLOOD PRESSURE: 86 MMHG | WEIGHT: 154 LBS | SYSTOLIC BLOOD PRESSURE: 132 MMHG | BODY MASS INDEX: 26.29 KG/M2 | HEART RATE: 74 BPM | OXYGEN SATURATION: 98 %

## 2024-03-28 DIAGNOSIS — R97.20 ELEVATED PROSTATE, SPECIFIC ANTIGEN [PSA]: ICD-10-CM

## 2024-03-28 PROCEDURE — 99024 POSTOP FOLLOW-UP VISIT: CPT

## 2024-03-28 NOTE — HISTORY OF PRESENT ILLNESS
[FreeTextEntry1] : 74yo gentleman with cc of urinary retention and BPH.   Pt reports urinary retention since Oct following nasal polyp ENT surgery. Has failed trial of voids. At baseline prior to this, was getting up 4x to go. No prior episodes of retention. No hx of UTIs. Had PSA of 16 in the setting of this retention. Had MRI 11/2023 that showed 142cc prostate with no suspicious lesions. PiRAD2. Had hospitalization earlier this month for covid and sepsis (liekly 2/2 UTI). 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   last week pt underwent RAL SPP with partial cystectomy due to bladder mass. Reviewed and path was benign. On abx for UCx from intraop. Dysuria feeling is improving. Having BMs.

## 2024-03-28 NOTE — PHYSICAL EXAM
[General Appearance - Well Developed] : well developed [General Appearance - Well Nourished] : well nourished [General Appearance - In No Acute Distress] : no acute distress [Exaggerated Use Of Accessory Muscles For Inspiration] : no accessory muscle use [Abdomen Soft] : soft [Abdomen Tenderness] : non-tender [Costovertebral Angle Tenderness] : no ~M costovertebral angle tenderness [Urethral Meatus] : meatus normal [Penis Abnormality] : normal circumcised penis [No Focal Deficits] : no focal deficits [Oriented To Time, Place, And Person] : oriented to person, place, and time [de-identified] : trace LE edema [de-identified] : incisions healing well [de-identified] : morales in place with clear yellow urine

## 2024-03-28 NOTE — ASSESSMENT
[FreeTextEntry1] : s/p RAL SPP with partial cystectomy due to abnormal mass. All benign. Harkins removed today.  --Cont abx --RTC in 2 weeks with repeat urine and PVR

## 2024-03-29 ENCOUNTER — RX CHANGE (OUTPATIENT)
Age: 74
End: 2024-03-29

## 2024-04-08 ENCOUNTER — APPOINTMENT (OUTPATIENT)
Dept: GERIATRICS | Facility: CLINIC | Age: 74
End: 2024-04-08
Payer: MEDICARE

## 2024-04-08 ENCOUNTER — APPOINTMENT (OUTPATIENT)
Dept: UROLOGY | Facility: CLINIC | Age: 74
End: 2024-04-08
Payer: MEDICARE

## 2024-04-08 VITALS
HEART RATE: 88 BPM | SYSTOLIC BLOOD PRESSURE: 115 MMHG | OXYGEN SATURATION: 97 % | RESPIRATION RATE: 16 BRPM | DIASTOLIC BLOOD PRESSURE: 76 MMHG

## 2024-04-08 VITALS
TEMPERATURE: 98.9 F | RESPIRATION RATE: 15 BRPM | OXYGEN SATURATION: 99 % | WEIGHT: 150 LBS | DIASTOLIC BLOOD PRESSURE: 76 MMHG | SYSTOLIC BLOOD PRESSURE: 116 MMHG | BODY MASS INDEX: 25.75 KG/M2 | HEART RATE: 88 BPM

## 2024-04-08 DIAGNOSIS — I43 ORGAN-LIMITED AMYLOIDOSIS: ICD-10-CM

## 2024-04-08 DIAGNOSIS — G89.29 PAIN IN RIGHT KNEE: ICD-10-CM

## 2024-04-08 DIAGNOSIS — E85.4 ORGAN-LIMITED AMYLOIDOSIS: ICD-10-CM

## 2024-04-08 DIAGNOSIS — M25.561 PAIN IN RIGHT KNEE: ICD-10-CM

## 2024-04-08 DIAGNOSIS — M25.562 PAIN IN RIGHT KNEE: ICD-10-CM

## 2024-04-08 PROCEDURE — 99024 POSTOP FOLLOW-UP VISIT: CPT

## 2024-04-08 PROCEDURE — 99214 OFFICE O/P EST MOD 30 MIN: CPT

## 2024-04-08 PROCEDURE — G2211 COMPLEX E/M VISIT ADD ON: CPT

## 2024-04-08 RX ORDER — AMPICILLIN SODIUM AND SULBACTAM SODIUM 2; 1 G/1; G/1
3 (2-1) INJECTION, POWDER, FOR SOLUTION INTRAVENOUS
Qty: 28 | Refills: 0 | Status: DISCONTINUED | COMMUNITY
Start: 2024-03-27 | End: 2024-04-08

## 2024-04-08 RX ORDER — SODIUM BICARBONATE 650 MG/1
650 TABLET ORAL
Refills: 0 | Status: DISCONTINUED | COMMUNITY
Start: 2024-02-06 | End: 2024-04-08

## 2024-04-08 RX ORDER — CIPROFLOXACIN HYDROCHLORIDE 500 MG/1
500 TABLET, FILM COATED ORAL TWICE DAILY
Qty: 28 | Refills: 0 | Status: DISCONTINUED | COMMUNITY
Start: 2024-03-12 | End: 2024-04-08

## 2024-04-08 RX ORDER — TAMSULOSIN HYDROCHLORIDE 0.4 MG/1
0.4 CAPSULE ORAL
Qty: 90 | Refills: 3 | Status: DISCONTINUED | COMMUNITY
Start: 2023-04-27 | End: 2024-04-08

## 2024-04-08 RX ORDER — SULFAMETHOXAZOLE AND TRIMETHOPRIM 800; 160 MG/1; MG/1
800-160 TABLET ORAL TWICE DAILY
Qty: 14 | Refills: 0 | Status: DISCONTINUED | COMMUNITY
Start: 2024-03-27 | End: 2024-04-08

## 2024-04-09 PROBLEM — M25.561 CHRONIC PAIN OF BOTH KNEES: Status: ACTIVE | Noted: 2024-04-08

## 2024-04-09 PROBLEM — E85.4 CARDIAC AMYLOIDOSIS: Status: ACTIVE | Noted: 2023-07-12

## 2024-04-09 LAB
ALBUMIN SERPL ELPH-MCNC: 4.5 G/DL
ALP BLD-CCNC: 183 U/L
ALT SERPL-CCNC: 43 U/L
ANION GAP SERPL CALC-SCNC: 12 MMOL/L
AST SERPL-CCNC: 31 U/L
BASOPHILS # BLD AUTO: 0.07 K/UL
BASOPHILS NFR BLD AUTO: 1.3 %
BILIRUB SERPL-MCNC: 0.5 MG/DL
BUN SERPL-MCNC: 36 MG/DL
CALCIUM SERPL-MCNC: 9.8 MG/DL
CHLORIDE SERPL-SCNC: 103 MMOL/L
CO2 SERPL-SCNC: 25 MMOL/L
CREAT SERPL-MCNC: 2.15 MG/DL
EGFR: 31 ML/MIN/1.73M2
EOSINOPHIL # BLD AUTO: 0.42 K/UL
EOSINOPHIL NFR BLD AUTO: 7.8 %
GLUCOSE SERPL-MCNC: 86 MG/DL
HCT VFR BLD CALC: 40.2 %
HGB BLD-MCNC: 13 G/DL
IMM GRANULOCYTES NFR BLD AUTO: 0.2 %
LYMPHOCYTES # BLD AUTO: 2.59 K/UL
LYMPHOCYTES NFR BLD AUTO: 48.3 %
MAN DIFF?: NORMAL
MCHC RBC-ENTMCNC: 28.4 PG
MCHC RBC-ENTMCNC: 32.3 GM/DL
MCV RBC AUTO: 87.8 FL
MONOCYTES # BLD AUTO: 0.69 K/UL
MONOCYTES NFR BLD AUTO: 12.9 %
NEUTROPHILS # BLD AUTO: 1.58 K/UL
NEUTROPHILS NFR BLD AUTO: 29.5 %
PLATELET # BLD AUTO: 181 K/UL
POTASSIUM SERPL-SCNC: 4.9 MMOL/L
PROT SERPL-MCNC: 8 G/DL
RBC # BLD: 4.58 M/UL
RBC # FLD: 16.9 %
SODIUM SERPL-SCNC: 140 MMOL/L
WBC # FLD AUTO: 5.36 K/UL

## 2024-04-09 RX ORDER — PREDNISONE 20 MG/1
20 TABLET ORAL
Refills: 0 | Status: DISCONTINUED | COMMUNITY
Start: 2024-02-06 | End: 2024-04-09

## 2024-04-09 RX ORDER — PREDNISONE 10 MG/1
10 TABLET ORAL
Refills: 0 | Status: DISCONTINUED | COMMUNITY
Start: 2024-02-06 | End: 2024-04-09

## 2024-04-09 RX ORDER — ATORVASTATIN CALCIUM 10 MG/1
10 TABLET, FILM COATED ORAL
Qty: 90 | Refills: 0 | Status: ACTIVE | COMMUNITY
Start: 2024-02-08 | End: 1900-01-01

## 2024-04-09 NOTE — HISTORY OF PRESENT ILLNESS
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  Accompanied by Franci, daughter/HCP.  GENNY: Admitted to [facility] on 03/21/2024 and discharged to home on 03/23/2024.  Admitted for elective RAL simple prostatectomy, partial cystectomy on 03/21/2024. Tolerated procedure well. Was given stress dose steroids intra-operatively and double home doses on POD #1 and returned to home doses on POD #2 for adrenal insufficiency. CBI was clamped and urine was normal with normal return of GI function. Was DC with morales to leg back managed on levaquin pending follow up with urology outpatient, Dr. Marion on 03/28/2024.   Followed up with Dr. Marion. Pathology of bladder mass following cystectomy was benign. Dysuria is improving. BM's back to normal. Levaquin continued. Has 2 week follow up with Dr. Marion with repeat urine studies and PVR's. Supposed to stop tamsulosin and then finasteride after 1 month.  Peripheral neuropathy: On Tylenol, was on gabapentin but held since hospitalization due to renal function. Ambulates with RW. Feels like his pain has significantly reduced following rehab and stopped gabapentin however pain is returning, mostly at night. Has associated knee pain as well which is probably more from OA.  Insomnia: Was on remeron but was started on prednisone from hospital and has appetite has become voracious. Patient wants to hold remeron for since he is going to be on prednisone for some time. Sleeping well.  Lumbago: Improved following rehab. Would like to continue with PT. Some vague neuropathy noted. Off of gabapentin for now.  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 Following with cards. No chest pain, palpitations, SOB, focal neurologic signs. Reluctant to start Amvuttra injections for silencer therapy. Would need vitamin A supplementation. Was placed on Lasix transiently in March 2024 leading up to prostate surgery. [No falls in past year] : Patient reported no falls in the past year [Little interest or pleasure doing things] : 1) Little interest or pleasure doing things [Feeling down, depressed, or hopeless] : 2) Feeling down, depressed, or hopeless [0] : 2) Feeling down, depressed, or hopeless: Not at all (0) [PHQ-2 Negative - No further assessment needed] : PHQ-2 Negative - No further assessment needed [[id number]] : 0

## 2024-04-10 LAB
APPEARANCE: CLEAR
BACTERIA UR CULT: NORMAL
BACTERIA: NEGATIVE /HPF
BILIRUBIN URINE: NEGATIVE
BLOOD URINE: ABNORMAL
CAST: 1 /LPF
COLOR: YELLOW
EPITHELIAL CELLS: 1 /HPF
GLUCOSE QUALITATIVE U: NEGATIVE MG/DL
KETONES URINE: NEGATIVE MG/DL
LEUKOCYTE ESTERASE URINE: ABNORMAL
MICROSCOPIC-UA: NORMAL
NITRITE URINE: NEGATIVE
PH URINE: 6.5
PROTEIN URINE: 30 MG/DL
RED BLOOD CELLS URINE: 575 /HPF
SPECIFIC GRAVITY URINE: 1.01
UROBILINOGEN URINE: 0.2 MG/DL
WHITE BLOOD CELLS URINE: 16 /HPF

## 2024-04-15 ENCOUNTER — APPOINTMENT (OUTPATIENT)
Dept: NEPHROLOGY | Facility: CLINIC | Age: 74
End: 2024-04-15
Payer: MEDICARE

## 2024-04-15 VITALS
BODY MASS INDEX: 30.02 KG/M2 | WEIGHT: 163.14 LBS | TEMPERATURE: 98.3 F | OXYGEN SATURATION: 98 % | HEIGHT: 62 IN | DIASTOLIC BLOOD PRESSURE: 75 MMHG | HEART RATE: 77 BPM | SYSTOLIC BLOOD PRESSURE: 126 MMHG

## 2024-04-15 DIAGNOSIS — I95.9 HYPOTENSION, UNSPECIFIED: ICD-10-CM

## 2024-04-15 DIAGNOSIS — E87.20 ACIDOSIS, UNSPECIFIED: ICD-10-CM

## 2024-04-15 DIAGNOSIS — N18.32 CHRONIC KIDNEY DISEASE, STAGE 3B: ICD-10-CM

## 2024-04-15 DIAGNOSIS — D63.1 CHRONIC KIDNEY DISEASE, STAGE 3B: ICD-10-CM

## 2024-04-15 PROCEDURE — G2211 COMPLEX E/M VISIT ADD ON: CPT

## 2024-04-15 PROCEDURE — 99214 OFFICE O/P EST MOD 30 MIN: CPT

## 2024-04-15 RX ORDER — SODIUM BICARBONATE 650 MG/1
650 TABLET ORAL
Qty: 360 | Refills: 0 | Status: DISCONTINUED | COMMUNITY
Start: 2024-04-08 | End: 2024-04-15

## 2024-04-15 NOTE — ASSESSMENT
[FreeTextEntry1] : 1. Chronic kidney disease stage 3b. Patient's baseline serum creatinine is ~2.0mg/dL, with a corresponding eGFR of around 32-37 ml/min/1.73m2. The etiology of CKD is unclear. The history of DM is also unclear. SPEP negative, serological workup negative. US kidney showed medical renal disease with no hydronephrosis. He has several small simple renal cysts. In order to slow progression, patient is advised have good blood pressure to avoid NSAIDs. >50% of the encounter was spent discussing about kidney function, stages of CKD, and steps to slow progression of kidney disease.  2.Hypotension Improved. He was told that it could be related to a pituitary issue and was started on hydrocortisone. Currently, he is on hydrocortisone. He is going to follow up with the endocrinologist on 4/16/2024.   3. Renal bone disease - Since patient has CKD, we will screen for secondary hyperparathyroidism and hyperphosphatemia. The latest phos level is 4.5, phos binders are not indicated.  4. Anemia of CKD - goal Hb is 10-11.5. his current Hb is 13. KIMANI is not indicated.   5. Metabolic acidosis - secondary to SARAH. However, serum creatinine has stabilized. We can stop sodium bicarb now.   Patient is recommended to follow up in 3-4 months.

## 2024-04-15 NOTE — PHYSICAL EXAM
[General Appearance - Alert] : alert [General Appearance - In No Acute Distress] : in no acute distress [General Appearance - Well Nourished] : well nourished [Sclera] : the sclera and conjunctiva were normal [Outer Ear] : the ears and nose were normal in appearance [Hearing Threshold Finger Rub Not [place]] : hearing was normal [Nasal Cavity] : the nasal mucosa and septum were normal [Oropharynx] : the oropharynx was normal [Neck Appearance] : the appearance of the neck was normal [Neck Cervical Mass (___cm)] : no neck mass was observed [] : no respiratory distress [Respiration, Rhythm And Depth] : normal respiratory rhythm and effort [Exaggerated Use Of Accessory Muscles For Inspiration] : no accessory muscle use [Auscultation Breath Sounds / Voice Sounds] : lungs were clear to auscultation bilaterally [Heart Rate And Rhythm] : heart rate was normal and rhythm regular [Heart Sounds] : normal S1 and S2 [Heart Sounds Gallop] : no gallops [Murmurs] : no murmurs [Edema] : there was no peripheral edema [Veins - Varicosity Changes] : there were no varicosital changes [Bowel Sounds] : normal bowel sounds [No CVA Tenderness] : no ~M costovertebral angle tenderness [No Spinal Tenderness] : no spinal tenderness [Involuntary Movements] : no involuntary movements were seen [Musculoskeletal - Swelling] : no joint swelling seen [FreeTextEntry1] : Walks using a cane.  [Impaired Insight] : insight and judgment were intact [Affect] : the affect was normal [Mood] : the mood was normal

## 2024-04-15 NOTE — HISTORY OF PRESENT ILLNESS
[FreeTextEntry1] : Mr. DEEPTHI GÓMEZ is a 73-year-old man with a PMH of BPH, DM, cardiac amyloid who presents [facility] for the management of CKD.   Kidney history: Mr. GÓMEZ has a baseline serum creatinine of 2.0 mg/dL, with a corresponding eGFR of 33 ml/min/1.73m2. He had a serum creatinine of 1.0 in 2015, but we do not have record in between.  Urinalyses showed no rbc/wbc; and UPCR showed 0.1 g/g. No prior kidney imaging done.  Serological workup was negative, paraproteinemia workup was negative.   He denies having nausea/vomiting/diarrhea. He has a good appetite. He denies hematuria, frothy urine or dysuria. He denies shortness of breath, chest pain, headache, edema. No hand tremors. He denies skin rash, joint pains or hair loss. He denies NSAID use or herbal supplements. No family history of kidney disease.  DM: Diagnosed in 2010  He said that he changed his diet. Not on any medication. Not consistent with outpatient follow up.   Cardiac amyloid: On Vyndamax.  -------------- Interval history:  He was admitted to the hospital in Jan 2024 for sepsis and developed SARAH. He was started on sodium bicarb. Serum creatinine has stayed in the range of 2.0-2.2 since then.  He has trace lower extremity edema. He also underwent prostatectomy several weeks ago. He had hematuria post op, but hematuria has resolved.  Currently, he denies having nausea/vomiting/metallic taste in His mouth. He has a good appetite. He denies hematuria, frothy urine or dysuria. He denies shortness of breath, chest pain, headache, edema. No hand tremors. He denies NSAID use or herbal supplements.

## 2024-04-16 NOTE — ASSESSMENT
[FreeTextEntry1] : s/p RAL SPP with partial cystectomy due to abnormal mass. Voiding well. EMptying well. Has bothersome frequency --UA, UCx --Cont finasteride until 1mo post op --COnsider OAB med if still with bother --RTC in 4-6 weeks

## 2024-04-16 NOTE — H&P ADULT - HISTORY OF PRESENT ILLNESS
Cy Sanchez is a 73 year old male with PMHx of BPH (c/b urinary retention s/p Harkins catheter placement two months ago, exchanged monthly), CKD stage IIIb, and cardiac amyloidosis who presented to the ED on 12/16/23 for complaints of bilateral leg pain.    Patient reports he started having bilateral leg pain yesterday and it progressively worsened this morning. In addition, reports he has been having intermittent lower back pain that "intensified today." Associated chills. Did not take anything for the pain at home. Admits to onset of RUQ pain while in the ER. Of note, patient had urinary retention two months ago and required Harkins catheter placement. Attempted voiding trial but failed so Harkins catheter replaced. Exchanged monthly, last exchanged one week ago. States he is supposed to be scheduled for surgery to shrink his prostate early next year in hopes of being able to remove the Harkins.    Recent admission from 11/3/23-11/7/23 for chest pain. Also found to have UTI. U/A with large leuks, moderate blood, and many bacteria. Urine and blood cultures negative. Received ceftriaxone while hospitalized.    In the ED, Tmax 102.9, HR as elevated as 109, BP as low as 97/59. Hgb 10.0, Cr 1.77, alkphos 127, AST 68. Lactic acid WNL. VBG 7.40 / 44 / 25 / 27. COVID/influenza negative. U/A with proteinuria, negative nitrites, large leuks, moderate blood, WBC 25-50, RBC 11-25, moderate bacteria, squamous epithelial cells. CXR with ?b/l lower infiltrates. As per ER physician, urine sample was obtained after exchanging Harkins catheter. Received LR 1500 cc bolus, ceftriaxone 1 g IV, and acetaminophen 975 mg. No Vaccines Administered.

## 2024-04-16 NOTE — PHYSICAL EXAM
[General Appearance - Well Developed] : well developed [General Appearance - Well Nourished] : well nourished [General Appearance - In No Acute Distress] : no acute distress [Exaggerated Use Of Accessory Muscles For Inspiration] : no accessory muscle use [Abdomen Soft] : soft [Abdomen Tenderness] : non-tender [Costovertebral Angle Tenderness] : no ~M costovertebral angle tenderness [Urethral Meatus] : meatus normal [Penis Abnormality] : normal circumcised penis [No Focal Deficits] : no focal deficits [Oriented To Time, Place, And Person] : oriented to person, place, and time [de-identified] : morales in place with clear yellow urine [de-identified] : trace LE edema [de-identified] : incisions healing well

## 2024-04-16 NOTE — HISTORY OF PRESENT ILLNESS
[FreeTextEntry1] : 74yo gentleman with cc of urinary retention and BPH.   Pt reports urinary retention since Oct following nasal polyp ENT surgery. Has failed trial of voids. At baseline prior to this, was getting up 4x to go. No prior episodes of retention. No hx of UTIs. Had PSA of 16 in the setting of this retention. Had MRI 11/2023 that showed 142cc prostate with no suspicious lesions. PiRAD2. Had hospitalization earlier this month for covid and sepsis (liekly 2/2 UTI). 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   last week pt underwent RAL SPP with partial cystectomy due to bladder mass. Reviewed and path was benign.   [facility] He is having a lot of daytime frequency. Better at night. He does not sleep well at night. Seem he goes longer but hard to tell. No pain. Regular BMs. Still with a little blood in urine. Has urgency and some urge leakage.   PVR 0

## 2024-05-13 ENCOUNTER — APPOINTMENT (OUTPATIENT)
Dept: PEDIATRIC ALLERGY IMMUNOLOGY | Facility: CLINIC | Age: 74
End: 2024-05-13

## 2024-05-15 ENCOUNTER — APPOINTMENT (OUTPATIENT)
Dept: CARDIOLOGY | Facility: CLINIC | Age: 74
End: 2024-05-15

## 2024-05-17 ENCOUNTER — APPOINTMENT (OUTPATIENT)
Dept: UROLOGY | Facility: CLINIC | Age: 74
End: 2024-05-17
Payer: MEDICARE

## 2024-05-17 VITALS
DIASTOLIC BLOOD PRESSURE: 77 MMHG | RESPIRATION RATE: 16 BRPM | HEART RATE: 80 BPM | SYSTOLIC BLOOD PRESSURE: 118 MMHG | OXYGEN SATURATION: 100 %

## 2024-05-17 DIAGNOSIS — R33.9 RETENTION OF URINE, UNSPECIFIED: ICD-10-CM

## 2024-05-17 DIAGNOSIS — N13.8 BENIGN PROSTATIC HYPERPLASIA WITH LOWER URINARY TRACT SYMPMS: ICD-10-CM

## 2024-05-17 DIAGNOSIS — N40.1 BENIGN PROSTATIC HYPERPLASIA WITH LOWER URINARY TRACT SYMPMS: ICD-10-CM

## 2024-05-17 PROCEDURE — 99024 POSTOP FOLLOW-UP VISIT: CPT

## 2024-05-17 RX ORDER — TROSPIUM CHLORIDE 20 MG/1
20 TABLET, FILM COATED ORAL TWICE DAILY
Qty: 60 | Refills: 11 | Status: ACTIVE | COMMUNITY
Start: 2024-05-17 | End: 1900-01-01

## 2024-05-21 NOTE — PHYSICAL EXAM
[General Appearance - Well Developed] : well developed [General Appearance - Well Nourished] : well nourished [General Appearance - In No Acute Distress] : no acute distress [Exaggerated Use Of Accessory Muscles For Inspiration] : no accessory muscle use [Abdomen Soft] : soft [Abdomen Tenderness] : non-tender [Costovertebral Angle Tenderness] : no ~M costovertebral angle tenderness [Urethral Meatus] : meatus normal [Penis Abnormality] : normal circumcised penis [No Focal Deficits] : no focal deficits [Oriented To Time, Place, And Person] : oriented to person, place, and time [de-identified] : morales in place with clear yellow urine [de-identified] : trace LE edema [de-identified] : incisions healing well [de-identified] : using walker

## 2024-05-21 NOTE — ASSESSMENT
[FreeTextEntry1] : s/p robotic SPP with umbilical hernia repair and partial cystectomy for bladder mass that was benign. Has some urge sx. Now 2mo out. EMptying very well. Discussed that typically wait until 3mo yamil to assess sx as being at baseline.  --Consider tropsium --RTC in 3mo with Dr Milan

## 2024-05-21 NOTE — HISTORY OF PRESENT ILLNESS
[FreeTextEntry1] : 74yo gentleman with cc of urinary retention and BPH.   Pt reports urinary retention since Oct following nasal polyp ENT surgery. Has failed trial of voids. At baseline prior to this, was getting up 4x to go. No prior episodes of retention. No hx of UTIs. Had PSA of 16 in the setting of this retention. Had MRI 11/2023 that showed 142cc prostate with no suspicious lesions. PiRAD2. Had hospitalization earlier this month for covid and sepsis (liekly 2/2 UTI). 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   Pt underwent RAL SPP with partial cystectomy due to bladder mass 3/2024. Path was benign. Post op was is having a lot of daytime frequency. Better at night. He does not sleep well at night due to PVD with restless legs. No dysuria. No hematuria. No issues with constipation. Has frequency in the day going every 1-2h. ENdorses urgency. Has some urge leakage. Does not wear a pull up when at home but does when he is out.

## 2024-06-06 ENCOUNTER — LABORATORY RESULT (OUTPATIENT)
Age: 74
End: 2024-06-06

## 2024-06-06 ENCOUNTER — APPOINTMENT (OUTPATIENT)
Dept: GERIATRICS | Facility: CLINIC | Age: 74
End: 2024-06-06
Payer: MEDICARE

## 2024-06-06 VITALS
HEIGHT: 62 IN | BODY MASS INDEX: 29.1 KG/M2 | SYSTOLIC BLOOD PRESSURE: 100 MMHG | WEIGHT: 158.13 LBS | HEART RATE: 83 BPM | DIASTOLIC BLOOD PRESSURE: 64 MMHG | TEMPERATURE: 97.1 F | OXYGEN SATURATION: 99 %

## 2024-06-06 DIAGNOSIS — M54.50 LOW BACK PAIN, UNSPECIFIED: ICD-10-CM

## 2024-06-06 DIAGNOSIS — G89.29 LOW BACK PAIN, UNSPECIFIED: ICD-10-CM

## 2024-06-06 DIAGNOSIS — R26.81 UNSTEADINESS ON FEET: ICD-10-CM

## 2024-06-06 DIAGNOSIS — N18.32 CHRONIC KIDNEY DISEASE, STAGE 3B: ICD-10-CM

## 2024-06-06 DIAGNOSIS — E11.42 TYPE 2 DIABETES MELLITUS WITH DIABETIC POLYNEUROPATHY: ICD-10-CM

## 2024-06-06 DIAGNOSIS — E27.40 UNSPECIFIED ADRENOCORTICAL INSUFFICIENCY: ICD-10-CM

## 2024-06-06 PROCEDURE — 99214 OFFICE O/P EST MOD 30 MIN: CPT

## 2024-06-06 PROCEDURE — G2211 COMPLEX E/M VISIT ADD ON: CPT

## 2024-06-06 RX ORDER — FINASTERIDE 5 MG/1
5 TABLET, FILM COATED ORAL DAILY
Qty: 90 | Refills: 3 | Status: DISCONTINUED | COMMUNITY
Start: 2023-04-27 | End: 2024-06-06

## 2024-06-06 RX ORDER — HYDROCORTISONE 10 MG/1
10 TABLET ORAL
Qty: 90 | Refills: 1 | Status: ACTIVE | COMMUNITY
Start: 2024-02-01 | End: 1900-01-01

## 2024-06-06 RX ORDER — TRAMADOL HYDROCHLORIDE 25 MG/1
25 TABLET, COATED ORAL
Qty: 30 | Refills: 0 | Status: ACTIVE | COMMUNITY
Start: 2024-06-06 | End: 1900-01-01

## 2024-06-06 RX ORDER — SODIUM BICARBONATE 650 MG/1
650 TABLET ORAL
Qty: 360 | Refills: 0 | Status: ACTIVE | COMMUNITY
Start: 2024-06-06 | End: 1900-01-01

## 2024-06-06 RX ORDER — HYDROCORTISONE 20 MG/1
20 TABLET ORAL
Qty: 90 | Refills: 1 | Status: ACTIVE | COMMUNITY
Start: 2024-02-01 | End: 1900-01-01

## 2024-06-06 RX ORDER — METOPROLOL TARTRATE 25 MG/1
25 TABLET, FILM COATED ORAL TWICE DAILY
Qty: 90 | Refills: 2 | Status: ACTIVE | COMMUNITY
Start: 2024-02-06 | End: 1900-01-01

## 2024-06-06 RX ORDER — ERGOCALCIFEROL 1.25 MG/1
1.25 MG CAPSULE, LIQUID FILLED ORAL
Qty: 12 | Refills: 0 | Status: ACTIVE | COMMUNITY
Start: 2024-02-07 | End: 1900-01-01

## 2024-06-06 RX ORDER — GABAPENTIN 100 MG/1
100 CAPSULE ORAL
Qty: 90 | Refills: 1 | Status: ACTIVE | COMMUNITY
Start: 2024-04-08 | End: 1900-01-01

## 2024-06-06 RX ORDER — FUROSEMIDE 20 MG/1
20 TABLET ORAL
Qty: 60 | Refills: 0 | Status: ACTIVE | COMMUNITY
Start: 2024-02-06 | End: 1900-01-01

## 2024-06-07 LAB
BASOPHILS # BLD AUTO: 0.13 K/UL
BASOPHILS NFR BLD AUTO: 2.6 %
CHOLEST SERPL-MCNC: 140 MG/DL
EOSINOPHIL # BLD AUTO: 0.22 K/UL
EOSINOPHIL NFR BLD AUTO: 4.4 %
HCT VFR BLD CALC: 37.4 %
HDLC SERPL-MCNC: 43 MG/DL
HGB BLD-MCNC: 12.5 G/DL
LDLC SERPL CALC-MCNC: 80 MG/DL
LYMPHOCYTES # BLD AUTO: 1.92 K/UL
LYMPHOCYTES NFR BLD AUTO: 38.3 %
MAN DIFF?: NORMAL
MCHC RBC-ENTMCNC: 27.7 PG
MCHC RBC-ENTMCNC: 33.4 GM/DL
MCV RBC AUTO: 82.9 FL
MONOCYTES # BLD AUTO: 0.96 K/UL
MONOCYTES NFR BLD AUTO: 19.1 %
NEUTROPHILS # BLD AUTO: 1.7 K/UL
NEUTROPHILS NFR BLD AUTO: 33 %
NONHDLC SERPL-MCNC: 97 MG/DL
PLATELET # BLD AUTO: 158 K/UL
RBC # BLD: 4.51 M/UL
RBC # FLD: 15.3 %
TRIGL SERPL-MCNC: 87 MG/DL
WBC # FLD AUTO: 5.02 K/UL

## 2024-06-09 PROBLEM — R26.81 GAIT INSTABILITY: Status: ACTIVE | Noted: 2024-06-06

## 2024-06-09 PROBLEM — M54.50 CHRONIC BILATERAL LOW BACK PAIN WITHOUT SCIATICA: Status: ACTIVE | Noted: 2023-11-23

## 2024-06-09 PROBLEM — E11.42 DIABETIC PERIPHERAL NEUROPATHY: Status: ACTIVE | Noted: 2023-12-26

## 2024-06-09 PROBLEM — N18.32 STAGE 3B CHRONIC KIDNEY DISEASE: Status: ACTIVE | Noted: 2023-05-04

## 2024-06-09 PROBLEM — E27.40 ADRENAL INSUFFICIENCY: Status: ACTIVE | Noted: 2024-02-06

## 2024-06-09 NOTE — PHYSICAL EXAM
[Alert] : alert [No Acute Distress] : in no acute distress [Normal Outer Ear/Nose] : the ears and nose were normal in appearance [Supple] : the neck was supple [Normal Appearance] : the appearance of the neck was normal [No Respiratory Distress] : no respiratory distress [No Acc Muscle Use] : no accessory muscle use [Respiration, Rhythm And Depth] : normal respiratory rhythm and effort [Auscultation Breath Sounds / Voice Sounds] : lungs were clear to auscultation bilaterally [Heart Rate And Rhythm] : heart rate was normal and rhythm regular [Bowel Sounds] : normal bowel sounds [Abdomen Tenderness] : non-tender [Abdomen Soft] : soft [Normal Color / Pigmentation] : normal skin color and pigmentation [Normal Turgor] : normal skin turgor [No Focal Deficits] : no focal deficits [Normal Affect] : the affect was normal [Normal Mood] : the mood was normal [Pedal Pulses Normal] : the pedal pulses are present [No CVA Tenderness] : no CVA  tenderness [Normal Gait] : abnormal gait [de-identified] : 2+ pedal edema in b/l LE's symmetrically from feet up to distal 1/3 shins [de-identified] : Tenderness noted over the L2-L3 and L3-L4 L side paraspinal processes, straight leg test is negative

## 2024-06-09 NOTE — ASSESSMENT
[FreeTextEntry1] : RTC in 1 week for follow up visit.  Total time spent: 30 mins This includes chart review, patient assessment, discussion and collaboration with interdisciplinary team members, excluding time spent on separately billable services.

## 2024-06-09 NOTE — HISTORY OF PRESENT ILLNESS
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  Accompanied by Franci, daughter/HCP.  Leg swelling: Recurrent but it has been happening more frequently for the past 1-2 weeks. He also has symmetrical leg cramps as well as lower back pain. He used to experience numbness/tingling in the b/l legs before his last hospitalization but it was alleviated with PT. I had treated him with gabapentin in the past but he stopped using it due to relief from PT. Gait is unsteady as well but this is baseline for him. No asymmetrical swelling, severe pain, color change noted in the legs. He has no SOB or chest pain as well. Non-compliant with compression stockings.  BPH s/p radical prostatectomy: RAL simple prostatectomy, partial cystectomy on 03/21/2024. Followed up with Dr. Marion. Pathology of bladder mass following cystectomy was benign. Off tamsulosin and finasteride. Now on Trospium Chloride 20 mg BID one hour before meals.  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 Following with cards. No chest pain, palpitations, SOB, focal neurologic signs. Reluctant to start Amvuttra injections for silencer therapy. Would need vitamin A supplementation. Was placed on Lasix transiently in March 2024 leading up to prostate surgery.  CKD stage 3b: April 2024 Cr 2.15, GFR 31. On sodium bicarb and vitamin D supplements.  Gait instability: Using RW. Needs a new cane.  Adrenal insufficiency: Following recent prostatectomy. On hydrocortisone therapy. Has upcoming endocrine appointment for management. BP has been okay.  HTN/HLD: On metoprolol tartrate 12.5 mg BID, atorvastatin 10 mg QHS. No chest pain, palpitations, SOB today. BP is okay. [[id number]] : 0

## 2024-06-10 ENCOUNTER — NON-APPOINTMENT (OUTPATIENT)
Age: 74
End: 2024-06-10

## 2024-06-10 LAB
ALBUMIN SERPL ELPH-MCNC: 4.2 G/DL
ALP BLD-CCNC: 139 U/L
ALT SERPL-CCNC: 20 U/L
ANION GAP SERPL CALC-SCNC: 22 MMOL/L
AST SERPL-CCNC: 32 U/L
BILIRUB SERPL-MCNC: 0.6 MG/DL
BUN SERPL-MCNC: 37 MG/DL
CALCIUM SERPL-MCNC: 9.4 MG/DL
CHLORIDE SERPL-SCNC: 96 MMOL/L
CO2 SERPL-SCNC: 18 MMOL/L
CREAT SERPL-MCNC: 2.1 MG/DL
EGFR: 32 ML/MIN/1.73M2
GLUCOSE SERPL-MCNC: 79 MG/DL
NT-PROBNP SERPL-MCNC: 1619 PG/ML
POTASSIUM SERPL-SCNC: 4.4 MMOL/L
PROT SERPL-MCNC: 7.2 G/DL
SODIUM SERPL-SCNC: 136 MMOL/L

## 2024-06-13 ENCOUNTER — APPOINTMENT (OUTPATIENT)
Dept: GERIATRICS | Facility: CLINIC | Age: 74
End: 2024-06-13
Payer: MEDICARE

## 2024-06-13 DIAGNOSIS — R60.0 LOCALIZED EDEMA: ICD-10-CM

## 2024-06-13 DIAGNOSIS — I50.20 UNSPECIFIED SYSTOLIC (CONGESTIVE) HEART FAILURE: ICD-10-CM

## 2024-06-13 DIAGNOSIS — R53.81 OTHER MALAISE: ICD-10-CM

## 2024-06-13 DIAGNOSIS — M47.817 SPONDYLOSIS W/OUT MYELOPATHY OR RADICULOPATHY, LUMBOSACRAL REGION: ICD-10-CM

## 2024-06-13 DIAGNOSIS — R26.9 UNSPECIFIED ABNORMALITIES OF GAIT AND MOBILITY: ICD-10-CM

## 2024-06-13 PROCEDURE — 99443: CPT

## 2024-06-14 ENCOUNTER — OUTPATIENT (OUTPATIENT)
Dept: OUTPATIENT SERVICES | Facility: HOSPITAL | Age: 74
LOS: 1 days | End: 2024-06-14
Payer: MEDICARE

## 2024-06-14 ENCOUNTER — APPOINTMENT (OUTPATIENT)
Dept: ULTRASOUND IMAGING | Facility: IMAGING CENTER | Age: 74
End: 2024-06-14
Payer: MEDICARE

## 2024-06-14 DIAGNOSIS — R60.0 LOCALIZED EDEMA: ICD-10-CM

## 2024-06-14 DIAGNOSIS — Z98.1 ARTHRODESIS STATUS: Chronic | ICD-10-CM

## 2024-06-14 PROCEDURE — 93970 EXTREMITY STUDY: CPT | Mod: 26

## 2024-06-14 PROCEDURE — 93970 EXTREMITY STUDY: CPT

## 2024-06-21 RX ORDER — TAFAMIDIS 61 MG/1
61 CAPSULE, LIQUID FILLED ORAL
Qty: 90 | Refills: 3 | Status: ACTIVE | COMMUNITY
Start: 2023-07-24 | End: 1900-01-01

## 2024-06-21 RX ORDER — HYDROMORPHONE HYDROCHLORIDE 2 MG/1
2 TABLET ORAL
Qty: 20 | Refills: 0 | Status: ACTIVE | COMMUNITY
Start: 2024-06-11 | End: 1900-01-01

## 2024-07-09 ENCOUNTER — APPOINTMENT (OUTPATIENT)
Dept: GERIATRICS | Facility: CLINIC | Age: 74
End: 2024-07-09
Payer: MEDICARE

## 2024-07-09 VITALS
OXYGEN SATURATION: 98 % | RESPIRATION RATE: 16 BRPM | HEART RATE: 72 BPM | TEMPERATURE: 97.9 F | DIASTOLIC BLOOD PRESSURE: 71 MMHG | HEIGHT: 62 IN | SYSTOLIC BLOOD PRESSURE: 115 MMHG | WEIGHT: 157.25 LBS | BODY MASS INDEX: 28.94 KG/M2

## 2024-07-09 DIAGNOSIS — I50.20 UNSPECIFIED SYSTOLIC (CONGESTIVE) HEART FAILURE: ICD-10-CM

## 2024-07-09 DIAGNOSIS — A49.9 URINARY TRACT INFECTION, SITE NOT SPECIFIED: ICD-10-CM

## 2024-07-09 DIAGNOSIS — I50.32 CHRONIC DIASTOLIC (CONGESTIVE) HEART FAILURE: ICD-10-CM

## 2024-07-09 DIAGNOSIS — Z87.898 PERSONAL HISTORY OF OTHER SPECIFIED CONDITIONS: ICD-10-CM

## 2024-07-09 DIAGNOSIS — G89.29 LUMBAGO WITH SCIATICA, LEFT SIDE: ICD-10-CM

## 2024-07-09 DIAGNOSIS — M54.41 LUMBAGO WITH SCIATICA, LEFT SIDE: ICD-10-CM

## 2024-07-09 DIAGNOSIS — Z86.69 PERSONAL HISTORY OF OTHER DISEASES OF THE NERVOUS SYSTEM AND SENSE ORGANS: ICD-10-CM

## 2024-07-09 DIAGNOSIS — I43 ORGAN-LIMITED AMYLOIDOSIS: ICD-10-CM

## 2024-07-09 DIAGNOSIS — M54.42 LUMBAGO WITH SCIATICA, LEFT SIDE: ICD-10-CM

## 2024-07-09 DIAGNOSIS — N39.0 URINARY TRACT INFECTION, SITE NOT SPECIFIED: ICD-10-CM

## 2024-07-09 DIAGNOSIS — E85.4 ORGAN-LIMITED AMYLOIDOSIS: ICD-10-CM

## 2024-07-09 DIAGNOSIS — N40.1 BENIGN PROSTATIC HYPERPLASIA WITH LOWER URINARY TRACT SYMPMS: ICD-10-CM

## 2024-07-09 DIAGNOSIS — R26.9 UNSPECIFIED ABNORMALITIES OF GAIT AND MOBILITY: ICD-10-CM

## 2024-07-09 DIAGNOSIS — R60.0 LOCALIZED EDEMA: ICD-10-CM

## 2024-07-09 DIAGNOSIS — N13.8 BENIGN PROSTATIC HYPERPLASIA WITH LOWER URINARY TRACT SYMPMS: ICD-10-CM

## 2024-07-09 DIAGNOSIS — E78.5 HYPERLIPIDEMIA, UNSPECIFIED: ICD-10-CM

## 2024-07-09 PROCEDURE — 99214 OFFICE O/P EST MOD 30 MIN: CPT

## 2024-07-09 PROCEDURE — G2211 COMPLEX E/M VISIT ADD ON: CPT

## 2024-07-10 PROBLEM — Z87.898 HISTORY OF EDEMA: Status: RESOLVED | Noted: 2024-07-10 | Resolved: 2024-07-10

## 2024-07-10 PROBLEM — N39.0 UTI (URINARY TRACT INFECTION), BACTERIAL: Status: RESOLVED | Noted: 2024-03-27 | Resolved: 2024-07-10

## 2024-07-10 PROBLEM — I50.32 CHRONIC DIASTOLIC HEART FAILURE: Status: RESOLVED | Noted: 2024-07-10 | Resolved: 2024-07-10

## 2024-07-10 PROBLEM — Z86.69 HISTORY OF IMPAIRED COGNITION: Status: RESOLVED | Noted: 2024-07-10 | Resolved: 2024-07-10

## 2024-07-10 PROBLEM — Z87.898 HISTORY OF UNSTEADY GAIT: Status: RESOLVED | Noted: 2024-07-10 | Resolved: 2024-07-10

## 2024-07-10 PROBLEM — M54.42 CHRONIC BILATERAL LOW BACK PAIN WITH BILATERAL SCIATICA: Status: RESOLVED | Noted: 2024-07-10 | Resolved: 2024-07-10

## 2024-07-12 ENCOUNTER — NON-APPOINTMENT (OUTPATIENT)
Age: 74
End: 2024-07-12

## 2024-07-12 LAB
ALBUMIN SERPL ELPH-MCNC: 4.2 G/DL
ALP BLD-CCNC: 143 U/L
ALT SERPL-CCNC: 16 U/L
ANION GAP SERPL CALC-SCNC: 11 MMOL/L
AST SERPL-CCNC: 23 U/L
BASOPHILS # BLD AUTO: 0.03 K/UL
BASOPHILS NFR BLD AUTO: 0.6 %
BILIRUB SERPL-MCNC: 0.4 MG/DL
BUN SERPL-MCNC: 25 MG/DL
CO2 SERPL-SCNC: 25 MMOL/L
CREAT SERPL-MCNC: 1.84 MG/DL
EGFR: 38 ML/MIN/1.73M2
EOSINOPHIL # BLD AUTO: 0.14 K/UL
EOSINOPHIL NFR BLD AUTO: 2.6 %
GLUCOSE SERPL-MCNC: 90 MG/DL
HCT VFR BLD CALC: 32.9 %
HGB BLD-MCNC: 10.9 G/DL
IMM GRANULOCYTES NFR BLD AUTO: 0.2 %
LYMPHOCYTES # BLD AUTO: 3.21 K/UL
LYMPHOCYTES NFR BLD AUTO: 60.3 %
MAN DIFF?: NORMAL
MCHC RBC-ENTMCNC: 28 PG
MCHC RBC-ENTMCNC: 33.1 GM/DL
MCV RBC AUTO: 84.6 FL
MONOCYTES # BLD AUTO: 0.61 K/UL
MONOCYTES NFR BLD AUTO: 11.5 %
NEUTROPHILS # BLD AUTO: 1.32 K/UL
NEUTROPHILS NFR BLD AUTO: 24.8 %
PLATELET # BLD AUTO: 176 K/UL
POTASSIUM SERPL-SCNC: 4 MMOL/L
PROT SERPL-MCNC: 7.2 G/DL
RBC # BLD: 3.89 M/UL
RBC # FLD: 14.8 %
SODIUM SERPL-SCNC: 135 MMOL/L
WBC # FLD AUTO: 5.32 K/UL

## 2024-07-15 ENCOUNTER — APPOINTMENT (OUTPATIENT)
Dept: NEPHROLOGY | Facility: CLINIC | Age: 74
End: 2024-07-15
Payer: MEDICARE

## 2024-07-15 VITALS
SYSTOLIC BLOOD PRESSURE: 110 MMHG | OXYGEN SATURATION: 97 % | HEART RATE: 74 BPM | TEMPERATURE: 97 F | DIASTOLIC BLOOD PRESSURE: 71 MMHG | HEIGHT: 62 IN

## 2024-07-15 DIAGNOSIS — D63.1 CHRONIC KIDNEY DISEASE, STAGE 3B: ICD-10-CM

## 2024-07-15 DIAGNOSIS — I10 ESSENTIAL (PRIMARY) HYPERTENSION: ICD-10-CM

## 2024-07-15 DIAGNOSIS — N18.32 CHRONIC KIDNEY DISEASE, STAGE 3B: ICD-10-CM

## 2024-07-15 DIAGNOSIS — E87.20 ACIDOSIS, UNSPECIFIED: ICD-10-CM

## 2024-07-15 PROCEDURE — G2211 COMPLEX E/M VISIT ADD ON: CPT

## 2024-07-15 PROCEDURE — 99214 OFFICE O/P EST MOD 30 MIN: CPT

## 2024-07-18 ENCOUNTER — APPOINTMENT (OUTPATIENT)
Dept: PAIN MANAGEMENT | Facility: CLINIC | Age: 74
End: 2024-07-18

## 2024-07-18 ENCOUNTER — APPOINTMENT (OUTPATIENT)
Dept: ENDOCRINOLOGY | Facility: CLINIC | Age: 74
End: 2024-07-18
Payer: MEDICARE

## 2024-07-18 VITALS
SYSTOLIC BLOOD PRESSURE: 104 MMHG | OXYGEN SATURATION: 99 % | HEART RATE: 72 BPM | DIASTOLIC BLOOD PRESSURE: 67 MMHG | RESPIRATION RATE: 16 BRPM | TEMPERATURE: 97.8 F | WEIGHT: 158 LBS | HEIGHT: 62 IN | BODY MASS INDEX: 29.08 KG/M2

## 2024-07-18 VITALS
DIASTOLIC BLOOD PRESSURE: 62 MMHG | HEART RATE: 72 BPM | WEIGHT: 158 LBS | SYSTOLIC BLOOD PRESSURE: 112 MMHG | OXYGEN SATURATION: 98 % | BODY MASS INDEX: 28.9 KG/M2

## 2024-07-18 DIAGNOSIS — M54.50 LOW BACK PAIN, UNSPECIFIED: ICD-10-CM

## 2024-07-18 DIAGNOSIS — Z87.891 PERSONAL HISTORY OF NICOTINE DEPENDENCE: ICD-10-CM

## 2024-07-18 DIAGNOSIS — E27.40 UNSPECIFIED ADRENOCORTICAL INSUFFICIENCY: ICD-10-CM

## 2024-07-18 DIAGNOSIS — D35.2 BENIGN NEOPLASM OF PITUITARY GLAND: ICD-10-CM

## 2024-07-18 DIAGNOSIS — G89.29 LOW BACK PAIN, UNSPECIFIED: ICD-10-CM

## 2024-07-18 DIAGNOSIS — M47.817 SPONDYLOSIS W/OUT MYELOPATHY OR RADICULOPATHY, LUMBOSACRAL REGION: ICD-10-CM

## 2024-07-18 PROCEDURE — 99203 OFFICE O/P NEW LOW 30 MIN: CPT

## 2024-07-18 PROCEDURE — 99214 OFFICE O/P EST MOD 30 MIN: CPT

## 2024-07-19 RX ORDER — DULOXETINE HYDROCHLORIDE 20 MG/1
20 CAPSULE, DELAYED RELEASE PELLETS ORAL
Qty: 90 | Refills: 0 | Status: ACTIVE | COMMUNITY
Start: 2024-07-19 | End: 1900-01-01

## 2024-07-19 RX ORDER — TIZANIDINE 2 MG/1
2 TABLET ORAL
Qty: 30 | Refills: 0 | Status: ACTIVE | COMMUNITY
Start: 2024-07-19 | End: 1900-01-01

## 2024-07-22 LAB
ACTH SER-ACNC: <1.5 PG/ML
ALBUMIN SERPL ELPH-MCNC: 4.2 G/DL
ALP BLD-CCNC: 139 U/L
ALT SERPL-CCNC: 18 U/L
ANION GAP SERPL CALC-SCNC: 20 MMOL/L
AST SERPL-CCNC: 25 U/L
BILIRUB SERPL-MCNC: 0.4 MG/DL
BUN SERPL-MCNC: 26 MG/DL
CALCIUM SERPL-MCNC: 9.3 MG/DL
CHLORIDE SERPL-SCNC: 99 MMOL/L
CO2 SERPL-SCNC: 22 MMOL/L
CORTIS SERPL-MCNC: 1.1 UG/DL
CREAT SERPL-MCNC: 2.11 MG/DL
EGFR: 32 ML/MIN/1.73M2
FSH SERPL-MCNC: 12 IU/L
GLUCOSE SERPL-MCNC: 90 MG/DL
IGF-1 INTERP: NORMAL
IGF-I BLD-MCNC: 88 NG/ML
LH SERPL-ACNC: 8 IU/L
POTASSIUM SERPL-SCNC: 4.5 MMOL/L
PROLACTIN SERPL-MCNC: 10.7 NG/ML
PROT SERPL-MCNC: 7.1 G/DL
SODIUM SERPL-SCNC: 141 MMOL/L
T4 FREE SERPL-MCNC: 1.2 NG/DL
TESTOST FREE SERPL-MCNC: 1.7 PG/ML
TESTOST SERPL-MCNC: 636 NG/DL
TSH SERPL-ACNC: 2.91 UIU/ML

## 2024-07-27 LAB
MONOMERIC PROLACTIN (ICMA)*: 10.3 NG/ML
PERCENT MACROPROLACTIN: 10 %
PROLACTIN, SERUM (ICMA)*: 11.4 NG/ML

## 2024-08-05 ENCOUNTER — APPOINTMENT (OUTPATIENT)
Dept: GERIATRICS | Facility: CLINIC | Age: 74
End: 2024-08-05

## 2024-08-09 ENCOUNTER — APPOINTMENT (OUTPATIENT)
Dept: GERIATRICS | Facility: CLINIC | Age: 74
End: 2024-08-09

## 2024-08-09 PROCEDURE — 99443: CPT

## 2024-08-12 ENCOUNTER — RX RENEWAL (OUTPATIENT)
Age: 74
End: 2024-08-12

## 2024-08-12 ENCOUNTER — LABORATORY RESULT (OUTPATIENT)
Age: 74
End: 2024-08-12

## 2024-08-13 ENCOUNTER — LABORATORY RESULT (OUTPATIENT)
Age: 74
End: 2024-08-13

## 2024-08-14 ENCOUNTER — LABORATORY RESULT (OUTPATIENT)
Age: 74
End: 2024-08-14

## 2024-08-16 ENCOUNTER — NON-APPOINTMENT (OUTPATIENT)
Age: 74
End: 2024-08-16

## 2024-08-21 NOTE — PROGRESS NOTE ADULT - PROBLEM SELECTOR PLAN 3
MEDICARE WELLNESS VISIT + NOTE    CHIEF COMPLAINT:  Daren Stephens presents for his Subsequent Annual Medicare Wellness Visit.   His additional complaints or concerns are addressed below.     Previously evaluated in office on 8/15 however due to symptoms of frequent diarrhea, dizziness/lightheadedness and low blood pressure patient was advised to go to ED.  His potassium at the time was 2.8 in ED and he was provided with potassium IV replacement as well as IV fluids.  Discharged home with oral potassium.  Levels were rechecked on Monday and came up to 4.0.  Patient is feeling much better since he was last evaluated, diarrhea stopped and he has been eating and drinking normally.  Has been working on pushing fluids.    Patient's only concern today is intermittent dizziness that he has now had for the last year.  Dizziness typically occurs daily when he is at work.  Will just be standing and suddenly gets tunnel vision and really sweaty.  Usually after sitting for a little bit and drinking fluids symptoms go away.  He does typically drinks a lot of Gatorade 0 throughout the day.  Patient reports that his cardiologist Dr. Sánchez did not think symptoms of dizziness was blood pressure related.  Last EKG completed in 2023 showing sinus rhythm with first-degree AV block and nonspecific ST straightening.  He did undergo coronary angiogram and was recommended medical therapy.  Echocardiogram in 2023 showed no significant valve disease with normal LVEF, proximal ascending aorta mildly dilated.  All of patient's previous lab work have been unremarkable.  He denies any increased dizziness/lightheadedness with position changes.  Denies chest pain, palpitations, shortness of breath.  Patient does not feel like the room is spinning or balance/coordination issues.  He is type II diabetic however he is not on any glucose lowering agents and he does eat appropriately throughout the day.  His blood pressure today is 120/70 with  pulse of 81, oxygen 95.    Health Maintenance:  Colonoscopy: Colonoscopy completed in  with normal results, follow-up in 10 years.  Vaccines Due: Shingles     Patient Care Team:  Nell Fox APNP as PCP - General (Nurse Practitioner)        Patient Active Problem List   Diagnosis   • Essential (primary) hypertension   • Hypercholesterolemia   • Acquired hypothyroidism   • Post-traumatic headache   • Type 2 diabetes mellitus without complication, without long-term current use of insulin  (CMD)   • Benign prostatic hyperplasia with urinary frequency         Past Medical History:   Diagnosis Date   • Adjustment disorder     with depressed mood   • Arthritis    • Bulging of cervical intervertebral disc    • Cataracts, bilateral    • Compression fracture of body of thoracic vertebra  (CMD)     T10   • GERD (gastroesophageal reflux disease)    • Glaucoma suspect of both eyes    • High cholesterol    • Hypertension    • Mild neurocognitive disorder     due to TBI   • Thyroid disease    • Type 2 diabetes mellitus  (CMD)          Past Surgical History:   Procedure Laterality Date   • ------------other-------------      left cubital tunnel release   • Ankle surgery     • Colonoscopy diagnostic  2023    follow up 10 years   • Rotator cuff repair Left          Social History     Tobacco Use   • Smoking status: Former     Current packs/day: 0.00     Average packs/day: 3.0 packs/day for 20.0 years (60.0 ttl pk-yrs)     Types: Cigarettes     Start date: 1977     Quit date: 1997     Years since quittin.4     Passive exposure: Past   • Smokeless tobacco: Never   Vaping Use   • Vaping status: never used   Substance Use Topics   • Alcohol use: Not Currently     Comment: occasional   • Drug use: No     Family History   Problem Relation Age of Onset   • Osteoarthritis Mother    • Hypertension Mother    • Thyroid Mother    • Cancer Father    • Osteoarthritis Father    • Hypertension Father    •  Hyperlipidemia Father    • Osteoarthritis Sister    • Hypertension Sister    • Myocardial Infarction Brother    • Osteoarthritis Brother    • Heart disease Brother    • Hypertension Brother    • Hyperlipidemia Brother    • Thyroid Brother          Current Outpatient Medications   Medication Sig Dispense Refill   • allopurinol (ZYLOPRIM) 100 MG tablet Take 1 tablet by mouth daily. 90 tablet 3   • cetirizine (ZyrTEC Allergy) 10 MG tablet Take 1 tablet by mouth daily. 90 tablet 3   • fluticasone (FLONASE) 50 MCG/ACT nasal spray Spray 2 sprays in each nostril daily. 32 g 3   • gemfibrozil (LOPID) 600 MG tablet Take 1 tablet by mouth daily. 90 tablet 3   • hydroCHLOROthiazide 25 MG tablet Take 1 tablet by mouth daily. 90 tablet 3   • levothyroxine 112 MCG tablet Take 1 tablet by mouth daily. 90 tablet 3   • losartan (COZAAR) 100 MG tablet Take 1 tablet by mouth daily. 90 tablet 3   • atorvastatin (LIPITOR) 20 MG tablet TAKE 1 TABLET EVERY DAY 90 tablet 3   • meloxicam (MOBIC) 15 MG tablet Take 1 tablet by mouth daily. 30 tablet 1   • gabapentin (NEURONTIN) 600 MG tablet Take 1 tablet by mouth 4 times daily. 360 tablet 1   • topiramate (TOPAMAX) 200 MG tablet Take 1 tablet by mouth in the morning and 1 tablet in the evening. 180 tablet 1   • eletriptan (Relpax) 40 MG tablet Take 1 tablet by mouth as needed for Migraine. May repeat dose in 2 hours if needed. 9 tablet 2   • tirzepatide (MOUNJARO) 5 MG/0.5ML Solution Pen-injector Inject 5 mg into the skin every 7 days. Indications: Type 2 Diabetes (Patient not taking: Reported on 8/21/2024) 2 mL 3   • clotrimazole-betamethasone (Lotrisone) 1-0.05 % cream Apply thin layer to affected area twice daily. 90 g 1   • galcanezumab-gnlm (Emgality) 120 MG/ML injection Inject 1 mL into the skin every 30 days. 1 mL 2   • galcanezumab-gnlm (Emgality) 120 MG/ML injection Loading dose of 2 mL into skin once. Follow in 30 days with 1 mL monthly 2 mL 0   • B Complex Vitamins (vitamin B  complex) tablet One daily to help with muscle cramping 30 tablet 0   • HYDROcodone-acetaminophen (NORCO) 5-325 MG per tablet Take 1 tablet by mouth as needed for Pain (takes very rarely).     • aspirin 81 MG chewable tablet Chew 81 mg by mouth daily.       No current facility-administered medications for this visit.        The following items on the Medicare Health Risk Assessment were found to be positive  1.) Do you have an Advance directive, living will, or power of  for health care document that contains your wishes for end of life care?: No     3.) During the past 4 weeks, how would you rate your health?: Fair     4.) During the past 4 weeks, what was the hardest physical activity you could do for at least 2 minutes?: Light     5.) Do you do moderate to strenuous exercise (brisk walk) for about 20 minutes for 3 or more days per week?: No, I usually do not exercise this much     6 b.) How many servings of High Fiber / Whole Grain Foods to you have each day ( 1 serving = 1 cup cold cereal, 1/2 cup cooked cereal, 1 slice bread): 1 per day     7a.) Have you had a fall in the past year?: Yes     7b.) Do you feel unsteady when standing or walking?: Yes     8.) During the past 4 weeks, has your physical and emotional health limited your social activities with family, friends, neighbors, or other groups?: Moderately     11b.) Bowel control problems: Sometimes     11c.) Teeth or Denture Problems: Often     11d.) Bodily pain: Often     11h.) Problems with your hearing: Often         Vision and Hearing screens: Not performed    Advance care planning documents on file - no    Cognitive/Functional Status: no evidence of cognitive dysfunction by direct observation    Opioid Review: Daren is not taking opioid medications.    Recent PHQ 2/9 Score:    PHQ 2:  PHQ 2 Score Adult PHQ 2 Score Adult PHQ 2 Interpretation Little interest or pleasure in activity?   8/21/2024  12:12 PM 0 No further screening needed 0       PHQ  9:  PHQ 9 Score Adult PHQ 9 Score Adult PHQ 9 Interpretation   8/2/2022  10:33 AM 3 Minimal Depression       DEPRESSION ASSESSMENT/PLAN:  Depression screening is negative no further plan needed.     Body mass index is 40.17 kg/m².    BMI ASSESSMENT/PLAN:  BMI is in obese range.    Join health club and Low carbohydrate diet        See Patient Instructions section.   Return in about 6 months (around 2/21/2025) for Medicare Wellness Visit, DIABETIC FU W PREVISIT LABS.      OUTPATIENT PROGRESS NOTE    Subjective   Chief Complaint Medicare Wellness Visit (initial)    Hypertension:  Taking hydrochlorothiazide 25 mg daily, losartan 100 mg daily. Blood pressure today is 120/70 with pulse of 81.  Denies chest pain, palpitation, or headaches.  Patient is following with Dr. Sánchez cardiology.     Hypercholesterolemia:  Taking atorvastatin 20 mg daily and gemfibrozil 600 mg daily.  LFTs are stable.  Total cholesterol 124, HDL 35, LDL 42 and triglyceride 233.     Type 2 diabetes: Patient was previously on Mounjaro 5 mg weekly injections, however is no longer using medication due to insurance issues.  Patient is not currently on any glucose lowering agents.  Has never checked with insurance to see what is covered for diabetes.  Recent A1c was 5.8.  Patient has been good about watching sugar/carbohydrate intake, typically cooks all of his meals at home.  Denies any polyuria, polyphagia or polydipsia.     Acquired hypothyroidism:  Taking levothyroxine 112 mcg daily.  Recent TSH is 4.540.  Patient notes that since his TSH is better regulated he is no longer feeling as fatigued.     BPH with urinary frequency: PSA was 0.65.     Migraine headaches:  Taking gabapentin 600 mg 4 times daily, and Emgality injections. Following with Dr. Alexander neurology.  Also taking topiramate 200 mg twice daily.     Medications  Medications were reviewed and updated today.    Histories  I have personally reviewed and updated the patient's past medical,  past surgical, family and social histories during today's visit.    ROS:  CONSTITUTIONAL: Denies unintentional weight gain/loss, fevers/chills, changes in appetite.  EYES: Denies blurred vision, vision changes  HEENT: Denies rhinorrhea, sore throat, changes in hearing.  CARDIOVASCULAR: Denies chest pain, palpitations, lower extremity swelling.  RESPIRATORY: Denies shortness of breath, cough, wheezing.  GASTROINTESTINAL: Denies abdominal pain, heartburn, blood in stool, nausea/vomiting, diarrhea or constipation.  URINARY: Denies painful urination, frequency or hematuria.  MUSCULOSKELETAL: Denies arthralgias, myalgias.  NEUROLOGICAL: Denies headaches, numbness/tingling. + Intermittent dizziness  INTEGUMENTARY: Denies rash, change in moles/skin lesion.  ENDOCRINE: Denies cold/heat intolerance, polyuria, polydipsia, polyphagia.  HEMATOLOGY: Denies bruising/bleeding, swollen lymph nodes.   ALLERGY: Denies seasonal allergies.  PSYCHIATRIC: Denies anxiety, depression. Getting restful sleep.     Objective   Visit Vitals  /70   Pulse 81   Ht 5' 9\" (1.753 m)   Wt 123.4 kg (272 lb)   SpO2 95%   BMI 40.17 kg/m²     Physical Exam  Constitutional:       Appearance: Normal appearance. No acute distress.  HENT:      Right Ear: Tympanic membrane intact and non erythematous, ear canal and external ear normal.      Left Ear: Tympanic membrane intact and non erythematous, ear canal and external ear normal.      Nose: Mucous membranes moist, no discharge/bleeding, malformations or foreign bodies.      Mouth/Throat: Oral mucosa pink/moist with good dentition. Tongue normal in appearance without lesions.      Pharynx: Oropharynx is clear without tonsillar swelling or exudates.  Eyes:      Conjunctiva/sclera: Conjunctivae normal.      Pupils: Pupils are equal, round, and reactive to light.   Cardiovascular:      Rate and Rhythm: Normal rate and regular rhythm.      Pulses: Normal pulses.      Heart sounds: Normal heart sounds.    Pulmonary:      Effort: Pulmonary effort is normal.      Breath sounds: Normal breath sounds.   Abdominal:      General: Bowel sounds are normal.      Palpations: Abdomen is soft.   Musculoskeletal:         General: Normal range of motion.      Cervical back: Normal range of motion and neck supple.   Skin:     General: Skin is warm and dry.   Neurological:      Mental Status: She is alert and oriented to person, place, and time.   Psychiatric:         Mood and Affect: Mood normal.         Behavior: Behavior normal.     Assessment/Plan  1. Medicare annual wellness visit, subsequent  Physical exam without abnormal findings, reviewed patient's annual lab work during visit no further questions.  Plan to recheck BMP since kidney function was most likely declined during illness/frequent diarrhea episodes.  Advised patient to follow-up in 6 months for diabetic follow-up or sooner as needed.    2. Type 2 diabetes mellitus with morbid obesity (CMD)  Patient was previously on Mounjaro injections for weight loss however insurance stopped covering medications so he is no longer using injections.  Diabetes is still well-controlled and A1c was 5.8.  Encourage patient to complete diabetic eye exam.  Plan to follow-up in 6 months for med check.    3. Dizziness  Unclear etiology of intermittent dizziness episodes that patient has been experiencing over the last year.  All lab work unremarkable.  Patient reports bringing this up to cardiology and patient reports that cardiology did not think dizziness was heart related.  Blood pressures have been well-controlled here in the office and patient does check BP at home which has been stable.  Encouraged patient to drink plenty of fluids and to keep snack on hand since he is also becoming diaphoretic with dizziness.  Discussed with patient that medication could potentially be causing dizziness however patient has been taking gabapentin for several years.  Advised him to bring this up  with his neurologist since he plans to see Dr. Alexander in October who has been managing migraine headaches.  Could always try decreasing dose of losartan in the future to see if this may be related to hypotension.    4. Essential (primary) hypertension  Blood pressure is at goal today 120/70, continue on current medications.  - hydroCHLOROthiazide 25 MG tablet; Take 1 tablet by mouth daily.  Dispense: 90 tablet; Refill: 3  - losartan (COZAAR) 100 MG tablet; Take 1 tablet by mouth daily.  Dispense: 90 tablet; Refill: 3    5. Hyperlipidemia, unspecified hyperlipidemia type  Triglycerides are still elevated however improved from last year.  Patient is following with cardiology and taking atorvastatin 20 mg daily along with gemfibrozil 600 mg daily.  Has increased dose of gemfibrozil in the past with worsening myalgias.  - gemfibrozil (LOPID) 600 MG tablet; Take 1 tablet by mouth daily.  Dispense: 90 tablet; Refill: 3    6. Acquired hypothyroidism  TSH is now within goal, continue on levothyroxine 112 mcg daily.  - levothyroxine 112 MCG tablet; Take 1 tablet by mouth daily.  Dispense: 90 tablet; Refill: 3    7. Benign prostatic hyperplasia with urinary frequency  Patient denies any urinary concerns, PSA was normal.    8. Gout of multiple sites, unspecified cause, unspecified chronicity  - allopurinol (ZYLOPRIM) 100 MG tablet; Take 1 tablet by mouth daily.  Dispense: 90 tablet; Refill: 3    9. Contact dermatitis, unspecified contact dermatitis type, unspecified trigger  - cetirizine (ZyrTEC Allergy) 10 MG tablet; Take 1 tablet by mouth daily.  Dispense: 90 tablet; Refill: 3    10. Post-nasal drip  - fluticasone (FLONASE) 50 MCG/ACT nasal spray; Spray 2 sprays in each nostril daily.  Dispense: 32 g; Refill: 3    11. Screening for diabetes mellitus (DM)  - Basic Metabolic Panel; Future    SONIA Whittaker     - AM cortisol low, ACTH low c/f secondary AI  - endocrine following, appreciate recs  - CT abdomen  without adrenal abnormality, CTH without obvious pituitary etiology though not an optimal test to evaluate pituitary  - Repeat TSH, FT4, TT3 pending given low FT4 of 0.9 on 1/3  - gonadal axis normal   - continue hydrocortisone 20 mg QD 8AM, 10 mg QD 3PM - will be discharged on this regimen   - MR brain/sella with nonspecific area of  enhancement involving L pituitary gland, per endo this finding was discussed with neuroradiology and may represent microadenoma vs cyst.   - per endo MR findings do not explain adrenal insufficiency, will need further workup with endo outpatient

## 2024-08-25 NOTE — PROGRESS NOTE ADULT - REASON FOR ADMISSION
Pt febrile at start of shift, medicated with tylenol, pt still febrile.  Dr. MILTON Gutierrez notified via secure message.  No new orders, ice packs and cool cloths applied to pt, covers removed, temperature setting turned down in room.      Entered room to find IV disconnected from pt while running abx for unknown amount of time.  IV reconnected.  Dr. Gutierrez notified.         08/24/24 1932 08/24/24 2330   Vital Signs   Temp (!) 100.9 °F (38.3 °C) (!) 100.9 °F (38.3 °C)   Temp Source Oral Oral        Diarrhea/ Diarrhea

## 2024-08-26 ENCOUNTER — APPOINTMENT (OUTPATIENT)
Dept: GERIATRICS | Facility: CLINIC | Age: 74
End: 2024-08-26
Payer: MEDICARE

## 2024-08-26 DIAGNOSIS — M54.42 LUMBAGO WITH SCIATICA, LEFT SIDE: ICD-10-CM

## 2024-08-26 DIAGNOSIS — R26.81 UNSTEADINESS ON FEET: ICD-10-CM

## 2024-08-26 DIAGNOSIS — M54.41 LUMBAGO WITH SCIATICA, LEFT SIDE: ICD-10-CM

## 2024-08-26 DIAGNOSIS — G89.29 LUMBAGO WITH SCIATICA, LEFT SIDE: ICD-10-CM

## 2024-08-26 DIAGNOSIS — E11.42 TYPE 2 DIABETES MELLITUS WITH DIABETIC POLYNEUROPATHY: ICD-10-CM

## 2024-08-26 DIAGNOSIS — N18.32 CHRONIC KIDNEY DISEASE, STAGE 3B: ICD-10-CM

## 2024-08-26 PROCEDURE — 99443: CPT

## 2024-08-26 RX ORDER — DULOXETINE HYDROCHLORIDE 30 MG/1
30 CAPSULE, DELAYED RELEASE PELLETS ORAL
Qty: 90 | Refills: 0 | Status: ACTIVE | COMMUNITY
Start: 2024-08-26 | End: 1900-01-01

## 2024-08-26 RX ORDER — OXYCODONE AND ACETAMINOPHEN 5; 325 MG/1; MG/1
5-325 TABLET ORAL
Qty: 60 | Refills: 0 | Status: ACTIVE | COMMUNITY
Start: 2024-08-26 | End: 1900-01-01

## 2024-08-27 NOTE — HISTORY OF PRESENT ILLNESS
[FreeTextEntry1] : 73 y.o. gentleman with cc of urinary retention and BPH.   History of urinary retention. Pt underwent RAL SPP with partial cystectomy due to bladder mass 3/2024 by Dr Marion. Path was benign.

## 2024-08-29 ENCOUNTER — APPOINTMENT (OUTPATIENT)
Dept: UROLOGY | Facility: CLINIC | Age: 74
End: 2024-08-29

## 2024-09-09 ENCOUNTER — APPOINTMENT (OUTPATIENT)
Dept: CARDIOLOGY | Facility: CLINIC | Age: 74
End: 2024-09-09

## 2024-09-11 ENCOUNTER — APPOINTMENT (OUTPATIENT)
Dept: GERIATRICS | Facility: CLINIC | Age: 74
End: 2024-09-11
Payer: MEDICARE

## 2024-09-11 VITALS
HEART RATE: 71 BPM | SYSTOLIC BLOOD PRESSURE: 125 MMHG | TEMPERATURE: 97.3 F | OXYGEN SATURATION: 99 % | HEIGHT: 62 IN | BODY MASS INDEX: 27.81 KG/M2 | DIASTOLIC BLOOD PRESSURE: 77 MMHG | WEIGHT: 151.13 LBS

## 2024-09-11 DIAGNOSIS — E27.40 UNSPECIFIED ADRENOCORTICAL INSUFFICIENCY: ICD-10-CM

## 2024-09-11 DIAGNOSIS — M54.41 LUMBAGO WITH SCIATICA, LEFT SIDE: ICD-10-CM

## 2024-09-11 DIAGNOSIS — M54.42 LUMBAGO WITH SCIATICA, LEFT SIDE: ICD-10-CM

## 2024-09-11 DIAGNOSIS — F33.1 MAJOR DEPRESSIVE DISORDER, RECURRENT, MODERATE: ICD-10-CM

## 2024-09-11 DIAGNOSIS — G89.29 LUMBAGO WITH SCIATICA, LEFT SIDE: ICD-10-CM

## 2024-09-11 DIAGNOSIS — Z23 ENCOUNTER FOR IMMUNIZATION: ICD-10-CM

## 2024-09-11 DIAGNOSIS — I50.20 UNSPECIFIED SYSTOLIC (CONGESTIVE) HEART FAILURE: ICD-10-CM

## 2024-09-11 PROCEDURE — G0008: CPT

## 2024-09-11 PROCEDURE — 99214 OFFICE O/P EST MOD 30 MIN: CPT

## 2024-09-11 PROCEDURE — 90662 IIV NO PRSV INCREASED AG IM: CPT

## 2024-09-11 RX ORDER — OXYCODONE AND ACETAMINOPHEN 5; 325 MG/1; MG/1
5-325 TABLET ORAL
Qty: 30 | Refills: 0 | Status: ACTIVE | COMMUNITY
Start: 2024-09-11 | End: 1900-01-01

## 2024-09-11 RX ORDER — SERTRALINE 25 MG/1
25 TABLET, FILM COATED ORAL
Qty: 30 | Refills: 0 | Status: ACTIVE | COMMUNITY
Start: 2024-09-11 | End: 1900-01-01

## 2024-09-11 NOTE — HISTORY OF PRESENT ILLNESS
[No falls in past year] : Patient reported no falls in the past year [Independent] : toileting [Full assistance needed] : Assistance needed managing medications [] : Assistance needed managing finances. [FAST Score: ____] : Functional Assessment Scale (FAST) Score: [unfilled] [Cane] : cane [Walker] : walker [Smoke Detector] : smoke detector [Carbon Monoxide Detector] : carbon monoxide detector [Night Light] : night light [Wears Seat Belt] : wears seat belt [Wears Sunscreen] : wears sunscreen [NO] : No [Little interest or pleasure doing things] : 1) Little interest or pleasure doing things [Feeling down, depressed, or hopeless] : 2) Feeling down, depressed, or hopeless [0] : 2) Feeling down, depressed, or hopeless: Not at all (0) [PHQ-2 Negative - No further assessment needed] : PHQ-2 Negative - No further assessment needed [FreeTextEntry1] : 72 y/o M with PMHx of HTN, DM2 not on insulin, 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  Accompanied by Franci, daughter/HCP.  Difficulty Sleeping: Reports trouble falling asleep, as well as lower mood and energy.   Leg swelling: Improved since my last phone call with patient and his daughter. Was taking furosemide regularly but ran out. Non-compliant with compression stockings/leg elevation. Ordered for lasix 30 every other day but has been taking daily. Reports improvement in lower extremity swelling.  Back pain: Improved since my last phone call with patient and his daughter. Gabapentin now 100 mg BID with plans to up-titrate to TID soon. Tramadol was not covered by insurance so we decided to switch over to hydromorphone which he only takes once a day if needed. Basic pain control with tylenol extra strength 1g TID. Waiting for insurance to allow home PT again. Interested in pain management referral. Now on duloxetine 30 and feels well controlled on it.   Gait instability: C/b swelling and back pain but is improving. Using cane today and if he can't then he switches to rollator. No recent falls.  BPH s/p radical prostatectomy: RAL simple prostatectomy, partial cystectomy on 03/21/2024. Benign pathology. Off tamsulosin and finasteride. Now on Trospium Chloride 20 mg BID one hour before meals.  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 Following with cards - yet to make follow-up appointment. No chest pain, palpitations, SOB, focal neurologic signs. Missed last cards visit.  Adrenal insufficiency: Following recent prostatectomy. On hydrocortisone therapy. Has upcoming endocrine appointment for management. BP has been okay. Following with endo, recommend continuing hydrocortisone and MRI.  HTN/HLD: On metoprolol tartrate 12.5 mg BID, atorvastatin 10 mg QHS. No chest pain, palpitations, SOB today. BP is okay. [Stair Lift] : no stair lift used in home [Grab Bars] : no grab bars [Shower Chair] : no shower chair [Anti-Slip Measures] : no anti-slip measures [Driving Concerns] : not driving or driving without noted concerns [[id number]] : 0

## 2024-09-11 NOTE — HISTORY OF PRESENT ILLNESS
[No falls in past year] : Patient reported no falls in the past year [Independent] : toileting [Full assistance needed] : Assistance needed managing medications [] : Assistance needed managing finances. [FAST Score: ____] : Functional Assessment Scale (FAST) Score: [unfilled] [Cane] : cane [Walker] : walker [Smoke Detector] : smoke detector [Carbon Monoxide Detector] : carbon monoxide detector [Night Light] : night light [Wears Seat Belt] : wears seat belt [Wears Sunscreen] : wears sunscreen [NO] : No [Little interest or pleasure doing things] : 1) Little interest or pleasure doing things [Feeling down, depressed, or hopeless] : 2) Feeling down, depressed, or hopeless [0] : 2) Feeling down, depressed, or hopeless: Not at all (0) [PHQ-2 Negative - No further assessment needed] : PHQ-2 Negative - No further assessment needed [FreeTextEntry1] : 72 y/o M with PMHx of HTN, DM2 not on insulin, 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  Accompanied by Franci, daughter/HCP.  Difficulty Sleeping: Reports trouble falling asleep, as well as lower mood and energy.   Leg swelling: Improved since my last phone call with patient and his daughter. Was taking furosemide regularly but ran out. Non-compliant with compression stockings/leg elevation. Ordered for lasix 30 every other day but has been taking daily. Reports improvement in lower extremity swelling.  Back pain: Improved since my last phone call with patient and his daughter. Gabapentin now 100 mg BID with plans to up-titrate to TID soon. Tramadol was not covered by insurance so we decided to switch over to hydromorphone which he only takes once a day if needed. Basic pain control with tylenol extra strength 1g TID. Waiting for insurance to allow home PT again. Interested in pain management referral. Now on duloxetine 30 and feels well controlled on it.   Gait instability: C/b swelling and back pain but is improving. Using cane today and if he can't then he switches to rollator. No recent falls.  BPH s/p radical prostatectomy: RAL simple prostatectomy, partial cystectomy on 03/21/2024. Benign pathology. Off tamsulosin and finasteride. Now on Trospium Chloride 20 mg BID one hour before meals.  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 Following with cards - yet to make follow-up appointment. No chest pain, palpitations, SOB, focal neurologic signs. Missed last cards visit.  Adrenal insufficiency: Following recent prostatectomy. On hydrocortisone therapy. Has upcoming endocrine appointment for management. BP has been okay. Following with endo, recommend continuing hydrocortisone and MRI.  HTN/HLD: On metoprolol tartrate 12.5 mg BID, atorvastatin 10 mg QHS. No chest pain, palpitations, SOB today. BP is okay. [Stair Lift] : no stair lift used in home [Grab Bars] : no grab bars [Shower Chair] : no shower chair [Anti-Slip Measures] : no anti-slip measures [Driving Concerns] : not driving or driving without noted concerns [[id number]] : 0

## 2024-09-21 ENCOUNTER — RX RENEWAL (OUTPATIENT)
Age: 74
End: 2024-09-21

## 2024-09-26 ENCOUNTER — APPOINTMENT (OUTPATIENT)
Dept: CARDIOLOGY | Facility: CLINIC | Age: 74
End: 2024-09-26

## 2024-09-26 ENCOUNTER — NON-APPOINTMENT (OUTPATIENT)
Age: 74
End: 2024-09-26

## 2024-09-26 VITALS
SYSTOLIC BLOOD PRESSURE: 133 MMHG | OXYGEN SATURATION: 98 % | DIASTOLIC BLOOD PRESSURE: 85 MMHG | BODY MASS INDEX: 27.8 KG/M2 | WEIGHT: 152 LBS | HEART RATE: 74 BPM

## 2024-09-26 DIAGNOSIS — E85.1 NEUROPATHIC HEREDOFAMILIAL AMYLOIDOSIS: ICD-10-CM

## 2024-09-26 DIAGNOSIS — E85.4 ORGAN-LIMITED AMYLOIDOSIS: ICD-10-CM

## 2024-09-26 DIAGNOSIS — I43 ORGAN-LIMITED AMYLOIDOSIS: ICD-10-CM

## 2024-09-26 DIAGNOSIS — G63 NEUROPATHIC HEREDOFAMILIAL AMYLOIDOSIS: ICD-10-CM

## 2024-09-26 DIAGNOSIS — R60.0 LOCALIZED EDEMA: ICD-10-CM

## 2024-09-26 PROCEDURE — G2211 COMPLEX E/M VISIT ADD ON: CPT

## 2024-09-26 PROCEDURE — 93000 ELECTROCARDIOGRAM COMPLETE: CPT

## 2024-09-26 PROCEDURE — 99215 OFFICE O/P EST HI 40 MIN: CPT

## 2024-09-27 LAB
ALBUMIN SERPL ELPH-MCNC: 4 G/DL
ALP BLD-CCNC: 139 U/L
ALT SERPL-CCNC: 7 U/L
ANION GAP SERPL CALC-SCNC: 14 MMOL/L
AST SERPL-CCNC: 16 U/L
BILIRUB SERPL-MCNC: 0.6 MG/DL
BUN SERPL-MCNC: 11 MG/DL
CALCIUM SERPL-MCNC: 9.3 MG/DL
CHLORIDE SERPL-SCNC: 100 MMOL/L
CO2 SERPL-SCNC: 25 MMOL/L
CREAT SERPL-MCNC: 1.46 MG/DL
EGFR: 50 ML/MIN/1.73M2
GLUCOSE SERPL-MCNC: 120 MG/DL
NT-PROBNP SERPL-MCNC: 2821 PG/ML
POTASSIUM SERPL-SCNC: 4.3 MMOL/L
PREALB SERPL NEPH-MCNC: 5 MG/DL
PROT SERPL-MCNC: 7.1 G/DL
SODIUM SERPL-SCNC: 139 MMOL/L

## 2024-10-03 NOTE — END OF VISIT
[Time Spent: ___ minutes] : I have spent [unfilled] minutes of time on the encounter which excludes teaching and separately reported services. [FreeTextEntry3] : I, Dago Mireles MD, personally performed the evaluation and management (E/M) services for this established patient who presents today with (a) new problem(s)/exacerbation of (an) existing condition(s). That E/M includes conducting the clinically appropriate interval history &/or exam, assessing all new/exacerbated conditions, and establishing a new plan of care. Today, Rose Mary Covarrubias NP was here to observe my evaluation and management service for this new problem/exacerbated condition and follow the plan of care established by me going forward.

## 2024-10-03 NOTE — HISTORY OF PRESENT ILLNESS
[FreeTextEntry1] : Patient is a 73 year-old Black gentleman, born in [facility], in the [place] since age 36, with known past medical history of hypertension, noninsulin dependent type II diabetes Pain in the feet that he thought was gout. He presented to the ER with no cardiovascular complaints, but because of swelling in the feet, they checked troponin, found it mildly elevated, treated for ACS, got an Echo, and ultimately made the diagnosis of cardiac amyloidosis.  On 4/27/2023, patient had kappa: lambda ratio of 1.67 (normal in the setting of creatinine of 2 mg/dL); this makes AL-CA unlikely.  Technetium pyrophosphate scan positive.   8/19/2023 Cy Sanchez returns today for routine scheduled follow up. He is accompanied by his daughter, Franci. Since last visit he has begun therapy with Vyndamax 61 mg daily. He has also been contacted by the representatives of [employer] although there are still ongoing insurance issues for approval. Today he is feeling well and has no specific complaints. He continues to report neuropathy which he feels mostly in his hands and occasionally in his feet.  In October he is planning for surgical intervention of his nasal polyps which have caused obstruction and he is hopeful that his breathing will become easier. He denies any overt exertional dyspnea, orthopnea or PND.   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 He has 3 sisters, 2 in [place] and 1 in [facility].  He has 2 sons and 1 daughter.  9/19/2023 Mr. Sanchez returns today for scheduled follow up and preoperative cardiovascular evaluation prior to planned endoscopic sinus surgery with possible septoplasty and bilateral inferior turbinate reduction with Cleveland Dinh MD on 10/11/2023. Today he reports feeling fair overall. He feels as if he has lost his appetite and has been vomiting. He is seen today down 8 pounds from before. He does remain fairly sedentary, spending 16 hours a day in bed, because sitting down is painful to his lower back. With his usual activities, he denies any chest discomfort. His exertional dyspnea is chronic and unchanged. He is seen today with pitting edema to his right foot.  November 2023 - Patient returns today for follow-up in his usual state of health. He remains on Vyndamax, but he is currently hesitant to start Amvuttra because he is not sure about taking an injection.  He has some lower extremity edema.  He sleeps lying flat with two pillows. He has no dyspnea on exertion. He has no orthopnea or paroxysmal nocturnal dyspnea.   2/14/2024 Cy Sanchez returns today for scheduled follow up. He was hospitalized for sepsis in the setting of Covid infection, treated with remdesivir, and urinary tract infection. During his stay he was also found to be hypotensive, hypoglycemic and with 2 brief episodes of wide complex tachycardia (16 beats on 1/11/2024 and 5 beats of 1/12/2024). He was discharged from [facility] and now has a home nursing services and physical therapy. He is now with complaints of pain in the feet and describes electric type, continuous pain. He puts Salon Pas patches on which he finds helpful to alleviate the symptoms. He has been out of Vyndamax for 2 months, but is otherwise taking his medications as directed.   3/18/2024 Cy Sanchez returns today for preoperative cardiovascular evaluation prior to planned robotic suprapubic prostatectomy with Evonne Marion MD on 3/21/2024. As he returns today he is feeling generally well and he has no specific cardiac complaints. He has been exercising by walking with his walker in the home and doing stepping exercises. With exertion he feels well and denies any chest discomfort, shortness of breath, palpitations, lightheadedness or syncope. We have reviewed his medications and he is taking all as directed with the exception of Vyndamax which he has been out of for the past few months.

## 2024-10-03 NOTE — REASON FOR VISIT
[Other: ____] : [unfilled] [Other: _____] : [unfilled] [FreeTextEntry1] : 9/26/2024 Cy Sanchez returns today for routine scheduled follow up. He is seen today in his usual state of health.  His daughter is pleased to announce that he is now working with home physical therapy twice weekly and such he is spending less time in bed. He reports that his breathing is stable and he is seen today without edema. He continues to report neuropathy which he feels in his feet and lesser so in his hands.

## 2024-10-03 NOTE — PHYSICAL EXAM

## 2024-10-03 NOTE — PHYSICAL EXAM

## 2024-10-03 NOTE — HISTORY OF PRESENT ILLNESS
[FreeTextEntry1] : Patient is a 73 year-old Black gentleman, born in [place], [place] the [place] since age 36, with known past medical history of hypertension, noninsulin dependent type II diabetes Pain in the feet that he thought was gout. He presented to the ER with no cardiovascular complaints, but because of swelling in the feet, they checked troponin, found it mildly elevated, treated for ACS, got an Echo, and ultimately made the diagnosis of cardiac amyloidosis.  On 4/27/2023, patient had kappa: lambda ratio of 1.67 (normal in the setting of creatinine of 2 mg/dL); this makes AL-CA unlikely.  Technetium pyrophosphate scan positive.   8/19/2023 Cy Sanchez returns today for routine scheduled follow up. He is accompanied by his daughter, Franci. Since last visit he has begun therapy with Vyndamax 61 mg daily. He has also been contacted by the representatives of [employer] although there are still ongoing insurance issues for approval. Today he is feeling well and has no specific complaints. He continues to report neuropathy which he feels mostly in his hands and occasionally in his feet.  In October he is planning for surgical intervention of his nasal polyps which have caused obstruction and he is hopeful that his breathing will become easier. He denies any overt exertional dyspnea, orthopnea or PND.   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 He has 3 sisters, 2 in [place] and 1 in [place].  He has 2 sons and 1 daughter.  9/19/2023 Mr. Sanchez returns today for scheduled follow up and preoperative cardiovascular evaluation prior to planned endoscopic sinus surgery with possible septoplasty and bilateral inferior turbinate reduction with Cleveland Dinh MD on 10/11/2023. Today he reports feeling fair overall. He feels as if he has lost his appetite and has been vomiting. He is seen today down 8 pounds from before. He does remain fairly sedentary, spending 16 hours a day in bed, because sitting down is painful to his lower back. With his usual activities, he denies any chest discomfort. His exertional dyspnea is chronic and unchanged. He is seen today with pitting edema to his right foot.  November 2023 - Patient returns today for follow-up in his usual state of health. He remains on Vyndamax, but he is currently hesitant to start Amvuttra because he is not sure about taking an injection.  He has some lower extremity edema.  He sleeps lying flat with two pillows. He has no dyspnea on exertion. He has no orthopnea or paroxysmal nocturnal dyspnea.   2/14/2024 Cy Sanchez returns today for scheduled follow up. He was hospitalized for sepsis in the setting of Covid infection, treated with remdesivir, and urinary tract infection. During his stay he was also found to be hypotensive, hypoglycemic and with 2 brief episodes of wide complex tachycardia (16 beats on 1/11/2024 and 5 beats of 1/12/2024). He was discharged from [facility] and now has a home nursing services and physical therapy. He is now with complaints of pain in the feet and describes electric type, continuous pain. He puts Salon Pas patches on which he finds helpful to alleviate the symptoms. He has been out of Vyndamax for 2 months, but is otherwise taking his medications as directed.   3/18/2024 Cy Sanchez returns today for preoperative cardiovascular evaluation prior to planned robotic suprapubic prostatectomy with Evonne Marion MD on 3/21/2024. As he returns today he is feeling generally well and he has no specific cardiac complaints. He has been exercising by walking with his walker in the home and doing stepping exercises. With exertion he feels well and denies any chest discomfort, shortness of breath, palpitations, lightheadedness or syncope. We have reviewed his medications and he is taking all as directed with the exception of Vyndamax which he has been out of for the past few months.

## 2024-10-03 NOTE — CARDIOLOGY SUMMARY
[de-identified] : 7/12/2023, sinus at 77 bpm, LAFB, early repolarization, normal voltage ECG [de-identified] : 4/24/2023 1. Normal left ventricular cavity size. The left ventricular wall thickness is moderately increased. The left ventricular systolic function is mildly decreased with an ejection fraction of 41 % by Freeman's method of disks. There is global left ventricular hypokinesis. 2. There is normal left ventricular diastolic function. 3. Global longitudinal strain is -8.0 % (abnormal > -16%). GLS was assessed on [employer] echo machine with a heart rate of 80 bpm and a blood pressure of 104/65 mmHg. 4. Normal right ventricular cavity size, normal wall thickness and normal systolic function. The tricuspid annular plane systolic excursion (TAPSE) is 2.4 cm (normal >=1.7 cm). 5. With increased left ventricular thickening, decreased left ventricular function and abnormal strain, would suggest further evaluation for infiltrative cardiomyopathy vs hypertensive heart disease with Cardiac MRI. 6. No significant valvular disease. 7. No pericardial effusion seen. 8. Compared to the transthoracic echocardiogram performed on 7/21/2015 There is a decrease in LVEF. [de-identified] : 4/25/2023, technetium pyrophosphate scan, grade 3

## 2024-10-03 NOTE — DISCUSSION/SUMMARY
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  He is NYHA Class II.  Amyloid is stage 2 based on pro-BNP < 3000 and GFR < 45.  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 Continue stabilizer therapy with Vyndamax.  Would plan to start silencer therapy with Amvuttra for patient with hereditary disease familial amyloid polyneuropathy.  At this time he is reluctant to begin treatment as he does not want to take an injection. Should he change his mind, we discussed the need for vitamin A supplementation with treatment. He and his daughter have been educated on vitamin A supplementation and signs and symptoms of vitamin A toxicity.   Patient has moderately reduced LVEF, but patients with amyloid cardiomyopathy often do not tolerate guideline directed medical therapy for HFrEF.  Labs today. Follow up in 3 months.

## 2024-10-03 NOTE — CARDIOLOGY SUMMARY
[de-identified] : 7/12/2023, sinus at 77 bpm, LAFB, early repolarization, normal voltage ECG [de-identified] : 4/24/2023 1. Normal left ventricular cavity size. The left ventricular wall thickness is moderately increased. The left ventricular systolic function is mildly decreased with an ejection fraction of 41 % by Freeman's method of disks. There is global left ventricular hypokinesis. 2. There is normal left ventricular diastolic function. 3. Global longitudinal strain is -8.0 % (abnormal > -16%). GLS was assessed on [employer] echo machine with a heart rate of 80 bpm and a blood pressure of 104/65 mmHg. 4. Normal right ventricular cavity size, normal wall thickness and normal systolic function. The tricuspid annular plane systolic excursion (TAPSE) is 2.4 cm (normal >=1.7 cm). 5. With increased left ventricular thickening, decreased left ventricular function and abnormal strain, would suggest further evaluation for infiltrative cardiomyopathy vs hypertensive heart disease with Cardiac MRI. 6. No significant valvular disease. 7. No pericardial effusion seen. 8. Compared to the transthoracic echocardiogram performed on 7/21/2015 There is a decrease in LVEF. [de-identified] : 4/25/2023, technetium pyrophosphate scan, grade 3

## 2024-10-09 ENCOUNTER — APPOINTMENT (OUTPATIENT)
Dept: GERIATRICS | Facility: CLINIC | Age: 74
End: 2024-10-09

## 2024-10-10 ENCOUNTER — RX RENEWAL (OUTPATIENT)
Age: 74
End: 2024-10-10

## 2024-10-15 NOTE — DISCHARGE NOTE PROVIDER - NSRESEARCHGRANT_MLMHIDDEN_GEN_A_CORE
Called pt to schedule appt @ [facility] and advised him of new time and date for appt with Dr. Martines, pt verbalized understanding and had no further questions.   
Hub to relay.     Called pt to advise him that I have RS his apt from the 15th with [place] to the 17th with Dr. Martines due to him being a new pt. No answer LM.     His apt is 10/17/ at 11:15 am.   
yes

## 2024-10-31 NOTE — PROGRESS NOTE ADULT - PROBLEM SELECTOR PLAN 9
Quality 130: Documentation Of Current Medications In The Medical Record: Current Medications Documented Detail Level: Detailed Quality 358: Patient-Centered Surgical Risk Assessment And Communication: Documentation of patient-specific risk assessment with a risk calculator based on multi-institutional clinical data, the specific risk calculator used, and communication of risk assessment from risk calculator with the patient or family. Quality 47: Advance Care Plan: Advance Care Planning discussed and documented; advance care plan or surrogate decision maker documented in the medical record. - consistent with chronic disease w/low iron  - s/p venofer x3 day course  - Hgb stable ~9, no evidence of active bleeding  - 1/9 drop from 9 -> 8, no evidence of bleeding and HDS. Continue to trend  - Hgb stable at 8 - consistent with chronic disease w/low iron  - s/p venofer x3 day course  - Hgb stable ~9, no evidence of active bleeding  - 1/9 drop from 9 -> 8, no evidence of bleeding and HDS. Continue to trend  - Hgb stable at 8--improved back to 9

## 2024-11-05 ENCOUNTER — APPOINTMENT (OUTPATIENT)
Dept: GERIATRICS | Facility: CLINIC | Age: 74
End: 2024-11-05
Payer: MEDICARE

## 2024-11-05 DIAGNOSIS — D35.2 BENIGN NEOPLASM OF PITUITARY GLAND: ICD-10-CM

## 2024-11-05 DIAGNOSIS — E85.4 ORGAN-LIMITED AMYLOIDOSIS: ICD-10-CM

## 2024-11-05 DIAGNOSIS — E27.40 UNSPECIFIED ADRENOCORTICAL INSUFFICIENCY: ICD-10-CM

## 2024-11-05 DIAGNOSIS — M54.42 LUMBAGO WITH SCIATICA, LEFT SIDE: ICD-10-CM

## 2024-11-05 DIAGNOSIS — I43 ORGAN-LIMITED AMYLOIDOSIS: ICD-10-CM

## 2024-11-05 DIAGNOSIS — M54.41 LUMBAGO WITH SCIATICA, LEFT SIDE: ICD-10-CM

## 2024-11-05 DIAGNOSIS — G89.29 LUMBAGO WITH SCIATICA, LEFT SIDE: ICD-10-CM

## 2024-11-05 DIAGNOSIS — I50.20 UNSPECIFIED SYSTOLIC (CONGESTIVE) HEART FAILURE: ICD-10-CM

## 2024-11-05 DIAGNOSIS — E55.9 VITAMIN D DEFICIENCY, UNSPECIFIED: ICD-10-CM

## 2024-11-05 DIAGNOSIS — N18.32 CHRONIC KIDNEY DISEASE, STAGE 3B: ICD-10-CM

## 2024-11-05 PROCEDURE — 99443: CPT

## 2024-11-05 RX ORDER — DULOXETINE HYDROCHLORIDE 40 MG/1
40 CAPSULE, DELAYED RELEASE PELLETS ORAL
Qty: 90 | Refills: 0 | Status: ACTIVE | COMMUNITY
Start: 2024-11-05 | End: 1900-01-01

## 2024-11-05 RX ORDER — LIDOCAINE 5 G/100G
5 OINTMENT TOPICAL
Qty: 1 | Refills: 0 | Status: ACTIVE | COMMUNITY
Start: 2024-11-05 | End: 1900-01-01

## 2024-11-05 RX ORDER — LIDOCAINE 4% 4 G/100G
4 CREAM TOPICAL
Qty: 1 | Refills: 0 | Status: ACTIVE | COMMUNITY
Start: 2024-11-05 | End: 1900-01-01

## 2024-11-15 DIAGNOSIS — F41.9 ANXIETY DISORDER, UNSPECIFIED: ICD-10-CM

## 2024-11-15 RX ORDER — ALPRAZOLAM 0.25 MG/1
0.25 TABLET ORAL
Qty: 2 | Refills: 0 | Status: ACTIVE | COMMUNITY
Start: 2024-11-15 | End: 1900-01-01

## 2024-11-18 ENCOUNTER — OUTPATIENT (OUTPATIENT)
Dept: OUTPATIENT SERVICES | Facility: HOSPITAL | Age: 74
LOS: 1 days | End: 2024-11-18
Payer: MEDICARE

## 2024-11-18 ENCOUNTER — APPOINTMENT (OUTPATIENT)
Dept: MRI IMAGING | Facility: IMAGING CENTER | Age: 74
End: 2024-11-18
Payer: MEDICARE

## 2024-11-18 DIAGNOSIS — D35.2 BENIGN NEOPLASM OF PITUITARY GLAND: ICD-10-CM

## 2024-11-18 DIAGNOSIS — Z98.1 ARTHRODESIS STATUS: Chronic | ICD-10-CM

## 2024-11-18 PROCEDURE — A9585: CPT

## 2024-11-18 PROCEDURE — 70553 MRI BRAIN STEM W/O & W/DYE: CPT | Mod: 26

## 2024-11-18 PROCEDURE — 70553 MRI BRAIN STEM W/O & W/DYE: CPT

## 2024-11-25 ENCOUNTER — APPOINTMENT (OUTPATIENT)
Dept: NEPHROLOGY | Facility: CLINIC | Age: 74
End: 2024-11-25
Payer: MEDICARE

## 2024-11-25 VITALS
HEART RATE: 86 BPM | OXYGEN SATURATION: 97 % | HEIGHT: 62 IN | DIASTOLIC BLOOD PRESSURE: 83 MMHG | WEIGHT: 152 LBS | SYSTOLIC BLOOD PRESSURE: 145 MMHG | BODY MASS INDEX: 27.97 KG/M2

## 2024-11-25 VITALS — DIASTOLIC BLOOD PRESSURE: 80 MMHG | SYSTOLIC BLOOD PRESSURE: 138 MMHG

## 2024-11-25 DIAGNOSIS — I10 ESSENTIAL (PRIMARY) HYPERTENSION: ICD-10-CM

## 2024-11-25 DIAGNOSIS — N18.32 CHRONIC KIDNEY DISEASE, STAGE 3B: ICD-10-CM

## 2024-11-25 DIAGNOSIS — R60.0 LOCALIZED EDEMA: ICD-10-CM

## 2024-11-25 PROCEDURE — 99214 OFFICE O/P EST MOD 30 MIN: CPT

## 2024-11-25 PROCEDURE — G2211 COMPLEX E/M VISIT ADD ON: CPT

## 2024-12-19 ENCOUNTER — APPOINTMENT (OUTPATIENT)
Dept: ENDOCRINOLOGY | Facility: CLINIC | Age: 74
End: 2024-12-19

## 2025-01-09 NOTE — HISTORY OF PRESENT ILLNESS
[facility] INPATIENT ENCOUNTER  CRITICAL CARE DAILY PROGRESS NOTE    ADMISSION DATE:  1/7/2025  DATE:  1/9/2025  CURRENT HOSPITAL DAY:  Hospital Day: 3  ATTENDING PHYSICIAN:  Emile Salcedo MD  CODE STATUS:  Full Resuscitation    IMPRESSION:   Septic shock  Pyelonephritis  Hypotension, resolved   Acute renal failure  UTI  Bacteremia, E.coli   Elevated troponin, peak 176-RVSP 61 mm Hg. EF 38%       PLAN:   Blood cultures with E. Coli cont with Cefepime  Lasix drip 5 mg/hr   Nephrology following  Maintain O2 sats greater than 90%, trial off BiPAP  DVT and GI prophylaxis    Discussed with patient, RN      Chief Complaint: weakness    SUBJECTIVE:  The patient is on BiPAP and is awake.    MEDICATIONS:  SCHEDULED MEDS:  Current Facility-Administered Medications   Medication Dose Route Frequency Provider Last Rate Last Admin    Magnesium Standard Replacement Protocol   Does not apply See Admin Instructions Emile Salcedo MD        Potassium Standard Replacement Protocol (Levels 3.5 and lower)   Does not apply See Admin Instructions Emile Salceod MD        pantoprazole (PROTONIX INJECT) injection 40 mg  40 mg Intravenous Daily Prince LACY Murphy MD   40 mg at 01/09/25 1025    potassium CHLORIDE (KLOR-CON) packet 40 mEq  40 mEq Oral Once Emile Salcedo MD        nystatin (MYCOSTATIN) powder   Topical 2 times per day Emile Salcedo MD   Given at 01/09/25 0819    metoPROLOL tartrate (LOPRESSOR) tablet 12.5 mg  12.5 mg Oral 2 times per day Emile Salcedo MD        [Held by provider] bumetanide (BUMEX) tablet 2 mg  2 mg Oral BID Esvin Macdonald MD        heparin (porcine) injection 5,000 Units  5,000 Units Subcutaneous 3 times per day Prince LACY Murphy MD   5,000 Units at 01/08/25 2100    ceFEPIme (MAXIPIME) 2 g in sodium chloride 0.9 % 100 mL IVPB  2 g Intravenous Daily Prince LACY Murphy MD 25 mL/hr at 01/09/25 0816 2 g at 01/09/25 0816    insulin lispro (ADMELOG,HumaLOG) - Correction Dose   Subcutaneous TID WC Prince LACY Murphy  MD        insulin lispro (ADMELOG,HumaLOG) - Correction Dose   Subcutaneous Nightly Prince LACY Murphy MD        midodrine (PROAMATINE) tablet 5 mg  5 mg Oral TID AC Prince LACY Murphy MD   5 mg at 01/08/25 1120    WARFARIN - PHARMACIST MONITORED Misc   Does not apply See Admin Instructions Prince LACY Murphy MD           CONTINUOUS INFUSIONS:  Current Facility-Administered Medications   Medication Dose Route Frequency Provider Last Rate Last Admin    furosemide (LASIX) 250 mg in NaCl 0.9% 125 mL infusion  5 mg/hr Intravenous Continuous Isael Bain MD 2.5 mL/hr at 01/09/25 1113 5 mg/hr at 01/09/25 1113    NORepinephrine (LEVOPHED) 8 mg/250 mL in dextrose 5 % infusion  0-10 mcg/min Intravenous Continuous Prince LACY Murphy MD   Stopped at 01/08/25 0403       PRN MEDS:  Current Facility-Administered Medications   Medication Dose Route Frequency Provider Last Rate Last Admin    sodium chloride 0.9 % injection 10 mL  10 mL Intravenous PRN Emile Salcedo MD        acetaminophen (TYLENOL) suppository 325 mg  325 mg Rectal Q4H PRN Prince LACY Murphy MD   325 mg at 01/09/25 0400    metoPROLOL (LOPRESSOR) injection 5 mg  5 mg Intravenous Q6H PRN Emile Salcedo MD        ondansetron (ZOFRAN) injection 4 mg  4 mg Intravenous Q6H PRN Abelardo Marcos MD   4 mg at 01/09/25 0930    sodium chloride (NORMAL SALINE) 0.9 % bolus 500 mL  500 mL Intravenous PRN Prince LACY Murphy MD   Stopped at 01/07/25 1846    dextrose 50 % injection 25 g  25 g Intravenous PRN Prince LACY Murphy MD        dextrose 50 % injection 12.5 g  12.5 g Intravenous PRN Prince LACY Murphy MD        glucagon (GLUCAGEN) injection 1 mg  1 mg Intramuscular PRN Prince LACY Murphy MD        dextrose (GLUTOSE) 40 % gel 15 g  15 g Oral PRN Prince LACY Murphy MD        dextrose (GLUTOSE) 40 % gel 30 g  30 g Oral PRN Prince LACY Murphy MD        melatonin tablet 3 mg  3 mg Oral Nightly PRN Prince LACY Murphy MD        acetaminophen (TYLENOL) tablet 650 mg  650 mg Oral Q4H PRN  [FreeTextEntry1] : He is a 72-year-old man who is seen today for initial visit with his daughter.  He developed urinary retention in April 2023 and was hospitalized.  He had a Harkins catheter briefly which was removed after that.  He was started on finasteride and tamsulosin.  Creatinine level was 2.2 and it decreased to 1.66.  Ultrasound showed no hydronephrosis and the prostate measured about 160 g.  Now nocturia is around 4 times.  Residual urine volume today was about 180 cc.  In 2015 PSA level was 5.6.  Prostate biopsy in 2013 was negative.  He thinks that the PSA levels have remained around the same.  He uses a cane to walk. Prince LACY Murphy MD   650 mg at 01/08/25 0837    polyethylene glycol (MIRALAX) packet 17 g  17 g Oral Daily PRN Prince LACY Murphy MD        docusate sodium-sennosides (SENOKOT S) 50-8.6 MG 2 tablet  2 tablet Oral Daily PRN Prince LACY Murphy MD        trimethobenzamide (TIGAN) injection 200 mg  200 mg Intramuscular Q6H PRN Prince LACY Murphy MD        clonazePAM (KlonoPIN) tablet 1 mg  1 mg Oral BID PRN Abelardo Marcos MD   1 mg at 01/07/25 0221       HISTORIES:  I have reviewed the past medical history, family history, social history, medications and allergies listed in the medical record as well as the nursing notes for this encounter.    OBJECTIVE:  VITAL SIGNS:   Vital Last Value 24 Hour Range   Temperature 99.5 °F (37.5 °C) (01/09/25 0900) Temp  Min: 99.5 °F (37.5 °C)  Max: 102 °F (38.9 °C)   Pulse 84 (01/09/25 0900) Pulse  Min: 79  Max: 96   Respiratory (!) 34 (01/09/25 0900) Resp  Min: 20  Max: 42   Non-Invasive  Blood Pressure 124/65 (01/09/25 0900) BP  Min: 96/56  Max: 138/68   Pulse Oximetry 99 % (01/09/25 0915) SpO2  Min: 96 %  Max: 100 %     Vital Today Admitted   Weight 94.8 kg (208 lb 15.9 oz) (01/09/25 0500) Weight: 100.6 kg (221 lb 12.5 oz) (01/07/25 1400)   Height N/A Height: 5' 2\" (157.5 cm) (01/07/25 1758)   BMI N/A BMI (Calculated): 41.09 (01/08/25 0527)       VENT SETTINGS LAST 24 HOURS:  FiO2 (%):  [30 %] 30 %      PHYSICAL EXAM:  General:  awake, on BIPAP  Skin:  Skin color, texture, and turgor normal.  No rashes or lesions. Face is flush.   Head:  Normocephalic, without obvious abnormality, atraumatic.  Eyes:  PERRL, conjunctivae/corneas clear.  Nose: Nares normal. Septum midline.  Throat:  Lips, mucosa, and tongue normal; teeth and gums normal.  Neck: Supple, symmetrical.  Trachea midline. No adenopathy.  Lungs:  crackles bilaterally, no wheeze  Heart: Regular rate and rhythm. S1 and S2 normal. No murmur, rub or gallop.  Abdomen:  Soft, nontender.  Bowel sounds active in all four  quadrants. No masses or hepatomegaly or splenomegaly.  Extremities:  Normal, atraumatic, no cyanosis, trace lower extrem  edema.  Neuro: awake, alert, LEAVITT    LABORATORY DATA:  Recent Labs   Lab 01/09/25  0423   SODIUM 134*   POTASSIUM 3.4   CHLORIDE 96*   CO2 30   ANIONGAP 11   GLUCOSE 115*   BUN 52*   CREATININE 2.32*   CALCIUM 8.8     Recent Labs   Lab 01/09/25  0423   WBC 7.5   HCT 32.8*   HGB 10.7*        TROPONIN I (ng/mL)   Date Value   09/17/2019 <0.02       Troponin I, High Sensitivity:   Recent Labs   Lab 01/07/25  1856   HTROPI 160*     Lab Results   Component Value Date    NTPROB 57,755 (H) 01/07/2025     Lab Results   Component Value Date    PCT 91.85 (H) 01/08/2025     Lactate, Venous (mmol/L)   Date Value   01/07/2025 1.9       Lab Results   Component Value Date    BLC No Growth <24 hours 01/09/2025     No components found for: \"UC\"    IMAGING STUDIES:    Radiographic images have been reviewed by me personally.      No results found for this or any previous visit.    Results for orders placed during the hospital encounter of 01/07/25    XR CHEST AP OR PA    Impression  1. No acute cardiopulmonary disease. No interval change.  2. Mild, chronic/stable cardiomegaly. No vascular congestion.        Results for orders placed during the hospital encounter of 01/07/25    CT ABDOMEN PELVIS WO CONTRAST    Impression  1. Edema and perirenal stranding along the left kidney and upper ureter  with no definite obstructing urolithiasis. Findings are concerning for  infection.                          On 01/09/25, IJanet NP scribed the services personally performed by PHU Cline MD      __________  The documentation recorded by the scribe accurately and completely reflects the service(s) I personally performed and the decisions made during the visit today, 01/09/25    Gabe Cline MD  Pulmonary and Critical Care

## 2025-01-16 ENCOUNTER — OUTPATIENT (OUTPATIENT)
Dept: OUTPATIENT SERVICES | Facility: HOSPITAL | Age: 75
LOS: 1 days | End: 2025-01-16
Payer: MEDICARE

## 2025-01-16 ENCOUNTER — APPOINTMENT (OUTPATIENT)
Dept: RADIOLOGY | Facility: IMAGING CENTER | Age: 75
End: 2025-01-16
Payer: MEDICARE

## 2025-01-16 ENCOUNTER — APPOINTMENT (OUTPATIENT)
Dept: MRI IMAGING | Facility: IMAGING CENTER | Age: 75
End: 2025-01-16
Payer: MEDICARE

## 2025-01-16 DIAGNOSIS — Z00.8 ENCOUNTER FOR OTHER GENERAL EXAMINATION: ICD-10-CM

## 2025-01-16 DIAGNOSIS — Z98.1 ARTHRODESIS STATUS: Chronic | ICD-10-CM

## 2025-01-16 PROCEDURE — 72148 MRI LUMBAR SPINE W/O DYE: CPT | Mod: 26

## 2025-01-16 PROCEDURE — 72110 X-RAY EXAM L-2 SPINE 4/>VWS: CPT | Mod: 26

## 2025-01-16 PROCEDURE — 72110 X-RAY EXAM L-2 SPINE 4/>VWS: CPT

## 2025-01-16 PROCEDURE — 72148 MRI LUMBAR SPINE W/O DYE: CPT

## 2025-01-21 NOTE — PATIENT PROFILE ADULT - HOW PATIENT ADDRESSED, PROFILE
"EP Problems:   Ventricular tachycardia  - 1/2016:  VT ablation, [place]  - 10/2017:  VT ablation, [facility]  - 1/2019:  VT ablation, [facility]  2.  Presence of an ICD  - 2008:  DOI  -2/2022:  Gen change, [employer]  3.  Atrial flutter, uncertain type     Cardiology problems:   HTN  Chronic systolic heart failure   HOCM  Stroke  LV thrombus     Medical problems:  1.  Medication nonadherence  2.  Obesity  3.  Prostate cancer  4.  Type II DM  5.  Lower extremity venous stasis ulcerations    Patient ID:  Jeanie Grossman is a 64 y.o. male with problem list as above as above who EP is following for acute on chronic systolic heart failure, atrial flutter.    Subjective:  Rates remain well-controlled.  Volume status continues to improve.    Objective:  /47 (BP Location: Left arm, Patient Position: Lying)   Pulse 64   Temp 98.3 °F (36.8 °C) (Oral)   Resp 16   Ht 190.5 cm (75\")   Wt 119 kg (261 lb 9.6 oz)   SpO2 99%   BMI 32.70 kg/m²     Obese, chronically ill-appearing  Normal work of breathing  Regular rate and rhythm  Trace lower extremity edema, legs are wrapped in dressing    Labs today:  Lab Results   Component Value Date    GLUCOSE 116 (H) 01/21/2025    CALCIUM 9.0 01/21/2025     01/21/2025    K 4.2 01/21/2025    CO2 25.0 01/21/2025    BUN 30 (H) 01/21/2025    CREATININE 1.48 (H) 01/21/2025    EGFR 51.9 (L) 01/21/2025     Lab Results   Component Value Date    MG 2.5 (H) 01/21/2025       Telemetry directly visualized and independently reviewed: Atrial flutter, predominantly with normal rate, intermittent ventricular pacing    Assessment:  Atrial flutter, uncertain type  Acute on chronic systolic heart failure, improved  Medication nonadherence  Hypertrophic cardiomyopathy    Plan:  -Can restart oral diuretics tomorrow  -Continue spironolactone  -Continue carvedilol current dose  -Continue apixaban 5 mg twice daily  -Tentative plans for outpatient ablation on 2/6/2025  -Okay for discharge home from EP " standpoint; will arrange outpatient follow-up    Part of this note may be an electronic transcription/translation of spoken language to printed text using the Dragon Dictation System.     Cy

## 2025-01-28 ENCOUNTER — OUTPATIENT (OUTPATIENT)
Dept: OUTPATIENT SERVICES | Facility: HOSPITAL | Age: 75
LOS: 1 days | End: 2025-01-28

## 2025-01-28 DIAGNOSIS — Z98.1 ARTHRODESIS STATUS: Chronic | ICD-10-CM

## 2025-02-06 ENCOUNTER — APPOINTMENT (OUTPATIENT)
Dept: ENDOCRINOLOGY | Facility: CLINIC | Age: 75
End: 2025-02-06

## 2025-04-11 NOTE — DISCHARGE NOTE PROVIDER - NSDCMRMEDTOKEN_GEN_ALL_CORE_FT
In an effort to ensure that our patients LiveWell, a Team Member has reviewed your chart and identified an opportunity to provide the best care possible. An attempt was made to discuss or schedule due or overdue Preventive or Chronic Condition care.Care Gaps identified:     The Outcome was Contact was made, appointment declined.   Type of Appointment needed: Comprehensive Annual Visit    Pt  is not interested at this time.    Name: JOCE MEDEL    ### Patient Details  YOB: 1957  MRN: [id number]    ### Encounter Details  Arrival Date: N/A  Discharge Date: N/A  Encounter ID: [id number]    ### Related interaction  [facility] Comprehensive Annual Visit ([place] CAV Outreach) ([email or web address])   acetaminophen 325 mg oral tablet: 2 tab(s) orally every 6 hours As needed Temp greater or equal to 38C (100.4F), Mild Pain (1 - 3)  benzonatate 100 mg oral capsule: 1 cap(s) orally every 8 hours as needed for Cough  colchicine 0.6 mg oral capsule: 1 cap(s) orally once a day  finasteride 5 mg oral tablet: 1 tab(s) orally once a day  fluticasone 50 mcg/inh nasal spray: 1 spray(s) nasal 2 times a day  sodium chloride 0.65% nasal spray: 2 spray(s) nasal 3 times a day  tamsulosin 0.4 mg oral capsule: 1 cap(s) orally once a day (at bedtime)   acetaminophen 325 mg oral tablet: 2 tab(s) orally every 6 hours As needed Temp greater or equal to 38C (100.4F), Mild Pain (1 - 3)  benzonatate 100 mg oral capsule: 1 cap(s) orally every 8 hours as needed for Cough  colchicine 0.6 mg oral capsule: 1 cap(s) orally once a day  finasteride 5 mg oral tablet: 1 tab(s) orally once a day  fluticasone 50 mcg/inh nasal spray: 1 spray(s) nasal 2 times a day  sodium chloride 0.65% nasal spray: 2 spray(s) nasal 3 times a day  tamsulosin 0.4 mg oral capsule: 1 cap(s) orally once a day (at bedtime)  Vyndamax 61 mg oral capsule: 1 cap(s) orally once a day

## 2025-04-15 NOTE — H&P ADULT - HISTORY OF PRESENT ILLNESS
73 y/o M with PMHx amyloidosis, BPH presents to the ED for chest pain. Pt states he was supposed to get MRI to evaluate prostate today but had episode of sharp left sided chest pain and was sent to ED for evaluation and MRI was not completed. Pt states pain only lasted a few seconds and he has no other complaints at this time. Pt states he has had similar episodes in past but never thought anything of it. Denies fevers, chills, SOB, palpitations, abd pain, n/v/d/c, dysuria, hematuria.

## 2025-04-23 ENCOUNTER — APPOINTMENT (OUTPATIENT)
Dept: GERIATRICS | Facility: CLINIC | Age: 75
End: 2025-04-23
Payer: MEDICARE

## 2025-04-23 ENCOUNTER — NON-APPOINTMENT (OUTPATIENT)
Age: 75
End: 2025-04-23

## 2025-04-23 VITALS
WEIGHT: 141 LBS | RESPIRATION RATE: 13 BRPM | OXYGEN SATURATION: 97 % | TEMPERATURE: 98.8 F | BODY MASS INDEX: 25.79 KG/M2 | DIASTOLIC BLOOD PRESSURE: 63 MMHG | HEART RATE: 74 BPM | SYSTOLIC BLOOD PRESSURE: 98 MMHG

## 2025-04-23 DIAGNOSIS — M54.42 LUMBAGO WITH SCIATICA, LEFT SIDE: ICD-10-CM

## 2025-04-23 DIAGNOSIS — G89.29 LUMBAGO WITH SCIATICA, LEFT SIDE: ICD-10-CM

## 2025-04-23 DIAGNOSIS — F32.A DEPRESSION, UNSPECIFIED: ICD-10-CM

## 2025-04-23 DIAGNOSIS — I10 ESSENTIAL (PRIMARY) HYPERTENSION: ICD-10-CM

## 2025-04-23 DIAGNOSIS — M54.41 LUMBAGO WITH SCIATICA, LEFT SIDE: ICD-10-CM

## 2025-04-23 DIAGNOSIS — E55.9 VITAMIN D DEFICIENCY, UNSPECIFIED: ICD-10-CM

## 2025-04-23 DIAGNOSIS — E85.4 ORGAN-LIMITED AMYLOIDOSIS: ICD-10-CM

## 2025-04-23 DIAGNOSIS — I50.20 UNSPECIFIED SYSTOLIC (CONGESTIVE) HEART FAILURE: ICD-10-CM

## 2025-04-23 DIAGNOSIS — R26.9 UNSPECIFIED ABNORMALITIES OF GAIT AND MOBILITY: ICD-10-CM

## 2025-04-23 DIAGNOSIS — R26.81 UNSTEADINESS ON FEET: ICD-10-CM

## 2025-04-23 DIAGNOSIS — Z71.89 OTHER SPECIFIED COUNSELING: ICD-10-CM

## 2025-04-23 DIAGNOSIS — I43 ORGAN-LIMITED AMYLOIDOSIS: ICD-10-CM

## 2025-04-23 PROCEDURE — 99497 ADVNCD CARE PLAN 30 MIN: CPT | Mod: 25

## 2025-04-23 PROCEDURE — 99215 OFFICE O/P EST HI 40 MIN: CPT

## 2025-04-23 RX ORDER — LIDOCAINE 5% 700 MG/1
5 PATCH TOPICAL
Qty: 1 | Refills: 4 | Status: ACTIVE | COMMUNITY
Start: 2025-04-23 | End: 1900-01-01

## 2025-04-24 ENCOUNTER — LABORATORY RESULT (OUTPATIENT)
Age: 75
End: 2025-04-24

## 2025-05-19 ENCOUNTER — APPOINTMENT (OUTPATIENT)
Dept: NEPHROLOGY | Facility: CLINIC | Age: 75
End: 2025-05-19
Payer: MEDICARE

## 2025-05-19 VITALS
DIASTOLIC BLOOD PRESSURE: 76 MMHG | OXYGEN SATURATION: 98 % | SYSTOLIC BLOOD PRESSURE: 122 MMHG | HEART RATE: 68 BPM | HEIGHT: 62 IN | TEMPERATURE: 97.3 F | WEIGHT: 146 LBS | BODY MASS INDEX: 26.87 KG/M2

## 2025-05-19 DIAGNOSIS — N18.32 CHRONIC KIDNEY DISEASE, STAGE 3B: ICD-10-CM

## 2025-05-19 DIAGNOSIS — E87.20 ACIDOSIS, UNSPECIFIED: ICD-10-CM

## 2025-05-19 DIAGNOSIS — I10 ESSENTIAL (PRIMARY) HYPERTENSION: ICD-10-CM

## 2025-05-19 DIAGNOSIS — D63.1 CHRONIC KIDNEY DISEASE, STAGE 3B: ICD-10-CM

## 2025-05-19 PROCEDURE — G2211 COMPLEX E/M VISIT ADD ON: CPT

## 2025-05-19 PROCEDURE — 99214 OFFICE O/P EST MOD 30 MIN: CPT

## 2025-05-22 LAB
ALBUMIN SERPL ELPH-MCNC: 4.2 G/DL
ANION GAP SERPL CALC-SCNC: 14 MMOL/L
APPEARANCE: CLEAR
BACTERIA: NEGATIVE /HPF
BILIRUBIN URINE: NEGATIVE
BLOOD URINE: NEGATIVE
BUN SERPL-MCNC: 27 MG/DL
CALCIUM SERPL-MCNC: 9.4 MG/DL
CALCIUM SERPL-MCNC: 9.4 MG/DL
CAST: 0 /LPF
CHLORIDE SERPL-SCNC: 102 MMOL/L
CO2 SERPL-SCNC: 21 MMOL/L
COLOR: YELLOW
CREAT SERPL-MCNC: 2.11 MG/DL
CREAT SPEC-SCNC: 73 MG/DL
CREAT SPEC-SCNC: 73 MG/DL
CREAT/PROT UR: 0.1 RATIO
CYSTATIN C SERPL-MCNC: 1.96 MG/L
EGFRCR SERPLBLD CKD-EPI 2021: 32 ML/MIN/1.73M2
EPITHELIAL CELLS: 0 /HPF
GFR/BSA.PRED SERPLBLD CYS-BASED-ARV: 30 ML/MIN/1.73M2
GLUCOSE QUALITATIVE U: NEGATIVE MG/DL
GLUCOSE SERPL-MCNC: 60 MG/DL
HCT VFR BLD CALC: 35 %
HGB BLD-MCNC: 11.1 G/DL
KETONES URINE: NEGATIVE MG/DL
LEUKOCYTE ESTERASE URINE: NEGATIVE
MCHC RBC-ENTMCNC: 28.2 PG
MCHC RBC-ENTMCNC: 31.7 G/DL
MCV RBC AUTO: 88.8 FL
MICROALBUMIN 24H UR DL<=1MG/L-MCNC: <1.2 MG/DL
MICROALBUMIN/CREAT 24H UR-RTO: NORMAL MG/G
MICROSCOPIC-UA: NORMAL
NITRITE URINE: NEGATIVE
PARATHYROID HORMONE INTACT: 35 PG/ML
PH URINE: 6.5
PHOSPHATE SERPL-MCNC: 3.9 MG/DL
PLATELET # BLD AUTO: 133 K/UL
POTASSIUM SERPL-SCNC: 4.4 MMOL/L
PROT UR-MCNC: 5 MG/DL
PROTEIN URINE: NEGATIVE MG/DL
RBC # BLD: 3.94 M/UL
RBC # FLD: 16.4 %
RED BLOOD CELLS URINE: 0 /HPF
SODIUM SERPL-SCNC: 137 MMOL/L
SPECIFIC GRAVITY URINE: 1.01
UROBILINOGEN URINE: 0.2 MG/DL
WBC # FLD AUTO: 5.5 K/UL
WHITE BLOOD CELLS URINE: 0 /HPF

## 2025-09-11 ENCOUNTER — APPOINTMENT (OUTPATIENT)
Dept: GERIATRICS | Facility: CLINIC | Age: 75
End: 2025-09-11

## 2025-09-19 ENCOUNTER — APPOINTMENT (OUTPATIENT)
Dept: GERIATRICS | Facility: CLINIC | Age: 75
End: 2025-09-19
Payer: MEDICARE

## 2025-09-19 VITALS — WEIGHT: 139 LBS | BODY MASS INDEX: 25.42 KG/M2

## 2025-09-19 VITALS
TEMPERATURE: 98.4 F | OXYGEN SATURATION: 99 % | HEART RATE: 68 BPM | RESPIRATION RATE: 14 BRPM | DIASTOLIC BLOOD PRESSURE: 71 MMHG | SYSTOLIC BLOOD PRESSURE: 110 MMHG

## 2025-09-19 DIAGNOSIS — I50.20 UNSPECIFIED SYSTOLIC (CONGESTIVE) HEART FAILURE: ICD-10-CM

## 2025-09-19 DIAGNOSIS — R60.0 LOCALIZED EDEMA: ICD-10-CM

## 2025-09-19 DIAGNOSIS — R26.81 UNSTEADINESS ON FEET: ICD-10-CM

## 2025-09-19 DIAGNOSIS — E85.4 ORGAN-LIMITED AMYLOIDOSIS: ICD-10-CM

## 2025-09-19 DIAGNOSIS — Z91.148 PATIENT'S OTHER NONCOMPLIANCE WITH MEDICATION REGIMEN FOR OTHER REASON: ICD-10-CM

## 2025-09-19 DIAGNOSIS — R53.81 OTHER MALAISE: ICD-10-CM

## 2025-09-19 DIAGNOSIS — Z23 ENCOUNTER FOR IMMUNIZATION: ICD-10-CM

## 2025-09-19 DIAGNOSIS — M54.42 LUMBAGO WITH SCIATICA, LEFT SIDE: ICD-10-CM

## 2025-09-19 DIAGNOSIS — E27.40 UNSPECIFIED ADRENOCORTICAL INSUFFICIENCY: ICD-10-CM

## 2025-09-19 DIAGNOSIS — N13.8 BENIGN PROSTATIC HYPERPLASIA WITH LOWER URINARY TRACT SYMPMS: ICD-10-CM

## 2025-09-19 DIAGNOSIS — E78.5 HYPERLIPIDEMIA, UNSPECIFIED: ICD-10-CM

## 2025-09-19 DIAGNOSIS — G89.29 LUMBAGO WITH SCIATICA, LEFT SIDE: ICD-10-CM

## 2025-09-19 DIAGNOSIS — I43 ORGAN-LIMITED AMYLOIDOSIS: ICD-10-CM

## 2025-09-19 DIAGNOSIS — N40.1 BENIGN PROSTATIC HYPERPLASIA WITH LOWER URINARY TRACT SYMPMS: ICD-10-CM

## 2025-09-19 DIAGNOSIS — R41.89 OTHER SYMPTOMS AND SIGNS INVOLVING COGNITIVE FUNCTIONS AND AWARENESS: ICD-10-CM

## 2025-09-19 DIAGNOSIS — M54.41 LUMBAGO WITH SCIATICA, LEFT SIDE: ICD-10-CM

## 2025-09-19 PROCEDURE — G2211 COMPLEX E/M VISIT ADD ON: CPT

## 2025-09-19 PROCEDURE — 99215 OFFICE O/P EST HI 40 MIN: CPT

## 2025-09-19 PROCEDURE — 99417 PROLNG OP E/M EACH 15 MIN: CPT

## 2025-09-19 RX ORDER — ZOSTER VACCINE RECOMBINANT, ADJUVANTED 50 MCG/0.5
50 KIT INTRAMUSCULAR
Qty: 1 | Refills: 0 | Status: ACTIVE | COMMUNITY
Start: 2025-09-19 | End: 1900-01-01

## 2025-09-19 RX ORDER — ROPINIROLE 0.25 MG/1
0.25 TABLET, FILM COATED ORAL
Qty: 30 | Refills: 0 | Status: ACTIVE | COMMUNITY
Start: 2025-09-19 | End: 1900-01-01

## 2025-09-20 PROBLEM — R41.89 COGNITIVE CHANGES: Status: ACTIVE | Noted: 2025-09-20

## 2025-09-20 PROBLEM — Z91.148 NON COMPLIANCE W MEDICATION REGIMEN: Status: ACTIVE | Noted: 2025-09-20

## (undated) DEVICE — XI ARM DISSECTOR CURVED BIPOLAR 8MM

## (undated) DEVICE — DRAPE 3/4 SHEET W REINFORCEMENT 56X77"

## (undated) DEVICE — XI SEAL UNIVERSIAL 5-12MM

## (undated) DEVICE — XI DRAPE ARM

## (undated) DEVICE — DRSG TEGADERM 2.5X3"

## (undated) DEVICE — PREP BETADINE SPONGE STICKS

## (undated) DEVICE — FOLEY TRAY 16FR 5CC LTX UMETER CLOSED

## (undated) DEVICE — MIDAS REX MR8 CLEARVIEW TN BALL 4MM X 13CM

## (undated) DEVICE — XI ARM CLIP APPLIER LARGE

## (undated) DEVICE — BLADE MEDTRONIC ENT SILVER BULLET ROTATABLE STRAIGHT 2.9MM X 11CM

## (undated) DEVICE — DRAIN JACKSON PRATT 10MM FLAT FULL NO TROCAR

## (undated) DEVICE — DRAPE MAYO STAND 30"

## (undated) DEVICE — GLV 8.5 PROTEXIS (WHITE)

## (undated) DEVICE — VENODYNE/SCD SLEEVE CALF LARGE

## (undated) DEVICE — DRSG NASOPORE 8CM STANDARD

## (undated) DEVICE — SOL IRR POUR H2O 250ML

## (undated) DEVICE — VISITEC 4X4

## (undated) DEVICE — BLADE SCALPEL SAFETYLOCK #10

## (undated) DEVICE — BLADE MEDTRONIC ENT FUSION TRICUT ROTATABLE STRAIGHT 4MM X 13CM

## (undated) DEVICE — GLV 8 PROTEXIS (WHITE)

## (undated) DEVICE — SOL IRR BAG H2O 3000ML

## (undated) DEVICE — ELCTR BOVIE SUCTION 11FR 8"

## (undated) DEVICE — POSITIONER PINK PAD PIGAZZI SYSTEM

## (undated) DEVICE — GOWN XXXL

## (undated) DEVICE — POSITIONER FOAM HEAD CRADLE (PINK)

## (undated) DEVICE — ELCTR BOVIE PENCIL HANDPIECE

## (undated) DEVICE — XI ARM PERMANENT CAUTERY HOOK

## (undated) DEVICE — DRAPE SPLIT SHEET 77" X 108"

## (undated) DEVICE — DRSG MASTISOL

## (undated) DEVICE — DRAPE INSTRUMENT POUCH 6.75" X 11"

## (undated) DEVICE — Device

## (undated) DEVICE — PACK ROBOTIC LIJ

## (undated) DEVICE — BAG URINE W METER 2L

## (undated) DEVICE — STRYKER MALLEABLE SUCTION MEDIUM STANDARD

## (undated) DEVICE — POSITIONER FOAM EGG CRATE ULNAR 2PCS (PINK)

## (undated) DEVICE — MEDICATION LABELS W MARKER

## (undated) DEVICE — XI ARM CLIP APPLIER MEDIUM-LARGE

## (undated) DEVICE — DRAPE LIGHT HANDLE COVER (BLUE)

## (undated) DEVICE — BLADE SCALPEL SAFETYLOCK #15

## (undated) DEVICE — SOL IRR POUR NS 0.9% 500ML

## (undated) DEVICE — GLV 7.5 PROTEXIS (WHITE)

## (undated) DEVICE — DRAPE TOWEL BLUE 17" X 24"

## (undated) DEVICE — MEDTRONIC INSTRUMENT TRACKER ENT

## (undated) DEVICE — BASIN SET SINGLE

## (undated) DEVICE — XI ARM FORCEP MARYLAND BIPOLAR

## (undated) DEVICE — VENODYNE/SCD SLEEVE CALF MEDIUM

## (undated) DEVICE — XI ARM SCISSOR MONO CURVED

## (undated) DEVICE — SUT VLOC 90 3-0 6" CV-23 UNDYED

## (undated) DEVICE — DRSG STERISTRIPS 0.5 X 4"

## (undated) DEVICE — SYR LUER LOK 10CC

## (undated) DEVICE — STAPLER SKIN VISI-STAT 35 WIDE

## (undated) DEVICE — DRSG OPSITE 13.75 X 4"

## (undated) DEVICE — TROCAR SURGIQUEST AIRSEAL 8MMX100MM

## (undated) DEVICE — XI ARM NEEDLE DRIVER LARGE

## (undated) DEVICE — POSITIONER STRAP ARMBOARD VELCRO TS-30

## (undated) DEVICE — XI TIP COVER

## (undated) DEVICE — LAP PAD 18 X 18"

## (undated) DEVICE — MEDTRONIC PATIENT TRACKER ENT

## (undated) DEVICE — URETERAL CATH RED RUBBER 10FR (BLACK)

## (undated) DEVICE — SPONGE END-STRUNG CYLINDRICAL .5X1.5"

## (undated) DEVICE — XI ARM FORCEP FENESTRATED BIPOLAR 8MM

## (undated) DEVICE — XI ARM FORCEP TENACULUM

## (undated) DEVICE — NDL SPINAL 25G X 2"

## (undated) DEVICE — MEDTRONIC AXIEM PATIENT TRACKER NON-INVASIVE

## (undated) DEVICE — LUBRICATING JELLY ONESHOT 1.25OZ

## (undated) DEVICE — GLV 7.5 PROTEXIS (CREAM) MICRO

## (undated) DEVICE — GLV 6.5 PROTEXIS (WHITE)

## (undated) DEVICE — DRAIN RESERVOIR FOR JACKSON PRATT 100CC CARDINAL

## (undated) DEVICE — DRSG SPLINT DYL II ARWY SILICONE

## (undated) DEVICE — SUCTION YANKAUER NO CONTROL VENT

## (undated) DEVICE — D HELP - CLEARVIEW CLEARIFY SYSTEM

## (undated) DEVICE — NDL HYPO REGULAR BEVEL 25G X 1.5" (BLUE)

## (undated) DEVICE — XI ARM FORCEP PROGRASP 8MM

## (undated) DEVICE — ELCTR BOVIE TIP CLEANER SCRATCH PAD

## (undated) DEVICE — SOL IRR BAG NS 0.9% 3000ML

## (undated) DEVICE — ELCTR GROUNDING PAD ADULT COVIDIEN

## (undated) DEVICE — DRSG SPLINT INTRA NASAL .5MM STANDARD THICK

## (undated) DEVICE — WARMING BLANKET UPPER ADULT

## (undated) DEVICE — DRSG NASOPORE 8CM FIRM

## (undated) DEVICE — SUT CAPROSYN 4-0 P-12 UNDYED

## (undated) DEVICE — TUBING STRYKEFLOW II SUCTION / IRRIGATOR

## (undated) DEVICE — XI DRAPE COLUMN

## (undated) DEVICE — SUT VICRYL 0 27" UR-6

## (undated) DEVICE — ELCTR BOVIE SUCTION 8FR 4.5"

## (undated) DEVICE — TUBING AIRSEAL TRI-LUMEN FILTERED

## (undated) DEVICE — GOWN XL

## (undated) DEVICE — WARMING BLANKET LOWER ADULT

## (undated) DEVICE — ELCTR BOVIE PENCIL SMOKE EVACUATION

## (undated) DEVICE — BLADE SCALPEL SAFETYLOCK #11

## (undated) DEVICE — TUBING IRRIGATION STRAIGHT SHOT

## (undated) DEVICE — GLV 7 PROTEXIS (WHITE)

## (undated) DEVICE — XI ARM GRASPER TIP UP FENESTRATED

## (undated) DEVICE — SPECIMEN CONTAINER 100ML

## (undated) DEVICE — SUCTION COAGULATOR HAND CONTROL 10FR X 6"

## (undated) DEVICE — INSUFFLATION NDL COVIDIEN SURGINEEDLE VERESS 120MM

## (undated) DEVICE — XI OBTURATOR OPTICAL BLADELESS 8MM

## (undated) DEVICE — TUBING SUCTION 20FT

## (undated) DEVICE — SYR CATH TIP 2 OZ

## (undated) DEVICE — GOWN TRIMAX LG

## (undated) DEVICE — ELCTR BOVIE TIP NEEDLE INSULATED 2.8" EDGE

## (undated) DEVICE — DRSG NASOPORE 4CM FIRM

## (undated) DEVICE — POSITIONER PURPLE ARM ONE STEP (LARGE)

## (undated) DEVICE — DRAPE CAMERA COVER

## (undated) DEVICE — ELCTR HEX BLADE

## (undated) DEVICE — XI ARM PERMANENT CAUTERY SPATULA

## (undated) DEVICE — PACK THYROID HEAD NECK

## (undated) DEVICE — LIJ/LIA-CAMERA VIDEO C MOUNT DIGITAL 3 CHIP CAM: Type: DURABLE MEDICAL EQUIPMENT